# Patient Record
Sex: FEMALE | Race: WHITE | NOT HISPANIC OR LATINO | Employment: OTHER | URBAN - NONMETROPOLITAN AREA
[De-identification: names, ages, dates, MRNs, and addresses within clinical notes are randomized per-mention and may not be internally consistent; named-entity substitution may affect disease eponyms.]

---

## 2017-07-20 ENCOUNTER — PREP FOR SURGERY (OUTPATIENT)
Dept: OTHER | Facility: HOSPITAL | Age: 47
End: 2017-07-20

## 2017-07-20 ENCOUNTER — OFFICE VISIT (OUTPATIENT)
Dept: SURGERY | Facility: CLINIC | Age: 47
End: 2017-07-20

## 2017-07-20 VITALS
BODY MASS INDEX: 43.32 KG/M2 | HEIGHT: 65 IN | DIASTOLIC BLOOD PRESSURE: 72 MMHG | SYSTOLIC BLOOD PRESSURE: 124 MMHG | WEIGHT: 260 LBS

## 2017-07-20 DIAGNOSIS — K81.1 CHRONIC CHOLECYSTITIS: Primary | ICD-10-CM

## 2017-07-20 PROCEDURE — 99204 OFFICE O/P NEW MOD 45 MIN: CPT | Performed by: SURGERY

## 2017-07-20 RX ORDER — SODIUM CHLORIDE 0.9 % (FLUSH) 0.9 %
1-10 SYRINGE (ML) INJECTION AS NEEDED
Status: CANCELLED | OUTPATIENT
Start: 2017-07-24

## 2017-07-20 RX ORDER — PANTOPRAZOLE SODIUM 40 MG/1
40 TABLET, DELAYED RELEASE ORAL DAILY
COMMUNITY
Start: 2017-07-10 | End: 2019-08-29 | Stop reason: HOSPADM

## 2017-07-20 NOTE — PROGRESS NOTES
Subjective   Maricarmen Ferris is a 46 y.o. female.   Referral from [facility] abd pain  History of Present Illness   Ms Ferris is a 46-year-old lady with a several month history of epigastric and right upper quadrant abdominal pain.  It is worse after meals.  She has associated nausea and vomiting.  She also having some reflux symptoms.  She's having some belching.  Also worse at night.  The pain does not radiate.  Just epigastric and right upper quadrant regions.    Past history of anxiety, heartburn, high blood pressure.    Past surgical history of uterine ablation.      Current Outpatient Prescriptions:   •  buPROPion (WELLBUTRIN) 100 MG tablet, Take 100 mg by mouth., Disp: , Rfl:   •  lisinopril (PRINIVIL,ZESTRIL) 10 MG tablet, TAKE ONE TABLET BY MOUTH DAILY, Disp: 30 tablet, Rfl: 0  •  naproxen (NAPROSYN) 500 MG tablet, Take 500 mg by mouth 2 (Two) Times a Day As Needed for mild pain (1-3)., Disp: , Rfl:   •  oxybutynin (DITROPAN) 5 MG tablet, Take 5 mg by mouth 2 (Two) Times a Day., Disp: , Rfl:   •  pantoprazole (PROTONIX) 40 MG EC tablet, 1 tablet., Disp: , Rfl:   •  sertraline (ZOLOFT) 50 MG tablet, Take 50 mg by mouth Daily., Disp: , Rfl:     Allergic to sulfa drugs.    Family history diabetes, heart disease, kidney disease, gallstones, high blood pressure.    Social history patient is unemployed and .  She has 3 kids and staged takes care of 2 grandkids regularly.  She doesn't smoke and drinks socially.  The following portions of the patient's history were reviewed and updated as appropriate: allergies, current medications, past family history, past medical history, past social history, past surgical history and problem list.    Review of Systems   Constitutional: Negative.  Negative for appetite change, chills, fever and unexpected weight change.   HENT: Negative for hearing loss, nosebleeds and trouble swallowing.    Eyes: Negative.  Negative for visual disturbance.   Respiratory: Positive  for chest tightness and shortness of breath. Negative for apnea, cough, choking, wheezing and stridor.    Cardiovascular: Negative.  Negative for chest pain, palpitations and leg swelling.   Gastrointestinal: Positive for abdominal pain, nausea and vomiting. Negative for abdominal distention, blood in stool, constipation and diarrhea.   Endocrine: Negative.  Negative for cold intolerance, heat intolerance, polydipsia, polyphagia and polyuria.   Genitourinary: Negative.  Negative for difficulty urinating, dysuria, frequency, hematuria and urgency.   Musculoskeletal: Positive for back pain. Negative for arthralgias, myalgias and neck pain.   Skin: Negative.  Negative for color change, pallor and rash.   Allergic/Immunologic: Negative.  Negative for immunocompromised state.   Neurological: Positive for headaches. Negative for dizziness, seizures, syncope, light-headedness and numbness.   Hematological: Negative.  Negative for adenopathy.   Psychiatric/Behavioral: Positive for dysphoric mood. Negative for suicidal ideas. The patient is not nervous/anxious.        Objective   Physical Exam   Constitutional: She is oriented to person, place, and time. She appears well-developed and well-nourished.   HENT:   Head: Normocephalic and atraumatic.   Eyes: EOM are normal. Pupils are equal, round, and reactive to light.   Neck: Normal range of motion. Neck supple.   Cardiovascular: Normal rate and regular rhythm.    Pulmonary/Chest: Effort normal and breath sounds normal.   Abdominal: Soft. Bowel sounds are normal. There is tenderness.   Musculoskeletal: Normal range of motion.   Neurological: She is alert and oriented to person, place, and time.   Skin: Skin is warm and dry.   Psychiatric: She has a normal mood and affect. Her behavior is normal.   Vitals reviewed.  Right upper quadrant abdominal pain on palpation.  Review of gallbladder ultrasound from [place] shows several gallstones and normal common duct diameter.  No  evidence of inflammation.  Labs from [facility] are all normal LFTs and normal pancreatic enzymes.    Assessment/Plan   Maricarmen was seen today for cholelithiasis.    Diagnoses and all orders for this visit:    Chronic cholecystitis     46-year-old lady with chronic cholecystitis.  I recommended a lap cholecystectomy to her.  She agrees and we'll do this on Monday, July 24.  The procedure and risks, benefits, and alternatives to the plan have been discussed patient and she understands and agrees.  Thank you for the consult.

## 2017-07-21 ENCOUNTER — ANESTHESIA EVENT (OUTPATIENT)
Dept: PERIOP | Facility: HOSPITAL | Age: 47
End: 2017-07-21

## 2017-07-21 ENCOUNTER — APPOINTMENT (OUTPATIENT)
Dept: PREADMISSION TESTING | Facility: HOSPITAL | Age: 47
End: 2017-07-21

## 2017-07-21 VITALS
HEART RATE: 68 BPM | OXYGEN SATURATION: 96 % | SYSTOLIC BLOOD PRESSURE: 112 MMHG | WEIGHT: 259 LBS | BODY MASS INDEX: 43.15 KG/M2 | RESPIRATION RATE: 18 BRPM | DIASTOLIC BLOOD PRESSURE: 72 MMHG | HEIGHT: 65 IN

## 2017-07-21 PROCEDURE — 93010 ELECTROCARDIOGRAM REPORT: CPT | Performed by: INTERNAL MEDICINE

## 2017-07-21 PROCEDURE — 93005 ELECTROCARDIOGRAM TRACING: CPT

## 2017-07-21 RX ORDER — SODIUM CHLORIDE, SODIUM GLUCONATE, SODIUM ACETATE, POTASSIUM CHLORIDE, AND MAGNESIUM CHLORIDE 526; 502; 368; 37; 30 MG/100ML; MG/100ML; MG/100ML; MG/100ML; MG/100ML
1000 INJECTION, SOLUTION INTRAVENOUS CONTINUOUS PRN
Status: CANCELLED | OUTPATIENT
Start: 2017-07-24

## 2017-07-21 RX ORDER — LISINOPRIL 10 MG/1
10 TABLET ORAL DAILY
COMMUNITY
End: 2018-01-16 | Stop reason: SDUPTHER

## 2017-07-21 NOTE — DISCHARGE INSTRUCTIONS
[facility]  Pre-op Information and Guidelines    You will be called after 2 p.m. the day before your surgery (Friday for Monday surgery) and notified of your time for arrival and approximate surgery time.  If you have not received a call by 4P.M., please contact Same Day Surgery at [phone] of if outside [facility] call [phone].    Please Follow these Important Safety Guidelines:    • The morning of your procedure, take only the medications listed below with   A sip of water:_____________________________________________       ______________________________________________    • DO NOT eat or drink anything after 12:00 midnight the night before surgery  Specific instructions concerning drinking clear liquids will be discussed during  the pre-surgery instruction call the day before your surgery.    • If you take a blood thinner (ex. Plavix, Coumadin, aspirin), ask your doctor when to stop it before surgery  STOP DATE: _________________    • Only 2 visitors are allowed in patient rooms at a time  Your visitors will be asked to wait in the lobby until the admission process is complete with the exception of a parent with a child and patients in need of special assistance.    • YOU CANNOT DRIVE YOURSELF HOME  You must be accompanied by someone who will be responsible for driving you home after surgery and for your care at home.    • DO NOT chew gum, use breath mints, hard candy, or smoke the day of surgery  • DO NOT drink alcohol for at least 24 hours before your surgery  • DO NOT wear any jewelry and remove all body piercing before coming to the hospital  • DO NOT wear make-up to the hospital  • If you are having surgery on an extremity (arm/leg/foot) remove nail polish/artificial nails on the surgical side  • Clothing, glasses, contacts, dentures, and hairpieces must be removed before surgery  • Bathe the night before or the morning of your surgery and do not use powders/lotions on  skin.

## 2017-07-21 NOTE — PAT
Chlorhexidine skin prep given with instruction for use, understanding verbalized.    Dr Montoya here to speak with pt and review ekg, no orders received.

## 2017-07-24 ENCOUNTER — HOSPITAL ENCOUNTER (OUTPATIENT)
Facility: HOSPITAL | Age: 47
Setting detail: HOSPITAL OUTPATIENT SURGERY
Discharge: HOME | End: 2017-07-24
Attending: SURGERY | Admitting: SURGERY

## 2017-07-24 ENCOUNTER — ANESTHESIA (OUTPATIENT)
Dept: PERIOP | Facility: HOSPITAL | Age: 47
End: 2017-07-24

## 2017-07-24 VITALS
WEIGHT: 260.14 LBS | TEMPERATURE: 97 F | OXYGEN SATURATION: 94 % | SYSTOLIC BLOOD PRESSURE: 147 MMHG | HEIGHT: 65 IN | BODY MASS INDEX: 43.34 KG/M2 | DIASTOLIC BLOOD PRESSURE: 77 MMHG | RESPIRATION RATE: 20 BRPM | HEART RATE: 65 BPM

## 2017-07-24 DIAGNOSIS — K81.1 CHRONIC CHOLECYSTITIS: ICD-10-CM

## 2017-07-24 PROCEDURE — 88304 TISSUE EXAM BY PATHOLOGIST: CPT | Performed by: PATHOLOGY

## 2017-07-24 PROCEDURE — 47562 LAPAROSCOPIC CHOLECYSTECTOMY: CPT | Performed by: SURGERY

## 2017-07-24 PROCEDURE — 49585 PR REPAIR UMBILICAL HERN,5+Y/O,REDUC: CPT | Performed by: SURGERY

## 2017-07-24 RX ORDER — SODIUM CHLORIDE 0.9 % (FLUSH) 0.9 %
1-10 SYRINGE (ML) INJECTION AS NEEDED
Status: DISCONTINUED | OUTPATIENT
Start: 2017-07-24 | End: 2017-07-24 | Stop reason: HOSPADM

## 2017-07-24 RX ORDER — LIDOCAINE HYDROCHLORIDE 20 MG/ML
INJECTION, SOLUTION INFILTRATION; PERINEURAL AS NEEDED
Status: DISCONTINUED | OUTPATIENT
Start: 2017-07-24 | End: 2017-07-24 | Stop reason: SURG

## 2017-07-24 RX ORDER — SUCCINYLCHOLINE CHLORIDE 20 MG/ML
INJECTION INTRAMUSCULAR; INTRAVENOUS AS NEEDED
Status: DISCONTINUED | OUTPATIENT
Start: 2017-07-24 | End: 2017-07-24 | Stop reason: SURG

## 2017-07-24 RX ORDER — NALOXONE HCL 0.4 MG/ML
0.2 VIAL (ML) INJECTION AS NEEDED
Status: DISCONTINUED | OUTPATIENT
Start: 2017-07-24 | End: 2017-07-24 | Stop reason: HOSPADM

## 2017-07-24 RX ORDER — ACETAMINOPHEN 325 MG/1
650 TABLET ORAL ONCE AS NEEDED
Status: DISCONTINUED | OUTPATIENT
Start: 2017-07-24 | End: 2017-07-24 | Stop reason: HOSPADM

## 2017-07-24 RX ORDER — FLUMAZENIL 0.1 MG/ML
0.2 INJECTION INTRAVENOUS AS NEEDED
Status: DISCONTINUED | OUTPATIENT
Start: 2017-07-24 | End: 2017-07-24 | Stop reason: HOSPADM

## 2017-07-24 RX ORDER — ONDANSETRON 2 MG/ML
4 INJECTION INTRAMUSCULAR; INTRAVENOUS ONCE AS NEEDED
Status: DISCONTINUED | OUTPATIENT
Start: 2017-07-24 | End: 2017-07-24 | Stop reason: HOSPADM

## 2017-07-24 RX ORDER — ACETAMINOPHEN 650 MG/1
650 SUPPOSITORY RECTAL ONCE AS NEEDED
Status: DISCONTINUED | OUTPATIENT
Start: 2017-07-24 | End: 2017-07-24 | Stop reason: HOSPADM

## 2017-07-24 RX ORDER — FENTANYL CITRATE 50 UG/ML
INJECTION, SOLUTION INTRAMUSCULAR; INTRAVENOUS AS NEEDED
Status: DISCONTINUED | OUTPATIENT
Start: 2017-07-24 | End: 2017-07-24 | Stop reason: SURG

## 2017-07-24 RX ORDER — GLYCOPYRROLATE 0.2 MG/ML
INJECTION INTRAMUSCULAR; INTRAVENOUS AS NEEDED
Status: DISCONTINUED | OUTPATIENT
Start: 2017-07-24 | End: 2017-07-24 | Stop reason: SURG

## 2017-07-24 RX ORDER — DEXAMETHASONE SODIUM PHOSPHATE 4 MG/ML
INJECTION, SOLUTION INTRA-ARTICULAR; INTRALESIONAL; INTRAMUSCULAR; INTRAVENOUS; SOFT TISSUE AS NEEDED
Status: DISCONTINUED | OUTPATIENT
Start: 2017-07-24 | End: 2017-07-24 | Stop reason: SURG

## 2017-07-24 RX ORDER — ROCURONIUM BROMIDE 10 MG/ML
INJECTION, SOLUTION INTRAVENOUS AS NEEDED
Status: DISCONTINUED | OUTPATIENT
Start: 2017-07-24 | End: 2017-07-24 | Stop reason: SURG

## 2017-07-24 RX ORDER — DIPHENHYDRAMINE HYDROCHLORIDE 50 MG/ML
12.5 INJECTION INTRAMUSCULAR; INTRAVENOUS
Status: DISCONTINUED | OUTPATIENT
Start: 2017-07-24 | End: 2017-07-24 | Stop reason: HOSPADM

## 2017-07-24 RX ORDER — BUPIVACAINE HYDROCHLORIDE AND EPINEPHRINE 5; 5 MG/ML; UG/ML
INJECTION, SOLUTION EPIDURAL; INTRACAUDAL; PERINEURAL AS NEEDED
Status: DISCONTINUED | OUTPATIENT
Start: 2017-07-24 | End: 2017-07-24 | Stop reason: HOSPADM

## 2017-07-24 RX ORDER — HYDROCODONE BITARTRATE AND ACETAMINOPHEN 7.5; 325 MG/1; MG/1
1 TABLET ORAL ONCE AS NEEDED
Status: COMPLETED | OUTPATIENT
Start: 2017-07-24 | End: 2017-07-24

## 2017-07-24 RX ORDER — HYDROCODONE BITARTRATE AND ACETAMINOPHEN 7.5; 325 MG/1; MG/1
1-2 TABLET ORAL EVERY 6 HOURS PRN
Qty: 30 TABLET | Refills: 0 | Status: ON HOLD | OUTPATIENT
Start: 2017-07-24 | End: 2017-07-31

## 2017-07-24 RX ORDER — LABETALOL HYDROCHLORIDE 5 MG/ML
5 INJECTION, SOLUTION INTRAVENOUS
Status: DISCONTINUED | OUTPATIENT
Start: 2017-07-24 | End: 2017-07-24 | Stop reason: HOSPADM

## 2017-07-24 RX ORDER — HYDRALAZINE HYDROCHLORIDE 20 MG/ML
5 INJECTION INTRAMUSCULAR; INTRAVENOUS
Status: DISCONTINUED | OUTPATIENT
Start: 2017-07-24 | End: 2017-07-24 | Stop reason: HOSPADM

## 2017-07-24 RX ORDER — PROPOFOL 10 MG/ML
VIAL (ML) INTRAVENOUS AS NEEDED
Status: DISCONTINUED | OUTPATIENT
Start: 2017-07-24 | End: 2017-07-24 | Stop reason: SURG

## 2017-07-24 RX ORDER — MIDAZOLAM HYDROCHLORIDE 1 MG/ML
INJECTION INTRAMUSCULAR; INTRAVENOUS AS NEEDED
Status: DISCONTINUED | OUTPATIENT
Start: 2017-07-24 | End: 2017-07-24 | Stop reason: SURG

## 2017-07-24 RX ORDER — KETOROLAC TROMETHAMINE 30 MG/ML
INJECTION, SOLUTION INTRAMUSCULAR; INTRAVENOUS AS NEEDED
Status: DISCONTINUED | OUTPATIENT
Start: 2017-07-24 | End: 2017-07-24 | Stop reason: SURG

## 2017-07-24 RX ORDER — ONDANSETRON 2 MG/ML
INJECTION INTRAMUSCULAR; INTRAVENOUS AS NEEDED
Status: DISCONTINUED | OUTPATIENT
Start: 2017-07-24 | End: 2017-07-24 | Stop reason: SURG

## 2017-07-24 RX ORDER — EPHEDRINE SULFATE 50 MG/ML
5 INJECTION, SOLUTION INTRAVENOUS ONCE AS NEEDED
Status: DISCONTINUED | OUTPATIENT
Start: 2017-07-24 | End: 2017-07-24 | Stop reason: HOSPADM

## 2017-07-24 RX ORDER — SODIUM CHLORIDE, SODIUM GLUCONATE, SODIUM ACETATE, POTASSIUM CHLORIDE, AND MAGNESIUM CHLORIDE 526; 502; 368; 37; 30 MG/100ML; MG/100ML; MG/100ML; MG/100ML; MG/100ML
1000 INJECTION, SOLUTION INTRAVENOUS CONTINUOUS PRN
Status: DISCONTINUED | OUTPATIENT
Start: 2017-07-24 | End: 2017-07-24 | Stop reason: HOSPADM

## 2017-07-24 RX ADMIN — LABETALOL HYDROCHLORIDE 5 MG: 5 INJECTION, SOLUTION INTRAVENOUS at 09:51

## 2017-07-24 RX ADMIN — HYDRALAZINE HYDROCHLORIDE 5 MG: 20 INJECTION INTRAMUSCULAR; INTRAVENOUS at 10:44

## 2017-07-24 RX ADMIN — LIDOCAINE HYDROCHLORIDE 50 MG: 20 INJECTION, SOLUTION INFILTRATION; PERINEURAL at 07:13

## 2017-07-24 RX ADMIN — SUCCINYLCHOLINE CHLORIDE 180 MG: 20 INJECTION, SOLUTION INTRAMUSCULAR; INTRAVENOUS at 07:13

## 2017-07-24 RX ADMIN — KETOROLAC TROMETHAMINE 30 MG: 30 INJECTION, SOLUTION INTRAMUSCULAR at 08:52

## 2017-07-24 RX ADMIN — ROCURONIUM BROMIDE 5 MG: 10 INJECTION INTRAVENOUS at 07:13

## 2017-07-24 RX ADMIN — LIDOCAINE HYDROCHLORIDE 50 MG: 20 INJECTION, SOLUTION INFILTRATION; PERINEURAL at 08:55

## 2017-07-24 RX ADMIN — MIDAZOLAM 2 MG: 1 INJECTION INTRAMUSCULAR; INTRAVENOUS at 07:08

## 2017-07-24 RX ADMIN — CEFOXITIN 2 G: 2 INJECTION, POWDER, FOR SOLUTION INTRAVENOUS at 07:21

## 2017-07-24 RX ADMIN — ONDANSETRON 4 MG: 2 INJECTION INTRAMUSCULAR; INTRAVENOUS at 08:35

## 2017-07-24 RX ADMIN — PROPOFOL 50 MG: 10 INJECTION, EMULSION INTRAVENOUS at 07:17

## 2017-07-24 RX ADMIN — LABETALOL HYDROCHLORIDE 5 MG: 5 INJECTION, SOLUTION INTRAVENOUS at 09:32

## 2017-07-24 RX ADMIN — PROPOFOL 50 MG: 10 INJECTION, EMULSION INTRAVENOUS at 07:35

## 2017-07-24 RX ADMIN — DEXAMETHASONE SODIUM PHOSPHATE 4 MG: 4 INJECTION, SOLUTION INTRAMUSCULAR; INTRAVENOUS at 07:13

## 2017-07-24 RX ADMIN — HYDROMORPHONE HYDROCHLORIDE 0.5 MG: 1 INJECTION, SOLUTION INTRAMUSCULAR; INTRAVENOUS; SUBCUTANEOUS at 09:17

## 2017-07-24 RX ADMIN — FENTANYL CITRATE 50 MCG: 50 INJECTION, SOLUTION INTRAMUSCULAR; INTRAVENOUS at 07:57

## 2017-07-24 RX ADMIN — PROPOFOL 200 MG: 10 INJECTION, EMULSION INTRAVENOUS at 07:13

## 2017-07-24 RX ADMIN — NEOSTIGMINE METHYLSULFATE 3 MG: 1 INJECTION, SOLUTION INTRAMUSCULAR; INTRAVENOUS; SUBCUTANEOUS at 08:48

## 2017-07-24 RX ADMIN — HYDROCODONE BITARTRATE AND ACETAMINOPHEN 1 TABLET: 7.5; 325 TABLET ORAL at 11:23

## 2017-07-24 RX ADMIN — GLYCOPYRROLATE 0.6 MG: 0.2 INJECTION, SOLUTION INTRAMUSCULAR; INTRAVENOUS at 08:48

## 2017-07-24 RX ADMIN — FENTANYL CITRATE 50 MCG: 50 INJECTION, SOLUTION INTRAMUSCULAR; INTRAVENOUS at 07:45

## 2017-07-24 RX ADMIN — ROCURONIUM BROMIDE 30 MG: 10 INJECTION INTRAVENOUS at 07:30

## 2017-07-24 RX ADMIN — FENTANYL CITRATE 100 MCG: 50 INJECTION, SOLUTION INTRAMUSCULAR; INTRAVENOUS at 07:13

## 2017-07-24 RX ADMIN — HYDRALAZINE HYDROCHLORIDE 5 MG: 20 INJECTION INTRAMUSCULAR; INTRAVENOUS at 10:26

## 2017-07-24 RX ADMIN — ROCURONIUM BROMIDE 20 MG: 10 INJECTION INTRAVENOUS at 07:57

## 2017-07-24 RX ADMIN — SODIUM CHLORIDE, SODIUM GLUCONATE, SODIUM ACETATE, POTASSIUM CHLORIDE, AND MAGNESIUM CHLORIDE 1000 ML: 526; 502; 368; 37; 30 INJECTION, SOLUTION INTRAVENOUS at 05:59

## 2017-07-24 RX ADMIN — HYDROMORPHONE HYDROCHLORIDE 0.5 MG: 1 INJECTION, SOLUTION INTRAMUSCULAR; INTRAVENOUS; SUBCUTANEOUS at 09:40

## 2017-07-24 NOTE — ANESTHESIA PROCEDURE NOTES
Airway  Urgency: elective    Airway not difficult    General Information and Staff    Patient location during procedure: OR  CRNA: KOKO PANDA    Indications and Patient Condition  Indications for airway management: airway protection    Preoxygenated: yes  MILS maintained throughout  Mask difficulty assessment: 2 - vent by mask + OA or adjuvant +/- NMBA    Final Airway Details  Final airway type: endotracheal airway      Successful airway: ETT  Cuffed: yes   Successful intubation technique: video laryngoscopy  Endotracheal tube insertion site: oral  Blade: Parveen  Blade size: #3  ETT size: 7.0 mm  Cormack-Lehane Classification: grade IIa - partial view of glottis  Placement verified by: chest auscultation   Cuff volume (mL): 7  Measured from: lips  ETT to lips (cm): 21  Number of attempts at approach: 2

## 2017-07-24 NOTE — ANESTHESIA POSTPROCEDURE EVALUATION
Patient: Maricarmen Ferris    Procedure Summary     Date Anesthesia Start Anesthesia Stop Room / Location    07/24/17 0708 0904  [facility] OR 03 / [facility] OR       Procedure Diagnosis Surgeon Provider    CHOLECYSTECTOMY LAPAROSCOPIC, also umbillical hernia repair (N/A Abdomen) Chronic cholecystitis  (Chronic cholecystitis [K81.1]) MD Bro Melgoza MD          Anesthesia Type: general  Last vitals  BP        Temp        Pulse       Resp        SpO2          Post Anesthesia Care and Evaluation    Patient location during evaluation: PACU  Patient participation: complete - patient participated  Level of consciousness: awake and alert  Pain score: 0  Pain management: adequate  Airway patency: patent  Anesthetic complications: No anesthetic complications  PONV Status: none  Cardiovascular status: acceptable  Respiratory status: acceptable  Hydration status: acceptable

## 2017-07-24 NOTE — ANESTHESIA PREPROCEDURE EVALUATION
Anesthesia Evaluation     Patient summary reviewed and Nursing notes reviewed   no history of anesthetic complications:  NPO Solid Status: > 8 hours  NPO Liquid Status: > 8 hours     Airway   Mallampati: II  TM distance: >3 FB  Neck ROM: full  no difficulty expected  Dental - normal exam     Pulmonary - normal exam   (+) sleep apnea,   Cardiovascular - normal exam    (+) hypertension,       Neuro/Psych  (+) psychiatric history Anxiety and Depression,    GI/Hepatic/Renal/Endo    (+) obesity, morbid obesity, GERD,     Musculoskeletal     Abdominal  - normal exam   Substance History      OB/GYN          Other                                        Anesthesia Plan    ASA 3     general     intravenous induction   Anesthetic plan and risks discussed with patient.

## 2017-07-25 LAB
LAB AP CASE REPORT: NORMAL
Lab: NORMAL
PATH REPORT.FINAL DX SPEC: NORMAL
PATH REPORT.GROSS SPEC: NORMAL

## 2017-07-27 ENCOUNTER — OFFICE VISIT (OUTPATIENT)
Dept: SURGERY | Facility: CLINIC | Age: 47
End: 2017-07-27

## 2017-07-27 ENCOUNTER — APPOINTMENT (OUTPATIENT)
Dept: CT IMAGING | Facility: HOSPITAL | Age: 47
End: 2017-07-27

## 2017-07-27 ENCOUNTER — HOSPITAL ENCOUNTER (INPATIENT)
Facility: HOSPITAL | Age: 47
LOS: 5 days | Discharge: ANOTHER HOSPITAL | End: 2017-08-01
Attending: SURGERY | Admitting: SURGERY

## 2017-07-27 ENCOUNTER — APPOINTMENT (OUTPATIENT)
Dept: GENERAL RADIOLOGY | Facility: HOSPITAL | Age: 47
End: 2017-07-27

## 2017-07-27 VITALS
DIASTOLIC BLOOD PRESSURE: 62 MMHG | SYSTOLIC BLOOD PRESSURE: 122 MMHG | BODY MASS INDEX: 43.15 KG/M2 | WEIGHT: 259 LBS | HEIGHT: 65 IN

## 2017-07-27 DIAGNOSIS — K81.1 CHRONIC CHOLECYSTITIS: Primary | ICD-10-CM

## 2017-07-27 DIAGNOSIS — K42.9 UMBILICAL HERNIA WITHOUT OBSTRUCTION AND WITHOUT GANGRENE: ICD-10-CM

## 2017-07-27 PROBLEM — R10.9 ABDOMINAL PAIN: Status: ACTIVE | Noted: 2017-07-27

## 2017-07-27 LAB
ALBUMIN SERPL-MCNC: 3.8 G/DL (ref 3.4–4.8)
ALBUMIN/GLOB SERPL: 1.2 G/DL (ref 1.1–1.8)
ALP SERPL-CCNC: 86 U/L (ref 38–126)
ALT SERPL W P-5'-P-CCNC: 51 U/L (ref 9–52)
AMYLASE SERPL-CCNC: 41 U/L (ref 50–130)
ANION GAP SERPL CALCULATED.3IONS-SCNC: 12 MMOL/L (ref 5–15)
AST SERPL-CCNC: 41 U/L (ref 14–36)
BASOPHILS # BLD AUTO: 0.02 10*3/MM3 (ref 0–0.2)
BASOPHILS NFR BLD AUTO: 0.1 % (ref 0–2)
BILIRUB SERPL-MCNC: 3 MG/DL (ref 0.2–1.3)
BUN BLD-MCNC: 11 MG/DL (ref 7–21)
BUN/CREAT SERPL: 14.9 (ref 7–25)
CALCIUM SPEC-SCNC: 9.2 MG/DL (ref 8.4–10.2)
CHLORIDE SERPL-SCNC: 95 MMOL/L (ref 95–110)
CK MB SERPL-CCNC: 1.39 NG/ML (ref 0–5)
CK SERPL-CCNC: 47 U/L (ref 30–135)
CK SERPL-CCNC: 52 U/L (ref 30–135)
CO2 SERPL-SCNC: 29 MMOL/L (ref 22–31)
CREAT BLD-MCNC: 0.74 MG/DL (ref 0.5–1)
D-LACTATE SERPL-SCNC: 2.1 MMOL/L (ref 0.5–2)
D-LACTATE SERPL-SCNC: 2.2 MMOL/L (ref 0.5–2)
DEPRECATED RDW RBC AUTO: 41.8 FL (ref 36.4–46.3)
EOSINOPHIL # BLD AUTO: 0.13 10*3/MM3 (ref 0–0.7)
EOSINOPHIL NFR BLD AUTO: 0.9 % (ref 0–7)
ERYTHROCYTE [DISTWIDTH] IN BLOOD BY AUTOMATED COUNT: 13.2 % (ref 11.5–14.5)
GFR SERPL CREATININE-BSD FRML MDRD: 84 ML/MIN/1.73 (ref 58–135)
GLOBULIN UR ELPH-MCNC: 3.1 GM/DL (ref 2.3–3.5)
GLUCOSE BLD-MCNC: 127 MG/DL (ref 60–100)
HCT VFR BLD AUTO: 45.3 % (ref 35–45)
HGB BLD-MCNC: 14.8 G/DL (ref 12–15.5)
IMM GRANULOCYTES # BLD: 0.05 10*3/MM3 (ref 0–0.02)
IMM GRANULOCYTES NFR BLD: 0.3 % (ref 0–0.5)
INR PPP: 0.97 (ref 0.8–1.2)
LIPASE SERPL-CCNC: 62 U/L (ref 23–300)
LYMPHOCYTES # BLD AUTO: 1.9 10*3/MM3 (ref 0.6–4.2)
LYMPHOCYTES NFR BLD AUTO: 13.2 % (ref 10–50)
MAGNESIUM SERPL-MCNC: 1.9 MG/DL (ref 1.6–2.3)
MCH RBC QN AUTO: 28.2 PG (ref 26.5–34)
MCHC RBC AUTO-ENTMCNC: 32.7 G/DL (ref 31.4–36)
MCV RBC AUTO: 86.5 FL (ref 80–98)
MONOCYTES # BLD AUTO: 1.22 10*3/MM3 (ref 0–0.9)
MONOCYTES NFR BLD AUTO: 8.5 % (ref 0–12)
NEUTROPHILS # BLD AUTO: 11.03 10*3/MM3 (ref 2–8.6)
NEUTROPHILS NFR BLD AUTO: 77 % (ref 37–80)
NRBC BLD MANUAL-RTO: 0 /100 WBC (ref 0–0)
NT-PROBNP SERPL-MCNC: 2240 PG/ML (ref 0–450)
PHOSPHATE SERPL-MCNC: 3.8 MG/DL (ref 2.4–4.4)
PLATELET # BLD AUTO: 306 10*3/MM3 (ref 150–450)
PMV BLD AUTO: 10.9 FL (ref 8–12)
POTASSIUM BLD-SCNC: 4.5 MMOL/L (ref 3.5–5.1)
PROT SERPL-MCNC: 6.9 G/DL (ref 6.3–8.6)
PROTHROMBIN TIME: 12.8 SECONDS (ref 11.1–15.3)
RBC # BLD AUTO: 5.24 10*6/MM3 (ref 3.77–5.16)
SODIUM BLD-SCNC: 136 MMOL/L (ref 137–145)
TROPONIN I SERPL-MCNC: 0.34 NG/ML
TROPONIN I SERPL-MCNC: 0.44 NG/ML
WBC NRBC COR # BLD: 14.35 10*3/MM3 (ref 3.2–9.8)

## 2017-07-27 PROCEDURE — 85025 COMPLETE CBC W/AUTO DIFF WBC: CPT | Performed by: SURGERY

## 2017-07-27 PROCEDURE — 25010000002 MORPHINE PER 10 MG: Performed by: SURGERY

## 2017-07-27 PROCEDURE — 82550 ASSAY OF CK (CPK): CPT | Performed by: SURGERY

## 2017-07-27 PROCEDURE — 83690 ASSAY OF LIPASE: CPT | Performed by: SURGERY

## 2017-07-27 PROCEDURE — 84484 ASSAY OF TROPONIN QUANT: CPT | Performed by: SURGERY

## 2017-07-27 PROCEDURE — 80053 COMPREHEN METABOLIC PANEL: CPT | Performed by: SURGERY

## 2017-07-27 PROCEDURE — 85610 PROTHROMBIN TIME: CPT | Performed by: SURGERY

## 2017-07-27 PROCEDURE — 99024 POSTOP FOLLOW-UP VISIT: CPT | Performed by: SURGERY

## 2017-07-27 PROCEDURE — 0 IOPAMIDOL PER 1 ML: Performed by: SURGERY

## 2017-07-27 PROCEDURE — 93010 ELECTROCARDIOGRAM REPORT: CPT | Performed by: INTERNAL MEDICINE

## 2017-07-27 PROCEDURE — 94799 UNLISTED PULMONARY SVC/PX: CPT

## 2017-07-27 PROCEDURE — 84100 ASSAY OF PHOSPHORUS: CPT | Performed by: SURGERY

## 2017-07-27 PROCEDURE — 83605 ASSAY OF LACTIC ACID: CPT | Performed by: SURGERY

## 2017-07-27 PROCEDURE — 71275 CT ANGIOGRAPHY CHEST: CPT

## 2017-07-27 PROCEDURE — 82550 ASSAY OF CK (CPK): CPT | Performed by: INTERNAL MEDICINE

## 2017-07-27 PROCEDURE — 93005 ELECTROCARDIOGRAM TRACING: CPT | Performed by: SURGERY

## 2017-07-27 PROCEDURE — 84484 ASSAY OF TROPONIN QUANT: CPT | Performed by: INTERNAL MEDICINE

## 2017-07-27 PROCEDURE — 82150 ASSAY OF AMYLASE: CPT | Performed by: SURGERY

## 2017-07-27 PROCEDURE — 71010 HC CHEST PA OR AP: CPT

## 2017-07-27 PROCEDURE — 83735 ASSAY OF MAGNESIUM: CPT | Performed by: SURGERY

## 2017-07-27 PROCEDURE — 83880 ASSAY OF NATRIURETIC PEPTIDE: CPT | Performed by: INTERNAL MEDICINE

## 2017-07-27 PROCEDURE — 94760 N-INVAS EAR/PLS OXIMETRY 1: CPT

## 2017-07-27 PROCEDURE — 82553 CREATINE MB FRACTION: CPT | Performed by: INTERNAL MEDICINE

## 2017-07-27 PROCEDURE — 99232 SBSQ HOSP IP/OBS MODERATE 35: CPT | Performed by: INTERNAL MEDICINE

## 2017-07-27 RX ORDER — HYDROCODONE BITARTRATE AND ACETAMINOPHEN 7.5; 325 MG/1; MG/1
2 TABLET ORAL EVERY 6 HOURS PRN
Status: DISCONTINUED | OUTPATIENT
Start: 2017-07-27 | End: 2017-08-01 | Stop reason: HOSPADM

## 2017-07-27 RX ORDER — DIPHENHYDRAMINE HCL 25 MG
25 CAPSULE ORAL NIGHTLY PRN
Status: DISCONTINUED | OUTPATIENT
Start: 2017-07-27 | End: 2017-08-01 | Stop reason: HOSPADM

## 2017-07-27 RX ORDER — NALOXONE HCL 0.4 MG/ML
0.4 VIAL (ML) INJECTION
Status: DISCONTINUED | OUTPATIENT
Start: 2017-07-27 | End: 2017-07-31

## 2017-07-27 RX ORDER — SODIUM CHLORIDE 9 MG/ML
100 INJECTION, SOLUTION INTRAVENOUS CONTINUOUS
Status: DISCONTINUED | OUTPATIENT
Start: 2017-07-27 | End: 2017-07-31

## 2017-07-27 RX ORDER — MORPHINE SULFATE 4 MG/ML
4 INJECTION, SOLUTION INTRAMUSCULAR; INTRAVENOUS
Status: DISCONTINUED | OUTPATIENT
Start: 2017-07-27 | End: 2017-07-31

## 2017-07-27 RX ORDER — SODIUM CHLORIDE 0.9 % (FLUSH) 0.9 %
1-10 SYRINGE (ML) INJECTION AS NEEDED
Status: DISCONTINUED | OUTPATIENT
Start: 2017-07-27 | End: 2017-08-01 | Stop reason: HOSPADM

## 2017-07-27 RX ORDER — ONDANSETRON 2 MG/ML
4 INJECTION INTRAMUSCULAR; INTRAVENOUS EVERY 6 HOURS PRN
Status: DISCONTINUED | OUTPATIENT
Start: 2017-07-27 | End: 2017-08-01 | Stop reason: HOSPADM

## 2017-07-27 RX ADMIN — SODIUM CHLORIDE 100 ML/HR: 900 INJECTION, SOLUTION INTRAVENOUS at 15:26

## 2017-07-27 RX ADMIN — Medication 10 ML: at 15:25

## 2017-07-27 RX ADMIN — SODIUM CHLORIDE 100 ML/HR: 900 INJECTION, SOLUTION INTRAVENOUS at 22:31

## 2017-07-27 RX ADMIN — SODIUM CHLORIDE 500 ML: 9 INJECTION, SOLUTION INTRAVENOUS at 16:46

## 2017-07-27 RX ADMIN — MORPHINE SULFATE 4 MG: 4 INJECTION, SOLUTION INTRAMUSCULAR; INTRAVENOUS at 15:25

## 2017-07-27 RX ADMIN — MORPHINE SULFATE 4 MG: 4 INJECTION, SOLUTION INTRAMUSCULAR; INTRAVENOUS at 19:20

## 2017-07-27 RX ADMIN — MORPHINE SULFATE 4 MG: 4 INJECTION, SOLUTION INTRAMUSCULAR; INTRAVENOUS at 22:31

## 2017-07-27 RX ADMIN — IOPAMIDOL 69 ML: 755 INJECTION, SOLUTION INTRAVENOUS at 19:00

## 2017-07-27 RX ADMIN — HYDROCODONE BITARTRATE AND ACETAMINOPHEN 2 TABLET: 7.5; 325 TABLET ORAL at 17:56

## 2017-07-27 NOTE — PROGRESS NOTES
Ms Ferris is a 46-year-old lady 3 days status post laparoscopic cholecystectomy and umbilical hernia repair.  She is not tolerating her surgery well at all.  She's having pain.  She is having nausea.  She feels weak.  She can't sleep.  The is difficult to take a deep breath.  Overall she is miserable.  She has a cough.  After further discussion her decided to do a direct admission and put in the hospital for symptom control.  We'll also do a workup including labs and chest x-ray to see if we can find a reason for her respiratory symptoms abdominal symptoms.  This most likely is just normal postoperative course and she is just not doing well.

## 2017-07-28 ENCOUNTER — APPOINTMENT (OUTPATIENT)
Dept: CARDIOLOGY | Facility: HOSPITAL | Age: 47
End: 2017-07-28
Attending: INTERNAL MEDICINE

## 2017-07-28 LAB
ALBUMIN SERPL-MCNC: 3.5 G/DL (ref 3.4–4.8)
ALBUMIN/GLOB SERPL: 1 G/DL (ref 1.1–1.8)
ALP SERPL-CCNC: 75 U/L (ref 38–126)
ALT SERPL W P-5'-P-CCNC: 41 U/L (ref 9–52)
ANION GAP SERPL CALCULATED.3IONS-SCNC: 6 MMOL/L (ref 5–15)
AST SERPL-CCNC: 34 U/L (ref 14–36)
BASOPHILS # BLD AUTO: 0.02 10*3/MM3 (ref 0–0.2)
BASOPHILS NFR BLD AUTO: 0.1 % (ref 0–2)
BH CV ECHO MEAS - ACS: 1.8 CM
BH CV ECHO MEAS - AO ISTHMUS: 1.8 CM
BH CV ECHO MEAS - AO MAX PG (FULL): 4.6 MMHG
BH CV ECHO MEAS - AO MAX PG: 13.1 MMHG
BH CV ECHO MEAS - AO MEAN PG (FULL): 2 MMHG
BH CV ECHO MEAS - AO MEAN PG: 6.6 MMHG
BH CV ECHO MEAS - AO ROOT AREA (BSA CORRECTED): 1.2
BH CV ECHO MEAS - AO ROOT AREA: 5.4 CM^2
BH CV ECHO MEAS - AO ROOT DIAM: 2.6 CM
BH CV ECHO MEAS - AO V2 MAX: 181 CM/SEC
BH CV ECHO MEAS - AO V2 MEAN: 120.7 CM/SEC
BH CV ECHO MEAS - AO V2 VTI: 24.2 CM
BH CV ECHO MEAS - ASC AORTA: 2.5 CM
BH CV ECHO MEAS - AVA(I,A): 3.5 CM^2
BH CV ECHO MEAS - AVA(I,D): 3.5 CM^2
BH CV ECHO MEAS - AVA(V,A): 2.9 CM^2
BH CV ECHO MEAS - AVA(V,D): 2.9 CM^2
BH CV ECHO MEAS - BSA(HAYCOCK): 2.4 M^2
BH CV ECHO MEAS - BSA: 2.2 M^2
BH CV ECHO MEAS - BZI_BMI: 44.4 KILOGRAMS/M^2
BH CV ECHO MEAS - BZI_METRIC_HEIGHT: 165.1 CM
BH CV ECHO MEAS - BZI_METRIC_WEIGHT: 121.1 KG
BH CV ECHO MEAS - EDV(CUBED): 140.6 ML
BH CV ECHO MEAS - EDV(TEICH): 129.5 ML
BH CV ECHO MEAS - EF(CUBED): 94.8 %
BH CV ECHO MEAS - EF(TEICH): 90.9 %
BH CV ECHO MEAS - ESV(CUBED): 7.3 ML
BH CV ECHO MEAS - ESV(TEICH): 11.8 ML
BH CV ECHO MEAS - FS: 62.7 %
BH CV ECHO MEAS - IVS/LVPW: 1.8
BH CV ECHO MEAS - IVSD: 1.3 CM
BH CV ECHO MEAS - LA DIMENSION: 4.2 CM
BH CV ECHO MEAS - LA/AO: 1.6
BH CV ECHO MEAS - LV MASS(C)D: 203.5 GRAMS
BH CV ECHO MEAS - LV MASS(C)DI: 91 GRAMS/M^2
BH CV ECHO MEAS - LV MAX PG: 8.5 MMHG
BH CV ECHO MEAS - LV MEAN PG: 4.7 MMHG
BH CV ECHO MEAS - LV V1 MAX: 146.1 CM/SEC
BH CV ECHO MEAS - LV V1 MEAN: 101.3 CM/SEC
BH CV ECHO MEAS - LV V1 VTI: 23.6 CM
BH CV ECHO MEAS - LVIDD: 5.2 CM
BH CV ECHO MEAS - LVIDS: 1.9 CM
BH CV ECHO MEAS - LVOT AREA (M): 3.5 CM^2
BH CV ECHO MEAS - LVOT AREA: 3.6 CM^2
BH CV ECHO MEAS - LVOT DIAM: 2.1 CM
BH CV ECHO MEAS - LVPWD: 0.74 CM
BH CV ECHO MEAS - MV A MAX VEL: 105 CM/SEC
BH CV ECHO MEAS - MV DEC SLOPE: 493.4 CM/SEC^2
BH CV ECHO MEAS - MV E MAX VEL: 67.3 CM/SEC
BH CV ECHO MEAS - MV E/A: 0.64
BH CV ECHO MEAS - MV MAX PG: 3.5 MMHG
BH CV ECHO MEAS - MV MEAN PG: 1.4 MMHG
BH CV ECHO MEAS - MV P1/2T MAX VEL: 76.4 CM/SEC
BH CV ECHO MEAS - MV P1/2T: 45.4 MSEC
BH CV ECHO MEAS - MV V2 MAX: 93.8 CM/SEC
BH CV ECHO MEAS - MV V2 MEAN: 55.2 CM/SEC
BH CV ECHO MEAS - MV V2 VTI: 20.6 CM
BH CV ECHO MEAS - MVA P1/2T LCG: 2.9 CM^2
BH CV ECHO MEAS - MVA(P1/2T): 4.8 CM^2
BH CV ECHO MEAS - MVA(VTI): 4.1 CM^2
BH CV ECHO MEAS - PA MAX PG: 7.9 MMHG
BH CV ECHO MEAS - PA V2 MAX: 140.3 CM/SEC
BH CV ECHO MEAS - PI END-D VEL: 60.3 CM/SEC
BH CV ECHO MEAS - RVDD: 2.5 CM
BH CV ECHO MEAS - SI(AO): 58.9 ML/M^2
BH CV ECHO MEAS - SI(CUBED): 59.6 ML/M^2
BH CV ECHO MEAS - SI(LVOT): 37.5 ML/M^2
BH CV ECHO MEAS - SI(TEICH): 52.6 ML/M^2
BH CV ECHO MEAS - SV(AO): 131.8 ML
BH CV ECHO MEAS - SV(CUBED): 133.3 ML
BH CV ECHO MEAS - SV(LVOT): 83.9 ML
BH CV ECHO MEAS - SV(TEICH): 117.7 ML
BH CV ECHO MEAS - TR MAX VEL: 167.5 CM/SEC
BILIRUB SERPL-MCNC: 3.4 MG/DL (ref 0.2–1.3)
BUN BLD-MCNC: 13 MG/DL (ref 7–21)
BUN/CREAT SERPL: 15.3 (ref 7–25)
CALCIUM SPEC-SCNC: 8.7 MG/DL (ref 8.4–10.2)
CHLORIDE SERPL-SCNC: 97 MMOL/L (ref 95–110)
CO2 SERPL-SCNC: 32 MMOL/L (ref 22–31)
CREAT BLD-MCNC: 0.85 MG/DL (ref 0.5–1)
DEPRECATED RDW RBC AUTO: 42.1 FL (ref 36.4–46.3)
EOSINOPHIL # BLD AUTO: 0.3 10*3/MM3 (ref 0–0.7)
EOSINOPHIL NFR BLD AUTO: 2.2 % (ref 0–7)
ERYTHROCYTE [DISTWIDTH] IN BLOOD BY AUTOMATED COUNT: 13.1 % (ref 11.5–14.5)
GFR SERPL CREATININE-BSD FRML MDRD: 72 ML/MIN/1.73 (ref 58–135)
GLOBULIN UR ELPH-MCNC: 3.4 GM/DL (ref 2.3–3.5)
GLUCOSE BLD-MCNC: 114 MG/DL (ref 60–100)
HCT VFR BLD AUTO: 41.3 % (ref 35–45)
HGB BLD-MCNC: 13.5 G/DL (ref 12–15.5)
IMM GRANULOCYTES # BLD: 0.08 10*3/MM3 (ref 0–0.02)
IMM GRANULOCYTES NFR BLD: 0.6 % (ref 0–0.5)
LYMPHOCYTES # BLD AUTO: 1.83 10*3/MM3 (ref 0.6–4.2)
LYMPHOCYTES NFR BLD AUTO: 13.1 % (ref 10–50)
MCH RBC QN AUTO: 28.5 PG (ref 26.5–34)
MCHC RBC AUTO-ENTMCNC: 32.7 G/DL (ref 31.4–36)
MCV RBC AUTO: 87.3 FL (ref 80–98)
MONOCYTES # BLD AUTO: 1.32 10*3/MM3 (ref 0–0.9)
MONOCYTES NFR BLD AUTO: 9.5 % (ref 0–12)
NEUTROPHILS # BLD AUTO: 10.4 10*3/MM3 (ref 2–8.6)
NEUTROPHILS NFR BLD AUTO: 74.5 % (ref 37–80)
PLATELET # BLD AUTO: 287 10*3/MM3 (ref 150–450)
PMV BLD AUTO: 10.6 FL (ref 8–12)
POTASSIUM BLD-SCNC: 4.1 MMOL/L (ref 3.5–5.1)
PROT SERPL-MCNC: 6.9 G/DL (ref 6.3–8.6)
RBC # BLD AUTO: 4.73 10*6/MM3 (ref 3.77–5.16)
SODIUM BLD-SCNC: 135 MMOL/L (ref 137–145)
TROPONIN I SERPL-MCNC: 0.2 NG/ML
TROPONIN I SERPL-MCNC: 0.28 NG/ML
WBC NRBC COR # BLD: 13.95 10*3/MM3 (ref 3.2–9.8)

## 2017-07-28 PROCEDURE — 25010000002 FUROSEMIDE PER 20 MG: Performed by: SURGERY

## 2017-07-28 PROCEDURE — 94799 UNLISTED PULMONARY SVC/PX: CPT

## 2017-07-28 PROCEDURE — 93306 TTE W/DOPPLER COMPLETE: CPT | Performed by: INTERNAL MEDICINE

## 2017-07-28 PROCEDURE — 84484 ASSAY OF TROPONIN QUANT: CPT | Performed by: INTERNAL MEDICINE

## 2017-07-28 PROCEDURE — 99232 SBSQ HOSP IP/OBS MODERATE 35: CPT | Performed by: INTERNAL MEDICINE

## 2017-07-28 PROCEDURE — 99024 POSTOP FOLLOW-UP VISIT: CPT | Performed by: SURGERY

## 2017-07-28 PROCEDURE — 25010000002 MORPHINE PER 10 MG: Performed by: SURGERY

## 2017-07-28 PROCEDURE — 94760 N-INVAS EAR/PLS OXIMETRY 1: CPT

## 2017-07-28 PROCEDURE — 25010000002 ENOXAPARIN PER 10 MG: Performed by: SURGERY

## 2017-07-28 PROCEDURE — 93306 TTE W/DOPPLER COMPLETE: CPT

## 2017-07-28 PROCEDURE — 80053 COMPREHEN METABOLIC PANEL: CPT | Performed by: SURGERY

## 2017-07-28 PROCEDURE — 25010000002 PIPERACILLIN SOD-TAZOBACTAM PER 1 G: Performed by: SURGERY

## 2017-07-28 PROCEDURE — 85025 COMPLETE CBC W/AUTO DIFF WBC: CPT | Performed by: SURGERY

## 2017-07-28 RX ORDER — DEXTROSE, SODIUM CHLORIDE, AND POTASSIUM CHLORIDE 5; .45; .15 G/100ML; G/100ML; G/100ML
50 INJECTION INTRAVENOUS CONTINUOUS
Status: DISCONTINUED | OUTPATIENT
Start: 2017-07-28 | End: 2017-07-31

## 2017-07-28 RX ORDER — FUROSEMIDE 10 MG/ML
20 INJECTION INTRAMUSCULAR; INTRAVENOUS ONCE
Status: COMPLETED | OUTPATIENT
Start: 2017-07-28 | End: 2017-07-28

## 2017-07-28 RX ORDER — IBUPROFEN 400 MG/1
400 TABLET ORAL EVERY 6 HOURS SCHEDULED
Status: DISCONTINUED | OUTPATIENT
Start: 2017-07-28 | End: 2017-07-31

## 2017-07-28 RX ADMIN — MORPHINE SULFATE 4 MG: 4 INJECTION, SOLUTION INTRAMUSCULAR; INTRAVENOUS at 17:02

## 2017-07-28 RX ADMIN — MORPHINE SULFATE 4 MG: 4 INJECTION, SOLUTION INTRAMUSCULAR; INTRAVENOUS at 23:15

## 2017-07-28 RX ADMIN — MORPHINE SULFATE 4 MG: 4 INJECTION, SOLUTION INTRAMUSCULAR; INTRAVENOUS at 03:19

## 2017-07-28 RX ADMIN — TAZOBACTAM SODIUM AND PIPERACILLIN SODIUM 3.38 G: 375; 3 INJECTION, SOLUTION INTRAVENOUS at 00:11

## 2017-07-28 RX ADMIN — HYDROCODONE BITARTRATE AND ACETAMINOPHEN 2 TABLET: 7.5; 325 TABLET ORAL at 01:36

## 2017-07-28 RX ADMIN — IBUPROFEN 400 MG: 400 TABLET, FILM COATED ORAL at 19:59

## 2017-07-28 RX ADMIN — IBUPROFEN 400 MG: 400 TABLET, FILM COATED ORAL at 14:45

## 2017-07-28 RX ADMIN — MORPHINE SULFATE 4 MG: 4 INJECTION, SOLUTION INTRAMUSCULAR; INTRAVENOUS at 13:53

## 2017-07-28 RX ADMIN — ENOXAPARIN SODIUM 40 MG: 40 INJECTION SUBCUTANEOUS at 14:45

## 2017-07-28 RX ADMIN — HYDROCODONE BITARTRATE AND ACETAMINOPHEN 2 TABLET: 7.5; 325 TABLET ORAL at 21:03

## 2017-07-28 RX ADMIN — HYDROCODONE BITARTRATE AND ACETAMINOPHEN 2 TABLET: 7.5; 325 TABLET ORAL at 07:28

## 2017-07-28 RX ADMIN — HYDROCODONE BITARTRATE AND ACETAMINOPHEN 2 TABLET: 7.5; 325 TABLET ORAL at 14:29

## 2017-07-28 RX ADMIN — POTASSIUM CHLORIDE, DEXTROSE MONOHYDRATE AND SODIUM CHLORIDE 50 ML/HR: 150; 5; 450 INJECTION, SOLUTION INTRAVENOUS at 14:45

## 2017-07-28 RX ADMIN — MORPHINE SULFATE 4 MG: 4 INJECTION, SOLUTION INTRAMUSCULAR; INTRAVENOUS at 09:26

## 2017-07-28 RX ADMIN — FUROSEMIDE 20 MG: 10 INJECTION, SOLUTION INTRAVENOUS at 14:45

## 2017-07-28 RX ADMIN — TAZOBACTAM SODIUM AND PIPERACILLIN SODIUM 3.38 G: 375; 3 INJECTION, SOLUTION INTRAVENOUS at 17:12

## 2017-07-28 RX ADMIN — TAZOBACTAM SODIUM AND PIPERACILLIN SODIUM 3.38 G: 375; 3 INJECTION, SOLUTION INTRAVENOUS at 09:18

## 2017-07-28 RX ADMIN — MORPHINE SULFATE 4 MG: 4 INJECTION, SOLUTION INTRAMUSCULAR; INTRAVENOUS at 19:58

## 2017-07-28 RX ADMIN — SODIUM CHLORIDE 100 ML/HR: 900 INJECTION, SOLUTION INTRAVENOUS at 09:18

## 2017-07-29 ENCOUNTER — APPOINTMENT (OUTPATIENT)
Dept: MRI IMAGING | Facility: HOSPITAL | Age: 47
End: 2017-07-29

## 2017-07-29 ENCOUNTER — APPOINTMENT (OUTPATIENT)
Dept: NUCLEAR MEDICINE | Facility: HOSPITAL | Age: 47
End: 2017-07-29

## 2017-07-29 LAB
ALBUMIN SERPL-MCNC: 3.3 G/DL (ref 3.4–4.8)
ALBUMIN/GLOB SERPL: 1.1 G/DL (ref 1.1–1.8)
ALP SERPL-CCNC: 72 U/L (ref 38–126)
ALT SERPL W P-5'-P-CCNC: 41 U/L (ref 9–52)
ANION GAP SERPL CALCULATED.3IONS-SCNC: 5 MMOL/L (ref 5–15)
APTT PPP: 31.4 SECONDS (ref 20–40.3)
AST SERPL-CCNC: 33 U/L (ref 14–36)
BASOPHILS # BLD AUTO: 0.02 10*3/MM3 (ref 0–0.2)
BASOPHILS NFR BLD AUTO: 0.2 % (ref 0–2)
BILIRUB CONJ SERPL-MCNC: 1.1 MG/DL (ref 0–0.3)
BILIRUB INDIRECT SERPL-MCNC: 0.9 MG/DL (ref 0–1.1)
BILIRUB SERPL-MCNC: 3.6 MG/DL (ref 0.2–1.3)
BILIRUB SERPL-MCNC: 3.6 MG/DL (ref 0.2–1.3)
BUN BLD-MCNC: 13 MG/DL (ref 7–21)
BUN/CREAT SERPL: 15.5 (ref 7–25)
CALCIUM SPEC-SCNC: 8.8 MG/DL (ref 8.4–10.2)
CHLORIDE SERPL-SCNC: 95 MMOL/L (ref 95–110)
CO2 SERPL-SCNC: 33 MMOL/L (ref 22–31)
CREAT BLD-MCNC: 0.84 MG/DL (ref 0.5–1)
DEPRECATED RDW RBC AUTO: 42 FL (ref 36.4–46.3)
EOSINOPHIL # BLD AUTO: 0.59 10*3/MM3 (ref 0–0.7)
EOSINOPHIL NFR BLD AUTO: 4.9 % (ref 0–7)
ERYTHROCYTE [DISTWIDTH] IN BLOOD BY AUTOMATED COUNT: 13 % (ref 11.5–14.5)
GFR SERPL CREATININE-BSD FRML MDRD: 73 ML/MIN/1.73 (ref 60–135)
GLOBULIN UR ELPH-MCNC: 3.1 GM/DL (ref 2.3–3.5)
GLUCOSE BLD-MCNC: 110 MG/DL (ref 60–100)
HCT VFR BLD AUTO: 38.3 % (ref 35–45)
HGB BLD-MCNC: 12.4 G/DL (ref 12–15.5)
IMM GRANULOCYTES # BLD: 0.02 10*3/MM3 (ref 0–0.02)
IMM GRANULOCYTES NFR BLD: 0.2 % (ref 0–0.5)
INR PPP: 0.95 (ref 0.8–1.2)
LYMPHOCYTES # BLD AUTO: 1.7 10*3/MM3 (ref 0.6–4.2)
LYMPHOCYTES NFR BLD AUTO: 14.1 % (ref 10–50)
MCH RBC QN AUTO: 28.2 PG (ref 26.5–34)
MCHC RBC AUTO-ENTMCNC: 32.4 G/DL (ref 31.4–36)
MCV RBC AUTO: 87.2 FL (ref 80–98)
MONOCYTES # BLD AUTO: 1.47 10*3/MM3 (ref 0–0.9)
MONOCYTES NFR BLD AUTO: 12.2 % (ref 0–12)
NEUTROPHILS # BLD AUTO: 8.25 10*3/MM3 (ref 2–8.6)
NEUTROPHILS NFR BLD AUTO: 68.4 % (ref 37–80)
PLATELET # BLD AUTO: 278 10*3/MM3 (ref 150–450)
PMV BLD AUTO: 10.9 FL (ref 8–12)
POTASSIUM BLD-SCNC: 3.7 MMOL/L (ref 3.5–5.1)
PROT SERPL-MCNC: 6.4 G/DL (ref 6.3–8.6)
PROTHROMBIN TIME: 12.6 SECONDS (ref 11.1–15.3)
RBC # BLD AUTO: 4.39 10*6/MM3 (ref 3.77–5.16)
SODIUM BLD-SCNC: 133 MMOL/L (ref 137–145)
WBC NRBC COR # BLD: 12.05 10*3/MM3 (ref 3.2–9.8)

## 2017-07-29 PROCEDURE — 85025 COMPLETE CBC W/AUTO DIFF WBC: CPT | Performed by: SURGERY

## 2017-07-29 PROCEDURE — 82248 BILIRUBIN DIRECT: CPT | Performed by: INTERNAL MEDICINE

## 2017-07-29 PROCEDURE — 25010000002 MORPHINE PER 10 MG: Performed by: SURGERY

## 2017-07-29 PROCEDURE — 85730 THROMBOPLASTIN TIME PARTIAL: CPT | Performed by: SURGERY

## 2017-07-29 PROCEDURE — 85610 PROTHROMBIN TIME: CPT | Performed by: SURGERY

## 2017-07-29 PROCEDURE — 25010000002 GADOTERIDOL PER 1 ML: Performed by: SURGERY

## 2017-07-29 PROCEDURE — A9537 TC99M MEBROFENIN: HCPCS | Performed by: SURGERY

## 2017-07-29 PROCEDURE — 0 TECHNETIUM TC 99M MEBROFENIN KIT: Performed by: SURGERY

## 2017-07-29 PROCEDURE — 78226 HEPATOBILIARY SYSTEM IMAGING: CPT

## 2017-07-29 PROCEDURE — 74183 MRI ABD W/O CNTR FLWD CNTR: CPT

## 2017-07-29 PROCEDURE — 82247 BILIRUBIN TOTAL: CPT | Performed by: INTERNAL MEDICINE

## 2017-07-29 PROCEDURE — A9576 INJ PROHANCE MULTIPACK: HCPCS | Performed by: SURGERY

## 2017-07-29 PROCEDURE — 25010000002 PIPERACILLIN SOD-TAZOBACTAM PER 1 G: Performed by: SURGERY

## 2017-07-29 PROCEDURE — 80053 COMPREHEN METABOLIC PANEL: CPT | Performed by: SURGERY

## 2017-07-29 RX ORDER — FLUCONAZOLE 150 MG/1
150 TABLET ORAL ONCE
Status: COMPLETED | OUTPATIENT
Start: 2017-07-29 | End: 2017-07-29

## 2017-07-29 RX ORDER — KIT FOR THE PREPARATION OF TECHNETIUM TC 99M MEBROFENIN 45 MG/10ML
1 INJECTION, POWDER, LYOPHILIZED, FOR SOLUTION INTRAVENOUS
Status: COMPLETED | OUTPATIENT
Start: 2017-07-29 | End: 2017-07-29

## 2017-07-29 RX ADMIN — MORPHINE SULFATE 4 MG: 4 INJECTION, SOLUTION INTRAMUSCULAR; INTRAVENOUS at 20:24

## 2017-07-29 RX ADMIN — MORPHINE SULFATE 4 MG: 4 INJECTION, SOLUTION INTRAMUSCULAR; INTRAVENOUS at 11:21

## 2017-07-29 RX ADMIN — FLUCONAZOLE 150 MG: 150 TABLET ORAL at 16:47

## 2017-07-29 RX ADMIN — HYDROCODONE BITARTRATE AND ACETAMINOPHEN 2 TABLET: 7.5; 325 TABLET ORAL at 16:51

## 2017-07-29 RX ADMIN — GADOTERIDOL 20 ML: 279.3 INJECTION, SOLUTION INTRAVENOUS at 11:00

## 2017-07-29 RX ADMIN — HYDROCODONE BITARTRATE AND ACETAMINOPHEN 2 TABLET: 7.5; 325 TABLET ORAL at 02:52

## 2017-07-29 RX ADMIN — IBUPROFEN 400 MG: 400 TABLET, FILM COATED ORAL at 05:26

## 2017-07-29 RX ADMIN — MEBROFENIN 1 DOSE: 45 INJECTION, POWDER, LYOPHILIZED, FOR SOLUTION INTRAVENOUS at 15:33

## 2017-07-29 RX ADMIN — TAZOBACTAM SODIUM AND PIPERACILLIN SODIUM 3.38 G: 375; 3 INJECTION, SOLUTION INTRAVENOUS at 11:26

## 2017-07-29 RX ADMIN — IBUPROFEN 400 MG: 400 TABLET, FILM COATED ORAL at 00:43

## 2017-07-29 RX ADMIN — TAZOBACTAM SODIUM AND PIPERACILLIN SODIUM 3.38 G: 375; 3 INJECTION, SOLUTION INTRAVENOUS at 16:00

## 2017-07-29 RX ADMIN — POTASSIUM CHLORIDE, DEXTROSE MONOHYDRATE AND SODIUM CHLORIDE 50 ML/HR: 150; 5; 450 INJECTION, SOLUTION INTRAVENOUS at 11:29

## 2017-07-29 RX ADMIN — MORPHINE SULFATE 4 MG: 4 INJECTION, SOLUTION INTRAMUSCULAR; INTRAVENOUS at 23:39

## 2017-07-29 RX ADMIN — TAZOBACTAM SODIUM AND PIPERACILLIN SODIUM 3.38 G: 375; 3 INJECTION, SOLUTION INTRAVENOUS at 00:43

## 2017-07-29 RX ADMIN — HYDROCODONE BITARTRATE AND ACETAMINOPHEN 2 TABLET: 7.5; 325 TABLET ORAL at 08:43

## 2017-07-29 RX ADMIN — IBUPROFEN 400 MG: 400 TABLET, FILM COATED ORAL at 11:29

## 2017-07-29 RX ADMIN — MORPHINE SULFATE 4 MG: 4 INJECTION, SOLUTION INTRAMUSCULAR; INTRAVENOUS at 05:26

## 2017-07-30 ENCOUNTER — APPOINTMENT (OUTPATIENT)
Dept: CT IMAGING | Facility: HOSPITAL | Age: 47
End: 2017-07-30

## 2017-07-30 LAB
BASOPHILS # BLD AUTO: 0.02 10*3/MM3 (ref 0–0.2)
BASOPHILS NFR BLD AUTO: 0.2 % (ref 0–2)
DEPRECATED RDW RBC AUTO: 40.4 FL (ref 36.4–46.3)
EOSINOPHIL # BLD AUTO: 0.62 10*3/MM3 (ref 0–0.7)
EOSINOPHIL NFR BLD AUTO: 5.4 % (ref 0–7)
ERYTHROCYTE [DISTWIDTH] IN BLOOD BY AUTOMATED COUNT: 12.9 % (ref 11.5–14.5)
HCT VFR BLD AUTO: 39 % (ref 35–45)
HGB BLD-MCNC: 12.6 G/DL (ref 12–15.5)
IMM GRANULOCYTES # BLD: 0.03 10*3/MM3 (ref 0–0.02)
IMM GRANULOCYTES NFR BLD: 0.3 % (ref 0–0.5)
LYMPHOCYTES # BLD AUTO: 1.81 10*3/MM3 (ref 0.6–4.2)
LYMPHOCYTES NFR BLD AUTO: 15.8 % (ref 10–50)
MCH RBC QN AUTO: 27.7 PG (ref 26.5–34)
MCHC RBC AUTO-ENTMCNC: 32.3 G/DL (ref 31.4–36)
MCV RBC AUTO: 85.7 FL (ref 80–98)
MONOCYTES # BLD AUTO: 1.34 10*3/MM3 (ref 0–0.9)
MONOCYTES NFR BLD AUTO: 11.7 % (ref 0–12)
NEUTROPHILS # BLD AUTO: 7.6 10*3/MM3 (ref 2–8.6)
NEUTROPHILS NFR BLD AUTO: 66.6 % (ref 37–80)
NRBC BLD MANUAL-RTO: 0 /100 WBC (ref 0–0)
PLATELET # BLD AUTO: 321 10*3/MM3 (ref 150–450)
PMV BLD AUTO: 10.8 FL (ref 8–12)
RBC # BLD AUTO: 4.55 10*6/MM3 (ref 3.77–5.16)
WBC NRBC COR # BLD: 11.42 10*3/MM3 (ref 3.2–9.8)

## 2017-07-30 PROCEDURE — 87206 SMEAR FLUORESCENT/ACID STAI: CPT | Performed by: RADIOLOGY

## 2017-07-30 PROCEDURE — 87116 MYCOBACTERIA CULTURE: CPT | Performed by: RADIOLOGY

## 2017-07-30 PROCEDURE — 25010000002 MIDAZOLAM PER 1 MG: Performed by: RADIOLOGY

## 2017-07-30 PROCEDURE — 0W9F30Z DRAINAGE OF ABDOMINAL WALL WITH DRAINAGE DEVICE, PERCUTANEOUS APPROACH: ICD-10-PCS | Performed by: RADIOLOGY

## 2017-07-30 PROCEDURE — 87070 CULTURE OTHR SPECIMN AEROBIC: CPT | Performed by: RADIOLOGY

## 2017-07-30 PROCEDURE — 87015 SPECIMEN INFECT AGNT CONCNTJ: CPT | Performed by: RADIOLOGY

## 2017-07-30 PROCEDURE — 87205 SMEAR GRAM STAIN: CPT | Performed by: RADIOLOGY

## 2017-07-30 PROCEDURE — 25010000002 FENTANYL CITRATE (PF) 100 MCG/2ML SOLUTION: Performed by: RADIOLOGY

## 2017-07-30 PROCEDURE — 85025 COMPLETE CBC W/AUTO DIFF WBC: CPT | Performed by: SURGERY

## 2017-07-30 PROCEDURE — 25010000002 MORPHINE PER 10 MG: Performed by: SURGERY

## 2017-07-30 PROCEDURE — 25010000002 PIPERACILLIN SOD-TAZOBACTAM PER 1 G: Performed by: SURGERY

## 2017-07-30 PROCEDURE — 63710000001 DIPHENHYDRAMINE PER 50 MG: Performed by: SURGERY

## 2017-07-30 PROCEDURE — 87075 CULTR BACTERIA EXCEPT BLOOD: CPT | Performed by: RADIOLOGY

## 2017-07-30 RX ORDER — MIDAZOLAM HYDROCHLORIDE 1 MG/ML
INJECTION INTRAMUSCULAR; INTRAVENOUS
Status: COMPLETED | OUTPATIENT
Start: 2017-07-30 | End: 2017-07-30

## 2017-07-30 RX ORDER — FENTANYL CITRATE 50 UG/ML
INJECTION, SOLUTION INTRAMUSCULAR; INTRAVENOUS
Status: COMPLETED | OUTPATIENT
Start: 2017-07-30 | End: 2017-07-30

## 2017-07-30 RX ADMIN — HYDROCODONE BITARTRATE AND ACETAMINOPHEN 2 TABLET: 7.5; 325 TABLET ORAL at 22:00

## 2017-07-30 RX ADMIN — MIDAZOLAM 0.5 MG: 1 INJECTION INTRAMUSCULAR; INTRAVENOUS at 08:36

## 2017-07-30 RX ADMIN — MORPHINE SULFATE 4 MG: 4 INJECTION, SOLUTION INTRAMUSCULAR; INTRAVENOUS at 10:10

## 2017-07-30 RX ADMIN — HYDROCODONE BITARTRATE AND ACETAMINOPHEN 2 TABLET: 7.5; 325 TABLET ORAL at 15:30

## 2017-07-30 RX ADMIN — DIPHENHYDRAMINE HYDROCHLORIDE 25 MG: 25 CAPSULE ORAL at 00:27

## 2017-07-30 RX ADMIN — MORPHINE SULFATE 4 MG: 4 INJECTION, SOLUTION INTRAMUSCULAR; INTRAVENOUS at 03:20

## 2017-07-30 RX ADMIN — IBUPROFEN 400 MG: 400 TABLET, FILM COATED ORAL at 17:00

## 2017-07-30 RX ADMIN — FENTANYL CITRATE 25 MCG: 50 INJECTION, SOLUTION INTRAMUSCULAR; INTRAVENOUS at 08:36

## 2017-07-30 RX ADMIN — TAZOBACTAM SODIUM AND PIPERACILLIN SODIUM 3.38 G: 375; 3 INJECTION, SOLUTION INTRAVENOUS at 09:48

## 2017-07-30 RX ADMIN — HYDROCODONE BITARTRATE AND ACETAMINOPHEN 2 TABLET: 7.5; 325 TABLET ORAL at 10:44

## 2017-07-30 RX ADMIN — IBUPROFEN 400 MG: 400 TABLET, FILM COATED ORAL at 13:03

## 2017-07-30 RX ADMIN — MIDAZOLAM 0.5 MG: 1 INJECTION INTRAMUSCULAR; INTRAVENOUS at 08:28

## 2017-07-30 RX ADMIN — FENTANYL CITRATE 25 MCG: 50 INJECTION, SOLUTION INTRAMUSCULAR; INTRAVENOUS at 08:28

## 2017-07-30 RX ADMIN — TAZOBACTAM SODIUM AND PIPERACILLIN SODIUM 3.38 G: 375; 3 INJECTION, SOLUTION INTRAVENOUS at 15:31

## 2017-07-30 RX ADMIN — MIDAZOLAM 0.5 MG: 1 INJECTION INTRAMUSCULAR; INTRAVENOUS at 08:44

## 2017-07-30 RX ADMIN — TAZOBACTAM SODIUM AND PIPERACILLIN SODIUM 3.38 G: 375; 3 INJECTION, SOLUTION INTRAVENOUS at 00:22

## 2017-07-30 RX ADMIN — MORPHINE SULFATE 4 MG: 4 INJECTION, SOLUTION INTRAMUSCULAR; INTRAVENOUS at 13:27

## 2017-07-30 RX ADMIN — FENTANYL CITRATE 25 MCG: 50 INJECTION, SOLUTION INTRAMUSCULAR; INTRAVENOUS at 08:44

## 2017-07-30 RX ADMIN — IBUPROFEN 400 MG: 400 TABLET, FILM COATED ORAL at 00:22

## 2017-07-31 ENCOUNTER — APPOINTMENT (OUTPATIENT)
Dept: GENERAL RADIOLOGY | Facility: HOSPITAL | Age: 47
End: 2017-07-31

## 2017-07-31 ENCOUNTER — ANESTHESIA (OUTPATIENT)
Dept: GASTROENTEROLOGY | Facility: HOSPITAL | Age: 47
End: 2017-07-31

## 2017-07-31 ENCOUNTER — ANESTHESIA EVENT (OUTPATIENT)
Dept: GASTROENTEROLOGY | Facility: HOSPITAL | Age: 47
End: 2017-07-31

## 2017-07-31 LAB
ALBUMIN SERPL-MCNC: 3.2 G/DL (ref 3.4–4.8)
ALBUMIN/GLOB SERPL: 1 G/DL (ref 1.1–1.8)
ALP SERPL-CCNC: 88 U/L (ref 38–126)
ALT SERPL W P-5'-P-CCNC: 37 U/L (ref 9–52)
ANION GAP SERPL CALCULATED.3IONS-SCNC: 6 MMOL/L (ref 5–15)
AST SERPL-CCNC: 29 U/L (ref 14–36)
BASOPHILS # BLD AUTO: 0.01 10*3/MM3 (ref 0–0.2)
BASOPHILS NFR BLD AUTO: 0.1 % (ref 0–2)
BILIRUB SERPL-MCNC: 1.9 MG/DL (ref 0.2–1.3)
BUN BLD-MCNC: 6 MG/DL (ref 7–21)
BUN/CREAT SERPL: 8.2 (ref 7–25)
CALCIUM SPEC-SCNC: 9.1 MG/DL (ref 8.4–10.2)
CHLORIDE SERPL-SCNC: 96 MMOL/L (ref 95–110)
CO2 SERPL-SCNC: 35 MMOL/L (ref 22–31)
CREAT BLD-MCNC: 0.73 MG/DL (ref 0.5–1)
DEPRECATED RDW RBC AUTO: 42.4 FL (ref 36.4–46.3)
EOSINOPHIL # BLD AUTO: 0.5 10*3/MM3 (ref 0–0.7)
EOSINOPHIL NFR BLD AUTO: 5.2 % (ref 0–7)
ERYTHROCYTE [DISTWIDTH] IN BLOOD BY AUTOMATED COUNT: 13.2 % (ref 11.5–14.5)
GFR SERPL CREATININE-BSD FRML MDRD: 86 ML/MIN/1.73 (ref 58–135)
GLOBULIN UR ELPH-MCNC: 3.2 GM/DL (ref 2.3–3.5)
GLUCOSE BLD-MCNC: 101 MG/DL (ref 60–100)
HCT VFR BLD AUTO: 37.4 % (ref 35–45)
HGB BLD-MCNC: 12.5 G/DL (ref 12–15.5)
IMM GRANULOCYTES # BLD: 0.04 10*3/MM3 (ref 0–0.02)
IMM GRANULOCYTES NFR BLD: 0.4 % (ref 0–0.5)
LYMPHOCYTES # BLD AUTO: 1.76 10*3/MM3 (ref 0.6–4.2)
LYMPHOCYTES NFR BLD AUTO: 18.5 % (ref 10–50)
MCH RBC QN AUTO: 28.8 PG (ref 26.5–34)
MCHC RBC AUTO-ENTMCNC: 33.4 G/DL (ref 31.4–36)
MCV RBC AUTO: 86.2 FL (ref 80–98)
MONOCYTES # BLD AUTO: 1.05 10*3/MM3 (ref 0–0.9)
MONOCYTES NFR BLD AUTO: 11 % (ref 0–12)
NEUTROPHILS # BLD AUTO: 6.17 10*3/MM3 (ref 2–8.6)
NEUTROPHILS NFR BLD AUTO: 64.8 % (ref 37–80)
PLATELET # BLD AUTO: 354 10*3/MM3 (ref 150–450)
PMV BLD AUTO: 10.2 FL (ref 8–12)
POTASSIUM BLD-SCNC: 3.7 MMOL/L (ref 3.5–5.1)
PROT SERPL-MCNC: 6.4 G/DL (ref 6.3–8.6)
RBC # BLD AUTO: 4.34 10*6/MM3 (ref 3.77–5.16)
SODIUM BLD-SCNC: 137 MMOL/L (ref 137–145)
WBC NRBC COR # BLD: 9.53 10*3/MM3 (ref 3.2–9.8)

## 2017-07-31 PROCEDURE — 85025 COMPLETE CBC W/AUTO DIFF WBC: CPT | Performed by: SURGERY

## 2017-07-31 PROCEDURE — 25010000002 PROPOFOL 10 MG/ML EMULSION: Performed by: NURSE ANESTHETIST, CERTIFIED REGISTERED

## 2017-07-31 PROCEDURE — 25010000002 PIPERACILLIN SOD-TAZOBACTAM PER 1 G: Performed by: SURGERY

## 2017-07-31 PROCEDURE — 25010000002 ONDANSETRON PER 1 MG: Performed by: SURGERY

## 2017-07-31 PROCEDURE — 25010000002 SUCCINYLCHOLINE PER 20 MG: Performed by: NURSE ANESTHETIST, CERTIFIED REGISTERED

## 2017-07-31 PROCEDURE — 25010000002 FENTANYL CITRATE (PF) 100 MCG/2ML SOLUTION: Performed by: NURSE ANESTHETIST, CERTIFIED REGISTERED

## 2017-07-31 PROCEDURE — 25010000002 MORPHINE PER 10 MG: Performed by: SURGERY

## 2017-07-31 PROCEDURE — 25010000002 DEXAMETHASONE PER 1 MG: Performed by: INTERNAL MEDICINE

## 2017-07-31 PROCEDURE — 25010000002 HYDROMORPHONE PER 4 MG: Performed by: NURSE ANESTHETIST, CERTIFIED REGISTERED

## 2017-07-31 PROCEDURE — 25010000002 NEOSTIGMINE 10 MG/10ML SOLUTION

## 2017-07-31 PROCEDURE — 25010000002 MIDAZOLAM PER 1 MG: Performed by: NURSE ANESTHETIST, CERTIFIED REGISTERED

## 2017-07-31 PROCEDURE — 99024 POSTOP FOLLOW-UP VISIT: CPT | Performed by: SURGERY

## 2017-07-31 PROCEDURE — 80053 COMPREHEN METABOLIC PANEL: CPT | Performed by: SURGERY

## 2017-07-31 PROCEDURE — 0 IOPAMIDOL 61 % SOLUTION: Performed by: SURGERY

## 2017-07-31 PROCEDURE — 74330 X-RAY BILE/PANC ENDOSCOPY: CPT

## 2017-07-31 PROCEDURE — 94799 UNLISTED PULMONARY SVC/PX: CPT

## 2017-07-31 PROCEDURE — 94640 AIRWAY INHALATION TREATMENT: CPT

## 2017-07-31 PROCEDURE — 25010000002 FUROSEMIDE PER 20 MG: Performed by: INTERNAL MEDICINE

## 2017-07-31 PROCEDURE — 0DJ08ZZ INSPECTION OF UPPER INTESTINAL TRACT, VIA NATURAL OR ARTIFICIAL OPENING ENDOSCOPIC: ICD-10-PCS | Performed by: INTERNAL MEDICINE

## 2017-07-31 RX ORDER — DIPHENHYDRAMINE HYDROCHLORIDE 50 MG/ML
12.5 INJECTION INTRAMUSCULAR; INTRAVENOUS
Status: DISCONTINUED | OUTPATIENT
Start: 2017-07-31 | End: 2017-07-31 | Stop reason: HOSPADM

## 2017-07-31 RX ORDER — ALBUTEROL SULFATE 2.5 MG/3ML
2.5 SOLUTION RESPIRATORY (INHALATION)
Status: DISCONTINUED | OUTPATIENT
Start: 2017-07-31 | End: 2017-08-01 | Stop reason: HOSPADM

## 2017-07-31 RX ORDER — NALOXONE HCL 0.4 MG/ML
0.2 VIAL (ML) INJECTION AS NEEDED
Status: DISCONTINUED | OUTPATIENT
Start: 2017-07-31 | End: 2017-07-31 | Stop reason: HOSPADM

## 2017-07-31 RX ORDER — EPHEDRINE SULFATE 50 MG/ML
5 INJECTION, SOLUTION INTRAVENOUS ONCE AS NEEDED
Status: DISCONTINUED | OUTPATIENT
Start: 2017-07-31 | End: 2017-07-31 | Stop reason: HOSPADM

## 2017-07-31 RX ORDER — LISINOPRIL 10 MG/1
10 TABLET ORAL DAILY
Status: DISCONTINUED | OUTPATIENT
Start: 2017-08-01 | End: 2017-08-01 | Stop reason: HOSPADM

## 2017-07-31 RX ORDER — LABETALOL HYDROCHLORIDE 5 MG/ML
5 INJECTION, SOLUTION INTRAVENOUS
Status: DISCONTINUED | OUTPATIENT
Start: 2017-07-31 | End: 2017-07-31 | Stop reason: HOSPADM

## 2017-07-31 RX ORDER — ACETAMINOPHEN 325 MG/1
650 TABLET ORAL ONCE AS NEEDED
Status: DISCONTINUED | OUTPATIENT
Start: 2017-07-31 | End: 2017-07-31 | Stop reason: HOSPADM

## 2017-07-31 RX ORDER — PROPOFOL 10 MG/ML
VIAL (ML) INTRAVENOUS AS NEEDED
Status: DISCONTINUED | OUTPATIENT
Start: 2017-07-31 | End: 2017-07-31 | Stop reason: SURG

## 2017-07-31 RX ORDER — ROCURONIUM BROMIDE 10 MG/ML
INJECTION, SOLUTION INTRAVENOUS AS NEEDED
Status: DISCONTINUED | OUTPATIENT
Start: 2017-07-31 | End: 2017-07-31 | Stop reason: SURG

## 2017-07-31 RX ORDER — LIDOCAINE HYDROCHLORIDE 20 MG/ML
INJECTION, SOLUTION INFILTRATION; PERINEURAL AS NEEDED
Status: DISCONTINUED | OUTPATIENT
Start: 2017-07-31 | End: 2017-07-31 | Stop reason: SURG

## 2017-07-31 RX ORDER — FUROSEMIDE 10 MG/ML
40 INJECTION INTRAMUSCULAR; INTRAVENOUS EVERY 12 HOURS
Status: DISCONTINUED | OUTPATIENT
Start: 2017-07-31 | End: 2017-08-01

## 2017-07-31 RX ORDER — PANTOPRAZOLE SODIUM 40 MG/1
40 TABLET, DELAYED RELEASE ORAL DAILY
Status: DISCONTINUED | OUTPATIENT
Start: 2017-08-01 | End: 2017-08-01 | Stop reason: HOSPADM

## 2017-07-31 RX ORDER — FENTANYL CITRATE 50 UG/ML
INJECTION, SOLUTION INTRAMUSCULAR; INTRAVENOUS AS NEEDED
Status: DISCONTINUED | OUTPATIENT
Start: 2017-07-31 | End: 2017-07-31 | Stop reason: SURG

## 2017-07-31 RX ORDER — OXYBUTYNIN CHLORIDE 5 MG/1
5 TABLET ORAL 2 TIMES DAILY
Status: DISCONTINUED | OUTPATIENT
Start: 2017-07-31 | End: 2017-08-01 | Stop reason: HOSPADM

## 2017-07-31 RX ORDER — GLYCOPYRROLATE 0.2 MG/ML
INJECTION INTRAMUSCULAR; INTRAVENOUS AS NEEDED
Status: DISCONTINUED | OUTPATIENT
Start: 2017-07-31 | End: 2017-07-31 | Stop reason: SURG

## 2017-07-31 RX ORDER — MORPHINE SULFATE 4 MG/ML
4 INJECTION, SOLUTION INTRAMUSCULAR; INTRAVENOUS
Status: DISCONTINUED | OUTPATIENT
Start: 2017-07-31 | End: 2017-08-01 | Stop reason: HOSPADM

## 2017-07-31 RX ORDER — MIDAZOLAM HYDROCHLORIDE 1 MG/ML
INJECTION INTRAMUSCULAR; INTRAVENOUS AS NEEDED
Status: DISCONTINUED | OUTPATIENT
Start: 2017-07-31 | End: 2017-07-31 | Stop reason: SURG

## 2017-07-31 RX ORDER — SUCCINYLCHOLINE CHLORIDE 20 MG/ML
INJECTION INTRAMUSCULAR; INTRAVENOUS AS NEEDED
Status: DISCONTINUED | OUTPATIENT
Start: 2017-07-31 | End: 2017-07-31 | Stop reason: SURG

## 2017-07-31 RX ORDER — ALBUTEROL SULFATE 2.5 MG/3ML
0.63 SOLUTION RESPIRATORY (INHALATION) ONCE
Status: DISCONTINUED | OUTPATIENT
Start: 2017-07-31 | End: 2017-07-31

## 2017-07-31 RX ORDER — BUPROPION HYDROCHLORIDE 100 MG/1
100 TABLET ORAL 2 TIMES DAILY
Status: DISCONTINUED | OUTPATIENT
Start: 2017-07-31 | End: 2017-08-01 | Stop reason: HOSPADM

## 2017-07-31 RX ORDER — ACETAMINOPHEN 650 MG/1
650 SUPPOSITORY RECTAL ONCE AS NEEDED
Status: DISCONTINUED | OUTPATIENT
Start: 2017-07-31 | End: 2017-07-31 | Stop reason: HOSPADM

## 2017-07-31 RX ORDER — ONDANSETRON 2 MG/ML
4 INJECTION INTRAMUSCULAR; INTRAVENOUS ONCE AS NEEDED
Status: DISCONTINUED | OUTPATIENT
Start: 2017-07-31 | End: 2017-07-31 | Stop reason: HOSPADM

## 2017-07-31 RX ORDER — DEXAMETHASONE SODIUM PHOSPHATE 4 MG/ML
10 INJECTION, SOLUTION INTRA-ARTICULAR; INTRALESIONAL; INTRAMUSCULAR; INTRAVENOUS; SOFT TISSUE ONCE
Status: COMPLETED | OUTPATIENT
Start: 2017-07-31 | End: 2017-07-31

## 2017-07-31 RX ORDER — FLUMAZENIL 0.1 MG/ML
0.2 INJECTION INTRAVENOUS AS NEEDED
Status: DISCONTINUED | OUTPATIENT
Start: 2017-07-31 | End: 2017-07-31 | Stop reason: HOSPADM

## 2017-07-31 RX ADMIN — HYDROCODONE BITARTRATE AND ACETAMINOPHEN 2 TABLET: 7.5; 325 TABLET ORAL at 21:10

## 2017-07-31 RX ADMIN — ROCURONIUM BROMIDE 40 MG: 10 INJECTION INTRAVENOUS at 17:29

## 2017-07-31 RX ADMIN — MORPHINE SULFATE 4 MG: 4 INJECTION, SOLUTION INTRAMUSCULAR; INTRAVENOUS at 02:23

## 2017-07-31 RX ADMIN — LIDOCAINE HYDROCHLORIDE 100 MG: 20 INJECTION, SOLUTION INFILTRATION; PERINEURAL at 17:13

## 2017-07-31 RX ADMIN — FUROSEMIDE 40 MG: 10 INJECTION, SOLUTION INTRAMUSCULAR; INTRAVENOUS at 21:14

## 2017-07-31 RX ADMIN — ALBUTEROL SULFATE 2.5 MG: 2.5 SOLUTION RESPIRATORY (INHALATION) at 19:03

## 2017-07-31 RX ADMIN — IBUPROFEN 400 MG: 400 TABLET, FILM COATED ORAL at 00:06

## 2017-07-31 RX ADMIN — IBUPROFEN 400 MG: 400 TABLET, FILM COATED ORAL at 06:20

## 2017-07-31 RX ADMIN — HYDROMORPHONE HYDROCHLORIDE 0.5 MG: 1 INJECTION, SOLUTION INTRAMUSCULAR; INTRAVENOUS; SUBCUTANEOUS at 19:07

## 2017-07-31 RX ADMIN — ALBUTEROL SULFATE 2.5 MG: 2.5 SOLUTION RESPIRATORY (INHALATION) at 22:51

## 2017-07-31 RX ADMIN — TAZOBACTAM SODIUM AND PIPERACILLIN SODIUM 3.38 G: 375; 3 INJECTION, SOLUTION INTRAVENOUS at 00:06

## 2017-07-31 RX ADMIN — DEXAMETHASONE SODIUM PHOSPHATE 10 MG: 4 INJECTION, SOLUTION INTRAMUSCULAR; INTRAVENOUS at 19:06

## 2017-07-31 RX ADMIN — SODIUM CHLORIDE 100 ML/HR: 900 INJECTION, SOLUTION INTRAVENOUS at 11:07

## 2017-07-31 RX ADMIN — OXYBUTYNIN CHLORIDE 5 MG: 5 TABLET ORAL at 21:11

## 2017-07-31 RX ADMIN — BUPROPION HYDROCHLORIDE 100 MG: 100 TABLET, FILM COATED ORAL at 21:10

## 2017-07-31 RX ADMIN — SUCCINYLCHOLINE CHLORIDE 180 MG: 20 INJECTION, SOLUTION INTRAMUSCULAR; INTRAVENOUS at 17:13

## 2017-07-31 RX ADMIN — TAZOBACTAM SODIUM AND PIPERACILLIN SODIUM 3.38 G: 375; 3 INJECTION, SOLUTION INTRAVENOUS at 09:01

## 2017-07-31 RX ADMIN — HYDROCODONE BITARTRATE AND ACETAMINOPHEN 2 TABLET: 7.5; 325 TABLET ORAL at 06:39

## 2017-07-31 RX ADMIN — MIDAZOLAM 2 MG: 1 INJECTION INTRAMUSCULAR; INTRAVENOUS at 17:05

## 2017-07-31 RX ADMIN — ONDANSETRON 4 MG: 2 INJECTION INTRAMUSCULAR; INTRAVENOUS at 21:34

## 2017-07-31 RX ADMIN — GLYCOPYRROLATE 0.4 MG: 0.2 INJECTION, SOLUTION INTRAMUSCULAR; INTRAVENOUS at 18:43

## 2017-07-31 RX ADMIN — PROPOFOL 40 MG: 10 INJECTION, EMULSION INTRAVENOUS at 17:19

## 2017-07-31 RX ADMIN — FENTANYL CITRATE 100 MCG: 50 INJECTION, SOLUTION INTRAMUSCULAR; INTRAVENOUS at 17:13

## 2017-07-31 RX ADMIN — PROPOFOL 160 MG: 10 INJECTION, EMULSION INTRAVENOUS at 17:13

## 2017-07-31 RX ADMIN — IOPAMIDOL 12 ML: 612 INJECTION, SOLUTION INTRAVENOUS at 19:15

## 2017-07-31 RX ADMIN — HYDROCODONE BITARTRATE AND ACETAMINOPHEN 2 TABLET: 7.5; 325 TABLET ORAL at 12:41

## 2017-07-31 RX ADMIN — MORPHINE SULFATE 4 MG: 4 INJECTION, SOLUTION INTRAMUSCULAR; INTRAVENOUS at 09:01

## 2017-07-31 RX ADMIN — ONDANSETRON 4 MG: 2 INJECTION INTRAMUSCULAR; INTRAVENOUS at 17:50

## 2017-07-31 RX ADMIN — MORPHINE SULFATE 4 MG: 4 INJECTION, SOLUTION INTRAMUSCULAR; INTRAVENOUS at 22:33

## 2017-07-31 RX ADMIN — MORPHINE SULFATE 4 MG: 4 INJECTION, SOLUTION INTRAMUSCULAR; INTRAVENOUS at 13:42

## 2017-07-31 NOTE — ANESTHESIA PROCEDURE NOTES
Airway  Urgency: elective    End Time:7/31/2017 5:27 PM    General Information and Staff    Patient location during procedure: radiology  CRNA: ANITHA ESCOBAR    Indications and Patient Condition  Indications for airway management: airway protection    Preoxygenated: yes  MILS maintained throughout  Mask difficulty assessment: 3 - difficult mask (inadequate, unstable or two providers) +/- NMBA    Final Airway Details  Final airway type: endotracheal airway      Successful airway: ETT  Cuffed: yes   Successful intubation technique: video laryngoscopy  Facilitating devices/methods: Bougie  Endotracheal tube insertion site: oral  Blade: Parveen  Blade size: #3  ETT size: 7.0 mm  Cormack-Lehane Classification: grade IIb - view of arytenoids or posterior of glottis only  Placement verified by: chest auscultation and capnometry   Inital cuff pressure (cm H2O): 22  Cuff volume (mL): 8  Measured from: lips  Number of attempts at approach: 3 or more    Additional Comments  Short Tm distance . Small mouth. Difficult to mask.

## 2017-07-31 NOTE — ANESTHESIA POSTPROCEDURE EVALUATION
Patient: Maricarmen Ferris    Procedure Summary     Date Anesthesia Start Anesthesia Stop Room / Location    07/31/17 8579 [id number] [facility] ENDO VIRTUAL  / [facility] ENDOSCOPY       Procedure Diagnosis Surgeon Provider    ENDOSCOPIC RETROGRADE CHOLANGIOPANCREATOGRAPHY (Left ) Common bile duct leak  (Common bile duct leak [K83.8]) DO Dannielle Chavez CRNA          Anesthesia Type: general  Last vitals  BP        Temp        Pulse       Resp        SpO2          Post Anesthesia Care and Evaluation    Patient location during evaluation: PACU  Patient participation: complete - patient participated  Level of consciousness: awake and alert  Pain score: 6  Pain management: adequate  Airway patency: patent  Anesthetic complications: No anesthetic complications  PONV Status: none  Cardiovascular status: acceptable  Respiratory status: acceptable  Hydration status: acceptable

## 2017-07-31 NOTE — ANESTHESIA PREPROCEDURE EVALUATION
Anesthesia Evaluation     Patient summary reviewed and Nursing notes reviewed   NPO Solid Status: > 8 hours  NPO Liquid Status: > 8 hours     Airway   Mallampati: III  TM distance: >3 FB  Neck ROM: full  possible difficult intubation  Dental - normal exam     Pulmonary - normal exam   (+) sleep apnea,     ROS comment: O2 since surgery last week  Cardiovascular - normal exam    (+) hypertension,       Neuro/Psych  (+) psychiatric history Anxiety and Depression,    GI/Hepatic/Renal/Endo    (+) morbid obesity, GERD well controlled,     ROS Comment: Bile duct leak    Musculoskeletal     Abdominal  - normal exam   Substance History      OB/GYN          Other                                        Anesthesia Plan    ASA 3     general     Anesthetic plan and risks discussed with patient.

## 2017-08-01 VITALS
DIASTOLIC BLOOD PRESSURE: 84 MMHG | HEIGHT: 65 IN | SYSTOLIC BLOOD PRESSURE: 150 MMHG | RESPIRATION RATE: 20 BRPM | TEMPERATURE: 96.2 F | WEIGHT: 259 LBS | OXYGEN SATURATION: 93 % | HEART RATE: 73 BPM | BODY MASS INDEX: 43.15 KG/M2

## 2017-08-01 LAB
BACTERIA SPEC ANAEROBE CULT: NORMAL
BASOPHILS # BLD AUTO: 0.01 10*3/MM3 (ref 0–0.2)
BASOPHILS NFR BLD AUTO: 0.1 % (ref 0–2)
DEPRECATED RDW RBC AUTO: 41.3 FL (ref 36.4–46.3)
EOSINOPHIL # BLD AUTO: 0 10*3/MM3 (ref 0–0.7)
EOSINOPHIL NFR BLD AUTO: 0 % (ref 0–7)
ERYTHROCYTE [DISTWIDTH] IN BLOOD BY AUTOMATED COUNT: 13 % (ref 11.5–14.5)
GRAM STN SPEC: NORMAL
GRAM STN SPEC: NORMAL
HCT VFR BLD AUTO: 37.9 % (ref 35–45)
HGB BLD-MCNC: 12.4 G/DL (ref 12–15.5)
HOLD SPECIMEN: NORMAL
IMM GRANULOCYTES # BLD: 0.04 10*3/MM3 (ref 0–0.02)
IMM GRANULOCYTES NFR BLD: 0.4 % (ref 0–0.5)
LYMPHOCYTES # BLD AUTO: 0.74 10*3/MM3 (ref 0.6–4.2)
LYMPHOCYTES NFR BLD AUTO: 6.5 % (ref 10–50)
MCH RBC QN AUTO: 28.3 PG (ref 26.5–34)
MCHC RBC AUTO-ENTMCNC: 32.7 G/DL (ref 31.4–36)
MCV RBC AUTO: 86.5 FL (ref 80–98)
MONOCYTES # BLD AUTO: 0.39 10*3/MM3 (ref 0–0.9)
MONOCYTES NFR BLD AUTO: 3.4 % (ref 0–12)
NEUTROPHILS # BLD AUTO: 10.19 10*3/MM3 (ref 2–8.6)
NEUTROPHILS NFR BLD AUTO: 89.6 % (ref 37–80)
PLATELET # BLD AUTO: 413 10*3/MM3 (ref 150–450)
PMV BLD AUTO: 10.3 FL (ref 8–12)
RBC # BLD AUTO: 4.38 10*6/MM3 (ref 3.77–5.16)
WBC NRBC COR # BLD: 11.37 10*3/MM3 (ref 3.2–9.8)

## 2017-08-01 PROCEDURE — 99024 POSTOP FOLLOW-UP VISIT: CPT | Performed by: SURGERY

## 2017-08-01 PROCEDURE — 94799 UNLISTED PULMONARY SVC/PX: CPT

## 2017-08-01 PROCEDURE — 25010000002 ONDANSETRON PER 1 MG: Performed by: SURGERY

## 2017-08-01 PROCEDURE — 25010000002 MORPHINE PER 10 MG: Performed by: SURGERY

## 2017-08-01 PROCEDURE — 94760 N-INVAS EAR/PLS OXIMETRY 1: CPT

## 2017-08-01 PROCEDURE — 85025 COMPLETE CBC W/AUTO DIFF WBC: CPT | Performed by: SURGERY

## 2017-08-01 PROCEDURE — 25010000002 PIPERACILLIN SOD-TAZOBACTAM PER 1 G: Performed by: SURGERY

## 2017-08-01 RX ADMIN — TAZOBACTAM SODIUM AND PIPERACILLIN SODIUM 3.38 G: 375; 3 INJECTION, SOLUTION INTRAVENOUS at 00:04

## 2017-08-01 RX ADMIN — MORPHINE SULFATE 4 MG: 4 INJECTION, SOLUTION INTRAMUSCULAR; INTRAVENOUS at 01:43

## 2017-08-01 RX ADMIN — HYDROCODONE BITARTRATE AND ACETAMINOPHEN 2 TABLET: 7.5; 325 TABLET ORAL at 03:21

## 2017-08-01 RX ADMIN — SERTRALINE HYDROCHLORIDE 50 MG: 50 TABLET ORAL at 08:16

## 2017-08-01 RX ADMIN — OXYBUTYNIN CHLORIDE 5 MG: 5 TABLET ORAL at 08:16

## 2017-08-01 RX ADMIN — MORPHINE SULFATE 4 MG: 4 INJECTION, SOLUTION INTRAMUSCULAR; INTRAVENOUS at 08:15

## 2017-08-01 RX ADMIN — ALBUTEROL SULFATE 2.5 MG: 2.5 SOLUTION RESPIRATORY (INHALATION) at 03:09

## 2017-08-01 RX ADMIN — ALBUTEROL SULFATE 2.5 MG: 2.5 SOLUTION RESPIRATORY (INHALATION) at 07:20

## 2017-08-01 RX ADMIN — MORPHINE SULFATE 4 MG: 4 INJECTION, SOLUTION INTRAMUSCULAR; INTRAVENOUS at 04:56

## 2017-08-01 RX ADMIN — HYDROCODONE BITARTRATE AND ACETAMINOPHEN 2 TABLET: 7.5; 325 TABLET ORAL at 09:25

## 2017-08-01 RX ADMIN — PANTOPRAZOLE SODIUM 40 MG: 40 TABLET, DELAYED RELEASE ORAL at 08:16

## 2017-08-01 RX ADMIN — TAZOBACTAM SODIUM AND PIPERACILLIN SODIUM 3.38 G: 375; 3 INJECTION, SOLUTION INTRAVENOUS at 08:16

## 2017-08-01 RX ADMIN — MORPHINE SULFATE 4 MG: 4 INJECTION, SOLUTION INTRAMUSCULAR; INTRAVENOUS at 10:23

## 2017-08-01 RX ADMIN — ONDANSETRON 4 MG: 2 INJECTION INTRAMUSCULAR; INTRAVENOUS at 03:22

## 2017-08-01 RX ADMIN — LISINOPRIL 10 MG: 10 TABLET ORAL at 08:16

## 2017-08-01 RX ADMIN — BUPROPION HYDROCHLORIDE 100 MG: 100 TABLET, FILM COATED ORAL at 08:16

## 2017-08-01 NOTE — PROGRESS NOTES
LOS: 5 days   Patient Care Team:  MARIA TERESA Jorge as PCP - General    Chief Complaint:  <principal problem not specified>    Subjective     Interval History:   Feels worse, pain overnight, getting up to urinate constantly    Objective     Vital Signs  Temp:  [96 °F (35.6 °C)-100.8 °F (38.2 °C)] 97.7 °F (36.5 °C)  Heart Rate:  [] 61  Resp:  [18-30] 18  BP: (132-162)/(65-88) 132/88    Physical Exam:  Lungs sound better, abd , perc drain with bilious output     Results Review:       Lab Results (last 24 hours)     Procedure Component Value Units Date/Time    CBC & Differential [[id number]] Collected:  08/01/17 0546    Specimen:  Blood Updated:  08/01/17 0609    Narrative:       The following orders were created for panel order CBC & Differential.  Procedure                               Abnormality         Status                     ---------                               -----------         ------                     CBC Auto Differential[[id number]]        Abnormal            Final result                 Please view results for these tests on the individual orders.    CBC Auto Differential [[id number]]  (Abnormal) Collected:  08/01/17 0546    Specimen:  Blood Updated:  08/01/17 0609     WBC 11.37 (H) 10*3/mm3      RBC 4.38 10*6/mm3      Hemoglobin 12.4 g/dL      Hematocrit 37.9 %      MCV 86.5 fL      MCH 28.3 pg      MCHC 32.7 g/dL      RDW 13.0 %      RDW-SD 41.3 fl      MPV 10.3 fL      Platelets 413 10*3/mm3      Neutrophil % 89.6 (H) %      Lymphocyte % 6.5 (L) %      Monocyte % 3.4 %      Eosinophil % 0.0 %      Basophil % 0.1 %      Immature Grans % 0.4 %      Neutrophils, Absolute 10.19 (H) 10*3/mm3      Lymphocytes, Absolute 0.74 10*3/mm3      Monocytes, Absolute 0.39 10*3/mm3      Eosinophils, Absolute 0.00 10*3/mm3      Basophils, Absolute 0.01 10*3/mm3      Immature Grans, Absolute 0.04 (H) 10*3/mm3     Anaerobic Culture [[id number]]  (Normal) Collected:  07/30/17 0910     Specimen:  Body Fluid from Abdominal Cavity Updated:  08/01/17 0656     Culture No anaerobes isolated at 2 days     Gram Stain Result Many (4+) WBCs seen      No organisms seen    Extra Tubes [[id number]] Collected:  08/01/17 0546    Specimen:  Blood from Blood, Venous Line Updated:  08/01/17 0701    Narrative:       The following orders were created for panel order Extra Tubes.  Procedure                               Abnormality         Status                     ---------                               -----------         ------                     Green Top (Gel)[[id number]]                                  Final result                 Please view results for these tests on the individual orders.    Green Top (Gel) [[id number]] Collected:  08/01/17 0546    Specimen:  Blood Updated:  08/01/17 0701     Extra Tube Hold for add-ons.      Auto resulted.             Medication Review:   Current Facility-Administered Medications   Medication Dose Route Frequency Provider Last Rate Last Dose   • albuterol (PROVENTIL) nebulizer solution 0.083% 2.5 mg/3mL  2.5 mg Nebulization Q4H - RT Samuel Redding DO   2.5 mg at 08/01/17 0720   • buPROPion (WELLBUTRIN) tablet 100 mg  100 mg Oral BID Samuel Redding DO   100 mg at 08/01/17 0816   • diphenhydrAMINE (BENADRYL) capsule 25 mg  25 mg Oral Nightly PRN Pk العلي MD   25 mg at 07/30/17 0027   • HYDROcodone-acetaminophen (NORCO) 7.5-325 MG per tablet 2 tablet  2 tablet Oral Q6H PRN Pk العلي MD   2 tablet at 08/01/17 0321   • lisinopril (PRINIVIL,ZESTRIL) tablet 10 mg  10 mg Oral Daily Samuel Redding DO   10 mg at 08/01/17 0816   • Morphine sulfate (PF) injection 4 mg  4 mg Intravenous Q3H PRN Pk العلي MD   4 mg at 08/01/17 0815   • ondansetron (ZOFRAN) injection 4 mg  4 mg Intravenous Q6H PRN Pk العلي MD   4 mg at 08/01/17 0322   • oxybutynin (DITROPAN) tablet 5 mg  5 mg Oral BID Samuel Redding DO   5 mg at 08/01/17 0816   • pantoprazole (PROTONIX) EC tablet  40 mg  40 mg Oral Daily Samuel Redding, DO   40 mg at 08/01/17 0816   • piperacillin-tazobactam (ZOSYN) 3.375 g in iso-osmotic dextrose 50 ml (premix)  3.375 g Intravenous Q8H Chong Crespo MD   3.375 g at 08/01/17 0816   • sertraline (ZOLOFT) tablet 50 mg  50 mg Oral Daily Samuel Redding,    50 mg at 08/01/17 0816   • sodium chloride 0.9 % flush 1-10 mL  1-10 mL Intravenous PRN Pk العلي MD   10 mL at 07/27/17 1525   • terconazole (TERAZOL 7) vaginal cream 1 applicator  1 applicator Vaginal PRN Samuel Redding DO           Assessment/Plan     Active Problems:    Abdominal pain    47 yo lady POD#8 s/p lap pk with post op bile leak  Continue drain  ERCP unsuccessful yesterday  Discussed with patient and Dr Kaur  Will transfer to [place] to Dr Kaur for ERCP or possible PTC to treat bile leak    Pk العلي MD  08/01/17  8:38 AM

## 2017-08-01 NOTE — DISCHARGE SUMMARY
Discharge Summary  Date of Admission: 7/27/17  Date of Discharge: 8/1/17  Service: General Surgery  Attending: Pk العلي  Procedures: MRCP, HIDA scan, percutaneous drainage of biloma, attempted ERCP  Discharge Diagnoses: post op cholecystectomy bile leak  Hospital Course: Patient seen POD#3 s/p lap pk with symptoms.  Cardiac workup negative.  Found to have bile leak and percutaneous drain placed.  ERCP attempted and aborted due to inability to canulate ampulla.  Transferred to Dr Kaur in [place] for treatment of bile leak.  Discharge Medications:     Your medication list      CONTINUE taking these medications       Instructions Last Dose Given Next Dose Due    buPROPion 100 MG tablet   Commonly known as:  WELLBUTRIN              lisinopril 10 MG tablet   Commonly known as:  PRINIVIL,ZESTRIL              oxybutynin 5 MG tablet   Commonly known as:  DITROPAN              pantoprazole 40 MG EC tablet   Commonly known as:  PROTONIX              sertraline 50 MG tablet   Commonly known as:  ZOLOFT              terconazole 0.4 % vaginal cream   Commonly known as:  TERAZOL 7                  Disposition: [facility]  Your Scheduled Appointments     Aug 07, 2017  1:15 PM CDT   Post-Op with Pk العلي MD   [facility] GENERAL SURGERY (--)    [address] [place] [address]  Medical [address] [address] [phone]            Aug 10, 2017  9:45 AM CDT   Mammo mad screen bilat with [facility]   [facility]    [address] [place] [id number] [phone]           Arrive 15 minutes prior to appointment time.                Continue current care  Transfer via ambulance          This document has been electronically signed by Pk العلي MD on August 1, 2017 8:43 AM

## 2017-08-01 NOTE — NURSING NOTE
Paged Dr. العلي regarding patient's complaint of pain.  The CNA brought to my attention that the patient had stated that she would rianna us if we didn't do something about her pain.  The patient was given Dilaudid in the PACU and was given Norco 7.5mg tabs x2 after arriving to the floor and her oxygen was stable.  The patient was not able to take deep breathes upon arrival and was requiring 5L NC oxygen.  The patient also stated to the CNA that we needed to give her a catheter because she was in to much pain to get up to keep going to the restroom.  I explained these issues with Dr. العلي.  He order Morphine IV for the patient and stated that we would not do a catheter for convenience.  We would however discuss the matter if the patient became unable to urinate.

## 2017-08-01 NOTE — PLAN OF CARE
Problem: Patient Care Overview (Adult)  Goal: Plan of Care Review  Outcome: Ongoing (interventions implemented as appropriate)    07/31/17 1939   Coping/Psychosocial Response Interventions   Plan Of Care Reviewed With patient   Patient Care Overview   Progress improving   Outcome Evaluation   Outcome Summary/Follow up Plan VSS, pt meets pacu d/c criteria       Goal: Adult Individualization and Mutuality  Outcome: Ongoing (interventions implemented as appropriate)    Problem: Perioperative Period (Adult)  Goal: Signs and Symptoms of Listed Potential Problems Will be Absent or Manageable (Perioperative Period)  Outcome: Ongoing (interventions implemented as appropriate)

## 2017-08-01 NOTE — PLAN OF CARE
Problem: Patient Care Overview (Adult)  Goal: Plan of Care Review  Outcome: Ongoing (interventions implemented as appropriate)    08/01/17 0355   Coping/Psychosocial Response Interventions   Plan Of Care Reviewed With patient   Patient Care Overview   Progress no change   Outcome Evaluation   Outcome Summary/Follow up Plan vss, pain better controlled, urinating well       Goal: Adult Individualization and Mutuality  Outcome: Ongoing (interventions implemented as appropriate)  Goal: Discharge Needs Assessment  Outcome: Ongoing (interventions implemented as appropriate)    Problem: Pain, Acute (Adult)  Goal: Acceptable Pain Control/Comfort Level  Outcome: Ongoing (interventions implemented as appropriate)    Problem: GI Endoscopy (Adult)  Goal: Signs and Symptoms of Listed Potential Problems Will be Absent or Manageable (GI Endoscopy)  Outcome: Ongoing (interventions implemented as appropriate)    Problem: Perioperative Period (Adult)  Goal: Signs and Symptoms of Listed Potential Problems Will be Absent or Manageable (Perioperative Period)  Outcome: Outcome(s) achieved Date Met:  08/01/17

## 2017-08-01 NOTE — PLAN OF CARE
Problem: GI Endoscopy (Adult)  Goal: Signs and Symptoms of Listed Potential Problems Will be Absent or Manageable (GI Endoscopy)  Outcome: Ongoing (interventions implemented as appropriate)    07/31/17 8374   GI Endoscopy   Problems Assessed (GI Endoscopy) all   Problems Present (GI Endoscopy) pain

## 2017-08-04 LAB
BACTERIA FLD CULT: NORMAL
GRAM STN SPEC: NORMAL
GRAM STN SPEC: NORMAL

## 2017-08-06 ENCOUNTER — HOSPITAL ENCOUNTER (INPATIENT)
Facility: HOSPITAL | Age: 47
LOS: 8 days | Discharge: HOME | End: 2017-08-16
Attending: EMERGENCY MEDICINE | Admitting: SURGERY

## 2017-08-06 ENCOUNTER — APPOINTMENT (OUTPATIENT)
Dept: CT IMAGING | Facility: HOSPITAL | Age: 47
End: 2017-08-06

## 2017-08-06 ENCOUNTER — APPOINTMENT (OUTPATIENT)
Dept: GENERAL RADIOLOGY | Facility: HOSPITAL | Age: 47
End: 2017-08-06

## 2017-08-06 DIAGNOSIS — Z74.09 IMPAIRED PHYSICAL MOBILITY: ICD-10-CM

## 2017-08-06 DIAGNOSIS — R18.8 ABDOMINAL FLUID COLLECTION: Primary | ICD-10-CM

## 2017-08-06 DIAGNOSIS — J90 PLEURAL EFFUSION: ICD-10-CM

## 2017-08-06 PROBLEM — K83.8: Status: ACTIVE | Noted: 2017-08-01

## 2017-08-06 LAB
ALBUMIN SERPL-MCNC: 3.4 G/DL (ref 3.4–4.8)
ALBUMIN/GLOB SERPL: 1 G/DL (ref 1.1–1.8)
ALP SERPL-CCNC: 113 U/L (ref 38–126)
ALT SERPL W P-5'-P-CCNC: 30 U/L (ref 9–52)
AMYLASE SERPL-CCNC: 77 U/L (ref 50–130)
ANION GAP SERPL CALCULATED.3IONS-SCNC: 20 MMOL/L (ref 5–15)
APTT PPP: 31.4 SECONDS (ref 20–40.3)
AST SERPL-CCNC: 30 U/L (ref 14–36)
BASOPHILS # BLD AUTO: 0.02 10*3/MM3 (ref 0–0.2)
BASOPHILS NFR BLD AUTO: 0.1 % (ref 0–2)
BILIRUB SERPL-MCNC: 0.8 MG/DL (ref 0.2–1.3)
BUN BLD-MCNC: 8 MG/DL (ref 7–21)
BUN/CREAT SERPL: 11 (ref 7–25)
CALCIUM SPEC-SCNC: 8.8 MG/DL (ref 8.4–10.2)
CHLORIDE SERPL-SCNC: 89 MMOL/L (ref 95–110)
CK MB SERPL-CCNC: 0.31 NG/ML (ref 0–5)
CK SERPL-CCNC: 27 U/L (ref 30–135)
CO2 SERPL-SCNC: 29 MMOL/L (ref 22–31)
CREAT BLD-MCNC: 0.73 MG/DL (ref 0.5–1)
D-DIMER, QUANTITATIVE (MAD,POW, STR): >4000 NG/ML (FEU) (ref 0–470)
DEPRECATED RDW RBC AUTO: 39.7 FL (ref 36.4–46.3)
EOSINOPHIL # BLD AUTO: 0.47 10*3/MM3 (ref 0–0.7)
EOSINOPHIL NFR BLD AUTO: 3.2 % (ref 0–7)
ERYTHROCYTE [DISTWIDTH] IN BLOOD BY AUTOMATED COUNT: 12.9 % (ref 11.5–14.5)
GFR SERPL CREATININE-BSD FRML MDRD: 86 ML/MIN/1.73 (ref 58–135)
GLOBULIN UR ELPH-MCNC: 3.4 GM/DL (ref 2.3–3.5)
GLUCOSE BLD-MCNC: 95 MG/DL (ref 60–100)
HCG SERPL QL: NEGATIVE
HCT VFR BLD AUTO: 37.2 % (ref 35–45)
HGB BLD-MCNC: 12.3 G/DL (ref 12–15.5)
IMM GRANULOCYTES # BLD: 0.07 10*3/MM3 (ref 0–0.02)
IMM GRANULOCYTES NFR BLD: 0.5 % (ref 0–0.5)
INR PPP: 1 (ref 0.8–1.2)
LIPASE SERPL-CCNC: 251 U/L (ref 23–300)
LYMPHOCYTES # BLD AUTO: 2.3 10*3/MM3 (ref 0.6–4.2)
LYMPHOCYTES NFR BLD AUTO: 15.6 % (ref 10–50)
MCH RBC QN AUTO: 28 PG (ref 26.5–34)
MCHC RBC AUTO-ENTMCNC: 33.1 G/DL (ref 31.4–36)
MCV RBC AUTO: 84.7 FL (ref 80–98)
MONOCYTES # BLD AUTO: 1.24 10*3/MM3 (ref 0–0.9)
MONOCYTES NFR BLD AUTO: 8.4 % (ref 0–12)
NEUTROPHILS # BLD AUTO: 10.69 10*3/MM3 (ref 2–8.6)
NEUTROPHILS NFR BLD AUTO: 72.2 % (ref 37–80)
NT-PROBNP SERPL-MCNC: 555 PG/ML (ref 0–450)
PLATELET # BLD AUTO: 473 10*3/MM3 (ref 150–450)
PMV BLD AUTO: 9.5 FL (ref 8–12)
POTASSIUM BLD-SCNC: 3.9 MMOL/L (ref 3.5–5.1)
PROT SERPL-MCNC: 6.8 G/DL (ref 6.3–8.6)
PROTHROMBIN TIME: 13.1 SECONDS (ref 11.1–15.3)
RBC # BLD AUTO: 4.39 10*6/MM3 (ref 3.77–5.16)
SODIUM BLD-SCNC: 138 MMOL/L (ref 137–145)
TROPONIN I SERPL-MCNC: <0.012 NG/ML
TROPONIN I SERPL-MCNC: <0.012 NG/ML
WBC NRBC COR # BLD: 14.79 10*3/MM3 (ref 3.2–9.8)

## 2017-08-06 PROCEDURE — 25010000002 HYDROMORPHONE PER 4 MG: Performed by: SURGERY

## 2017-08-06 PROCEDURE — 83690 ASSAY OF LIPASE: CPT | Performed by: EMERGENCY MEDICINE

## 2017-08-06 PROCEDURE — 0 IOPAMIDOL PER 1 ML: Performed by: EMERGENCY MEDICINE

## 2017-08-06 PROCEDURE — 93005 ELECTROCARDIOGRAM TRACING: CPT

## 2017-08-06 PROCEDURE — 71275 CT ANGIOGRAPHY CHEST: CPT

## 2017-08-06 PROCEDURE — G0378 HOSPITAL OBSERVATION PER HR: HCPCS

## 2017-08-06 PROCEDURE — 80053 COMPREHEN METABOLIC PANEL: CPT | Performed by: EMERGENCY MEDICINE

## 2017-08-06 PROCEDURE — 71010 HC CHEST PA OR AP: CPT

## 2017-08-06 PROCEDURE — 84484 ASSAY OF TROPONIN QUANT: CPT | Performed by: EMERGENCY MEDICINE

## 2017-08-06 PROCEDURE — 84703 CHORIONIC GONADOTROPIN ASSAY: CPT | Performed by: EMERGENCY MEDICINE

## 2017-08-06 PROCEDURE — 85379 FIBRIN DEGRADATION QUANT: CPT | Performed by: EMERGENCY MEDICINE

## 2017-08-06 PROCEDURE — 85025 COMPLETE CBC W/AUTO DIFF WBC: CPT | Performed by: EMERGENCY MEDICINE

## 2017-08-06 PROCEDURE — 93010 ELECTROCARDIOGRAM REPORT: CPT | Performed by: INTERNAL MEDICINE

## 2017-08-06 PROCEDURE — 82550 ASSAY OF CK (CPK): CPT | Performed by: EMERGENCY MEDICINE

## 2017-08-06 PROCEDURE — 85730 THROMBOPLASTIN TIME PARTIAL: CPT | Performed by: EMERGENCY MEDICINE

## 2017-08-06 PROCEDURE — 82150 ASSAY OF AMYLASE: CPT | Performed by: EMERGENCY MEDICINE

## 2017-08-06 PROCEDURE — 83880 ASSAY OF NATRIURETIC PEPTIDE: CPT | Performed by: EMERGENCY MEDICINE

## 2017-08-06 PROCEDURE — 85610 PROTHROMBIN TIME: CPT | Performed by: EMERGENCY MEDICINE

## 2017-08-06 PROCEDURE — 74177 CT ABD & PELVIS W/CONTRAST: CPT

## 2017-08-06 PROCEDURE — 82553 CREATINE MB FRACTION: CPT | Performed by: EMERGENCY MEDICINE

## 2017-08-06 PROCEDURE — 25010000002 PIPERACILLIN-TAZOBACTAM: Performed by: SURGERY

## 2017-08-06 PROCEDURE — 99024 POSTOP FOLLOW-UP VISIT: CPT | Performed by: SURGERY

## 2017-08-06 PROCEDURE — 25010000002 PIPERACILLIN SOD-TAZOBACTAM PER 1 G: Performed by: SURGERY

## 2017-08-06 PROCEDURE — 99285 EMERGENCY DEPT VISIT HI MDM: CPT

## 2017-08-06 RX ORDER — SODIUM CHLORIDE 9 MG/ML
125 INJECTION, SOLUTION INTRAVENOUS CONTINUOUS
Status: DISCONTINUED | OUTPATIENT
Start: 2017-08-06 | End: 2017-08-06

## 2017-08-06 RX ORDER — SODIUM CHLORIDE 9 MG/ML
75 INJECTION, SOLUTION INTRAVENOUS CONTINUOUS
Status: DISCONTINUED | OUTPATIENT
Start: 2017-08-06 | End: 2017-08-07

## 2017-08-06 RX ORDER — SODIUM CHLORIDE 0.9 % (FLUSH) 0.9 %
10 SYRINGE (ML) INJECTION AS NEEDED
Status: DISCONTINUED | OUTPATIENT
Start: 2017-08-06 | End: 2017-08-16 | Stop reason: HOSPADM

## 2017-08-06 RX ORDER — BUPROPION HYDROCHLORIDE 100 MG/1
100 TABLET ORAL 2 TIMES DAILY
Status: DISCONTINUED | OUTPATIENT
Start: 2017-08-06 | End: 2017-08-16 | Stop reason: HOSPADM

## 2017-08-06 RX ORDER — OXYBUTYNIN CHLORIDE 5 MG/1
5 TABLET ORAL 2 TIMES DAILY
COMMUNITY
End: 2019-05-31

## 2017-08-06 RX ORDER — PANTOPRAZOLE SODIUM 40 MG/1
40 TABLET, DELAYED RELEASE ORAL DAILY
Status: DISCONTINUED | OUTPATIENT
Start: 2017-08-07 | End: 2017-08-16 | Stop reason: HOSPADM

## 2017-08-06 RX ORDER — ENALAPRILAT 2.5 MG/2ML
1.25 INJECTION INTRAVENOUS EVERY 6 HOURS PRN
Status: DISCONTINUED | OUTPATIENT
Start: 2017-08-06 | End: 2017-08-12

## 2017-08-06 RX ORDER — OXYBUTYNIN CHLORIDE 5 MG/1
5 TABLET ORAL 2 TIMES DAILY
Status: DISCONTINUED | OUTPATIENT
Start: 2017-08-06 | End: 2017-08-16 | Stop reason: HOSPADM

## 2017-08-06 RX ADMIN — IOPAMIDOL 50 ML: 755 INJECTION, SOLUTION INTRAVENOUS at 17:30

## 2017-08-06 RX ADMIN — SODIUM CHLORIDE 500 ML: 9 INJECTION, SOLUTION INTRAVENOUS at 16:53

## 2017-08-06 RX ADMIN — HYDROMORPHONE HYDROCHLORIDE 1 MG: 1 INJECTION, SOLUTION INTRAMUSCULAR; INTRAVENOUS; SUBCUTANEOUS at 21:11

## 2017-08-06 RX ADMIN — SODIUM CHLORIDE 75 ML/HR: 9 INJECTION, SOLUTION INTRAVENOUS at 21:11

## 2017-08-06 RX ADMIN — BUPROPION HYDROCHLORIDE 100 MG: 100 TABLET, FILM COATED ORAL at 21:46

## 2017-08-06 RX ADMIN — IOPAMIDOL 54 ML: 755 INJECTION, SOLUTION INTRAVENOUS at 17:31

## 2017-08-06 RX ADMIN — SODIUM CHLORIDE 125 ML/HR: 9 INJECTION, SOLUTION INTRAVENOUS at 16:17

## 2017-08-06 RX ADMIN — TAZOBACTAM SODIUM AND PIPERACILLIN SODIUM 3.38 G: 375; 3 INJECTION, SOLUTION INTRAVENOUS at 21:47

## 2017-08-06 RX ADMIN — OXYBUTYNIN CHLORIDE 5 MG: 5 TABLET ORAL at 21:46

## 2017-08-06 NOTE — ED PROVIDER NOTES
Subjective   HPI Comments: 45yo female pmh significant htn, recently discharged [facility] 08.02.2017, s/p ERCP/sphincterotomy/CBD stent placement/percutaneous drainage of bile leak resulting from lap cholecystectomy 07.24.2017, presents ED c/o 2d hx chest 'heaviness'/nonradiating/assoc soa, sears, orthopnea.  ROS neg fever/chills/cough/abdominal pain/dysuria.  Pt reports normal bowel/bladder function.    Patient is a 46 y.o. female presenting with chest pain.   Chest Pain   Pain location:  Substernal area  Pain quality: pressure and tightness    Pain radiates to:  Does not radiate  Pain severity:  Moderate  Onset quality:  Sudden  Duration:  2 days  Timing:  Constant  Chronicity:  New  Context: breathing    Relieved by:  Nothing  Worsened by:  Exertion  Ineffective treatments:  None tried  Associated symptoms: shortness of breath    Associated symptoms: no cough and no palpitations        Review of Systems   Constitutional: Negative.    HENT: Negative.    Eyes: Negative.    Respiratory: Positive for chest tightness and shortness of breath. Negative for cough and wheezing.    Cardiovascular: Positive for chest pain. Negative for palpitations and leg swelling.   Gastrointestinal: Negative.    Endocrine: Negative.    Genitourinary: Negative.    Musculoskeletal: Negative.    Skin: Negative.    Neurological: Negative for syncope.       Past Medical History:   Diagnosis Date   • Acid reflux    • Anxiety    • Depressed    • Hard to intubate    • Hypertension    • Sleep apnea        Allergies   Allergen Reactions   • Sulfa Antibiotics Anaphylaxis       Past Surgical History:   Procedure Laterality Date   • ABDOMINAL SURGERY     • CYSTOSCOPY     • ENDOMETRIAL ABLATION  2015   • LAPAROSCOPIC TUBAL LIGATION  1995   • [place] ERCP DX COLLECTION SPECIMEN BRUSHING/WASHING Left 7/31/2017    Procedure: ENDOSCOPIC RETROGRADE CHOLANGIOPANCREATOGRAPHY;  Surgeon: Samuel Redding DO;  Location: [facility] ENDOSCOPY;  Service:  Gastroenterology   • [place] LAP,CHOLECYSTECTOMY N/A 7/24/2017    Procedure: CHOLECYSTECTOMY LAPAROSCOPIC, also umbillical hernia repair;  Surgeon: Pk العلي MD;  Location: [facility];  Service: General       History reviewed. No pertinent family history.    Social History     Social History   • Marital status:      Spouse name: N/A   • Number of children: N/A   • Years of education: N/A     Social History Main Topics   • Smoking status: Never Smoker   • Smokeless tobacco: Never Used   • Alcohol use Yes      Comment: socially   • Drug use: No   • Sexual activity: Not Asked     Other Topics Concern   • None     Social History Narrative           Objective   Physical Exam   Constitutional: She is oriented to person, place, and time. She appears well-developed and well-nourished.   HENT:   Head: Normocephalic and atraumatic.   Mouth/Throat: Oropharynx is clear and moist.   Eyes: Pupils are equal, round, and reactive to light.   Neck: Normal range of motion. Neck supple. No JVD present. No tracheal deviation present.   Cardiovascular: Normal rate, regular rhythm, normal heart sounds and intact distal pulses.  Exam reveals no gallop and no friction rub.    No murmur heard.  Pulmonary/Chest: Effort normal and breath sounds normal. She has no wheezes. She has no rales.   Abdominal: Soft. Normal appearance and bowel sounds are normal. There is tenderness in the right upper quadrant. There is no rebound, no guarding and no tenderness at McBurney's point.       Musculoskeletal: Normal range of motion. She exhibits no tenderness.   Lymphadenopathy:     She has no cervical adenopathy.   Neurological: She is alert and oriented to person, place, and time.   Skin: Skin is warm and dry.   Nursing note and vitals reviewed.      ECG 12 Lead    Date/Time: 8/6/2017 5:00 PM  Performed by: SONAL GLOVER  Authorized by: SONAL GLOVER   Interpreted by physician  Rhythm: sinus rhythm  Rate: normal  BPM: 78  QRS axis:  left  Conduction: conduction normal  ST Segments: ST segments normal  T Waves: T waves normal  Other: no other findings  Clinical impression: non-specific ECG               ED Course  ED Course   Comment By Time   D/w Dr Varner; donna zelaya in ED David Bunn MD 08/06 1816      Labs Reviewed   COMPREHENSIVE METABOLIC PANEL - Abnormal; Notable for the following:        Result Value    Chloride 89 (*)     A/G Ratio 1.0 (*)     Anion Gap 20.0 (*)     All other components within normal limits   CK - Abnormal; Notable for the following:     Creatine Kinase 27 (*)     All other components within normal limits   BNP (IN-HOUSE) - Abnormal; Notable for the following:     proBNP 555.0 (*)     All other components within normal limits   D-DIMER, QUANTITATIVE - Abnormal; Notable for the following:     D-Dimer, Quantitative >4000 (*)     All other components within normal limits    Narrative:     Dimer values <500 ng/ml FEU are FDA approved as aid in diagnosis of deep venous thrombosis and pulmonary embolism.  This test should not be used in an exclusion strategy with pretest probability alone.    A recent guideline regarding diagnosis for pulmonary thomboembolism recommends an adjusted exclusion criterion of age x 10 ng/ml FEU for patients >50 years of age (Casie Intern Med 2015; 163: 701-711).   CBC WITH AUTO DIFFERENTIAL - Abnormal; Notable for the following:     WBC 14.79 (*)     Platelets 473 (*)     Neutrophils, Absolute 10.69 (*)     Monocytes, Absolute 1.24 (*)     Immature Grans, Absolute 0.07 (*)     All other components within normal limits   AMYLASE - Normal   LIPASE - Normal   PROTIME-INR - Normal    Narrative:     Therapeutic range for most indications is 2.0-3.0 INR,  or 2.5-3.5 for mechanical heart valves.   APTT - Normal    Narrative:     The recommended Heparin therapeutic range is 68-97 seconds.   TROPONIN (IN-HOUSE) - Normal   TROPONIN (IN-HOUSE) - Normal   CK MB - Normal   HCG, SERUM, QUALITATIVE - Normal    TROPONIN (IN-HOUSE)   CBC AND DIFFERENTIAL    Narrative:     The following orders were created for panel order CBC & Differential.  Procedure                               Abnormality         Status                     ---------                               -----------         ------                     Scan Slide[[id number]]                                                                  CBC Auto Differential[[id number]]        Abnormal            Final result                 Please view results for these tests on the individual orders.     Mri Abdomen With & Without Contrast    Result Date: 7/29/2017  Narrative: EXAMINATION:  MRI OF THE ABDOMEN WITHOUT AND WITH CONTRAST CLINICAL INFORMATION: Elevated bilirubin after cholecystectomy. DESCRIPTION: Technique: Multiplanar imaging was performed using multiple pulse sequences. MRCP was also performed. Computer generated 3-D reconstructions/MIPS were performed Contrast: 20 mL intravenous ProHance Comparison: None available. FINDINGS: Lung bases: There is a right pleural effusion with adjacent atelectasis/consolidation of the right lower lobe partially visualized. There is a tiny left pleural effusion.   Liver: There is no suspicious mass.     Gallbladder and bile ducts: The gallbladder has been resected. There is fluid in the gallbladder fossa.   There is no intrahepatic or extrahepatic biliary ductal dilatation.     Pancreas: The pancreas is normal.    Spleen: The spleen is normal.    Kidneys: There is no evidence of hydronephrosis or other abnormality.    Adrenal glands: There is no adrenal nodule.    Peritoneum, retroperitoneum and gastrointestinal tract: There is a moderate amount of ascites, predominantly perihepatic. Lymph nodes: There is no lymphadenopathy.    Aorta and main branches: The aorta and main branches are normal in caliber.        Impression: CONCLUSION: 1.  Moderate amount of perihepatic ascites and a small amount of fluid in the gallbladder fossa.  In patient with recent cholecystectomy, a bile leak should be considered. A scan could be obtained for further evaluation if indicated. 2.  Normal caliber intra and extrahepatic bile ducts. Electronically signed by:  Justin Burch MD  7/29/2017 12:04 PM CDT Workstation: [id number]    Nm Hepatobiliary Without Cck    Result Date: 7/29/2017  Narrative: Status post cholecystectomy and abnormal MRI scan. Nuclear medicine, hepatobiliary study. Study was performed following intravenous administration 6.1 mCi technetium 99 M Choletec. There is large collection of radiotracer seen in the gallbladder fossa consistent with bile leak.     Impression: CONCLUSION: Finding consistent with bile leak. Finding discussed with patient nurse at the time of the dictation. Electronically signed by:  Juan Carlos Caldwell MD  7/29/2017 5:16 PM CDT Workstation: [id number]    Fl Ercp    Result Date: 7/31/2017  Narrative: Fluoroscopy ERCP. HISTORY: Bile duct leak. Four minutes and three seconds of fluoroscopy was used by Dr. Samuel Redding during attempted ERCP. 12 mL of Isovue-300. A single film was obtained. Please see more detailed procedural note by Dr. Samuel Redding.     Impression: CONCLUSION: As above. Electronically signed by:  David Monique MD  7/31/2017 7:59 PM CDT Workstation: [id number]    Xr Chest 1 View    Result Date: 8/6/2017  Narrative: Patient Name:  MS. RAHEEM VENCES Patient ID:  [id number] Ordering:  SONAL GLOVER Attending:  SONAL GLOVER Referring:  SONAL GLOVER ------------------------------------------------ PORTABLE CHEST HISTORY: Chest pain. Chest pressure. Shortness of air. Portable AP upright film of the chest was obtained at 4:12 PM. COMPARISON: July 27, 2017 EKG leads in place. Unchanged elevation right hemidiaphragm. Right basilar subsegmental atelectasis or infiltrate. The heart is not enlarged. The pulmonary vasculature is not increased. No pleural effusion. No pneumothorax. No acute osseous abnormality.  Degenerative changes are present in the thoracic spine.     Impression: CONCLUSION: Unchanged elevation right hemidiaphragm. Right basilar subsegmental atelectasis or infiltrate. [id number] Electronically signed by:  Christiano Enciso MD  8/6/2017 4:47 PM CDT Workstation: [id number]    Xr Chest 1 View    Result Date: 7/27/2017  Narrative: [facility] Patient Name: MS. RAHEEM VENCES ORDERING: GLEN COSBY ATTENDING: GLEN COSBY REFERRING: GLEN COSBY ----------------------- PROCEDURE: Portable chest x-ray TECHNIQUE: Single AP view of the chest COMPARISON: None HISTORY: cough FINDINGS:  Life-support devices: None Lungs/pleura: There are low lung volumes. No overt pulmonary vascular congestion, focal pulmonary parenchymal opacity, pleural effusion or pneumothorax. Heart, hilar and mediastinal structures:  Normal accounting for projection and technique     Impression: CONCLUSION: No acute pulmonary disease. Electronically signed by:  Justin Burch MD  7/27/2017 3:03 PM CDT Workstation: [id number]    Ct Angiogram Chest With Contrast    Result Date: 8/6/2017  Narrative: Patient Name:  MS. RAHEEM VENCES Patient ID:  [id number] Ordering:  SONAL GLOVER Attending:  SONAL GLOVER Referring:  SONAL GLOVER ------------------------------------------------ CT angiogram of the chest with contrast and CT abdomen and pelvis with contrast. History: Shortness of air. Right-sided pain. CT angiogram of the chest with contrast Axial spiral scans of the chest were obtained  with  intravenous contrast.  Coronal and sagittal reconstructions and both oblique coronal MIPS performed the same workstation. This exam was performed according to our departmental dose-optimization program, which includes automated exposure control, adjustment of the mA and/or kV according to patient size and/or use of iterative reconstruction technique. DLP: 121.90 Comparison: July 27, 2017 No pulmonary embolism. No mediastinal  hematoma. No hilar, mediastinal or axillary adenopathy. No thoracic aortic aneurysm or dissection. No pericardial effusion. Small to moderate-sized right pleural effusion with associated compressive atelectasis right upper and lower lobes. Minimal subsegmental atelectasis or infiltrate left upper lobe. No pneumothorax. No acute osseous abnormality. Minimal degenerative changes in the thoracic spine. CT ABDOMEN AND PELVIS WITH CONTRAST Following the intravenous administration of contrast, axial spiral scans of the abdomen and pelvis were obtained.  Coronal and sagital reconstructions were preformed. This exam was performed according to our departmental dose-optimization program, which includes automated exposure control, adjustment of the mA and/or kV according to patient size and/or use of iterative reconstruction technique. DLP: 2052.70 COMPARISON: July 30, 2017. No acute osseous abnormality. Degenerative changes lumbar spine. Cholecystectomy. Biliary stent extending from the common hepatic duct into the duodenum. Right upper quadrant pigtail drainage catheter now lies in the subcutaneous fat right anterior abdominal wall. Hepatic subcapsular fluid collection dome of the liver and right lateral aspect of the liver measuring 15.4 cm in greatest length by 8.2 cm greatest transverse dimension by 13.9 cm greatest AP dimension. Second 6.4 cm subcapsular fluid collection along the inferior edge of the liver. 4.4 cm collection of fluid in the gallbladder fossa. Small amount of free fluid posteriorly in the pelvis. The spleen is unremarkable. The pancreas is unremarkable. The adrenal glands are unremarkable. No renal or ureteral calculi. No renal mass. No hydronephrosis. Unremarkable bladder. No pelvic mass. No abdominal aortic aneurysm. No adenopathy. No bowel obstruction. Normal appendix. No free fluid. No hernia.     Impression: Conclusion: No Pulmonary Embolism Small to moderate-sized right pleural effusion with  associated compressive atelectasis right upper and lower lobes. Minimal subsegmental atelectasis or infiltrate left upper lobe. Cholecystectomy. Biliary stent extending from the common hepatic duct into the duodenum. Right upper quadrant pigtail drainage catheter now lies in the subcutaneous fat right anterior abdominal wall. Hepatic subcapsular fluid collection dome of the liver and right lateral aspect of the liver measuring 15.4 cm in greatest length by 8.2 cm greatest transverse dimension by 13.9 cm greatest AP dimension. Second 6.4 cm subcapsular fluid collection along the inferior edge of the liver. 4.4 cm collection of fluid in the gallbladder fossa. Small amount of free fluid posteriorly in the pelvis. [id number] Electronically signed by:  Christiano Enciso MD  8/6/2017 5:54 PM CDT Workstation: [email or web address]    Ct Angiogram Chest With Contrast    Result Date: 7/27/2017  Narrative: EXAM:  Computed Tomography with CTA       REGION:  Chest     INDICATION:    dyspnea   - rule out pulmonary embolism     CLINICAL HISTORY: CORRELATIVE IMAGING:  None                       TECHNIQUE:    - PE / vascular protocol    - reconstructions:  axial, coronal, sagittal, obliques    - computer-generated 3D reconstructions (MIPS) were performed.    - contrast:  intravenous Isovue 370, 70 mL                             This exam was performed according to the departmental dose-optimization program which includes automated exposure control, adjustment of the mA and/or kV according to patient size and/or use of iterative reconstruction technique.     COMMENTS: - Pulmonary arterial system:     - Main pulmonary artery trunk:  negative     - Left, right main pulmonary arteries: negative     - Lobar arteries: negative     - Segmental arteries: negative  - Systemic vascularity (as visualized):       - Aorta:  grossly negative / normal caliber / no dissection       - roots of great vessels:  grossly negative / normal caliber       - SVC / IVC:   grossly negative / normal caliber  - Misc (limited visualization):     - pulmonary parenchyma:  Consolidated airspace disease, primarily in the right lower lobe and dependent portions of the upper lobe.     - pleura:  negative     - mediastinal / shane:  negative     - neck, inferior:  grossly wnl     - subdiaphragmatic structures:  grossly negative (limited evaluation)     - osseous:     - misc:    .        Impression: CONCLUSION:      1.  No evidence of pulmonary embolism.          2.  No evidence of pathology associated with the visualized aorta.      3. Consolidated airspace disease, primarily right lower lobe.                                               Electronically signed by:  SUSANA Mijares MD  7/27/2017 6:48 PM CDT Workstation: [id number]    Ct Tube Placement Organ Site    Result Date: 7/31/2017  Narrative: Procedure: CT TUBE PLACEMENT ORGAN SITE History: Bile leak. This exam was performed according to our departmental dose optimization program, which includes automated exposure control, adjustment of the mA and/or KV according to patient size and/or use of iterative reconstruction technique. Dose length product 1953 The patient was placed in a supine position and  scans were obtained revealing a fluid collection in the right upper quadrant of the abdomen around the liver.  The overlying skin was marked at an appropriate level for needle introduction.  The skin was cleansed with chlorhexidine solution and local lidocaine was infiltrated. A five gauge Yueh needle was introduced into the fluid collection. Aspiration of a small amount of yellow fluid was obtained.  A stiff wire was then placed through the needle and the needle was removed.  The tract was then dilated with eventual placement of an 8 Fr. all purpose drainage catheter.  Aspiration of approximately 800 cc of a bilious fluid was performed.  The catheter was locked in place and connected to gravity drainage.  The catheter was secured at  the skin and a dressing was applied.     Impression: Impression: Successful CT guided fluid drainage from the right upper quadrant of the abdomen.  The catheter was connected to gravity.  The procedure was tolerated well without complications. Electronically signed by:  Justin Burch MD  7/31/2017 11:16 AM CDT Workstation: [id number]                MDM    Final diagnoses:   Abdominal fluid collection   Pleural effusion            David Bunn MD  08/06/17 1927

## 2017-08-06 NOTE — H&P
Patient Care Team:  MARIA TERESA Jorge as PCP - General    Chief complaint Shortness of breath    Subjective     Chest Pain    This is a new problem. The current episode started yesterday. The onset quality is gradual. The problem occurs constantly. The problem has been unchanged. The pain is present in the lateral region and epigastric region (Right side). The pain is moderate. The quality of the pain is described as sharp (Worse with deep breaths). The pain does not radiate. Associated symptoms include abdominal pain, a cough, diaphoresis, malaise/fatigue, shortness of breath and weakness. Pertinent negatives include no back pain, claudication, dizziness, fever, hemoptysis, irregular heartbeat, nausea, near-syncope, numbness, palpitations or vomiting.   Shortness of Breath   Associated symptoms include abdominal pain and chest pain. Pertinent negatives include no claudication, fever, hemoptysis or vomiting.   Abdominal Pain   This is a recurrent problem. Pertinent negatives include no fever, nausea or vomiting. Her past medical history is significant for abdominal surgery (Recent lap pk with po bile leak, ercp, stemt placement). There is no history of colon cancer, Crohn's disease, GERD, irritable bowel syndrome, pancreatitis, PUD or ulcerative colitis.     Laparoscopic cholecystectomy by Dr. العلي in late July. A bile leak occurred requiring drainage and ERCP with stent placement in [place]. Was doing well with no drainage from the biliary catheter- yesterday and today began having sharp chest and RUQ pain, difficulty breathing. ER workup has excluded PE and MI. There are 2 subcapsular collections of fluid on the RIGHT lobe of the liver and a RIGHT pleural effusion. Her main problem is pleuritic chest pain on the right side when she takes a deep breath. Eating and having bowel movements today.    Review of Systems   Constitutional: Positive for diaphoresis and malaise/fatigue. Negative for fever.    Respiratory: Positive for cough and shortness of breath. Negative for hemoptysis.    Cardiovascular: Positive for chest pain. Negative for palpitations, claudication and near-syncope.   Gastrointestinal: Positive for abdominal pain. Negative for nausea and vomiting.   Musculoskeletal: Negative for back pain.   Neurological: Positive for weakness. Negative for dizziness and numbness.        Past Medical History:   Diagnosis Date   • Acid reflux    • Anxiety    • Depressed    • Hard to intubate    • Hypertension    • Sleep apnea      Past Surgical History:   Procedure Laterality Date   • ABDOMINAL SURGERY     • CYSTOSCOPY     • ENDOMETRIAL ABLATION  2015   • LAPAROSCOPIC TUBAL LIGATION  1995   • [place] ERCP DX COLLECTION SPECIMEN BRUSHING/WASHING Left 7/31/2017    Procedure: ENDOSCOPIC RETROGRADE CHOLANGIOPANCREATOGRAPHY;  Surgeon: Samuel Redding DO;  Location: [facility] ENDOSCOPY;  Service: Gastroenterology   • [place] LAP,CHOLECYSTECTOMY N/A 7/24/2017    Procedure: CHOLECYSTECTOMY LAPAROSCOPIC, also umbillical hernia repair;  Surgeon: Pk العلي MD;  Location: [facility] OR;  Service: General     History reviewed. No pertinent family history.  Social History   Substance Use Topics   • Smoking status: Never Smoker   • Smokeless tobacco: Never Used   • Alcohol use Yes      Comment: socially   Medications:       buPROPion (WELLBUTRIN) 100 MG tablet    Take 100 mg by mouth 2 (Two) Times a Day.           furosemide (LASIX) 40 MG tablet    Take 1 tablet by mouth  Daily.       lisinopril (PRINIVIL,ZESTRIL) 10 MG tablet    Take 10 mg by mouth Daily.       oxybutynin (DITROPAN) 5 MG tablet    Take 5 mg by mouth.       oxyCODONE-acetaminophen (ROXICET) 5-325 MG per tablet    Take 1-2 tablets by mouth Every 6 (Six) Demi rs As Needed for Severe Pain .       pantoprazole (PROTONIX) 40 MG EC tablet    Take 40 mg by mouth Daily.       sertraline (ZOLOFT) 50 MG tablet    Take 50 mg by mouth Daily.       traZODone (DESYREL) 50 MG tablet     Take 1 tablet by mouth Every Night.    (Not in a hospital admission)  Allergies:  Sulfa antibiotics    Objective      Vital Signs  Temp:  [98.8 °F (37.1 °C)] 98.8 °F (37.1 °C)  Heart Rate:  [74-87] 76  Resp:  [20] 20  BP: (138-180)/(78-91) 180/90    Physical Exam   Constitutional: She is oriented to person, place, and time. She appears well-developed and well-nourished. No distress.   HENT:   Head: Normocephalic and atraumatic.   Eyes: EOM are normal. Pupils are equal, round, and reactive to light.   Neck: Normal range of motion. Neck supple. No JVD present. No tracheal deviation present. No thyromegaly present.   Cardiovascular: Normal rate and regular rhythm.    Pulmonary/Chest: Effort normal and breath sounds normal. No stridor. No respiratory distress. She has no wheezes. She has no rales. She exhibits no tenderness.   Abdominal: Soft. Bowel sounds are normal. She exhibits no distension and no mass. There is no tenderness. There is no rebound and no guarding. No hernia.   Drain in the sq tissue is removed   Musculoskeletal: Normal range of motion.   Lymphadenopathy:     She has no cervical adenopathy.   Neurological: She is alert and oriented to person, place, and time.   Skin: Skin is warm and dry. She is not diaphoretic.   Psychiatric: She has a normal mood and affect. Judgment and thought content normal.   Vitals reviewed.      Results Review:   I reviewed the patient's new clinical results.  I reviewed the patient's new imaging results and agree with the interpretation.      Assessment/Plan     Active Problems:    * No active hospital problems. *      Assessment:    Condition: In stable condition.   (1. Pleuritic pain RIGHT side- possible early pneumonia).     Plan:   Transfer to floor.  Encourage ambulation.  Add bronchodilators.  Regular diet.  Start antibiotics.         I discussed the patients findings and my recommendations with patient, family and consulting provider    Abraham Varner,  MD  08/06/17  6:56 PM    Time: 30 mins

## 2017-08-07 ENCOUNTER — APPOINTMENT (OUTPATIENT)
Dept: CT IMAGING | Facility: HOSPITAL | Age: 47
End: 2017-08-07

## 2017-08-07 LAB
ALBUMIN SERPL-MCNC: 3.2 G/DL (ref 3.4–4.8)
ALBUMIN/GLOB SERPL: 1 G/DL (ref 1.1–1.8)
ALP SERPL-CCNC: 102 U/L (ref 38–126)
ALT SERPL W P-5'-P-CCNC: 39 U/L (ref 9–52)
ANION GAP SERPL CALCULATED.3IONS-SCNC: 8 MMOL/L (ref 5–15)
AST SERPL-CCNC: 35 U/L (ref 14–36)
BASOPHILS # BLD AUTO: 0.02 10*3/MM3 (ref 0–0.2)
BASOPHILS NFR BLD AUTO: 0.1 % (ref 0–2)
BILIRUB SERPL-MCNC: 1.1 MG/DL (ref 0.2–1.3)
BUN BLD-MCNC: 8 MG/DL (ref 7–21)
BUN/CREAT SERPL: 9.8 (ref 7–25)
CALCIUM SPEC-SCNC: 8.5 MG/DL (ref 8.4–10.2)
CHLORIDE SERPL-SCNC: 100 MMOL/L (ref 95–110)
CO2 SERPL-SCNC: 29 MMOL/L (ref 22–31)
CREAT BLD-MCNC: 0.82 MG/DL (ref 0.5–1)
DEPRECATED RDW RBC AUTO: 40.7 FL (ref 36.4–46.3)
EOSINOPHIL # BLD AUTO: 0.33 10*3/MM3 (ref 0–0.7)
EOSINOPHIL NFR BLD AUTO: 2.2 % (ref 0–7)
ERYTHROCYTE [DISTWIDTH] IN BLOOD BY AUTOMATED COUNT: 13.2 % (ref 11.5–14.5)
GFR SERPL CREATININE-BSD FRML MDRD: 75 ML/MIN/1.73 (ref 58–135)
GLOBULIN UR ELPH-MCNC: 3.2 GM/DL (ref 2.3–3.5)
GLUCOSE BLD-MCNC: 102 MG/DL (ref 60–100)
HCT VFR BLD AUTO: 35.9 % (ref 35–45)
HGB BLD-MCNC: 12 G/DL (ref 12–15.5)
IMM GRANULOCYTES # BLD: 0.06 10*3/MM3 (ref 0–0.02)
IMM GRANULOCYTES NFR BLD: 0.4 % (ref 0–0.5)
LYMPHOCYTES # BLD AUTO: 1.66 10*3/MM3 (ref 0.6–4.2)
LYMPHOCYTES NFR BLD AUTO: 11.3 % (ref 10–50)
MAGNESIUM SERPL-MCNC: 1.9 MG/DL (ref 1.6–2.3)
MCH RBC QN AUTO: 28.2 PG (ref 26.5–34)
MCHC RBC AUTO-ENTMCNC: 33.4 G/DL (ref 31.4–36)
MCV RBC AUTO: 84.3 FL (ref 80–98)
MONOCYTES # BLD AUTO: 1.44 10*3/MM3 (ref 0–0.9)
MONOCYTES NFR BLD AUTO: 9.8 % (ref 0–12)
NEUTROPHILS # BLD AUTO: 11.2 10*3/MM3 (ref 2–8.6)
NEUTROPHILS NFR BLD AUTO: 76.2 % (ref 37–80)
PHOSPHATE SERPL-MCNC: 4 MG/DL (ref 2.4–4.4)
PLATELET # BLD AUTO: 503 10*3/MM3 (ref 150–450)
PMV BLD AUTO: 9.3 FL (ref 8–12)
POTASSIUM BLD-SCNC: 4.2 MMOL/L (ref 3.5–5.1)
PROT SERPL-MCNC: 6.4 G/DL (ref 6.3–8.6)
RBC # BLD AUTO: 4.26 10*6/MM3 (ref 3.77–5.16)
SODIUM BLD-SCNC: 137 MMOL/L (ref 137–145)
WBC NRBC COR # BLD: 14.71 10*3/MM3 (ref 3.2–9.8)

## 2017-08-07 PROCEDURE — 87070 CULTURE OTHR SPECIMN AEROBIC: CPT | Performed by: SURGERY

## 2017-08-07 PROCEDURE — 87205 SMEAR GRAM STAIN: CPT | Performed by: SURGERY

## 2017-08-07 PROCEDURE — 25010000002 HYDROMORPHONE PER 4 MG: Performed by: SURGERY

## 2017-08-07 PROCEDURE — 0F903ZZ DRAINAGE OF LIVER, PERCUTANEOUS APPROACH: ICD-10-PCS | Performed by: RADIOLOGY

## 2017-08-07 PROCEDURE — 87015 SPECIMEN INFECT AGNT CONCNTJ: CPT | Performed by: SURGERY

## 2017-08-07 PROCEDURE — 25010000002 FENTANYL CITRATE (PF) 100 MCG/2ML SOLUTION: Performed by: RADIOLOGY

## 2017-08-07 PROCEDURE — 83735 ASSAY OF MAGNESIUM: CPT | Performed by: SURGERY

## 2017-08-07 PROCEDURE — 25010000002 MIDAZOLAM PER 1 MG: Performed by: RADIOLOGY

## 2017-08-07 PROCEDURE — G0378 HOSPITAL OBSERVATION PER HR: HCPCS

## 2017-08-07 PROCEDURE — 25010000002 ONDANSETRON PER 1 MG: Performed by: SURGERY

## 2017-08-07 PROCEDURE — 84100 ASSAY OF PHOSPHORUS: CPT | Performed by: SURGERY

## 2017-08-07 PROCEDURE — 99024 POSTOP FOLLOW-UP VISIT: CPT | Performed by: SURGERY

## 2017-08-07 PROCEDURE — 85025 COMPLETE CBC W/AUTO DIFF WBC: CPT | Performed by: SURGERY

## 2017-08-07 PROCEDURE — 80053 COMPREHEN METABOLIC PANEL: CPT | Performed by: SURGERY

## 2017-08-07 PROCEDURE — 75989 ABSCESS DRAINAGE UNDER X-RAY: CPT

## 2017-08-07 PROCEDURE — 25010000002 PIPERACILLIN SOD-TAZOBACTAM PER 1 G: Performed by: SURGERY

## 2017-08-07 RX ORDER — MIDAZOLAM HYDROCHLORIDE 1 MG/ML
INJECTION INTRAMUSCULAR; INTRAVENOUS
Status: COMPLETED | OUTPATIENT
Start: 2017-08-07 | End: 2017-08-07

## 2017-08-07 RX ORDER — FENTANYL CITRATE 50 UG/ML
INJECTION, SOLUTION INTRAMUSCULAR; INTRAVENOUS
Status: COMPLETED | OUTPATIENT
Start: 2017-08-07 | End: 2017-08-07

## 2017-08-07 RX ORDER — ONDANSETRON 2 MG/ML
4 INJECTION INTRAMUSCULAR; INTRAVENOUS EVERY 6 HOURS PRN
Status: DISCONTINUED | OUTPATIENT
Start: 2017-08-07 | End: 2017-08-16 | Stop reason: HOSPADM

## 2017-08-07 RX ORDER — HYDROCODONE BITARTRATE AND ACETAMINOPHEN 5; 325 MG/1; MG/1
1 TABLET ORAL EVERY 6 HOURS PRN
Status: DISCONTINUED | OUTPATIENT
Start: 2017-08-07 | End: 2017-08-08

## 2017-08-07 RX ADMIN — SERTRALINE HYDROCHLORIDE 50 MG: 50 TABLET ORAL at 08:08

## 2017-08-07 RX ADMIN — OXYBUTYNIN CHLORIDE 5 MG: 5 TABLET ORAL at 08:08

## 2017-08-07 RX ADMIN — HYDROMORPHONE HYDROCHLORIDE 1 MG: 1 INJECTION, SOLUTION INTRAMUSCULAR; INTRAVENOUS; SUBCUTANEOUS at 20:37

## 2017-08-07 RX ADMIN — TAZOBACTAM SODIUM AND PIPERACILLIN SODIUM 3.38 G: 375; 3 INJECTION, SOLUTION INTRAVENOUS at 20:38

## 2017-08-07 RX ADMIN — TAZOBACTAM SODIUM AND PIPERACILLIN SODIUM 3.38 G: 375; 3 INJECTION, SOLUTION INTRAVENOUS at 15:09

## 2017-08-07 RX ADMIN — HYDROMORPHONE HYDROCHLORIDE 1 MG: 1 INJECTION, SOLUTION INTRAMUSCULAR; INTRAVENOUS; SUBCUTANEOUS at 22:41

## 2017-08-07 RX ADMIN — PANTOPRAZOLE SODIUM 40 MG: 40 TABLET, DELAYED RELEASE ORAL at 08:08

## 2017-08-07 RX ADMIN — BUPROPION HYDROCHLORIDE 100 MG: 100 TABLET, FILM COATED ORAL at 18:06

## 2017-08-07 RX ADMIN — FENTANYL CITRATE 25 MCG: 50 INJECTION, SOLUTION INTRAMUSCULAR; INTRAVENOUS at 13:35

## 2017-08-07 RX ADMIN — HYDROMORPHONE HYDROCHLORIDE 1 MG: 1 INJECTION, SOLUTION INTRAMUSCULAR; INTRAVENOUS; SUBCUTANEOUS at 15:09

## 2017-08-07 RX ADMIN — HYDROCODONE BITARTRATE AND ACETAMINOPHEN 1 TABLET: 5; 325 TABLET ORAL at 20:37

## 2017-08-07 RX ADMIN — HYDROMORPHONE HYDROCHLORIDE 1 MG: 1 INJECTION, SOLUTION INTRAMUSCULAR; INTRAVENOUS; SUBCUTANEOUS at 12:22

## 2017-08-07 RX ADMIN — HYDROMORPHONE HYDROCHLORIDE 1 MG: 1 INJECTION, SOLUTION INTRAMUSCULAR; INTRAVENOUS; SUBCUTANEOUS at 18:06

## 2017-08-07 RX ADMIN — TAZOBACTAM SODIUM AND PIPERACILLIN SODIUM 3.38 G: 375; 3 INJECTION, SOLUTION INTRAVENOUS at 08:16

## 2017-08-07 RX ADMIN — FENTANYL CITRATE 25 MCG: 50 INJECTION, SOLUTION INTRAMUSCULAR; INTRAVENOUS at 13:57

## 2017-08-07 RX ADMIN — HYDROMORPHONE HYDROCHLORIDE 1 MG: 1 INJECTION, SOLUTION INTRAMUSCULAR; INTRAVENOUS; SUBCUTANEOUS at 08:35

## 2017-08-07 RX ADMIN — HYDROMORPHONE HYDROCHLORIDE 1 MG: 1 INJECTION, SOLUTION INTRAMUSCULAR; INTRAVENOUS; SUBCUTANEOUS at 05:33

## 2017-08-07 RX ADMIN — OXYBUTYNIN CHLORIDE 5 MG: 5 TABLET ORAL at 18:06

## 2017-08-07 RX ADMIN — BUPROPION HYDROCHLORIDE 100 MG: 100 TABLET, FILM COATED ORAL at 08:08

## 2017-08-07 RX ADMIN — MIDAZOLAM 0.5 MG: 1 INJECTION INTRAMUSCULAR; INTRAVENOUS at 13:57

## 2017-08-07 RX ADMIN — MIDAZOLAM 0.5 MG: 1 INJECTION INTRAMUSCULAR; INTRAVENOUS at 13:34

## 2017-08-07 RX ADMIN — TAZOBACTAM SODIUM AND PIPERACILLIN SODIUM 3.38 G: 375; 3 INJECTION, SOLUTION INTRAVENOUS at 02:46

## 2017-08-07 RX ADMIN — ONDANSETRON 4 MG: 2 INJECTION INTRAMUSCULAR; INTRAVENOUS at 15:42

## 2017-08-07 RX ADMIN — HYDROMORPHONE HYDROCHLORIDE 1 MG: 1 INJECTION, SOLUTION INTRAMUSCULAR; INTRAVENOUS; SUBCUTANEOUS at 00:40

## 2017-08-07 NOTE — PLAN OF CARE
Problem: Patient Care Overview (Adult)  Goal: Plan of Care Review  Outcome: Ongoing (interventions implemented as appropriate)    08/07/17 0319   Coping/Psychosocial Response Interventions   Plan Of Care Reviewed With patient   Patient Care Overview   Progress no change   Outcome Evaluation   Outcome Summary/Follow up Plan Patient is new admit, is NPO for SX 8/7/17, pain has been controlled with IV meds, has rested well throughout the night.        Goal: Adult Individualization and Mutuality  Outcome: Ongoing (interventions implemented as appropriate)  Goal: Discharge Needs Assessment  Outcome: Ongoing (interventions implemented as appropriate)    Problem: Pain, Acute (Adult)  Goal: Identify Related Risk Factors and Signs and Symptoms  Outcome: Ongoing (interventions implemented as appropriate)  Goal: Acceptable Pain Control/Comfort Level  Outcome: Ongoing (interventions implemented as appropriate)

## 2017-08-07 NOTE — PROGRESS NOTES
LOS: 0 days   Patient Care Team:  MARIA TERESA Jorge as PCP - General    Chief Complaint:  <principal problem not specified>    Subjective     Interval History:   Feels better than last hospitalization.  Difficult to take a deep breath.  Pleuritic chest pain.  RUQ abd pain. No fevers    Objective     Vital Signs  Temp:  [98.2 °F (36.8 °C)-99.8 °F (37.7 °C)] 99.7 °F (37.6 °C)  Heart Rate:  [69-87] 81  Resp:  [17-20] 18  BP: (120-180)/(58-92) 122/58    Physical Exam:  Incisions OK     Results Review:       Lab Results (last 24 hours)     Procedure Component Value Units Date/Time    CBC & Differential [[id number]] Collected:  08/06/17 1601    Specimen:  Blood Updated:  08/06/17 1611    Narrative:       The following orders were created for panel order CBC & Differential.  Procedure                               Abnormality         Status                     ---------                               -----------         ------                     Scan Slide[[id number]]                                                                  CBC Auto Differential[[id number]]        Abnormal            Final result                 Please view results for these tests on the individual orders.    CBC Auto Differential [[id number]]  (Abnormal) Collected:  08/06/17 1601    Specimen:  Blood Updated:  08/06/17 1611     WBC 14.79 (H) 10*3/mm3      RBC 4.39 10*6/mm3      Hemoglobin 12.3 g/dL      Hematocrit 37.2 %      MCV 84.7 fL      MCH 28.0 pg      MCHC 33.1 g/dL      RDW 12.9 %      RDW-SD 39.7 fl      MPV 9.5 fL      Platelets 473 (H) 10*3/mm3      Neutrophil % 72.2 %      Lymphocyte % 15.6 %      Monocyte % 8.4 %      Eosinophil % 3.2 %      Basophil % 0.1 %      Immature Grans % 0.5 %      Neutrophils, Absolute 10.69 (H) 10*3/mm3      Lymphocytes, Absolute 2.30 10*3/mm3      Monocytes, Absolute 1.24 (H) 10*3/mm3      Eosinophils, Absolute 0.47 10*3/mm3      Basophils, Absolute 0.02 10*3/mm3      Immature Grans, Absolute 0.07  (H) 10*3/mm3     CK [[id number]]  (Abnormal) Collected:  08/06/17 1601    Specimen:  Blood Updated:  08/06/17 1619     Creatine Kinase 27 (L) U/L     Amylase [[id number]]  (Normal) Collected:  08/06/17 1601    Specimen:  Blood Updated:  08/06/17 1620     Amylase 77 U/L     Lipase [[id number]]  (Normal) Collected:  08/06/17 1601    Specimen:  Blood Updated:  08/06/17 1620     Lipase 251 U/L     Comprehensive Metabolic Panel [[id number]]  (Abnormal) Collected:  08/06/17 1601    Specimen:  Blood Updated:  08/06/17 1620     Glucose 95 mg/dL      BUN 8 mg/dL      Creatinine 0.73 mg/dL      Sodium 138 mmol/L      Potassium 3.9 mmol/L      Chloride 89 (L) mmol/L      CO2 29.0 mmol/L      Calcium 8.8 mg/dL      Total Protein 6.8 g/dL      Albumin 3.40 g/dL      ALT (SGPT) 30 U/L      AST (SGOT) 30 U/L      Alkaline Phosphatase 113 U/L      Total Bilirubin 0.8 mg/dL      eGFR Non African Amer 86 mL/min/1.73      Globulin 3.4 gm/dL      A/G Ratio 1.0 (L) g/dL      BUN/Creatinine Ratio 11.0     Anion Gap 20.0 (H) mmol/L     hCG, Serum, Qualitative [[id number]]  (Normal) Collected:  08/06/17 1601    Specimen:  Blood Updated:  08/06/17 1622     HCG Qualitative Negative    Protime-INR [[id number]]  (Normal) Collected:  08/06/17 1601    Specimen:  Blood Updated:  08/06/17 1623     Protime 13.1 Seconds      INR 1.00    Narrative:       Therapeutic range for most indications is 2.0-3.0 INR,  or 2.5-3.5 for mechanical heart valves.    aPTT [[id number]]  (Normal) Collected:  08/06/17 1601    Specimen:  Blood Updated:  08/06/17 1629     PTT 31.4 seconds     Narrative:       The recommended Heparin therapeutic range is 68-97 seconds.    CK-MB [[id number]]  (Normal) Collected:  08/06/17 1601    Specimen:  Blood Updated:  08/06/17 1631     CKMB 0.31 ng/mL     Troponin [[id number]]  (Normal) Collected:  08/06/17 1601    Specimen:  Blood Updated:  08/06/17 1631     Troponin I <0.012 ng/mL     BNP [[id number]]  (Abnormal) Collected:  08/06/17 1601     Specimen:  Blood Updated:  08/06/17 1631     proBNP 555.0 (H) pg/mL     D-dimer, Quantitative [[id number]]  (Abnormal) Collected:  08/06/17 1601    Specimen:  Blood Updated:  08/06/17 1633     D-Dimer, Quantitative >4000 (H) ng/mL (FEU)     Narrative:       Dimer values <500 ng/ml FEU are FDA approved as aid in diagnosis of deep venous thrombosis and pulmonary embolism.  This test should not be used in an exclusion strategy with pretest probability alone.    A recent guideline regarding diagnosis for pulmonary thomboembolism recommends an adjusted exclusion criterion of age x 10 ng/ml FEU for patients >50 years of age ([employer] Intern Med 2015; 163: 701-711).    Troponin [[id number]]  (Normal) Collected:  08/06/17 1755    Specimen:  Blood from Arm, Left Updated:  08/06/17 1841     Troponin I <0.012 ng/mL     CBC & Differential [[id number]] Collected:  08/07/17 0534    Specimen:  Blood Updated:  08/07/17 0617    Narrative:       The following orders were created for panel order CBC & Differential.  Procedure                               Abnormality         Status                     ---------                               -----------         ------                     CBC Auto Differential[[id number]]        Abnormal            Final result                 Please view results for these tests on the individual orders.    CBC Auto Differential [[id number]]  (Abnormal) Collected:  08/07/17 0534    Specimen:  Blood Updated:  08/07/17 0617     WBC 14.71 (H) 10*3/mm3      RBC 4.26 10*6/mm3      Hemoglobin 12.0 g/dL      Hematocrit 35.9 %      MCV 84.3 fL      MCH 28.2 pg      MCHC 33.4 g/dL      RDW 13.2 %      RDW-SD 40.7 fl      MPV 9.3 fL      Platelets 503 (H) 10*3/mm3      Neutrophil % 76.2 %      Lymphocyte % 11.3 %      Monocyte % 9.8 %      Eosinophil % 2.2 %      Basophil % 0.1 %      Immature Grans % 0.4 %      Neutrophils, Absolute 11.20 (H) 10*3/mm3      Lymphocytes, Absolute 1.66 10*3/mm3      Monocytes, Absolute 1.44  (H) 10*3/mm3      Eosinophils, Absolute 0.33 10*3/mm3      Basophils, Absolute 0.02 10*3/mm3      Immature Grans, Absolute 0.06 (H) 10*3/mm3     Magnesium [[id number]]  (Normal) Collected:  08/07/17 0534    Specimen:  Blood Updated:  08/07/17 0633     Magnesium 1.9 mg/dL     Phosphorus [[id number]]  (Normal) Collected:  08/07/17 0534    Specimen:  Blood Updated:  08/07/17 0633     Phosphorus 4.0 mg/dL     Comprehensive Metabolic Panel [[id number]]  (Abnormal) Collected:  08/07/17 0534    Specimen:  Blood Updated:  08/07/17 0636     Glucose 102 (H) mg/dL      BUN 8 mg/dL      Creatinine 0.82 mg/dL      Sodium 137 mmol/L      Potassium 4.2 mmol/L      Chloride 100 mmol/L      CO2 29.0 mmol/L      Calcium 8.5 mg/dL      Total Protein 6.4 g/dL      Albumin 3.20 (L) g/dL      ALT (SGPT) 39 U/L      AST (SGOT) 35 U/L      Alkaline Phosphatase 102 U/L      Total Bilirubin 1.1 mg/dL      eGFR Non African Amer 75 mL/min/1.73      Globulin 3.2 gm/dL      A/G Ratio 1.0 (L) g/dL      BUN/Creatinine Ratio 9.8     Anion Gap 8.0 mmol/L           Medication Review:   Current Facility-Administered Medications   Medication Dose Route Frequency Provider Last Rate Last Dose   • buPROPion (WELLBUTRIN) tablet 100 mg  100 mg Oral BID Abraham Varner MD   100 mg at 08/07/17 0808   • enalaprilat (VASOTEC) injection 1.25 mg  1.25 mg Intravenous Q6H PRN Abraham Varner MD       • HYDROmorphone (DILAUDID) injection 1 mg  1 mg Intravenous Q3H PRN Abraham Varner MD   1 mg at 08/07/17 0835   • oxybutynin (DITROPAN) tablet 5 mg  5 mg Oral BID Abraham Varner MD   5 mg at 08/07/17 0808   • pantoprazole (PROTONIX) EC tablet 40 mg  40 mg Oral Daily Abraham Varner MD   40 mg at 08/07/17 0808   • piperacillin-tazobactam (ZOSYN) 3.375 g in iso-osmotic dextrose 50 ml (premix)  3.375 g Intravenous Q6H Abraham Varner MD   3.375 g at 08/07/17 0816   • sertraline (ZOLOFT) tablet 50 mg  50 mg Oral Daily Abraham Varner MD   50 mg at 08/07/17 0808   • sodium chloride 0.9 % flush  10 mL  10 mL Intravenous PRN David Bunn MD       • sodium chloride 0.9 % infusion  75 mL/hr Intravenous Continuous Abraham Varner MD 75 mL/hr at 08/07/17 0807 75 mL/hr at 08/07/17 0807       Assessment/Plan     Active Problems:    Abdominal fluid collection    47 yo lady with resolved bile leak and retained biloma after lap pk  Drain appears to have come out spontaneously around Thursday  Due to increasing symptoms will have radiology replace drain today  Continue NPO and iv abx    Pk العلي MD  08/07/17  9:09 AM

## 2017-08-07 NOTE — PLAN OF CARE
Problem: Patient Care Overview (Adult)  Goal: Plan of Care Review  Outcome: Ongoing (interventions implemented as appropriate)    08/07/17 1522   Coping/Psychosocial Response Interventions   Plan Of Care Reviewed With patient   Patient Care Overview   Progress no change   Outcome Evaluation   Outcome Summary/Follow up Plan VSS, unsuccessful drain placement. restart diet.        Goal: Adult Individualization and Mutuality  Outcome: Ongoing (interventions implemented as appropriate)    08/07/17 1522   Individualization   Patient Specific Goals patient wants to go home       Goal: Discharge Needs Assessment  Outcome: Ongoing (interventions implemented as appropriate)    08/07/17 1522   Discharge Needs Assessment   Concerns To Be Addressed no discharge needs identified         Problem: Pain, Acute (Adult)  Goal: Identify Related Risk Factors and Signs and Symptoms  Outcome: Ongoing (interventions implemented as appropriate)    08/07/17 1522   Pain, Acute   Related Risk Factors (Acute Pain) patient perception   Signs and Symptoms (Acute Pain) verbalization of pain descriptors       Goal: Acceptable Pain Control/Comfort Level  Outcome: Ongoing (interventions implemented as appropriate)    08/07/17 1522   Pain, Acute (Adult)   Acceptable Pain Control/Comfort Level making progress toward outcome

## 2017-08-07 NOTE — CONSULTS
Adult Nutrition  Assessment    Patient Name:  Maricarmen Ferris  YOB: 1970  MRN: [id number]  Admit Date:  8/6/2017    Assessment Date:  8/7/2017          Reason for Assessment       08/07/17 1520    Reason for Assessment    Reason For Assessment/Visit per organizational policy    Identified At Risk By Screening Criteria BMI                Nutrition/Diet History       08/07/17 1520    Nutrition/Diet History    Typical Food/Fluid Intake Pt reports that she follows no particular diet at home                         Comments:  Pt presents with a BMI of 43.98 (which is compatible with morbid obesity) & is 220% of her IBW>  Education provided on Making Lifestyle changes for a healthy weight.  Written diet copy and contact number provided         Electronically signed by:  Mayi Anderson RD  08/07/17 3:23 PM

## 2017-08-08 LAB
ANION GAP SERPL CALCULATED.3IONS-SCNC: 8 MMOL/L (ref 5–15)
BASOPHILS # BLD AUTO: 0.02 10*3/MM3 (ref 0–0.2)
BASOPHILS NFR BLD AUTO: 0.1 % (ref 0–2)
BUN BLD-MCNC: 8 MG/DL (ref 7–21)
BUN/CREAT SERPL: 10.5 (ref 7–25)
CALCIUM SPEC-SCNC: 8.6 MG/DL (ref 8.4–10.2)
CHLORIDE SERPL-SCNC: 97 MMOL/L (ref 95–110)
CO2 SERPL-SCNC: 30 MMOL/L (ref 22–31)
CREAT BLD-MCNC: 0.76 MG/DL (ref 0.5–1)
DEPRECATED RDW RBC AUTO: 42.3 FL (ref 36.4–46.3)
EOSINOPHIL # BLD AUTO: 0.37 10*3/MM3 (ref 0–0.7)
EOSINOPHIL NFR BLD AUTO: 2.3 % (ref 0–7)
ERYTHROCYTE [DISTWIDTH] IN BLOOD BY AUTOMATED COUNT: 13.5 % (ref 11.5–14.5)
GFR SERPL CREATININE-BSD FRML MDRD: 82 ML/MIN/1.73 (ref 60–135)
GLUCOSE BLD-MCNC: 106 MG/DL (ref 60–100)
HCT VFR BLD AUTO: 35.9 % (ref 35–45)
HGB BLD-MCNC: 11.9 G/DL (ref 12–15.5)
IMM GRANULOCYTES # BLD: 0.08 10*3/MM3 (ref 0–0.02)
IMM GRANULOCYTES NFR BLD: 0.5 % (ref 0–0.5)
LYMPHOCYTES # BLD AUTO: 2.06 10*3/MM3 (ref 0.6–4.2)
LYMPHOCYTES NFR BLD AUTO: 12.8 % (ref 10–50)
MCH RBC QN AUTO: 28.3 PG (ref 26.5–34)
MCHC RBC AUTO-ENTMCNC: 33.1 G/DL (ref 31.4–36)
MCV RBC AUTO: 85.3 FL (ref 80–98)
MONOCYTES # BLD AUTO: 1.93 10*3/MM3 (ref 0–0.9)
MONOCYTES NFR BLD AUTO: 12 % (ref 0–12)
NEUTROPHILS # BLD AUTO: 11.58 10*3/MM3 (ref 2–8.6)
NEUTROPHILS NFR BLD AUTO: 72.3 % (ref 37–80)
PLATELET # BLD AUTO: 505 10*3/MM3 (ref 150–450)
PMV BLD AUTO: 9.5 FL (ref 8–12)
POTASSIUM BLD-SCNC: 4.2 MMOL/L (ref 3.5–5.1)
RBC # BLD AUTO: 4.21 10*6/MM3 (ref 3.77–5.16)
RBC MORPH BLD: NORMAL
SMALL PLATELETS BLD QL SMEAR: NORMAL
SODIUM BLD-SCNC: 135 MMOL/L (ref 137–145)
WBC MORPH BLD: NORMAL
WBC NRBC COR # BLD: 16.04 10*3/MM3 (ref 3.2–9.8)

## 2017-08-08 PROCEDURE — 94760 N-INVAS EAR/PLS OXIMETRY 1: CPT

## 2017-08-08 PROCEDURE — 80048 BASIC METABOLIC PNL TOTAL CA: CPT | Performed by: SURGERY

## 2017-08-08 PROCEDURE — 94640 AIRWAY INHALATION TREATMENT: CPT

## 2017-08-08 PROCEDURE — 94799 UNLISTED PULMONARY SVC/PX: CPT

## 2017-08-08 PROCEDURE — 25010000002 HYDROMORPHONE PER 4 MG: Performed by: SURGERY

## 2017-08-08 PROCEDURE — 25010000002 PIPERACILLIN SOD-TAZOBACTAM PER 1 G: Performed by: SURGERY

## 2017-08-08 PROCEDURE — 85007 BL SMEAR W/DIFF WBC COUNT: CPT | Performed by: SURGERY

## 2017-08-08 PROCEDURE — 99024 POSTOP FOLLOW-UP VISIT: CPT | Performed by: SURGERY

## 2017-08-08 PROCEDURE — 85025 COMPLETE CBC W/AUTO DIFF WBC: CPT | Performed by: SURGERY

## 2017-08-08 RX ORDER — HYDROCODONE BITARTRATE AND ACETAMINOPHEN 5; 325 MG/1; MG/1
2 TABLET ORAL EVERY 6 HOURS PRN
Status: DISCONTINUED | OUTPATIENT
Start: 2017-08-08 | End: 2017-08-10

## 2017-08-08 RX ORDER — IPRATROPIUM BROMIDE AND ALBUTEROL SULFATE 2.5; .5 MG/3ML; MG/3ML
3 SOLUTION RESPIRATORY (INHALATION)
Status: DISCONTINUED | OUTPATIENT
Start: 2017-08-08 | End: 2017-08-14

## 2017-08-08 RX ADMIN — TAZOBACTAM SODIUM AND PIPERACILLIN SODIUM 3.38 G: 375; 3 INJECTION, SOLUTION INTRAVENOUS at 04:05

## 2017-08-08 RX ADMIN — HYDROCODONE BITARTRATE AND ACETAMINOPHEN 2 TABLET: 5; 325 TABLET ORAL at 15:08

## 2017-08-08 RX ADMIN — IPRATROPIUM BROMIDE AND ALBUTEROL SULFATE 3 ML: 2.5; .5 SOLUTION RESPIRATORY (INHALATION) at 11:56

## 2017-08-08 RX ADMIN — HYDROCODONE BITARTRATE AND ACETAMINOPHEN 1 TABLET: 5; 325 TABLET ORAL at 01:15

## 2017-08-08 RX ADMIN — HYDROMORPHONE HYDROCHLORIDE 1 MG: 1 INJECTION, SOLUTION INTRAMUSCULAR; INTRAVENOUS; SUBCUTANEOUS at 09:38

## 2017-08-08 RX ADMIN — IPRATROPIUM BROMIDE AND ALBUTEROL SULFATE 3 ML: 2.5; .5 SOLUTION RESPIRATORY (INHALATION) at 19:03

## 2017-08-08 RX ADMIN — HYDROMORPHONE HYDROCHLORIDE 1 MG: 1 INJECTION, SOLUTION INTRAMUSCULAR; INTRAVENOUS; SUBCUTANEOUS at 17:14

## 2017-08-08 RX ADMIN — BUPROPION HYDROCHLORIDE 100 MG: 100 TABLET, FILM COATED ORAL at 17:18

## 2017-08-08 RX ADMIN — OXYBUTYNIN CHLORIDE 5 MG: 5 TABLET ORAL at 08:30

## 2017-08-08 RX ADMIN — HYDROMORPHONE HYDROCHLORIDE 1 MG: 1 INJECTION, SOLUTION INTRAMUSCULAR; INTRAVENOUS; SUBCUTANEOUS at 01:15

## 2017-08-08 RX ADMIN — HYDROCODONE BITARTRATE AND ACETAMINOPHEN 1 TABLET: 5; 325 TABLET ORAL at 07:15

## 2017-08-08 RX ADMIN — HYDROMORPHONE HYDROCHLORIDE 1 MG: 1 INJECTION, SOLUTION INTRAMUSCULAR; INTRAVENOUS; SUBCUTANEOUS at 12:08

## 2017-08-08 RX ADMIN — TAZOBACTAM SODIUM AND PIPERACILLIN SODIUM 3.38 G: 375; 3 INJECTION, SOLUTION INTRAVENOUS at 15:11

## 2017-08-08 RX ADMIN — HYDROCODONE BITARTRATE AND ACETAMINOPHEN 2 TABLET: 5; 325 TABLET ORAL at 21:39

## 2017-08-08 RX ADMIN — HYDROMORPHONE HYDROCHLORIDE 1 MG: 1 INJECTION, SOLUTION INTRAMUSCULAR; INTRAVENOUS; SUBCUTANEOUS at 20:57

## 2017-08-08 RX ADMIN — BUPROPION HYDROCHLORIDE 100 MG: 100 TABLET, FILM COATED ORAL at 08:30

## 2017-08-08 RX ADMIN — TAZOBACTAM SODIUM AND PIPERACILLIN SODIUM 3.38 G: 375; 3 INJECTION, SOLUTION INTRAVENOUS at 20:58

## 2017-08-08 RX ADMIN — HYDROMORPHONE HYDROCHLORIDE 1 MG: 1 INJECTION, SOLUTION INTRAMUSCULAR; INTRAVENOUS; SUBCUTANEOUS at 04:05

## 2017-08-08 RX ADMIN — TAZOBACTAM SODIUM AND PIPERACILLIN SODIUM 3.38 G: 375; 3 INJECTION, SOLUTION INTRAVENOUS at 08:30

## 2017-08-08 RX ADMIN — PANTOPRAZOLE SODIUM 40 MG: 40 TABLET, DELAYED RELEASE ORAL at 08:30

## 2017-08-08 RX ADMIN — OXYBUTYNIN CHLORIDE 5 MG: 5 TABLET ORAL at 17:18

## 2017-08-08 RX ADMIN — SERTRALINE HYDROCHLORIDE 50 MG: 50 TABLET ORAL at 08:30

## 2017-08-08 NOTE — PROGRESS NOTES
LOS: 0 days   Patient Care Team:  MARIA TERESA Jorge as PCP - General    Chief Complaint:  <principal problem not specified>    Subjective     Interval History:   Went on oxygen yesterday after the procedure, feeling pain at stick site, tolerating diet    Objective     Vital Signs  Temp:  [96.7 °F (35.9 °C)-100.5 °F (38.1 °C)] 99.6 °F (37.6 °C)  Heart Rate:  [67-87] 72  Resp:  [17-22] 18  BP: ()/(56-85) 98/56    Physical Exam:  Diminished breath sounds on right, abd OK     Results Review:       Lab Results (last 24 hours)     Procedure Component Value Units Date/Time    CBC Auto Differential [[id number]]  (Abnormal) Collected:  08/08/17 0559    Specimen:  Blood Updated:  08/08/17 0628     WBC 16.04 (H) 10*3/mm3      RBC 4.21 10*6/mm3      Hemoglobin 11.9 (L) g/dL      Hematocrit 35.9 %      MCV 85.3 fL      MCH 28.3 pg      MCHC 33.1 g/dL      RDW 13.5 %      RDW-SD 42.3 fl      MPV 9.5 fL      Platelets 505 (H) 10*3/mm3      Neutrophil % 72.3 %      Lymphocyte % 12.8 %      Monocyte % 12.0 %      Eosinophil % 2.3 %      Basophil % 0.1 %      Immature Grans % 0.5 %      Neutrophils, Absolute 11.58 (H) 10*3/mm3      Lymphocytes, Absolute 2.06 10*3/mm3      Monocytes, Absolute 1.93 (H) 10*3/mm3      Eosinophils, Absolute 0.37 10*3/mm3      Basophils, Absolute 0.02 10*3/mm3      Immature Grans, Absolute 0.08 (H) 10*3/mm3     Body Fluid Culture [[id number]]  (Normal) Collected:  08/07/17 1337    Specimen:  Body Fluid from Liver Updated:  08/08/17 0640     BF Culture No growth at less than 24 hours     Gram Stain Result No WBCs per low power field      No organisms seen    Body Fluid Culture [[id number]]  (Normal) Collected:  08/07/17 1405    Specimen:  Body Fluid from Liver Updated:  08/08/17 0640     BF Culture No growth at less than 24 hours     Gram Stain Result Rare (1+) WBCs per low power field      No organisms seen    Basic Metabolic Panel [[id number]]  (Abnormal) Collected:  08/08/17 0559     Specimen:  Blood Updated:  08/08/17 0644     Glucose 106 (H) mg/dL      BUN 8 mg/dL      Creatinine 0.76 mg/dL      Sodium 135 (L) mmol/L      Potassium 4.2 mmol/L      Chloride 97 mmol/L      CO2 30.0 mmol/L      Calcium 8.6 mg/dL      eGFR Non African Amer 82 mL/min/1.73      BUN/Creatinine Ratio 10.5     Anion Gap 8.0 mmol/L     CBC & Differential [[id number]] Collected:  08/08/17 0559    Specimen:  Blood Updated:  08/08/17 0720    Narrative:       The following orders were created for panel order CBC & Differential.  Procedure                               Abnormality         Status                     ---------                               -----------         ------                     Scan Slide[[id number]]                                       Final result               CBC Auto Differential[[id number]]        Abnormal            Final result                 Please view results for these tests on the individual orders.    Scan Slide [[id number]] Collected:  08/08/17 0559    Specimen:  Blood Updated:  08/08/17 0720     RBC Morphology Normal     WBC Morphology Normal     Platelet Estimate Increased          Medication Review:   Current Facility-Administered Medications   Medication Dose Route Frequency Provider Last Rate Last Dose   • buPROPion (WELLBUTRIN) tablet 100 mg  100 mg Oral BID Abraham Varner MD   100 mg at 08/08/17 0830   • enalaprilat (VASOTEC) injection 1.25 mg  1.25 mg Intravenous Q6H PRN Abraham Varner MD       • HYDROcodone-acetaminophen (NORCO) 5-325 MG per tablet 2 tablet  2 tablet Oral Q6H PRN Pk العلي MD       • HYDROmorphone (DILAUDID) injection 1 mg  1 mg Intravenous Q3H PRN Pk العلي MD   1 mg at 08/08/17 0405   • ipratropium-albuterol (DUO-NEB) nebulizer solution 3 mL  3 mL Nebulization Q6H While Awake - RT Pk العلي MD       • ondansetron (ZOFRAN) injection 4 mg  4 mg Intravenous Q6H PRN Pk العلي MD   4 mg at 08/07/17 1542   • oxybutynin (DITROPAN) tablet 5 mg  5 mg Oral BID  Abraham Varner MD   5 mg at 08/08/17 0830   • pantoprazole (PROTONIX) EC tablet 40 mg  40 mg Oral Daily Abraham Varner MD   40 mg at 08/08/17 0830   • piperacillin-tazobactam (ZOSYN) 3.375 g in iso-osmotic dextrose 50 ml (premix)  3.375 g Intravenous Q6H Abraham Varner MD 0 mL/hr at 08/08/17 0627 3.375 g at 08/08/17 0830   • sertraline (ZOLOFT) tablet 50 mg  50 mg Oral Daily Abraham Varner MD   50 mg at 08/08/17 0830   • sodium chloride 0.9 % flush 10 mL  10 mL Intravenous PRN David Bunn MD           Assessment/Plan     Active Problems:    Abdominal fluid collection    45 yo lady with resolved bile leak and retained biloma after lap pk  S/p CT guided drainage, could not leave a catheter yesterday  Continue regular diet and iv abx  Pain control  Pulmonary toilet  Will resorb biloma spontaneously, will keep inpatient as long as she is symptomatic    Pk العلي MD  08/08/17  9:10 AM

## 2017-08-08 NOTE — PLAN OF CARE
Problem: Patient Care Overview (Adult)  Goal: Plan of Care Review  Outcome: Ongoing (interventions implemented as appropriate)    08/08/17 1353   Coping/Psychosocial Response Interventions   Plan Of Care Reviewed With patient   Patient Care Overview   Progress improving   Outcome Evaluation   Outcome Summary/Follow up Plan VSS. pt tolerating regular food       Goal: Adult Individualization and Mutuality  Outcome: Ongoing (interventions implemented as appropriate)    08/08/17 1353   Individualization   Patient Specific Goals better pain control       Goal: Discharge Needs Assessment  Outcome: Ongoing (interventions implemented as appropriate)    08/08/17 1353   Discharge Needs Assessment   Concerns To Be Addressed no discharge needs identified         Problem: Pain, Acute (Adult)  Goal: Identify Related Risk Factors and Signs and Symptoms  Outcome: Ongoing (interventions implemented as appropriate)    08/08/17 1353   Pain, Acute   Related Risk Factors (Acute Pain) surgery;procedure/treatment   Signs and Symptoms (Acute Pain) verbalization of pain descriptors       Goal: Acceptable Pain Control/Comfort Level  Outcome: Ongoing (interventions implemented as appropriate)    08/08/17 1353   Pain, Acute (Adult)   Acceptable Pain Control/Comfort Level making progress toward outcome

## 2017-08-08 NOTE — PAYOR COMM NOTE
"[facility]  Carolann Abrams, [facility]  [phone]  Fax [phone]    Barrington Raheemlia Sanchez (46 y.o. Female)     Date of Birth Social Security Number Address Home Phone MRN    1970  [address] [facility] [address] [phone] [id number]    Sikhism Marital Status          Congregation        Admission Date Admission Type Admitting Provider Attending Provider Department, Room/Bed    8/6/17 Emergency Abraham Varner MD Fife, Luke P, MD [facility] 3 EAST, 375/1    Discharge Date Discharge Disposition Discharge Destination                      Attending Provider: Pk العلي MD     Allergies:  Sulfa Antibiotics    Isolation:  None   Infection:  None   Code Status:  FULL    Ht:  65\" (165.1 cm)   Wt:  264 lb 4.8 oz (120 kg)    Admission Cmt:  None   Principal Problem:  None                Active Insurance as of 8/6/2017     Primary Coverage     Payor Plan Insurance Group Employer/Plan Group    [employer] [place] [employer]LL[employer] [place]ICA[place]      Payor Plan Address Payor Plan Phone Number Effective From Effective To    [address] [address] [phone] 3/3/2017     [place] [address]       Subscriber Name Subscriber Birth Date Member ID       RAHEEM FERRIS 1970 [id number]                 Emergency Contacts      (Rel.) Home Phone Work Phone Mobile Phone    Joyce Bolton (Mother) [phone]            Insurance Information                [facility]/WELLCARE MEDICAID Phone: [phone]    Subscriber: Raheem Ferris Subscriber#: [id number]    Group#:  Precert#:              History & Physical      Abraham Varner MD at 8/6/2017  6:56 PM                Patient Care Team:  MARIA TERESA Jorge as PCP - General    Chief complaint Shortness of breath    Subjective     Chest Pain    This is a new problem. The current episode started yesterday. The onset quality is gradual. The problem occurs constantly. The problem has been " unchanged. The pain is present in the lateral region and epigastric region (Right side). The pain is moderate. The quality of the pain is described as sharp (Worse with deep breaths). The pain does not radiate. Associated symptoms include abdominal pain, a cough, diaphoresis, malaise/fatigue, shortness of breath and weakness. Pertinent negatives include no back pain, claudication, dizziness, fever, hemoptysis, irregular heartbeat, nausea, near-syncope, numbness, palpitations or vomiting.   Shortness of Breath   Associated symptoms include abdominal pain and chest pain. Pertinent negatives include no claudication, fever, hemoptysis or vomiting.   Abdominal Pain   This is a recurrent problem. Pertinent negatives include no fever, nausea or vomiting. Her past medical history is significant for abdominal surgery (Recent lap pk with po bile leak, ercp, stemt placement). There is no history of colon cancer, Crohn's disease, GERD, irritable bowel syndrome, pancreatitis, PUD or ulcerative colitis.     Laparoscopic cholecystectomy by Dr. العلي in late July. A bile leak occurred requiring drainage and ERCP with stent placement in [place]. Was doing well with no drainage from the biliary catheter- yesterday and today began having sharp chest and RUQ pain, difficulty breathing. ER workup has excluded PE and MI. There are 2 subcapsular collections of fluid on the RIGHT lobe of the liver and a RIGHT pleural effusion. Her main problem is pleuritic chest pain on the right side when she takes a deep breath. Eating and having bowel movements today.    Review of Systems   Constitutional: Positive for diaphoresis and malaise/fatigue. Negative for fever.   Respiratory: Positive for cough and shortness of breath. Negative for hemoptysis.    Cardiovascular: Positive for chest pain. Negative for palpitations, claudication and near-syncope.   Gastrointestinal: Positive for abdominal pain. Negative for nausea and vomiting.    Musculoskeletal: Negative for back pain.   Neurological: Positive for weakness. Negative for dizziness and numbness.        Past Medical History:   Diagnosis Date   • Acid reflux    • Anxiety    • Depressed    • Hard to intubate    • Hypertension    • Sleep apnea      Past Surgical History:   Procedure Laterality Date   • ABDOMINAL SURGERY     • CYSTOSCOPY     • ENDOMETRIAL ABLATION  2015   • LAPAROSCOPIC TUBAL LIGATION  1995   • OK ERCP DX COLLECTION SPECIMEN BRUSHING/WASHING Left 7/31/2017    Procedure: ENDOSCOPIC RETROGRADE CHOLANGIOPANCREATOGRAPHY;  Surgeon: Samuel Redding DO;  Location: [facility] ENDOSCOPY;  Service: Gastroenterology   • OK LAP,CHOLECYSTECTOMY N/A 7/24/2017    Procedure: CHOLECYSTECTOMY LAPAROSCOPIC, also umbillical hernia repair;  Surgeon: Pk العلي MD;  Location: [facility] OR;  Service: General     History reviewed. No pertinent family history.  Social History   Substance Use Topics   • Smoking status: Never Smoker   • Smokeless tobacco: Never Used   • Alcohol use Yes      Comment: socially       (Not in a hospital admission)  Allergies:  Sulfa antibiotics    Objective      Vital Signs  Temp:  [98.8 °F (37.1 °C)] 98.8 °F (37.1 °C)  Heart Rate:  [74-87] 76  Resp:  [20] 20  BP: (138-180)/(78-91) 180/90    Physical Exam   Constitutional: She is oriented to person, place, and time. She appears well-developed and well-nourished. No distress.   HENT:   Head: Normocephalic and atraumatic.   Eyes: EOM are normal. Pupils are equal, round, and reactive to light.   Neck: Normal range of motion. Neck supple. No JVD present. No tracheal deviation present. No thyromegaly present.   Cardiovascular: Normal rate and regular rhythm.    Pulmonary/Chest: Effort normal and breath sounds normal. No stridor. No respiratory distress. She has no wheezes. She has no rales. She exhibits no tenderness.   Abdominal: Soft. Bowel sounds are normal. She exhibits no distension and no mass. There is no tenderness. There is  no rebound and no guarding. No hernia.   Drain in the sq tissue is removed   Musculoskeletal: Normal range of motion.   Lymphadenopathy:     She has no cervical adenopathy.   Neurological: She is alert and oriented to person, place, and time.   Skin: Skin is warm and dry. She is not diaphoretic.   Psychiatric: She has a normal mood and affect. Judgment and thought content normal.   Vitals reviewed.      Results Review:   I reviewed the patient's new clinical results.  I reviewed the patient's new imaging results and agree with the interpretation.      Assessment/Plan     Active Problems:    * No active hospital problems. *      Assessment:    Condition: In stable condition.   (1. Pleuritic pain RIGHT side- possible early pneumonia).     Plan:   Transfer to floor.  Encourage ambulation.  Add bronchodilators.  Regular diet.  Start antibiotics.         I discussed the patients findings and my recommendations with patient, family and consulting provider    Abraham Varner MD  08/06/17  6:56 PM    Time: 30 mins     Electronically signed by Abraham aVrner MD at 8/6/2017  7:07 PM           Physician Progress Notes (last 72 hours) (Notes from 8/5/2017  1:37 PM through 8/8/2017  1:37 PM)      Pk العلي MD at 8/7/2017  9:09 AM  Version 1 of 1              LOS: 0 days   Patient Care Team:  MARIA TERESA Jorge as PCP - General    Chief Complaint:  <principal problem not specified>    Subjective     Interval History:   Feels better than last hospitalization.  Difficult to take a deep breath.  Pleuritic chest pain.  RUQ abd pain. No fevers    Objective     Vital Signs  Temp:  [98.2 °F (36.8 °C)-99.8 °F (37.7 °C)] 99.7 °F (37.6 °C)  Heart Rate:  [69-87] 81  Resp:  [17-20] 18  BP: (120-180)/(58-92) 122/58    Physical Exam:  Incisions OK     Results Review:       Lab Results (last 24 hours)     Procedure Component Value Units Date/Time    CBC & Differential [[id number]] Collected:  08/06/17 1601    Specimen:  Blood Updated:   08/06/17 1611    Narrative:       The following orders were created for panel order CBC & Differential.  Procedure                               Abnormality         Status                     ---------                               -----------         ------                     Scan Slide[[id number]]                                                                  CBC Auto Differential[[id number]]        Abnormal            Final result                 Please view results for these tests on the individual orders.    CBC Auto Differential [[id number]]  (Abnormal) Collected:  08/06/17 1601    Specimen:  Blood Updated:  08/06/17 1611     WBC 14.79 (H) 10*3/mm3      RBC 4.39 10*6/mm3      Hemoglobin 12.3 g/dL      Hematocrit 37.2 %      MCV 84.7 fL      MCH 28.0 pg      MCHC 33.1 g/dL      RDW 12.9 %      RDW-SD 39.7 fl      MPV 9.5 fL      Platelets 473 (H) 10*3/mm3      Neutrophil % 72.2 %      Lymphocyte % 15.6 %      Monocyte % 8.4 %      Eosinophil % 3.2 %      Basophil % 0.1 %      Immature Grans % 0.5 %      Neutrophils, Absolute 10.69 (H) 10*3/mm3      Lymphocytes, Absolute 2.30 10*3/mm3      Monocytes, Absolute 1.24 (H) 10*3/mm3      Eosinophils, Absolute 0.47 10*3/mm3      Basophils, Absolute 0.02 10*3/mm3      Immature Grans, Absolute 0.07 (H) 10*3/mm3     CK [[id number]]  (Abnormal) Collected:  08/06/17 1601    Specimen:  Blood Updated:  08/06/17 1619     Creatine Kinase 27 (L) U/L     Amylase [[id number]]  (Normal) Collected:  08/06/17 1601    Specimen:  Blood Updated:  08/06/17 1620     Amylase 77 U/L     Lipase [[id number]]  (Normal) Collected:  08/06/17 1601    Specimen:  Blood Updated:  08/06/17 1620     Lipase 251 U/L     Comprehensive Metabolic Panel [[id number]]  (Abnormal) Collected:  08/06/17 1601    Specimen:  Blood Updated:  08/06/17 1620     Glucose 95 mg/dL      BUN 8 mg/dL      Creatinine 0.73 mg/dL      Sodium 138 mmol/L      Potassium 3.9 mmol/L      Chloride 89 (L) mmol/L      CO2 29.0 mmol/L       Calcium 8.8 mg/dL      Total Protein 6.8 g/dL      Albumin 3.40 g/dL      ALT (SGPT) 30 U/L      AST (SGOT) 30 U/L      Alkaline Phosphatase 113 U/L      Total Bilirubin 0.8 mg/dL      eGFR Non African Amer 86 mL/min/1.73      Globulin 3.4 gm/dL      A/G Ratio 1.0 (L) g/dL      BUN/Creatinine Ratio 11.0     Anion Gap 20.0 (H) mmol/L     hCG, Serum, Qualitative [[id number]]  (Normal) Collected:  08/06/17 1601    Specimen:  Blood Updated:  08/06/17 1622     HCG Qualitative Negative    Protime-INR [[id number]]  (Normal) Collected:  08/06/17 1601    Specimen:  Blood Updated:  08/06/17 1623     Protime 13.1 Seconds      INR 1.00    Narrative:       Therapeutic range for most indications is 2.0-3.0 INR,  or 2.5-3.5 for mechanical heart valves.    aPTT [[id number]]  (Normal) Collected:  08/06/17 1601    Specimen:  Blood Updated:  08/06/17 1629     PTT 31.4 seconds     Narrative:       The recommended Heparin therapeutic range is 68-97 seconds.    CK-MB [[id number]]  (Normal) Collected:  08/06/17 1601    Specimen:  Blood Updated:  08/06/17 1631     CKMB 0.31 ng/mL     Troponin [[id number]]  (Normal) Collected:  08/06/17 1601    Specimen:  Blood Updated:  08/06/17 1631     Troponin I <0.012 ng/mL     BNP [[id number]]  (Abnormal) Collected:  08/06/17 1601    Specimen:  Blood Updated:  08/06/17 1631     proBNP 555.0 (H) pg/mL     D-dimer, Quantitative [[id number]]  (Abnormal) Collected:  08/06/17 1601    Specimen:  Blood Updated:  08/06/17 1633     D-Dimer, Quantitative >4000 (H) ng/mL (FEU)     Narrative:       Dimer values <500 ng/ml FEU are FDA approved as aid in diagnosis of deep venous thrombosis and pulmonary embolism.  This test should not be used in an exclusion strategy with pretest probability alone.    A recent guideline regarding diagnosis for pulmonary thomboembolism recommends an adjusted exclusion criterion of age x 10 ng/ml FEU for patients >50 years of age ([employer] Intern Med 2015; 163: 701-711).    Troponin [[id number]]   (Normal) Collected:  08/06/17 3486    Specimen:  Blood from Arm, Left Updated:  08/06/17 1841     Troponin I <0.012 ng/mL     CBC & Differential [[id number]] Collected:  08/07/17 0534    Specimen:  Blood Updated:  08/07/17 0617    Narrative:       The following orders were created for panel order CBC & Differential.  Procedure                               Abnormality         Status                     ---------                               -----------         ------                     CBC Auto Differential[[id number]]        Abnormal            Final result                 Please view results for these tests on the individual orders.    CBC Auto Differential [[id number]]  (Abnormal) Collected:  08/07/17 0534    Specimen:  Blood Updated:  08/07/17 0617     WBC 14.71 (H) 10*3/mm3      RBC 4.26 10*6/mm3      Hemoglobin 12.0 g/dL      Hematocrit 35.9 %      MCV 84.3 fL      MCH 28.2 pg      MCHC 33.4 g/dL      RDW 13.2 %      RDW-SD 40.7 fl      MPV 9.3 fL      Platelets 503 (H) 10*3/mm3      Neutrophil % 76.2 %      Lymphocyte % 11.3 %      Monocyte % 9.8 %      Eosinophil % 2.2 %      Basophil % 0.1 %      Immature Grans % 0.4 %      Neutrophils, Absolute 11.20 (H) 10*3/mm3      Lymphocytes, Absolute 1.66 10*3/mm3      Monocytes, Absolute 1.44 (H) 10*3/mm3      Eosinophils, Absolute 0.33 10*3/mm3      Basophils, Absolute 0.02 10*3/mm3      Immature Grans, Absolute 0.06 (H) 10*3/mm3     Magnesium [[id number]]  (Normal) Collected:  08/07/17 0534    Specimen:  Blood Updated:  08/07/17 0633     Magnesium 1.9 mg/dL     Phosphorus [[id number]]  (Normal) Collected:  08/07/17 0534    Specimen:  Blood Updated:  08/07/17 0633     Phosphorus 4.0 mg/dL     Comprehensive Metabolic Panel [[id number]]  (Abnormal) Collected:  08/07/17 0534    Specimen:  Blood Updated:  08/07/17 0636     Glucose 102 (H) mg/dL      BUN 8 mg/dL      Creatinine 0.82 mg/dL      Sodium 137 mmol/L      Potassium 4.2 mmol/L      Chloride 100 mmol/L      CO2  29.0 mmol/L      Calcium 8.5 mg/dL      Total Protein 6.4 g/dL      Albumin 3.20 (L) g/dL      ALT (SGPT) 39 U/L      AST (SGOT) 35 U/L      Alkaline Phosphatase 102 U/L      Total Bilirubin 1.1 mg/dL      eGFR Non African Amer 75 mL/min/1.73      Globulin 3.2 gm/dL      A/G Ratio 1.0 (L) g/dL      BUN/Creatinine Ratio 9.8     Anion Gap 8.0 mmol/L           Medication Review:   Current Facility-Administered Medications   Medication Dose Route Frequency Provider Last Rate Last Dose   • buPROPion (WELLBUTRIN) tablet 100 mg  100 mg Oral BID Abraham Varner MD   100 mg at 08/07/17 0808   • enalaprilat (VASOTEC) injection 1.25 mg  1.25 mg Intravenous Q6H PRN Abraham Varner MD       • HYDROmorphone (DILAUDID) injection 1 mg  1 mg Intravenous Q3H PRN Abraham Varner MD   1 mg at 08/07/17 0835   • oxybutynin (DITROPAN) tablet 5 mg  5 mg Oral BID Abraham Varner MD   5 mg at 08/07/17 0808   • pantoprazole (PROTONIX) EC tablet 40 mg  40 mg Oral Daily Abraham Varner MD   40 mg at 08/07/17 0808   • piperacillin-tazobactam (ZOSYN) 3.375 g in iso-osmotic dextrose 50 ml (premix)  3.375 g Intravenous Q6H Abraham Varner MD   3.375 g at 08/07/17 0816   • sertraline (ZOLOFT) tablet 50 mg  50 mg Oral Daily Abraham Varner MD   50 mg at 08/07/17 0808   • sodium chloride 0.9 % flush 10 mL  10 mL Intravenous PRN David Bunn MD       • sodium chloride 0.9 % infusion  75 mL/hr Intravenous Continuous Abraham Varner MD 75 mL/hr at 08/07/17 0807 75 mL/hr at 08/07/17 0807       Assessment/Plan     Active Problems:    Abdominal fluid collection    45 yo lady with resolved bile leak and retained biloma after lap pk  Drain appears to have come out spontaneously around Thursday  Due to increasing symptoms will have radiology replace drain today  Continue NPO and iv abx    Pk العلي MD  08/07/17  9:09 AM       Electronically signed by Pk العلي MD at 8/7/2017  9:13 AM      Pk العلي MD at 8/8/2017  9:10 AM  Version 1 of 1              LOS: 0 days    Patient Care Team:  MARIA TERESA Jorge as PCP - General    Chief Complaint:  <principal problem not specified>    Subjective     Interval History:   Went on oxygen yesterday after the procedure, feeling pain at stick site, tolerating diet    Objective     Vital Signs  Temp:  [96.7 °F (35.9 °C)-100.5 °F (38.1 °C)] 99.6 °F (37.6 °C)  Heart Rate:  [67-87] 72  Resp:  [17-22] 18  BP: ()/(56-85) 98/56    Physical Exam:  Diminished breath sounds on right, abd OK     Results Review:       Lab Results (last 24 hours)     Procedure Component Value Units Date/Time    CBC Auto Differential [[id number]]  (Abnormal) Collected:  08/08/17 0559    Specimen:  Blood Updated:  08/08/17 0628     WBC 16.04 (H) 10*3/mm3      RBC 4.21 10*6/mm3      Hemoglobin 11.9 (L) g/dL      Hematocrit 35.9 %      MCV 85.3 fL      MCH 28.3 pg      MCHC 33.1 g/dL      RDW 13.5 %      RDW-SD 42.3 fl      MPV 9.5 fL      Platelets 505 (H) 10*3/mm3      Neutrophil % 72.3 %      Lymphocyte % 12.8 %      Monocyte % 12.0 %      Eosinophil % 2.3 %      Basophil % 0.1 %      Immature Grans % 0.5 %      Neutrophils, Absolute 11.58 (H) 10*3/mm3      Lymphocytes, Absolute 2.06 10*3/mm3      Monocytes, Absolute 1.93 (H) 10*3/mm3      Eosinophils, Absolute 0.37 10*3/mm3      Basophils, Absolute 0.02 10*3/mm3      Immature Grans, Absolute 0.08 (H) 10*3/mm3     Body Fluid Culture [[id number]]  (Normal) Collected:  08/07/17 1337    Specimen:  Body Fluid from Liver Updated:  08/08/17 0640     BF Culture No growth at less than 24 hours     Gram Stain Result No WBCs per low power field      No organisms seen    Body Fluid Culture [[id number]]  (Normal) Collected:  08/07/17 1405    Specimen:  Body Fluid from Liver Updated:  08/08/17 0640     BF Culture No growth at less than 24 hours     Gram Stain Result Rare (1+) WBCs per low power field      No organisms seen    Basic Metabolic Panel [[id number]]  (Abnormal) Collected:  08/08/17 0559    Specimen:  Blood Updated:   08/08/17 0644     Glucose 106 (H) mg/dL      BUN 8 mg/dL      Creatinine 0.76 mg/dL      Sodium 135 (L) mmol/L      Potassium 4.2 mmol/L      Chloride 97 mmol/L      CO2 30.0 mmol/L      Calcium 8.6 mg/dL      eGFR Non African Amer 82 mL/min/1.73      BUN/Creatinine Ratio 10.5     Anion Gap 8.0 mmol/L     CBC & Differential [[id number]] Collected:  08/08/17 0559    Specimen:  Blood Updated:  08/08/17 0720    Narrative:       The following orders were created for panel order CBC & Differential.  Procedure                               Abnormality         Status                     ---------                               -----------         ------                     Scan Slide[[id number]]                                       Final result               CBC Auto Differential[[id number]]        Abnormal            Final result                 Please view results for these tests on the individual orders.    Scan Slide [[id number]] Collected:  08/08/17 0559    Specimen:  Blood Updated:  08/08/17 0720     RBC Morphology Normal     WBC Morphology Normal     Platelet Estimate Increased          Medication Review:   Current Facility-Administered Medications   Medication Dose Route Frequency Provider Last Rate Last Dose   • buPROPion (WELLBUTRIN) tablet 100 mg  100 mg Oral BID Abraham Varner MD   100 mg at 08/08/17 0830   • enalaprilat (VASOTEC) injection 1.25 mg  1.25 mg Intravenous Q6H PRN Abraham Varner MD       • HYDROcodone-acetaminophen (NORCO) 5-325 MG per tablet 2 tablet  2 tablet Oral Q6H PRN Pk العلي MD       • HYDROmorphone (DILAUDID) injection 1 mg  1 mg Intravenous Q3H PRN Pk العلي MD   1 mg at 08/08/17 0405   • ipratropium-albuterol (DUO-NEB) nebulizer solution 3 mL  3 mL Nebulization Q6H While Awake - RT Pk العلي MD       • ondansetron (ZOFRAN) injection 4 mg  4 mg Intravenous Q6H PRN Pk العلي MD   4 mg at 08/07/17 1542   • oxybutynin (DITROPAN) tablet 5 mg  5 mg Oral BID Abraham Varner MD   5 mg at  08/08/17 0830   • pantoprazole (PROTONIX) EC tablet 40 mg  40 mg Oral Daily Abraham Varner MD   40 mg at 08/08/17 0830   • piperacillin-tazobactam (ZOSYN) 3.375 g in iso-osmotic dextrose 50 ml (premix)  3.375 g Intravenous Q6H Abraham Varner MD 0 mL/hr at 08/08/17 0627 3.375 g at 08/08/17 0830   • sertraline (ZOLOFT) tablet 50 mg  50 mg Oral Daily Abraham Varner MD   50 mg at 08/08/17 0830   • sodium chloride 0.9 % flush 10 mL  10 mL Intravenous PRN David Bunn MD           Assessment/Plan     Active Problems:    Abdominal fluid collection    47 yo lady with resolved bile leak and retained biloma after lap pk  S/p CT guided drainage, could not leave a catheter yesterday  Continue regular diet and iv abx  Pain control  Pulmonary toilet  Will resorb biloma spontaneously, will keep inpatient as long as she is symptomatic    Pk العلي MD  08/08/17  9:10 AM       Electronically signed by Pk العلي MD at 8/8/2017  9:13 AM

## 2017-08-09 LAB
BASOPHILS # BLD AUTO: 0.02 10*3/MM3 (ref 0–0.2)
BASOPHILS NFR BLD AUTO: 0.1 % (ref 0–2)
DEPRECATED RDW RBC AUTO: 41.8 FL (ref 36.4–46.3)
EOSINOPHIL # BLD AUTO: 0.34 10*3/MM3 (ref 0–0.7)
EOSINOPHIL NFR BLD AUTO: 2.4 % (ref 0–7)
ERYTHROCYTE [DISTWIDTH] IN BLOOD BY AUTOMATED COUNT: 13.2 % (ref 11.5–14.5)
HCT VFR BLD AUTO: 33.5 % (ref 35–45)
HGB BLD-MCNC: 11.1 G/DL (ref 12–15.5)
HOLD SPECIMEN: NORMAL
IMM GRANULOCYTES # BLD: 0.07 10*3/MM3 (ref 0–0.02)
IMM GRANULOCYTES NFR BLD: 0.5 % (ref 0–0.5)
LYMPHOCYTES # BLD AUTO: 1.28 10*3/MM3 (ref 0.6–4.2)
LYMPHOCYTES NFR BLD AUTO: 9.2 % (ref 10–50)
MCH RBC QN AUTO: 28.2 PG (ref 26.5–34)
MCHC RBC AUTO-ENTMCNC: 33.1 G/DL (ref 31.4–36)
MCV RBC AUTO: 85.2 FL (ref 80–98)
MONOCYTES # BLD AUTO: 1.74 10*3/MM3 (ref 0–0.9)
MONOCYTES NFR BLD AUTO: 12.5 % (ref 0–12)
NEUTROPHILS # BLD AUTO: 10.48 10*3/MM3 (ref 2–8.6)
NEUTROPHILS NFR BLD AUTO: 75.3 % (ref 37–80)
PLATELET # BLD AUTO: 466 10*3/MM3 (ref 150–450)
PMV BLD AUTO: 9.8 FL (ref 8–12)
RBC # BLD AUTO: 3.93 10*6/MM3 (ref 3.77–5.16)
WBC NRBC COR # BLD: 13.93 10*3/MM3 (ref 3.2–9.8)

## 2017-08-09 PROCEDURE — 25010000002 PIPERACILLIN SOD-TAZOBACTAM PER 1 G: Performed by: SURGERY

## 2017-08-09 PROCEDURE — 99024 POSTOP FOLLOW-UP VISIT: CPT | Performed by: SURGERY

## 2017-08-09 PROCEDURE — 94799 UNLISTED PULMONARY SVC/PX: CPT

## 2017-08-09 PROCEDURE — 25010000002 ENOXAPARIN PER 10 MG: Performed by: SURGERY

## 2017-08-09 PROCEDURE — 94760 N-INVAS EAR/PLS OXIMETRY 1: CPT

## 2017-08-09 PROCEDURE — 85025 COMPLETE CBC W/AUTO DIFF WBC: CPT | Performed by: SURGERY

## 2017-08-09 PROCEDURE — 25010000002 HYDROMORPHONE PER 4 MG: Performed by: SURGERY

## 2017-08-09 RX ADMIN — IPRATROPIUM BROMIDE AND ALBUTEROL SULFATE 3 ML: 2.5; .5 SOLUTION RESPIRATORY (INHALATION) at 13:15

## 2017-08-09 RX ADMIN — HYDROMORPHONE HYDROCHLORIDE 1 MG: 1 INJECTION, SOLUTION INTRAMUSCULAR; INTRAVENOUS; SUBCUTANEOUS at 00:14

## 2017-08-09 RX ADMIN — TAZOBACTAM SODIUM AND PIPERACILLIN SODIUM 3.38 G: 375; 3 INJECTION, SOLUTION INTRAVENOUS at 15:25

## 2017-08-09 RX ADMIN — TAZOBACTAM SODIUM AND PIPERACILLIN SODIUM 3.38 G: 375; 3 INJECTION, SOLUTION INTRAVENOUS at 03:27

## 2017-08-09 RX ADMIN — HYDROCODONE BITARTRATE AND ACETAMINOPHEN 2 TABLET: 5; 325 TABLET ORAL at 17:17

## 2017-08-09 RX ADMIN — PANTOPRAZOLE SODIUM 40 MG: 40 TABLET, DELAYED RELEASE ORAL at 08:10

## 2017-08-09 RX ADMIN — HYDROMORPHONE HYDROCHLORIDE 1 MG: 1 INJECTION, SOLUTION INTRAMUSCULAR; INTRAVENOUS; SUBCUTANEOUS at 08:14

## 2017-08-09 RX ADMIN — HYDROCODONE BITARTRATE AND ACETAMINOPHEN 2 TABLET: 5; 325 TABLET ORAL at 10:03

## 2017-08-09 RX ADMIN — HYDROMORPHONE HYDROCHLORIDE 1 MG: 1 INJECTION, SOLUTION INTRAMUSCULAR; INTRAVENOUS; SUBCUTANEOUS at 23:57

## 2017-08-09 RX ADMIN — OXYBUTYNIN CHLORIDE 5 MG: 5 TABLET ORAL at 08:10

## 2017-08-09 RX ADMIN — BUPROPION HYDROCHLORIDE 100 MG: 100 TABLET, FILM COATED ORAL at 17:13

## 2017-08-09 RX ADMIN — ENOXAPARIN SODIUM 40 MG: 40 INJECTION SUBCUTANEOUS at 10:43

## 2017-08-09 RX ADMIN — TAZOBACTAM SODIUM AND PIPERACILLIN SODIUM 3.38 G: 375; 3 INJECTION, SOLUTION INTRAVENOUS at 08:11

## 2017-08-09 RX ADMIN — IPRATROPIUM BROMIDE AND ALBUTEROL SULFATE 3 ML: 2.5; .5 SOLUTION RESPIRATORY (INHALATION) at 07:10

## 2017-08-09 RX ADMIN — HYDROMORPHONE HYDROCHLORIDE 1 MG: 1 INJECTION, SOLUTION INTRAMUSCULAR; INTRAVENOUS; SUBCUTANEOUS at 19:59

## 2017-08-09 RX ADMIN — TAZOBACTAM SODIUM AND PIPERACILLIN SODIUM 3.38 G: 375; 3 INJECTION, SOLUTION INTRAVENOUS at 20:54

## 2017-08-09 RX ADMIN — HYDROMORPHONE HYDROCHLORIDE 1 MG: 1 INJECTION, SOLUTION INTRAMUSCULAR; INTRAVENOUS; SUBCUTANEOUS at 15:25

## 2017-08-09 RX ADMIN — HYDROCODONE BITARTRATE AND ACETAMINOPHEN 2 TABLET: 5; 325 TABLET ORAL at 23:18

## 2017-08-09 RX ADMIN — OXYBUTYNIN CHLORIDE 5 MG: 5 TABLET ORAL at 17:13

## 2017-08-09 RX ADMIN — IPRATROPIUM BROMIDE AND ALBUTEROL SULFATE 3 ML: 2.5; .5 SOLUTION RESPIRATORY (INHALATION) at 19:36

## 2017-08-09 RX ADMIN — SERTRALINE HYDROCHLORIDE 50 MG: 50 TABLET ORAL at 08:10

## 2017-08-09 RX ADMIN — BUPROPION HYDROCHLORIDE 100 MG: 100 TABLET, FILM COATED ORAL at 08:10

## 2017-08-09 RX ADMIN — HYDROMORPHONE HYDROCHLORIDE 1 MG: 1 INJECTION, SOLUTION INTRAMUSCULAR; INTRAVENOUS; SUBCUTANEOUS at 04:23

## 2017-08-09 RX ADMIN — HYDROMORPHONE HYDROCHLORIDE 1 MG: 1 INJECTION, SOLUTION INTRAMUSCULAR; INTRAVENOUS; SUBCUTANEOUS at 11:58

## 2017-08-09 RX ADMIN — HYDROCODONE BITARTRATE AND ACETAMINOPHEN 2 TABLET: 5; 325 TABLET ORAL at 03:27

## 2017-08-09 NOTE — PROGRESS NOTES
LOS: 1 day   Patient Care Team:  MARIA TERESA Jorge as PCP - General    Chief Complaint:  <principal problem not specified>    Subjective     Interval History:   Pain better, breathing better, eating OK    Objective     Vital Signs  Temp:  [96.5 °F (35.8 °C)-98.8 °F (37.1 °C)] 96.5 °F (35.8 °C)  Heart Rate:  [64-87] 87  Resp:  [18-22] 20  BP: (119-136)/(56-68) 136/64    Physical Exam:  abd OK     Results Review:       Lab Results (last 24 hours)     Procedure Component Value Units Date/Time    CBC & Differential [[id number]] Collected:  08/09/17 0535    Specimen:  Blood Updated:  08/09/17 0601    Narrative:       The following orders were created for panel order CBC & Differential.  Procedure                               Abnormality         Status                     ---------                               -----------         ------                     CBC Auto Differential[[id number]]        Abnormal            Final result                 Please view results for these tests on the individual orders.    CBC Auto Differential [[id number]]  (Abnormal) Collected:  08/09/17 0535    Specimen:  Blood Updated:  08/09/17 0601     WBC 13.93 (H) 10*3/mm3      RBC 3.93 10*6/mm3      Hemoglobin 11.1 (L) g/dL      Hematocrit 33.5 (L) %      MCV 85.2 fL      MCH 28.2 pg      MCHC 33.1 g/dL      RDW 13.2 %      RDW-SD 41.8 fl      MPV 9.8 fL      Platelets 466 (H) 10*3/mm3      Neutrophil % 75.3 %      Lymphocyte % 9.2 (L) %      Monocyte % 12.5 (H) %      Eosinophil % 2.4 %      Basophil % 0.1 %      Immature Grans % 0.5 %      Neutrophils, Absolute 10.48 (H) 10*3/mm3      Lymphocytes, Absolute 1.28 10*3/mm3      Monocytes, Absolute 1.74 (H) 10*3/mm3      Eosinophils, Absolute 0.34 10*3/mm3      Basophils, Absolute 0.02 10*3/mm3      Immature Grans, Absolute 0.07 (H) 10*3/mm3     Extra Tubes [[id number]] Collected:  08/09/17 0535    Specimen:  Blood from Blood, Venous Line Updated:  08/09/17 0701    Narrative:       The  following orders were created for panel order Extra Tubes.  Procedure                               Abnormality         Status                     ---------                               -----------         ------                     Green Top (Gel)[[id number]]                                  Final result                 Please view results for these tests on the individual orders.    Green Top (Gel) [[id number]] Collected:  08/09/17 0535    Specimen:  Blood Updated:  08/09/17 0701     Extra Tube Hold for add-ons.      Auto resulted.       Body Fluid Culture [[id number]]  (Normal) Collected:  08/07/17 1337    Specimen:  Body Fluid from Liver Updated:  08/09/17 0709     BF Culture No growth at 2 days     Gram Stain Result No WBCs per low power field      No organisms seen    Body Fluid Culture [[id number]]  (Normal) Collected:  08/07/17 1405    Specimen:  Body Fluid from Liver Updated:  08/09/17 0709     BF Culture No growth at 2 days     Gram Stain Result Rare (1+) WBCs per low power field      No organisms seen          Medication Review:   Current Facility-Administered Medications   Medication Dose Route Frequency Provider Last Rate Last Dose   • buPROPion (WELLBUTRIN) tablet 100 mg  100 mg Oral BID Abraham Varner MD   100 mg at 08/09/17 0810   • enalaprilat (VASOTEC) injection 1.25 mg  1.25 mg Intravenous Q6H PRN Abraham Varner MD       • enoxaparin (LOVENOX) syringe 40 mg  40 mg Subcutaneous Q24H Pk العلي MD       • HYDROcodone-acetaminophen (NORCO) 5-325 MG per tablet 2 tablet  2 tablet Oral Q6H PRN Pk العلي MD   2 tablet at 08/09/17 0327   • HYDROmorphone (DILAUDID) injection 1 mg  1 mg Intravenous Q3H PRN Pk العلي MD   1 mg at 08/09/17 0814   • ipratropium-albuterol (DUO-NEB) nebulizer solution 3 mL  3 mL Nebulization Q6H While Awake - RT Pk العلي MD   3 mL at 08/09/17 0710   • ondansetron (ZOFRAN) injection 4 mg  4 mg Intravenous Q6H PRN Pk العلي MD   4 mg at 08/07/17 1542   • oxybutynin  (DITROPAN) tablet 5 mg  5 mg Oral BID Abraham Varner MD   5 mg at 08/09/17 0810   • pantoprazole (PROTONIX) EC tablet 40 mg  40 mg Oral Daily Abraham Varner MD   40 mg at 08/09/17 0810   • piperacillin-tazobactam (ZOSYN) 3.375 g in iso-osmotic dextrose 50 ml (premix)  3.375 g Intravenous Q6H Abraham Varner MD 0 mL/hr at 08/08/17 0627 3.375 g at 08/09/17 0811   • sertraline (ZOLOFT) tablet 50 mg  50 mg Oral Daily Abraham Varner MD   50 mg at 08/09/17 0810   • sodium chloride 0.9 % flush 10 mL  10 mL Intravenous PRN David Bunn MD           Assessment/Plan     Active Problems:    Abdominal fluid collection    47 yo lady with resolved bile leak and retained biloma after lap pk  S/p CT guided drainage, could not leave a catheter   Continue regular diet and iv abx  Pain control  Pulmonary toilet  Will resorb biloma spontaneously, will keep inpatient as long as she is symptomatic  Wean off oxygen as tolerated    Pk العلي MD  08/09/17  10:02 AM

## 2017-08-09 NOTE — PLAN OF CARE
Problem: Patient Care Overview (Adult)  Goal: Adult Individualization and Mutuality  Outcome: Ongoing (interventions implemented as appropriate)  Goal: Discharge Needs Assessment  Outcome: Ongoing (interventions implemented as appropriate)    Problem: Pain, Acute (Adult)  Goal: Identify Related Risk Factors and Signs and Symptoms  Outcome: Outcome(s) achieved Date Met:  08/09/17  Goal: Acceptable Pain Control/Comfort Level  Outcome: Ongoing (interventions implemented as appropriate)

## 2017-08-09 NOTE — PLAN OF CARE
Problem: Patient Care Overview (Adult)  Goal: Plan of Care Review  Outcome: Ongoing (interventions implemented as appropriate)    08/09/17 0352   Coping/Psychosocial Response Interventions   Plan Of Care Reviewed With patient   Patient Care Overview   Progress improving   Outcome Evaluation   Outcome Summary/Follow up Plan VSS, working on pain control.       Goal: Adult Individualization and Mutuality  Outcome: Ongoing (interventions implemented as appropriate)  Goal: Discharge Needs Assessment  Outcome: Ongoing (interventions implemented as appropriate)    Problem: Pain, Acute (Adult)  Goal: Identify Related Risk Factors and Signs and Symptoms  Outcome: Ongoing (interventions implemented as appropriate)  Goal: Acceptable Pain Control/Comfort Level  Outcome: Ongoing (interventions implemented as appropriate)

## 2017-08-10 ENCOUNTER — APPOINTMENT (OUTPATIENT)
Dept: GENERAL RADIOLOGY | Facility: HOSPITAL | Age: 47
End: 2017-08-10

## 2017-08-10 LAB
BASOPHILS # BLD AUTO: 0.02 10*3/MM3 (ref 0–0.2)
BASOPHILS NFR BLD AUTO: 0.2 % (ref 0–2)
DEPRECATED RDW RBC AUTO: 41.8 FL (ref 36.4–46.3)
EOSINOPHIL # BLD AUTO: 0.38 10*3/MM3 (ref 0–0.7)
EOSINOPHIL NFR BLD AUTO: 3.2 % (ref 0–7)
ERYTHROCYTE [DISTWIDTH] IN BLOOD BY AUTOMATED COUNT: 13.2 % (ref 11.5–14.5)
HCT VFR BLD AUTO: 32.8 % (ref 35–45)
HGB BLD-MCNC: 10.5 G/DL (ref 12–15.5)
IMM GRANULOCYTES # BLD: 0.06 10*3/MM3 (ref 0–0.02)
IMM GRANULOCYTES NFR BLD: 0.5 % (ref 0–0.5)
LYMPHOCYTES # BLD AUTO: 1.72 10*3/MM3 (ref 0.6–4.2)
LYMPHOCYTES NFR BLD AUTO: 14.5 % (ref 10–50)
MCH RBC QN AUTO: 27.7 PG (ref 26.5–34)
MCHC RBC AUTO-ENTMCNC: 32 G/DL (ref 31.4–36)
MCV RBC AUTO: 86.5 FL (ref 80–98)
MONOCYTES # BLD AUTO: 1.5 10*3/MM3 (ref 0–0.9)
MONOCYTES NFR BLD AUTO: 12.7 % (ref 0–12)
NEUTROPHILS # BLD AUTO: 8.16 10*3/MM3 (ref 2–8.6)
NEUTROPHILS NFR BLD AUTO: 68.9 % (ref 37–80)
PLATELET # BLD AUTO: 454 10*3/MM3 (ref 150–450)
PMV BLD AUTO: 10.3 FL (ref 8–12)
RBC # BLD AUTO: 3.79 10*6/MM3 (ref 3.77–5.16)
WBC NRBC COR # BLD: 11.84 10*3/MM3 (ref 3.2–9.8)

## 2017-08-10 PROCEDURE — 71010 HC CHEST PA OR AP: CPT

## 2017-08-10 PROCEDURE — 99024 POSTOP FOLLOW-UP VISIT: CPT | Performed by: SURGERY

## 2017-08-10 PROCEDURE — 25010000002 HYDROMORPHONE PER 4 MG: Performed by: SURGERY

## 2017-08-10 PROCEDURE — 25010000002 MORPHINE PER 10 MG: Performed by: SURGERY

## 2017-08-10 PROCEDURE — 85025 COMPLETE CBC W/AUTO DIFF WBC: CPT | Performed by: SURGERY

## 2017-08-10 PROCEDURE — 94760 N-INVAS EAR/PLS OXIMETRY 1: CPT

## 2017-08-10 PROCEDURE — 94799 UNLISTED PULMONARY SVC/PX: CPT

## 2017-08-10 PROCEDURE — 25010000002 PIPERACILLIN SOD-TAZOBACTAM PER 1 G: Performed by: SURGERY

## 2017-08-10 PROCEDURE — 25010000002 ENOXAPARIN PER 10 MG: Performed by: SURGERY

## 2017-08-10 RX ORDER — HYDROCODONE BITARTRATE AND ACETAMINOPHEN 7.5; 325 MG/1; MG/1
2 TABLET ORAL EVERY 6 HOURS PRN
Status: DISCONTINUED | OUTPATIENT
Start: 2017-08-10 | End: 2017-08-16 | Stop reason: HOSPADM

## 2017-08-10 RX ORDER — FLUCONAZOLE 150 MG/1
150 TABLET ORAL ONCE
Status: COMPLETED | OUTPATIENT
Start: 2017-08-10 | End: 2017-08-10

## 2017-08-10 RX ORDER — MORPHINE SULFATE 4 MG/ML
4 INJECTION, SOLUTION INTRAMUSCULAR; INTRAVENOUS
Status: DISCONTINUED | OUTPATIENT
Start: 2017-08-10 | End: 2017-08-12

## 2017-08-10 RX ADMIN — IPRATROPIUM BROMIDE AND ALBUTEROL SULFATE 3 ML: 2.5; .5 SOLUTION RESPIRATORY (INHALATION) at 07:47

## 2017-08-10 RX ADMIN — BUPROPION HYDROCHLORIDE 100 MG: 100 TABLET, FILM COATED ORAL at 08:02

## 2017-08-10 RX ADMIN — HYDROMORPHONE HYDROCHLORIDE 1 MG: 1 INJECTION, SOLUTION INTRAMUSCULAR; INTRAVENOUS; SUBCUTANEOUS at 03:07

## 2017-08-10 RX ADMIN — SERTRALINE HYDROCHLORIDE 50 MG: 50 TABLET ORAL at 08:02

## 2017-08-10 RX ADMIN — HYDROCODONE BITARTRATE AND ACETAMINOPHEN 2 TABLET: 7.5; 325 TABLET ORAL at 12:00

## 2017-08-10 RX ADMIN — TAZOBACTAM SODIUM AND PIPERACILLIN SODIUM 3.38 G: 375; 3 INJECTION, SOLUTION INTRAVENOUS at 10:15

## 2017-08-10 RX ADMIN — MORPHINE SULFATE 4 MG: 4 INJECTION, SOLUTION INTRAMUSCULAR; INTRAVENOUS at 17:55

## 2017-08-10 RX ADMIN — PANTOPRAZOLE SODIUM 40 MG: 40 TABLET, DELAYED RELEASE ORAL at 08:02

## 2017-08-10 RX ADMIN — BUPROPION HYDROCHLORIDE 100 MG: 100 TABLET, FILM COATED ORAL at 17:55

## 2017-08-10 RX ADMIN — MORPHINE SULFATE 4 MG: 4 INJECTION, SOLUTION INTRAMUSCULAR; INTRAVENOUS at 11:07

## 2017-08-10 RX ADMIN — TAZOBACTAM SODIUM AND PIPERACILLIN SODIUM 3.38 G: 375; 3 INJECTION, SOLUTION INTRAVENOUS at 04:44

## 2017-08-10 RX ADMIN — MORPHINE SULFATE 4 MG: 4 INJECTION, SOLUTION INTRAMUSCULAR; INTRAVENOUS at 22:54

## 2017-08-10 RX ADMIN — HYDROCODONE BITARTRATE AND ACETAMINOPHEN 2 TABLET: 5; 325 TABLET ORAL at 05:57

## 2017-08-10 RX ADMIN — HYDROMORPHONE HYDROCHLORIDE 1 MG: 1 INJECTION, SOLUTION INTRAMUSCULAR; INTRAVENOUS; SUBCUTANEOUS at 08:02

## 2017-08-10 RX ADMIN — TAZOBACTAM SODIUM AND PIPERACILLIN SODIUM 3.38 G: 375; 3 INJECTION, SOLUTION INTRAVENOUS at 22:15

## 2017-08-10 RX ADMIN — TAZOBACTAM SODIUM AND PIPERACILLIN SODIUM 3.38 G: 375; 3 INJECTION, SOLUTION INTRAVENOUS at 16:20

## 2017-08-10 RX ADMIN — MORPHINE SULFATE 4 MG: 4 INJECTION, SOLUTION INTRAMUSCULAR; INTRAVENOUS at 14:44

## 2017-08-10 RX ADMIN — HYDROCODONE BITARTRATE AND ACETAMINOPHEN 2 TABLET: 7.5; 325 TABLET ORAL at 19:34

## 2017-08-10 RX ADMIN — FLUCONAZOLE 150 MG: 150 TABLET ORAL at 10:15

## 2017-08-10 RX ADMIN — OXYBUTYNIN CHLORIDE 5 MG: 5 TABLET ORAL at 17:55

## 2017-08-10 RX ADMIN — IPRATROPIUM BROMIDE AND ALBUTEROL SULFATE 3 ML: 2.5; .5 SOLUTION RESPIRATORY (INHALATION) at 13:51

## 2017-08-10 RX ADMIN — IPRATROPIUM BROMIDE AND ALBUTEROL SULFATE 3 ML: 2.5; .5 SOLUTION RESPIRATORY (INHALATION) at 18:45

## 2017-08-10 RX ADMIN — OXYBUTYNIN CHLORIDE 5 MG: 5 TABLET ORAL at 08:02

## 2017-08-10 RX ADMIN — ENOXAPARIN SODIUM 40 MG: 40 INJECTION SUBCUTANEOUS at 10:15

## 2017-08-10 NOTE — PROGRESS NOTES
LOS: 2 days   Patient Care Team:  MARIA TERESA Jorge as PCP - General    Chief Complaint:  <principal problem not specified>    Subjective     Interval History:   Still on oxygen, still having pain, tolerating diet, ambulating    Objective     Vital Signs  Temp:  [97.6 °F (36.4 °C)-99.5 °F (37.5 °C)] 99.5 °F (37.5 °C)  Heart Rate:  [56-78] 75  Resp:  [18-20] 18  BP: (117-134)/(61-67) 130/67    Physical Exam:  Diminished BS on left, incisions OK     Results Review:       Lab Results (last 24 hours)     Procedure Component Value Units Date/Time    CBC & Differential [[id number]] Collected:  08/10/17 0551    Specimen:  Blood Updated:  08/10/17 0657    Narrative:       The following orders were created for panel order CBC & Differential.  Procedure                               Abnormality         Status                     ---------                               -----------         ------                     CBC Auto Differential[[id number]]        Abnormal            Final result                 Please view results for these tests on the individual orders.    CBC Auto Differential [[id number]]  (Abnormal) Collected:  08/10/17 0551    Specimen:  Blood Updated:  08/10/17 0657     WBC 11.84 (H) 10*3/mm3      RBC 3.79 10*6/mm3      Hemoglobin 10.5 (L) g/dL      Hematocrit 32.8 (L) %      MCV 86.5 fL      MCH 27.7 pg      MCHC 32.0 g/dL      RDW 13.2 %      RDW-SD 41.8 fl      MPV 10.3 fL      Platelets 454 (H) 10*3/mm3      Neutrophil % 68.9 %      Lymphocyte % 14.5 %      Monocyte % 12.7 (H) %      Eosinophil % 3.2 %      Basophil % 0.2 %      Immature Grans % 0.5 %      Neutrophils, Absolute 8.16 10*3/mm3      Lymphocytes, Absolute 1.72 10*3/mm3      Monocytes, Absolute 1.50 (H) 10*3/mm3      Eosinophils, Absolute 0.38 10*3/mm3      Basophils, Absolute 0.02 10*3/mm3      Immature Grans, Absolute 0.06 (H) 10*3/mm3           Medication Review:   Current Facility-Administered Medications   Medication Dose Route  Frequency Provider Last Rate Last Dose   • buPROPion (WELLBUTRIN) tablet 100 mg  100 mg Oral BID Abraham Varner MD   100 mg at 08/10/17 0802   • enalaprilat (VASOTEC) injection 1.25 mg  1.25 mg Intravenous Q6H PRN Abraham Varner MD       • enoxaparin (LOVENOX) syringe 40 mg  40 mg Subcutaneous Q24H Pk العلي MD   40 mg at 08/09/17 1043   • fluconazole (DIFLUCAN) tablet 150 mg  150 mg Oral Once Pk العلي MD       • HYDROcodone-acetaminophen (NORCO) 7.5-325 MG per tablet 2 tablet  2 tablet Oral Q6H PRN Pk العلي MD       • ipratropium-albuterol (DUO-NEB) nebulizer solution 3 mL  3 mL Nebulization Q6H While Awake - RT Pk العلي MD   3 mL at 08/10/17 0747   • Morphine sulfate (PF) injection 4 mg  4 mg Intravenous Q3H PRN Pk العلي MD       • ondansetron (ZOFRAN) injection 4 mg  4 mg Intravenous Q6H PRN Pk العلي MD   4 mg at 08/07/17 1542   • oxybutynin (DITROPAN) tablet 5 mg  5 mg Oral BID Abraham Varner MD   5 mg at 08/10/17 0802   • pantoprazole (PROTONIX) EC tablet 40 mg  40 mg Oral Daily Abraham Varner MD   40 mg at 08/10/17 0802   • piperacillin-tazobactam (ZOSYN) 3.375 g in iso-osmotic dextrose 50 ml (premix)  3.375 g Intravenous Q6H Abraham Varner MD 0 mL/hr at 08/08/17 0627 3.375 g at 08/10/17 0444   • sertraline (ZOLOFT) tablet 50 mg  50 mg Oral Daily Abraham Varner MD   50 mg at 08/10/17 0802   • sodium chloride 0.9 % flush 10 mL  10 mL Intravenous PRN David Bunn MD       CXR: worsening left pleural effusion    Assessment/Plan     Active Problems:    Abdominal fluid collection    45 yo lady with resolved bile leak and retained biloma after lap pk  S/p CT guided drainage, could not leave a catheter   Continue regular diet and iv abx, fluconazole today for yeast infection  Pain control, will change to morphine today and increase norco  Pulmonary toilet  Will resorb biloma spontaneously, will keep inpatient as long as she is symptomatic  Wean off oxygen as tolerated   consult for home  therapy vs inpatient placement    Pk العلي MD  08/10/17  9:18 AM

## 2017-08-10 NOTE — PLAN OF CARE
Problem: Patient Care Overview (Adult)  Goal: Plan of Care Review  Outcome: Ongoing (interventions implemented as appropriate)    08/10/17 2122   Coping/Psychosocial Response Interventions   Plan Of Care Reviewed With patient   Patient Care Overview   Progress no change   Outcome Evaluation   Outcome Summary/Follow up Plan Pain controlled with interventions. Vitals stable. No concerns at this time.        Goal: Adult Individualization and Mutuality  Outcome: Ongoing (interventions implemented as appropriate)  Goal: Discharge Needs Assessment  Outcome: Ongoing (interventions implemented as appropriate)    Problem: Pain, Acute (Adult)  Goal: Acceptable Pain Control/Comfort Level  Outcome: Ongoing (interventions implemented as appropriate)

## 2017-08-10 NOTE — NURSING NOTE
"Went in to give patient her zosyn, patient stated \"where's my pain meds I called for them at 3 o clock\" I told her I gave her diluadid at 3:07 am. Pt stated \"you're a liar, I've been sitting here in excruciating pain waiting for my medicine.\" I told her \"do you remember me bringing you your pain meds we talked about you wanting a shower\" and she said \"you're making this up\" I called Lisa Cunningham supervisor to come and talk to patient. Patient stated \"I never said that I must not remember it's because I want morphine I don't want diluadid. You just think I'm a druggie.I want my doctor. I don't deserve any of this. I won't call for anymore medicine I'll just have my mother come up here and sit with me.\" Lisa told her we would let the doctor know and try to change her meds back to something that pleases her. I gave patient her zosyn she ignored my questions and refused to make eye contact with me.   "

## 2017-08-10 NOTE — NURSING NOTE
"Medicated patient with her PO norco patient complained of terrible pain, went back in room 30 mins later to reassess pain and recheck patient's blood pressure and see if she wanted her PRN dialaudid. Patient stated her pain was 0 and that her pain was under control from her PO pain medication. While I was in the room a visitor came in and brought patient food patient became very upset and started crying. Patient stated to her friend \"I'm in the worst pain of my life\" I asked patient I thought you said your pain was under control. And she said no. Patient stated to her friend that she \"walked to the nurse's station gasping for air.\" Regina Porras RN CN was sitting at the desk with me and patient was fine and in no distress. Patient stated that she was \"off track for her medication, we were making her wait too long between medications\". I told patient the last time she had her PRN IV pain med q3 and PRN PO pain med q6. The interval between the times she is getting the medications is right on time that she can have it. After receiving additional complaints I gave patient her IV pain med.   "

## 2017-08-10 NOTE — PLAN OF CARE
Problem: Patient Care Overview (Adult)  Goal: Plan of Care Review  Outcome: Ongoing (interventions implemented as appropriate)  Goal: Adult Individualization and Mutuality  Outcome: Ongoing (interventions implemented as appropriate)    08/07/17 0319 08/10/17 0317   Individualization   Patient Specific Preferences Patient likes door closed.  --    Mutuality/Individual Preferences   What Information Would Help Us Give You More Personalized Care? --  Wants pain meds as soon as she can have them         Problem: Pain, Acute (Adult)  Goal: Acceptable Pain Control/Comfort Level  Outcome: Ongoing (interventions implemented as appropriate)

## 2017-08-11 ENCOUNTER — APPOINTMENT (OUTPATIENT)
Dept: GENERAL RADIOLOGY | Facility: HOSPITAL | Age: 47
End: 2017-08-11

## 2017-08-11 ENCOUNTER — APPOINTMENT (OUTPATIENT)
Dept: CT IMAGING | Facility: HOSPITAL | Age: 47
End: 2017-08-11

## 2017-08-11 LAB
BASOPHILS # BLD AUTO: 0.02 10*3/MM3 (ref 0–0.2)
BASOPHILS NFR BLD AUTO: 0.2 % (ref 0–2)
DEPRECATED RDW RBC AUTO: 42.5 FL (ref 36.4–46.3)
EOSINOPHIL # BLD AUTO: 0.38 10*3/MM3 (ref 0–0.7)
EOSINOPHIL NFR BLD AUTO: 3.7 % (ref 0–7)
ERYTHROCYTE [DISTWIDTH] IN BLOOD BY AUTOMATED COUNT: 13.2 % (ref 11.5–14.5)
HCT VFR BLD AUTO: 32.2 % (ref 35–45)
HGB BLD-MCNC: 10.3 G/DL (ref 12–15.5)
IMM GRANULOCYTES # BLD: 0.04 10*3/MM3 (ref 0–0.02)
IMM GRANULOCYTES NFR BLD: 0.4 % (ref 0–0.5)
LYMPHOCYTES # BLD AUTO: 1.82 10*3/MM3 (ref 0.6–4.2)
LYMPHOCYTES NFR BLD AUTO: 17.6 % (ref 10–50)
MCH RBC QN AUTO: 27.7 PG (ref 26.5–34)
MCHC RBC AUTO-ENTMCNC: 32 G/DL (ref 31.4–36)
MCV RBC AUTO: 86.6 FL (ref 80–98)
MONOCYTES # BLD AUTO: 1.16 10*3/MM3 (ref 0–0.9)
MONOCYTES NFR BLD AUTO: 11.2 % (ref 0–12)
NEUTROPHILS # BLD AUTO: 6.91 10*3/MM3 (ref 2–8.6)
NEUTROPHILS NFR BLD AUTO: 66.9 % (ref 37–80)
PLATELET # BLD AUTO: 459 10*3/MM3 (ref 150–450)
PMV BLD AUTO: 9.9 FL (ref 8–12)
RBC # BLD AUTO: 3.72 10*6/MM3 (ref 3.77–5.16)
WBC NRBC COR # BLD: 10.33 10*3/MM3 (ref 3.2–9.8)

## 2017-08-11 PROCEDURE — 94799 UNLISTED PULMONARY SVC/PX: CPT

## 2017-08-11 PROCEDURE — 99024 POSTOP FOLLOW-UP VISIT: CPT | Performed by: SURGERY

## 2017-08-11 PROCEDURE — 87070 CULTURE OTHR SPECIMN AEROBIC: CPT | Performed by: SURGERY

## 2017-08-11 PROCEDURE — 87015 SPECIMEN INFECT AGNT CONCNTJ: CPT | Performed by: SURGERY

## 2017-08-11 PROCEDURE — 25010000002 PIPERACILLIN SOD-TAZOBACTAM PER 1 G: Performed by: SURGERY

## 2017-08-11 PROCEDURE — 87205 SMEAR GRAM STAIN: CPT | Performed by: SURGERY

## 2017-08-11 PROCEDURE — 25010000002 FENTANYL CITRATE (PF) 100 MCG/2ML SOLUTION: Performed by: RADIOLOGY

## 2017-08-11 PROCEDURE — 75989 ABSCESS DRAINAGE UNDER X-RAY: CPT

## 2017-08-11 PROCEDURE — 25010000002 MORPHINE PER 10 MG: Performed by: SURGERY

## 2017-08-11 PROCEDURE — 94760 N-INVAS EAR/PLS OXIMETRY 1: CPT

## 2017-08-11 PROCEDURE — 71010 HC CHEST PA OR AP: CPT

## 2017-08-11 PROCEDURE — 0W993ZZ DRAINAGE OF RIGHT PLEURAL CAVITY, PERCUTANEOUS APPROACH: ICD-10-PCS | Performed by: RADIOLOGY

## 2017-08-11 PROCEDURE — 25010000002 MIDAZOLAM PER 1 MG: Performed by: RADIOLOGY

## 2017-08-11 PROCEDURE — 85025 COMPLETE CBC W/AUTO DIFF WBC: CPT | Performed by: SURGERY

## 2017-08-11 RX ORDER — FENTANYL CITRATE 50 UG/ML
INJECTION, SOLUTION INTRAMUSCULAR; INTRAVENOUS
Status: COMPLETED | OUTPATIENT
Start: 2017-08-11 | End: 2017-08-11

## 2017-08-11 RX ORDER — MIDAZOLAM HYDROCHLORIDE 1 MG/ML
INJECTION INTRAMUSCULAR; INTRAVENOUS
Status: COMPLETED | OUTPATIENT
Start: 2017-08-11 | End: 2017-08-11

## 2017-08-11 RX ADMIN — SERTRALINE HYDROCHLORIDE 50 MG: 50 TABLET ORAL at 08:24

## 2017-08-11 RX ADMIN — IPRATROPIUM BROMIDE AND ALBUTEROL SULFATE 3 ML: 2.5; .5 SOLUTION RESPIRATORY (INHALATION) at 06:51

## 2017-08-11 RX ADMIN — OXYBUTYNIN CHLORIDE 5 MG: 5 TABLET ORAL at 08:25

## 2017-08-11 RX ADMIN — TAZOBACTAM SODIUM AND PIPERACILLIN SODIUM 3.38 G: 375; 3 INJECTION, SOLUTION INTRAVENOUS at 04:07

## 2017-08-11 RX ADMIN — FENTANYL CITRATE 25 MCG: 50 INJECTION, SOLUTION INTRAMUSCULAR; INTRAVENOUS at 13:12

## 2017-08-11 RX ADMIN — OXYBUTYNIN CHLORIDE 5 MG: 5 TABLET ORAL at 17:35

## 2017-08-11 RX ADMIN — IPRATROPIUM BROMIDE AND ALBUTEROL SULFATE 3 ML: 2.5; .5 SOLUTION RESPIRATORY (INHALATION) at 19:24

## 2017-08-11 RX ADMIN — MIDAZOLAM 1 MG: 1 INJECTION INTRAMUSCULAR; INTRAVENOUS at 13:12

## 2017-08-11 RX ADMIN — HYDROCODONE BITARTRATE AND ACETAMINOPHEN 2 TABLET: 7.5; 325 TABLET ORAL at 14:29

## 2017-08-11 RX ADMIN — PANTOPRAZOLE SODIUM 40 MG: 40 TABLET, DELAYED RELEASE ORAL at 08:24

## 2017-08-11 RX ADMIN — TAZOBACTAM SODIUM AND PIPERACILLIN SODIUM 3.38 G: 375; 3 INJECTION, SOLUTION INTRAVENOUS at 14:29

## 2017-08-11 RX ADMIN — TAZOBACTAM SODIUM AND PIPERACILLIN SODIUM 3.38 G: 375; 3 INJECTION, SOLUTION INTRAVENOUS at 20:57

## 2017-08-11 RX ADMIN — MORPHINE SULFATE 4 MG: 4 INJECTION, SOLUTION INTRAMUSCULAR; INTRAVENOUS at 19:20

## 2017-08-11 RX ADMIN — HYDROCODONE BITARTRATE AND ACETAMINOPHEN 2 TABLET: 7.5; 325 TABLET ORAL at 20:55

## 2017-08-11 RX ADMIN — MORPHINE SULFATE 4 MG: 4 INJECTION, SOLUTION INTRAMUSCULAR; INTRAVENOUS at 09:08

## 2017-08-11 RX ADMIN — BUPROPION HYDROCHLORIDE 100 MG: 100 TABLET, FILM COATED ORAL at 08:24

## 2017-08-11 RX ADMIN — HYDROCODONE BITARTRATE AND ACETAMINOPHEN 2 TABLET: 7.5; 325 TABLET ORAL at 02:06

## 2017-08-11 RX ADMIN — BUPROPION HYDROCHLORIDE 100 MG: 100 TABLET, FILM COATED ORAL at 17:35

## 2017-08-11 RX ADMIN — MORPHINE SULFATE 4 MG: 4 INJECTION, SOLUTION INTRAMUSCULAR; INTRAVENOUS at 23:37

## 2017-08-11 RX ADMIN — MORPHINE SULFATE 4 MG: 4 INJECTION, SOLUTION INTRAMUSCULAR; INTRAVENOUS at 04:42

## 2017-08-11 RX ADMIN — TAZOBACTAM SODIUM AND PIPERACILLIN SODIUM 3.38 G: 375; 3 INJECTION, SOLUTION INTRAVENOUS at 08:24

## 2017-08-11 NOTE — PLAN OF CARE
Problem: Patient Care Overview (Adult)  Goal: Plan of Care Review  Outcome: Ongoing (interventions implemented as appropriate)    08/10/17 1709 08/11/17 0325   Coping/Psychosocial Response Interventions   Plan Of Care Reviewed With --  patient   Patient Care Overview   Progress --  no change   Outcome Evaluation   Outcome Summary/Follow up Plan Pain controlled with interventions. Vitals stable. No concerns at this time.  --        Goal: Adult Individualization and Mutuality  Outcome: Ongoing (interventions implemented as appropriate)  Goal: Discharge Needs Assessment  Outcome: Ongoing (interventions implemented as appropriate)    Problem: Pain, Acute (Adult)  Goal: Acceptable Pain Control/Comfort Level  Outcome: Ongoing (interventions implemented as appropriate)

## 2017-08-11 NOTE — PLAN OF CARE
Problem: Patient Care Overview (Adult)  Goal: Plan of Care Review  Outcome: Ongoing (interventions implemented as appropriate)    08/11/17 1537   Coping/Psychosocial Response Interventions   Plan Of Care Reviewed With patient   Patient Care Overview   Progress no change       Goal: Adult Individualization and Mutuality  Outcome: Ongoing (interventions implemented as appropriate)  Goal: Discharge Needs Assessment  Outcome: Ongoing (interventions implemented as appropriate)    Problem: Pain, Acute (Adult)  Goal: Acceptable Pain Control/Comfort Level  Outcome: Ongoing (interventions implemented as appropriate)

## 2017-08-11 NOTE — PAYOR COMM NOTE
"[facility]   [facility]  [phone]  [phone]    auth number [id number]      Raheem Ferris (47 y.o. Female)     Date of Birth Social Security Number Address Home Phone MRN    1970  [address]  [facility] [address] [phone] [id number]    Sabianist Marital Status          Religious        Admission Date Admission Type Admitting Provider Attending Provider Department, Room/Bed    8/6/17 Emergency Pk العلي MD Fife, Luke P, MD [facility] 3 EAST, [facility]/1    Discharge Date Discharge Disposition Discharge Destination                      Attending Provider: Pk العلي MD     Allergies:  Sulfa Antibiotics    Isolation:  None   Infection:  None   Code Status:  FULL    Ht:  65\" (165.1 cm)   Wt:  264 lb 4.8 oz (120 kg)    Admission Cmt:  None   Principal Problem:  None                Active Insurance as of 8/6/2017     Primary Coverage     Payor Plan Insurance Group Employer/Plan Group    [employer] [place] [employer] [place]ICA[place]      Payor Plan Address Payor Plan Phone Number Effective From Effective To    [address] [address] [phone] 3/3/2017     [place] [id number]       Subscriber Name Subscriber Birth Date Member ID       RAHEEM FERRIS 1970 [id number]                 Emergency Contacts      (Rel.) Home Phone Work Phone Mobile Phone    Joyce Bolton (Mother) [phone]               Physician Progress Notes (last 24 hours) (Notes from 8/10/2017  1:14 PM through 8/11/2017  1:14 PM)      Pk العلي MD at 8/11/2017  7:39 AM  Version 1 of 1              LOS: 3 days   Patient Care Team:  MARIA TERESA oJrge as PCP - General    Chief Complaint:  <principal problem not specified>    Subjective     Interval History:   Feels better    Objective     Vital Signs  Temp:  [97.4 °F (36.3 °C)-100.3 °F (37.9 °C)] 97.4 °F (36.3 °C)  Heart Rate:  [64-75] 67  Resp:  [18-20] 20  BP: " (106145)/(51-74) 117/56    Physical Exam:  improved     Results Review:       Lab Results (last 24 hours)     Procedure Component Value Units Date/Time    CBC & Differential [[id number]] Collected:  08/11/17 0508    Specimen:  Blood Updated:  08/11/17 0516    Narrative:       The following orders were created for panel order CBC & Differential.  Procedure                               Abnormality         Status                     ---------                               -----------         ------                     CBC Auto Differential[[id number]]        Abnormal            Final result                 Please view results for these tests on the individual orders.    CBC Auto Differential [[id number]]  (Abnormal) Collected:  08/11/17 0508    Specimen:  Blood Updated:  08/11/17 0516     WBC 10.33 (H) 10*3/mm3      RBC 3.72 (L) 10*6/mm3      Hemoglobin 10.3 (L) g/dL      Hematocrit 32.2 (L) %      MCV 86.6 fL      MCH 27.7 pg      MCHC 32.0 g/dL      RDW 13.2 %      RDW-SD 42.5 fl      MPV 9.9 fL      Platelets 459 (H) 10*3/mm3      Neutrophil % 66.9 %      Lymphocyte % 17.6 %      Monocyte % 11.2 %      Eosinophil % 3.7 %      Basophil % 0.2 %      Immature Grans % 0.4 %      Neutrophils, Absolute 6.91 10*3/mm3      Lymphocytes, Absolute 1.82 10*3/mm3      Monocytes, Absolute 1.16 (H) 10*3/mm3      Eosinophils, Absolute 0.38 10*3/mm3      Basophils, Absolute 0.02 10*3/mm3      Immature Grans, Absolute 0.04 (H) 10*3/mm3     Body Fluid Culture [[id number]]  (Normal) Collected:  08/07/17 1405    Specimen:  Body Fluid from Liver Updated:  08/11/17 0620     BF Culture No growth at 4 days     Gram Stain Result Rare (1+) WBCs per low power field      No organisms seen    Body Fluid Culture [[id number]]  (Normal) Collected:  08/07/17 1337    Specimen:  Body Fluid from Liver Updated:  08/11/17 0620     BF Culture No growth at 4 days     Gram Stain Result No WBCs per low power field      No organisms seen          Medication  Review:   Current Facility-Administered Medications   Medication Dose Route Frequency Provider Last Rate Last Dose   • buPROPion (WELLBUTRIN) tablet 100 mg  100 mg Oral BID Abraham Varner MD   100 mg at 08/10/17 1755   • enalaprilat (VASOTEC) injection 1.25 mg  1.25 mg Intravenous Q6H PRN Abraham Varner MD       • HYDROcodone-acetaminophen (NORCO) 7.5-325 MG per tablet 2 tablet  2 tablet Oral Q6H PRN Pk العلي MD   2 tablet at 08/11/17 0206   • ipratropium-albuterol (DUO-NEB) nebulizer solution 3 mL  3 mL Nebulization Q6H While Awake - RT Pk العلي MD   3 mL at 08/11/17 0651   • Morphine sulfate (PF) injection 4 mg  4 mg Intravenous Q3H PRN Pk العلي MD   4 mg at 08/11/17 0442   • ondansetron (ZOFRAN) injection 4 mg  4 mg Intravenous Q6H PRN Pk العلي MD   4 mg at 08/07/17 1542   • oxybutynin (DITROPAN) tablet 5 mg  5 mg Oral BID Abraham Varner MD   5 mg at 08/10/17 1755   • pantoprazole (PROTONIX) EC tablet 40 mg  40 mg Oral Daily Abraham Varner MD   40 mg at 08/10/17 0802   • piperacillin-tazobactam (ZOSYN) 3.375 g in iso-osmotic dextrose 50 ml (premix)  3.375 g Intravenous Q6H Abraham Varner MD 0 mL/hr at 08/10/17 1040 3.375 g at 08/11/17 0407   • sertraline (ZOLOFT) tablet 50 mg  50 mg Oral Daily Abraham Varner MD   50 mg at 08/10/17 0802   • sodium chloride 0.9 % flush 10 mL  10 mL Intravenous PRN David Bunn MD           Assessment/Plan     Active Problems:    Abdominal fluid collection    47 yo lady with resolved bile leak and retained biloma after lap pk  Continue regular diet and iv abx  Pain control  Pulmonary toilet  Will resorb biloma spontaneously, will keep inpatient as long as she is symptomatic  Wean off oxygen as tolerated  Radiology consult today for possible right thoracentesis, will keep NPO and hold levenox    Luke P [place], MD  08/11/17  7:39 AM       Electronically signed by Pk العلي MD at 8/11/2017  7:40 AM

## 2017-08-11 NOTE — PROGRESS NOTES
LOS: 3 days   Patient Care Team:  MARIA TERESA Jorge as PCP - General    Chief Complaint:  <principal problem not specified>    Subjective     Interval History:   Feels better    Objective     Vital Signs  Temp:  [97.4 °F (36.3 °C)-100.3 °F (37.9 °C)] 97.4 °F (36.3 °C)  Heart Rate:  [64-75] 67  Resp:  [18-20] 20  BP: (106-145)/(51-74) 117/56    Physical Exam:  improved     Results Review:       Lab Results (last 24 hours)     Procedure Component Value Units Date/Time    CBC & Differential [[id number]] Collected:  08/11/17 0508    Specimen:  Blood Updated:  08/11/17 0516    Narrative:       The following orders were created for panel order CBC & Differential.  Procedure                               Abnormality         Status                     ---------                               -----------         ------                     CBC Auto Differential[[id number]]        Abnormal            Final result                 Please view results for these tests on the individual orders.    CBC Auto Differential [[id number]]  (Abnormal) Collected:  08/11/17 0508    Specimen:  Blood Updated:  08/11/17 0516     WBC 10.33 (H) 10*3/mm3      RBC 3.72 (L) 10*6/mm3      Hemoglobin 10.3 (L) g/dL      Hematocrit 32.2 (L) %      MCV 86.6 fL      MCH 27.7 pg      MCHC 32.0 g/dL      RDW 13.2 %      RDW-SD 42.5 fl      MPV 9.9 fL      Platelets 459 (H) 10*3/mm3      Neutrophil % 66.9 %      Lymphocyte % 17.6 %      Monocyte % 11.2 %      Eosinophil % 3.7 %      Basophil % 0.2 %      Immature Grans % 0.4 %      Neutrophils, Absolute 6.91 10*3/mm3      Lymphocytes, Absolute 1.82 10*3/mm3      Monocytes, Absolute 1.16 (H) 10*3/mm3      Eosinophils, Absolute 0.38 10*3/mm3      Basophils, Absolute 0.02 10*3/mm3      Immature Grans, Absolute 0.04 (H) 10*3/mm3     Body Fluid Culture [[id number]]  (Normal) Collected:  08/07/17 1405    Specimen:  Body Fluid from Liver Updated:  08/11/17 0620     BF Culture No growth at 4 days     Gram  Stain Result Rare (1+) WBCs per low power field      No organisms seen    Body Fluid Culture [[id number]]  (Normal) Collected:  08/07/17 1337    Specimen:  Body Fluid from Liver Updated:  08/11/17 0620     BF Culture No growth at 4 days     Gram Stain Result No WBCs per low power field      No organisms seen          Medication Review:   Current Facility-Administered Medications   Medication Dose Route Frequency Provider Last Rate Last Dose   • buPROPion (WELLBUTRIN) tablet 100 mg  100 mg Oral BID Abraham Varner MD   100 mg at 08/10/17 1755   • enalaprilat (VASOTEC) injection 1.25 mg  1.25 mg Intravenous Q6H PRN Abraham Varner MD       • HYDROcodone-acetaminophen (NORCO) 7.5-325 MG per tablet 2 tablet  2 tablet Oral Q6H PRN Pk العلي MD   2 tablet at 08/11/17 0206   • ipratropium-albuterol (DUO-NEB) nebulizer solution 3 mL  3 mL Nebulization Q6H While Awake - RT Pk العلي MD   3 mL at 08/11/17 0651   • Morphine sulfate (PF) injection 4 mg  4 mg Intravenous Q3H PRN Pk العلي MD   4 mg at 08/11/17 0442   • ondansetron (ZOFRAN) injection 4 mg  4 mg Intravenous Q6H PRN Pk العلي MD   4 mg at 08/07/17 1542   • oxybutynin (DITROPAN) tablet 5 mg  5 mg Oral BID Abraham Varner MD   5 mg at 08/10/17 1755   • pantoprazole (PROTONIX) EC tablet 40 mg  40 mg Oral Daily Abraham Varner MD   40 mg at 08/10/17 0802   • piperacillin-tazobactam (ZOSYN) 3.375 g in iso-osmotic dextrose 50 ml (premix)  3.375 g Intravenous Q6H Abraham Varner MD 0 mL/hr at 08/10/17 1040 3.375 g at 08/11/17 0407   • sertraline (ZOLOFT) tablet 50 mg  50 mg Oral Daily Abraham Varner MD   50 mg at 08/10/17 0802   • sodium chloride 0.9 % flush 10 mL  10 mL Intravenous PRN David Bunn MD           Assessment/Plan     Active Problems:    Abdominal fluid collection    45 yo lady with resolved bile leak and retained biloma after lap pk  Continue regular diet and iv abx  Pain control  Pulmonary toilet  Will resorb biloma spontaneously, will keep inpatient as  long as she is symptomatic  Wean off oxygen as tolerated  Radiology consult today for possible right thoracentesis, will keep NPO and hold levenox    Pk العلي MD  08/11/17  7:39 AM

## 2017-08-12 LAB
BACTERIA FLD CULT: NORMAL
BACTERIA FLD CULT: NORMAL
BASOPHILS # BLD AUTO: 0.02 10*3/MM3 (ref 0–0.2)
BASOPHILS NFR BLD AUTO: 0.2 % (ref 0–2)
DEPRECATED RDW RBC AUTO: 43.5 FL (ref 36.4–46.3)
EOSINOPHIL # BLD AUTO: 0.44 10*3/MM3 (ref 0–0.7)
EOSINOPHIL NFR BLD AUTO: 4.3 % (ref 0–7)
ERYTHROCYTE [DISTWIDTH] IN BLOOD BY AUTOMATED COUNT: 13.5 % (ref 11.5–14.5)
GRAM STN SPEC: NORMAL
HCT VFR BLD AUTO: 32.2 % (ref 35–45)
HGB BLD-MCNC: 10 G/DL (ref 12–15.5)
IMM GRANULOCYTES # BLD: 0.03 10*3/MM3 (ref 0–0.02)
IMM GRANULOCYTES NFR BLD: 0.3 % (ref 0–0.5)
LYMPHOCYTES # BLD AUTO: 2.24 10*3/MM3 (ref 0.6–4.2)
LYMPHOCYTES NFR BLD AUTO: 21.9 % (ref 10–50)
MCH RBC QN AUTO: 27.2 PG (ref 26.5–34)
MCHC RBC AUTO-ENTMCNC: 31.1 G/DL (ref 31.4–36)
MCV RBC AUTO: 87.7 FL (ref 80–98)
MONOCYTES # BLD AUTO: 0.96 10*3/MM3 (ref 0–0.9)
MONOCYTES NFR BLD AUTO: 9.4 % (ref 0–12)
NEUTROPHILS # BLD AUTO: 6.55 10*3/MM3 (ref 2–8.6)
NEUTROPHILS NFR BLD AUTO: 63.9 % (ref 37–80)
PLATELET # BLD AUTO: 504 10*3/MM3 (ref 150–450)
PMV BLD AUTO: 10.2 FL (ref 8–12)
RBC # BLD AUTO: 3.67 10*6/MM3 (ref 3.77–5.16)
WBC NRBC COR # BLD: 10.24 10*3/MM3 (ref 3.2–9.8)

## 2017-08-12 PROCEDURE — 25010000002 MORPHINE PER 10 MG: Performed by: SURGERY

## 2017-08-12 PROCEDURE — 94760 N-INVAS EAR/PLS OXIMETRY 1: CPT

## 2017-08-12 PROCEDURE — G8978 MOBILITY CURRENT STATUS: HCPCS

## 2017-08-12 PROCEDURE — 94799 UNLISTED PULMONARY SVC/PX: CPT

## 2017-08-12 PROCEDURE — 85025 COMPLETE CBC W/AUTO DIFF WBC: CPT | Performed by: SURGERY

## 2017-08-12 PROCEDURE — 99024 POSTOP FOLLOW-UP VISIT: CPT | Performed by: SURGERY

## 2017-08-12 PROCEDURE — G8979 MOBILITY GOAL STATUS: HCPCS

## 2017-08-12 PROCEDURE — 97162 PT EVAL MOD COMPLEX 30 MIN: CPT

## 2017-08-12 PROCEDURE — 97116 GAIT TRAINING THERAPY: CPT

## 2017-08-12 PROCEDURE — 25010000002 ENOXAPARIN PER 10 MG: Performed by: SURGERY

## 2017-08-12 PROCEDURE — 25010000002 PIPERACILLIN SOD-TAZOBACTAM PER 1 G: Performed by: SURGERY

## 2017-08-12 RX ORDER — MORPHINE SULFATE 4 MG/ML
4 INJECTION, SOLUTION INTRAMUSCULAR; INTRAVENOUS
Status: DISCONTINUED | OUTPATIENT
Start: 2017-08-12 | End: 2017-08-16 | Stop reason: HOSPADM

## 2017-08-12 RX ADMIN — TAZOBACTAM SODIUM AND PIPERACILLIN SODIUM 3.38 G: 375; 3 INJECTION, SOLUTION INTRAVENOUS at 02:33

## 2017-08-12 RX ADMIN — Medication 10 ML: at 11:30

## 2017-08-12 RX ADMIN — ENOXAPARIN SODIUM 40 MG: 40 INJECTION SUBCUTANEOUS at 08:59

## 2017-08-12 RX ADMIN — MORPHINE SULFATE 4 MG: 4 INJECTION, SOLUTION INTRAMUSCULAR; INTRAVENOUS at 06:39

## 2017-08-12 RX ADMIN — MORPHINE SULFATE 4 MG: 4 INJECTION, SOLUTION INTRAMUSCULAR; INTRAVENOUS at 21:01

## 2017-08-12 RX ADMIN — Medication 10 ML: at 11:31

## 2017-08-12 RX ADMIN — BUPROPION HYDROCHLORIDE 100 MG: 100 TABLET, FILM COATED ORAL at 17:36

## 2017-08-12 RX ADMIN — IPRATROPIUM BROMIDE AND ALBUTEROL SULFATE 3 ML: 2.5; .5 SOLUTION RESPIRATORY (INHALATION) at 18:29

## 2017-08-12 RX ADMIN — HYDROCODONE BITARTRATE AND ACETAMINOPHEN 2 TABLET: 7.5; 325 TABLET ORAL at 18:49

## 2017-08-12 RX ADMIN — OXYBUTYNIN CHLORIDE 5 MG: 5 TABLET ORAL at 17:36

## 2017-08-12 RX ADMIN — IPRATROPIUM BROMIDE AND ALBUTEROL SULFATE 3 ML: 2.5; .5 SOLUTION RESPIRATORY (INHALATION) at 07:21

## 2017-08-12 RX ADMIN — HYDROCODONE BITARTRATE AND ACETAMINOPHEN 2 TABLET: 7.5; 325 TABLET ORAL at 12:39

## 2017-08-12 RX ADMIN — PANTOPRAZOLE SODIUM 40 MG: 40 TABLET, DELAYED RELEASE ORAL at 08:59

## 2017-08-12 RX ADMIN — IPRATROPIUM BROMIDE AND ALBUTEROL SULFATE 3 ML: 2.5; .5 SOLUTION RESPIRATORY (INHALATION) at 13:55

## 2017-08-12 RX ADMIN — BUPROPION HYDROCHLORIDE 100 MG: 100 TABLET, FILM COATED ORAL at 08:59

## 2017-08-12 RX ADMIN — HYDROCODONE BITARTRATE AND ACETAMINOPHEN 2 TABLET: 7.5; 325 TABLET ORAL at 02:32

## 2017-08-12 RX ADMIN — SERTRALINE HYDROCHLORIDE 50 MG: 50 TABLET ORAL at 08:59

## 2017-08-12 RX ADMIN — OXYBUTYNIN CHLORIDE 5 MG: 5 TABLET ORAL at 08:59

## 2017-08-12 NOTE — PLAN OF CARE
Problem: Patient Care Overview (Adult)  Goal: Plan of Care Review  Outcome: Ongoing (interventions implemented as appropriate)    08/12/17 0247   Coping/Psychosocial Response Interventions   Plan Of Care Reviewed With patient   Patient Care Overview   Progress no change   Outcome Evaluation   Outcome Summary/Follow up Plan Pt reports SOA on exertion, lungs clear bilaterally but diminished, remains on 2l oxygen vian/c         Problem: Pain, Acute (Adult)  Goal: Acceptable Pain Control/Comfort Level  Outcome: Ongoing (interventions implemented as appropriate)

## 2017-08-12 NOTE — THERAPY EVALUATION
Acute Care - Physical Therapy Initial Evaluation  [facility]     Patient Name: Maricarmen Ferris  : 1970  MRN: [id number]  Today's Date: 2017   Onset of Illness/Injury or Date of Surgery Date: 17  Date of Referral to PT: 17  Referring Physician: Dr. Chong Crespo      Admit Date: 2017     Visit Dx:    ICD-10-CM ICD-9-CM   1. Abdominal fluid collection R18.8 789.59   2. Pleural effusion J90 511.9   3. Impaired physical mobility Z74.09 781.99     Patient Active Problem List   Diagnosis   • Chronic cholecystitis   • Umbilical hernia without obstruction and without gangrene   • Abdominal pain   • Abdominal fluid collection   • Intra-hepatic bile leak     Past Medical History:   Diagnosis Date   • Acid reflux    • Anxiety    • Depressed    • Hard to intubate    • Hypertension    • Sleep apnea      Past Surgical History:   Procedure Laterality Date   • ABDOMINAL SURGERY     • CYSTOSCOPY     • ENDOMETRIAL ABLATION     • LAPAROSCOPIC TUBAL LIGATION     • [facility] ERCP DX COLLECTION SPECIMEN BRUSHING/WASHING Left 2017    Procedure: ENDOSCOPIC RETROGRADE CHOLANGIOPANCREATOGRAPHY;  Surgeon: Samuel Redding DO;  Location: [facility] ENDOSCOPY;  Service: Gastroenterology   • VA LAP,CHOLECYSTECTOMY N/A 2017    Procedure: CHOLECYSTECTOMY LAPAROSCOPIC, also umbillical hernia repair;  Surgeon: Pk العلي MD;  Location: [facility] OR;  Service: General          PT ASSESSMENT (last 72 hours)      PT Evaluation       17 1512       Rehab Evaluation    Document Type evaluation  -LINDSEY     Subjective Information agree to therapy  -LINDSEY     Patient Effort, Rehab Treatment good  -LINDSEY     Symptoms Noted During/After Treatment shortness of breath;significant change in vital signs  -LINDSEY     Symptoms Noted Comment Pt voiced her frustration with recent multiple hospitalizations. Pt stating she is not getting well before she leaves the hospital. PT allowed pt to vent. After  session, PT reported  "incident to RN. RN  reports pt has voiced her frustration to her as well.  -LINDSEY     General Information    Patient Profile Review yes  -LINDSEY     Onset of Illness/Injury or Date of Surgery Date 08/06/17  -LINDSEY     Referring Physician Dr. Chong Crespo  -LINDSEY     General Observations Pt supine; noted IV, O2 on 2L/min  -LINDSEY     Pertinent History Of Current Problem Pt admitted to [facility] with R pleural effusion with h/o recent laparoscopic with bile leak/abdominal fluid collection. Pt underwent R thorocentesis 8/11/2017  -LINDSEY     Precautions/Limitations oxygen therapy device and L/min;other (see comments)   vital signs;desats with O2 watch exercise tolerance  -LINDSEY     Prior Level of Function independent:;all household mobility;community mobility;ADL's;home management;driving;using stairs;work   had 2 other jobs in addition to babysitting 2 grandchildren  -LINDSEY     Equipment Currently Used at Home none  -LINDSEY     Plans/Goals Discussed With patient  -LINDSEY     Risks Reviewed patient:  -LINDSEY     Benefits Reviewed patient:  -LINDSEY     Barriers to Rehab --   pt frustrated with current condition  -LINDSEY     Living Environment    Lives With parent(s)   mother  -LINDSEY     Living Arrangements house  -LINDSEY     Home Accessibility ramps present at home  -LINDSEY     Transportation Available car  -LINDSEY     Living Environment Comment Prior pt able to complete her own IADL's  -LINDSEY     Clinical Impression    Date of Referral to PT 08/12/17  -LINDSEY     PT Diagnosis impaired mobility due to pleural effusion, deconditioning, s/p R thoracentesis  -LINDSEY     Prognosis per MD  -LINDSEY     Patient/Family Goals Statement return to PLOF   \"I want my life back\"  -LINDSEY     Criteria for Skilled Therapeutic Interventions Met yes  -LINDSEY     Rehab Potential good, to achieve stated therapy goals  -LINDSEY     Predicted Duration of Therapy Intervention (days/wks) 1-2 weeks  -LINDSEY     Vital Signs    Pre Systolic BP Rehab 100  -LINDSEY     Pre Treatment Diastolic BP 54  -LINDSEY     Pretreatment Heart Rate (beats/min) 76  " -     Posttreatment Heart Rate (beats/min) 78  -     Pre SpO2 (%) 96  -LINDSEY     O2 Delivery Pre Treatment supplemental O2   2L/min  -     Intra SpO2 (%) 88  -     O2 Delivery Intra Treatment supplemental O2   2L/min  -     Post SpO2 (%) 98  -     O2 Delivery Post Treatment supplemental O2  -LINDSEY     Pre Patient Position Supine  -LINDSEY     Intra Patient Position Standing  -LINDSEY     Post Patient Position Supine  -     Pain Assessment    Pain Assessment 0-10  -     Pain Score 5  -LINDSEY     Post Pain Score 5  -     Pain Location Other (Comment)   R side  -     Pain Intervention(s) Emotional support;Rest   reports had pain med  -     Vision Assessment/Intervention    Visual Impairment WFL with corrective lenses  -     Cognitive Assessment/Intervention    Current Cognitive/Communication Assessment functional  -     Orientation Status oriented x 4  -     Follows Commands/Answers Questions 100% of the time  -     Personal Safety WNL/WFL  -     ROM (Range of Motion)    General ROM no range of motion deficits identified  -     MMT (Manual Muscle Testing)    General MMT Assessment lower extremity strength deficits identified  -     General MMT Assessment Detail BUE: grossly 5/5  -     Lower Extremity    Lower Ext Manual Muscle Testing Detail BLE: 4/5 ankles/feet, knees. hips  -     Bed Mobility, Assessment/Treatment    Bed Mob, Supine to Sit, [place] independent  -     Bed Mob, Sit to Supine, [place] independent  -     Transfer Assessment/Treatment    Transfers, Sit-Stand [place] independent  -     Transfers, Stand-Sit [place] independent  -     Gait Assessment/Treatment    Gait, [place] Level supervision required  -     Gait, Distance (Feet) 300  -     Gait, Comment Pt very SOA during last 100ft;however, pt upset and  insisted she did not need rest break. O2 sats retaken and registered 88%. Recovered to 94% in 1 minute  -     Sensory Assessment/Intervention     Light Touch LUE;RUE;LLE;RLE  -LINDSEY     LUE Light Touch WNL  -LINDSEY     RUE Light Touch WNL  -LINDSEY     LLE Light Touch WNL  -LINDSEY     RLE Light Touch WNL  -LINDSEY     Edema Management    Edema Amount right:;left:;minimal  -LINDSEY     Positioning and Restraints    Pre-Treatment Position in bed  -LINDSEY     Post Treatment Position bed  -LINDSEY     In Bed call light within reach  -LINDSEY       User Key  (r) = Recorded By, (t) = Taken By, (c) = Cosigned By    Initials Name Provider Type    LINDSEY Sullivan PT Physical Therapist          Physical Therapy Education     Title: PT OT SLP Therapies (Active)     Topic: Physical Therapy (Active)     Point: Mobility training (Active)    Learning Progress Summary    Learner Readiness Method Response Comment Documented by Status   Patient Acceptance E NR   08/12/17 1726 Active               Point: Precautions (Active)    Learning Progress Summary    Learner Readiness Method Response Comment Documented by Status   Patient Acceptance E NR   08/12/17 1726 Active                      User Key     Initials Effective Dates Name Provider Type Discipline     10/17/16 -  Mey Sullivan PT Physical Therapist PT                PT Recommendation and Plan  Anticipated Discharge Disposition: home with assist (may benefit from pulmonary rehab)  Planned Therapy Interventions: gait training, stair training, transfer training, strengthening  PT Frequency: other (see comments) (5-14 times per week)  Plan of Care Review  Plan Of Care Reviewed With: patient  Outcome Summary/Follow up Plan: PT evaluation completed. Pt extremely frustrated and upset about recent complications and hospitalizations. Pt ambulated 300ft with SBA. O2 sats dropped to 88% on 2L/min and was extremely SOA . Pt was upset during walking and insisted she did not need to rest even with coaxing from PT. Function limited primarily by decreased strength and tolerance for functional mobility and activities. Pt will benefit from skilled PT to regain lost  function gently as/if pt weaned of O2.  If pt discharged prior to goals being met, anticipate pt will require assistance at home and may benefit from pulmonary rehab.          IP PT Goals       08/12/17 1727          Gait Training PT LTG    Gait Training Goal PT LTG, Date Established 08/12/17  -      Gait Training Goal PT LTG, Time to Achieve by discharge  -      Gait Training Goal PT LTG, [place] Level independent  -      Gait Training Goal PT LTG, Distance to Achieve [id number];O2 sats will not drop below 92%  -      Gait Training Goal PT LTG, Additional Goal [id number];O2 sats will not drop below 92%  -      Gait Training Goal PT LTG, Outcome goal ongoing  -      Stair Training PT LTG    Stair Training Goal PT LTG, Date Established 08/12/17  -      Stair Training Goal PT LTG, Time to Achieve by discharge  -      Stair Training Goal PT LTG, Number of Steps 3  -      Stair Training Goal PT LTG, Assist Device 1 handrail  -      Stair Training Goal PT LTG, Additional Goal O2 sats will not drop below 92%  -      Stair Training Goal PT LTG, Outcome goal ongoing  [facility]      Patient Education PT LTG    Patient Education PT LTG, Date Established 08/12/17  -      Patient Education PT LTG, Time to Achieve by discharge  -      Patient Education PT LTG, Education Type energy conservation;home safety  -      Patient Education PT LTG Outcome goal [facility]        User Key  (r) = Recorded By, (t) = Taken By, (c) = Cosigned By    Initials Name Provider Type    LINDSEY Sullivan, PT Physical Therapist                Outcome Measures       08/12/17 1512          How much help from another person do you currently need...    Turning from your back to your side while in flat bed without using bedrails? 4  -LINDSEY      Moving from lying on back to sitting on the side of a flat bed without bedrails? 4  -LINDSEY      Moving to and from a bed to a chair (including a wheelchair)? 4  -LINDSEY      Standing up from a chair using  your arms (e.g., wheelchair, bedside chair)? 4  -LINDSEY      Climbing 3-5 steps with a railing? 3  -LINDSEY      To walk in hospital room? 3  -LINDSEY      AM-PAC 6 Clicks Score 22  -      Functional Assessment    Outcome Measure Options AM-PAC 6 Clicks Basic Mobility (PT)  -LINDSEY        User Key  (r) = Recorded By, (t) = Taken By, (c) = Cosigned By    Initials Name Provider Type    LINDSEY Sullivan PT Physical Therapist           Time Calculation:         PT Charges       08/12/17 1739          Time Calculation    Start Time 1512  -LINDSEY      Stop Time 1544  -LINDSEY      Time Calculation (min) 32 min  -LINDSEY      PT Received On 08/12/17  -      PT Goal Re-Cert Due Date 08/25/17  -      Time Calculation- PT    Total Timed Code Minutes- PT 17 minute(s)  -LINDSEY        User Key  (r) = Recorded By, (t) = Taken By, (c) = Cosigned By    Initials Name Provider Type    LINDSEY Sullivan PT Physical Therapist          Therapy Charges for Today     Code Description Service Date Service Provider Modifiers Qty    52022242918 HC PT MOBILITY CURRENT 8/12/2017 Mey Sullivan, PT GP,  1    25719339276 HC PT MOBILITY PROJECTED 8/12/2017 Mey Sullivan, PT GP,  1    95652262402 HC PT EVAL MOD COMPLEXITY 1 8/12/2017 Mey Sullivan, PT GP 1    46347873373 HC GAIT TRAINING EA 15 MIN 8/12/2017 Mey Sullivan, PT GP 1          PT G-Codes  PT Professional Judgement Used?: Yes  Outcome Measure Options: AM-PAC 6 Clicks Basic Mobility (PT)  Score: 22  Functional Limitation: Mobility: Walking and moving around  Mobility: Walking and Moving Around Current Status (): At least 20 percent but less than 40 percent impaired, limited or restricted  Mobility: Walking and Moving Around Goal Status (): 0 percent impaired, limited or restricted      Mey Sullivan PT  8/12/2017

## 2017-08-12 NOTE — PLAN OF CARE
Problem: Patient Care Overview (Adult)  Goal: Plan of Care Review  Outcome: Ongoing (interventions implemented as appropriate)    08/12/17 1727   Coping/Psychosocial Response Interventions   Plan Of Care Reviewed With patient   Outcome Evaluation   Outcome Summary/Follow up Plan PT evaluation completed. Pt extremely frustrated and upset about recent complications and hospitalizations. Pt ambulated 300ft with SBA. O2 sats dropped to 88% on 2L/min and was extremely SOA . Pt was upset during walking and insisted she did not need to rest even with coaxing from PT. Function limited primarily by decreased strength and tolerance for functional mobility and activities. Pt will benefit from skilled PT to regain lost function gently as/if pt weaned of O2. If pt discharged prior to goals being met, anticipate pt will require assistance at home and may benefit from pulmonary rehab.         Problem: Inpatient Physical Therapy  Goal: Gait Training Goal LTG- PT  Outcome: Ongoing (interventions implemented as appropriate)    08/12/17 1727   Gait Training PT LTG   Gait Training Goal PT LTG, Date Established 08/12/17   Gait Training Goal PT LTG, Time to Achieve by discharge   Gait Training Goal PT LTG, [place] Level independent   Gait Training Goal PT LTG, Distance to Achieve [id number];O2 sats will not drop below 92%   Gait Training Goal PT LTG, Additional Goal [id number];O2 sats will not drop below 92%   Gait Training Goal PT LTG, Outcome goal ongoing       Goal: Stair Training Goal LTG- PT  Outcome: Ongoing (interventions implemented as appropriate)    08/12/17 1727   Stair Training PT LTG   Stair Training Goal PT LTG, Date Established 08/12/17   Stair Training Goal PT LTG, Time to Achieve by discharge   Stair Training Goal PT LTG, Number of Steps 3   Stair Training Goal PT LTG, Assist Device 1 handrail   Stair Training Goal PT LTG, Additional Goal O2 sats will not drop below 92%   Stair Training Goal PT LTG, Outcome goal ongoing        Goal: Patient Education Goal LTG- PT  Outcome: Ongoing (interventions implemented as appropriate)    08/12/17 1727   Patient Education PT LTG   Patient Education PT LTG, Date Established 08/12/17   Patient Education PT LTG, Time to Achieve by discharge   Patient Education PT LTG, Education Type energy conservation;home safety   Patient Education PT LTG Outcome goal ongoing

## 2017-08-12 NOTE — PLAN OF CARE
Problem: Patient Care Overview (Adult)  Goal: Plan of Care Review  Outcome: Ongoing (interventions implemented as appropriate)    08/12/17 1550   Coping/Psychosocial Response Interventions   Plan Of Care Reviewed With patient   Patient Care Overview   Progress no change   Outcome Evaluation   Outcome Summary/Follow up Plan pain controlled with PO meds, pt has no IV access ok by Dr. Crespo, pt ambulated halls today, unable to come off O2       Goal: Adult Individualization and Mutuality  Outcome: Ongoing (interventions implemented as appropriate)  Goal: Discharge Needs Assessment  Outcome: Ongoing (interventions implemented as appropriate)    Problem: Pain, Acute (Adult)  Goal: Acceptable Pain Control/Comfort Level  Outcome: Ongoing (interventions implemented as appropriate)

## 2017-08-12 NOTE — PROGRESS NOTES
GENERAL SURGERY PROGRESS NOTE  Chief Complaint:  Surgery Follow up   LOS: 4 days       Subjective     Interval History:     Still with some SOA and desats. Had thoracentesis yesterday.     Objective     Vital Signs  Temp:  [97.9 °F (36.6 °C)-99.5 °F (37.5 °C)] 98.8 °F (37.1 °C)  Heart Rate:  [58-79] 64  Resp:  [18-21] 18  BP: ()/(52-89) 90/52    Physical Exam:   Abdomen soft  Labs:  Lab Results (last 24 hours)     Procedure Component Value Units Date/Time    CBC & Differential [[id number]] Collected:  08/12/17 0557    Specimen:  Blood Updated:  08/12/17 0647    Narrative:       The following orders were created for panel order CBC & Differential.  Procedure                               Abnormality         Status                     ---------                               -----------         ------                     CBC Auto Differential[[id number]]        Abnormal            Final result                 Please view results for these tests on the individual orders.    CBC Auto Differential [[id number]]  (Abnormal) Collected:  08/12/17 0557    Specimen:  Blood Updated:  08/12/17 0647     WBC 10.24 (H) 10*3/mm3      RBC 3.67 (L) 10*6/mm3      Hemoglobin 10.0 (L) g/dL      Hematocrit 32.2 (L) %      MCV 87.7 fL      MCH 27.2 pg      MCHC 31.1 (L) g/dL      RDW 13.5 %      RDW-SD 43.5 fl      MPV 10.2 fL      Platelets 504 (H) 10*3/mm3      Neutrophil % 63.9 %      Lymphocyte % 21.9 %      Monocyte % 9.4 %      Eosinophil % 4.3 %      Basophil % 0.2 %      Immature Grans % 0.3 %      Neutrophils, Absolute 6.55 10*3/mm3      Lymphocytes, Absolute 2.24 10*3/mm3      Monocytes, Absolute 0.96 (H) 10*3/mm3      Eosinophils, Absolute 0.44 10*3/mm3      Basophils, Absolute 0.02 10*3/mm3      Immature Grans, Absolute 0.03 (H) 10*3/mm3     Body Fluid Culture [[id number]]  (Normal) Collected:  08/07/17 1405    Specimen:  Body Fluid from Liver Updated:  08/12/17 0842     BF Culture No growth at 5 days     Gram Stain Result  Rare (1+) WBCs per low power field      No organisms seen    Body Fluid Culture [[id number]]  (Normal) Collected:  08/07/17 1337    Specimen:  Body Fluid from Liver Updated:  08/12/17 0842     BF Culture No growth at 5 days     Gram Stain Result No WBCs per low power field      No organisms seen    Body Fluid Culture [[id number]]  (Normal) Collected:  08/11/17 1320    Specimen:  Body Fluid from Pleural Cavity Updated:  08/12/17 0905     BF Culture No growth at 24 hours     Gram Stain Result Many (4+) WBCs seen      No No organisms seen           Results Review:     Labs and imaging for today were reviewed.    Assessment/Plan     Maricarmen Ferris is a 47 y.o. female who is s/p lap pk, bile leak, pleural effusion      DC IV ABx  PO Pain meds  Ambulate, will get PT to assist.          This document has been electronically signed by Chong Crespo MD on August 12, 2017 11:35 AM        Chong Crespo MD  08/12/17  11:35 AM

## 2017-08-13 LAB
BASOPHILS # BLD AUTO: 0.03 10*3/MM3 (ref 0–0.2)
BASOPHILS NFR BLD AUTO: 0.3 % (ref 0–2)
DEPRECATED RDW RBC AUTO: 43.2 FL (ref 36.4–46.3)
EOSINOPHIL # BLD AUTO: 0.43 10*3/MM3 (ref 0–0.7)
EOSINOPHIL NFR BLD AUTO: 4.1 % (ref 0–7)
ERYTHROCYTE [DISTWIDTH] IN BLOOD BY AUTOMATED COUNT: 13.3 % (ref 11.5–14.5)
HCT VFR BLD AUTO: 31.6 % (ref 35–45)
HGB BLD-MCNC: 9.9 G/DL (ref 12–15.5)
HOLD SPECIMEN: NORMAL
IMM GRANULOCYTES # BLD: 0.04 10*3/MM3 (ref 0–0.02)
IMM GRANULOCYTES NFR BLD: 0.4 % (ref 0–0.5)
LYMPHOCYTES # BLD AUTO: 2.55 10*3/MM3 (ref 0.6–4.2)
LYMPHOCYTES NFR BLD AUTO: 24.5 % (ref 10–50)
MCH RBC QN AUTO: 27.3 PG (ref 26.5–34)
MCHC RBC AUTO-ENTMCNC: 31.3 G/DL (ref 31.4–36)
MCV RBC AUTO: 87.3 FL (ref 80–98)
MONOCYTES # BLD AUTO: 0.95 10*3/MM3 (ref 0–0.9)
MONOCYTES NFR BLD AUTO: 9.1 % (ref 0–12)
NEUTROPHILS # BLD AUTO: 6.39 10*3/MM3 (ref 2–8.6)
NEUTROPHILS NFR BLD AUTO: 61.6 % (ref 37–80)
PLATELET # BLD AUTO: 506 10*3/MM3 (ref 150–450)
PMV BLD AUTO: 10.3 FL (ref 8–12)
RBC # BLD AUTO: 3.62 10*6/MM3 (ref 3.77–5.16)
WBC NRBC COR # BLD: 10.39 10*3/MM3 (ref 3.2–9.8)

## 2017-08-13 PROCEDURE — 94799 UNLISTED PULMONARY SVC/PX: CPT

## 2017-08-13 PROCEDURE — 94760 N-INVAS EAR/PLS OXIMETRY 1: CPT

## 2017-08-13 PROCEDURE — 25010000002 ENOXAPARIN PER 10 MG: Performed by: SURGERY

## 2017-08-13 PROCEDURE — 25010000002 MORPHINE PER 10 MG: Performed by: SURGERY

## 2017-08-13 PROCEDURE — 97116 GAIT TRAINING THERAPY: CPT

## 2017-08-13 PROCEDURE — 99024 POSTOP FOLLOW-UP VISIT: CPT | Performed by: SURGERY

## 2017-08-13 PROCEDURE — 85025 COMPLETE CBC W/AUTO DIFF WBC: CPT | Performed by: SURGERY

## 2017-08-13 RX ADMIN — MORPHINE SULFATE 4 MG: 4 INJECTION, SOLUTION INTRAMUSCULAR; INTRAVENOUS at 12:05

## 2017-08-13 RX ADMIN — MORPHINE SULFATE 4 MG: 4 INJECTION, SOLUTION INTRAMUSCULAR; INTRAVENOUS at 20:31

## 2017-08-13 RX ADMIN — HYDROCODONE BITARTRATE AND ACETAMINOPHEN 2 TABLET: 7.5; 325 TABLET ORAL at 16:12

## 2017-08-13 RX ADMIN — IPRATROPIUM BROMIDE AND ALBUTEROL SULFATE 3 ML: 2.5; .5 SOLUTION RESPIRATORY (INHALATION) at 07:30

## 2017-08-13 RX ADMIN — MORPHINE SULFATE 4 MG: 4 INJECTION, SOLUTION INTRAMUSCULAR; INTRAVENOUS at 04:01

## 2017-08-13 RX ADMIN — HYDROCODONE BITARTRATE AND ACETAMINOPHEN 2 TABLET: 7.5; 325 TABLET ORAL at 23:14

## 2017-08-13 RX ADMIN — OXYBUTYNIN CHLORIDE 5 MG: 5 TABLET ORAL at 08:49

## 2017-08-13 RX ADMIN — HYDROCODONE BITARTRATE AND ACETAMINOPHEN 2 TABLET: 7.5; 325 TABLET ORAL at 01:35

## 2017-08-13 RX ADMIN — PANTOPRAZOLE SODIUM 40 MG: 40 TABLET, DELAYED RELEASE ORAL at 08:49

## 2017-08-13 RX ADMIN — IPRATROPIUM BROMIDE AND ALBUTEROL SULFATE 3 ML: 2.5; .5 SOLUTION RESPIRATORY (INHALATION) at 19:00

## 2017-08-13 RX ADMIN — HYDROCODONE BITARTRATE AND ACETAMINOPHEN 2 TABLET: 7.5; 325 TABLET ORAL at 09:39

## 2017-08-13 RX ADMIN — OXYBUTYNIN CHLORIDE 5 MG: 5 TABLET ORAL at 17:03

## 2017-08-13 RX ADMIN — IPRATROPIUM BROMIDE AND ALBUTEROL SULFATE 3 ML: 2.5; .5 SOLUTION RESPIRATORY (INHALATION) at 13:22

## 2017-08-13 RX ADMIN — BUPROPION HYDROCHLORIDE 100 MG: 100 TABLET, FILM COATED ORAL at 08:50

## 2017-08-13 RX ADMIN — ENOXAPARIN SODIUM 40 MG: 40 INJECTION SUBCUTANEOUS at 08:49

## 2017-08-13 RX ADMIN — SERTRALINE HYDROCHLORIDE 50 MG: 50 TABLET ORAL at 08:49

## 2017-08-13 RX ADMIN — BUPROPION HYDROCHLORIDE 100 MG: 100 TABLET, FILM COATED ORAL at 17:02

## 2017-08-13 NOTE — PLAN OF CARE
Problem: Patient Care Overview (Adult)  Goal: Discharge Needs Assessment  Outcome: Ongoing (interventions implemented as appropriate)    08/09/17 1457 08/12/17 1512 08/12/17 1550   Discharge Needs Assessment   Concerns To Be Addressed --  --  no discharge needs identified   Readmission Within The Last 30 Days other (see comments)  (Restorationist transferred to [facility] Patient Readmitted after discharge from [facility] ) --  --    Current Discharge Risk chronically ill --  --    Discharge Disposition still a patient --  --    Living Environment   Transportation Available --  car --    Self-Care   Equipment Currently Used at Home --  none --          Problem: Inpatient Physical Therapy  Goal: Gait Training Goal LTG- PT  Outcome: Ongoing (interventions implemented as appropriate)    08/12/17 1727 08/13/17 1103   Gait Training PT LTG   Gait Training Goal PT LTG, Date Established 08/12/17 --    Gait Training Goal PT LTG, Time to Achieve by discharge --    Gait Training Goal PT LTG, [place] Level independent --    Gait Training Goal PT LTG, Distance to Achieve [id number];O2 sats will not drop below 92% --    Gait Training Goal PT LTG, Additional Goal [id number];O2 sats will not drop below 92% --    Gait Training Goal PT LTG, Date Goal Reviewed --  08/13/17   Gait Training Goal PT LTG, Outcome --  goal not met       Goal: Stair Training Goal LTG- PT  Outcome: Ongoing (interventions implemented as appropriate)    08/12/17 1727 08/13/17 1103   Stair Training PT LTG   Stair Training Goal PT LTG, Date Established 08/12/17 --    Stair Training Goal PT LTG, Time to Achieve by discharge --    Stair Training Goal PT LTG, Number of Steps 3 --    Stair Training Goal PT LTG, Assist Device 1 handrail --    Stair Training Goal PT LTG, Additional Goal O2 sats will not drop below 92% --    Stair Training Goal PT LTG, Date Goal Reviewed --  08/13/17   Stair Training Goal PT LTG, Outcome --  goal not met       Goal: Patient Education Goal LTG-  PT  Outcome: Ongoing (interventions implemented as appropriate)    08/12/17 1727 08/13/17 1103   Patient Education PT LTG   Patient Education PT LTG, Date Established 08/12/17 --    Patient Education PT LTG, Time to Achieve by discharge --    Patient Education PT LTG, Education Type energy conservation;home safety --    Patient Education PT LTG, Date Goal Reviewed --  08/13/17   Patient Education PT LTG Outcome --  goal not met

## 2017-08-13 NOTE — PLAN OF CARE
Problem: Patient Care Overview (Adult)  Goal: Plan of Care Review  Outcome: Ongoing (interventions implemented as appropriate)    08/12/17 1550 08/13/17 1103   Coping/Psychosocial Response Interventions   Plan Of Care Reviewed With --  patient   Patient Care Overview   Progress no change --    Outcome Evaluation   Outcome Summary/Follow up Plan --  amb 82',90',112',66' w/o ad and sba of 1.sats 92-94% with shorter distances,t/f's ind-no new goals met-if d/c today would benefit from pulmonary rehab

## 2017-08-13 NOTE — PLAN OF CARE
Problem: Patient Care Overview (Adult)  Goal: Plan of Care Review  Outcome: Ongoing (interventions implemented as appropriate)  Regained iv access. Iv Morphine    08/12/17 1550 08/12/17 1069   Coping/Psychosocial Response Interventions   Plan Of Care Reviewed With --  patient   Patient Care Overview   Progress no change --     restarted.  Goal: Adult Individualization and Mutuality  Outcome: Ongoing (interventions implemented as appropriate)  Goal: Discharge Needs Assessment  Outcome: Ongoing (interventions implemented as appropriate)    Problem: Pain, Acute (Adult)  Goal: Acceptable Pain Control/Comfort Level  Outcome: Ongoing (interventions implemented as appropriate)

## 2017-08-13 NOTE — PROGRESS NOTES
GENERAL SURGERY PROGRESS NOTE  Chief Complaint:  Surgery Follow up   LOS: 5 days       Subjective     Interval History:     Feels a little. Had desats into the 80's yesterday while walking with PT. Complains of pain at site of thoracentesis.    Objective     Vital Signs  Temp:  [97.7 °F (36.5 °C)-100.7 °F (38.2 °C)] 97.7 °F (36.5 °C)  Heart Rate:  [57-74] 71  Resp:  [16-18] 16  BP: (117-132)/(58-74) 122/64    Physical Exam:   Abdomen soft  Labs:  Lab Results (last 24 hours)     Procedure Component Value Units Date/Time    CBC & Differential [[id number]] Collected:  08/13/17 0601    Specimen:  Blood Updated:  08/13/17 0705    Narrative:       The following orders were created for panel order CBC & Differential.  Procedure                               Abnormality         Status                     ---------                               -----------         ------                     CBC Auto Differential[[id number]]        Abnormal            Final result                 Please view results for these tests on the individual orders.    CBC Auto Differential [[id number]]  (Abnormal) Collected:  08/13/17 0601    Specimen:  Blood Updated:  08/13/17 0705     WBC 10.39 (H) 10*3/mm3      RBC 3.62 (L) 10*6/mm3      Hemoglobin 9.9 (L) g/dL      Hematocrit 31.6 (L) %      MCV 87.3 fL      MCH 27.3 pg      MCHC 31.3 (L) g/dL      RDW 13.3 %      RDW-SD 43.2 fl      MPV 10.3 fL      Platelets 506 (H) 10*3/mm3      Neutrophil % 61.6 %      Lymphocyte % 24.5 %      Monocyte % 9.1 %      Eosinophil % 4.1 %      Basophil % 0.3 %      Immature Grans % 0.4 %      Neutrophils, Absolute 6.39 10*3/mm3      Lymphocytes, Absolute 2.55 10*3/mm3      Monocytes, Absolute 0.95 (H) 10*3/mm3      Eosinophils, Absolute 0.43 10*3/mm3      Basophils, Absolute 0.03 10*3/mm3      Immature Grans, Absolute 0.04 (H) 10*3/mm3     Extra Tubes [[id number]] Collected:  08/13/17 0601    Specimen:  Blood from Blood, Venous Line Updated:  08/13/17 0802     Narrative:       The following orders were created for panel order Extra Tubes.  Procedure                               Abnormality         Status                     ---------                               -----------         ------                     Green Top (Gel)[[id number]]                                  Final result                 Please view results for these tests on the individual orders.    Green Top (Gel) [[id number]] Collected:  08/13/17 0601    Specimen:  Blood Updated:  08/13/17 0802     Extra Tube Hold for add-ons.      Auto resulted.       Body Fluid Culture [[id number]]  (Normal) Collected:  08/11/17 1320    Specimen:  Body Fluid from Pleural Cavity Updated:  08/13/17 0917     BF Culture No growth at 2 days     Gram Stain Result Many (4+) WBCs seen      No No organisms seen           Results Review:     Labs and imaging for today were reviewed.    Assessment/Plan     Maricarmen Ferris is a 47 y.o. female who is s/p lap pk with complications, bile leak, pleural effusion      Continue PT and encouraged OOB.   Pain control.  Pulmonary toilet.          This document has been electronically signed by Chong Crespo MD on August 13, 2017 10:37 AM        Chong Crespo MD  08/13/17  10:37 AM

## 2017-08-13 NOTE — THERAPY TREATMENT NOTE
Acute Care - Physical Therapy Treatment Note  [facility]     Patient Name: Maricarmen Ferris  : 1970  MRN: [id number]  Today's Date: 2017  Onset of Illness/Injury or Date of Surgery Date: 17  Date of Referral to PT: 17  Referring Physician: Dr. Chong Crespo    Admit Date: 2017    Visit Dx:    ICD-10-CM ICD-9-CM   1. Abdominal fluid collection R18.8 789.59   2. Pleural effusion J90 511.9   3. Impaired physical mobility Z74.09 781.99     Patient Active Problem List   Diagnosis   • Chronic cholecystitis   • Umbilical hernia without obstruction and without gangrene   • Abdominal pain   • Abdominal fluid collection   • Intra-hepatic bile leak               Adult Rehabilitation Note       17 1103          Rehab Assessment/Intervention    Discipline physical therapy assistant  -LN      Document Type therapy note (daily note)  -LN      Subjective Information agree to therapy;complains of;pain  -LN      Precautions/Limitations oxygen therapy device and L/min   vs,desats with 02,watch ex tolerance  -LN      Recorded by [LN] Ele Plascencia PTA      Vital Signs    Pre Systolic BP Rehab 115  -LN      Pre Treatment Diastolic BP 55  -LN      Post Systolic BP Rehab 127  -LN      Post Treatment Diastolic BP 66  -LN      Pretreatment Heart Rate (beats/min) 65  -LN      Intratreatment Heart Rate (beats/min) 82  -LN      Posttreatment Heart Rate (beats/min) 69  -LN      Pre SpO2 (%) 97  -LN      O2 Delivery Pre Treatment supplemental O2  -LN      Intra SpO2 (%) --   92-94% with gt  -LN      Post SpO2 (%) 95  -LN      Pre Patient Position Sitting  -LN      Intra Patient Position Standing  -LN      Post Patient Position Sitting  -LN      Recorded by [LN] Ele Plascencia PTA      Pain Assessment    Pain Assessment 0-10  -LN      Pain Score 5  -LN      Post Pain Score 7  -LN      Pain Type Acute pain;Surgical pain  -LN      Pain Location Rib cage  -LN      Pain Orientation Right  -LN       Recorded by [LN] Ele Plascencia PTA      Cognitive Assessment/Intervention    Orientation Status oriented to;person     -LN      Recorded by [LN] Ele Plascencia, PTA      Bed Mobility, Assessment/Treatment    Bed Mob, Supine to Sit, [place] not tested  -LN      Bed Mob, Sit to Supine, [place] not tested  -LN      Recorded by [LN] Ele Plascencia, PTA      Transfer Assessment/Treatment    Transfers, Sit-Stand [place] independent  -LN      Transfers, Stand-Sit [place] independent  -LN      Transfers, Sit-Stand-Sit, Assist Device --   none  -LN      Recorded by [LN] Ele Plascencia, PTA      Gait Assessment/Treatment    Gait, [place] Level supervision required  -LN      Gait, Assistive Device --   none  -LN      Gait, Distance (Feet) 82   90',112',66'  -LN      Gait, Comment --   pt able to stop/rest when becoming soa w/o v.c's  -LN      Recorded by [LN] Ele Plascencia, PTA      Positioning and Restraints    Post Treatment Position bed  -LN      In Bed sitting EOB;call light within reach;with family/caregiver  -LN      Recorded by [LN] Ele Plascencia, RICHELLE        User Key  (r) = Recorded By, (t) = Taken By, (c) = Cosigned By    Initials Name Effective Dates    LN Ele Plascencia, RICHELLE 10/17/16 -                 IP PT Goals       17 1103 17 1727       Gait Training PT LTG    Gait Training Goal PT LTG, Date Established  17  -     Gait Training Goal PT LTG, Time to Achieve  by discharge  -     Gait Training Goal PT LTG, [place] Level  independent  -     Gait Training Goal PT LTG, Distance to Achieve  [id number];O2 sats will not drop below 92%  -     Gait Training Goal PT LTG, Additional Goal  [id number];O2 sats will not drop below 92%  -     Gait Training Goal PT LTG, Date Goal Reviewed 17  -LN      Gait Training Goal PT LTG, Outcome goal not met  - goal ongoing  -     Stair Training PT LTG    Stair Training Goal PT LTG, Date Established  17  -     Stair Training Goal  PT LTG, Time to Achieve  by discharge  -     Stair Training Goal PT LTG, Number of Steps  3  -     Stair Training Goal PT LTG, Assist Device  1 handrail  -     Stair Training Goal PT LTG, Additional Goal  O2 sats will not drop below 92%  -     Stair Training Goal PT LTG, Date Goal Reviewed 08/13/17  -      Stair Training Goal PT LTG, Outcome goal not met  -LN goal ongoing  -     Patient Education PT LTG    Patient Education PT LTG, Date Established  08/12/17  -     Patient Education PT LTG, Time to Achieve  by discharge  -     Patient Education PT LTG, Education Type  energy conservation;home safety  -     Patient Education PT LTG, Date Goal Reviewed 08/13/17  -      Patient Education PT LTG Outcome goal not met  -LN goal ongoing  -       User Key  (r) = Recorded By, (t) = Taken By, (c) = Cosigned By    Initials Name Provider Type    LINDSEY Sullivan, PT Physical Therapist    LN Ele Plascencia, PTA Physical Therapy Assistant          Physical Therapy Education     Title: PT OT SLP Therapies (Active)     Topic: Physical Therapy (Active)     Point: Mobility training (Active)    Learning Progress Summary    Learner Readiness Method Response Comment Documented by Status   Patient Acceptance E NR   08/13/17 1247 Active    Acceptance E NR   08/12/17 1726 Active               Point: Precautions (Active)    Learning Progress Summary    Learner Readiness Method Response Comment Documented by Status   Patient Acceptance E NR  LN 08/13/17 1247 Active    Acceptance E NR   08/12/17 1726 Active                      User Key     Initials Effective Dates Name Provider Type Discipline     10/17/16 -  Mey Sullivan, PT Physical Therapist PT     10/17/16 -  Ele Plascencia, RICHELLE Physical Therapy Assistant PT                    PT Recommendation and Plan  Anticipated Discharge Disposition: home with assist (may benefit from pulmonary rehab)  Planned Therapy Interventions: gait training, stair training,  transfer training, strengthening  PT Frequency: other (see comments) (5-14 times per week)  Plan of Care Review  Plan Of Care Reviewed With: patient  Outcome Summary/Follow up Plan: amb 82',90',112',66' w/o ad and sba of 1.sats 92-94% with shorter distances,t/f's ind-no new goals met-if d/c today would benefit from pulmonary rehab          Outcome Measures       08/13/17 1103 08/12/17 1512       How much help from another person do you currently need...    Turning from your back to your side while in flat bed without using bedrails? 4  -LN 4  -LINDSEY     Moving from lying on back to sitting on the side of a flat bed without bedrails? 4  -LN 4  -LINDSEY     Moving to and from a bed to a chair (including a wheelchair)? 4  -LN 4  -LINDSEY     Standing up from a chair using your arms (e.g., wheelchair, bedside chair)? 4  -LN 4  -LINDSEY     Climbing 3-5 steps with a railing? 3  -LN 3  -LINDSEY     To walk in hospital room? 3  -LN 3  -LINDSEY     AM-PAC 6 Clicks Score 22  -LN 22  -LINDSEY     Functional Assessment    Outcome Measure Options AM-PAC 6 Clicks Basic Mobility (PT)  -LN AM-PAC 6 Clicks Basic Mobility (PT)  -LINDSEY       User Key  (r) = Recorded By, (t) = Taken By, (c) = Cosigned By    Initials Name Provider Type    LINDSEY Sullivan, PT Physical Therapist    DAX Plascencia PTA Physical Therapy Assistant           Time Calculation:         PT Charges       08/13/17 1103          Time Calculation    Start Time 1103  -LN      Stop Time 1120  -LN      Time Calculation (min) 17 min  -LN      PT Received On 08/13/17  -LN      Time Calculation- PT    Total Timed Code Minutes- PT 17 minute(s)  -LN        User Key  (r) = Recorded By, (t) = Taken By, (c) = Cosigned By    Initials Name Provider Type    DAX Plascencia PTA Physical Therapy Assistant          Therapy Charges for Today     Code Description Service Date Service Provider Modifiers Qty    11316006747 HC GAIT TRAINING EA 15 MIN 8/13/2017 Ele Plascencia PTA GP 1          PT G-Codes  PT Professional  Judgement Used?: Yes  Outcome Measure Options: AM-PAC 6 Clicks Basic Mobility (PT)  Score: 22  Functional Limitation: Mobility: Walking and moving around  Mobility: Walking and Moving Around Current Status (): At least 20 percent but less than 40 percent impaired, limited or restricted  Mobility: Walking and Moving Around Goal Status (): 0 percent impaired, limited or restricted    Ele Plascencia, PTA  8/13/2017

## 2017-08-13 NOTE — PLAN OF CARE
Problem: Patient Care Overview (Adult)  Goal: Plan of Care Review  Outcome: Ongoing (interventions implemented as appropriate)    08/13/17 1310   Coping/Psychosocial Response Interventions   Plan Of Care Reviewed With patient   Patient Care Overview   Progress no change   Outcome Evaluation   Outcome Summary/Follow up Plan pt vss and pain controlled       Goal: Adult Individualization and Mutuality  Outcome: Ongoing (interventions implemented as appropriate)  Goal: Discharge Needs Assessment  Outcome: Ongoing (interventions implemented as appropriate)    Problem: Pain, Acute (Adult)  Goal: Acceptable Pain Control/Comfort Level  Outcome: Ongoing (interventions implemented as appropriate)

## 2017-08-14 LAB
BASOPHILS # BLD AUTO: 0.03 10*3/MM3 (ref 0–0.2)
BASOPHILS NFR BLD AUTO: 0.3 % (ref 0–2)
DEPRECATED RDW RBC AUTO: 43.6 FL (ref 36.4–46.3)
EOSINOPHIL # BLD AUTO: 0.41 10*3/MM3 (ref 0–0.7)
EOSINOPHIL NFR BLD AUTO: 4.3 % (ref 0–7)
ERYTHROCYTE [DISTWIDTH] IN BLOOD BY AUTOMATED COUNT: 13.7 % (ref 11.5–14.5)
HCT VFR BLD AUTO: 32.2 % (ref 35–45)
HGB BLD-MCNC: 10.6 G/DL (ref 12–15.5)
HOLD SPECIMEN: NORMAL
IMM GRANULOCYTES # BLD: 0.06 10*3/MM3 (ref 0–0.02)
IMM GRANULOCYTES NFR BLD: 0.6 % (ref 0–0.5)
LYMPHOCYTES # BLD AUTO: 2.51 10*3/MM3 (ref 0.6–4.2)
LYMPHOCYTES NFR BLD AUTO: 26.2 % (ref 10–50)
MCH RBC QN AUTO: 28.3 PG (ref 26.5–34)
MCHC RBC AUTO-ENTMCNC: 32.9 G/DL (ref 31.4–36)
MCV RBC AUTO: 85.9 FL (ref 80–98)
MONOCYTES # BLD AUTO: 0.88 10*3/MM3 (ref 0–0.9)
MONOCYTES NFR BLD AUTO: 9.2 % (ref 0–12)
NEUTROPHILS # BLD AUTO: 5.68 10*3/MM3 (ref 2–8.6)
NEUTROPHILS NFR BLD AUTO: 59.4 % (ref 37–80)
PLATELET # BLD AUTO: 593 10*3/MM3 (ref 150–450)
PMV BLD AUTO: 9.4 FL (ref 8–12)
RBC # BLD AUTO: 3.75 10*6/MM3 (ref 3.77–5.16)
WBC NRBC COR # BLD: 9.57 10*3/MM3 (ref 3.2–9.8)

## 2017-08-14 PROCEDURE — 25010000002 ENOXAPARIN PER 10 MG: Performed by: SURGERY

## 2017-08-14 PROCEDURE — 94799 UNLISTED PULMONARY SVC/PX: CPT

## 2017-08-14 PROCEDURE — 25010000002 MORPHINE PER 10 MG: Performed by: SURGERY

## 2017-08-14 PROCEDURE — 99024 POSTOP FOLLOW-UP VISIT: CPT | Performed by: SURGERY

## 2017-08-14 PROCEDURE — 99232 SBSQ HOSP IP/OBS MODERATE 35: CPT | Performed by: INTERNAL MEDICINE

## 2017-08-14 PROCEDURE — 25010000002 FUROSEMIDE PER 20 MG: Performed by: INTERNAL MEDICINE

## 2017-08-14 PROCEDURE — 97530 THERAPEUTIC ACTIVITIES: CPT

## 2017-08-14 PROCEDURE — 94760 N-INVAS EAR/PLS OXIMETRY 1: CPT

## 2017-08-14 PROCEDURE — 85025 COMPLETE CBC W/AUTO DIFF WBC: CPT | Performed by: SURGERY

## 2017-08-14 RX ORDER — ECHINACEA PURPUREA EXTRACT 125 MG
1 TABLET ORAL AS NEEDED
Status: DISCONTINUED | OUTPATIENT
Start: 2017-08-14 | End: 2017-08-16 | Stop reason: HOSPADM

## 2017-08-14 RX ORDER — FUROSEMIDE 10 MG/ML
20 INJECTION INTRAMUSCULAR; INTRAVENOUS EVERY 12 HOURS
Status: DISCONTINUED | OUTPATIENT
Start: 2017-08-14 | End: 2017-08-15

## 2017-08-14 RX ORDER — IPRATROPIUM BROMIDE AND ALBUTEROL SULFATE 2.5; .5 MG/3ML; MG/3ML
3 SOLUTION RESPIRATORY (INHALATION) EVERY 4 HOURS PRN
Status: DISCONTINUED | OUTPATIENT
Start: 2017-08-14 | End: 2017-08-14

## 2017-08-14 RX ADMIN — MORPHINE SULFATE 4 MG: 4 INJECTION, SOLUTION INTRAMUSCULAR; INTRAVENOUS at 18:01

## 2017-08-14 RX ADMIN — PANTOPRAZOLE SODIUM 40 MG: 40 TABLET, DELAYED RELEASE ORAL at 08:42

## 2017-08-14 RX ADMIN — ENOXAPARIN SODIUM 40 MG: 40 INJECTION SUBCUTANEOUS at 08:43

## 2017-08-14 RX ADMIN — MORPHINE SULFATE 4 MG: 4 INJECTION, SOLUTION INTRAMUSCULAR; INTRAVENOUS at 01:20

## 2017-08-14 RX ADMIN — BUPROPION HYDROCHLORIDE 100 MG: 100 TABLET, FILM COATED ORAL at 08:42

## 2017-08-14 RX ADMIN — MORPHINE SULFATE 4 MG: 4 INJECTION, SOLUTION INTRAMUSCULAR; INTRAVENOUS at 10:29

## 2017-08-14 RX ADMIN — HYDROCODONE BITARTRATE AND ACETAMINOPHEN 2 TABLET: 7.5; 325 TABLET ORAL at 12:55

## 2017-08-14 RX ADMIN — FUROSEMIDE 20 MG: 10 INJECTION, SOLUTION INTRAVENOUS at 10:29

## 2017-08-14 RX ADMIN — FUROSEMIDE 20 MG: 10 INJECTION, SOLUTION INTRAVENOUS at 18:00

## 2017-08-14 RX ADMIN — HYDROCODONE BITARTRATE AND ACETAMINOPHEN 2 TABLET: 7.5; 325 TABLET ORAL at 21:36

## 2017-08-14 RX ADMIN — HYDROCODONE BITARTRATE AND ACETAMINOPHEN 2 TABLET: 7.5; 325 TABLET ORAL at 05:11

## 2017-08-14 RX ADMIN — IPRATROPIUM BROMIDE AND ALBUTEROL SULFATE 3 ML: 2.5; .5 SOLUTION RESPIRATORY (INHALATION) at 07:24

## 2017-08-14 RX ADMIN — MORPHINE SULFATE 4 MG: 4 INJECTION, SOLUTION INTRAMUSCULAR; INTRAVENOUS at 23:51

## 2017-08-14 RX ADMIN — OXYBUTYNIN CHLORIDE 5 MG: 5 TABLET ORAL at 18:01

## 2017-08-14 RX ADMIN — OXYBUTYNIN CHLORIDE 5 MG: 5 TABLET ORAL at 08:42

## 2017-08-14 RX ADMIN — IPRATROPIUM BROMIDE AND ALBUTEROL SULFATE 3 ML: 2.5; .5 SOLUTION RESPIRATORY (INHALATION) at 00:59

## 2017-08-14 RX ADMIN — BUPROPION HYDROCHLORIDE 100 MG: 100 TABLET, FILM COATED ORAL at 18:01

## 2017-08-14 RX ADMIN — SERTRALINE HYDROCHLORIDE 50 MG: 50 TABLET ORAL at 08:42

## 2017-08-14 NOTE — PLAN OF CARE
Problem: Patient Care Overview (Adult)  Goal: Plan of Care Review  Outcome: Ongoing (interventions implemented as appropriate)    08/14/17 1247   Coping/Psychosocial Response Interventions   Plan Of Care Reviewed With patient   Patient Care Overview   Progress no change   Outcome Evaluation   Outcome Summary/Follow up Plan working on pain control       Goal: Adult Individualization and Mutuality  Outcome: Ongoing (interventions implemented as appropriate)  Goal: Discharge Needs Assessment  Outcome: Ongoing (interventions implemented as appropriate)    Problem: Pain, Acute (Adult)  Goal: Acceptable Pain Control/Comfort Level  Outcome: Ongoing (interventions implemented as appropriate)

## 2017-08-14 NOTE — PLAN OF CARE
Problem: Patient Care Overview (Adult)  Goal: Plan of Care Review  Outcome: Ongoing (interventions implemented as appropriate)    08/14/17 1240   Coping/Psychosocial Response Interventions   Plan Of Care Reviewed With patient   Patient Care Overview   Progress progress toward functional goals as expected   Outcome Evaluation   Outcome Summary/Follow up Plan pt responded well to therapy. tx focused on gait endurance while montioring vitals. pt is indep w/ gait but becomes SOA easily and sometimes requires standing rests. pt O2 never dropped below 93% this tx showing improvement. 1 goal partially met this tx. pt would continue to benefit from PT services.        Goal: Discharge Needs Assessment  Outcome: Ongoing (interventions implemented as appropriate)    08/09/17 1457 08/12/17 1512 08/14/17 1247   Discharge Needs Assessment   Concerns To Be Addressed --  --  no discharge needs identified   Readmission Within The Last 30 Days other (see comments)  ([facility] transferred to [facility]. Patient Readmitted after discharge from [facility] ) --  --    Current Discharge Risk chronically ill --  --    Discharge Disposition still a patient --  --    Living Environment   Transportation Available --  car --    Self-Care   Equipment Currently Used at Home --  none --          Problem: Inpatient Physical Therapy  Goal: Gait Training Goal LTG- PT  Outcome: Ongoing (interventions implemented as appropriate)    08/12/17 1727 08/14/17 1240   Gait Training PT LTG   Gait Training Goal PT LTG, Date Established 08/12/17 --    Gait Training Goal PT LTG, Time to Achieve by discharge --    Gait Training Goal PT LTG, [place] Level independent --    Gait Training Goal PT LTG, Distance to Achieve [id number];O2 sats will not drop below 92% --    Gait Training Goal PT LTG, Additional Goal [id number];O2 sats will not drop below 92% --    Gait Training Goal PT LTG, Date Goal Reviewed --  08/14/17   Gait Training Goal PT LTG, Outcome --  goal partially  met       Goal: Stair Training Goal LTG- PT  Outcome: Ongoing (interventions implemented as appropriate)    08/12/17 1727 08/14/17 1240   Stair Training PT LTG   Stair Training Goal PT LTG, Date Established 08/12/17 --    Stair Training Goal PT LTG, Time to Achieve by discharge --    Stair Training Goal PT LTG, Number of Steps 3 --    Stair Training Goal PT LTG, Assist Device 1 handrail --    Stair Training Goal PT LTG, Additional Goal O2 sats will not drop below 92% --    Stair Training Goal PT LTG, Date Goal Reviewed --  08/14/17   Stair Training Goal PT LTG, Outcome --  goal ongoing       Goal: Patient Education Goal LTG- PT  Outcome: Ongoing (interventions implemented as appropriate)    08/12/17 1727 08/13/17 1103 08/14/17 1240   Patient Education PT LTG   Patient Education PT LTG, Date Established 08/12/17 --  --    Patient Education PT LTG, Time to Achieve by discharge --  --    Patient Education PT LTG, Education Type energy conservation;home safety --  --    Patient Education PT LTG, Date Goal Reviewed --  --  08/14/17   Patient Education PT LTG Outcome --  goal not met --

## 2017-08-14 NOTE — CONSULTS
"PULMONARY CONSULT NOTE  Rachel Enciso MD    [address]  8/14/2017            Maricarmen Ferris  47 y.o. female  1970  [id number]            Requesting physician: Pk اللعي MD    Reason for Consultation:  Hypoxemic respiratory failure    CC: \"I can't breathe\"    Subjective     History of Present Illness:  Maricarmen Ferris is a 47 y.o. female with PMH significant for morbid obesity, ELSIE noncompliant with CPAP, hypertension, and GERD who was admitted on 8/6/17 with abdominal pain.  Patient has compensated recent history which started a few weeks ago when she was admitted for an elective cholecystectomy.  Her surgery was unremarkable, but she soon developed severe abdominal pain after discharge and had to be readmitted.  She was found to have a bile leak so a drain was placed.  An attempt was made to place a biliary stent, but was unsuccessful and the patient had to be transferred to [facility] in [place].  She did undergo successful stent placement at level was discharged home.  Unfortunately, she began experiencing recurrent abdominal pain and re-presented to the ED on 8/6/17.  During this time the patient reports onset of severe dyspnea on exertion as well as new orthopnea.  She denies any dyspnea or history of respiratory disease prior to current illness.  She did receive significant IV fluids during her multiple hospitalizations, although she is no longer receiving them.  Patient reports that she was diagnosed with apnea a while back, but no longer uses the CPAP as she did not find a comfortable.  Also, she was able to lose a significant amount of weight with the assistance of phentermine, but when this was stopped 2 months ago, she had some weight gain.  Prior to her surgery her weight was in the mid 250s, but most recently it is in the mid to 60s.  Currently, the patient is satting well on 1.5 L.  She did have a desat into the 70s on exertion this morning.  She is a never " smoker but was exposed to secondhand smoke when she was young from her parents.  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  Her grandfather did have black lung and pulmonary fibrosis, but there is no other lung disease in the family.  She did have an echocardiogram during her last hospitalization which showed diastolic dysfunction, but did not comment on the RVSP or pulmonary artery size.    Review of Systems:    Review of Systems   Constitutional: Positive for fatigue and unexpected weight change.   Respiratory: Positive for cough, chest tightness and shortness of breath. Negative for wheezing.    Cardiovascular: Positive for leg swelling. Negative for chest pain.        Orthopnea   Gastrointestinal: Positive for abdominal distention and abdominal pain.     All systems were reviewed and negative except as noted above in the HPI.    Home Meds:  Prescriptions Prior to Admission   Medication Sig Dispense Refill Last Dose   • buPROPion (WELLBUTRIN) 100 MG tablet Take 100 mg by mouth 2 (Two) Times a Day.   8/6/2017 at 1000   • lisinopril (PRINIVIL,ZESTRIL) 10 MG tablet Take 10 mg by mouth Daily.   8/6/2017 at 1000   • pantoprazole (PROTONIX) 40 MG EC tablet Take 40 mg by mouth Daily.   8/6/2017 at 1000   • sertraline (ZOLOFT) 50 MG tablet Take 50 mg by mouth Daily.   8/6/2017 at 1000   • oxybutynin (DITROPAN) 5 MG tablet Take 5 mg by mouth.          Inpatient Meds:    Current Facility-Administered Medications:   •  buPROPion (WELLBUTRIN) tablet 100 mg, 100 mg, Oral, BID, Abraham Varner MD, 100 mg at 08/14/17 0842  •  enoxaparin (LOVENOX) syringe 40 mg, 40 mg, Subcutaneous, Q24H, Pk العلي MD, 40 mg at 08/14/17 0843  •  furosemide (LASIX) injection 20 mg, 20 mg, Intravenous, Q12H, Rachel Enciso MD, 20 mg at 08/14/17 1029  •  HYDROcodone-acetaminophen (NORCO) 7.5-325 MG per tablet 2 tablet, 2 tablet, Oral, Q6H PRN, Pk العلي MD, 2 tablet at  08/14/17 0511  •  ipratropium-albuterol (DUO-NEB) nebulizer solution 3 mL, 3 mL, Nebulization, Q4H PRN, Rachel Enciso MD  •  Morphine sulfate (PF) injection 4 mg, 4 mg, Intravenous, Q3H PRN, Chong Crespo MD, 4 mg at 08/14/17 1029  •  ondansetron (ZOFRAN) injection 4 mg, 4 mg, Intravenous, Q6H PRN, Pk العلي MD, 4 mg at 08/07/17 1542  •  oxybutynin (DITROPAN) tablet 5 mg, 5 mg, Oral, BID, Abraham Varner MD, 5 mg at 08/14/17 0842  •  pantoprazole (PROTONIX) EC tablet 40 mg, 40 mg, Oral, Daily, Abraham Varner MD, 40 mg at 08/14/17 0842  •  sertraline (ZOLOFT) tablet 50 mg, 50 mg, Oral, Daily, Abraham Varner MD, 50 mg at 08/14/17 0842  •  sodium chloride (OCEAN) nasal spray 1 spray, 1 spray, Each Nare, PRN, Rachel Enciso MD  •  Insert peripheral IV, , , Once **AND** sodium chloride 0.9 % flush 10 mL, 10 mL, Intravenous, PRN, David Bunn MD, 10 mL at 08/12/17 1131    Allergies:  Allergies   Allergen Reactions   • Sulfa Antibiotics Anaphylaxis and GI Intolerance       Past Medical History:  Past Medical History:   Diagnosis Date   • Acid reflux    • Anxiety    • Depressed    • Hard to intubate    • Hypertension    • Sleep apnea        Past Surgical History:  Past Surgical History:   Procedure Laterality Date   • ABDOMINAL SURGERY     • CYSTOSCOPY     • ENDOMETRIAL ABLATION  2015   • LAPAROSCOPIC TUBAL LIGATION  1995   • CT ERCP DX COLLECTION SPECIMEN BRUSHING/WASHING Left 7/31/2017    Procedure: ENDOSCOPIC RETROGRADE CHOLANGIOPANCREATOGRAPHY;  Surgeon: Samuel Redding DO;  Location: [facility] ENDOSCOPY;  Service: Gastroenterology   • CT LAP,CHOLECYSTECTOMY N/A 7/24/2017    Procedure: CHOLECYSTECTOMY LAPAROSCOPIC, also umbillical hernia repair;  Surgeon: Pk العلي MD;  Location: [facility] OR;  Service: General        Social History:   Social History     Social History   • Marital status:      Spouse name: N/A   • Number of children: N/A   • Years of education: N/A     Occupational History   • Not on  file.     Social History Main Topics   • Smoking status: Never Smoker   • Smokeless tobacco: Never Used   • Alcohol use Yes      Comment: socially   • Drug use: No   • Sexual activity: Defer     Other Topics Concern   • Not on file     Social History Narrative       Family History:  History reviewed. No pertinent family history.    Objective     Vital Sign Min/Max for last 24 hours:  Temp  Min: 97 °F (36.1 °C)  Max: 99.1 °F (37.3 °C)   BP  Min: 133/69  Max: 150/80   Pulse  Min: 52  Max: 78   Resp  Min: 16  Max: 20   SpO2  Min: 93 %  Max: 99 %   Flow (L/min)  Min: 1.5  Max: 2   Weight  Min: 264 lb (120 kg)  Max: 264 lb (120 kg)     Physical Exam:  98.4 °F (36.9 °C) (Oral) 67 133/69 20 99% 264 lb (120 kg) Body mass index is 43.93 kg/(m^2).  Physical Exam   Constitutional: She is oriented to person, place, and time. Vital signs are normal. She appears well-developed and well-nourished.   Morbidly obese   HENT:   Head: Normocephalic and atraumatic.   Nose: Nose normal.   Mouth/Throat: Oropharynx is clear and moist and mucous membranes are normal.   Mallampati 4   Eyes: Conjunctivae, EOM and lids are normal.   Neck: Trachea normal. Neck supple. No thyroid mass present.   Cardiovascular: Normal rate, regular rhythm and normal heart sounds.  Exam reveals no gallop.    No murmur heard.  Pulmonary/Chest: Effort normal. No respiratory distress. She has decreased breath sounds in the right lower field and the left lower field. She has no wheezes. She has no rhonchi. She has no rales.   Shallow effort   Abdominal: Soft. Normal appearance and bowel sounds are normal. There is tenderness in the right upper quadrant.   Morbidly obese   Musculoskeletal:   1+ BLE pitting edema   Lymphadenopathy:        Head (right side): No submandibular adenopathy present.        Head (left side): No submandibular adenopathy present.     She has no cervical adenopathy.        Right: No supraclavicular adenopathy present.        Left: No  supraclavicular adenopathy present.   Neurological: She is alert and oriented to person, place, and time.   Skin: Skin is warm and dry. No cyanosis. Nails show no clubbing.   Psychiatric: She has a normal mood and affect. Her behavior is normal. Judgment normal. Cognition and memory are normal.       Data Review-     Labs: I personally reviewed latest laboratory results.  Lab Results (last 24 hours)     Procedure Component Value Units Date/Time    CBC Auto Differential [[id number]]  (Abnormal) Collected:  08/14/17 0531    Specimen:  Blood Updated:  08/14/17 0609     WBC 9.57 10*3/mm3      RBC 3.75 (L) 10*6/mm3      Hemoglobin 10.6 (L) g/dL      Hematocrit 32.2 (L) %      MCV 85.9 fL      MCH 28.3 pg      MCHC 32.9 g/dL      RDW 13.7 %      RDW-SD 43.6 fl      MPV 9.4 fL      Platelets 593 (H) 10*3/mm3      Neutrophil % 59.4 %      Lymphocyte % 26.2 %      Monocyte % 9.2 %      Eosinophil % 4.3 %      Basophil % 0.3 %      Immature Grans % 0.6 (H) %      Neutrophils, Absolute 5.68 10*3/mm3      Lymphocytes, Absolute 2.51 10*3/mm3      Monocytes, Absolute 0.88 10*3/mm3      Eosinophils, Absolute 0.41 10*3/mm3      Basophils, Absolute 0.03 10*3/mm3      Immature Grans, Absolute 0.06 (H) 10*3/mm3     Body Fluid Culture [[id number]]  (Normal) Collected:  08/11/17 1320    Specimen:  Body Fluid from Pleural Cavity Updated:  08/14/17 0612     BF Culture No growth at 3 days     Gram Stain Result Many (4+) WBCs seen      No No organisms seen    CBC & Differential [[id number]] Collected:  08/14/17 0531    Specimen:  Blood Updated:  08/14/17 0618    Narrative:       The following orders were created for panel order CBC & Differential.  Procedure                               Abnormality         Status                     ---------                               -----------         ------                     Scan Slide[[id number]]                                                                  CBC Auto Differential[[id number]]         Abnormal            Final result                 Please view results for these tests on the individual orders.    Extra Tubes [[id number]] Collected:  08/14/17 0531    Specimen:  Blood from Blood, Venous Line Updated:  08/14/17 0701    Narrative:       The following orders were created for panel order Extra Tubes.  Procedure                               Abnormality         Status                     ---------                               -----------         ------                     Green Top (Gel)[[id number]]                                  Final result                 Please view results for these tests on the individual orders.    Green Top (Gel) [[id number]] Collected:  08/14/17 0531    Specimen:  Blood Updated:  08/14/17 0701     Extra Tube Hold for add-ons.      Auto resulted.              Imaging: I personally visualized the relevant images of scans/x-rays performed.  Imaging Results (last 72 hours)     Procedure Component Value Units Date/Time    CT Guided Thoracentesis Right [[id number]] Collected:  08/11/17 1307     Updated:  08/11/17 1343    Narrative:       CT-guided thoracentesis.       CLINICAL INDICATION: Right-sided pleural effusion and right  basilar atelectasis.       COMPARISON: CT chest August 6, 2017.       TECHNIQUE: Noncontrast helical scanning with axial and coronal  reformations. Soft tissue, lung, liver, and bone windows  reviewed.    This exam was performed according to our departmental  dose-optimization program, which includes automated exposure  control, adjustment of the mA and/or kV according to patient size  and/or use of iterative reconstruction technique.    CHEST CT FINDINGS: Using CT guidance a path was selected for  thoracentesis.    A 5 Lao multipurpose drainage catheter was introduced into the  inferolateral right chest.    500 mL of clear yellowish fluid was aspirated without difficulty.  Fluid was sent to the laboratory for requested exams.    Following thoracentesis,  there is significant decrease in amount  of right-sided pleural fluid and improved aeration of the right  lower lobe. There is no pneumothorax.      Impression:       CONCLUSION: Technically successful and uneventful CT guided right  thoracentesis.    Electronically signed by:  David Monique MD  8/11/2017 1:42 PM CDT  Workstation: [id number]    XR Chest 1 View [[id number]] Collected:  08/11/17 1451     Updated:  08/11/17 1711    Narrative:         EXAM:         Radiograph(s), Chest   VIEWS:   Portable ; 1       DATE/TIME: 8/11/2017 5:04 PM CDT               INDICATION:     Pleural effusion follow-up, status post  thoracentesis      COMPARISON:   CXR: 8/10/17          FINDINGS:           - lines/tubes:     none     - cardiac:          size within normal limits         - mediastinum:  contour within normal limits         - lungs:          no focal air space process, pulmonary  interstitial edema, nodule(s)/mass             - pleura:          Interval decrease in size of the right pleural  fluid collection, no evidence of a pneumothorax.                  - osseous:          unremarkable for age                  - misc.:        Impression:       CONCLUSION:        1. Stable post procedural examination.                      Electronically signed by:  SUSANA Mijares MD  8/11/2017 5:10  PM CDT Workstation: [id number]          Assessment/Plan     Assessment:  # Acute hypoxic respiratory failure- likely a combination of hypervolemia, hypoventilation from obesity and abdominal surgery, and pulmonary hypertension  # Pulmonary hypertension as evidenced by enlarged PA on CTPA-likely secondary to untreated ELSIE, the patient did have exposure to phentermine  # Diastolic dysfunction  # Hypervolemia  # Status post cholecystectomy with subsequent bilioma and drainage  # Morbid obesity  # ELSIE/ OHS, noncompliant with CPAP  # Mild chronic hypercarbic respiratory failure with compensation  # Rhinitis due to oxygen  use      Recommendations:  -Wean O2 to keep sat >89%.  Goal is to wean off oxygen.  -Lasix 20 mg IV twice daily  -Follow daily weights and urine output  -Consider repeat echocardiogram with PAH protocol interpreted by Dr. Monson  -Stop scheduled duo nebs as there is no indication.  Okay to continue as needed.  -Nasal saline as needed  -Encouraged out of bed and frequent ambulation as tolerated to prevent deconditioning  -Adequate pain control  -Incentive spirometry  -Needs outpatient sleep study to reestablish with CPAP  -PPX: Lovenox    If we do not make much headway over the next couple days, will consult Dr. Monson for assistance with pulmonary hypertension.    Thank you for allowing me to participate in the care of Maricarmen Ferris. Please do not hesitate to contact me with any questions.     Total time spent: 73 minutes      This document has been electronically signed by Rachel Enciso MD on August 14, 2017 12:25 PM      [phone]    Dictated using Dragon

## 2017-08-14 NOTE — CONSULTS
"Adult Nutrition  Assessment    Patient Name:  Maricarmen Ferris  YOB: 1970  MRN: [id number]  Admit Date:  8/6/2017    Assessment Date:  8/14/2017          Reason for Assessment       08/14/17 1136    Reason for Assessment    Reason For Assessment/Visit per organizational policy;length of stay              Nutrition/Diet History       08/14/17 1137    Nutrition/Diet History    Typical Food/Fluid Intake Pt continues to have some discomfort in abdomen due to fluid build up, reports Lasix started.  Pt reports meals are going fine.            Anthropometrics       08/14/17 1137    Anthropometrics    Height 165.1 cm (65\")    Weight 120 kg (264 lb)    Anthropometrics (Special Considerations)    Height Used for Calculations 1.651 m (5' 5\")    Weight Used for Calculations 120 kg (264 lb)    RD Calculated     RD Calculated %     Ideal Body Weight (IBW)    Ideal Body Weight (IBW), Female 57.69    % Ideal Body Weight 208.01    Body Mass Index (BMI)    BMI (kg/m2) 44.02    BMI Grade greater than 40 - obesity grade III            Labs/Tests/Procedures/Meds       08/14/17 1139    Labs/Tests/Procedures/Meds    Labs/Tests Review Reviewed    Medication Review Reviewed, pertinent;Diuretic            Physical Findings       08/14/17 1139    Physical Findings/Assessment    Additional Documentation Physical Appearance (Group)    Physical Appearance    Overall Physical Appearance obese    Gastrointestinal abdominal distention;abdominal tenderness            Estimated/Assessed Needs       08/14/17 1139    Calculation Measurements    Weight Used For Calculations 120 kg (264 lb)    Height Used for Calculations 1.651 m (5' 5\")    Estimated/Assessed Energy Needs    Energy Need Method [place] Jeor    Age 47    RMR ([place]-St[place] Jeor Equation) 1833.38    Activity Factors ([place] St Jeor)  Out of bed, ambulatory  1.3    Total estimated needs ([place] St[place] Jeor) 2383-597=0415    Estimated/Assessed Protein Needs "    Weight Used for Protein Calculation 56.7 kg (125 lb)    Protein (gm/kg) 0.8    0.8 Gm Protein (gm) 45.36    Estimated Protein Range 57g    Estimated/Assessed Fluid Needs    Fluid Need Method RDA method    RDA Method (mL)  1883            Nutrition Prescription Ordered       08/14/17 1143    Nutrition Prescription PO    Current PO Diet Regular            Evaluation of Received Nutrient/Fluid Intake       08/14/17 1143    PO Evaluation    Number of Days PO Intake Evaluated --   5 days    % PO Intake             Comments:      Pt reports good appetite and has no food preferences or requests.  Continues to have some abdominal tenderness and fluid accumulation s/p lap cholecystectomy 3 weeks ago with complications of bile leakage.  Lasix ordered.  RD discussed wt loss and heart healthy diet with pt and her aunt who was present and had questions.  Also encouraged pt to limit fat intake since recent gallbladder surgery.  Pt BMI 44 compatible w/morbid obesity at 211% IBW 125lb.  RD will monitor.        Electronically signed by:  Claudia Galindo RD  08/14/17 11:44 AM

## 2017-08-14 NOTE — PAYOR COMM NOTE
"[facility]  [facility]ann [facility]  [phone]  [phone]    auth number [id number]    Raheem Ferris (47 y.o. Female)     Date of Birth Social Security Number Address Home Phone MRN    1970  [address]  [facility] [address] [phone] [id number]    Protestant Marital Status          Gnosticism        Admission Date Admission Type Admitting Provider Attending Provider Department, Room/Bed    8/6/17 Emergency Pk العلي MD Fife, Luke P, MD [facility] 3 EAST, 375/1    Discharge Date Discharge Disposition Discharge Destination                      Attending Provider: Pk العلي MD     Allergies:  Sulfa Antibiotics    Isolation:  None   Infection:  None   Code Status:  FULL    Ht:  65\" (165.1 cm)   Wt:  264 lb 4.8 oz (120 kg)    Admission Cmt:  None   Principal Problem:  None                Active Insurance as of 8/6/2017     Primary Coverage     Payor Plan Insurance Group Employer/Plan Group    [employer] [place] [employer] [place]ICA[place]      Payor Plan Address Payor Plan Phone Number Effective From Effective To    [address] [address] [phone] 3/3/2017     [place] [address]       Subscriber Name Subscriber Birth Date Member ID       RAHEEM FERRIS 1970 [id number]                 Emergency Contacts      (Rel.) Home Phone Work Phone Mobile Phone    Joyce Bolton (Mother) [phone]               Physician Progress Notes (last 72 hours) (Notes from 8/11/2017  8:33 AM through 8/14/2017  8:33 AM)      Chong Crespo MD at 8/12/2017 11:35 AM  Version 1 of 1         GENERAL SURGERY PROGRESS NOTE  Chief Complaint:  Surgery Follow up   LOS: 4 days       Subjective     Interval History:     Still with some SOA and desats. Had thoracentesis yesterday.     Objective     Vital Signs  Temp:  [97.9 °F (36.6 °C)-99.5 °F (37.5 °C)] 98.8 °F (37.1 °C)  Heart Rate:  [58-79] 64  Resp:  [18-21] 18  BP: " ()/(52-89) 90/52    Physical Exam:   Abdomen soft  Labs:  Lab Results (last 24 hours)     Procedure Component Value Units Date/Time    CBC & Differential [[id number]] Collected:  08/12/17 0557    Specimen:  Blood Updated:  08/12/17 0647    Narrative:       The following orders were created for panel order CBC & Differential.  Procedure                               Abnormality         Status                     ---------                               -----------         ------                     CBC Auto Differential[[id number]]        Abnormal            Final result                 Please view results for these tests on the individual orders.    CBC Auto Differential [[id number]]  (Abnormal) Collected:  08/12/17 0557    Specimen:  Blood Updated:  08/12/17 0647     WBC 10.24 (H) 10*3/mm3      RBC 3.67 (L) 10*6/mm3      Hemoglobin 10.0 (L) g/dL      Hematocrit 32.2 (L) %      MCV 87.7 fL      MCH 27.2 pg      MCHC 31.1 (L) g/dL      RDW 13.5 %      RDW-SD 43.5 fl      MPV 10.2 fL      Platelets 504 (H) 10*3/mm3      Neutrophil % 63.9 %      Lymphocyte % 21.9 %      Monocyte % 9.4 %      Eosinophil % 4.3 %      Basophil % 0.2 %      Immature Grans % 0.3 %      Neutrophils, Absolute 6.55 10*3/mm3      Lymphocytes, Absolute 2.24 10*3/mm3      Monocytes, Absolute 0.96 (H) 10*3/mm3      Eosinophils, Absolute 0.44 10*3/mm3      Basophils, Absolute 0.02 10*3/mm3      Immature Grans, Absolute 0.03 (H) 10*3/mm3     Body Fluid Culture [[id number]]  (Normal) Collected:  08/07/17 1405    Specimen:  Body Fluid from Liver Updated:  08/12/17 0842     BF Culture No growth at 5 days     Gram Stain Result Rare (1+) WBCs per low power field      No organisms seen    Body Fluid Culture [[id number]]  (Normal) Collected:  08/07/17 1337    Specimen:  Body Fluid from Liver Updated:  08/12/17 0842     BF Culture No growth at 5 days     Gram Stain Result No WBCs per low power field      No organisms seen    Body Fluid Culture [[id number]]   (Normal) Collected:  08/11/17 1320    Specimen:  Body Fluid from Pleural Cavity Updated:  08/12/17 0905     BF Culture No growth at 24 hours     Gram Stain Result Many (4+) WBCs seen      No No organisms seen           Results Review:     Labs and imaging for today were reviewed.    Assessment/Plan     Maricarmen Ferris is a 47 y.o. female who is s/p lap pk, bile leak, pleural effusion      DC IV ABx  PO Pain meds  Ambulate, will get PT to assist.          This document has been electronically signed by Chong Crespo MD on August 12, 2017 11:35 AM        Chong Crespo MD  08/12/17  11:35 AM           Electronically signed by Chong Crespo MD at 8/12/2017 11:36 AM      Chong Crespo MD at 8/13/2017 10:37 AM  Version 1 of 1         GENERAL SURGERY PROGRESS NOTE  Chief Complaint:  Surgery Follow up   LOS: 5 days       Subjective     Interval History:     Feels a little. Had desats into the 80's yesterday while walking with PT. Complains of pain at site of thoracentesis.    Objective     Vital Signs  Temp:  [97.7 °F (36.5 °C)-100.7 °F (38.2 °C)] 97.7 °F (36.5 °C)  Heart Rate:  [57-74] 71  Resp:  [16-18] 16  BP: (117-132)/(58-74) 122/64    Physical Exam:   Abdomen soft  Labs:  Lab Results (last 24 hours)     Procedure Component Value Units Date/Time    CBC & Differential [[id number]] Collected:  08/13/17 0601    Specimen:  Blood Updated:  08/13/17 0705    Narrative:       The following orders were created for panel order CBC & Differential.  Procedure                               Abnormality         Status                     ---------                               -----------         ------                     CBC Auto Differential[[id number]]        Abnormal            Final result                 Please view results for these tests on the individual orders.    CBC Auto Differential [[id number]]  (Abnormal) Collected:  08/13/17 0601    Specimen:  Blood Updated:  08/13/17  0705     WBC 10.39 (H) 10*3/mm3      RBC 3.62 (L) 10*6/mm3      Hemoglobin 9.9 (L) g/dL      Hematocrit 31.6 (L) %      MCV 87.3 fL      MCH 27.3 pg      MCHC 31.3 (L) g/dL      RDW 13.3 %      RDW-SD 43.2 fl      MPV 10.3 fL      Platelets 506 (H) 10*3/mm3      Neutrophil % 61.6 %      Lymphocyte % 24.5 %      Monocyte % 9.1 %      Eosinophil % 4.1 %      Basophil % 0.3 %      Immature Grans % 0.4 %      Neutrophils, Absolute 6.39 10*3/mm3      Lymphocytes, Absolute 2.55 10*3/mm3      Monocytes, Absolute 0.95 (H) 10*3/mm3      Eosinophils, Absolute 0.43 10*3/mm3      Basophils, Absolute 0.03 10*3/mm3      Immature Grans, Absolute 0.04 (H) 10*3/mm3     Extra Tubes [[id number]] Collected:  08/13/17 0601    Specimen:  Blood from Blood, Venous Line Updated:  08/13/17 0802    Narrative:       The following orders were created for panel order Extra Tubes.  Procedure                               Abnormality         Status                     ---------                               -----------         ------                     Green Top (Gel)[[id number]]                                  Final result                 Please view results for these tests on the individual orders.    Green Top (Gel) [[id number]] Collected:  08/13/17 0601    Specimen:  Blood Updated:  08/13/17 0802     Extra Tube Hold for add-ons.      Auto resulted.       Body Fluid Culture [[id number]]  (Normal) Collected:  08/11/17 1320    Specimen:  Body Fluid from Pleural Cavity Updated:  08/13/17 0917     BF Culture No growth at 2 days     Gram Stain Result Many (4+) WBCs seen      No No organisms seen           Results Review:     Labs and imaging for today were reviewed.    Assessment/Plan     Maricarmen Ferris is a 47 y.o. female who is s/p lap pk with complications, bile leak, pleural effusion      Continue PT and encouraged OOB.   Pain control.  Pulmonary toilet.          This document has been electronically signed by Chong Crespo MD  on August 13, 2017 10:37 AM        Chong Crespo MD  08/13/17  10:37 AM           Electronically signed by Chong Crespo MD at 8/13/2017 10:39 AM

## 2017-08-14 NOTE — PLAN OF CARE
Problem: Patient Care Overview (Adult)  Goal: Plan of Care Review  Outcome: Ongoing (interventions implemented as appropriate)    08/14/17 0450   Coping/Psychosocial Response Interventions   Plan Of Care Reviewed With patient   Patient Care Overview   Progress no change   Outcome Evaluation   Outcome Summary/Follow up Plan pt rested well during the night; working towards pain control       Goal: Adult Individualization and Mutuality  Outcome: Ongoing (interventions implemented as appropriate)  Goal: Discharge Needs Assessment  Outcome: Ongoing (interventions implemented as appropriate)    Problem: Pain, Acute (Adult)  Goal: Acceptable Pain Control/Comfort Level  Outcome: Ongoing (interventions implemented as appropriate)

## 2017-08-14 NOTE — THERAPY TREATMENT NOTE
Acute Care - Physical Therapy Treatment Note  [facility]     Patient Name: Maricarmen Ferris  : 1970  MRN: [id number]  Today's Date: 2017  Onset of Illness/Injury or Date of Surgery Date: 17  Date of Referral to PT: 17  Referring Physician: Dr. Chong Crespo    Admit Date: 2017    Visit Dx:    ICD-10-CM ICD-9-CM   1. Abdominal fluid collection R18.8 789.59   2. Pleural effusion J90 511.9   3. Impaired physical mobility Z74.09 781.99     Patient Active Problem List   Diagnosis   • Chronic cholecystitis   • Umbilical hernia without obstruction and without gangrene   • Abdominal pain   • Abdominal fluid collection   • Intra-hepatic bile leak               Adult Rehabilitation Note       17 1240 17 1103       Rehab Assessment/Intervention    Discipline physical therapy assistant  -LINDSEY physical therapy assistant  -LN     Document Type therapy note (daily note)  -LINDSEY therapy note (daily note)  -LN     Subjective Information agree to therapy  -LINDSEY agree to therapy;complains of;pain  -LN     Patient Effort, Rehab Treatment good  -LINDSEY      Symptoms Noted During/After Treatment shortness of breath;significant change in vital signs  -LINDSEY      Precautions/Limitations oxygen therapy device and L/min   vs, desats w/ O2, watch ex tolerance  -LINDSEY oxygen therapy device and L/min   vs,desats with 02,watch ex tolerance  -LN     Recorded by [LINDSEY] Tony Tom, PTA [LN] Ele Plascencia PTA     Vital Signs    Pre Systolic BP Rehab 144  -LINDSEY 115  -LN     Pre Treatment Diastolic BP 81  -LINDSEY 55  -LN     Post Systolic BP Rehab 133  -LINDSEY 127  -LN     Post Treatment Diastolic BP 77  -LINDSEY 66  -LN     Pretreatment Heart Rate (beats/min) 69  -LINDSEY 65  -LN     Intratreatment Heart Rate (beats/min) 90  -LINDSEY 82  -LN     Posttreatment Heart Rate (beats/min) 74  -LINDSEY 69  -LN     Pre SpO2 (%) 99  -LINDSEY 97  -LN     O2 Delivery Pre Treatment supplemental O2  -LINDSEY supplemental O2  -LN     Intra SpO2 (%) 93   after gait.  98% after 2nd gait trip.   -LINDSEY --   92-94% with gt  -LN     O2 Delivery Intra Treatment supplemental O2  -LINDSEY      Post SpO2 (%) 98  -LINDSEY 95  -LN     O2 Delivery Post Treatment supplemental O2  -LINDSEY      Pre Patient Position Supine   pt had been sitting up all morning. had just gotten to bed.  -LINDSEY Sitting  -LN     Intra Patient Position  Standing  -LN     Post Patient Position Supine  -LINDSEY Sitting  -LN     Recorded by [LINDSEY] Tony Tom PTA [LN] Ele Plascencia PTA     Pain Assessment    Pain Assessment 0-10  -LINDSEY 0-10  -LN     Pain Score 8  -LINDSEY 5  -LN     Post Pain Score 8  -LINDSEY 7  -LN     Pain Type Acute pain  -LINDSEY Acute pain;Surgical pain  -LN     Pain Location Rib cage  -LINDSEY Rib cage  -LN     Pain Orientation  Right  -LN     Pain Intervention(s) Medication (See MAR);Ambulation/increased activity  -LINDSEY      Recorded by [LINDSEY] Tony Tom PTA [LN] Ele Plascencia PTA     Vision Assessment/Intervention    Visual Impairment WFL with corrective lenses  -LINDSEY      Recorded by [LINDSEY] Tony Tom PTA      Cognitive Assessment/Intervention    Current Cognitive/Communication Assessment functional  -LINDSEY      Orientation Status oriented x 4  -LINDSEY oriented to;person     -LN     Follows Commands/Answers Questions 100% of the time  -LINDSEY      Personal Safety WNL/WFL  -LINDSEY      Recorded by [LINDSEY] Tony Tom PTA [LN] Ele Plascencia PTA     Bed Mobility, Assessment/Treatment    Bed Mobility, Assistive Device bed rails  -LINDSEY      Bed Mob, Supine to Sit, [place] conditional independence  -LINDSEY not tested  -LN     Bed Mob, Sit to Supine, Ge[place] conditional independence  -LINDSEY not tested  -LN     Recorded by [LINDSEY] Tony Tom PTA [LN] Ele Plascencia PTA     Transfer Assessment/Treatment    Transfers, Sit-Stand [place] independent  -LINDSEY independent  -LN     Transfers, Stand-Sit Ge[place] independent  -LINDSEY independent  -LN     Transfers, Sit-Stand-Sit, Assist Device  --   none  -LN     Recorded by [LINDSEY] Tony Tom PTA  [LN] Ele Plascencia PTA     Gait Assessment/Treatment    Gait, [place] Level independent  - supervision required  -LN     Gait, Assistive Device --   no AD  - --   none  -LN     Gait, Distance (Feet) 360   260 ft. 1st gait trip required multiple standing rests.   - 82   90',112',66'  -LN     Gait, Comment  --   pt able to stop/rest when becoming soa w/o v.c's  -LN     Recorded by [] Tony Tom PTA [LN] Ele Plascencia PTA     Positioning and Restraints    Pre-Treatment Position in bed  -      Post Treatment Position bed  - bed  -LN     In Bed supine;call light within reach;encouraged to call for assist;with family/caregiver   all needs met.   - sitting EOB;call light within reach;with family/caregiver  -LN     Recorded by [LINDSEY] Tony Tom PTA [LN] Ele Plascencia PTA       User Key  (r) = Recorded By, (t) = Taken By, (c) = Cosigned By    Initials Name Effective Dates     Tony Tom PTA 10/17/16 -     LN Ele Plascencia PTA 10/17/16 -                 IP PT Goals       08/14/17 1240 08/13/17 1103 08/12/17 1727    Gait Training PT LTG    Gait Training Goal PT LTG, Date Established   08/12/17  -    Gait Training Goal PT LTG, Time to Achieve   by discharge  -    Gait Training Goal PT LTG, [place] Level   independent  -    Gait Training Goal PT LTG, Distance to Achieve   932zer2;O2 sats will not drop below 92%  -    Gait Training Goal PT LTG, Additional Goal   [id number];O2 sats will not drop below 92%  -    Gait Training Goal PT LTG, Date Goal Reviewed 08/14/17  -JCA 08/13/17  -LN     Gait Training Goal PT LTG, Outcome goal partially met  -A goal not met  -LN goal ongoing  -    Stair Training PT LTG    Stair Training Goal PT LTG, Date Established   08/12/17  -    Stair Training Goal PT LTG, Time to Achieve   by discharge  -    Stair Training Goal PT LTG, Number of Steps   3  -    Stair Training Goal PT LTG, Assist Device   1 handrail  -    Stair Training Goal PT LTG,  Additional Goal   O2 sats will not drop below 92%  -    Stair Training Goal PT LTG, Date Goal Reviewed 08/14/17  -JCA 08/13/17  -LN     Stair Training Goal PT LTG, Outcome goal ongoing  -A goal not met  -LN goal ongoing  -    Patient Education PT LTG    Patient Education PT LTG, Date Established   08/12/17  -    Patient Education PT LTG, Time to Achieve   by discharge  -    Patient Education PT LTG, Education Type   energy conservation;home safety  -    Patient Education PT LTG, Date Goal Reviewed 08/14/17  -JCA 08/13/17  -LN     Patient Education PT LTG Outcome  goal not met  -LN goal ongoing  -      User Key  (r) = Recorded By, (t) = Taken By, (c) = Cosigned By    Initials Name Provider Type    LINDSEY Mey Sullivan, PT Physical Therapist    [facility] Tony Tom, PTA Physical Therapy Assistant    LN Ele Plascencia, RICHELLE Physical Therapy Assistant          Physical Therapy Education     Title: PT OT SLP Therapies (Active)     Topic: Physical Therapy (Active)     Point: Mobility training (Active)    Learning Progress Summary    Learner Readiness Method Response Comment Documented by Status   Patient Acceptance E NR  LINDSEY 08/14/17 1315 Active    Acceptance E NR  LN 08/13/17 1247 Active    Acceptance E NR  JC1 08/12/17 1726 Active               Point: Precautions (Active)    Learning Progress Summary    Learner Readiness Method Response Comment Documented by Status   Patient Acceptance E NR  LN 08/13/17 1247 Active    Acceptance E NR  JC1 08/12/17 1726 Active                      User Key     Initials Effective Dates Name Provider Type Discipline    [facility] 10/17/16 -  Mey Sullivan, PT Physical Therapist PT     10/17/16 -  Tony Tom PTA Physical Therapy Assistant PT     10/17/16 -  Ele Plascencia PTA Physical Therapy Assistant PT                    PT Recommendation and Plan  Anticipated Discharge Disposition: home with assist (may benefit from pulmonary rehab)  Planned Therapy Interventions: gait training,  stair training, transfer training, strengthening  PT Frequency: other (see comments) (5-14 times per week)  Plan of Care Review  Plan Of Care Reviewed With: patient  Progress: progress toward functional goals as expected  Outcome Summary/Follow up Plan: pt responded well to therapy. tx focused on gait endurance while montioring vitals. pt is indep w/ gait but becomes SOA easily and sometimes requires standing rests. pt O2 never dropped below 93% this tx showing improvement. 1 goal partially met this tx. pt would continue to benefit from PT services.           Outcome Measures       08/14/17 1240 08/13/17 1103 08/12/17 1512    How much help from another person do you currently need...    Turning from your back to your side while in flat bed without using bedrails? 4  -LINDSEY 4  -LN 4  -JCA    Moving from lying on back to sitting on the side of a flat bed without bedrails? 4  -LINDSEY 4  -LN 4  -JCA    Moving to and from a bed to a chair (including a wheelchair)? 4  -LINDSEY 4  -LN 4  -JCA    Standing up from a chair using your arms (e.g., wheelchair, bedside chair)? 4  -LINDSEY 4  -LN 4  -JCA    Climbing 3-5 steps with a railing? 3  -LINDSEY 3  -LN 3  -JCA    To walk in hospital room? 4  -LINDSEY 3  -LN 3  -JCA    AM-PAC 6 Clicks Score 23  -LINDSEY 22  -LN 22  -JCA    Functional Assessment    Outcome Measure Options AM-PAC 6 Clicks Basic Mobility (PT)  -LINDSEY AM-PAC 6 Clicks Basic Mobility (PT)  -LN AM-PAC 6 Clicks Basic Mobility (PT)  -JCA      User Key  (r) = Recorded By, (t) = Taken By, (c) = Cosigned By    Initials Name Provider Type    JCA Mey Sullivan, PT Physical Therapist    LINDSEY Tom PTA Physical Therapy Assistant    LN Ele Plascencia PTA Physical Therapy Assistant           Time Calculation:         PT Charges       08/14/17 1318          Time Calculation    Start Time 1240  -LINDSEY      Stop Time 1303  -LINDSEY      Time Calculation (min) 23 min  -LINDSEY      Time Calculation- PT    Total Timed Code Minutes- PT 23 minute(s)  -LINDSEY        User Key   (r) = Recorded By, (t) = Taken By, (c) = Cosigned By    Initials Name Provider Type     Tony Tom PTA Physical Therapy Assistant          Therapy Charges for Today     Code Description Service Date Service Provider Modifiers Qty    82385971368  PT THERAPEUTIC ACT EA 15 MIN 8/14/2017 Tony Tom PTA GP 2          PT G-Codes  PT Professional Judgement Used?: Yes  Outcome Measure Options: AM-PAC 6 Clicks Basic Mobility (PT)  Score: 22  Functional Limitation: Mobility: Walking and moving around  Mobility: Walking and Moving Around Current Status (): At least 20 percent but less than 40 percent impaired, limited or restricted  Mobility: Walking and Moving Around Goal Status (): 0 percent impaired, limited or restricted    Tony Tom PTA  8/14/2017

## 2017-08-14 NOTE — PROGRESS NOTES
LOS: 6 days   Patient Care Team:  MARIA TERESA Jorge as PCP - General    Chief Complaint:  <principal problem not specified>    Subjective     Interval History:   Still having SOA with ambulation, eating OK, having bowel fxn    Objective     Vital Signs  Temp:  [97 °F (36.1 °C)-99.1 °F (37.3 °C)] 97 °F (36.1 °C)  Heart Rate:  [52-78] 70  Resp:  [16-20] 20  BP: (127-150)/(66-80) 150/80    Physical Exam:  Right sided breath sounds better     Results Review:       Lab Results (last 24 hours)     Procedure Component Value Units Date/Time    CBC Auto Differential [[id number]]  (Abnormal) Collected:  08/14/17 0531    Specimen:  Blood Updated:  08/14/17 0609     WBC 9.57 10*3/mm3      RBC 3.75 (L) 10*6/mm3      Hemoglobin 10.6 (L) g/dL      Hematocrit 32.2 (L) %      MCV 85.9 fL      MCH 28.3 pg      MCHC 32.9 g/dL      RDW 13.7 %      RDW-SD 43.6 fl      MPV 9.4 fL      Platelets 593 (H) 10*3/mm3      Neutrophil % 59.4 %      Lymphocyte % 26.2 %      Monocyte % 9.2 %      Eosinophil % 4.3 %      Basophil % 0.3 %      Immature Grans % 0.6 (H) %      Neutrophils, Absolute 5.68 10*3/mm3      Lymphocytes, Absolute 2.51 10*3/mm3      Monocytes, Absolute 0.88 10*3/mm3      Eosinophils, Absolute 0.41 10*3/mm3      Basophils, Absolute 0.03 10*3/mm3      Immature Grans, Absolute 0.06 (H) 10*3/mm3     Body Fluid Culture [[id number]]  (Normal) Collected:  08/11/17 1320    Specimen:  Body Fluid from Pleural Cavity Updated:  08/14/17 0612     BF Culture No growth at 3 days     Gram Stain Result Many (4+) WBCs seen      No No organisms seen    CBC & Differential [[id number]] Collected:  08/14/17 0531    Specimen:  Blood Updated:  08/14/17 0618    Narrative:       The following orders were created for panel order CBC & Differential.  Procedure                               Abnormality         Status                     ---------                               -----------         ------                     Scan Slide[[id number]]                                                                   CBC Auto Differential[[id number]]        Abnormal            Final result                 Please view results for these tests on the individual orders.    Extra Tubes [[id number]] Collected:  08/14/17 0531    Specimen:  Blood from Blood, Venous Line Updated:  08/14/17 0701    Narrative:       The following orders were created for panel order Extra Tubes.  Procedure                               Abnormality         Status                     ---------                               -----------         ------                     Green Top (Gel)[[id number]]                                  Final result                 Please view results for these tests on the individual orders.    Green Top (Gel) [[id number]] Collected:  08/14/17 0531    Specimen:  Blood Updated:  08/14/17 0701     Extra Tube Hold for add-ons.      Auto resulted.             Medication Review:   Current Facility-Administered Medications   Medication Dose Route Frequency Provider Last Rate Last Dose   • buPROPion (WELLBUTRIN) tablet 100 mg  100 mg Oral BID Abraham Varner MD   100 mg at 08/14/17 0842   • enoxaparin (LOVENOX) syringe 40 mg  40 mg Subcutaneous Q24H Pk العلي MD   40 mg at 08/14/17 0843   • HYDROcodone-acetaminophen (NORCO) 7.5-325 MG per tablet 2 tablet  2 tablet Oral Q6H PRN Pk العلي MD   2 tablet at 08/14/17 0511   • ipratropium-albuterol (DUO-NEB) nebulizer solution 3 mL  3 mL Nebulization Q6H While Awake - RT Pk العلي MD   3 mL at 08/14/17 0724   • Morphine sulfate (PF) injection 4 mg  4 mg Intravenous Q3H PRN Chong Crespo MD   4 mg at 08/14/17 0120   • ondansetron (ZOFRAN) injection 4 mg  4 mg Intravenous Q6H PRN Pk العلي MD   4 mg at 08/07/17 1542   • oxybutynin (DITROPAN) tablet 5 mg  5 mg Oral BID Abraham Varner MD   5 mg at 08/14/17 0842   • pantoprazole (PROTONIX) EC tablet 40 mg  40 mg Oral Daily Abraham Varner MD   40 mg at 08/14/17 0842   • sertraline  (ZOLOFT) tablet 50 mg  50 mg Oral Daily Abraham Varner MD   50 mg at 08/14/17 0842   • sodium chloride 0.9 % flush 10 mL  10 mL Intravenous PRN David Bunn MD   10 mL at 08/12/17 1131       Assessment/Plan     Active Problems:    Abdominal fluid collection    47 yo lady with resolved bile leak and retained biloma after lap pk  Continue regular diet  Pain control  Pulmonary toilet  Continue PT  Pulmonology consult    Pk العلي MD  08/14/17  9:18 AM

## 2017-08-15 LAB
ANION GAP SERPL CALCULATED.3IONS-SCNC: 7 MMOL/L (ref 5–15)
BASOPHILS # BLD AUTO: 0.02 10*3/MM3 (ref 0–0.2)
BASOPHILS NFR BLD AUTO: 0.2 % (ref 0–2)
BUN BLD-MCNC: 11 MG/DL (ref 7–21)
BUN/CREAT SERPL: 15.3 (ref 7–25)
CALCIUM SPEC-SCNC: 9.1 MG/DL (ref 8.4–10.2)
CHLORIDE SERPL-SCNC: 93 MMOL/L (ref 95–110)
CO2 SERPL-SCNC: 38 MMOL/L (ref 22–31)
CREAT BLD-MCNC: 0.72 MG/DL (ref 0.5–1)
DEPRECATED RDW RBC AUTO: 43.3 FL (ref 36.4–46.3)
EOSINOPHIL # BLD AUTO: 0.53 10*3/MM3 (ref 0–0.7)
EOSINOPHIL NFR BLD AUTO: 5.6 % (ref 0–7)
ERYTHROCYTE [DISTWIDTH] IN BLOOD BY AUTOMATED COUNT: 13.6 % (ref 11.5–14.5)
GFR SERPL CREATININE-BSD FRML MDRD: 87 ML/MIN/1.73 (ref 58–135)
GLUCOSE BLD-MCNC: 95 MG/DL (ref 60–100)
HCT VFR BLD AUTO: 36.2 % (ref 35–45)
HGB BLD-MCNC: 11.2 G/DL (ref 12–15.5)
IMM GRANULOCYTES # BLD: 0.03 10*3/MM3 (ref 0–0.02)
IMM GRANULOCYTES NFR BLD: 0.3 % (ref 0–0.5)
LYMPHOCYTES # BLD AUTO: 2.46 10*3/MM3 (ref 0.6–4.2)
LYMPHOCYTES NFR BLD AUTO: 26.1 % (ref 10–50)
MCH RBC QN AUTO: 26.9 PG (ref 26.5–34)
MCHC RBC AUTO-ENTMCNC: 30.9 G/DL (ref 31.4–36)
MCV RBC AUTO: 86.8 FL (ref 80–98)
MONOCYTES # BLD AUTO: 0.87 10*3/MM3 (ref 0–0.9)
MONOCYTES NFR BLD AUTO: 9.2 % (ref 0–12)
NEUTROPHILS # BLD AUTO: 5.5 10*3/MM3 (ref 2–8.6)
NEUTROPHILS NFR BLD AUTO: 58.6 % (ref 37–80)
PLATELET # BLD AUTO: 565 10*3/MM3 (ref 150–450)
PMV BLD AUTO: 9.9 FL (ref 8–12)
POTASSIUM BLD-SCNC: 4 MMOL/L (ref 3.5–5.1)
RBC # BLD AUTO: 4.17 10*6/MM3 (ref 3.77–5.16)
SODIUM BLD-SCNC: 138 MMOL/L (ref 137–145)
WBC NRBC COR # BLD: 9.41 10*3/MM3 (ref 3.2–9.8)

## 2017-08-15 PROCEDURE — 25010000002 ENOXAPARIN PER 10 MG: Performed by: SURGERY

## 2017-08-15 PROCEDURE — 25010000002 FUROSEMIDE PER 20 MG: Performed by: INTERNAL MEDICINE

## 2017-08-15 PROCEDURE — 80048 BASIC METABOLIC PNL TOTAL CA: CPT | Performed by: INTERNAL MEDICINE

## 2017-08-15 PROCEDURE — 85025 COMPLETE CBC W/AUTO DIFF WBC: CPT | Performed by: SURGERY

## 2017-08-15 PROCEDURE — 94760 N-INVAS EAR/PLS OXIMETRY 1: CPT

## 2017-08-15 PROCEDURE — 94799 UNLISTED PULMONARY SVC/PX: CPT

## 2017-08-15 PROCEDURE — 99232 SBSQ HOSP IP/OBS MODERATE 35: CPT | Performed by: INTERNAL MEDICINE

## 2017-08-15 PROCEDURE — 97116 GAIT TRAINING THERAPY: CPT

## 2017-08-15 PROCEDURE — 99024 POSTOP FOLLOW-UP VISIT: CPT | Performed by: SURGERY

## 2017-08-15 RX ORDER — FUROSEMIDE 10 MG/ML
20 INJECTION INTRAMUSCULAR; INTRAVENOUS DAILY
Status: DISCONTINUED | OUTPATIENT
Start: 2017-08-16 | End: 2017-08-16

## 2017-08-15 RX ADMIN — BUPROPION HYDROCHLORIDE 100 MG: 100 TABLET, FILM COATED ORAL at 17:47

## 2017-08-15 RX ADMIN — FUROSEMIDE 20 MG: 10 INJECTION, SOLUTION INTRAVENOUS at 05:41

## 2017-08-15 RX ADMIN — OXYBUTYNIN CHLORIDE 5 MG: 5 TABLET ORAL at 08:58

## 2017-08-15 RX ADMIN — SERTRALINE HYDROCHLORIDE 50 MG: 50 TABLET ORAL at 08:58

## 2017-08-15 RX ADMIN — BUPROPION HYDROCHLORIDE 100 MG: 100 TABLET, FILM COATED ORAL at 08:58

## 2017-08-15 RX ADMIN — OXYBUTYNIN CHLORIDE 5 MG: 5 TABLET ORAL at 17:47

## 2017-08-15 RX ADMIN — ENOXAPARIN SODIUM 40 MG: 40 INJECTION SUBCUTANEOUS at 08:58

## 2017-08-15 RX ADMIN — HYDROCODONE BITARTRATE AND ACETAMINOPHEN 2 TABLET: 7.5; 325 TABLET ORAL at 10:07

## 2017-08-15 RX ADMIN — HYDROCODONE BITARTRATE AND ACETAMINOPHEN 2 TABLET: 7.5; 325 TABLET ORAL at 23:10

## 2017-08-15 RX ADMIN — HYDROCODONE BITARTRATE AND ACETAMINOPHEN 2 TABLET: 7.5; 325 TABLET ORAL at 16:25

## 2017-08-15 RX ADMIN — PANTOPRAZOLE SODIUM 40 MG: 40 TABLET, DELAYED RELEASE ORAL at 08:58

## 2017-08-15 RX ADMIN — HYDROCODONE BITARTRATE AND ACETAMINOPHEN 2 TABLET: 7.5; 325 TABLET ORAL at 03:59

## 2017-08-15 NOTE — SIGNIFICANT NOTE
08/15/17 1340   Rehab Treatment   Discipline physical therapy assistant   Treatment Not Performed patient/family declined treatment  (pt states she walked outside w/ her family and sat for about 30-45', O2 only dropped to 89, pt defers pm PT tx)

## 2017-08-15 NOTE — THERAPY TREATMENT NOTE
Acute Care - Physical Therapy Treatment Note  [facility]     Patient Name: Maricarmen Ferris  : 1970  MRN: [id number]  Today's Date: 8/15/2017  Onset of Illness/Injury or Date of Surgery Date: 17  Date of Referral to PT: 17  Referring Physician: Dr. Chong Crespo    Admit Date: 2017    Visit Dx:    ICD-10-CM ICD-9-CM   1. Abdominal fluid collection R18.8 789.59   2. Pleural effusion J90 511.9   3. Impaired physical mobility Z74.09 781.99     Patient Active Problem List   Diagnosis   • Chronic cholecystitis   • Umbilical hernia without obstruction and without gangrene   • Abdominal pain   • Abdominal fluid collection   • Intra-hepatic bile leak               Adult Rehabilitation Note       08/15/17 0808 17 1240 17 1103    Rehab Assessment/Intervention    Discipline physical therapy assistant  -JW physical therapy assistant  -LINDSEY physical therapy assistant  -LN    Document Type therapy note (daily note)  -JW therapy note (daily note)  -LINDSEY therapy note (daily note)  -LN    Subjective Information agree to therapy   pt states it is too early, but still agreeable  - agree to therapy  -LINDSEY agree to therapy;complains of;pain  -LN    Patient Effort, Rehab Treatment good  - good  -LINDSEY     Symptoms Noted During/After Treatment shortness of breath  - shortness of breath;significant change in vital signs  -LINDSEY     Precautions/Limitations oxygen therapy device and L/min   vital signs; desats w/ O2 watch exercise tolerance  - oxygen therapy device and L/min   vs, desats w/ O2, watch ex tolerance  -LINDSEY oxygen therapy device and L/min   vs,desats with 02,watch ex tolerance  -LN    Recorded by [JW] Elena Norris, PTA [LINDSEY] Tony Tom, PTA [LN] Ele Plascencia PTA    Vital Signs    Pre Systolic BP Rehab  144  -LINDSEY 115  -LN    Pre Treatment Diastolic BP  81  -LINDSEY 55  -LN    Post Systolic BP Rehab  133  -LINDSEY 127  -LN    Post Treatment Diastolic BP  77  -LINDSEY 66  -LN    Pretreatment  Heart Rate (beats/min) 58  -JW 69  -LINDSEY 65  -LN    Intratreatment Heart Rate (beats/min) 70  -JW 90  -LINDSEY 82  -LN    Posttreatment Heart Rate (beats/min) 75  -JW 74  -LINDSEY 69  -LN    Pre SpO2 (%) 90   up to 93 w/ PLB  -JW 99  -LINDSEY 97  -LN    O2 Delivery Pre Treatment room air  -JW supplemental O2  -LINDSEY supplemental O2  -LN    Intra SpO2 (%) 91   after 1st gt, 87 after 2nd gt, 90 after 3rd gait  -JW 93   after gait. 98% after 2nd gait trip.   -LINDSEY --   92-94% with gt  -LN    O2 Delivery Intra Treatment room air  -JW supplemental O2  -LINDSEY     Post SpO2 (%) 94  -JW 98  -LINDSEY 95  -LN    O2 Delivery Post Treatment room air  -JW supplemental O2  -LINDSEY     Pre Patient Position Supine  -JW Supine   pt had been sitting up all morning. had just gotten to bed.  -LINDSEY Sitting  -LN    Intra Patient Position   Standing  -LN    Post Patient Position Sitting  -JW Supine  -LINDSEY Sitting  -LN    Recorded by [JW] Elena Norris, PTA [LINDSEY] Tony Tom PTA [LN] Ele Plascencia PTA    Pain Assessment    Pain Assessment 0-10  -JW 0-10  -LINDSEY 0-10  -LN    Pain Score --   pt states yes but didn't rate  -JW 8  -LINDSEY 5  -LN    Post Pain Score  8  -LINDSEY 7  -LN    Pain Type  Acute pain  -LINDSEY Acute pain;Surgical pain  -LN    Pain Location Rib cage  -JW Rib cage  -LINDSEY Rib cage  -LN    Pain Orientation   Right  -LN    Pain Intervention(s) Medication (See MAR)  - Medication (See MAR);Ambulation/increased activity  -LINDSEY     Recorded by [JW] Elena Norris PTA [LINDSEY] Tony Tom, PTA [LN] Ele Plascencia PTA    Vision Assessment/Intervention    Visual Impairment  WFL with corrective lenses  -LINDSEY     Recorded by  [LINDSEY] Tony Tom PTA     Cognitive Assessment/Intervention    Current Cognitive/Communication Assessment functional  -JW functional  -LINDSEY     Orientation Status oriented x 4  -JW oriented x 4  -LINDSEY oriented to;person     -LN    Follows Commands/Answers Questions 100% of the time  -% of the time  -LINDSEY     Personal Safety  WNL/WFL  -LINDSEY      Personal Safety Interventions nonskid shoes/slippers when out of bed  -JW      Recorded by [JW] Elena Norris, PTA [LINDSEY] Tony Tom, PTA [LN] Ele Plascencia PTA    Bed Mobility, Assessment/Treatment    Bed Mobility, Assistive Device head of bed elevated  -JW bed rails  -LINDSEY     Bed Mob, Supine to Sit, Jenkins conditional independence  -JW conditional independence  -LINDSEY not tested  -LN    Bed Mob, Sit to Supine, Jenkins  conditional independence  -LINDSEY not tested  -LN    Recorded by [JW] Elena Norris, PTA [LINDSEY] Tony Tom, PTA [LN] Ele Plascencia, RICHELLE    Transfer Assessment/Treatment    Transfers, Sit-Stand Jenkins independent  - independent  -LINDSEY independent  -LN    Transfers, Stand-Sit Jenkins independent  -JW independent  -LINDSEY independent  -LN    Transfers, Sit-Stand-Sit, Assist Device other (see comments)   none  -JW  --   none  -LN    Recorded by [JW] Elena Norris, PTA [LINDSEY] Tony Tom, PTA [LN] Ele Plascencia PTA    Gait Assessment/Treatment    Gait, Jenkins Level independent  - independent  - supervision required  -LN    Gait, Assistive Device --   none  -JW --   no AD  - --   none  -LN    Gait, Distance (Feet) 180   180, 150  - 360   260 ft. 1st gait trip required multiple standing rests.   - 82   90',112',66'  -LN    Gait, Comment pt w/ standing rest break between 1st and 2nd gt distances, pt w/ sitting rest break between 2nd and 3rd gait distances  -  --   pt able to stop/rest when becoming soa w/o v.c's  -LN    Recorded by [] Elena Norris, PTA [LINDSEY] Tony Tom, PTA [LN] Ele Plascencia, PTA    Positioning and Restraints    Pre-Treatment Position in bed  -JW in bed  -     Post Treatment Position chair  - bed  -LINDSEY bed  -LN    In Bed  supine;call light within reach;encouraged to call for assist;with family/caregiver   all needs met.   -LINDSEY sitting EOB;call light within reach;with family/caregiver  -LN    In Chair sitting;call light  within reach;encouraged to call for assist   MD present  -JW      Recorded by [JW] Elena Norris, PTA [LINDSEY] Tony Tom, PTA [LN] Ele Plascencia, PTA      User Key  (r) = Recorded By, (t) = Taken By, (c) = Cosigned By    Initials Name Effective Dates    JW Elena Norris, PTA 08/11/15 -     LINDSEY Tony Tom, PTA 10/17/16 -     LN Ele DUNIA Plascencia, PTA 10/17/16 -                 IP PT Goals       08/15/17 0808 08/14/17 1240 08/13/17 1103    Gait Training PT LTG    Gait Training Goal PT LTG, Date Goal Reviewed 08/15/17  -JW 08/14/17  -LINDSEY 08/13/17  -LN    Gait Training Goal PT LTG, Outcome  goal partially met  -LINDSEY goal not met  -LN    Stair Training PT LTG    Stair Training Goal PT LTG, Date Goal Reviewed 08/15/17  -JW 08/14/17  -LINDSEY 08/13/17  -LN    Stair Training Goal PT LTG, Outcome  goal ongoing  -LINDSEY goal not met  -LN    Patient Education PT LTG    Patient Education PT LTG, Date Goal Reviewed 08/15/17  -JW 08/14/17  -LINDSEY 08/13/17  -LN    Patient Education PT LTG Outcome goal ongoing  -JW  goal not met  -LN      08/12/17 1727          Gait Training PT LTG    Gait Training Goal PT LTG, Date Established 08/12/17  -A      Gait Training Goal PT LTG, Time to Achieve by discharge  -A      Gait Training Goal PT LTG, [place] Level independent  -[facility]      Gait Training Goal PT LTG, Distance to Achieve [id number];O2 sats will not drop below 92%  -A      Gait Training Goal PT LTG, Additional Goal [id number];O2 sats will not drop below 92%  -A      Gait Training Goal PT LTG, Outcome goal ongoing  -A      Stair Training PT LTG    Stair Training Goal PT LTG, Date Established 08/12/17  -A      Stair Training Goal PT LTG, Time to Achieve by discharge  -A      Stair Training Goal PT LTG, Number of Steps 3  -[facility]      Stair Training Goal PT LTG, Assist Device 1 handrail  -[facility]      Stair Training Goal PT LTG, Additional Goal O2 sats will not drop below 92%  -JCA      Stair Training Goal PT LTG, Outcome goal  ongoing  -A      Patient Education PT LTG    Patient Education PT LTG, Date Established 08/12/17  -A      Patient Education PT LTG, Time to Achieve by discharge  -[facility]      Patient Education PT LTG, Education Type energy conservation;home safety  -A      Patient Education PT LTG Outcome goal ongoing  -[facility]        User Key  (r) = Recorded By, (t) = Taken By, (c) = Cosigned By    Initials Name Provider Type     Elena Norris, PTA Physical Therapy Assistant    [facility] Mey Sullivan, PT Physical Therapist    LINDSEY Tony Tom, PTA Physical Therapy Assistant    DAX Plascencia, PTA Physical Therapy Assistant          Physical Therapy Education     Title: PT OT SLP Therapies (Active)     Topic: Physical Therapy (Active)     Point: Mobility training (Active)    Learning Progress Summary    Learner Readiness Method Response Comment Documented by Status   Patient Acceptance E NR  LINDSEY 08/14/17 1315 Active    Acceptance E NR   08/13/17 1247 Active    Acceptance E NR  [facility] 08/12/17 1726 Active               Point: Precautions (Active)    Learning Progress Summary    Learner Readiness Method Response Comment Documented by Status   Patient Acceptance E NR  LN 08/13/17 1247 Active    Acceptance E NR  [facility] 08/12/17 1726 Active                      User Key     Initials Effective Dates Name Provider Type Discipline    [facility] 10/17/16 -  Mey Sullivan, PT Physical Therapist PT     10/17/16 -  Tony Tom, PTA Physical Therapy Assistant PT     10/17/16 -  Ele Plascencia, PTA Physical Therapy Assistant PT                    PT Recommendation and Plan  Anticipated Discharge Disposition: home with assist (may benefit from pulmonary rehab)  Planned Therapy Interventions: gait training, stair training, transfer training, strengthening  PT Frequency: other (see comments) (5-14 times per week)  Plan of Care Review  Plan Of Care Reviewed With: patient  Progress: improving  Outcome Summary/Follow up Plan: pt on RA upon entering  room, pt 90% RA, up to 93% w/ PLB, pt t/zully sup to sit w/ HOB elevated w/ Mod Ind, sit <-> stand Ind, ambulated 180' O2 to 91 w/ short standing rest break, ambulated 180' O2 to 87 w/ short sitting rest break; pt ambulated 150' w/ O2 down to 90, recovered to 94 at end of tx, no new goals met this tx, but progressing toward goals          Outcome Measures       08/15/17 0808 08/14/17 1240 08/13/17 1103    How much help from another person do you currently need...    Turning from your back to your side while in flat bed without using bedrails? 4  - 4  -LINDSEY 4  -LN    Moving from lying on back to sitting on the side of a flat bed without bedrails? 4  - 4  -LINDESY 4  -LN    Moving to and from a bed to a chair (including a wheelchair)? 4  - 4  -LINDSEY 4  -LN    Standing up from a chair using your arms (e.g., wheelchair, bedside chair)? 4  - 4  -LINDSEY 4  -LN    Climbing 3-5 steps with a railing? 3  - 3  -LINDSEY 3  -LN    To walk in hospital room? 4  - 4  -LINDSEY 3  -LN    AM-PAC 6 Clicks Score 23  - 23  -LINDSEY 22  -LN    Functional Assessment    Outcome Measure Options AM-PAC 6 Clicks Basic Mobility (PT)  - AM-PAC 6 Clicks Basic Mobility (PT)  -LINDSEY AM-PAC 6 Clicks Basic Mobility (PT)  -LN      08/12/17 1512          How much help from another person do you currently need...    Turning from your back to your side while in flat bed without using bedrails? 4  -JCA      Moving from lying on back to sitting on the side of a flat bed without bedrails? 4  -JCA      Moving to and from a bed to a chair (including a wheelchair)? 4  -JCA      Standing up from a chair using your arms (e.g., wheelchair, bedside chair)? 4  -JCA      Climbing 3-5 steps with a railing? 3  -JCA      To walk in hospital room? 3  -JCA      AM-PAC 6 Clicks Score 22  -JCA      Functional Assessment    Outcome Measure Options AM-PAC 6 Clicks Basic Mobility (PT)  -JCA        User Key  (r) = Recorded By, (t) = Taken By, (c) = Cosigned By    Initials Name Provider Type     SAI Norris PTA Physical Therapy Assistant    ALDA Sullivan, PT Physical Therapist    LINDSEY Tony Tom, PTA Physical Therapy Assistant    DAX Plascencia, PTA Physical Therapy Assistant           Time Calculation:         PT Charges       08/15/17 0808          Time Calculation    Start Time 0808  -      Stop Time 0823  -      Time Calculation (min) 15 min  -      PT Received On 08/15/17  -      Time Calculation- PT    Total Timed Code Minutes- PT 15 minute(s)  -        User Key  (r) = Recorded By, (t) = Taken By, (c) = Cosigned By    Initials Name Provider Type    SAI Norris PTA Physical Therapy Assistant          Therapy Charges for Today     Code Description Service Date Service Provider Modifiers Qty    [id number] HC GAIT TRAINING EA 15 MIN 8/15/2017 Elena Norris, RICHELLE GP 1          PT G-Codes  PT Professional Judgement Used?: Yes  Outcome Measure Options: AM-PAC 6 Clicks Basic Mobility (PT)  Score: 22  Functional Limitation: Mobility: Walking and moving around  Mobility: Walking and Moving Around Current Status (): At least 20 percent but less than 40 percent impaired, limited or restricted  Mobility: Walking and Moving Around Goal Status (): 0 percent impaired, limited or restricted    Elena Norris PTA  8/15/2017

## 2017-08-15 NOTE — PLAN OF CARE
Problem: Patient Care Overview (Adult)  Goal: Plan of Care Review  Outcome: Ongoing (interventions implemented as appropriate)    08/15/17 0333   Coping/Psychosocial Response Interventions   Plan Of Care Reviewed With patient   Patient Care Overview   Progress no change   Outcome Evaluation   Outcome Summary/Follow up Plan pt resting well throughout the night       Goal: Adult Individualization and Mutuality  Outcome: Ongoing (interventions implemented as appropriate)  Goal: Discharge Needs Assessment  Outcome: Ongoing (interventions implemented as appropriate)    Problem: Pain, Acute (Adult)  Goal: Acceptable Pain Control/Comfort Level  Outcome: Ongoing (interventions implemented as appropriate)

## 2017-08-15 NOTE — PROGRESS NOTES
PULMONARY PROGRESS NOTE  Rachel Enciso MD    [address]  8/15/2017        Maricarmen Ferris  [id number]  1970  47 y.o. female             LOS: 7 days   Pk العلي MD    Subjective     CC/Reason for visit: F/u hypoxic respiratory failure    HPI: Rough night as she could not get comfortable and was more conscious of her breathing. Still with orthopnea. Good diuresis. O2 just taken off but sats not recorded yet.     Review of Systems:  Respiratory ROS:  Review of Systems   Constitutional: Positive for fatigue.   Respiratory: Positive for cough and shortness of breath. Negative for wheezing.    Cardiovascular: Positive for leg swelling. Negative for chest pain.     All other systems were reviewed and were negative except as above in the HPI.    Objective     Vital Sign Min/Max for last 24 hours:  Temp  Min: 98 °F (36.7 °C)  Max: 98.8 °F (37.1 °C)   BP  Min: 103/53  Max: 133/69   Pulse  Min: 63  Max: 74   Resp  Min: 18  Max: 20   SpO2  Min: 91 %  Max: 99 %   Flow (L/min)  Min: 1  Max: 1.5   Weight  Min: 264 lb (120 kg)  Max: 264 lb (120 kg)     Physical Exam:  98 °F (36.7 °C) (Oral) 63 132/63 18 96% 264 lb (120 kg) Body mass index is 43.93 kg/(m^2).  Physical Exam   Constitutional: She is oriented to person, place, and time. Vital signs are normal. She appears well-developed and well-nourished.   Morbidly obese   HENT:   Head: Normocephalic and atraumatic.   Nose: Nose normal.   Eyes: EOM are normal.   Cardiovascular: Normal rate, regular rhythm and normal heart sounds.  Exam reveals no gallop.    No murmur heard.  Pulmonary/Chest: Effort normal and breath sounds normal. No respiratory distress. She has no wheezes. She has no rhonchi. She has no rales.   Abdominal: Soft. Normal appearance and bowel sounds are normal. There is tenderness in the right upper quadrant.   Morbidly obese   Musculoskeletal:   Trace bilateral lower extremity edema   Neurological: She is alert and oriented  to person, place, and time.   Skin: Skin is warm and dry. No cyanosis. Nails show no clubbing.   Psychiatric: She has a normal mood and affect. Her behavior is normal. Judgment normal. Cognition and memory are normal.       Scheduled meds:      buPROPion 100 mg Oral BID   enoxaparin 40 mg Subcutaneous Q24H   furosemide 20 mg Intravenous Q12H   oxybutynin 5 mg Oral BID   pantoprazole 40 mg Oral Daily   sertraline 50 mg Oral Daily       IV meds:                            Data Review: I personally reviewed the patient's medications and new clinical results.  Lab Results   Component Value Date    CALCIUM 9.1 08/15/2017    PHOS 4.0 08/07/2017     Results from last 7 days  Lab Units 08/15/17  0639 08/14/17  0531 08/13/17  0601   SODIUM mmol/L 138  --   --    POTASSIUM mmol/L 4.0  --   --    CHLORIDE mmol/L 93*  --   --    CO2 mmol/L 38.0*  --   --    BUN mg/dL 11  --   --    CREATININE mg/dL 0.72  --   --    GLUCOSE mg/dL 95  --   --    CALCIUM mg/dL 9.1  --   --    WBC 10*3/mm3 9.41 9.57 10.39*   HEMOGLOBIN g/dL 11.2* 10.6* 9.9*   PLATELETS 10*3/mm3 565* 593* 506*               Radiology: I independently reviewed the patient's new imaging, including images and reports.  Imaging Results (last 24 hours)     ** No results found for the last 24 hours. **          Assessment/Plan     ASSESSMENT:   # Acute hypoxic respiratory failure- likely a combination of hypervolemia, hypoventilation from obesity and abdominal surgery, and pulmonary hypertension  # Pulmonary hypertension as evidenced by enlarged PA on CTPA-likely secondary to untreated ELSIE, the patient did have exposure to phentermine  # Diastolic dysfunction  # Hypervolemia  # Status post cholecystectomy with subsequent bilioma and drainage  # Morbid obesity  # ELSIE/ OHS, noncompliant with CPAP  # Mild chronic hypercarbic respiratory failure with compensation  # Rhinitis due to oxygen use    PLAN/ RECOMMENDATIONS:  -Wean O2 to keep sat >89%.  Goal is to wean off  oxygen.  -Decrease lasix to 20mg IV daily  -Follow daily weights and urine output  -Consider repeat echocardiogram with PAH protocol interpreted by Dr. Iona Rodriguez as needed.  -Nasal saline as needed  -Encouraged out of bed and frequent ambulation as tolerated to prevent deconditioning  -Adequate pain control  -Incentive spirometry  -Needs outpatient sleep study to reestablish with CPAP  -PPX: Lovenox        Total time spent: 22 minutes      This document has been electronically signed by Rachel Enciso MD on August 15, 2017 8:20 AM      [phone]    Dictated using Dragon

## 2017-08-15 NOTE — PROGRESS NOTES
LOS: 7 days   Patient Care Team:  MARIA TERESA Jorge as PCP - General    Chief Complaint:  <principal problem not specified>    Subjective     Interval History:   Pain and breathing improved. Lasix very effective.  Walked around the [place] this morning without oxygen and did not drop sats.  Did become SOA.  Pain improved.    Objective     Vital Signs  Temp:  [98 °F (36.7 °C)-98.8 °F (37.1 °C)] 98 °F (36.7 °C)  Heart Rate:  [63-74] 63  Resp:  [18-20] 18  BP: (103-133)/(53-69) 132/63    Physical Exam:  No change, wounds healed     Results Review:       Lab Results (last 24 hours)     Procedure Component Value Units Date/Time    Body Fluid Culture [[id number]]  (Normal) Collected:  08/11/17 1320    Specimen:  Body Fluid from Pleural Cavity Updated:  08/15/17 0653     BF Culture No growth at 4 days     Gram Stain Result Many (4+) WBCs seen      No No organisms seen    CBC & Differential [[id number]] Collected:  08/15/17 0639    Specimen:  Blood Updated:  08/15/17 0706    Narrative:       The following orders were created for panel order CBC & Differential.  Procedure                               Abnormality         Status                     ---------                               -----------         ------                     CBC Auto Differential[[id number]]        Abnormal            Final result                 Please view results for these tests on the individual orders.    CBC Auto Differential [[id number]]  (Abnormal) Collected:  08/15/17 0639    Specimen:  Blood Updated:  08/15/17 0706     WBC 9.41 10*3/mm3      RBC 4.17 10*6/mm3      Hemoglobin 11.2 (L) g/dL      Hematocrit 36.2 %      MCV 86.8 fL      MCH 26.9 pg      MCHC 30.9 (L) g/dL      RDW 13.6 %      RDW-SD 43.3 fl      MPV 9.9 fL      Platelets 565 (H) 10*3/mm3      Neutrophil % 58.6 %      Lymphocyte % 26.1 %      Monocyte % 9.2 %      Eosinophil % 5.6 %      Basophil % 0.2 %      Immature Grans % 0.3 %      Neutrophils, Absolute 5.50 10*3/mm3       Lymphocytes, Absolute 2.46 10*3/mm3      Monocytes, Absolute 0.87 10*3/mm3      Eosinophils, Absolute 0.53 10*3/mm3      Basophils, Absolute 0.02 10*3/mm3      Immature Grans, Absolute 0.03 (H) 10*3/mm3     Basic Metabolic Panel [[id number]]  (Abnormal) Collected:  08/15/17 0639    Specimen:  Blood Updated:  08/15/17 0714     Glucose 95 mg/dL      BUN 11 mg/dL      Creatinine 0.72 mg/dL      Sodium 138 mmol/L      Potassium 4.0 mmol/L      Chloride 93 (L) mmol/L      CO2 38.0 (H) mmol/L      Calcium 9.1 mg/dL      eGFR Non African Amer 87 mL/min/1.73      BUN/Creatinine Ratio 15.3     Anion Gap 7.0 mmol/L           Medication Review:   Current Facility-Administered Medications   Medication Dose Route Frequency Provider Last Rate Last Dose   • buPROPion (WELLBUTRIN) tablet 100 mg  100 mg Oral BID Abraham Varner MD   100 mg at 08/14/17 1801   • enoxaparin (LOVENOX) syringe 40 mg  40 mg Subcutaneous Q24H Pk العلي MD   40 mg at 08/14/17 0843   • [START ON 8/16/2017] furosemide (LASIX) injection 20 mg  20 mg Intravenous Daily Rachel Enciso MD       • HYDROcodone-acetaminophen (NORCO) 7.5-325 MG per tablet 2 tablet  2 tablet Oral Q6H PRN Pk العلي MD   2 tablet at 08/15/17 0359   • Morphine sulfate (PF) injection 4 mg  4 mg Intravenous Q3H PRN Chong Crespo MD   4 mg at 08/14/17 2351   • ondansetron (ZOFRAN) injection 4 mg  4 mg Intravenous Q6H PRN Pk العلي MD   4 mg at 08/07/17 1542   • oxybutynin (DITROPAN) tablet 5 mg  5 mg Oral BID Abraham Varner MD   5 mg at 08/14/17 1801   • pantoprazole (PROTONIX) EC tablet 40 mg  40 mg Oral Daily Abraham Varner MD   40 mg at 08/14/17 0842   • sertraline (ZOLOFT) tablet 50 mg  50 mg Oral Daily Abraham Varner MD   50 mg at 08/14/17 0842   • sodium chloride (OCEAN) nasal spray 1 spray  1 spray Each Nare PRN Rachel Enciso MD       • sodium chloride 0.9 % flush 10 mL  10 mL Intravenous PRN David Bunn MD   10 mL at 08/12/17 5498       Assessment/Plan     Active  Problems:    Abdominal fluid collection    45 yo lady with resolved bile leak after lap pk  Continue regular diet  Pain control  Pulmonary toilet  Continue PT  Continue lasix and diuresis  Much improved    Pk العلي MD  08/15/17  8:29 AM

## 2017-08-15 NOTE — PLAN OF CARE
Problem: Patient Care Overview (Adult)  Goal: Plan of Care Review  Outcome: Ongoing (interventions implemented as appropriate)    08/15/17 1433   Coping/Psychosocial Response Interventions   Plan Of Care Reviewed With patient   Patient Care Overview   Progress no change   Outcome Evaluation   Outcome Summary/Follow up Plan Pain controlled with PO pain medication. Vitals stable. O2 92% room air.        Goal: Adult Individualization and Mutuality  Outcome: Ongoing (interventions implemented as appropriate)  Goal: Discharge Needs Assessment  Outcome: Ongoing (interventions implemented as appropriate)    Problem: Pain, Acute (Adult)  Goal: Acceptable Pain Control/Comfort Level  Outcome: Ongoing (interventions implemented as appropriate)

## 2017-08-16 ENCOUNTER — APPOINTMENT (OUTPATIENT)
Dept: CARDIOLOGY | Facility: HOSPITAL | Age: 47
End: 2017-08-16
Attending: INTERNAL MEDICINE

## 2017-08-16 ENCOUNTER — APPOINTMENT (OUTPATIENT)
Dept: GENERAL RADIOLOGY | Facility: HOSPITAL | Age: 47
End: 2017-08-16

## 2017-08-16 VITALS
BODY MASS INDEX: 43.99 KG/M2 | HEART RATE: 53 BPM | WEIGHT: 264 LBS | SYSTOLIC BLOOD PRESSURE: 115 MMHG | DIASTOLIC BLOOD PRESSURE: 62 MMHG | HEIGHT: 65 IN | OXYGEN SATURATION: 92 % | RESPIRATION RATE: 18 BRPM | TEMPERATURE: 96.3 F

## 2017-08-16 LAB
ANION GAP SERPL CALCULATED.3IONS-SCNC: 6 MMOL/L (ref 5–15)
ANISOCYTOSIS BLD QL: NORMAL
BACTERIA FLD CULT: NORMAL
BASOPHILS # BLD AUTO: 0.01 10*3/MM3 (ref 0–0.2)
BASOPHILS NFR BLD AUTO: 0.1 % (ref 0–2)
BH CV ECHO MEAS - % IVS THICK: 17.9 %
BH CV ECHO MEAS - % LVPW THICK: 22.4 %
BH CV ECHO MEAS - BSA(HAYCOCK): 2.4 M^2
BH CV ECHO MEAS - BSA: 2.2 M^2
BH CV ECHO MEAS - BZI_BMI: 43.9 KILOGRAMS/M^2
BH CV ECHO MEAS - BZI_METRIC_HEIGHT: 165.1 CM
BH CV ECHO MEAS - BZI_METRIC_WEIGHT: 119.8 KG
BH CV ECHO MEAS - EDV(CUBED): 156 ML
BH CV ECHO MEAS - EDV(TEICH): 140.3 ML
BH CV ECHO MEAS - EF(CUBED): 67.7 %
BH CV ECHO MEAS - EF(TEICH): 58.8 %
BH CV ECHO MEAS - ESV(CUBED): 50.3 ML
BH CV ECHO MEAS - ESV(TEICH): 57.8 ML
BH CV ECHO MEAS - FS: 31.4 %
BH CV ECHO MEAS - IVS/LVPW: 1.1
BH CV ECHO MEAS - IVSD: 1.1 CM
BH CV ECHO MEAS - IVSS: 1.3 CM
BH CV ECHO MEAS - LA DIMENSION: 4.3 CM
BH CV ECHO MEAS - LV MASS(C)D: 221.3 GRAMS
BH CV ECHO MEAS - LV MASS(C)DI: 99.4 GRAMS/M^2
BH CV ECHO MEAS - LV MASS(C)S: 159.9 GRAMS
BH CV ECHO MEAS - LV MASS(C)SI: 71.8 GRAMS/M^2
BH CV ECHO MEAS - LVIDD: 5.4 CM
BH CV ECHO MEAS - LVIDS: 3.7 CM
BH CV ECHO MEAS - LVPWD: 1 CM
BH CV ECHO MEAS - LVPWS: 1.3 CM
BH CV ECHO MEAS - PA MAX PG: 5.7 MMHG
BH CV ECHO MEAS - PA V2 MAX: 119.4 CM/SEC
BH CV ECHO MEAS - RAP SYSTOLE: 5 MMHG
BH CV ECHO MEAS - RVDD: 2.6 CM
BH CV ECHO MEAS - RVSP: 30.7 MMHG
BH CV ECHO MEAS - SI(CUBED): 47.5 ML/M^2
BH CV ECHO MEAS - SI(TEICH): 37.1 ML/M^2
BH CV ECHO MEAS - SV(CUBED): 105.7 ML
BH CV ECHO MEAS - SV(TEICH): 82.5 ML
BH CV ECHO MEAS - TAPSE (>1.6): 2 CM2
BH CV ECHO MEAS - TR MAX VEL: 252.1 CM/SEC
BH CV XLRA - RV BASE: 3.3 CM
BH CV XLRA - RV LENGTH: 5.7 CM
BH CV XLRA - RV MID: 3.3 CM
BH CV XLRA - TDI S': 13 CM/SEC
BUN BLD-MCNC: 10 MG/DL (ref 7–21)
BUN/CREAT SERPL: 12.8 (ref 7–25)
CALCIUM SPEC-SCNC: 9 MG/DL (ref 8.4–10.2)
CHLORIDE SERPL-SCNC: 97 MMOL/L (ref 95–110)
CO2 SERPL-SCNC: 36 MMOL/L (ref 22–31)
CREAT BLD-MCNC: 0.78 MG/DL (ref 0.5–1)
DEPRECATED RDW RBC AUTO: 42.3 FL (ref 36.4–46.3)
EOSINOPHIL # BLD AUTO: 0.61 10*3/MM3 (ref 0–0.7)
EOSINOPHIL NFR BLD AUTO: 7.7 % (ref 0–7)
ERYTHROCYTE [DISTWIDTH] IN BLOOD BY AUTOMATED COUNT: 13.4 % (ref 11.5–14.5)
GFR SERPL CREATININE-BSD FRML MDRD: 79 ML/MIN/1.73 (ref 58–135)
GLUCOSE BLD-MCNC: 84 MG/DL (ref 60–100)
GRAM STN SPEC: NORMAL
GRAM STN SPEC: NORMAL
HCT VFR BLD AUTO: 34.8 % (ref 35–45)
HGB BLD-MCNC: 11.3 G/DL (ref 12–15.5)
HOLD SPECIMEN: NORMAL
IMM GRANULOCYTES # BLD: 0.03 10*3/MM3 (ref 0–0.02)
IMM GRANULOCYTES NFR BLD: 0.4 % (ref 0–0.5)
LYMPHOCYTES # BLD AUTO: 2.46 10*3/MM3 (ref 0.6–4.2)
LYMPHOCYTES NFR BLD AUTO: 31 % (ref 10–50)
MCH RBC QN AUTO: 27.7 PG (ref 26.5–34)
MCHC RBC AUTO-ENTMCNC: 32.5 G/DL (ref 31.4–36)
MCV RBC AUTO: 85.3 FL (ref 80–98)
MONOCYTES # BLD AUTO: 0.79 10*3/MM3 (ref 0–0.9)
MONOCYTES NFR BLD AUTO: 9.9 % (ref 0–12)
NEUTROPHILS # BLD AUTO: 4.04 10*3/MM3 (ref 2–8.6)
NEUTROPHILS NFR BLD AUTO: 50.9 % (ref 37–80)
PLATELET # BLD AUTO: 594 10*3/MM3 (ref 150–450)
PMV BLD AUTO: 9.3 FL (ref 8–12)
POTASSIUM BLD-SCNC: 4 MMOL/L (ref 3.5–5.1)
RBC # BLD AUTO: 4.08 10*6/MM3 (ref 3.77–5.16)
SMALL PLATELETS BLD QL SMEAR: NORMAL
SODIUM BLD-SCNC: 139 MMOL/L (ref 137–145)
WBC MORPH BLD: NORMAL
WBC NRBC COR # BLD: 7.94 10*3/MM3 (ref 3.2–9.8)

## 2017-08-16 PROCEDURE — 99232 SBSQ HOSP IP/OBS MODERATE 35: CPT | Performed by: INTERNAL MEDICINE

## 2017-08-16 PROCEDURE — 93308 TTE F-UP OR LMTD: CPT

## 2017-08-16 PROCEDURE — 85007 BL SMEAR W/DIFF WBC COUNT: CPT | Performed by: SURGERY

## 2017-08-16 PROCEDURE — 97110 THERAPEUTIC EXERCISES: CPT

## 2017-08-16 PROCEDURE — 93325 DOPPLER ECHO COLOR FLOW MAPG: CPT

## 2017-08-16 PROCEDURE — 93321 DOPPLER ECHO F-UP/LMTD STD: CPT

## 2017-08-16 PROCEDURE — 97116 GAIT TRAINING THERAPY: CPT

## 2017-08-16 PROCEDURE — 99024 POSTOP FOLLOW-UP VISIT: CPT | Performed by: SURGERY

## 2017-08-16 PROCEDURE — 71010 HC CHEST PA OR AP: CPT

## 2017-08-16 PROCEDURE — 25010000002 ENOXAPARIN PER 10 MG: Performed by: SURGERY

## 2017-08-16 PROCEDURE — 94760 N-INVAS EAR/PLS OXIMETRY 1: CPT

## 2017-08-16 PROCEDURE — 94640 AIRWAY INHALATION TREATMENT: CPT

## 2017-08-16 PROCEDURE — 80048 BASIC METABOLIC PNL TOTAL CA: CPT | Performed by: INTERNAL MEDICINE

## 2017-08-16 PROCEDURE — 93308 TTE F-UP OR LMTD: CPT | Performed by: INTERNAL MEDICINE

## 2017-08-16 PROCEDURE — 85025 COMPLETE CBC W/AUTO DIFF WBC: CPT | Performed by: SURGERY

## 2017-08-16 PROCEDURE — 94799 UNLISTED PULMONARY SVC/PX: CPT

## 2017-08-16 RX ORDER — FUROSEMIDE 40 MG/1
40 TABLET ORAL DAILY
Qty: 30 TABLET | Refills: 3 | Status: SHIPPED | OUTPATIENT
Start: 2017-08-16 | End: 2018-01-16 | Stop reason: HOSPADM

## 2017-08-16 RX ORDER — ALBUTEROL SULFATE 2.5 MG/3ML
2.5 SOLUTION RESPIRATORY (INHALATION) EVERY 4 HOURS PRN
Status: DISCONTINUED | OUTPATIENT
Start: 2017-08-16 | End: 2017-08-16 | Stop reason: HOSPADM

## 2017-08-16 RX ORDER — FUROSEMIDE 40 MG/1
40 TABLET ORAL DAILY
Status: DISCONTINUED | OUTPATIENT
Start: 2017-08-16 | End: 2017-08-16 | Stop reason: HOSPADM

## 2017-08-16 RX ORDER — ALBUTEROL SULFATE 2.5 MG/3ML
SOLUTION RESPIRATORY (INHALATION)
Status: COMPLETED
Start: 2017-08-16 | End: 2017-08-16

## 2017-08-16 RX ORDER — HYDROCODONE BITARTRATE AND ACETAMINOPHEN 7.5; 325 MG/1; MG/1
1-2 TABLET ORAL EVERY 6 HOURS PRN
Qty: 30 TABLET | Refills: 0 | Status: SHIPPED | OUTPATIENT
Start: 2017-08-16 | End: 2017-08-20

## 2017-08-16 RX ADMIN — BUPROPION HYDROCHLORIDE 100 MG: 100 TABLET, FILM COATED ORAL at 08:48

## 2017-08-16 RX ADMIN — FUROSEMIDE 40 MG: 40 TABLET ORAL at 08:49

## 2017-08-16 RX ADMIN — OXYBUTYNIN CHLORIDE 5 MG: 5 TABLET ORAL at 08:48

## 2017-08-16 RX ADMIN — HYDROCODONE BITARTRATE AND ACETAMINOPHEN 2 TABLET: 7.5; 325 TABLET ORAL at 08:51

## 2017-08-16 RX ADMIN — ENOXAPARIN SODIUM 40 MG: 40 INJECTION SUBCUTANEOUS at 08:49

## 2017-08-16 RX ADMIN — ALBUTEROL SULFATE 2.5 MG: 2.5 SOLUTION RESPIRATORY (INHALATION) at 01:00

## 2017-08-16 RX ADMIN — PANTOPRAZOLE SODIUM 40 MG: 40 TABLET, DELAYED RELEASE ORAL at 08:49

## 2017-08-16 RX ADMIN — SERTRALINE HYDROCHLORIDE 50 MG: 50 TABLET ORAL at 08:48

## 2017-08-16 NOTE — DISCHARGE PLACEMENT REQUEST
"Raheem Ferris (47 y.o. Female)     Date of Birth Social Security Number Address Home Phone MRN    1970  [address] [id number] [phone] [id number]    [employer] Marital Status          Taoist        Admission Date Admission Type Admitting Provider Attending Provider Department, Room/Bed    17 Emergency Pk العلي MD Fife, Luke P, MD [address], [facility]/    Discharge Date Discharge Disposition Discharge Destination         Home or Self Care             Attending Provider: Pk العلي MD     Allergies:  Sulfa Antibiotics    Isolation:  None   Infection:  None   Code Status:  FULL    Ht:  65\" (165.1 cm)   Wt:  264 lb (120 kg)    Admission Cmt:  None   Principal Problem:  None                Active Insurance as of 2017     Primary Coverage     Payor Plan Insurance Group Employer/Plan Group    [employer] [place] [employer] [place]ICAID      Payor Plan Address Payor Plan Phone Number Effective From Effective To    [address] [address] [phone] 3/3/2017     [place] [address]       Subscriber Name Subscriber Birth Date Member ID       RAHEEM FERRIS 1970 [id number]                 Emergency Contacts      (Rel.) Home Phone Work Phone Mobile Phone    Joyce Bolton (Mother) [phone]          [address] [address]  Phone:  [phone]  Fax:   Date Ordered: Aug 16, 2017         Patient:  Raheem Ferris MRN:  [id number]   [address] [facility] [address] :  1970  SSN:    Phone: [phone] Sex:  F     Weight: 264 lb (120 kg)         Ht Readings from Last 1 Encounters:   17 65\" (165.1 cm)         Miscellaneous DME             (Order ID: [id number])    Diagnosis:  Impaired physical mobility (Z74.09 [ICD-10-CM] 781.99 [ICD-9-CM])   Quantity:  1     Type of DME: overnight pulse ox to qualify for nocturnal " oxygen  Length of Need (99 Months = Lifetime): 3 Months            Authorizing Provider:Pk العلي MD  Authorizing Provider's NPI: [id number]  Order Entered By: Pk العلي MD 8/16/2017  9:51 AM     Electronically signed by: Pk العلي MD 8/16/2017  9:51 AM                History & Physical      Abraham Varner MD at 8/6/2017  6:56 PM                Patient Care Team:  MARIA TERESA Jorge as PCP - General    Chief complaint Shortness of breath    Subjective     Chest Pain    This is a new problem. The current episode started yesterday. The onset quality is gradual. The problem occurs constantly. The problem has been unchanged. The pain is present in the lateral region and epigastric region (Right side). The pain is moderate. The quality of the pain is described as sharp (Worse with deep breaths). The pain does not radiate. Associated symptoms include abdominal pain, a cough, diaphoresis, malaise/fatigue, shortness of breath and weakness. Pertinent negatives include no back pain, claudication, dizziness, fever, hemoptysis, irregular heartbeat, nausea, near-syncope, numbness, palpitations or vomiting.   Shortness of Breath   Associated symptoms include abdominal pain and chest pain. Pertinent negatives include no claudication, fever, hemoptysis or vomiting.   Abdominal Pain   This is a recurrent problem. Pertinent negatives include no fever, nausea or vomiting. Her past medical history is significant for abdominal surgery (Recent lap pk with po bile leak, ercp, stemt placement). There is no history of colon cancer, Crohn's disease, GERD, irritable bowel syndrome, pancreatitis, PUD or ulcerative colitis.     Laparoscopic cholecystectomy by Dr. العلي in late July. A bile leak occurred requiring drainage and ERCP with stent placement in [place]. Was doing well with no drainage from the biliary catheter- yesterday and today began having sharp chest and RUQ pain, difficulty breathing. ER workup has excluded PE  and MI. There are 2 subcapsular collections of fluid on the RIGHT lobe of the liver and a RIGHT pleural effusion. Her main problem is pleuritic chest pain on the right side when she takes a deep breath. Eating and having bowel movements today.    Review of Systems   Constitutional: Positive for diaphoresis and malaise/fatigue. Negative for fever.   Respiratory: Positive for cough and shortness of breath. Negative for hemoptysis.    Cardiovascular: Positive for chest pain. Negative for palpitations, claudication and near-syncope.   Gastrointestinal: Positive for abdominal pain. Negative for nausea and vomiting.   Musculoskeletal: Negative for back pain.   Neurological: Positive for weakness. Negative for dizziness and numbness.        Past Medical History:   Diagnosis Date   • Acid reflux    • Anxiety    • Depressed    • Hard to intubate    • Hypertension    • Sleep apnea      Past Surgical History:   Procedure Laterality Date   • ABDOMINAL SURGERY     • CYSTOSCOPY     • ENDOMETRIAL ABLATION  2015   • LAPAROSCOPIC TUBAL LIGATION  1995   • NM ERCP DX COLLECTION SPECIMEN BRUSHING/WASHING Left 7/31/2017    Procedure: ENDOSCOPIC RETROGRADE CHOLANGIOPANCREATOGRAPHY;  Surgeon: Samuel Redding DO;  Location: [facility] ENDOSCOPY;  Service: Gastroenterology   • NM LAP,CHOLECYSTECTOMY N/A 7/24/2017    Procedure: CHOLECYSTECTOMY LAPAROSCOPIC, also umbillical hernia repair;  Surgeon: Pk العلي MD;  Location: [facility] OR;  Service: General     History reviewed. No pertinent family history.  Social History   Substance Use Topics   • Smoking status: Never Smoker   • Smokeless tobacco: Never Used   • Alcohol use Yes      Comment: socially       (Not in a hospital admission)  Allergies:  Sulfa antibiotics    Objective      Vital Signs  Temp:  [98.8 °F (37.1 °C)] 98.8 °F (37.1 °C)  Heart Rate:  [74-87] 76  Resp:  [20] 20  BP: (138-180)/(78-91) 180/90    Physical Exam   Constitutional: She is oriented to person, place, and time. She  appears well-developed and well-nourished. No distress.   HENT:   Head: Normocephalic and atraumatic.   Eyes: EOM are normal. Pupils are equal, round, and reactive to light.   Neck: Normal range of motion. Neck supple. No JVD present. No tracheal deviation present. No thyromegaly present.   Cardiovascular: Normal rate and regular rhythm.    Pulmonary/Chest: Effort normal and breath sounds normal. No stridor. No respiratory distress. She has no wheezes. She has no rales. She exhibits no tenderness.   Abdominal: Soft. Bowel sounds are normal. She exhibits no distension and no mass. There is no tenderness. There is no rebound and no guarding. No hernia.   Drain in the sq tissue is removed   Musculoskeletal: Normal range of motion.   Lymphadenopathy:     She has no cervical adenopathy.   Neurological: She is alert and oriented to person, place, and time.   Skin: Skin is warm and dry. She is not diaphoretic.   Psychiatric: She has a normal mood and affect. Judgment and thought content normal.   Vitals reviewed.      Results Review:   I reviewed the patient's new clinical results.  I reviewed the patient's new imaging results and agree with the interpretation.      Assessment/Plan     Active Problems:    * No active hospital problems. *      Assessment:    Condition: In stable condition.   (1. Pleuritic pain RIGHT side- possible early pneumonia).     Plan:   Transfer to floor.  Encourage ambulation.  Add bronchodilators.  Regular diet.  Start antibiotics.         I discussed the patients findings and my recommendations with patient, family and consulting provider    Abraham Varner MD  08/06/17  6:56 PM    Time: 30 mins     Electronically signed by Abraham Varner MD at 8/6/2017  7:07 PM           Physician Progress Notes (last 72 hours) (Notes from 8/13/2017  9:58 AM through 8/16/2017  9:58 AM)      Chong Crespo MD at 8/13/2017 10:37 AM  Version 1 of 1         GENERAL SURGERY PROGRESS NOTE  Chief Complaint:   Surgery Follow up   LOS: 5 days       Subjective     Interval History:     Feels a little. Had desats into the 80's yesterday while walking with PT. Complains of pain at site of thoracentesis.    Objective     Vital Signs  Temp:  [97.7 °F (36.5 °C)-100.7 °F (38.2 °C)] 97.7 °F (36.5 °C)  Heart Rate:  [57-74] 71  Resp:  [16-18] 16  BP: (117-132)/(58-74) 122/64    Physical Exam:   Abdomen soft  Labs:  Lab Results (last 24 hours)     Procedure Component Value Units Date/Time    CBC & Differential [[id number]] Collected:  08/13/17 0601    Specimen:  Blood Updated:  08/13/17 0705    Narrative:       The following orders were created for panel order CBC & Differential.  Procedure                               Abnormality         Status                     ---------                               -----------         ------                     CBC Auto Differential[[id number]]        Abnormal            Final result                 Please view results for these tests on the individual orders.    CBC Auto Differential [[id number]]  (Abnormal) Collected:  08/13/17 0601    Specimen:  Blood Updated:  08/13/17 0705     WBC 10.39 (H) 10*3/mm3      RBC 3.62 (L) 10*6/mm3      Hemoglobin 9.9 (L) g/dL      Hematocrit 31.6 (L) %      MCV 87.3 fL      MCH 27.3 pg      MCHC 31.3 (L) g/dL      RDW 13.3 %      RDW-SD 43.2 fl      MPV 10.3 fL      Platelets 506 (H) 10*3/mm3      Neutrophil % 61.6 %      Lymphocyte % 24.5 %      Monocyte % 9.1 %      Eosinophil % 4.1 %      Basophil % 0.3 %      Immature Grans % 0.4 %      Neutrophils, Absolute 6.39 10*3/mm3      Lymphocytes, Absolute 2.55 10*3/mm3      Monocytes, Absolute 0.95 (H) 10*3/mm3      Eosinophils, Absolute 0.43 10*3/mm3      Basophils, Absolute 0.03 10*3/mm3      Immature Grans, Absolute 0.04 (H) 10*3/mm3     Extra Tubes [[id number]] Collected:  08/13/17 0601    Specimen:  Blood from Blood, Venous Line Updated:  08/13/17 0802    Narrative:       The following orders were created for  panel order Extra Tubes.  Procedure                               Abnormality         Status                     ---------                               -----------         ------                     Green Top (Gel)[[id number]]                                  Final result                 Please view results for these tests on the individual orders.    Green Top (Gel) [[id number]] Collected:  08/13/17 0601    Specimen:  Blood Updated:  08/13/17 0802     Extra Tube Hold for add-ons.      Auto resulted.       Body Fluid Culture [[id number]]  (Normal) Collected:  08/11/17 1320    Specimen:  Body Fluid from Pleural Cavity Updated:  08/13/17 0917     BF Culture No growth at 2 days     Gram Stain Result Many (4+) WBCs seen      No No organisms seen           Results Review:     Labs and imaging for today were reviewed.    Assessment/Plan     Maricarmen Ferris is a 47 y.o. female who is s/p lap pk with complications, bile leak, pleural effusion      Continue PT and encouraged OOB.   Pain control.  Pulmonary toilet.          This document has been electronically signed by Chong Crespo MD on August 13, 2017 10:37 AM        Chong Crespo MD  08/13/17  10:37 AM           Electronically signed by Chong Crespo MD at 8/13/2017 10:39 AM      Pk العلي MD at 8/14/2017  9:18 AM  Version 1 of 1              LOS: 6 days   Patient Care Team:  MARIA TERESA Jorge as PCP - General    Chief Complaint:  <principal problem not specified>    Subjective     Interval History:   Still having SOA with ambulation, eating OK, having bowel fxn    Objective     Vital Signs  Temp:  [97 °F (36.1 °C)-99.1 °F (37.3 °C)] 97 °F (36.1 °C)  Heart Rate:  [52-78] 70  Resp:  [16-20] 20  BP: (127-150)/(66-80) 150/80    Physical Exam:  Right sided breath sounds better     Results Review:       Lab Results (last 24 hours)     Procedure Component Value Units Date/Time    CBC Auto Differential [[id number]]  (Abnormal)  Collected:  08/14/17 0531    Specimen:  Blood Updated:  08/14/17 0609     WBC 9.57 10*3/mm3      RBC 3.75 (L) 10*6/mm3      Hemoglobin 10.6 (L) g/dL      Hematocrit 32.2 (L) %      MCV 85.9 fL      MCH 28.3 pg      MCHC 32.9 g/dL      RDW 13.7 %      RDW-SD 43.6 fl      MPV 9.4 fL      Platelets 593 (H) 10*3/mm3      Neutrophil % 59.4 %      Lymphocyte % 26.2 %      Monocyte % 9.2 %      Eosinophil % 4.3 %      Basophil % 0.3 %      Immature Grans % 0.6 (H) %      Neutrophils, Absolute 5.68 10*3/mm3      Lymphocytes, Absolute 2.51 10*3/mm3      Monocytes, Absolute 0.88 10*3/mm3      Eosinophils, Absolute 0.41 10*3/mm3      Basophils, Absolute 0.03 10*3/mm3      Immature Grans, Absolute 0.06 (H) 10*3/mm3     Body Fluid Culture [[id number]]  (Normal) Collected:  08/11/17 1320    Specimen:  Body Fluid from Pleural Cavity Updated:  08/14/17 0612     BF Culture No growth at 3 days     Gram Stain Result Many (4+) WBCs seen      No No organisms seen    CBC & Differential [[id number]] Collected:  08/14/17 0531    Specimen:  Blood Updated:  08/14/17 0618    Narrative:       The following orders were created for panel order CBC & Differential.  Procedure                               Abnormality         Status                     ---------                               -----------         ------                     Scan Slide[[id number]]                                                                  CBC Auto Differential[[id number]]        Abnormal            Final result                 Please view results for these tests on the individual orders.    Extra Tubes [[id number]] Collected:  08/14/17 0531    Specimen:  Blood from Blood, Venous Line Updated:  08/14/17 0701    Narrative:       The following orders were created for panel order Extra Tubes.  Procedure                               Abnormality         Status                     ---------                               -----------         ------                     Green  Top (Gel)[[id number]]                                  Final result                 Please view results for these tests on the individual orders.    Green Top (Gel) [[id number]] Collected:  08/14/17 0531    Specimen:  Blood Updated:  08/14/17 0701     Extra Tube Hold for add-ons.      Auto resulted.             Medication Review:   Current Facility-Administered Medications   Medication Dose Route Frequency Provider Last Rate Last Dose   • buPROPion (WELLBUTRIN) tablet 100 mg  100 mg Oral BID Abraham Varner MD   100 mg at 08/14/17 0842   • enoxaparin (LOVENOX) syringe 40 mg  40 mg Subcutaneous Q24H Pk العلي MD   40 mg at 08/14/17 0843   • HYDROcodone-acetaminophen (NORCO) 7.5-325 MG per tablet 2 tablet  2 tablet Oral Q6H PRN Pk العلي MD   2 tablet at 08/14/17 0511   • ipratropium-albuterol (DUO-NEB) nebulizer solution 3 mL  3 mL Nebulization Q6H While Awake - RT Pk العلي MD   3 mL at 08/14/17 0724   • Morphine sulfate (PF) injection 4 mg  4 mg Intravenous Q3H PRN Chong Crespo MD   4 mg at 08/14/17 0120   • ondansetron (ZOFRAN) injection 4 mg  4 mg Intravenous Q6H PRN Pk العلي MD   4 mg at 08/07/17 1542   • oxybutynin (DITROPAN) tablet 5 mg  5 mg Oral BID Abraham Varner MD   5 mg at 08/14/17 0842   • pantoprazole (PROTONIX) EC tablet 40 mg  40 mg Oral Daily Abraham Varner MD   40 mg at 08/14/17 0842   • sertraline (ZOLOFT) tablet 50 mg  50 mg Oral Daily Abraham Varner MD   50 mg at 08/14/17 0842   • sodium chloride 0.9 % flush 10 mL  10 mL Intravenous PRN David Bunn MD   10 mL at 08/12/17 1131       Assessment/Plan     Active Problems:    Abdominal fluid collection    47 yo lady with resolved bile leak and retained biloma after lap pk  Continue regular diet  Pain control  Pulmonary toilet  Continue PT  Pulmonology consult    Pk العلي MD  08/14/17  9:18 AM       Electronically signed by Pk العلي MD at 8/14/2017  9:20 AM      Rachel Enciso MD at 8/15/2017  8:19 AM  Version 1 of 1          PULMONARY PROGRESS NOTE  Rachel Enciso MD    [address]  8/15/2017        Maricarmen Ferris  [id number]  1970  47 y.o. female             LOS: 7 days   Pk العلي MD    Subjective     CC/Reason for visit: F/u hypoxic respiratory failure    HPI: Rough night as she could not get comfortable and was more conscious of her breathing. Still with orthopnea. Good diuresis. O2 just taken off but sats not recorded yet.     Review of Systems:  Respiratory ROS:  Review of Systems   Constitutional: Positive for fatigue.   Respiratory: Positive for cough and shortness of breath. Negative for wheezing.    Cardiovascular: Positive for leg swelling. Negative for chest pain.     All other systems were reviewed and were negative except as above in the HPI.    Objective     Vital Sign Min/Max for last 24 hours:  Temp  Min: 98 °F (36.7 °C)  Max: 98.8 °F (37.1 °C)   BP  Min: 103/53  Max: 133/69   Pulse  Min: 63  Max: 74   Resp  Min: 18  Max: 20   SpO2  Min: 91 %  Max: 99 %   Flow (L/min)  Min: 1  Max: 1.5   Weight  Min: 264 lb (120 kg)  Max: 264 lb (120 kg)     Physical Exam:  98 °F (36.7 °C) (Oral) 63 132/63 18 96% 264 lb (120 kg) Body mass index is 43.93 kg/(m^2).  Physical Exam   Constitutional: She is oriented to person, place, and time. Vital signs are normal. She appears well-developed and well-nourished.   Morbidly obese   HENT:   Head: Normocephalic and atraumatic.   Nose: Nose normal.   Eyes: EOM are normal.   Cardiovascular: Normal rate, regular rhythm and normal heart sounds.  Exam reveals no gallop.    No murmur heard.  Pulmonary/Chest: Effort normal and breath sounds normal. No respiratory distress. She has no wheezes. She has no rhonchi. She has no rales.   Abdominal: Soft. Normal appearance and bowel sounds are normal. There is tenderness in the right upper quadrant.   Morbidly obese   Musculoskeletal:   Trace bilateral lower extremity edema   Neurological: She is alert and  oriented to person, place, and time.   Skin: Skin is warm and dry. No cyanosis. Nails show no clubbing.   Psychiatric: She has a normal mood and affect. Her behavior is normal. Judgment normal. Cognition and memory are normal.       Scheduled meds:      buPROPion 100 mg Oral BID   enoxaparin 40 mg Subcutaneous Q24H   furosemide 20 mg Intravenous Q12H   oxybutynin 5 mg Oral BID   pantoprazole 40 mg Oral Daily   sertraline 50 mg Oral Daily       IV meds:                            Data Review: I personally reviewed the patient's medications and new clinical results.  Lab Results   Component Value Date    CALCIUM 9.1 08/15/2017    PHOS 4.0 08/07/2017     Results from last 7 days  Lab Units 08/15/17  0639 08/14/17  0531 08/13/17  0601   SODIUM mmol/L 138  --   --    POTASSIUM mmol/L 4.0  --   --    CHLORIDE mmol/L 93*  --   --    CO2 mmol/L 38.0*  --   --    BUN mg/dL 11  --   --    CREATININE mg/dL 0.72  --   --    GLUCOSE mg/dL 95  --   --    CALCIUM mg/dL 9.1  --   --    WBC 10*3/mm3 9.41 9.57 10.39*   HEMOGLOBIN g/dL 11.2* 10.6* 9.9*   PLATELETS 10*3/mm3 565* 593* 506*               Radiology: I independently reviewed the patient's new imaging, including images and reports.  Imaging Results (last 24 hours)     ** No results found for the last 24 hours. **          Assessment/Plan     ASSESSMENT:   # Acute hypoxic respiratory failure- likely a combination of hypervolemia, hypoventilation from obesity and abdominal surgery, and pulmonary hypertension  # Pulmonary hypertension as evidenced by enlarged PA on CTPA-likely secondary to untreated ELSIE, the patient did have exposure to phentermine  # Diastolic dysfunction  # Hypervolemia  # Status post cholecystectomy with subsequent bilioma and drainage  # Morbid obesity  # ELSIE/ OHS, noncompliant with CPAP  # Mild chronic hypercarbic respiratory failure with compensation  # Rhinitis due to oxygen use    PLAN/ RECOMMENDATIONS:  -Wean O2 to keep sat >89%.  Goal is to wean off  oxygen.  -Decrease lasix to 20mg IV daily  -Follow daily weights and urine output  -Consider repeat echocardiogram with PAH protocol interpreted by Dr. Iona Rodriguez as needed.  -Nasal saline as needed  -Encouraged out of bed and frequent ambulation as tolerated to prevent deconditioning  -Adequate pain control  -Incentive spirometry  -Needs outpatient sleep study to reestablish with CPAP  -PPX: Lovenox        Total time spent: 22 minutes      This document has been electronically signed by Rachel Enciso MD on August 15, 2017 8:20 AM      [phone]    Dictated using Dragon       Electronically signed by Rachel Enciso MD at 8/15/2017  8:24 AM      Pk العلي MD at 8/15/2017  8:29 AM  Version 1 of 1              LOS: 7 days   Patient Care Team:  MARIA TERESA Jorge as PCP - General    Chief Complaint:  <principal problem not specified>    Subjective     Interval History:   Pain and breathing improved. Lasix very effective.  [place]ed [place] this morning without oxygen and did not drop sats.  Did become SOA.  Pain improved.    Objective     Vital Signs  Temp:  [98 °F (36.7 °C)-98.8 °F (37.1 °C)] 98 °F (36.7 °C)  Heart Rate:  [63-74] 63  Resp:  [18-20] 18  BP: (103-133)/(53-69) 132/63    Physical Exam:  No change, wounds healed     Results Review:       Lab Results (last 24 hours)     Procedure Component Value Units Date/Time    Body Fluid Culture [[id number]]  (Normal) Collected:  08/11/17 1320    Specimen:  Body Fluid from Pleural Cavity Updated:  08/15/17 0653     BF Culture No growth at 4 days     Gram Stain Result Many (4+) WBCs seen      No No organisms seen    CBC & Differential [[id number]] Collected:  08/15/17 0639    Specimen:  Blood Updated:  08/15/17 0706    Narrative:       The following orders were created for panel order CBC & Differential.  Procedure                               Abnormality         Status                     ---------                               -----------          ------                     CBC Auto Differential[[id number]]        Abnormal            Final result                 Please view results for these tests on the individual orders.    CBC Auto Differential [[id number]]  (Abnormal) Collected:  08/15/17 0639    Specimen:  Blood Updated:  08/15/17 0706     WBC 9.41 10*3/mm3      RBC 4.17 10*6/mm3      Hemoglobin 11.2 (L) g/dL      Hematocrit 36.2 %      MCV 86.8 fL      MCH 26.9 pg      MCHC 30.9 (L) g/dL      RDW 13.6 %      RDW-SD 43.3 fl      MPV 9.9 fL      Platelets 565 (H) 10*3/mm3      Neutrophil % 58.6 %      Lymphocyte % 26.1 %      Monocyte % 9.2 %      Eosinophil % 5.6 %      Basophil % 0.2 %      Immature Grans % 0.3 %      Neutrophils, Absolute 5.50 10*3/mm3      Lymphocytes, Absolute 2.46 10*3/mm3      Monocytes, Absolute 0.87 10*3/mm3      Eosinophils, Absolute 0.53 10*3/mm3      Basophils, Absolute 0.02 10*3/mm3      Immature Grans, Absolute 0.03 (H) 10*3/mm3     Basic Metabolic Panel [[id number]]  (Abnormal) Collected:  08/15/17 0639    Specimen:  Blood Updated:  08/15/17 0714     Glucose 95 mg/dL      BUN 11 mg/dL      Creatinine 0.72 mg/dL      Sodium 138 mmol/L      Potassium 4.0 mmol/L      Chloride 93 (L) mmol/L      CO2 38.0 (H) mmol/L      Calcium 9.1 mg/dL      eGFR Non African Amer 87 mL/min/1.73      BUN/Creatinine Ratio 15.3     Anion Gap 7.0 mmol/L           Medication Review:   Current Facility-Administered Medications   Medication Dose Route Frequency Provider Last Rate Last Dose   • buPROPion (WELLBUTRIN) tablet 100 mg  100 mg Oral BID Abraham Varner MD   100 mg at 08/14/17 1801   • enoxaparin (LOVENOX) syringe 40 mg  40 mg Subcutaneous Q24H Pk العلي MD   40 mg at 08/14/17 0843   • [START ON 8/16/2017] furosemide (LASIX) injection 20 mg  20 mg Intravenous Daily Rachel Enciso MD       • HYDROcodone-acetaminophen (NORCO) 7.5-325 MG per tablet 2 tablet  2 tablet Oral Q6H PRN Pk العلي MD   2 tablet at 08/15/17 0359   • Morphine  sulfate (PF) injection 4 mg  4 mg Intravenous Q3H PRN Chong Crespo MD   4 mg at 08/14/17 2351   • ondansetron (ZOFRAN) injection 4 mg  4 mg Intravenous Q6H PRN Pk العلي MD   4 mg at 08/07/17 1542   • oxybutynin (DITROPAN) tablet 5 mg  5 mg Oral BID Abraham Varner MD   5 mg at 08/14/17 1801   • pantoprazole (PROTONIX) EC tablet 40 mg  40 mg Oral Daily Abraham Varner MD   40 mg at 08/14/17 0842   • sertraline (ZOLOFT) tablet 50 mg  50 mg Oral Daily Abraham Varner MD   50 mg at 08/14/17 0842   • sodium chloride (OCEAN) nasal spray 1 spray  1 spray Each Nare PRN Rachel Enciso MD       • sodium chloride 0.9 % flush 10 mL  10 mL Intravenous PRN David Bunn MD   10 mL at 08/12/17 1131       Assessment/Plan     Active Problems:    Abdominal fluid collection    47 yo lady with resolved bile leak after lap pk  Continue regular diet  Pain control  Pulmonary toilet  Continue PT  Continue lasix and diuresis  Much improved    Pk العلي MD  08/15/17  8:29 AM       Electronically signed by Pk العلي MD at 8/15/2017  8:31 AM      Rachel Enciso MD at 8/16/2017  8:22 AM  Version 1 of 1         PULMONARY PROGRESS NOTE  Rachel Enciso MD    [address]  8/16/2017        Maricarmen Ferris  [id number]  1970  47 y.o. female             LOS: 8 days   Pk العلي MD    Subjective     CC/Reason for visit: F/u hypoxic respiratory failure    HPI: Continues to improve. ~2L net negative yesterday. Off O2 this AM with adequate sats. Reports able to ambulate off O2 yesterday. Ready for d/c.    Review of Systems:  Respiratory ROS:  Review of Systems   Constitutional: Positive for fatigue.   Respiratory: Positive for shortness of breath. Negative for cough and wheezing.    Cardiovascular: Positive for leg swelling. Negative for chest pain.     All other systems were reviewed and were negative except as above in the HPI.    Objective     Vital Sign Min/Max for last 24 hours:  Temp  Min:  96.3 °F (35.7 °C)  Max: 97.9 °F (36.6 °C)   BP  Min: 115/62  Max: 139/78   Pulse  Min: 53  Max: 69   Resp  Min: 18  Max: 20   SpO2  Min: 91 %  Max: 95 %   Flow (L/min)  Min: 1  Max: 1   No Data Recorded     Physical Exam:  96.3 °F (35.7 °C) (Oral) 53 115/62 18 92% 264 lb (120 kg) Body mass index is 43.93 kg/(m^2).  Physical Exam   Constitutional: She is oriented to person, place, and time. Vital signs are normal. She appears well-developed and well-nourished.   Morbidly obese   HENT:   Head: Normocephalic and atraumatic.   Nose: Nose normal.   Eyes: EOM are normal.   Cardiovascular: Normal rate, regular rhythm and normal heart sounds.  Exam reveals no gallop.    No murmur heard.  Pulmonary/Chest: Effort normal and breath sounds normal. No respiratory distress. She has no wheezes. She has no rhonchi. She has no rales.   Abdominal: Soft. Normal appearance and bowel sounds are normal. There is tenderness in the right upper quadrant.   Morbidly obese   Musculoskeletal:   Trace bilateral lower extremity edema   Neurological: She is alert and oriented to person, place, and time.   Skin: Skin is warm and dry. No cyanosis. Nails show no clubbing.   Psychiatric: She has a normal mood and affect. Her behavior is normal. Judgment normal. Cognition and memory are normal.       Scheduled meds:      buPROPion 100 mg Oral BID   enoxaparin 40 mg Subcutaneous Q24H   furosemide 40 mg Oral Daily   oxybutynin 5 mg Oral BID   pantoprazole 40 mg Oral Daily   sertraline 50 mg Oral Daily       IV meds:                            Data Review: I personally reviewed the patient's medications and new clinical results.  Lab Results   Component Value Date    CALCIUM 9.1 08/15/2017    PHOS 4.0 08/07/2017       Results from last 7 days  Lab Units 08/16/17  0538 08/15/17  0639 08/14/17  0531   SODIUM mmol/L  --  138  --    POTASSIUM mmol/L  --  4.0  --    CHLORIDE mmol/L  --  93*  --    CO2 mmol/L  --  38.0*  --    BUN mg/dL  --  11  --     CREATININE mg/dL  --  0.72  --    GLUCOSE mg/dL  --  95  --    CALCIUM mg/dL  --  9.1  --    WBC 10*3/mm3 7.94 9.41 9.57   HEMOGLOBIN g/dL 11.3* 11.2* 10.6*   PLATELETS 10*3/mm3 594* 565* 593*               Radiology: I independently reviewed the patient's new imaging, including images and reports.  Imaging Results (last 24 hours)     Procedure Component Value Units Date/Time    XR Chest 1 View [[id number]] Collected:  08/16/17 0044     Updated:  08/16/17 0113    Narrative:         Chest single view on  8/16/2017     CLINICAL INDICATION: Change in breath sounds, shortness of breath    COMPARISON: 8/11/2017    FINDINGS: There is elevation of the right hemidiaphragm. There is  small adjacent right pleural effusion. Cardiomegaly is noted.  There is worsening vascular congestion. Hilar and mediastinal  contours are within normal limits. There is mild right basilar  atelectasis.      Impression:       Findings consistent with changes of CHF.    Electronically signed by:  Denis Power  8/16/2017 1:12 AM  CDT Workstation: [id number]          Assessment/Plan     ASSESSMENT:   # Acute hypoxic respiratory failure- likely a combination of hypervolemia, hypoventilation from obesity and abdominal surgery, and pulmonary hypertension  # Pulmonary hypertension as evidenced by enlarged PA on CTPA-likely secondary to untreated ELSIE, the patient did have exposure to phentermine  # Diastolic dysfunction  # Hypervolemia  # Status post cholecystectomy with subsequent bilioma and drainage  # Morbid obesity  # ELSIE/ OHS, noncompliant with CPAP  # Mild chronic hypercarbic respiratory failure with compensation  # Rhinitis due to oxygen use    PLAN/ RECOMMENDATIONS:  -Wean O2 to keep sat >89%.  Goal is to wean off oxygen.  -Change lasix to 40mg PO daily  -Follow daily weights and urine output  -TTE with PAH protocol interpreted by Dr. Iona Rodriguez as needed.  -Nasal saline as needed  -Encouraged out of bed and frequent  "ambulation as tolerated to prevent deconditioning  -Adequate pain control  -Incentive spirometry  -Needs outpatient sleep study to reestablish with CPAP  -PPX: Lovenox    If adequate RA sats (rest and exertion), ok for d/c home today. Needs to be seen by PCP Fri or Mon for BMP to monitor diuresis. Referral to Dr. Monson as outpt for PH.    Total time spent: 23 minutes      This document has been electronically signed by Rachel Enciso MD on August 16, 2017 8:22 AM      [phone]    Dictated using Dragon       Electronically signed by Rachel Enciso MD at 8/16/2017  8:24 AM           Consult Notes (last 72 hours) (Notes from 8/13/2017  9:58 AM through 8/16/2017  9:58 AM)      Rachel Enciso MD at 8/14/2017 12:24 PM  Version 2 of 2     Consult Orders:    1. Inpatient Consult to Pulmonology [[id number]] ordered by Pk العلي MD at 08/14/17 0917                PULMONARY CONSULT NOTE  Rachel Enciso MD    [address]  8/14/2017            Maricarmen Ferris  47 y.o. female  1970  [id number]            Requesting physician: Pk العلي MD    Reason for Consultation:  Hypoxemic respiratory failure    CC: \"I can't breathe\"    Subjective     History of Present Illness:  Maricarmen Ferris is a 47 y.o. female with PMH significant for morbid obesity, ELSIE noncompliant with CPAP, hypertension, and GERD who was admitted on 8/6/17 with abdominal pain.  Patient has compensated recent history which started a few weeks ago when she was admitted for an elective cholecystectomy.  Her surgery was unremarkable, but she soon developed severe abdominal pain after discharge and had to be readmitted.  She was found to have a bile leak so a drain was placed.  An attempt was made to place a biliary stent, but was unsuccessful and the patient had to be transferred to [facility] in [place].  She did undergo successful stent placement at level was discharged home.  Unfortunately, she began " experiencing recurrent abdominal pain and re-presented to the ED on 8/6/17.  During this time the patient reports onset of severe dyspnea on exertion as well as new orthopnea.  She denies any dyspnea or history of respiratory disease prior to current illness.  She did receive significant IV fluids during her multiple hospitalizations, although she is no longer receiving them.  Patient reports that she was diagnosed with apnea a while back, but no longer uses the CPAP as she did not find a comfortable.  Also, she was able to lose a significant amount of weight with the assistance of phentermine, but when this was stopped 2 months ago, she had some weight gain.  Prior to her surgery her weight was in the mid 250s, but most recently it is in the mid to 60s.  Currently, the patient is satting well on 1.5 L.  She did have a desat into the 70s on exertion this morning.  She is a never smoker but was exposed to secondhand smoke when she was young from her parents.  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  Her grandfather did have black lung and pulmonary fibrosis, but there is no other lung disease in the family.  She did have an echocardiogram during her last hospitalization which showed diastolic dysfunction, but did not comment on the RVSP or pulmonary artery size.    Review of Systems:    Review of Systems   Constitutional: Positive for fatigue and unexpected weight change.   Respiratory: Positive for cough, chest tightness and shortness of breath. Negative for wheezing.    Cardiovascular: Positive for leg swelling. Negative for chest pain.        Orthopnea   Gastrointestinal: Positive for abdominal distention and abdominal pain.     All systems were reviewed and negative except as noted above in the HPI.    Home Meds:  Prescriptions Prior to Admission   Medication Sig Dispense Refill Last Dose   • buPROPion (WELLBUTRIN) 100 MG tablet Take 100  mg by mouth 2 (Two) Times a Day.   8/6/2017 at 1000   • lisinopril (PRINIVIL,ZESTRIL) 10 MG tablet Take 10 mg by mouth Daily.   8/6/2017 at 1000   • pantoprazole (PROTONIX) 40 MG EC tablet Take 40 mg by mouth Daily.   8/6/2017 at 1000   • sertraline (ZOLOFT) 50 MG tablet Take 50 mg by mouth Daily.   8/6/2017 at 1000   • oxybutynin (DITROPAN) 5 MG tablet Take 5 mg by mouth.          Inpatient Meds:    Current Facility-Administered Medications:   •  buPROPion (WELLBUTRIN) tablet 100 mg, 100 mg, Oral, BID, Abraham Varner MD, 100 mg at 08/14/17 0842  •  enoxaparin (LOVENOX) syringe 40 mg, 40 mg, Subcutaneous, Q24H, Pk العلي MD, 40 mg at 08/14/17 0843  •  furosemide (LASIX) injection 20 mg, 20 mg, Intravenous, Q12H, Rachel Enciso MD, 20 mg at 08/14/17 1029  •  HYDROcodone-acetaminophen (NORCO) 7.5-325 MG per tablet 2 tablet, 2 tablet, Oral, Q6H PRN, Pk العلي MD, 2 tablet at 08/14/17 0511  •  ipratropium-albuterol (DUO-NEB) nebulizer solution 3 mL, 3 mL, Nebulization, Q4H PRN, Rachel Enciso MD  •  Morphine sulfate (PF) injection 4 mg, 4 mg, Intravenous, Q3H PRN, Chong Crespo MD, 4 mg at 08/14/17 1029  •  ondansetron (ZOFRAN) injection 4 mg, 4 mg, Intravenous, Q6H PRN, Pk العلي MD, 4 mg at 08/07/17 1542  •  oxybutynin (DITROPAN) tablet 5 mg, 5 mg, Oral, BID, Abraham Varner MD, 5 mg at 08/14/17 0842  •  pantoprazole (PROTONIX) EC tablet 40 mg, 40 mg, Oral, Daily, Abraham Varner MD, 40 mg at 08/14/17 0842  •  sertraline (ZOLOFT) tablet 50 mg, 50 mg, Oral, Daily, Abraham Varner MD, 50 mg at 08/14/17 0842  •  sodium chloride (OCEAN) nasal spray 1 spray, 1 spray, Each Nare, PRN, Rachel Enciso MD  •  Insert peripheral IV, , , Once **AND** sodium chloride 0.9 % flush 10 mL, 10 mL, Intravenous, PRN, David Bunn MD, 10 mL at 08/12/17 1131    Allergies:  Allergies   Allergen Reactions   • Sulfa Antibiotics Anaphylaxis and GI Intolerance       Past Medical History:  Past Medical History:   Diagnosis Date    • Acid reflux    • Anxiety    • Depressed    • Hard to intubate    • Hypertension    • Sleep apnea        Past Surgical History:  Past Surgical History:   Procedure Laterality Date   • ABDOMINAL SURGERY     • CYSTOSCOPY     • ENDOMETRIAL ABLATION  2015   • LAPAROSCOPIC TUBAL LIGATION  1995   • MT ERCP DX COLLECTION SPECIMEN BRUSHING/WASHING Left 7/31/2017    Procedure: ENDOSCOPIC RETROGRADE CHOLANGIOPANCREATOGRAPHY;  Surgeon: Samuel Redding DO;  Location: [facility] ENDOSCOPY;  Service: Gastroenterology   • MT LAP,CHOLECYSTECTOMY N/A 7/24/2017    Procedure: CHOLECYSTECTOMY LAPAROSCOPIC, also umbillical hernia repair;  Surgeon: Pk العلي MD;  Location: [facility] OR;  Service: General        Social History:   Social History     Social History   • Marital status:      Spouse name: N/A   • Number of children: N/A   • Years of education: N/A     Occupational History   • Not on file.     Social History Main Topics   • Smoking status: Never Smoker   • Smokeless tobacco: Never Used   • Alcohol use Yes      Comment: socially   • Drug use: No   • Sexual activity: Defer     Other Topics Concern   • Not on file     Social History Narrative       Family History:  History reviewed. No pertinent family history.    Objective     Vital Sign Min/Max for last 24 hours:  Temp  Min: 97 °F (36.1 °C)  Max: 99.1 °F (37.3 °C)   BP  Min: 133/69  Max: 150/80   Pulse  Min: 52  Max: 78   Resp  Min: 16  Max: 20   SpO2  Min: 93 %  Max: 99 %   Flow (L/min)  Min: 1.5  Max: 2   Weight  Min: 264 lb (120 kg)  Max: 264 lb (120 kg)     Physical Exam:  98.4 °F (36.9 °C) (Oral) 67 133/69 20 99% 264 lb (120 kg) Body mass index is 43.93 kg/(m^2).  Physical Exam   Constitutional: She is oriented to person, place, and time. Vital signs are normal. She appears well-developed and well-nourished.   Morbidly obese   HENT:   Head: Normocephalic and atraumatic.   Nose: Nose normal.   Mouth/Throat: Oropharynx is clear and moist and mucous membranes are  normal.   Mallampati 4   Eyes: Conjunctivae, EOM and lids are normal.   Neck: Trachea normal. Neck supple. No thyroid mass present.   Cardiovascular: Normal rate, regular rhythm and normal heart sounds.  Exam reveals no gallop.    No murmur heard.  Pulmonary/Chest: Effort normal. No respiratory distress. She has decreased breath sounds in the right lower field and the left lower field. She has no wheezes. She has no rhonchi. She has no rales.   Shallow effort   Abdominal: Soft. Normal appearance and bowel sounds are normal. There is tenderness in the right upper quadrant.   Morbidly obese   Musculoskeletal:   1+ BLE pitting edema   Lymphadenopathy:        Head (right side): No submandibular adenopathy present.        Head (left side): No submandibular adenopathy present.     She has no cervical adenopathy.        Right: No supraclavicular adenopathy present.        Left: No supraclavicular adenopathy present.   Neurological: She is alert and oriented to person, place, and time.   Skin: Skin is warm and dry. No cyanosis. Nails show no clubbing.   Psychiatric: She has a normal mood and affect. Her behavior is normal. Judgment normal. Cognition and memory are normal.       Data Review-     Labs: I personally reviewed latest laboratory results.  Lab Results (last 24 hours)     Procedure Component Value Units Date/Time    CBC Auto Differential [[id number]]  (Abnormal) Collected:  08/14/17 0531    Specimen:  Blood Updated:  08/14/17 0609     WBC 9.57 10*3/mm3      RBC 3.75 (L) 10*6/mm3      Hemoglobin 10.6 (L) g/dL      Hematocrit 32.2 (L) %      MCV 85.9 fL      MCH 28.3 pg      MCHC 32.9 g/dL      RDW 13.7 %      RDW-SD 43.6 fl      MPV 9.4 fL      Platelets 593 (H) 10*3/mm3      Neutrophil % 59.4 %      Lymphocyte % 26.2 %      Monocyte % 9.2 %      Eosinophil % 4.3 %      Basophil % 0.3 %      Immature Grans % 0.6 (H) %      Neutrophils, Absolute 5.68 10*3/mm3      Lymphocytes, Absolute 2.51 10*3/mm3      Monocytes,  Absolute 0.88 10*3/mm3      Eosinophils, Absolute 0.41 10*3/mm3      Basophils, Absolute 0.03 10*3/mm3      Immature Grans, Absolute 0.06 (H) 10*3/mm3     Body Fluid Culture [[id number]]  (Normal) Collected:  08/11/17 1320    Specimen:  Body Fluid from Pleural Cavity Updated:  08/14/17 0612     BF Culture No growth at 3 days     Gram Stain Result Many (4+) WBCs seen      No No organisms seen    CBC & Differential [[id number]] Collected:  08/14/17 0531    Specimen:  Blood Updated:  08/14/17 0618    Narrative:       The following orders were created for panel order CBC & Differential.  Procedure                               Abnormality         Status                     ---------                               -----------         ------                     Scan Slide[[id number]]                                                                  CBC Auto Differential[[id number]]        Abnormal            Final result                 Please view results for these tests on the individual orders.    Extra Tubes [[id number]] Collected:  08/14/17 0531    Specimen:  Blood from Blood, Venous Line Updated:  08/14/17 0701    Narrative:       The following orders were created for panel order Extra Tubes.  Procedure                               Abnormality         Status                     ---------                               -----------         ------                     Green Top (Gel)[[id number]]                                  Final result                 Please view results for these tests on the individual orders.    Green Top (Gel) [[id number]] Collected:  08/14/17 0531    Specimen:  Blood Updated:  08/14/17 0701     Extra Tube Hold for add-ons.      Auto resulted.              Imaging: I personally visualized the relevant images of scans/x-rays performed.  Imaging Results (last 72 hours)     Procedure Component Value Units Date/Time    CT Guided Thoracentesis Right [[id number]] Collected:  08/11/17 1307     Updated:   08/11/17 1343    Narrative:       CT-guided thoracentesis.       CLINICAL INDICATION: Right-sided pleural effusion and right  basilar atelectasis.       COMPARISON: CT chest August 6, 2017.       TECHNIQUE: Noncontrast helical scanning with axial and coronal  reformations. Soft tissue, lung, liver, and bone windows  reviewed.    This exam was performed according to our departmental  dose-optimization program, which includes automated exposure  control, adjustment of the mA and/or kV according to patient size  and/or use of iterative reconstruction technique.    CHEST CT FINDINGS: Using CT guidance a path was selected for  thoracentesis.    A 5 [place] multipurpose drainage catheter was introduced into the  inferolateral right chest.    500 mL of clear yellowish fluid was aspirated without difficulty.  Fluid was sent to the laboratory for requested exams.    Following thoracentesis, there is significant decrease in amount  of right-sided pleural fluid and improved aeration of the right  lower lobe. There is no pneumothorax.      Impression:       CONCLUSION: Technically successful and uneventful CT guided right  thoracentesis.    Electronically signed by:  David Monique MD  8/11/2017 1:42 PM CDT  Workstation: [id number]    XR Chest 1 View [[id number]] Collected:  08/11/17 1451     Updated:  08/11/17 1711    Narrative:         EXAM:         Radiograph(s), Chest   VIEWS:   Portable ; 1       DATE/TIME: 8/11/2017 5:04 PM CDT               INDICATION:     Pleural effusion follow-up, status post  thoracentesis      COMPARISON:   CXR: 8/10/17          FINDINGS:           - lines/tubes:     none     - cardiac:          size within normal limits         - mediastinum:  contour within normal limits         - lungs:          no focal air space process, pulmonary  interstitial edema, nodule(s)/mass             - pleura:          Interval decrease in size of the right pleural  fluid collection, no evidence of a pneumothorax.                   - osseous:          unremarkable for age                  - misc.:        Impression:       CONCLUSION:        1. Stable post procedural examination.                      Electronically signed by:  SUSANA Mijares MD  8/11/2017 5:10  PM CDT Workstation: [id number]          Assessment/Plan     Assessment:  # Acute hypoxic respiratory failure- likely a combination of hypervolemia, hypoventilation from obesity and abdominal surgery, and pulmonary hypertension  # Pulmonary hypertension as evidenced by enlarged PA on CTPA-likely secondary to untreated ELSIE, the patient did have exposure to phentermine  # Diastolic dysfunction  # Hypervolemia  # Status post cholecystectomy with subsequent bilioma and drainage  # Morbid obesity  # ELSIE/ OHS, noncompliant with CPAP  # Mild chronic hypercarbic respiratory failure with compensation  # Rhinitis due to oxygen use      Recommendations:  -Wean O2 to keep sat >89%.  Goal is to wean off oxygen.  -Lasix 20 mg IV twice daily  -Follow daily weights and urine output  -Consider repeat echocardiogram with PAH protocol interpreted by Dr. Monson  -Stop scheduled duo nebs as there is no indication.  Okay to continue as needed.  -Nasal saline as needed  -Encouraged out of bed and frequent ambulation as tolerated to prevent deconditioning  -Adequate pain control  -Incentive spirometry  -Needs outpatient sleep study to reestablish with CPAP  -PPX: Lovenox    If we do not make much headway over the next couple days, will consult Dr. Monson for assistance with pulmonary hypertension.    Thank you for allowing me to participate in the care of Maricarmen Ferris. Please do not hesitate to contact me with any questions.     Total time spent: 73 minutes      This document has been electronically signed by Rachel Enciso MD on August 14, 2017 12:25 PM      [phone]    Dictated using Dragon       Electronically signed by Rachel nEciso MD at 8/14/2017 12:50 PM      Rachel MCKENZIE  "MD Fernie at 8/14/2017 12:24 PM  Version 1 of 2         PULMONARY CONSULT NOTE  Rachel Enciso MD    [address]  8/14/2017            Maricarmen Ferris  47 y.o. female  1970  [id number]            Requesting physician: Pk العلي MD    Reason for Consultation:  Hypoxemic respiratory failure    CC: \"I can't breathe\"    Subjective     History of Present Illness:  Maricarmen Ferris is a 47 y.o. female with PMH significant for morbid obesity, ELSIE noncompliant with CPAP, hypertension, and GERD who was admitted on 8/6/17 with abdominal pain.  Patient has compensated recent history which started a few weeks ago when she was admitted for an elective cholecystectomy.  Her surgery was unremarkable, but she soon developed severe abdominal pain after discharge and had to be readmitted.  She was found to have a bile leak so a drain was placed.  An attempt was made to place a biliary stent, but was unsuccessful and the patient had to be transferred to [facility] in [place].  She did undergo successful stent placement at level was discharged home.  Unfortunately, she began experiencing recurrent abdominal pain and re-presented to the ED on 8/6/17.  During this time the patient reports onset of severe dyspnea on exertion as well as new orthopnea.  She denies any dyspnea or history of respiratory disease prior to current illness.  She did receive significant IV fluids during her multiple hospitalizations, although she is no longer receiving them.  Patient reports that she was diagnosed with apnea a while back, but no longer uses the CPAP as she did not find a comfortable.  Also, she was able to lose a significant amount of weight with the assistance of phentermine, but when this was stopped 2 months ago, she had some weight gain.  Prior to her surgery her weight was in the mid 250s, but most recently it is in the mid to 60s.  Currently, the patient is satting well on 1.5 L.  She did have a " desat into the 70s on exertion this morning.  She is a never smoker but was exposed to secondhand smoke when she was young from her parents.  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  Her grandfather did have black lung and pulmonary fibrosis, but there is no other lung disease in the family.  She did have an echocardiogram during her last hospitalization which showed diastolic dysfunction, but did not comment on the RVSP or pulmonary artery size.    Review of Systems:    Review of Systems   Constitutional: Positive for fatigue and unexpected weight change.   Respiratory: Positive for cough, chest tightness and shortness of breath. Negative for wheezing.    Cardiovascular: Positive for leg swelling. Negative for chest pain.        Orthopnea   Gastrointestinal: Positive for abdominal distention and abdominal pain.     All systems were reviewed and negative except as noted above in the HPI.    Home Meds:  Prescriptions Prior to Admission   Medication Sig Dispense Refill Last Dose   • buPROPion (WELLBUTRIN) 100 MG tablet Take 100 mg by mouth 2 (Two) Times a Day.   8/6/2017 at 1000   • lisinopril (PRINIVIL,ZESTRIL) 10 MG tablet Take 10 mg by mouth Daily.   8/6/2017 at 1000   • pantoprazole (PROTONIX) 40 MG EC tablet Take 40 mg by mouth Daily.   8/6/2017 at 1000   • sertraline (ZOLOFT) 50 MG tablet Take 50 mg by mouth Daily.   8/6/2017 at 1000   • oxybutynin (DITROPAN) 5 MG tablet Take 5 mg by mouth.          Inpatient Meds:    Current Facility-Administered Medications:   •  buPROPion (WELLBUTRIN) tablet 100 mg, 100 mg, Oral, BID, Abraham Varner MD, 100 mg at 08/14/17 0842  •  enoxaparin (LOVENOX) syringe 40 mg, 40 mg, Subcutaneous, Q24H, Pk العلي MD, 40 mg at 08/14/17 0843  •  furosemide (LASIX) injection 20 mg, 20 mg, Intravenous, Q12H, Rachel Enciso MD, 20 mg at 08/14/17 1029  •  HYDROcodone-acetaminophen (NORCO) 7.5-325 MG per tablet 2  tablet, 2 tablet, Oral, Q6H PRN, Pk العلي MD, 2 tablet at 08/14/17 0511  •  ipratropium-albuterol (DUO-NEB) nebulizer solution 3 mL, 3 mL, Nebulization, Q4H PRN, Rachel Enciso MD  •  Morphine sulfate (PF) injection 4 mg, 4 mg, Intravenous, Q3H PRN, Chong Crespo MD, 4 mg at 08/14/17 1029  •  ondansetron (ZOFRAN) injection 4 mg, 4 mg, Intravenous, Q6H PRN, Pk العلي MD, 4 mg at 08/07/17 1542  •  oxybutynin (DITROPAN) tablet 5 mg, 5 mg, Oral, BID, Abraham Varner MD, 5 mg at 08/14/17 0842  •  pantoprazole (PROTONIX) EC tablet 40 mg, 40 mg, Oral, Daily, Abraham Varner MD, 40 mg at 08/14/17 0842  •  sertraline (ZOLOFT) tablet 50 mg, 50 mg, Oral, Daily, Abraham Varner MD, 50 mg at 08/14/17 0842  •  sodium chloride (OCEAN) nasal spray 1 spray, 1 spray, Each Nare, PRN, Rachel Enciso MD  •  Insert peripheral IV, , , Once **AND** sodium chloride 0.9 % flush 10 mL, 10 mL, Intravenous, PRN, David Bunn MD, 10 mL at 08/12/17 1131    Allergies:  Allergies   Allergen Reactions   • Sulfa Antibiotics Anaphylaxis and GI Intolerance       Past Medical History:  Past Medical History:   Diagnosis Date   • Acid reflux    • Anxiety    • Depressed    • Hard to intubate    • Hypertension    • Sleep apnea        Past Surgical History:  Past Surgical History:   Procedure Laterality Date   • ABDOMINAL SURGERY     • CYSTOSCOPY     • ENDOMETRIAL ABLATION  2015   • LAPAROSCOPIC TUBAL LIGATION  1995   • MI ERCP DX COLLECTION SPECIMEN BRUSHING/WASHING Left 7/31/2017    Procedure: ENDOSCOPIC RETROGRADE CHOLANGIOPANCREATOGRAPHY;  Surgeon: Samuel Redding DO;  Location: [facility] ENDOSCOPY;  Service: Gastroenterology   • MI LAP,CHOLECYSTECTOMY N/A 7/24/2017    Procedure: CHOLECYSTECTOMY LAPAROSCOPIC, also umbillical hernia repair;  Surgeon: Pk العلي MD;  Location: [facility] OR;  Service: General        Social History:   Social History     Social History   • Marital status:      Spouse name: N/A   • Number of children: N/A    • Years of education: N/A     Occupational History   • Not on file.     Social History Main Topics   • Smoking status: Never Smoker   • Smokeless tobacco: Never Used   • Alcohol use Yes      Comment: socially   • Drug use: No   • Sexual activity: Defer     Other Topics Concern   • Not on file     Social History Narrative       Family History:  History reviewed. No pertinent family history.    Objective     Vital Sign Min/Max for last 24 hours:  Temp  Min: 97 °F (36.1 °C)  Max: 99.1 °F (37.3 °C)   BP  Min: 133/69  Max: 150/80   Pulse  Min: 52  Max: 78   Resp  Min: 16  Max: 20   SpO2  Min: 93 %  Max: 99 %   Flow (L/min)  Min: 1.5  Max: 2   Weight  Min: 264 lb (120 kg)  Max: 264 lb (120 kg)     Physical Exam:  98.4 °F (36.9 °C) (Oral) 67 133/69 20 99% 264 lb (120 kg) Body mass index is 43.93 kg/(m^2).  Physical Exam   Constitutional: She is oriented to person, place, and time. Vital signs are normal. She appears well-developed and well-nourished.   Morbidly obese   HENT:   Head: Normocephalic and atraumatic.   Nose: Nose normal.   Mouth/Throat: Oropharynx is clear and moist and mucous membranes are normal.   Mallampati 4   Eyes: Conjunctivae, EOM and lids are normal.   Neck: Trachea normal. Neck supple. No thyroid mass present.   Cardiovascular: Normal rate, regular rhythm and normal heart sounds.  Exam reveals no gallop.    No murmur heard.  Pulmonary/Chest: Effort normal. No respiratory distress. She has decreased breath sounds in the right lower field and the left lower field. She has no wheezes. She has no rhonchi. She has no rales.   Shallow effort   Abdominal: Soft. Normal appearance and bowel sounds are normal. There is tenderness in the right upper quadrant.   Morbidly obese   Musculoskeletal:   1+ BLE pitting edema   Lymphadenopathy:        Head (right side): No submandibular adenopathy present.        Head (left side): No submandibular adenopathy present.     She has no cervical adenopathy.        Right: No  supraclavicular adenopathy present.        Left: No supraclavicular adenopathy present.   Neurological: She is alert and oriented to person, place, and time.   Skin: Skin is warm and dry. No cyanosis. Nails show no clubbing.   Psychiatric: She has a normal mood and affect. Her behavior is normal. Judgment normal. Cognition and memory are normal.       Data Review-     Labs: I personally reviewed latest laboratory results.  Lab Results (last 24 hours)     Procedure Component Value Units Date/Time    CBC Auto Differential [[id number]]  (Abnormal) Collected:  08/14/17 0531    Specimen:  Blood Updated:  08/14/17 0609     WBC 9.57 10*3/mm3      RBC 3.75 (L) 10*6/mm3      Hemoglobin 10.6 (L) g/dL      Hematocrit 32.2 (L) %      MCV 85.9 fL      MCH 28.3 pg      MCHC 32.9 g/dL      RDW 13.7 %      RDW-SD 43.6 fl      MPV 9.4 fL      Platelets 593 (H) 10*3/mm3      Neutrophil % 59.4 %      Lymphocyte % 26.2 %      Monocyte % 9.2 %      Eosinophil % 4.3 %      Basophil % 0.3 %      Immature Grans % 0.6 (H) %      Neutrophils, Absolute 5.68 10*3/mm3      Lymphocytes, Absolute 2.51 10*3/mm3      Monocytes, Absolute 0.88 10*3/mm3      Eosinophils, Absolute 0.41 10*3/mm3      Basophils, Absolute 0.03 10*3/mm3      Immature Grans, Absolute 0.06 (H) 10*3/mm3     Body Fluid Culture [[id number]]  (Normal) Collected:  08/11/17 1320    Specimen:  Body Fluid from Pleural Cavity Updated:  08/14/17 0612     BF Culture No growth at 3 days     Gram Stain Result Many (4+) WBCs seen      No No organisms seen    CBC & Differential [[id number]] Collected:  08/14/17 0531    Specimen:  Blood Updated:  08/14/17 0618    Narrative:       The following orders were created for panel order CBC & Differential.  Procedure                               Abnormality         Status                     ---------                               -----------         ------                     Scan Slide[[id number]]                                                                   CBC Auto Differential[[id number]]        Abnormal            Final result                 Please view results for these tests on the individual orders.    Extra Tubes [[id number]] Collected:  08/14/17 0531    Specimen:  Blood from Blood, Venous Line Updated:  08/14/17 0701    Narrative:       The following orders were created for panel order Extra Tubes.  Procedure                               Abnormality         Status                     ---------                               -----------         ------                     Green Top (Gel)[[id number]]                                  Final result                 Please view results for these tests on the individual orders.    Green Top (Gel) [[id number]] Collected:  08/14/17 0531    Specimen:  Blood Updated:  08/14/17 0701     Extra Tube Hold for add-ons.      Auto resulted.              Imaging: I personally visualized the relevant images of scans/x-rays performed.  Imaging Results (last 72 hours)     Procedure Component Value Units Date/Time    CT Guided Thoracentesis Right [[id number]] Collected:  08/11/17 1307     Updated:  08/11/17 1343    Narrative:       CT-guided thoracentesis.       CLINICAL INDICATION: Right-sided pleural effusion and right  basilar atelectasis.       COMPARISON: CT chest August 6, 2017.       TECHNIQUE: Noncontrast helical scanning with axial and coronal  reformations. Soft tissue, lung, liver, and bone windows  reviewed.    This exam was performed according to our departmental  dose-optimization program, which includes automated exposure  control, adjustment of the mA and/or kV according to patient size  and/or use of iterative reconstruction technique.    CHEST CT FINDINGS: Using CT guidance a path was selected for  thoracentesis.    A 5 Greek multipurpose drainage catheter was introduced into the  inferolateral right chest.    500 mL of clear yellowish fluid was aspirated without difficulty.  Fluid was sent to the laboratory  for requested exams.    Following thoracentesis, there is significant decrease in amount  of right-sided pleural fluid and improved aeration of the right  lower lobe. There is no pneumothorax.      Impression:       CONCLUSION: Technically successful and uneventful CT guided right  thoracentesis.    Electronically signed by:  David Monique MD  8/11/2017 1:42 PM CDT  Workstation: [id number]    XR Chest 1 View [[id number]] Collected:  08/11/17 1451     Updated:  08/11/17 1711    Narrative:         EXAM:         Radiograph(s), Chest   VIEWS:   Portable ; 1       DATE/TIME: 8/11/2017 5:04 PM CDT               INDICATION:     Pleural effusion follow-up, status post  thoracentesis      COMPARISON:   CXR: 8/10/17          FINDINGS:           - lines/tubes:     none     - cardiac:          size within normal limits         - mediastinum:  contour within normal limits         - lungs:          no focal air space process, pulmonary  interstitial edema, nodule(s)/mass             - pleura:          Interval decrease in size of the right pleural  fluid collection, no evidence of a pneumothorax.                  - osseous:          unremarkable for age                  - misc.:        Impression:       CONCLUSION:        1. Stable post procedural examination.                      Electronically signed by:  SUSANA Mijares MD  8/11/2017 5:10  PM CDT Workstation: [id number]          Assessment/Plan     Assessment:  # Acute hypoxic respiratory failure- likely a combination of hypervolemia, hypoventilation from obesity and abdominal surgery, and pulmonary hypertension  # Pulmonary hypertension as evidenced by enlarged PA on CTPA-likely secondary to untreated ELSIE, the patient did have exposure to phentermine  # Diastolic dysfunction  # Hypervolemia  # Status post cholecystectomy with subsequent bilioma and drainage  # Morbid obesity  # ELSIE, noncompliant with CPAP  # Rhinitis due to oxygen use      Recommendations:  -Wean O2 to keep  sat >89%.  Goal is to wean off oxygen.  -Lasix 20 mg IV twice daily  -Follow daily weights and urine output  -Consider repeat echocardiogram with PAH protocol interpreted by Dr. Monson  -Stop scheduled duo nebs as there is no indication.  Okay to continue as needed.  -Nasal saline as needed  -Encouraged out of bed and frequent ambulation as tolerated to prevent deconditioning  -Adequate pain control  -Incentive spirometry  -Needs outpatient sleep study to reestablish with CPAP  -PPX: Lovenox    If we do not make much headway over the next couple days, will consult Dr. Monson for assistance with pulmonary hypertension.    Thank you for allowing me to participate in the care of Maricarmen Ferris. Please do not hesitate to contact me with any questions.     Total time spent: 73 minutes      This document has been electronically signed by Rachel Enciso MD on August 14, 2017 12:25 PM      [phone]    Dictated using Dragon       Electronically signed by Rachel Enciso MD at 8/14/2017 12:47 PM

## 2017-08-16 NOTE — PLAN OF CARE
Problem: Patient Care Overview (Adult)  Goal: Plan of Care Review  Outcome: Ongoing (interventions implemented as appropriate)    08/16/17 0237   Coping/Psychosocial Response Interventions   Plan Of Care Reviewed With patient   Patient Care Overview   Progress no change   Outcome Evaluation   Outcome Summary/Follow up Plan VSS, O2 94% room air, had to get PRN neb tx ordered die to pt stating she felt she couldnt't lie down and sleep due to SOA       Goal: Adult Individualization and Mutuality  Outcome: Ongoing (interventions implemented as appropriate)  Goal: Discharge Needs Assessment  Outcome: Ongoing (interventions implemented as appropriate)    Problem: Pain, Acute (Adult)  Goal: Acceptable Pain Control/Comfort Level  Outcome: Ongoing (interventions implemented as appropriate)    Problem: Respiratory Insufficiency (Adult)  Goal: Identify Related Risk Factors and Signs and Symptoms  Outcome: Ongoing (interventions implemented as appropriate)  Goal: Acid/Base Balance  Outcome: Ongoing (interventions implemented as appropriate)  Goal: Effective Ventilation  Outcome: Ongoing (interventions implemented as appropriate)

## 2017-08-16 NOTE — DISCHARGE SUMMARY
Discharge Summary  Date of Admission: 8/6/17  Date of Discharge: 8/16/17   Service: General Surgery  Attending: Pk العلي  Procedures: percutaneous drainage of biloma and pleural effusion  Discharge Diagnoses: hypoxic respiratory failure, deconditioning, bile leak (resolved)  Hospital Course: 48 yo lady 2 weeks s/p lap pk complicated by bile leak.  Received ERCP in [place] for resolution of bile leak.  Her percutaneous drain came out early and she started having symptoms again.  She arrived with SOA and RUQ abd pain.  Pain was due to retained biloma that could not be drained again.  Thoracentesis was attempted without success for resolution of pleural effusion.  Pulm consult revealed pulmonary HTN as the source of her respiratory symptoms.  This was treated with diuresis which successfully resolved her hypoxia.     Discharge Medications:     Your medication list      START taking these medications       Instructions Last Dose Given Next Dose Due    furosemide 40 MG tablet   Commonly known as:  LASIX        Take 1 tablet by mouth Daily.         HYDROcodone-acetaminophen 7.5-325 MG per tablet   Commonly known as:  NORCO        Take 1-2 tablets by mouth Every 6 (Six) Hours As Needed for Moderate Pain  for up to 4 days.           CONTINUE taking these medications       Instructions Last Dose Given Next Dose Due    buPROPion 100 MG tablet   Commonly known as:  WELLBUTRIN              lisinopril 10 MG tablet   Commonly known as:  PRINIVIL,ZESTRIL              oxybutynin 5 MG tablet   Commonly known as:  DITROPAN              pantoprazole 40 MG EC tablet   Commonly known as:  PROTONIX              sertraline 50 MG tablet   Commonly known as:  ZOLOFT                   Where to Get Your Medications      These medications were sent to [facility], [place] - [address] [place] [address] [address] [phone]  - [phone] [address] [place], [facility] [address]     Phone:   [phone]    • furosemide 40 MG tablet         You can get these medications from any pharmacy     Bring a paper prescription for each of these medications    • HYDROcodone-acetaminophen 7.5-325 MG per tablet           Disposition: home  Your Scheduled Appointments     Sep 05, 2017  9:30 AM CDT   New Patient with Abdoulaye Dooley MD   [facility] (--)    [address] [place] [address]   [place] [id number] [phone]           Bring all previous medical records and films, along with current medications and insurance information.                F/u with me in 1 week  F/u Dr Kaur as scheduled  F/u with sleep medicine for sleep study          This document has been electronically signed by Pk العلي MD on August 16, 2017 9:42 AM

## 2017-08-16 NOTE — PROGRESS NOTES
LOS: 8 days   Patient Care Team:  MARIA TERESA Jorge as PCP - General    Chief Complaint:  <principal problem not specified>    Subjective     Interval History:   Feels better, off oxygen during day and with ambulation, minimal pain    Objective     Vital Signs  Temp:  [96.3 °F (35.7 °C)-97.9 °F (36.6 °C)] 96.3 °F (35.7 °C)  Heart Rate:  [53-69] 53  Resp:  [18-20] 18  BP: (115-139)/(57-78) 115/62    Physical Exam:  No change     Results Review:       Lab Results (last 24 hours)     Procedure Component Value Units Date/Time    CBC Auto Differential [[id number]]  (Abnormal) Collected:  08/16/17 0538    Specimen:  Blood Updated:  08/16/17 0638     WBC 7.94 10*3/mm3      RBC 4.08 10*6/mm3      Hemoglobin 11.3 (L) g/dL      Hematocrit 34.8 (L) %      MCV 85.3 fL      MCH 27.7 pg      MCHC 32.5 g/dL      RDW 13.4 %      RDW-SD 42.3 fl      MPV 9.3 fL      Platelets 594 (H) 10*3/mm3      Neutrophil % 50.9 %      Lymphocyte % 31.0 %      Monocyte % 9.9 %      Eosinophil % 7.7 (H) %      Basophil % 0.1 %      Immature Grans % 0.4 %      Neutrophils, Absolute 4.04 10*3/mm3      Lymphocytes, Absolute 2.46 10*3/mm3      Monocytes, Absolute 0.79 10*3/mm3      Eosinophils, Absolute 0.61 10*3/mm3      Basophils, Absolute 0.01 10*3/mm3      Immature Grans, Absolute 0.03 (H) 10*3/mm3     Extra Tubes [[id number]] Collected:  08/16/17 0538    Specimen:  Blood from Blood, Venous Line Updated:  08/16/17 0701    Narrative:       The following orders were created for panel order Extra Tubes.  Procedure                               Abnormality         Status                     ---------                               -----------         ------                     Green Top (Gel)[[id number]]                                  Final result                 Please view results for these tests on the individual orders.    Green Top (Gel) [[id number]] Collected:  08/16/17 0538    Specimen:  Blood Updated:  08/16/17 0701     Extra Tube Hold  for add-ons.      Auto resulted.       CBC & Differential [[id number]] Collected:  08/16/17 0538    Specimen:  Blood Updated:  08/16/17 0707    Narrative:       The following orders were created for panel order CBC & Differential.  Procedure                               Abnormality         Status                     ---------                               -----------         ------                     Scan Slide[[id number]]                                       Final result               CBC Auto Differential[[id number]]        Abnormal            Final result                 Please view results for these tests on the individual orders.    Scan Slide [[id number]] Collected:  08/16/17 0538    Specimen:  Blood Updated:  08/16/17 0707     Anisocytosis Slight/1+     WBC Morphology Normal     Platelet Estimate Increased      RARE GIANT PLATELET SEEN       Body Fluid Culture [[id number]]  (Normal) Collected:  08/11/17 1320    Specimen:  Body Fluid from Pleural Cavity Updated:  08/16/17 0735     BF Culture No growth at 5 days     Gram Stain Result Many (4+) WBCs seen      No No organisms seen    Basic Metabolic Panel [[id number]]  (Abnormal) Collected:  08/16/17 0538    Specimen:  Blood Updated:  08/16/17 0947     Glucose 84 mg/dL      BUN 10 mg/dL      Creatinine 0.78 mg/dL      Sodium 139 mmol/L      Potassium 4.0 mmol/L      Chloride 97 mmol/L      CO2 36.0 (H) mmol/L      Calcium 9.0 mg/dL      eGFR Non African Amer 79 mL/min/1.73      BUN/Creatinine Ratio 12.8     Anion Gap 6.0 mmol/L           Medication Review:   Current Facility-Administered Medications   Medication Dose Route Frequency Provider Last Rate Last Dose   • albuterol (PROVENTIL) nebulizer solution 0.083% 2.5 mg/3mL  2.5 mg Nebulization Q4H PRN Abraham Varner MD       • buPROPion (WELLBUTRIN) tablet 100 mg  100 mg Oral BID Abraham Varner MD   100 mg at 08/16/17 0848   • enoxaparin (LOVENOX) syringe 40 mg  40 mg Subcutaneous Q24H Pk العلي MD   40 mg at  08/16/17 0849   • furosemide (LASIX) tablet 40 mg  40 mg Oral Daily Rachel Enciso MD   40 mg at 08/16/17 0849   • HYDROcodone-acetaminophen (NORCO) 7.5-325 MG per tablet 2 tablet  2 tablet Oral Q6H PRN Pk العلي MD   2 tablet at 08/16/17 0851   • Morphine sulfate (PF) injection 4 mg  4 mg Intravenous Q3H PRN Chong Crespo MD   4 mg at 08/14/17 2351   • ondansetron (ZOFRAN) injection 4 mg  4 mg Intravenous Q6H PRN Pk العلي MD   4 mg at 08/07/17 1542   • oxybutynin (DITROPAN) tablet 5 mg  5 mg Oral BID Abraham Varner MD   5 mg at 08/16/17 0848   • pantoprazole (PROTONIX) EC tablet 40 mg  40 mg Oral Daily Abraham Varner MD   40 mg at 08/16/17 0849   • sertraline (ZOLOFT) tablet 50 mg  50 mg Oral Daily Abraham Varner MD   50 mg at 08/16/17 0848   • sodium chloride (OCEAN) nasal spray 1 spray  1 spray Each Nare PRN Rachel Enciso MD       • sodium chloride 0.9 % flush 10 mL  10 mL Intravenous PRN David Bunn MD   10 mL at 08/12/17 1131       Assessment/Plan     Active Problems:    Abdominal fluid collection    47 yo lady with resolved bile leak after lap pk  Hypoxic respiratory failure improved  Continue regular diet  Pain control  Pulmonary toilet  Continue PT  Continue lasix and diuresis  Much improved  Home today with oral pain meds and lasix  F/u with sleep medicine  Home oxygen at night as needed  BMP later this week  Echo today and f/u Dr Monson for pulm HTN    Pk العلي MD  08/16/17  9:58 AM

## 2017-08-16 NOTE — PROGRESS NOTES
PULMONARY PROGRESS NOTE  Rachel Enciso MD    [address]  8/16/2017        Maricarmen Ferris  [id number]  1970  47 y.o. female             LOS: 8 days   Pk العلي MD    Subjective     CC/Reason for visit: F/u hypoxic respiratory failure    HPI: Continues to improve. ~2L net negative yesterday. Off O2 this AM with adequate sats. Reports able to ambulate off O2 yesterday. Ready for d/c.    Review of Systems:  Respiratory ROS:  Review of Systems   Constitutional: Positive for fatigue.   Respiratory: Positive for shortness of breath. Negative for cough and wheezing.    Cardiovascular: Positive for leg swelling. Negative for chest pain.     All other systems were reviewed and were negative except as above in the HPI.    Objective     Vital Sign Min/Max for last 24 hours:  Temp  Min: 96.3 °F (35.7 °C)  Max: 97.9 °F (36.6 °C)   BP  Min: 115/62  Max: 139/78   Pulse  Min: 53  Max: 69   Resp  Min: 18  Max: 20   SpO2  Min: 91 %  Max: 95 %   Flow (L/min)  Min: 1  Max: 1   No Data Recorded     Physical Exam:  96.3 °F (35.7 °C) (Oral) 53 115/62 18 92% 264 lb (120 kg) Body mass index is 43.93 kg/(m^2).  Physical Exam   Constitutional: She is oriented to person, place, and time. Vital signs are normal. She appears well-developed and well-nourished.   Morbidly obese   HENT:   Head: Normocephalic and atraumatic.   Nose: Nose normal.   Eyes: EOM are normal.   Cardiovascular: Normal rate, regular rhythm and normal heart sounds.  Exam reveals no gallop.    No murmur heard.  Pulmonary/Chest: Effort normal and breath sounds normal. No respiratory distress. She has no wheezes. She has no rhonchi. She has no rales.   Abdominal: Soft. Normal appearance and bowel sounds are normal. There is tenderness in the right upper quadrant.   Morbidly obese   Musculoskeletal:   Trace bilateral lower extremity edema   Neurological: She is alert and oriented to person, place, and time.   Skin: Skin is warm and dry.  No cyanosis. Nails show no clubbing.   Psychiatric: She has a normal mood and affect. Her behavior is normal. Judgment normal. Cognition and memory are normal.       Scheduled meds:      buPROPion 100 mg Oral BID   enoxaparin 40 mg Subcutaneous Q24H   furosemide 40 mg Oral Daily   oxybutynin 5 mg Oral BID   pantoprazole 40 mg Oral Daily   sertraline 50 mg Oral Daily       IV meds:                            Data Review: I personally reviewed the patient's medications and new clinical results.  Lab Results   Component Value Date    CALCIUM 9.1 08/15/2017    PHOS 4.0 08/07/2017       Results from last 7 days  Lab Units 08/16/17  0538 08/15/17  0639 08/14/17  0531   SODIUM mmol/L  --  138  --    POTASSIUM mmol/L  --  4.0  --    CHLORIDE mmol/L  --  93*  --    CO2 mmol/L  --  38.0*  --    BUN mg/dL  --  11  --    CREATININE mg/dL  --  0.72  --    GLUCOSE mg/dL  --  95  --    CALCIUM mg/dL  --  9.1  --    WBC 10*3/mm3 7.94 9.41 9.57   HEMOGLOBIN g/dL 11.3* 11.2* 10.6*   PLATELETS 10*3/mm3 594* 565* 593*               Radiology: I independently reviewed the patient's new imaging, including images and reports.  Imaging Results (last 24 hours)     Procedure Component Value Units Date/Time    XR Chest 1 View [[id number]] Collected:  08/16/17 0044     Updated:  08/16/17 0113    Narrative:         Chest single view on  8/16/2017     CLINICAL INDICATION: Change in breath sounds, shortness of breath    COMPARISON: 8/11/2017    FINDINGS: There is elevation of the right hemidiaphragm. There is  small adjacent right pleural effusion. Cardiomegaly is noted.  There is worsening vascular congestion. Hilar and mediastinal  contours are within normal limits. There is mild right basilar  atelectasis.      Impression:       Findings consistent with changes of CHF.    Electronically signed by:  Denis Power  8/16/2017 1:12 AM  CDT Workstation: [id number]          Assessment/Plan     ASSESSMENT:   # Acute hypoxic respiratory  failure- likely a combination of hypervolemia, hypoventilation from obesity and abdominal surgery, and pulmonary hypertension  # Pulmonary hypertension as evidenced by enlarged PA on CTPA-likely secondary to untreated ELSIE, the patient did have exposure to phentermine  # Diastolic dysfunction  # Hypervolemia  # Status post cholecystectomy with subsequent bilioma and drainage  # Morbid obesity  # ELSIE/ OHS, noncompliant with CPAP  # Mild chronic hypercarbic respiratory failure with compensation  # Rhinitis due to oxygen use    PLAN/ RECOMMENDATIONS:  -Wean O2 to keep sat >89%.  Goal is to wean off oxygen.  -Change lasix to 40mg PO daily  -Follow daily weights and urine output  -TTE with PAH protocol interpreted by Dr. Iona Rodriguez as needed.  -Nasal saline as needed  -Encouraged out of bed and frequent ambulation as tolerated to prevent deconditioning  -Adequate pain control  -Incentive spirometry  -Needs outpatient sleep study to reestablish with CPAP  -PPX: Lovenox    If adequate RA sats (rest and exertion), ok for d/c home today. Needs to be seen by PCP Fri or Mon for BMP to monitor diuresis. Referral to Dr. Monson as outpt for PH.    Total time spent: 23 minutes      This document has been electronically signed by Rachel Enciso MD on August 16, 2017 8:22 AM      [phone]    Dictated using Dragon

## 2017-08-16 NOTE — THERAPY DISCHARGE NOTE
Acute Care - Physical Therapy Treatment Note/Discharge  [facility]     Patient Name: Maricarmen Ferris  : 1970  MRN: [id number]  Today's Date: 2017  Onset of Illness/Injury or Date of Surgery Date: 17  Date of Referral to PT: 17  Referring Physician: Dr. Chong Crespo    Admit Date: 2017    Visit Dx:    ICD-10-CM ICD-9-CM   1. Abdominal fluid collection R18.8 789.59   2. Pleural effusion J90 511.9   3. Impaired physical mobility Z74.09 781.99     Patient Active Problem List   Diagnosis   • Chronic cholecystitis   • Umbilical hernia without obstruction and without gangrene   • Abdominal pain   • Abdominal fluid collection   • Intra-hepatic bile leak       Physical Therapy Education     Title: PT OT SLP Therapies (Active)     Topic: Physical Therapy (Active)     Point: Mobility training (Active)    Learning Progress Summary    Learner Readiness Method Response Comment Documented by Status   Patient Acceptance E NR  LINDSEY 17 1315 Active    Acceptance E NR   17 1247 Active    Acceptance E NR  [facility] 17 1726 Active               Point: Precautions (Active)    Learning Progress Summary    Learner Readiness Method Response Comment Documented by Status   Patient Acceptance E NR   17 1247 Active    Acceptance E NR  [facility] 17 1726 Active                      User Key     Initials Effective Dates Name Provider Type Discipline    [facility] 10/17/16 -  Mey Sullivan, PT Physical Therapist PT     10/17/16 -  Tony Tom PTA Physical Therapy Assistant PT    LN 10/17/16 -  Ele Plascencia, PTA Physical Therapy Assistant PT                    IP PT Goals       08/15/17 0808 17 1240 17 1103    Gait Training PT LTG    Gait Training Goal PT LTG, Date Goal Reviewed 08/15/17  -JW 17  -LINDSEY 17  -LN    Gait Training Goal PT LTG, Outcome  goal partially met  - goal not met  -LN    Stair Training PT LTG    Stair Training Goal PT LTG, Date Goal Reviewed  08/15/17  -JW 08/14/17  -LINDSEY 08/13/17  -LN    Stair Training Goal PT LTG, Outcome  goal ongoing  - goal not met  -LN    Patient Education PT LTG    Patient Education PT LTG, Date Goal Reviewed 08/15/17  -JW 08/14/17  -LINDSEY 08/13/17  -LN    Patient Education PT LTG Outcome goal ongoing  [facility]  goal not met  -LN      08/12/17 1727          Gait Training PT LTG    Gait Training Goal PT LTG, Date Established 08/12/17  -A      Gait Training Goal PT LTG, Time to Achieve by discharge  -A      Gait Training Goal PT LTG, [place] Level independent  -[facility]      Gait Training Goal PT LTG, Distance to Achieve [id number];O2 sats will not drop below 92%  -A      Gait Training Goal PT LTG, Additional Goal [id number];O2 sats will not drop below 92%  -A      Gait Training Goal PT LTG, Outcome goal ongoing  -[facility]      Stair Training PT LTG    Stair Training Goal PT LTG, Date Established 08/12/17  -[facility]      Stair Training Goal PT LTG, Time to Achieve by discharge  -[facility]      Stair Training Goal PT LTG, Number of Steps 3  -A      Stair Training Goal PT LTG, Assist Device 1 handrail  -[facility]      Stair Training Goal PT LTG, Additional Goal O2 sats will not drop below 92%  -A      Stair Training Goal PT LTG, Outcome goal ongoing  -[facility]      Patient Education PT LTG    Patient Education PT LTG, Date Established 08/12/17  -[facility]      Patient Education PT LTG, Time to Achieve by discharge  -[facility]      Patient Education PT LTG, Education Type energy conservation;home safety  -[facility]      Patient Education PT LTG Outcome goal ongoing  -[facility]        User Key  (r) = Recorded By, (t) = Taken By, (c) = Cosigned By    Initials Name Provider Type    SAI Norirs, PTA Physical Therapy Assistant    ALDA Sullivan, PT Physical Therapist    LINDSEY Tom, PTA Physical Therapy Assistant    DAX Plascencia, PTA Physical Therapy Assistant              Adult Rehabilitation Note       08/16/17 0820 08/15/17 0808 08/14/17 1240    Rehab  Assessment/Intervention    Discipline physical therapy assistant  -AM physical therapy assistant  -JW physical therapy assistant  -LINDSEY    Document Type therapy note (daily note)  -AM therapy note (daily note)  -JW therapy note (daily note)  -LINDSEY    Subjective Information agree to therapy;no complaints  -AM agree to therapy   pt states it is too early, but still agreeable  -JW agree to therapy  -LINDSEY    Patient Effort, Rehab Treatment good  -AM good  -JW good  -LINDSEY    Symptoms Noted During/After Treatment none  -AM shortness of breath  -JW shortness of breath;significant change in vital signs  -LINDSEY    Precautions/Limitations no known precautions/limitations  -AM oxygen therapy device and L/min   vital signs; desats w/ O2 watch exercise tolerance  - oxygen therapy device and L/min   vs, desats w/ O2, watch ex tolerance  -LINDSEY    Recorded by [AM] Zach Jaimes, PTA [JW] Elena Norris, RICHELLE [LINDSEY] Tony Tom, RICHELLE    Vital Signs    Pre Systolic BP Rehab 132  -AM  144  -LINDSEY    Pre Treatment Diastolic BP 81  -AM  81  -LINDSEY    Post Systolic BP Rehab 145  -AM  133  -LINDSEY    Post Treatment Diastolic BP 64  -AM  77  -LINDSEY    Pretreatment Heart Rate (beats/min) 75  -AM 58  -JW 69  -LINDSEY    Intratreatment Heart Rate (beats/min)  70  -JW 90  -LINDSEY    Posttreatment Heart Rate (beats/min) 89  -AM 75  -JW 74  -LINDSEY    Pre SpO2 (%) 97  -AM 90   up to 93 w/ PLB  -JW 99  -LINDSEY    O2 Delivery Pre Treatment room air  -AM room air  - supplemental O2  -LINDSEY    Intra SpO2 (%)  91   after 1st gt, 87 after 2nd gt, 90 after 3rd gait  -JW 93   after gait. 98% after 2nd gait trip.   -LINDSEY    O2 Delivery Intra Treatment  room air  - supplemental O2  -LINDSEY    Post SpO2 (%) 93  -AM 94  -JW 98  -LINDSEY    O2 Delivery Post Treatment room air  -AM room air  - supplemental O2  -LINDSEY    Pre Patient Position Supine  -AM Supine  -JW Supine   pt had been sitting up all morning. had just gotten to bed.  -LINDSEY    Intra Patient Position Standing  -AM      Post Patient Position  Sitting  -AM Sitting  -JW Supine  -LINDSEY    Recorded by [AM] Zach Jaimes PTA [JW] Elena Norris, RICHELLE [LINDSEY] Tony Tom PTA    Pain Assessment    Pain Assessment No/denies pain  -AM 0-10  -JW 0-10  -LINDSEY    Pain Score  --   pt states yes but didn't rate  -JW 8  -LINDSEY    Post Pain Score   8  -LINDSEY    Pain Type   Acute pain  -LINDSEY    Pain Location  Rib cage  -JW Rib cage  -LINDSEY    Pain Intervention(s)  Medication (See MAR)  -JW Medication (See MAR);Ambulation/increased activity  -LINDSEY    Recorded by [AM] Zach Jaimes PTA [JW] Elena Norris PTA [LINDSEY] Tony Tom PTA    Vision Assessment/Intervention    Visual Impairment   WFL with corrective lenses  -LINDSEY    Recorded by   [LINDSEY] Tony Tom PTA    Cognitive Assessment/Intervention    Current Cognitive/Communication Assessment functional  -AM functional  -JW functional  -LINDSEY    Orientation Status oriented x 4  -AM oriented x 4  -JW oriented x 4  -LINDSEY    Follows Commands/Answers Questions 100% of the time  -% of the time  -% of the time  -LINDSEY    Personal Safety   WNL/WFL  -LINDSEY    Personal Safety Interventions gait belt;nonskid shoes/slippers when out of bed;supervised activity  -AM nonskid shoes/slippers when out of bed  -JW     Recorded by [AM] Zach Jaimes PTA [JW] Elena Norris, PTA [LINDSEY] Tony Tom PTA    ROM (Range of Motion)    General ROM no range of motion deficits identified  -AM      Recorded by [AM] Zach Jaimes PTA      Bed Mobility, Assessment/Treatment    Bed Mobility, Assistive Device  head of bed elevated  -JW bed rails  -LINDSEY    Bed Mobility, Roll Left, [place] not tested  -AM      Bed Mobility, Roll Right, [place] not tested  -AM      Bed Mobility, Scoot/Bridge, [place] not tested  -AM      Bed Mob, Supine to Sit, [place] independent  -AM conditional independence  -JW conditional independence  -LINDSEY    Bed Mob, Sit to Supine, [place] independent  -AM  conditional independence  -LINDSEY    Bed  Mob, Sidelying to Sit, [place] not tested  -AM      Bed Mob, Sit to Sidelying, [place] not tested  -AM      Recorded by [AM] Zach Jaimes PTA [JW] Elena Norris, PTA [LINDSEY] Tony Tom PTA    Transfer Assessment/Treatment    Transfers, Bed-Chair [place] independent  -AM      Transfers, Chair-Bed [place] independent  -AM      Transfers, Sit-Stand [place] independent  -AM independent  -JW independent  -LINDSEY    Transfers, Stand-Sit [place] independent  -AM independent  -JW independent  -LINDSEY    Transfers, Sit-Stand-Sit, Assist Device  other (see comments)   none  -JW     Toilet Transfer, [place] not tested  -AM      Walk-In Shower Transfer, [place] not tested  -AM      Bathtub Transfer, [place] not tested  -AM      Transfer, Maintain Weight Bearing Status able to maintain weight bearing status  -AM      Recorded by [AM] Zach Jaimes PTA [JW] Elena Norris, PTA [LINDSEY] Tony Tom PTA    Gait Assessment/Treatment    Gait, [place] Level independent  -AM independent  -JW independent  -LINDSEY    Gait, Assistive Device --   none  -AM --   none  -JW --   no AD  -LINDSEY    Gait, Distance (Feet) 1000   300+300+200+200  -   180, 150  -   260 ft. 1st gait trip required multiple standing rests.   -LINDSEY    Gait, Gait Pattern Analysis swing-through gait  -AM      Gait, Maintain Weight Bearing Status able to maintain weight bearing status  -AM      Gait, Comment  pt w/ standing rest break between 1st and 2nd gt distances, pt w/ sitting rest break between 2nd and 3rd gait distances  -JW     Recorded by [AM] Zach Jaimes PTA [JW] Elena Norris, PTA [LINDSEY] Tony Tom PTA    Stairs Assessment/Treatment    Number of Stairs 3  -AM      Stairs, Handrail Location right side (ascending)  -AM      Stairs, [place] Level independent  -AM      Stairs, Technique Used step over step (ascending);step over step (descending)  -AM      Stairs, Maintain  Weight Bearing Status able to maintain weight bearing status  -AM      Recorded by [AM] Zach Jaimes PTA      Positioning and Restraints    Pre-Treatment Position in bed  -AM in bed  -JW in bed  -LINDSEY    Post Treatment Position bed  -AM chair  -JW bed  -LINDSEY    In Bed sitting EOB;call light within reach;encouraged to call for assist  -AM  supine;call light within reach;encouraged to call for assist;with family/caregiver   all needs met.   -LINDSEY    In Chair  sitting;call light within reach;encouraged to call for assist   MD present  -     Recorded by [AM] Zach Jaimes, RICHELLE [JW] Elena Norris, RICHELLE [LINDSEY] Tony Tom PTA      User Key  (r) = Recorded By, (t) = Taken By, (c) = Cosigned By    Initials Name Effective Dates    JW Elena Norris, PTA 08/11/15 -     LINDSEY Tony Tom, RICHELLE 10/17/16 -     AM Zach Jaimes PTA 10/17/16 -           PT Recommendation and Plan  Anticipated Discharge Disposition: home with home health  Planned Therapy Interventions: gait training, stair training, transfer training, strengthening  PT Frequency: other (see comments) (5-14 times per week)             Outcome Measures       08/16/17 0820 08/15/17 0808 08/14/17 1240    How much help from another person do you currently need...    Turning from your back to your side while in flat bed without using bedrails? 4  -AM 4  - 4  -LINDSEY    Moving from lying on back to sitting on the side of a flat bed without bedrails? 4  -AM 4  - 4  -LINDSEY    Moving to and from a bed to a chair (including a wheelchair)? 4  -AM 4  - 4  -LINDSEY    Standing up from a chair using your arms (e.g., wheelchair, bedside chair)? 4  -AM 4  - 4  -LINDSEY    Climbing 3-5 steps with a railing? 4  -AM 3  - 3  -LINDSEY    To walk in hospital room? 4  -AM 4  - 4  -LINDSEY    AM-PAC 6 Clicks Score 24  -AM 23  - 23  -    Functional Assessment    Outcome Measure Options AM-PAC 6 Clicks Basic Mobility (PT)  -AM AM-PAC 6 Clicks Basic Mobility (PT)  -JW AM-PAC 6 Clicks  Basic Mobility (PT)  -LINDSEY      User Key  (r) = Recorded By, (t) = Taken By, (c) = Cosigned By    Initials Name Provider Type    SAI Norris, PTA Physical Therapy Assistant    LINDSEY Tom PTA Physical Therapy Assistant    SHIVAM Jaimes PTA Physical Therapy Assistant           Time Calculation:         PT Charges       08/16/17 0820          Time Calculation    Start Time 0820  -AM      Stop Time 0900  -AM      Time Calculation (min) 40 min  -AM      PT Received On 08/16/17  -AM      Time Calculation- PT    Total Timed Code Minutes- PT 40 minute(s)  -AM        User Key  (r) = Recorded By, (t) = Taken By, (c) = Cosigned By    Initials Name Provider Type    AM Zach Jaimes PTA Physical Therapy Assistant          Therapy Charges for Today     Code Description Service Date Service Provider Modifiers Qty    82031336822 HC GAIT TRAINING EA 15 MIN 8/16/2017 Zach Jaimes PTA GP 1    72744473100 HC PT THER PROC EA 15 MIN 8/16/2017 Zach Jaimes PTA GP 2          PT G-Codes  PT Professional Judgement Used?: Yes  Outcome Measure Options: AM-PAC 6 Clicks Basic Mobility (PT)  Score: 22  Functional Limitation: Mobility: Walking and moving around  Mobility: Walking and Moving Around Current Status (): At least 20 percent but less than 40 percent impaired, limited or restricted  Mobility: Walking and Moving Around Goal Status (): 0 percent impaired, limited or restricted    PT Discharge Summary  Anticipated Discharge Disposition: home with home health  Reason for Discharge: All goals achieved, Discharge from facility, Per MD order  Outcomes Achieved: Able to achieve all goals within established timeline  Discharge Destination: Home    Zach Jaimes PTA  8/16/2017

## 2017-08-16 NOTE — PLAN OF CARE
Problem: Inpatient Physical Therapy  Goal: Gait Training Goal LTG- PT  Outcome: Outcome(s) achieved Date Met:  08/16/17 08/12/17 1727 08/16/17 0820   Gait Training PT LTG   Gait Training Goal PT LTG, Date Established 08/12/17 --    Gait Training Goal PT LTG, Time to Achieve by discharge --    Gait Training Goal PT LTG, [place] Level independent --    Gait Training Goal PT LTG, Distance to Achieve [id number];O2 sats will not drop below 92% --    Gait Training Goal PT LTG, Additional Goal [id number];O2 sats will not drop below 92% --    Gait Training Goal PT LTG, Date Goal Reviewed --  08/16/17   Gait Training Goal PT LTG, Outcome --  goal met       Goal: Stair Training Goal LTG- PT  Outcome: Outcome(s) achieved Date Met:  08/16/17 08/12/17 1727 08/16/17 0820   Stair Training PT LTG   Stair Training Goal PT LTG, Date Established 08/12/17 --    Stair Training Goal PT LTG, Time to Achieve by discharge --    Stair Training Goal PT LTG, Number of Steps 3 --    Stair Training Goal PT LTG, Assist Device 1 handrail --    Stair Training Goal PT LTG, Additional Goal O2 sats will not drop below 92% --    Stair Training Goal PT LTG, Date Goal Reviewed --  08/16/17   Stair Training Goal PT LTG, Outcome --  goal met       Goal: Patient Education Goal LTG- PT  Outcome: Outcome(s) achieved Date Met:  08/16/17 08/12/17 1727 08/16/17 0820   Patient Education PT LTG   Patient Education PT LTG, Date Established 08/12/17 --    Patient Education PT LTG, Time to Achieve by discharge --    Patient Education PT LTG, Education Type energy conservation;home safety --    Patient Education PT LTG, Date Goal Reviewed --  08/16/17   Patient Education PT LTG Outcome --  goal met

## 2017-08-18 ENCOUNTER — LAB (OUTPATIENT)
Dept: LAB | Facility: HOSPITAL | Age: 47
End: 2017-08-18

## 2017-08-18 ENCOUNTER — OFFICE VISIT (OUTPATIENT)
Dept: SURGERY | Facility: CLINIC | Age: 47
End: 2017-08-18

## 2017-08-18 VITALS
HEIGHT: 65 IN | WEIGHT: 247 LBS | BODY MASS INDEX: 41.15 KG/M2 | SYSTOLIC BLOOD PRESSURE: 128 MMHG | DIASTOLIC BLOOD PRESSURE: 90 MMHG

## 2017-08-18 DIAGNOSIS — Z74.09 IMPAIRED PHYSICAL MOBILITY: ICD-10-CM

## 2017-08-18 DIAGNOSIS — G47.01 INSOMNIA DUE TO MEDICAL CONDITION: Primary | ICD-10-CM

## 2017-08-18 LAB
ANION GAP SERPL CALCULATED.3IONS-SCNC: 12 MMOL/L (ref 5–15)
BUN BLD-MCNC: 11 MG/DL (ref 7–21)
BUN/CREAT SERPL: 15.3 (ref 7–25)
CALCIUM SPEC-SCNC: 9.5 MG/DL (ref 8.4–10.2)
CHLORIDE SERPL-SCNC: 100 MMOL/L (ref 95–110)
CO2 SERPL-SCNC: 27 MMOL/L (ref 22–31)
CREAT BLD-MCNC: 0.72 MG/DL (ref 0.5–1)
GFR SERPL CREATININE-BSD FRML MDRD: 87 ML/MIN/1.73 (ref 58–135)
GLUCOSE BLD-MCNC: 98 MG/DL (ref 60–100)
POTASSIUM BLD-SCNC: 4.3 MMOL/L (ref 3.5–5.1)
SODIUM BLD-SCNC: 139 MMOL/L (ref 137–145)

## 2017-08-18 PROCEDURE — 80048 BASIC METABOLIC PNL TOTAL CA: CPT

## 2017-08-18 PROCEDURE — 99024 POSTOP FOLLOW-UP VISIT: CPT | Performed by: SURGERY

## 2017-08-18 PROCEDURE — 36415 COLL VENOUS BLD VENIPUNCTURE: CPT

## 2017-08-18 RX ORDER — TRAZODONE HYDROCHLORIDE 50 MG/1
50 TABLET ORAL NIGHTLY
Qty: 30 TABLET | Refills: 0 | Status: SHIPPED | OUTPATIENT
Start: 2017-08-18 | End: 2017-08-23 | Stop reason: ALTCHOICE

## 2017-08-18 RX ORDER — DIPHENHYDRAMINE HCL 25 MG
25 CAPSULE ORAL NIGHTLY PRN
Status: DISCONTINUED | OUTPATIENT
Start: 2017-08-18 | End: 2018-04-01

## 2017-08-18 RX ORDER — OXYCODONE HYDROCHLORIDE AND ACETAMINOPHEN 5; 325 MG/1; MG/1
1-2 TABLET ORAL EVERY 6 HOURS PRN
Qty: 30 TABLET | Refills: 0 | Status: SHIPPED | OUTPATIENT
Start: 2017-08-18 | End: 2017-08-23

## 2017-08-22 ENCOUNTER — OFFICE VISIT (OUTPATIENT)
Dept: SLEEP MEDICINE | Facility: HOSPITAL | Age: 47
End: 2017-08-22

## 2017-08-22 VITALS
HEIGHT: 65 IN | DIASTOLIC BLOOD PRESSURE: 76 MMHG | SYSTOLIC BLOOD PRESSURE: 120 MMHG | OXYGEN SATURATION: 97 % | HEART RATE: 85 BPM | WEIGHT: 251 LBS | BODY MASS INDEX: 41.82 KG/M2

## 2017-08-22 DIAGNOSIS — G47.33 OBSTRUCTIVE SLEEP APNEA, ADULT: Primary | ICD-10-CM

## 2017-08-22 DIAGNOSIS — F51.04 PSYCHOPHYSIOLOGICAL INSOMNIA: ICD-10-CM

## 2017-08-22 DIAGNOSIS — Z72.821 INADEQUATE SLEEP HYGIENE: ICD-10-CM

## 2017-08-22 PROCEDURE — 99214 OFFICE O/P EST MOD 30 MIN: CPT | Performed by: INTERNAL MEDICINE

## 2017-08-22 NOTE — PROGRESS NOTES
"Sleep Clinic Follow Up    Date: 8/22/2017  Primary Care Physician: MARIA TERESA Jorge      Interim History (1/3):  Since the last visit on 01/27/2015, patient has not followed up. She had complex gallbladder removal in June 207, complicated by bile duct laceration, stent placement, peritonitis? She was started on trazodone while in the hospital. She has known ELSIE with split night polysomnography  on 05/12/2015 that showed and RDI of 61.1. She was titrated to CPAP of 6 cm H2O with a residual AHI of 0.6. She admits to having worm CPAP in the past, however, has not been on for some time (insurance issues). Since her last visit in 2015 she continues, with excessive daytime sleepiness, fatigue, and non-restorative sleep. She did not have CPAP machine ordered at her last visit. She has not work  PAP therapy in 8-9 years. She takes wellbutrin and zoloft for depression, trazodone for insomnia.    Before surgery :  Bedtime is ~ 2300, takes up to 1 hour to fall asleep (plays candy crush in bed). She is up ~ 0730, drinks 5-6 cokes per day (prior to hospitalization), no tea or coffee. She had 1 pint of alcohol daily. She has daily naps but denies RLS. She has sleeptalking.     PMHx, FH, SH reviewed and pertinent changes are: as above    REVIEW OF SYSTEMS:   Negative for chest pain, fever, chills, SOA, abdominal pain. Smoking: none  Wilmar - 17  Exam (6-11/12):    Vitals:    08/22/17 1117   BP: 120/76   Pulse: 85   SpO2: 97%     Body mass index is 41.77 kg/(m^2).    Neck circumference: 16\"            General: Alert. Cooperative. Well developed. No acute distress.             Head:  Normocephalic. Symmetrical. Atraumatic.              Eyes: Sclera clear. No icterus. PERRLA. Normal EOM.             Ears: No deformities. Normal hearing.             Nose: No septal deviation. No drainage.          Throat: No oral lesions. No thrush. Moist mucous membranes.    Tongue is enlarged    Dentition is fair       Pharynx: Posterior " pharyngeal pillars are wide    Mallampati score of IV (only hard palate visible)    Pharynx is nonerythematous   Chest Wall:  Normal shape. Symmetric expansion with respiration. No tenderness.             Neck:  Trachea midline.           Lungs:  Clear to auscultation bilaterally. No wheezes. No rhonchi. No rales. Respirations regular, even and unlabored.            Heart:  Regular rhythm and normal rate. Normal S1 and S2. No murmur.     Abdomen:  Soft, obese, mildly tender to deep palpation  Extremities:  Moves all extremities well. No edema.           Pulses: Pulses palpable and equal bilaterally.               Skin: Dry. Intact. No bleeding. No rash.           Neuro: Moves all 4 extremities and cranial nerves grossly intact.  Psychiatric: Normal mood and affect.        Past Medical History:   Diagnosis Date   • Acid reflux    • Anxiety    • Depressed    • Hard to intubate    • Hypertension    • Sleep apnea        Current Outpatient Prescriptions:   •  buPROPion (WELLBUTRIN) 100 MG tablet, Take 100 mg by mouth 2 (Two) Times a Day., Disp: , Rfl:   •  furosemide (LASIX) 40 MG tablet, Take 1 tablet by mouth Daily., Disp: 30 tablet, Rfl: 3  •  lisinopril (PRINIVIL,ZESTRIL) 10 MG tablet, Take 10 mg by mouth Daily., Disp: , Rfl:   •  oxybutynin (DITROPAN) 5 MG tablet, Take 5 mg by mouth., Disp: , Rfl:   •  oxyCODONE-acetaminophen (ROXICET) 5-325 MG per tablet, Take 1-2 tablets by mouth Every 6 (Six) Hours As Needed for Severe Pain ., Disp: 30 tablet, Rfl: 0  •  pantoprazole (PROTONIX) 40 MG EC tablet, Take 40 mg by mouth Daily., Disp: , Rfl:   •  sertraline (ZOLOFT) 50 MG tablet, Take 50 mg by mouth Daily., Disp: , Rfl:   •  traZODone (DESYREL) 50 MG tablet, Take 1 tablet by mouth Every Night., Disp: 30 tablet, Rfl: 0    Current Facility-Administered Medications:   •  diphenhydrAMINE (BENADRYL) capsule 25 mg, 25 mg, Oral, Nightly PRN, Pk العلي MD      ASSESSMENT (3/4):    1. Obstructive sleep apnea (PSG on  5/12/2005, AHI of 61.1, on CPAP of 6 cm H2O), currently on nothing -under controlled. Will order home sleep test - with follow up after.  2. Sleep onset insomina - multifactorial (rcent hospitalization, pain, medication, behavioral) - suggest stopping trazodone, maintain regular schedule  3. Poor sleep hygiene - turn off phone in bed       This document has been electronically signed by Abdoulaye Dooley MD on August 22, 2017         CC: MARIA TERESA Jorge          No ref. provider found

## 2017-08-23 ENCOUNTER — LAB (OUTPATIENT)
Dept: LAB | Facility: HOSPITAL | Age: 47
End: 2017-08-23

## 2017-08-23 ENCOUNTER — OFFICE VISIT (OUTPATIENT)
Dept: SURGERY | Facility: CLINIC | Age: 47
End: 2017-08-23

## 2017-08-23 VITALS
BODY MASS INDEX: 41.65 KG/M2 | DIASTOLIC BLOOD PRESSURE: 68 MMHG | WEIGHT: 250 LBS | SYSTOLIC BLOOD PRESSURE: 118 MMHG | HEIGHT: 65 IN

## 2017-08-23 DIAGNOSIS — R10.11 RIGHT UPPER QUADRANT ABDOMINAL PAIN: Primary | ICD-10-CM

## 2017-08-23 DIAGNOSIS — R10.11 RIGHT UPPER QUADRANT ABDOMINAL PAIN: ICD-10-CM

## 2017-08-23 LAB
ALBUMIN SERPL-MCNC: 4.1 G/DL (ref 3.4–4.8)
ALBUMIN/GLOB SERPL: 1 G/DL (ref 1.1–1.8)
ALP SERPL-CCNC: 78 U/L (ref 38–126)
ALT SERPL W P-5'-P-CCNC: 32 U/L (ref 9–52)
ANION GAP SERPL CALCULATED.3IONS-SCNC: 12 MMOL/L (ref 5–15)
AST SERPL-CCNC: 28 U/L (ref 14–36)
BILIRUB SERPL-MCNC: 0.6 MG/DL (ref 0.2–1.3)
BUN BLD-MCNC: 18 MG/DL (ref 7–21)
BUN/CREAT SERPL: 21.4 (ref 7–25)
CALCIUM SPEC-SCNC: 9.5 MG/DL (ref 8.4–10.2)
CHLORIDE SERPL-SCNC: 99 MMOL/L (ref 95–110)
CO2 SERPL-SCNC: 25 MMOL/L (ref 22–31)
CREAT BLD-MCNC: 0.84 MG/DL (ref 0.5–1)
GFR SERPL CREATININE-BSD FRML MDRD: 73 ML/MIN/1.73 (ref 58–135)
GLOBULIN UR ELPH-MCNC: 4.2 GM/DL (ref 2.3–3.5)
GLUCOSE BLD-MCNC: 97 MG/DL (ref 60–100)
POTASSIUM BLD-SCNC: 4.1 MMOL/L (ref 3.5–5.1)
PROT SERPL-MCNC: 8.3 G/DL (ref 6.3–8.6)
SODIUM BLD-SCNC: 136 MMOL/L (ref 137–145)
WHOLE BLOOD HOLD SPECIMEN: NORMAL

## 2017-08-23 PROCEDURE — 99024 POSTOP FOLLOW-UP VISIT: CPT | Performed by: SURGERY

## 2017-08-23 PROCEDURE — 80053 COMPREHEN METABOLIC PANEL: CPT

## 2017-08-23 PROCEDURE — 36415 COLL VENOUS BLD VENIPUNCTURE: CPT

## 2017-08-23 RX ORDER — HYDROCODONE BITARTRATE AND ACETAMINOPHEN 7.5; 325 MG/1; MG/1
1 TABLET ORAL EVERY 6 HOURS PRN
Qty: 30 TABLET | Refills: 0 | Status: SHIPPED | OUTPATIENT
Start: 2017-08-23 | End: 2017-08-31 | Stop reason: SDUPTHER

## 2017-08-23 NOTE — PROGRESS NOTES
This is a 47-year-old lady one month s/p laparoscopic cholecystectomy, get a postop bile leak.  She is much improved.  Getting better finally.  Eating and pain is controlled.  She is sleeping better at night.  They did not approve her home hospital bed or her home oxygen.  She's been checking her sats and her O2 sats only dropped once the grocery store when she was walking.  She's also sleep doctor yesterday is set up for her sleep study.  She has seen Dr. Kaur September 11.  The percocet is not working well and she wants Norco back instead.  She still having some right upper quadrant pain and an chest on his shortness of breath that she had before.  We'll check a CT scan to see if anything is changed at this time.  Follow-up after CT scan.

## 2017-08-24 ENCOUNTER — HOSPITAL ENCOUNTER (OUTPATIENT)
Dept: CT IMAGING | Facility: HOSPITAL | Age: 47
End: 2017-08-24

## 2017-08-30 ENCOUNTER — HOSPITAL ENCOUNTER (OUTPATIENT)
Dept: CT IMAGING | Facility: HOSPITAL | Age: 47
Discharge: HOME | End: 2017-08-30
Admitting: SURGERY

## 2017-08-30 DIAGNOSIS — R10.11 RIGHT UPPER QUADRANT ABDOMINAL PAIN: ICD-10-CM

## 2017-08-30 PROCEDURE — 0 IOPAMIDOL 61 % SOLUTION: Performed by: SURGERY

## 2017-08-30 PROCEDURE — 74177 CT ABD & PELVIS W/CONTRAST: CPT

## 2017-08-30 RX ADMIN — IOPAMIDOL 94 ML: 612 INJECTION, SOLUTION INTRAVENOUS at 08:30

## 2017-08-31 ENCOUNTER — OFFICE VISIT (OUTPATIENT)
Dept: SURGERY | Facility: CLINIC | Age: 47
End: 2017-08-31

## 2017-08-31 VITALS
DIASTOLIC BLOOD PRESSURE: 72 MMHG | SYSTOLIC BLOOD PRESSURE: 110 MMHG | WEIGHT: 252 LBS | BODY MASS INDEX: 41.99 KG/M2 | HEIGHT: 65 IN

## 2017-08-31 DIAGNOSIS — R10.84 GENERALIZED ABDOMINAL PAIN: Primary | ICD-10-CM

## 2017-08-31 PROCEDURE — 99024 POSTOP FOLLOW-UP VISIT: CPT | Performed by: SURGERY

## 2017-08-31 RX ORDER — HYDROCODONE BITARTRATE AND ACETAMINOPHEN 7.5; 325 MG/1; MG/1
1 TABLET ORAL EVERY 6 HOURS PRN
Qty: 30 TABLET | Refills: 0 | Status: SHIPPED | OUTPATIENT
Start: 2017-08-31 | End: 2017-09-18 | Stop reason: RX

## 2017-09-06 ENCOUNTER — HOSPITAL ENCOUNTER (OUTPATIENT)
Dept: SLEEP MEDICINE | Facility: HOSPITAL | Age: 47
Discharge: HOME | End: 2017-09-06
Admitting: INTERNAL MEDICINE

## 2017-09-06 DIAGNOSIS — G47.33 OBSTRUCTIVE SLEEP APNEA, ADULT: ICD-10-CM

## 2017-09-06 PROCEDURE — G0399 HOME SLEEP TEST/TYPE 3 PORTA: HCPCS

## 2017-09-06 PROCEDURE — 95806 SLEEP STUDY UNATT&RESP EFFT: CPT | Performed by: INTERNAL MEDICINE

## 2017-09-06 PROCEDURE — 95806 SLEEP STUDY UNATT&RESP EFFT: CPT

## 2017-09-10 LAB
MYCOBACTERIUM SPEC CULT: NORMAL
NIGHT BLUE STAIN TISS: NORMAL

## 2017-09-12 DIAGNOSIS — G47.33 OSA (OBSTRUCTIVE SLEEP APNEA): Primary | ICD-10-CM

## 2017-09-18 ENCOUNTER — OFFICE VISIT (OUTPATIENT)
Dept: SURGERY | Facility: CLINIC | Age: 47
End: 2017-09-18

## 2017-09-18 VITALS
BODY MASS INDEX: 41.65 KG/M2 | DIASTOLIC BLOOD PRESSURE: 74 MMHG | SYSTOLIC BLOOD PRESSURE: 116 MMHG | WEIGHT: 250 LBS | HEIGHT: 65 IN

## 2017-09-18 DIAGNOSIS — R10.84 GENERALIZED ABDOMINAL PAIN: Primary | ICD-10-CM

## 2017-09-18 PROCEDURE — 99024 POSTOP FOLLOW-UP VISIT: CPT | Performed by: SURGERY

## 2017-09-18 RX ORDER — HYDROCODONE BITARTRATE AND ACETAMINOPHEN 7.5; 325 MG/1; MG/1
1 TABLET ORAL EVERY 6 HOURS PRN
Qty: 30 TABLET | Refills: 0 | Status: SHIPPED | OUTPATIENT
Start: 2017-09-18 | End: 2017-09-27 | Stop reason: SDUPTHER

## 2017-09-18 NOTE — PROGRESS NOTES
Mrs. Ferris is a 47-year-old lady 2 months status post laparoscopic cholecystectomy.,  Guided by postoperative bile leak that is since resolved.  She saw Dr. singh retired this week for ERCP good with exception of possibly of the right hepatic segmental area of her bile ducts.  He is concern for potential bile duct injury in that area.  She doesn't smoke pain medications.  She has seen the sleep doctor has been diagnosed sleep apnea.  She is going for in-house sleep study soon.  We'll refill pain Meds today.  Follow-up as needed.

## 2017-09-27 ENCOUNTER — OFFICE VISIT (OUTPATIENT)
Dept: SURGERY | Facility: CLINIC | Age: 47
End: 2017-09-27

## 2017-09-27 VITALS
BODY MASS INDEX: 42.32 KG/M2 | DIASTOLIC BLOOD PRESSURE: 80 MMHG | WEIGHT: 254 LBS | SYSTOLIC BLOOD PRESSURE: 158 MMHG | HEIGHT: 65 IN

## 2017-09-27 DIAGNOSIS — R10.84 GENERALIZED ABDOMINAL PAIN: Primary | ICD-10-CM

## 2017-09-27 PROCEDURE — 99024 POSTOP FOLLOW-UP VISIT: CPT | Performed by: SURGERY

## 2017-09-27 RX ORDER — HYDROCODONE BITARTRATE AND ACETAMINOPHEN 7.5; 325 MG/1; MG/1
1 TABLET ORAL EVERY 6 HOURS PRN
Qty: 30 TABLET | Refills: 0 | Status: SHIPPED | OUTPATIENT
Start: 2017-09-27 | End: 2017-10-04 | Stop reason: SDUPTHER

## 2017-09-27 NOTE — PROGRESS NOTES
This is a 47-year-old lady 2 months status post laparoscopic cholecystectomy with umbilical hernia repair complicated by postop bile leak.  Still having some mild pain.  Pain is somewhat improved.  She takes alternating Norco in ibuprofens.  She's here for pain medication refill.  She'll see Dr. Kaur is about 2 weeks.  We'll do an ERCP to evaluate her bile ducts at that time.  She is welcome follow-up any time with issues.

## 2017-10-04 ENCOUNTER — OFFICE VISIT (OUTPATIENT)
Dept: SURGERY | Facility: CLINIC | Age: 47
End: 2017-10-04

## 2017-10-04 VITALS
WEIGHT: 254 LBS | BODY MASS INDEX: 42.32 KG/M2 | SYSTOLIC BLOOD PRESSURE: 120 MMHG | DIASTOLIC BLOOD PRESSURE: 74 MMHG | HEIGHT: 65 IN

## 2017-10-04 DIAGNOSIS — R10.11 RIGHT UPPER QUADRANT ABDOMINAL PAIN: Primary | ICD-10-CM

## 2017-10-04 PROCEDURE — 99024 POSTOP FOLLOW-UP VISIT: CPT | Performed by: SURGERY

## 2017-10-04 RX ORDER — HYDROCODONE BITARTRATE AND ACETAMINOPHEN 7.5; 325 MG/1; MG/1
1 TABLET ORAL EVERY 6 HOURS PRN
Qty: 30 TABLET | Refills: 0 | Status: SHIPPED | OUTPATIENT
Start: 2017-10-04 | End: 2017-10-12 | Stop reason: SDUPTHER

## 2017-10-04 NOTE — PROGRESS NOTES
Ms Ferris is a 47-year-old lady here again status post laparoscopic cholecystectomy with bile leak that resolved.  Still having right upper quadrant pain.  Tried to go back to work.  Pain medications are still helping and she needs a refill.  Going to see Dr. Kaur in 2 weeks for MRCP and possible procedure.  F/U as necessary.

## 2017-10-12 ENCOUNTER — HOSPITAL ENCOUNTER (OUTPATIENT)
Dept: SLEEP MEDICINE | Facility: HOSPITAL | Age: 47
Discharge: HOME | End: 2017-10-12
Admitting: INTERNAL MEDICINE

## 2017-10-12 ENCOUNTER — OFFICE VISIT (OUTPATIENT)
Dept: SURGERY | Facility: CLINIC | Age: 47
End: 2017-10-12

## 2017-10-12 VITALS
BODY MASS INDEX: 43.32 KG/M2 | SYSTOLIC BLOOD PRESSURE: 120 MMHG | WEIGHT: 260 LBS | DIASTOLIC BLOOD PRESSURE: 76 MMHG | HEIGHT: 65 IN

## 2017-10-12 DIAGNOSIS — R10.84 GENERALIZED ABDOMINAL PAIN: Primary | ICD-10-CM

## 2017-10-12 DIAGNOSIS — G47.33 OSA (OBSTRUCTIVE SLEEP APNEA): ICD-10-CM

## 2017-10-12 PROCEDURE — 95810 POLYSOM 6/> YRS 4/> PARAM: CPT | Performed by: INTERNAL MEDICINE

## 2017-10-12 PROCEDURE — 99024 POSTOP FOLLOW-UP VISIT: CPT | Performed by: SURGERY

## 2017-10-12 PROCEDURE — 95810 POLYSOM 6/> YRS 4/> PARAM: CPT

## 2017-10-12 RX ORDER — HYDROCODONE BITARTRATE AND ACETAMINOPHEN 7.5; 325 MG/1; MG/1
1 TABLET ORAL EVERY 6 HOURS PRN
Qty: 30 TABLET | Refills: 0 | Status: SHIPPED | OUTPATIENT
Start: 2017-10-12 | End: 2018-04-01

## 2017-10-12 NOTE — PROGRESS NOTES
Ms Ferris is a 47-year-old lady here almost 3 months status post laparoscopic cholecystectomy with post op bile leak.  Still having pain.  Back to work due to her job.  She finally it's insertion inhale sleep study tonight.  She will go see Dr. Kaur Monday for MRCP and possible procedure.  Follow-up with me in 1-2 weeks for another postop check.  Refill pain medications today.

## 2017-11-03 DIAGNOSIS — G47.33 OSA (OBSTRUCTIVE SLEEP APNEA): Primary | ICD-10-CM

## 2018-01-15 ENCOUNTER — HOSPITAL ENCOUNTER (OUTPATIENT)
Facility: HOSPITAL | Age: 48
Setting detail: OBSERVATION
Discharge: HOME | End: 2018-01-16
Attending: EMERGENCY MEDICINE | Admitting: INTERNAL MEDICINE

## 2018-01-15 DIAGNOSIS — T78.40XA ALLERGIC REACTION, INITIAL ENCOUNTER: ICD-10-CM

## 2018-01-15 DIAGNOSIS — G47.01 INSOMNIA DUE TO MEDICAL CONDITION: ICD-10-CM

## 2018-01-15 DIAGNOSIS — R07.9 CHEST PAIN, UNSPECIFIED TYPE: Primary | ICD-10-CM

## 2018-01-15 PROCEDURE — 85379 FIBRIN DEGRADATION QUANT: CPT | Performed by: EMERGENCY MEDICINE

## 2018-01-15 PROCEDURE — 99285 EMERGENCY DEPT VISIT HI MDM: CPT

## 2018-01-15 PROCEDURE — 93005 ELECTROCARDIOGRAM TRACING: CPT | Performed by: EMERGENCY MEDICINE

## 2018-01-15 PROCEDURE — 85610 PROTHROMBIN TIME: CPT | Performed by: EMERGENCY MEDICINE

## 2018-01-15 PROCEDURE — 80053 COMPREHEN METABOLIC PANEL: CPT | Performed by: EMERGENCY MEDICINE

## 2018-01-15 PROCEDURE — 85025 COMPLETE CBC W/AUTO DIFF WBC: CPT | Performed by: EMERGENCY MEDICINE

## 2018-01-15 PROCEDURE — 93010 ELECTROCARDIOGRAM REPORT: CPT | Performed by: INTERNAL MEDICINE

## 2018-01-15 PROCEDURE — 84484 ASSAY OF TROPONIN QUANT: CPT | Performed by: EMERGENCY MEDICINE

## 2018-01-15 PROCEDURE — 83880 ASSAY OF NATRIURETIC PEPTIDE: CPT | Performed by: EMERGENCY MEDICINE

## 2018-01-15 RX ORDER — SODIUM CHLORIDE 0.9 % (FLUSH) 0.9 %
10 SYRINGE (ML) INJECTION AS NEEDED
Status: DISCONTINUED | OUTPATIENT
Start: 2018-01-15 | End: 2018-01-16 | Stop reason: HOSPADM

## 2018-01-15 RX ORDER — DIPHENHYDRAMINE HYDROCHLORIDE 50 MG/ML
50 INJECTION INTRAMUSCULAR; INTRAVENOUS ONCE
Status: COMPLETED | OUTPATIENT
Start: 2018-01-15 | End: 2018-01-16

## 2018-01-15 RX ORDER — NITROGLYCERIN 0.4 MG/1
0.4 TABLET SUBLINGUAL
Status: DISCONTINUED | OUTPATIENT
Start: 2018-01-15 | End: 2018-01-16 | Stop reason: HOSPADM

## 2018-01-15 RX ORDER — ASPIRIN 325 MG
325 TABLET ORAL ONCE
Status: COMPLETED | OUTPATIENT
Start: 2018-01-15 | End: 2018-01-16

## 2018-01-16 ENCOUNTER — APPOINTMENT (OUTPATIENT)
Dept: GENERAL RADIOLOGY | Facility: HOSPITAL | Age: 48
End: 2018-01-16

## 2018-01-16 VITALS
WEIGHT: 275.2 LBS | TEMPERATURE: 97.4 F | RESPIRATION RATE: 18 BRPM | HEIGHT: 65 IN | SYSTOLIC BLOOD PRESSURE: 104 MMHG | OXYGEN SATURATION: 96 % | BODY MASS INDEX: 45.85 KG/M2 | HEART RATE: 56 BPM | DIASTOLIC BLOOD PRESSURE: 50 MMHG

## 2018-01-16 PROBLEM — T78.40XA ALLERGIC REACTION: Status: RESOLVED | Noted: 2018-01-16 | Resolved: 2018-01-16

## 2018-01-16 PROBLEM — R07.9 CHEST PAIN: Status: ACTIVE | Noted: 2018-01-16

## 2018-01-16 PROBLEM — R07.9 CHEST PAIN: Status: RESOLVED | Noted: 2018-01-16 | Resolved: 2018-01-16

## 2018-01-16 PROBLEM — T78.40XA ALLERGIC REACTION: Status: ACTIVE | Noted: 2018-01-16

## 2018-01-16 LAB
ALBUMIN SERPL-MCNC: 3.9 G/DL (ref 3.4–4.8)
ALBUMIN/GLOB SERPL: 1.2 G/DL (ref 1.1–1.8)
ALP SERPL-CCNC: 56 U/L (ref 38–126)
ALT SERPL W P-5'-P-CCNC: 26 U/L (ref 9–52)
ANION GAP SERPL CALCULATED.3IONS-SCNC: 9 MMOL/L (ref 5–15)
AST SERPL-CCNC: 24 U/L (ref 14–36)
BASOPHILS # BLD AUTO: 0.01 10*3/MM3 (ref 0–0.2)
BASOPHILS NFR BLD AUTO: 0.1 % (ref 0–2)
BILIRUB SERPL-MCNC: 0.2 MG/DL (ref 0.2–1.3)
BILIRUB UR QL STRIP: NEGATIVE
BUN BLD-MCNC: 18 MG/DL (ref 7–21)
BUN/CREAT SERPL: 18.2 (ref 7–25)
CALCIUM SPEC-SCNC: 9.2 MG/DL (ref 8.4–10.2)
CHLORIDE SERPL-SCNC: 98 MMOL/L (ref 95–110)
CLARITY UR: CLEAR
CO2 SERPL-SCNC: 24 MMOL/L (ref 22–31)
COLOR UR: YELLOW
CREAT BLD-MCNC: 0.99 MG/DL (ref 0.5–1)
D-DIMER, QUANTITATIVE (MAD,POW, STR): 302 NG/ML (FEU) (ref 0–470)
DEPRECATED RDW RBC AUTO: 43.3 FL (ref 36.4–46.3)
EOSINOPHIL # BLD AUTO: 0.19 10*3/MM3 (ref 0–0.7)
EOSINOPHIL NFR BLD AUTO: 1.8 % (ref 0–7)
ERYTHROCYTE [DISTWIDTH] IN BLOOD BY AUTOMATED COUNT: 14.4 % (ref 11.5–14.5)
GFR SERPL CREATININE-BSD FRML MDRD: 60 ML/MIN/1.73 (ref 58–135)
GLOBULIN UR ELPH-MCNC: 3.2 GM/DL (ref 2.3–3.5)
GLUCOSE BLD-MCNC: 105 MG/DL (ref 60–100)
GLUCOSE UR STRIP-MCNC: NEGATIVE MG/DL
HCT VFR BLD AUTO: 41.9 % (ref 35–45)
HGB BLD-MCNC: 14.1 G/DL (ref 12–15.5)
HGB UR QL STRIP.AUTO: NEGATIVE
HOLD SPECIMEN: NORMAL
HOLD SPECIMEN: NORMAL
IMM GRANULOCYTES # BLD: 0.03 10*3/MM3 (ref 0–0.02)
IMM GRANULOCYTES NFR BLD: 0.3 % (ref 0–0.5)
INR PPP: 0.9 (ref 0.8–1.2)
KETONES UR QL STRIP: NEGATIVE
LEUKOCYTE ESTERASE UR QL STRIP.AUTO: NEGATIVE
LYMPHOCYTES # BLD AUTO: 4.14 10*3/MM3 (ref 0.6–4.2)
LYMPHOCYTES NFR BLD AUTO: 38.8 % (ref 10–50)
MAGNESIUM SERPL-MCNC: 1.8 MG/DL (ref 1.6–2.3)
MCH RBC QN AUTO: 27.8 PG (ref 26.5–34)
MCHC RBC AUTO-ENTMCNC: 33.7 G/DL (ref 31.4–36)
MCV RBC AUTO: 82.5 FL (ref 80–98)
MONOCYTES # BLD AUTO: 0.75 10*3/MM3 (ref 0–0.9)
MONOCYTES NFR BLD AUTO: 7 % (ref 0–12)
NEUTROPHILS # BLD AUTO: 5.54 10*3/MM3 (ref 2–8.6)
NEUTROPHILS NFR BLD AUTO: 52 % (ref 37–80)
NITRITE UR QL STRIP: NEGATIVE
NT-PROBNP SERPL-MCNC: 80.1 PG/ML (ref 0–450)
PH UR STRIP.AUTO: <=5 [PH] (ref 5–9)
PLATELET # BLD AUTO: 288 10*3/MM3 (ref 150–450)
PMV BLD AUTO: 10.1 FL (ref 8–12)
POTASSIUM BLD-SCNC: 3.8 MMOL/L (ref 3.5–5.1)
PROT SERPL-MCNC: 7.1 G/DL (ref 6.3–8.6)
PROT UR QL STRIP: NEGATIVE
PROTHROMBIN TIME: 12 SECONDS (ref 11.1–15.3)
RBC # BLD AUTO: 5.08 10*6/MM3 (ref 3.77–5.16)
SODIUM BLD-SCNC: 131 MMOL/L (ref 137–145)
SP GR UR STRIP: 1.01 (ref 1–1.03)
TROPONIN I SERPL-MCNC: <0.012 NG/ML
TROPONIN I SERPL-MCNC: <0.012 NG/ML
UROBILINOGEN UR QL STRIP: NORMAL
WBC NRBC COR # BLD: 10.66 10*3/MM3 (ref 3.2–9.8)
WHOLE BLOOD HOLD SPECIMEN: NORMAL

## 2018-01-16 PROCEDURE — 94760 N-INVAS EAR/PLS OXIMETRY 1: CPT

## 2018-01-16 PROCEDURE — 96372 THER/PROPH/DIAG INJ SC/IM: CPT

## 2018-01-16 PROCEDURE — 25010000002 METHYLPREDNISOLONE PER 125 MG: Performed by: EMERGENCY MEDICINE

## 2018-01-16 PROCEDURE — G0378 HOSPITAL OBSERVATION PER HR: HCPCS

## 2018-01-16 PROCEDURE — 81003 URINALYSIS AUTO W/O SCOPE: CPT | Performed by: INTERNAL MEDICINE

## 2018-01-16 PROCEDURE — 71045 X-RAY EXAM CHEST 1 VIEW: CPT

## 2018-01-16 PROCEDURE — 25010000002 DIPHENHYDRAMINE PER 50 MG: Performed by: EMERGENCY MEDICINE

## 2018-01-16 PROCEDURE — 96374 THER/PROPH/DIAG INJ IV PUSH: CPT

## 2018-01-16 PROCEDURE — 94799 UNLISTED PULMONARY SVC/PX: CPT

## 2018-01-16 PROCEDURE — 96375 TX/PRO/DX INJ NEW DRUG ADDON: CPT

## 2018-01-16 PROCEDURE — 84484 ASSAY OF TROPONIN QUANT: CPT | Performed by: EMERGENCY MEDICINE

## 2018-01-16 PROCEDURE — 96361 HYDRATE IV INFUSION ADD-ON: CPT

## 2018-01-16 PROCEDURE — 93010 ELECTROCARDIOGRAM REPORT: CPT | Performed by: INTERNAL MEDICINE

## 2018-01-16 PROCEDURE — 25010000002 HEPARIN (PORCINE) PER 1000 UNITS: Performed by: INTERNAL MEDICINE

## 2018-01-16 PROCEDURE — 90682 RIV4 VACC RECOMBINANT DNA IM: CPT | Performed by: INTERNAL MEDICINE

## 2018-01-16 PROCEDURE — 25010000002 INFLUENZA VAC SPLIT QUAD SUSPENSION: Performed by: INTERNAL MEDICINE

## 2018-01-16 PROCEDURE — 93005 ELECTROCARDIOGRAM TRACING: CPT | Performed by: INTERNAL MEDICINE

## 2018-01-16 PROCEDURE — G0008 ADMIN INFLUENZA VIRUS VAC: HCPCS | Performed by: INTERNAL MEDICINE

## 2018-01-16 PROCEDURE — 83735 ASSAY OF MAGNESIUM: CPT | Performed by: INTERNAL MEDICINE

## 2018-01-16 RX ORDER — HEPARIN SODIUM 5000 [USP'U]/ML
5000 INJECTION, SOLUTION INTRAVENOUS; SUBCUTANEOUS EVERY 8 HOURS SCHEDULED
Status: DISCONTINUED | OUTPATIENT
Start: 2018-01-16 | End: 2018-01-16 | Stop reason: HOSPADM

## 2018-01-16 RX ORDER — METHYLPREDNISOLONE SODIUM SUCCINATE 125 MG/2ML
125 INJECTION, POWDER, LYOPHILIZED, FOR SOLUTION INTRAMUSCULAR; INTRAVENOUS ONCE
Status: COMPLETED | OUTPATIENT
Start: 2018-01-16 | End: 2018-01-16

## 2018-01-16 RX ORDER — NAPROXEN 500 MG/1
500 TABLET ORAL 2 TIMES DAILY PRN
COMMUNITY
Start: 2018-01-02 | End: 2019-05-14

## 2018-01-16 RX ORDER — ACETAMINOPHEN 325 MG/1
650 TABLET ORAL EVERY 4 HOURS PRN
Status: DISCONTINUED | OUTPATIENT
Start: 2018-01-16 | End: 2018-01-16 | Stop reason: HOSPADM

## 2018-01-16 RX ORDER — CINCHONA OFFICINALIS BARK,MAGNESIUM PHOSPHATE, DIBASIC TRIHYDRATE,COPPER,PSEUDOGNAPHALIUM OBTUSIFOLIUM,VERATRUM ALBUM ROOT,HYOSCYAMUS NIGER,PLATINUM AND ZINC VALERATE DIHYDRATE 3; 4; 6; 6; 6; 12; 12; 12 [HP_X]/1; [HP_X]/1; [HP_X]/1; [HP_X]/1; [HP_X]/1; [HP_X]/1; [HP_X]/1; [HP_X]/1
TABLET ORAL
COMMUNITY
Start: 2018-01-13 | End: 2018-01-16 | Stop reason: HOSPADM

## 2018-01-16 RX ORDER — PANTOPRAZOLE SODIUM 40 MG/1
40 TABLET, DELAYED RELEASE ORAL
Status: DISCONTINUED | OUTPATIENT
Start: 2018-01-17 | End: 2018-01-16 | Stop reason: HOSPADM

## 2018-01-16 RX ORDER — FUROSEMIDE 40 MG/1
40 TABLET ORAL DAILY
Status: DISCONTINUED | OUTPATIENT
Start: 2018-01-16 | End: 2018-01-16 | Stop reason: HOSPADM

## 2018-01-16 RX ORDER — ONDANSETRON 4 MG/1
4 TABLET, FILM COATED ORAL EVERY 6 HOURS PRN
Status: DISCONTINUED | OUTPATIENT
Start: 2018-01-16 | End: 2018-01-16 | Stop reason: HOSPADM

## 2018-01-16 RX ORDER — LISINOPRIL 20 MG/1
20 TABLET ORAL
Status: DISCONTINUED | OUTPATIENT
Start: 2018-01-16 | End: 2018-01-16 | Stop reason: HOSPADM

## 2018-01-16 RX ORDER — BUPROPION HYDROCHLORIDE 100 MG/1
100 TABLET ORAL EVERY 12 HOURS SCHEDULED
Status: DISCONTINUED | OUTPATIENT
Start: 2018-01-16 | End: 2018-01-16 | Stop reason: HOSPADM

## 2018-01-16 RX ORDER — ONDANSETRON 2 MG/ML
4 INJECTION INTRAMUSCULAR; INTRAVENOUS EVERY 6 HOURS PRN
Status: DISCONTINUED | OUTPATIENT
Start: 2018-01-16 | End: 2018-01-16 | Stop reason: HOSPADM

## 2018-01-16 RX ORDER — SODIUM CHLORIDE 0.9 % (FLUSH) 0.9 %
1-10 SYRINGE (ML) INJECTION AS NEEDED
Status: DISCONTINUED | OUTPATIENT
Start: 2018-01-16 | End: 2018-01-16 | Stop reason: HOSPADM

## 2018-01-16 RX ORDER — DOCUSATE SODIUM 100 MG/1
200 CAPSULE, LIQUID FILLED ORAL 2 TIMES DAILY
Status: DISCONTINUED | OUTPATIENT
Start: 2018-01-16 | End: 2018-01-16 | Stop reason: HOSPADM

## 2018-01-16 RX ORDER — NICOTINE 21 MG/24HR
1 PATCH, TRANSDERMAL 24 HOURS TRANSDERMAL EVERY 24 HOURS
Status: DISCONTINUED | OUTPATIENT
Start: 2018-01-16 | End: 2018-01-16 | Stop reason: HOSPADM

## 2018-01-16 RX ORDER — OXYBUTYNIN CHLORIDE 5 MG/1
5 TABLET ORAL 3 TIMES DAILY
Status: DISCONTINUED | OUTPATIENT
Start: 2018-01-16 | End: 2018-01-16 | Stop reason: HOSPADM

## 2018-01-16 RX ORDER — FLUCONAZOLE 150 MG/1
TABLET ORAL
COMMUNITY
Start: 2017-12-07 | End: 2018-01-16 | Stop reason: SDUPTHER

## 2018-01-16 RX ORDER — ONDANSETRON 4 MG/1
4 TABLET, ORALLY DISINTEGRATING ORAL EVERY 6 HOURS PRN
Status: DISCONTINUED | OUTPATIENT
Start: 2018-01-16 | End: 2018-01-16 | Stop reason: HOSPADM

## 2018-01-16 RX ORDER — DIPHENHYDRAMINE HCL 25 MG
25 CAPSULE ORAL NIGHTLY PRN
Status: DISCONTINUED | OUTPATIENT
Start: 2018-01-16 | End: 2018-01-16 | Stop reason: HOSPADM

## 2018-01-16 RX ORDER — METRONIDAZOLE 500 MG/1
TABLET ORAL
COMMUNITY
Start: 2017-11-14 | End: 2018-04-01

## 2018-01-16 RX ORDER — LISINOPRIL 20 MG/1
20 TABLET ORAL DAILY
COMMUNITY
Start: 2018-01-02 | End: 2019-05-19 | Stop reason: HOSPADM

## 2018-01-16 RX ORDER — FAMOTIDINE 10 MG/ML
20 INJECTION, SOLUTION INTRAVENOUS ONCE
Status: COMPLETED | OUTPATIENT
Start: 2018-01-16 | End: 2018-01-16

## 2018-01-16 RX ORDER — BISACODYL 10 MG
10 SUPPOSITORY, RECTAL RECTAL DAILY PRN
Status: DISCONTINUED | OUTPATIENT
Start: 2018-01-16 | End: 2018-01-16 | Stop reason: HOSPADM

## 2018-01-16 RX ORDER — SODIUM CHLORIDE 9 MG/ML
100 INJECTION, SOLUTION INTRAVENOUS CONTINUOUS
Status: DISCONTINUED | OUTPATIENT
Start: 2018-01-16 | End: 2018-01-16 | Stop reason: HOSPADM

## 2018-01-16 RX ORDER — PANTOPRAZOLE SODIUM 40 MG/1
40 TABLET, DELAYED RELEASE ORAL DAILY
Status: DISCONTINUED | OUTPATIENT
Start: 2018-01-16 | End: 2018-01-16 | Stop reason: SDUPTHER

## 2018-01-16 RX ORDER — HYDROCODONE BITARTRATE AND ACETAMINOPHEN 7.5; 325 MG/1; MG/1
1 TABLET ORAL EVERY 6 HOURS PRN
Status: DISCONTINUED | OUTPATIENT
Start: 2018-01-16 | End: 2018-01-16 | Stop reason: HOSPADM

## 2018-01-16 RX ORDER — METRONIDAZOLE 500 MG/1
500 TABLET ORAL EVERY 8 HOURS SCHEDULED
Status: DISCONTINUED | OUTPATIENT
Start: 2018-01-16 | End: 2018-01-16

## 2018-01-16 RX ADMIN — FAMOTIDINE 20 MG: 10 INJECTION, SOLUTION INTRAVENOUS at 01:16

## 2018-01-16 RX ADMIN — SODIUM CHLORIDE 1000 ML: 900 INJECTION, SOLUTION INTRAVENOUS at 01:59

## 2018-01-16 RX ADMIN — Medication 10 ML: at 00:42

## 2018-01-16 RX ADMIN — INFLUENZA A VIRUS A/MICHIGAN/45/2015 X-275 (H1N1) ANTIGEN (FORMALDEHYDE INACTIVATED), INFLUENZA A VIRUS A/HONG KONG/4801/2014 X-263B (H3N2) ANTIGEN (FORMALDEHYDE INACTIVATED), INFLUENZA B VIRUS B/PHUKET/3073/2013 ANTIGEN (FORMALDEHYDE INACTIVATED), AND INFLUENZA B VIRUS B/BRISBANE/60/2008 ANTIGEN (FORMALDEHYDE INACTIVATED) 0.5 ML: 15; 15; 15; 15 INJECTION, SUSPENSION INTRAMUSCULAR at 11:58

## 2018-01-16 RX ADMIN — DIPHENHYDRAMINE HYDROCHLORIDE 50 MG: 50 INJECTION INTRAMUSCULAR; INTRAVENOUS at 00:40

## 2018-01-16 RX ADMIN — METRONIDAZOLE 500 MG: 500 TABLET ORAL at 05:41

## 2018-01-16 RX ADMIN — ASPIRIN 325 MG: 325 TABLET, COATED ORAL at 00:40

## 2018-01-16 RX ADMIN — OXYBUTYNIN CHLORIDE 5 MG: 5 TABLET ORAL at 08:28

## 2018-01-16 RX ADMIN — NITROGLYCERIN 0.4 MG: 0.4 TABLET SUBLINGUAL at 01:06

## 2018-01-16 RX ADMIN — SODIUM CHLORIDE 100 ML/HR: 9 INJECTION, SOLUTION INTRAVENOUS at 05:41

## 2018-01-16 RX ADMIN — METHYLPREDNISOLONE SODIUM SUCCINATE 125 MG: 125 INJECTION, POWDER, FOR SOLUTION INTRAMUSCULAR; INTRAVENOUS at 01:16

## 2018-01-16 RX ADMIN — SODIUM CHLORIDE 1000 ML: 900 INJECTION, SOLUTION INTRAVENOUS at 00:58

## 2018-01-16 RX ADMIN — HEPARIN SODIUM 5000 UNITS: 5000 INJECTION, SOLUTION INTRAVENOUS; SUBCUTANEOUS at 05:41

## 2018-01-16 RX ADMIN — SERTRALINE HYDROCHLORIDE 50 MG: 50 TABLET ORAL at 08:28

## 2018-01-16 NOTE — ED TRIAGE NOTES
Patient presents to ED with c/o SOA and chest pain and what she thinks maybe a possible allergic reaction to Lasix. Patient appears red in the face chest and arms, blanchable. Reports she took Lasix an hour ago before she came in and within 30 started feeling this way like she could not breathe. Pt stopped taking her Lasix for several weeks due to side effects of legs cramps and shortness of air. She expressed her concern with her PCP and they put her back on the med and she began taking it 3 days ago. Pt reports gaining 20 lbs in the last month.

## 2018-01-16 NOTE — DISCHARGE SUMMARY
[facility] Medicine Services  DISCHARGE SUMMARY       Date of Admission: 1/15/2018  Date of Discharge:  1/16/2018  Primary Care Physician: MARIA TERESA Jorge    Presenting Problem/History of Present Illness:  Allergic reaction, initial encounter [T78.40XA]  Chest pain, unspecified type [R07.9]     Final Discharge Diagnoses:  Allergic reaction, resolved    Pertinent Test Results:     Lab Results (last 24 hours)     Procedure Component Value Units Date/Time    CBC & Differential [[id number]] Collected:  01/15/18 2359    Specimen:  Blood Updated:  01/16/18 0003    Narrative:       The following orders were created for panel order CBC & Differential.  Procedure                               Abnormality         Status                     ---------                               -----------         ------                     CBC Auto Differential[[id number]]        Abnormal            Final result                 Please view results for these tests on the individual orders.    CBC Auto Differential [[id number]]  (Abnormal) Collected:  01/15/18 2359    Specimen:  Blood Updated:  01/16/18 0003     WBC 10.66 (H) 10*3/mm3      RBC 5.08 10*6/mm3      Hemoglobin 14.1 g/dL      Hematocrit 41.9 %      MCV 82.5 fL      MCH 27.8 pg      MCHC 33.7 g/dL      RDW 14.4 %      RDW-SD 43.3 fl      MPV 10.1 fL      Platelets 288 10*3/mm3      Neutrophil % 52.0 %      Lymphocyte % 38.8 %      Monocyte % 7.0 %      Eosinophil % 1.8 %      Basophil % 0.1 %      Immature Grans % 0.3 %      Neutrophils, Absolute 5.54 10*3/mm3      Lymphocytes, Absolute 4.14 10*3/mm3      Monocytes, Absolute 0.75 10*3/mm3      Eosinophils, Absolute 0.19 10*3/mm3      Basophils, Absolute 0.01 10*3/mm3      Immature Grans, Absolute 0.03 (H) 10*3/mm3     Protime-INR [[id number]]  (Normal) Collected:  01/15/18 2359    Specimen:  Blood Updated:  01/16/18 0021     Protime 12.0 Seconds      INR 0.90    Narrative:        Therapeutic range for most indications is 2.0-3.0 INR,  or 2.5-3.5 for mechanical heart valves.    Comprehensive Metabolic Panel [[id number]]  (Abnormal) Collected:  01/15/18 2359    Specimen:  Blood Updated:  01/16/18 0040     Glucose 105 (H) mg/dL      BUN 18 mg/dL      Creatinine 0.99 mg/dL      Sodium 131 (L) mmol/L      Potassium 3.8 mmol/L      Chloride 98 mmol/L      CO2 24.0 mmol/L      Calcium 9.2 mg/dL      Total Protein 7.1 g/dL      Albumin 3.90 g/dL      ALT (SGPT) 26 U/L      AST (SGOT) 24 U/L      Alkaline Phosphatase 56 U/L      Total Bilirubin 0.2 mg/dL      eGFR Non African Amer 60 mL/min/1.73      Globulin 3.2 gm/dL      A/G Ratio 1.2 g/dL      BUN/Creatinine Ratio 18.2     Anion Gap 9.0 mmol/L     Troponin [[id number]]  (Normal) Collected:  01/15/18 2359    Specimen:  Blood Updated:  01/16/18 0052     Troponin I <0.012 ng/mL     BNP [[id number]]  (Normal) Collected:  01/15/18 2359    Specimen:  Blood Updated:  01/16/18 0052     proBNP 80.1 pg/mL     [place] Draw [[id number]] Collected:  01/15/18 2359    Specimen:  Blood Updated:  01/16/18 0101    Narrative:       The following orders were created for panel order [place] Draw.  Procedure                               Abnormality         Status                     ---------                               -----------         ------                     Light Blue Top[[id number]]                                   Final result               Green Top (Gel)[[id number]]                                  Final result               Lavender Top[[id number]]                                     Final result               Gold Top - SST[[id number]]                                   Final result                 Please view results for these tests on the individual orders.    Light Blue Top [[id number]] Collected:  01/15/18 2359    Specimen:  Blood Updated:  01/16/18 0101     Extra Tube hold for add-on      Auto resulted       Green Top (Gel) [[id number]] Collected:   01/15/18 2359    Specimen:  Blood Updated:  01/16/18 0101     Extra Tube Hold for add-ons.      Auto resulted.       Lavender Top [[id number]] Collected:  01/15/18 2359    Specimen:  Blood Updated:  01/16/18 0101     Extra Tube hold for add-on      Auto resulted       Gold Top - SST [[id number]] Collected:  01/15/18 2359    Specimen:  Blood Updated:  01/16/18 0101     Extra Tube Hold for add-ons.      Auto resulted.       D-dimer, Quantitative [[id number]]  (Normal) Collected:  01/15/18 2359    Specimen:  Blood Updated:  01/16/18 0145     D-Dimer, Quantitative 302 ng/mL (FEU)     Narrative:       Dimer values <500 ng/ml FEU are FDA approved as aid in diagnosis of deep venous thrombosis and pulmonary embolism.  This test should not be used in an exclusion strategy with pretest probability alone.    A recent guideline regarding diagnosis for pulmonary thomboembolism recommends an adjusted exclusion criterion of age x 10 ng/ml FEU for patients >50 years of age ([employer] Intern Med 2015; 163: 701-711).    Magnesium [[id number]]  (Normal) Collected:  01/16/18 0530    Specimen:  Blood Updated:  01/16/18 0602     Magnesium 1.8 mg/dL     Urinalysis With / Culture If Indicated - [[id number]]  (Normal) Collected:  01/16/18 0540    Specimen:  Urine Updated:  01/16/18 0610     Color, UA Yellow     Appearance, UA Clear     pH, UA <=5.0     Specific Gravity, UA 1.010     Glucose, UA Negative     Ketones, UA Negative     Bilirubin, UA Negative     Blood, UA Negative     Protein, UA Negative     Leuk Esterase, UA Negative     Nitrite, UA Negative     Urobilinogen, UA 0.2 E.U./dL    Narrative:       Urine microscopic not indicated.    Troponin [[id number]]  (Normal) Collected:  01/16/18 0530    Specimen:  Blood Updated:  01/16/18 0614     Troponin I <0.012 ng/mL     Extra Tubes [[id number]] Collected:  01/16/18 0530    Specimen:  Blood from Blood, Venous Line Updated:  01/16/18 0631    Narrative:       The following orders were created for  "panel order Extra Tubes.  Procedure                               Abnormality         Status                     ---------                               -----------         ------                     Lavender Top[[id number]]                                     Final result                 Please view results for these tests on the individual orders.    Evelia Bowden [[id number]] Collected:  01/16/18 0530    Specimen:  Blood Updated:  01/16/18 0631     Extra Tube hold for add-on      Auto resulted           Hospital Course:  The patient is a 47 y.o. female who presented to [facility] with Symptoms of an allergic reaction.  Patient, who has an allergy to sulfa drugs, started a new herbal over-the-counter medication for leg cramps.  Patient began to have chest tightness, nausea, and mild shortness of air.  Patient states that she looked up on the Internet for the possible side effects of the over-the-counter medication she took and all of the side effects matched what was happening to her.  Denied any dizziness, syncope, or sharp chest pain.  Denied any abdominal pain.  All of patient's resolved while at the hospital.  She states she felt much better and would like to go home.  No shortness of air, chest pain, nausea, vomiting, dizziness, syncope, edema, stridor, or wheezing.  Airway patent with no obstructions.  Patient was also told to refrain from taking Lasix at this time given the fact that most Lasix formulations contains sulfa.  Patient was instructed to follow-up with her PCP concerning Lasix or possible substitute.  Patient was instructed to return with chest pain, shortness of air, nausea, vomiting, dizziness, any worsening or concerning symptoms.    Condition on Discharge:  Stable    Physical Exam on Discharge:  /50 (BP Location: Right arm, Patient Position: Lying)  Pulse 56  Temp 97.4 °F (36.3 °C) (Oral)   Resp 18  Ht 165.1 cm (65\")  Wt 125 kg (275 lb 3.2 oz)  SpO2 96%  BMI 45.8 " kg/m2  Physical Exam   Constitutional: She is oriented to person, place, and time. She appears well-developed and well-nourished. No distress.   HENT:   Head: Normocephalic and atraumatic.   Eyes: Conjunctivae are normal.   Cardiovascular: Normal rate and regular rhythm.    Pulmonary/Chest: Effort normal and breath sounds normal. No respiratory distress. She has no wheezes. She has no rales.   Abdominal: Soft. Bowel sounds are normal. She exhibits no distension. There is no tenderness.   Musculoskeletal: She exhibits no edema.   Neurological: She is alert and oriented to person, place, and time.   Skin: Skin is warm and dry. No rash noted. She is not diaphoretic.   Psychiatric: She has a normal mood and affect. Her behavior is normal.     Discharge Disposition:  Home or Self Care    Discharge Medications:   Maricarmen Ferris   Home Medication Instructions LORNE:[id number]    Printed on:01/16/18 7418   Medication Information                      buPROPion (WELLBUTRIN) 100 MG tablet  Take 100 mg by mouth 2 (Two) Times a Day.             fluconazole (DIFLUCAN) 200 MG tablet  Take one at the first sign of infection and may repeat in 3 days as needed.             guaifenesin-dextromethorphan (ROBITUSSIN DM) 100-10 MG/5ML syrup  Take 5 mL by mouth 3 (Three) Times a Day As Needed for Cough.             HYDROcodone-acetaminophen (NORCO) 7.5-325 MG per tablet  Take 1 tablet by mouth Every 6 (Six) Hours As Needed for Moderate Pain .             lisinopril (PRINIVIL,ZESTRIL) 20 MG tablet               metroNIDAZOLE (FLAGYL) 500 MG tablet               naproxen (NAPROSYN) 500 MG tablet  As Needed.             oxybutynin (DITROPAN) 5 MG tablet  Take 5 mg by mouth.             pantoprazole (PROTONIX) 40 MG EC tablet  Take 40 mg by mouth Daily.             sertraline (ZOLOFT) 50 MG tablet  Take 50 mg by mouth Daily.                 Discharge Diet:   Diet Instructions     Advance Diet As Tolerated                     Activity  at Discharge:   Activity Instructions     Activity as Tolerated                     Discharge Care Plan/Instructions:   As noted above.          This document has been electronically signed by JANET Nathan on January 16, 2018 1:56 PM

## 2018-01-16 NOTE — PLAN OF CARE
Problem: Patient Care Overview (Adult)  Goal: Plan of Care Review  Outcome: Ongoing (interventions implemented as appropriate)   01/16/18 0446   Coping/Psychosocial Response Interventions   Plan Of Care Reviewed With patient   Patient Care Overview   Progress no change   Outcome Evaluation   Outcome Summary/Follow up Plan new admit, vss, no c/o chest pain     Goal: Adult Individualization and Mutuality  Outcome: Ongoing (interventions implemented as appropriate)    Goal: Discharge Needs Assessment  Outcome: Ongoing (interventions implemented as appropriate)      Problem: Acute Coronary Syndrome (ACS) (Adult)  Goal: Signs and Symptoms of Listed Potential Problems Will be Absent or Manageable (Acute Coronary Syndrome)  Outcome: Ongoing (interventions implemented as appropriate)

## 2018-01-16 NOTE — CONSULTS
"Adult Nutrition  Assessment    Patient Name:  Maricarmen Ferris  YOB: 1970  MRN: [id number]  Admit Date:  1/15/2018    Assessment Date:  1/16/2018    Comments:  Visited due to BMI >40.  Pt has already been discharged.  Pt has a BMI of 45.8 which is compatible with morbid obesity.  She is at 212% of her IBW.  RD will mail pt information on \"Making Lifestyle changes for a Healthy weight\" along with contact numbers.            Reason for Assessment       01/16/18 1411    Reason for Assessment    Reason For Assessment/Visit per organizational policy    Identified At Risk By Screening Criteria BMI                              Electronically signed by:  Mayi Anderson RD  01/16/18 2:11 PM  "

## 2018-01-16 NOTE — H&P
[facility] Medicine Services  INPATIENT HISTORY AND PHYSICAL       Patient Care Team:  MARIA TERESA Jorge as PCP - General    Chief complaint   Chief Complaint   Patient presents with   • Chest Pain   • Allergic Reaction       Subjective     Patient is a 47 y.o. female presents with Complaint of having allergic reaction.  Patient was initially evaluated in the emergency room and was found to have allergic reaction to over-the-counter medication which patient claims she had taken from leg cramps.  Patient states she had lost any allergies to sulfa drugs and was unaware of over-the-counter product containing sulfur products.  Patient subsequently also complain of having chest tightness which was suspected to have atypical chest pain and patient was admitted for further evaluation and monitoring.  Patient denies any diaphoresis, palpitation, nausea, vomiting, shortness of breath or weakness associated with the symptoms.        Review of Systems   Review of Systems   Constitutional: Negative for appetite change, chills, diaphoresis and fever.   HENT: Negative for congestion, rhinorrhea, sore throat and trouble swallowing.    Eyes: Negative for visual disturbance.   Respiratory: Negative for cough, chest tightness, shortness of breath and wheezing.    Cardiovascular: Positive for chest pain. Negative for palpitations and leg swelling.   Gastrointestinal: Negative for abdominal pain, blood in stool, diarrhea, nausea and vomiting.   Endocrine: Negative for cold intolerance and heat intolerance.   Genitourinary: Negative for decreased urine volume and difficulty urinating.   Musculoskeletal: Negative for back pain, gait problem and neck pain.   Skin: Negative for rash.   Neurological: Negative for dizziness, syncope, weakness, light-headedness, numbness and headaches.   Psychiatric/Behavioral: The patient is not nervous/anxious.        History  Past Medical History:    Diagnosis Date   • Acid reflux    • Anxiety    • Depressed    • Hard to intubate    • Hypertension    • Sleep apnea      Past Surgical History:   Procedure Laterality Date   • ABDOMINAL SURGERY     • CYSTOSCOPY     • ENDOMETRIAL ABLATION  2015   • LAPAROSCOPIC TUBAL LIGATION  1995   • MT ERCP DX COLLECTION SPECIMEN BRUSHING/WASHING Left 7/31/2017    Procedure: ENDOSCOPIC RETROGRADE CHOLANGIOPANCREATOGRAPHY;  Surgeon: Samuel Redding DO;  Location: [facility] ENDOSCOPY;  Service: Gastroenterology   • MT LAP,CHOLECYSTECTOMY N/A 7/24/2017    Procedure: CHOLECYSTECTOMY LAPAROSCOPIC, also umbillical hernia repair;  Surgeon: Pk العلي MD;  Location: [facility] OR;  Service: General     Family History   Problem Relation Age of Onset   • Diabetes Mother    • Hypertension Mother    • Hypertension Father      Social History   Substance Use Topics   • Smoking status: Never Smoker   • Smokeless tobacco: Never Used   • Alcohol use Yes      Comment: socially     Facility-Administered Medications Prior to Admission   Medication Dose Route Frequency Provider Last Rate Last Dose   • diphenhydrAMINE (BENADRYL) capsule 25 mg  25 mg Oral Nightly PRN Pk العلي MD         Prescriptions Prior to Admission   Medication Sig Dispense Refill Last Dose   • buPROPion (WELLBUTRIN) 100 MG tablet Take 100 mg by mouth 2 (Two) Times a Day.   1/15/2018 at Unknown time   • fluconazole (DIFLUCAN) 200 MG tablet Take one at the first sign of infection and may repeat in 3 days as needed. 2 tablet 0 Past Week at Unknown time   • furosemide (LASIX) 40 MG tablet Take 1 tablet by mouth Daily. 30 tablet 3 1/15/2018 at Unknown time   • lisinopril (PRINIVIL,ZESTRIL) 20 MG tablet    1/15/2018 at Unknown time   • metroNIDAZOLE (FLAGYL) 500 MG tablet    1/15/2018 at Unknown time   • naproxen (NAPROSYN) 500 MG tablet As Needed.   1/14/2018 at Unknown time   • oxybutynin (DITROPAN) 5 MG tablet Take 5 mg by mouth.   1/15/2018 at Unknown time   • pantoprazole  (PROTONIX) 40 MG EC tablet Take 40 mg by mouth Daily.   1/15/2018 at Unknown time   • sertraline (ZOLOFT) 50 MG tablet Take 50 mg by mouth Daily.   1/15/2018 at Unknown time   • guaifenesin-dextromethorphan (ROBITUSSIN DM) 100-10 MG/5ML syrup Take 5 mL by mouth 3 (Three) Times a Day As Needed for Cough. 118 mL 0 Taking   • Homeopathic Products (LEG CRAMP RELIEF) sublingual tablet Place  under the tongue. Patient reports that she started taking this medication about 3 days ago OTC. Brand name: Radha's Leg Cramps dissolving tablets      • HYDROcodone-acetaminophen (NORCO) 7.5-325 MG per tablet Take 1 tablet by mouth Every 6 (Six) Hours As Needed for Moderate Pain . 30 tablet 0 More than a month at Unknown time   • metroNIDAZOLE (FLAGYL) 250 MG tablet Take 250 mg by mouth 2 (Two) Times a Day.      • promethazine-dextromethorphan (PROMETHAZINE-DM) 6.25-15 MG/5ML syrup Take 5 mL by mouth Every 4 (Four) Hours As Needed for Cough. 150 mL 0 Unknown at Unknown time     Allergies:  Sulfa antibiotics  Prior to Admission medications    Medication Sig Start Date End Date Taking? Authorizing Provider   buPROPion (WELLBUTRIN) 100 MG tablet Take 100 mg by mouth 2 (Two) Times a Day.   Yes Nurse Epic Emergency, RN   fluconazole (DIFLUCAN) 200 MG tablet Take one at the first sign of infection and may repeat in 3 days as needed. 11/16/17  Yes MARIA TERESA Ge   furosemide (LASIX) 40 MG tablet Take 1 tablet by mouth Daily. 8/16/17  Yes Pk العلي MD   lisinopril (PRINIVIL,ZESTRIL) 20 MG tablet  1/2/18  Yes Historical Provider, MD   metroNIDAZOLE (FLAGYL) 500 MG tablet  11/14/17  Yes Historical Provider, MD   naproxen (NAPROSYN) 500 MG tablet As Needed. 1/2/18  Yes Historical Provider, MD   oxybutynin (DITROPAN) 5 MG tablet Take 5 mg by mouth.   Yes Historical Provider, MD   pantoprazole (PROTONIX) 40 MG EC tablet Take 40 mg by mouth Daily. 7/10/17  Yes Historical Provider, MD   sertraline (ZOLOFT) 50 MG tablet Take 50 mg by  mouth Daily.   Yes Nurse Epic Emergency, RN   guaifenesin-dextromethorphan (ROBITUSSIN DM) 100-10 MG/5ML syrup Take 5 mL by mouth 3 (Three) Times a Day As Needed for Cough. 9/16/17   Roshan Fernandez MD   Homeopathic Products (LEG CRAMP RELIEF) sublingual tablet Place  under the tongue. Patient reports that she started taking this medication about 3 days ago OTC. Brand name: Luis Armandos Leg Cramps dissolving tablets 1/13/18   Historical Provider, MD   HYDROcodone-acetaminophen (NORCO) 7.5-325 MG per tablet Take 1 tablet by mouth Every 6 (Six) Hours As Needed for Moderate Pain . 10/12/17   Pk العلي MD   metroNIDAZOLE (FLAGYL) 250 MG tablet Take 250 mg by mouth 2 (Two) Times a Day.    Nurse Epic Emergency, RN   promethazine-dextromethorphan (PROMETHAZINE-DM) 6.25-15 MG/5ML syrup Take 5 mL by mouth Every 4 (Four) Hours As Needed for Cough. 11/16/17   MARIA TERESA Ge   fluconazole (DIFLUCAN) 150 MG tablet  12/7/17 1/16/18  Historical Provider, MD   lisinopril (PRINIVIL,ZESTRIL) 10 MG tablet Take 10 mg by mouth Daily.  1/16/18  Historical Provider, MD       Objective     Vital Signs    Temp:  [97.5 °F (36.4 °C)-98 °F (36.7 °C)] 98 °F (36.7 °C)  Heart Rate:  [60-92] 63  Resp:  [18-28] 18  BP: ()/(50-76) 113/60    Physical Exam:      Physical Exam   Constitutional: She is oriented to person, place, and time. She appears well-developed and well-nourished.   HENT:   Head: Normocephalic and atraumatic.   Nose: Nose normal.   Eyes: Conjunctivae and EOM are normal. Pupils are equal, round, and reactive to light.   Neck: Normal range of motion. Neck supple. No JVD present. No tracheal deviation present. No thyromegaly present.   Cardiovascular: Normal rate, regular rhythm, normal heart sounds and intact distal pulses.    Pulmonary/Chest: Effort normal and breath sounds normal. No respiratory distress. She has no wheezes. She has no rales. She exhibits no tenderness.   Abdominal: Soft. Bowel sounds are normal. She  exhibits no distension. There is no tenderness. There is no rebound and no guarding.   Musculoskeletal: Normal range of motion. She exhibits no edema.   Lymphadenopathy:     She has no cervical adenopathy.   Neurological: She is alert and oriented to person, place, and time. She has normal reflexes. No cranial nerve deficit.   Skin: Skin is warm and dry.   Intact   Psychiatric: She has a normal mood and affect.   Nursing note and vitals reviewed.      Results Review:       Results from last 7 days  Lab Units 01/15/18  2359   SODIUM mmol/L 131*   POTASSIUM mmol/L 3.8   CHLORIDE mmol/L 98   CO2 mmol/L 24.0   BUN mg/dL 18   CREATININE mg/dL 0.99   GLUCOSE mg/dL 105*   CALCIUM mg/dL 9.2   BILIRUBIN mg/dL 0.2   ALK PHOS U/L 56   ALT (SGPT) U/L 26   AST (SGOT) U/L 24         Results from last 7 days  Lab Units 01/16/18  0530   MAGNESIUM mg/dL 1.8         Results from last 7 days  Lab Units 01/15/18  2359   WBC 10*3/mm3 10.66*   HEMOGLOBIN g/dL 14.1   HEMATOCRIT % 41.9   PLATELETS 10*3/mm3 288         Results from last 7 days  Lab Units 01/15/18  2359   INR  0.90     Imaging Results (last 7 days)     Procedure Component Value Units Date/Time    XR Chest 1 View [[id number]] Collected:  01/16/18 0006     Updated:  01/16/18 0032    Narrative:         Chest single view on  1/15/2018     CLINICAL INDICATION: Chest pain    COMPARISON: 8/16/2017    FINDINGS: There is elevation of the right diaphragm. There is  minimal right basilar atelectasis or scarring. The lungs are  otherwise clear. Cardiac, hilar and mediastinal contours are  within normal limits. Pulmonary vascularity is within normal  limits.      Impression:       No acute disease.    Electronically signed by:  Denis Power  1/16/2018 12:31 AM  CST Workstation: [id number]          Assessment/Plan     Principal Problem:    Chest pain  Active Problems:    Allergic reaction      -Will get 3 sets of Cardiac enzymes.   -Will continue with home medications as prior to  coming to hospital.  -DVT and GI Prophylaxis in place.  -Continue monitoring patient in hospital setting and treat patient as course dictates.    I discussed the patients findings and my recommendations with patient and nursing staff.         This document has been electronically signed by Fernandez Royal MD on January 16, 2018 6:35 AM        Total Time Spent: 45 mins    EMR Dragon/Transcription disclaimer:   Dictated utilizing Dragon dictation.

## 2018-01-16 NOTE — ED PROVIDER NOTES
Subjective   HPI Comments: Patient is a history of anxiety sleep apnea acid reflux she presents to emergency department with a chief complaint of chest pain patient states that she was taking generic form of Lasix a prescription for some lower extremity swelling and about 20 minutes after she took that PLO she developed some chest pain she reports substernal chest pressure that's nonradiating the soreness with associated shortness of breath nausea and diaphoresis the episode lasted about 2 hours yesterday and resolved patient states that again this evening she developed the chest pressure again this is about an hour after taking the Lasix pill she developed the pressure with associated shortness of breath now she is taking this pill in the past before and hasn't had these symptoms she reports last time she took the pills she only had some leg cramping and no shortness of breath no rash no diaphoresis nausea or vomiting patient states that this time she denies any rash denies any itching she denies any syncope or near syncope she does remember the last time she had a stress test she does not have a cardiologist      History provided by:  Patient   used: No        Review of Systems   Constitutional: Negative.    HENT: Negative.    Eyes: Negative.    Respiratory: Positive for shortness of breath. Negative for apnea, cough, choking, chest tightness, wheezing and stridor.    Cardiovascular: Positive for chest pain. Negative for palpitations and leg swelling.   Gastrointestinal: Positive for nausea. Negative for abdominal distention, abdominal pain, anal bleeding, blood in stool, constipation, diarrhea, rectal pain and vomiting.   Genitourinary: Negative.    Musculoskeletal: Negative.    Skin: Negative.    Neurological: Negative.    Hematological: Negative.        Past Medical History:   Diagnosis Date   • Acid reflux    • Anxiety    • Depressed    • Hard to intubate    • Hypertension    • Sleep apnea         Allergies   Allergen Reactions   • Sulfa Antibiotics Anaphylaxis and GI Intolerance       Past Surgical History:   Procedure Laterality Date   • ABDOMINAL SURGERY     • CYSTOSCOPY     • ENDOMETRIAL ABLATION  2015   • LAPAROSCOPIC TUBAL LIGATION  1995   • DC ERCP DX COLLECTION SPECIMEN BRUSHING/WASHING Left 7/31/2017    Procedure: ENDOSCOPIC RETROGRADE CHOLANGIOPANCREATOGRAPHY;  Surgeon: Samuel Redding DO;  Location: [facility] ENDOSCOPY;  Service: Gastroenterology   • DC LAP,CHOLECYSTECTOMY N/A 7/24/2017    Procedure: CHOLECYSTECTOMY LAPAROSCOPIC, also umbillical hernia repair;  Surgeon: Pk العلي MD;  Location: [facility] OR;  Service: General       Family History   Problem Relation Age of Onset   • Diabetes Mother    • Hypertension Mother    • Hypertension Father        Social History     Social History   • Marital status:      Spouse name: N/A   • Number of children: N/A   • Years of education: N/A     Social History Main Topics   • Smoking status: Never Smoker   • Smokeless tobacco: Never Used   • Alcohol use Yes      Comment: socially   • Drug use: No   • Sexual activity: Defer     Other Topics Concern   • Not on file     Social History Narrative           Objective   Physical Exam   Constitutional: She is oriented to person, place, and time. She appears well-developed and well-nourished. No distress.   HENT:   Head: Normocephalic and atraumatic.   Nose: Nose normal.   Mouth/Throat: Oropharynx is clear and moist.   Eyes: Conjunctivae and EOM are normal. Pupils are equal, round, and reactive to light.   Neck: Normal range of motion. Neck supple. No JVD present.   Cardiovascular: Normal rate, regular rhythm, normal heart sounds and intact distal pulses.  Exam reveals no gallop and no friction rub.    No murmur heard.  Pulmonary/Chest: Effort normal and breath sounds normal. No stridor. No respiratory distress. She has no wheezes. She has no rales. She exhibits no tenderness.   Abdominal: Soft. Bowel  sounds are normal. She exhibits no distension and no mass. There is no tenderness. There is no rebound and no guarding. No hernia.   Musculoskeletal: Normal range of motion. She exhibits no edema, tenderness or deformity.   Neurological: She is alert and oriented to person, place, and time.   Skin: Skin is warm and dry. She is not diaphoretic. No erythema.   Flushed in the face no other rash noted.  No hives   Psychiatric:   Patient is very anxious   Nursing note and vitals reviewed.      ECG 12 Lead    Date/Time: 1/15/2018 11:57 PM  Performed by: PETE SANTOS  Authorized by: PETE SANTOS   Comparison: not compared with previous ECG   Rhythm: sinus rhythm  Rate: normal  BPM: 83  Clinical impression: non-specific ECG               ED Course  ED Course        Labs Reviewed   COMPREHENSIVE METABOLIC PANEL - Abnormal; Notable for the following:        Result Value    Glucose 105 (*)     Sodium 131 (*)     All other components within normal limits   CBC WITH AUTO DIFFERENTIAL - Abnormal; Notable for the following:     WBC 10.66 (*)     Immature Grans, Absolute 0.03 (*)     All other components within normal limits   TROPONIN (IN-HOUSE) - Normal   BNP (IN-HOUSE) - Normal   PROTIME-INR - Normal    Narrative:     Therapeutic range for most indications is 2.0-3.0 INR,  or 2.5-3.5 for mechanical heart valves.   D-DIMER, QUANTITATIVE - Normal    Narrative:     Dimer values <500 ng/ml FEU are FDA approved as aid in diagnosis of deep venous thrombosis and pulmonary embolism.  This test should not be used in an exclusion strategy with pretest probability alone.    A recent guideline regarding diagnosis for pulmonary thomboembolism recommends an adjusted exclusion criterion of age x 10 ng/ml FEU for patients >50 years of age ([employer] Intern Med 2015; 163: 701-711).   RAINBOW DRAW    Narrative:     The following orders were created for panel order [place] Draw.  Procedure                               Abnormality          Status                     ---------                               -----------         ------                     Light Blue Top[[id number]]                                   Final result               Green Top (Gel)[[id number]]                                  Final result               Lavender Top[[id number]]                                     Final result               Gold Top - SST[[id number]]                                   Final result                 Please view results for these tests on the individual orders.   TROPONIN (IN-HOUSE)   CBC AND DIFFERENTIAL    Narrative:     The following orders were created for panel order CBC & Differential.  Procedure                               Abnormality         Status                     ---------                               -----------         ------                     CBC Auto Differential[[id number]]        Abnormal            Final result                 Please view results for these tests on the individual orders.   LIGHT BLUE TOP   GREEN TOP   LAVENDER TOP   GOLD TOP - SST       XR Chest 1 View   Final Result   No acute disease.      Electronically signed by:  Denis Power  1/16/2018 12:31 AM   [facility] Workstation: [id number]                        [facility]  Number of Diagnoses or Management Options  Allergic reaction, initial encounter:   Chest pain, unspecified type:   Diagnosis management comments: Differential diagnosis ACS allergic reaction anxiety    Patient Progress  Patient progress: (Nurse was able to ascertain patient is now taking over-the-counter medication for leg cramps that has sulfur and she does have a sulfa allergy which would explain her symptoms we did give her Solu-Medrol and Pepcid as well she did, little hypotensive after the Lasix I don't feel she's in anaphylaxis at this time we did give her a little bit of a fluid bolus which her pressure did respond admitted to the hospitalist service for observation    Please note the patient's  blood pressure did respond to fluids I don't suspect anaphylaxis in this patient she says he did take her increased dosage of lisinopril before coming in here her blood pressure medication was just increased a week ago at primary care doctor's office she just started taking it 2 days ago and took her evening dose about an hour prior to arrival she has no lightheadedness or dizziness or vitals are otherwise stable she is feeling better after the allergy medicine treatment but still is not safe for discharge at this time we'll proceed with the observational status)      Final diagnoses:   Chest pain, unspecified type   Allergic reaction, initial encounter            Shantanu Vallejo MD  01/16/18 5547

## 2018-01-16 NOTE — ED NOTES
"Asked patient if she is \"taking any OTC meds?\"... She said yes she started taking a pill for leg cramps about 3 days ago. Researched OTC, \"Hylands Leg Cramp dissolving tablets.\" Notified MD that OTC med contains sulfur, pt is allergic to Sulfa antibiotics.      Camille Montiel RN  01/16/18 0236    "

## 2018-04-01 PROCEDURE — 87480 CANDIDA DNA DIR PROBE: CPT | Performed by: NURSE PRACTITIONER

## 2018-04-01 PROCEDURE — 87660 TRICHOMONAS VAGIN DIR PROBE: CPT | Performed by: NURSE PRACTITIONER

## 2018-04-01 PROCEDURE — 87510 GARDNER VAG DNA DIR PROBE: CPT | Performed by: NURSE PRACTITIONER

## 2018-04-01 PROCEDURE — 87086 URINE CULTURE/COLONY COUNT: CPT | Performed by: NURSE PRACTITIONER

## 2018-05-04 ENCOUNTER — APPOINTMENT (OUTPATIENT)
Dept: MRI IMAGING | Facility: HOSPITAL | Age: 48
End: 2018-05-04

## 2018-05-10 ENCOUNTER — HOSPITAL ENCOUNTER (OUTPATIENT)
Dept: MRI IMAGING | Facility: HOSPITAL | Age: 48
Discharge: HOME | End: 2018-05-10
Admitting: NURSE PRACTITIONER

## 2018-05-10 DIAGNOSIS — R20.2 PARESTHESIA OF SKIN: ICD-10-CM

## 2018-05-10 PROCEDURE — 25010000002 GADOTERIDOL PER 1 ML: Performed by: NURSE PRACTITIONER

## 2018-05-10 PROCEDURE — 70553 MRI BRAIN STEM W/O & W/DYE: CPT

## 2018-05-10 PROCEDURE — A9576 INJ PROHANCE MULTIPACK: HCPCS | Performed by: NURSE PRACTITIONER

## 2018-05-10 RX ADMIN — GADOTERIDOL 20 ML: 279.3 INJECTION, SOLUTION INTRAVENOUS at 07:27

## 2018-05-23 ENCOUNTER — OFFICE VISIT (OUTPATIENT)
Dept: SLEEP MEDICINE | Facility: HOSPITAL | Age: 48
End: 2018-05-23

## 2018-05-23 VITALS — BODY MASS INDEX: 46.48 KG/M2 | HEIGHT: 65 IN | WEIGHT: 279 LBS

## 2018-05-23 DIAGNOSIS — F51.04 PSYCHOPHYSIOLOGICAL INSOMNIA: ICD-10-CM

## 2018-05-23 DIAGNOSIS — G25.81 RESTLESS LEGS SYNDROME (RLS): ICD-10-CM

## 2018-05-23 DIAGNOSIS — G47.33 OBSTRUCTIVE SLEEP APNEA, ADULT: Primary | ICD-10-CM

## 2018-05-23 PROCEDURE — 99213 OFFICE O/P EST LOW 20 MIN: CPT | Performed by: INTERNAL MEDICINE

## 2018-05-23 NOTE — PROGRESS NOTES
Sleep Clinic Follow Up    Date: 5/23/2018  Primary Care Physician: MARIA TERESA Jorge      Interim History (1/3):  Since the last visit on 08/22/2017, patient has:      1)  ELSIE - Unfortunately had legal circumstances that prevented her from wearing machine. She will have to return secondary to under use.     PAP Data:  Time frame: 12/21/2017 - 03/20/201   Compliance 28.9%  Average use on days used: 3 hrs 12 min  PAP range : 5-20 cm H2O  Average 90% pressure: 15.8 cmH2O  Leak: 7.5 minutes  Average AHI 4.1 events/hr  Mask type: FFM  DME: BG    Bed time: 3989-5467  Sleep latency: 40-50 minutes  Number of times awakens during the night: 3-4  Wake time: 9272-4795  Estimated total sleep time at night: 6-7 hours  Caffeine intake: 2-3 teas  Alcohol intake: 2-3 per week  Nap time: yes, unexpected  Sleepiness with Driving: some    [place] - 21    2) Patient has intermittent RLS symptoms - started on Requip by her pcp ~ 2-3 months ago.    PMHx, FH, SH reviewed and pertinent changes are: unchanged from last office visit on 08/22/2017      REVIEW OF SYSTEMS:   Negative for chest pain, fever, chills, SOA, abdominal pain. Smoking: none      Exam (6-11/12):    There were no vitals filed for this visit.    Body mass index is 46.43 kg/m². Patient's Body mass index is 46.43 kg/m². BMI is above normal parameters. Recommendations include: referral to primary care.      Gen:  No distress, conversant, pleasant, appears stated age, alert, oriented  Eyes:   Anicteric sclera, moist conjunctiva, no lid lag     PERRLA, EOMI   Heent:   NC/AT    Oropharynx clear, Mallampati 4    normal hearing  Lungs:  Normal effort, non-labored breathing    Clear to auscultation    CV:  Normal S1/S2, no murmur    no lower extremity edema  ABD:  Soft, normal bowel sounds    Psych:  Appropriate affect  Neuro:  CN 2-12 intact    Past Medical History:   Diagnosis Date   • Acid reflux    • Anxiety    • Depressed    • Hard to intubate    • Hypertension    •  Sleep apnea        Current Outpatient Prescriptions:   •  buPROPion (WELLBUTRIN) 100 MG tablet, Take 100 mg by mouth 2 (Two) Times a Day., Disp: , Rfl:   •  furosemide (LASIX) 20 MG tablet, , Disp: , Rfl:   •  lisinopril (PRINIVIL,ZESTRIL) 20 MG tablet, , Disp: , Rfl:   •  naproxen (NAPROSYN) 500 MG tablet, As Needed., Disp: , Rfl:   •  oxybutynin (DITROPAN) 5 MG tablet, Take 5 mg by mouth., Disp: , Rfl:   •  pantoprazole (PROTONIX) 40 MG EC tablet, Take 40 mg by mouth Daily., Disp: , Rfl:   •  pravastatin (PRAVACHOL) 20 MG tablet, , Disp: , Rfl:   •  rOPINIRole (REQUIP) 0.25 MG tablet, , Disp: , Rfl:   •  sertraline (ZOLOFT) 50 MG tablet, Take 50 mg by mouth Daily., Disp: , Rfl:       ASSESSMENT / PLAN:     1. Obstructive sleep apnea  1. PSG on 05/12/2005, AHI of 61.1  2. Currently on 5-20 cm H2O  3. Continue PAP as prescribed.   4. Script for PAP supplies  5. Return to clinic in 1 year with compliance check unless sx change in the interim period.  2. Insomnia - sleep onset (stress)  3. Poor sleep hygiene - better  4. RLS - on ropinirole per pcp      Total time 15 min, more than half spent in face to face counseling and coordination of care.    RTC in 12 months     This document has been electronically signed by Abdoulaye Dooley MD on May 23, 2018         CC: MARIA TERESA Jorge          No ref. provider found

## 2018-07-03 ENCOUNTER — TRANSCRIBE ORDERS (OUTPATIENT)
Dept: PHYSICAL THERAPY | Facility: HOSPITAL | Age: 48
End: 2018-07-03

## 2018-07-03 DIAGNOSIS — R07.9 CHEST PAIN, UNSPECIFIED TYPE: Primary | ICD-10-CM

## 2018-07-03 DIAGNOSIS — M54.42 ACUTE BACK PAIN WITH SCIATICA, LEFT: Primary | ICD-10-CM

## 2018-07-12 ENCOUNTER — HOSPITAL ENCOUNTER (OUTPATIENT)
Dept: PHYSICAL THERAPY | Facility: HOSPITAL | Age: 48
Setting detail: THERAPIES SERIES
Discharge: HOME | End: 2018-07-12

## 2018-07-12 DIAGNOSIS — M54.40 ACUTE LOW BACK PAIN WITH SCIATICA, SCIATICA LATERALITY UNSPECIFIED, UNSPECIFIED BACK PAIN LATERALITY: Primary | ICD-10-CM

## 2018-07-12 PROCEDURE — 97162 PT EVAL MOD COMPLEX 30 MIN: CPT
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
## 2018-07-19 ENCOUNTER — APPOINTMENT (OUTPATIENT)
Dept: PHYSICAL THERAPY | Facility: HOSPITAL | Age: 48
End: 2018-07-19

## 2018-07-23 ENCOUNTER — HOSPITAL ENCOUNTER (OUTPATIENT)
Dept: PHYSICAL THERAPY | Facility: HOSPITAL | Age: 48
Setting detail: THERAPIES SERIES
Discharge: HOME | End: 2018-07-23

## 2018-07-23 DIAGNOSIS — M54.40 ACUTE LOW BACK PAIN WITH SCIATICA, SCIATICA LATERALITY UNSPECIFIED, UNSPECIFIED BACK PAIN LATERALITY: Primary | ICD-10-CM

## 2018-07-23 PROCEDURE — 97110 THERAPEUTIC EXERCISES: CPT

## 2018-07-23 NOTE — THERAPY TREATMENT NOTE
Outpatient Physical Therapy Ortho Treatment Note  [facility]     Patient Name: Maricarmen Ferris  : 1970  MRN: [id number]  Today's Date: 2018      Visit Date: 2018      Subjective Improvement: 0%  Attendance:  2/3 (eval+ 12 until 18)  Next MD Visit : TBD  Recert Date:  18      Therapy Diagnosis:  Chronic LBP; Sciatica      Visit Dx:    ICD-10-CM ICD-9-CM   1. Acute low back pain with sciatica, sciatica laterality unspecified, unspecified back pain laterality M54.40 724.2     724.3       Patient Active Problem List   Diagnosis   • Chronic cholecystitis   • Umbilical hernia without obstruction and without gangrene   • Abdominal pain   • Abdominal fluid collection   • Intra-hepatic bile leak        Past Medical History:   Diagnosis Date   • Acid reflux    • Anxiety    • Depressed    • Hard to intubate    • Hypertension    • Sleep apnea         Past Surgical History:   Procedure Laterality Date   • ABDOMINAL SURGERY     • CYSTOSCOPY     • ENDOMETRIAL ABLATION     • LAPAROSCOPIC TUBAL LIGATION     • [place] ERCP DX COLLECTION SPECIMEN BRUSHING/WASHING Left 2017    Procedure: ENDOSCOPIC RETROGRADE CHOLANGIOPANCREATOGRAPHY;  Surgeon: Samuel Redding DO;  Location: [facility] ENDOSCOPY;  Service: Gastroenterology   • [place] LAP,CHOLECYSTECTOMY N/A 2017    Procedure: CHOLECYSTECTOMY LAPAROSCOPIC, also umbillical hernia repair;  Surgeon: Pk العلي MD;  Location: [facility] OR;  Service: General             PT Ortho     Row Name 18 0850       Precautions and Contraindications    Precautions Monitor Vitals  -LEENA       Posture/Observations    Posture/Observations Comments L leg long; L anterior torsion; rounded shoulder posture;   -LEENA      User Key  (r) = Recorded By, (t) = Taken By, (c) = Cosigned By    Initials Name Provider Type    LEENA Dumont PTA Physical Therapy Assistant                            PT Assessment/Plan     Row Name 18 1796          PT Assessment  "   Assessment Comments did well with treatment this date and alignment corrected with ME;  Pt very decondition and has heart issues that are still trying to get settled by MD; added stretches to HEP  -KH        PT Plan    PT Frequency 2x/week  -KH     Predicted Duration of Therapy Intervention (Therapy Eval) 6 weeks  -KH     PT Plan Comments Continue to montior alignment and core stab; monitor vitals and increase as pt tolerates.   -KH       User Key  (r) = Recorded By, (t) = Taken By, (c) = Cosigned By    Initials Name Provider Type    LEENA Dumont PTA Physical Therapy Assistant                    Exercises     Row Name 07/23/18 0850             Subjective Comments    Subjective Comments Pt does reports that her leg pain is not constant.  states that prolonged periods of postions pain seem to get worse. Pt reports that her back is not to bad today.    -KH         Subjective Pain    Able to rate subjective pain? yes  -KH      Pre-Treatment Pain Level 6  -KH      Post-Treatment Pain Level 4  -KH         Exercise 1    Exercise Name 1 pro ll LE strength  -KH      Time 1 8  -KH      Additional Comments level 2  -KH         Exercise 2    Exercise Name 2 prone extension  -KH      Sets 2 1  -KH      Reps 2 10  -KH      Time 2 10\" holds  -KH         Exercise 3    Exercise Name 3 prone glute/heel squeeze  -KH      Sets 3 2  -KH      Reps 3 10  -KH         Exercise 4    Exercise Name 4 prone TKE's  -KH      Sets 4 2  -KH      Reps 4 10  -KH         Exercise 5    Exercise Name 5 prone ham curls  -KH      Sets 5 2  -KH      Reps 5 10  -KH         Exercise 6    Exercise Name 6 supine hamstring stretch  -KH      Sets 6 3  -KH      Reps 6 bilateral  -KH      Time 6 30\"  -KH      Additional Comments added to HEP  -KH         Exercise 7    Exercise Name 7 supine pirformis stretch  -KH      Sets 7 3  -KH      Time 7 30\"  -KH      Additional Comments added to HEP  -KH         Exercise 8    Exercise Name 8 ME to L anterior torsion  " -      Time 8 5'  -        User Key  (r) = Recorded By, (t) = Taken By, (c) = Cosigned By    Initials Name Provider Type    LEENA Dumont PTA Physical Therapy Assistant                               PT OP Goals     Row Name 07/23/18 0850          PT Short Term Goals    STG 1 Patient will be independent with HEP  -     STG 1 Progress Ongoing  -     STG 2 Patient will imporve oswestry by 15 %   -     STG 2 Progress Progressing  -     STG 3 Patient will report subjective improvement of 30 %   -     STG 3 Progress Progressing  -     STG 4 Patient will have decreased left radicular symtoms  -     STG 4 Progress Progressing  -        Long Term Goals    LTG 1 Patient will improve oswestry score to less than 20 % disability  -     LTG 1 Progress Progressing  -     LTG 2 Patient will have negative neural tension testing SLR/PKB on left  -     LTG 2 Progress Progressing  -     LTG 3 Patient will improve arom testing of lumbar spine to WFL  -     LTG 3 Progress Progressing  -     LTG 4 Patient will report subjective improvement of 50 %   -     LTG 4 Progress Progressing  -        Time Calculation    PT Goal Re-Cert Due Date 08/03/18  -       User Key  (r) = Recorded By, (t) = Taken By, (c) = Cosigned By    Initials Name Provider Type    LEENA Dumont PTA Physical Therapy Assistant                         Time Calculation:   Start Time: 0850  Stop Time: 0928  Time Calculation (min): 38 min  Total Timed Code Minutes- PT: 38 minute(s)  Therapy Suggested Charges     Code   Minutes Charges    None           Therapy Charges for Today     Code Description Service Date Service Provider Modifiers Qty    14313169281 HC PT THER PROC EA 15 MIN 7/23/2018 Marley Dumont PTA GP 3                    Marley Dumont PTA  7/23/2018

## 2018-07-25 PROCEDURE — G0123 SCREEN CERV/VAG THIN LAYER: HCPCS | Performed by: NURSE PRACTITIONER

## 2018-07-25 PROCEDURE — G0123 SCREEN CERV/VAG THIN LAYER: HCPCS

## 2018-07-27 ENCOUNTER — LAB REQUISITION (OUTPATIENT)
Dept: LAB | Facility: HOSPITAL | Age: 48
End: 2018-07-27

## 2018-07-27 ENCOUNTER — HOSPITAL ENCOUNTER (OUTPATIENT)
Dept: PHYSICAL THERAPY | Facility: HOSPITAL | Age: 48
Setting detail: THERAPIES SERIES
Discharge: HOME | End: 2018-07-27

## 2018-07-27 DIAGNOSIS — R87.610 ATYPICAL SQUAMOUS CELLS OF UNDETERMINED SIGNIFICANCE ON CYTOLOGIC SMEAR OF CERVIX (ASC-US): ICD-10-CM

## 2018-07-27 DIAGNOSIS — M54.40 ACUTE LOW BACK PAIN WITH SCIATICA, SCIATICA LATERALITY UNSPECIFIED, UNSPECIFIED BACK PAIN LATERALITY: Primary | ICD-10-CM

## 2018-07-27 PROCEDURE — 97110 THERAPEUTIC EXERCISES: CPT

## 2018-07-27 NOTE — THERAPY TREATMENT NOTE
Outpatient Physical Therapy Ortho Treatment Note  [facility]     Patient Name: Maricarmen Ferris  : 1970  MRN: [id number]  Today's Date: 2018      Visit Date: 2018     Subjective Improvement: 0%  Attendance:  3/4 (eval +12 until 18)  Next MD Visit : TBD  Recert Date:  18      Therapy Diagnosis:  Chronic LBP; Sciatica        Visit Dx:    ICD-10-CM ICD-9-CM   1. Acute low back pain with sciatica, sciatica laterality unspecified, unspecified back pain laterality M54.40 724.2     724.3       Patient Active Problem List   Diagnosis   • Chronic cholecystitis   • Umbilical hernia without obstruction and without gangrene   • Abdominal pain   • Abdominal fluid collection   • Intra-hepatic bile leak        Past Medical History:   Diagnosis Date   • Acid reflux    • Anxiety    • Depressed    • Hard to intubate    • Hypertension    • Sleep apnea         Past Surgical History:   Procedure Laterality Date   • ABDOMINAL SURGERY     • CYSTOSCOPY     • ENDOMETRIAL ABLATION     • LAPAROSCOPIC TUBAL LIGATION     • [place] ERCP DX COLLECTION SPECIMEN BRUSHING/WASHING Left 2017    Procedure: ENDOSCOPIC RETROGRADE CHOLANGIOPANCREATOGRAPHY;  Surgeon: Samuel Redding DO;  Location: [facility] ENDOSCOPY;  Service: Gastroenterology   • [place] LAP,CHOLECYSTECTOMY N/A 2017    Procedure: CHOLECYSTECTOMY LAPAROSCOPIC, also umbillical hernia repair;  Surgeon: Pk العلي MD;  Location: [facility] OR;  Service: General             PT Ortho     Row Name 18 0932       Precautions and Contraindications    Precautions Monitor Vitals  -       Posture/Observations    Posture/Observations Comments = LL this date, ASIS =  -LEENA      User Key  (r) = Recorded By, (t) = Taken By, (c) = Cosigned By    Initials Name Provider Type    LEENA Dumont PTA Physical Therapy Assistant                            PT Assessment/Plan     Row Name 18 0932          PT Assessment    Assessment Comments good  "alignment noted this date; continuing to progress well with treatment.    -        PT Plan    PT Frequency 2x/week  -     Predicted Duration of Therapy Intervention (Therapy Eval) 6 weeks  -     PT Plan Comments Pt to obtain an order for her neck from PCP; Core stab and montior alignment.  Clam shells next  -       User Key  (r) = Recorded By, (t) = Taken By, (c) = Cosigned By    Initials Name Provider Type    LEENA Dumont PTA Physical Therapy Assistant                Modalities     Row Name 07/27/18 0932             Subjective Pain    Post-Treatment Pain Level 1  -        User Key  (r) = Recorded By, (t) = Taken By, (c) = Cosigned By    Initials Name Provider Type    LEENA Marleybronson Dumont PTA Physical Therapy Assistant                Exercises     Row Name 07/27/18 0932             Subjective Comments    Subjective Comments Saw neurologist yesterday and she states that he was not happy she was not getting PT for her neck, she stated that she explained to him insurance reasons. Pt reports that she is feeling better. states that she actually was able to go walking the other.  had to take a few breaks becasue of her breath but otherwise doing well.  states that she is having a stress test 08/06/18  -         Subjective Pain    Able to rate subjective pain? yes  -      Pre-Treatment Pain Level 1  -      Post-Treatment Pain Level 1  -         Exercise 1    Exercise Name 1 pro ll LE strength  -      Time 1 10'  -KH      Additional Comments level 2   BP after 140/90  -KH         Exercise 2    Exercise Name 2 incline stretch  -KH      Sets 2 3  -KH      Time 2 30\"  -KH         Exercise 3    Exercise Name 3 supine hamstring stretch  -KH      Sets 3 3  -KH      Time 3 30\"  -KH         Exercise 4    Exercise Name 4 supine pirformis stretch  -KH      Sets 4 3  -KH      Time 4 30\"  -KH         Exercise 5    Exercise Name 5 bridges  -KH      Sets 5 2  -KH      Reps 5 10  -KH         Exercise 6    Exercise Name 6 " "LTR  -      Sets 6 1  -KH      Reps 6 10  -      Time 6 10\" holds  -        User Key  (r) = Recorded By, (t) = Taken By, (c) = Cosigned By    Initials Name Provider Type    LEENA Dumont PTA Physical Therapy Assistant                               PT OP Goals     Row Name 07/27/18 0932          PT Short Term Goals    STG 1 Patient will be independent with HEP  -     STG 1 Progress Ongoing  -     STG 2 Patient will imporve oswestry by 15 %   -     STG 2 Progress Progressing  -     STG 3 Patient will report subjective improvement of 30 %   -     STG 3 Progress Progressing  -     STG 4 Patient will have decreased left radicular symtoms  -     STG 4 Progress Progressing  -        Long Term Goals    LTG 1 Patient will improve oswestry score to less than 20 % disability  -     LTG 1 Progress Progressing  -     LTG 2 Patient will have negative neural tension testing SLR/PKB on left  -     LTG 2 Progress Progressing  -     LTG 3 Patient will improve arom testing of lumbar spine to WFL  -     LTG 3 Progress Progressing  -     LTG 4 Patient will report subjective improvement of 50 %   -     LTG 4 Progress Progressing  -        Time Calculation    PT Goal Re-Cert Due Date 08/03/18  -       User Key  (r) = Recorded By, (t) = Taken By, (c) = Cosigned By    Initials Name Provider Type    LEENA Dumont PTA Physical Therapy Assistant                         Time Calculation:   Start Time: 0932  Stop Time: 1011  Time Calculation (min): 39 min  Total Timed Code Minutes- PT: 39 minute(s)  Therapy Suggested Charges     Code   Minutes Charges    None           Therapy Charges for Today     Code Description Service Date Service Provider Modifiers Qty    21783139360 HC PT THER PROC EA 15 MIN 7/27/2018 Marley uDmont PTA GP 3                    Marley Dumont PTA  7/27/2018     "

## 2018-07-30 LAB
LAB AP CASE REPORT: NORMAL
LAB AP GYN ADDITIONAL INFORMATION: NORMAL
LAB AP LMP: NORMAL
PATH INTERP SPEC-IMP: NORMAL
STAT OF ADQ CVX/VAG CYTO-IMP: NORMAL

## 2018-07-31 ENCOUNTER — APPOINTMENT (OUTPATIENT)
Dept: PHYSICAL THERAPY | Facility: HOSPITAL | Age: 48
End: 2018-07-31

## 2018-08-03 ENCOUNTER — HOSPITAL ENCOUNTER (OUTPATIENT)
Dept: PHYSICAL THERAPY | Facility: HOSPITAL | Age: 48
Setting detail: THERAPIES SERIES
Discharge: HOME | End: 2018-08-03

## 2018-08-03 DIAGNOSIS — M54.40 ACUTE LOW BACK PAIN WITH SCIATICA, SCIATICA LATERALITY UNSPECIFIED, UNSPECIFIED BACK PAIN LATERALITY: Primary | ICD-10-CM

## 2018-08-03 PROCEDURE — 97110 THERAPEUTIC EXERCISES: CPT

## 2018-08-03 NOTE — THERAPY TREATMENT NOTE
Outpatient Physical Therapy Ortho Treatment Note  [facility]     Patient Name: Maricarmen Ferris  : 1970  MRN: [id number]  Today's Date: 8/3/2018      Visit Date: 2018     Subjective Improvement: 15%  Attendance:  / (eval + 12 until 18)  Next MD Visit : TBD  Recert Date:  18      Therapy Diagnosis:  Chronic LBP; Sciatica        Visit Dx:    ICD-10-CM ICD-9-CM   1. Acute low back pain with sciatica, sciatica laterality unspecified, unspecified back pain laterality M54.40 724.2     724.3       Patient Active Problem List   Diagnosis   • Chronic cholecystitis   • Umbilical hernia without obstruction and without gangrene   • Abdominal pain   • Abdominal fluid collection   • Intra-hepatic bile leak        Past Medical History:   Diagnosis Date   • Acid reflux    • Anxiety    • Depressed    • Hard to intubate    • Hypertension    • Sleep apnea         Past Surgical History:   Procedure Laterality Date   • ABDOMINAL SURGERY     • CYSTOSCOPY     • ENDOMETRIAL ABLATION     • LAPAROSCOPIC TUBAL LIGATION     • [facility] ERCP DX COLLECTION SPECIMEN BRUSHING/WASHING Left 2017    Procedure: ENDOSCOPIC RETROGRADE CHOLANGIOPANCREATOGRAPHY;  Surgeon: Samuel Redding DO;  Location: [facility] ENDOSCOPY;  Service: Gastroenterology   • VA LAP,CHOLECYSTECTOMY N/A 2017    Procedure: CHOLECYSTECTOMY LAPAROSCOPIC, also umbillical hernia repair;  Surgeon: Pk العلي MD;  Location: [facility] OR;  Service: General             PT Ortho     Row Name 18 0845       Precautions and Contraindications    Precautions Monitor Vitals  -      User Key  (r) = Recorded By, (t) = Taken By, (c) = Cosigned By    Initials Name Provider Type    Marley Raymond PTA Physical Therapy Assistant                            PT Assessment/Plan     Row Name 18 0944          PT Assessment    Assessment Comments decreased treatment this date secondary to pt being late for appt; continues to make good progress  "with LE and core strengthening.   -KH        PT Plan    PT Frequency 2x/week  -KH     Predicted Duration of Therapy Intervention (Therapy Eval) 6 weeks  -KH     PT Plan Comments Recheck with PT 08/06/18; Possible assessment of neck, (order in chart for neck and back from [facility]); continue as advised; core strength as able.   -       User Key  (r) = Recorded By, (t) = Taken By, (c) = Cosigned By    Initials Name Provider Type    Marley Raymond PTA Physical Therapy Assistant                    Exercises     Row Name 08/03/18 0945             Subjective Comments    Subjective Comments Pt reports that she is feeling pretty good today. No increased pain noted.  Still wants to be seen for and her neck and back  -KH         Subjective Pain    Able to rate subjective pain? yes  -KH      Pre-Treatment Pain Level 2  -KH      Post-Treatment Pain Level 1  -KH         Exercise 1    Exercise Name 1 pro ll LE strength  -KH      Time 1 10'  -KH      Additional Comments level 2 seat 10  -KH         Exercise 2    Exercise Name 2 incline stretch  -KH      Sets 2 3  -KH      Time 2 30\"  -KH         Exercise 3    Exercise Name 3 standing hamstring stretch  -KH      Sets 3 3  -KH      Time 3 30\"  -KH         Exercise 4    Exercise Name 4 standing hip flexor stretch  -KH      Sets 4 3  -KH      Time 4 30\"  -KH         Exercise 5    Exercise Name 5 standing hip abd B LE  -KH      Sets 5 2  -KH      Reps 5 10  -KH         Exercise 6    Exercise Name 6 standing hip extension B LE  -KH      Sets 6 2  -KH      Reps 6 10  -KH         Exercise 7    Exercise Name 7 LAQ w/ add squeeze  -KH      Sets 7 2  -KH      Reps 7 10  -KH      Additional Comments with iso trans abs  -KH        User Key  (r) = Recorded By, (t) = Taken By, (c) = Cosigned By    Initials Name Provider Type    Marley Raymond PTA Physical Therapy Assistant                               PT OP Goals     Row Name 08/03/18 0944          PT Short Term Goals    STG 1 Patient will be " independent with HEP  -     STG 1 Progress Ongoing  -     STG 2 Patient will imporve oswestry by 15 %   -     STG 2 Progress Progressing  -     STG 3 Patient will report subjective improvement of 30 %   -     STG 3 Progress Progressing  -     STG 4 Patient will have decreased left radicular symtoms  -     STG 4 Progress Progressing  -        Long Term Goals    LTG 1 Patient will improve oswestry score to less than 20 % disability  -     LTG 1 Progress Progressing  -     LTG 2 Patient will have negative neural tension testing SLR/PKB on left  -     LTG 2 Progress Progressing  -     LTG 3 Patient will improve arom testing of lumbar spine to WFL  -     LTG 3 Progress Progressing  -     LTG 4 Patient will report subjective improvement of 50 %   -     LTG 4 Progress Progressing  -        Time Calculation    PT Goal Re-Cert Due Date 08/03/18  -       User Key  (r) = Recorded By, (t) = Taken By, (c) = Cosigned By    Initials Name Provider Type     Marley Dumont PTA Physical Therapy Assistant                         Time Calculation:   Start Time: 0944  Stop Time: 1014  Time Calculation (min): 30 min  Total Timed Code Minutes- PT: 30 minute(s)  Therapy Suggested Charges     Code   Minutes Charges    None           Therapy Charges for Today     Code Description Service Date Service Provider Modifiers Qty    26848491612 HC PT THER PROC EA 15 MIN 8/3/2018 Marley Dumont PTA GP 2                    Marley Dumont PTA  8/3/2018

## 2018-08-06 ENCOUNTER — HOSPITAL ENCOUNTER (OUTPATIENT)
Dept: NUCLEAR MEDICINE | Facility: HOSPITAL | Age: 48
Discharge: HOME | End: 2018-08-06
Attending: INTERNAL MEDICINE

## 2018-08-06 DIAGNOSIS — R07.9 CHEST PAIN, UNSPECIFIED TYPE: ICD-10-CM

## 2018-08-06 PROCEDURE — 0 TECHNETIUM SESTAMIBI: Performed by: INTERNAL MEDICINE

## 2018-08-06 PROCEDURE — A9500 TC99M SESTAMIBI: HCPCS | Performed by: INTERNAL MEDICINE

## 2018-08-06 RX ORDER — GABAPENTIN 100 MG/1
200 CAPSULE ORAL 3 TIMES DAILY
COMMUNITY
End: 2019-05-19 | Stop reason: HOSPADM

## 2018-08-06 RX ORDER — METHOCARBAMOL 500 MG/1
500 TABLET, FILM COATED ORAL 3 TIMES DAILY
COMMUNITY
End: 2018-09-30

## 2018-08-06 RX ORDER — ISOSORBIDE MONONITRATE 30 MG/1
30 TABLET, EXTENDED RELEASE ORAL DAILY
COMMUNITY
End: 2019-05-19 | Stop reason: HOSPADM

## 2018-08-06 RX ORDER — TRAMADOL HYDROCHLORIDE 50 MG/1
50 TABLET ORAL NIGHTLY
COMMUNITY
End: 2019-01-30

## 2018-08-06 RX ADMIN — TECHNETIUM TC 99M SESTAMIBI 1 DOSE: 1 INJECTION INTRAVENOUS at 08:52

## 2018-08-07 ENCOUNTER — HOSPITAL ENCOUNTER (OUTPATIENT)
Dept: NUCLEAR MEDICINE | Facility: HOSPITAL | Age: 48
Discharge: HOME | End: 2018-08-07
Attending: INTERNAL MEDICINE

## 2018-08-07 ENCOUNTER — HOSPITAL ENCOUNTER (OUTPATIENT)
Dept: CARDIOLOGY | Facility: HOSPITAL | Age: 48
Discharge: HOME | End: 2018-08-07
Attending: INTERNAL MEDICINE

## 2018-08-07 PROCEDURE — 0 TECHNETIUM SESTAMIBI: Performed by: INTERNAL MEDICINE

## 2018-08-07 PROCEDURE — 78452 HT MUSCLE IMAGE SPECT MULT: CPT

## 2018-08-07 PROCEDURE — 93017 CV STRESS TEST TRACING ONLY: CPT

## 2018-08-07 PROCEDURE — A9500 TC99M SESTAMIBI: HCPCS | Performed by: INTERNAL MEDICINE

## 2018-08-07 PROCEDURE — 25010000002 REGADENOSON 0.4 MG/5ML SOLUTION: Performed by: INTERNAL MEDICINE

## 2018-08-07 RX ORDER — 0.9 % SODIUM CHLORIDE 0.9 %
10 VIAL (ML) INJECTION AS NEEDED
Status: DISCONTINUED | OUTPATIENT
Start: 2018-08-07 | End: 2018-08-08 | Stop reason: HOSPADM

## 2018-08-07 RX ADMIN — SODIUM CHLORIDE, PRESERVATIVE FREE 10 ML: 5 INJECTION INTRAVENOUS at 08:41

## 2018-08-07 RX ADMIN — TECHNETIUM TC 99M SESTAMIBI 1 DOSE: 1 INJECTION INTRAVENOUS at 08:42

## 2018-08-07 RX ADMIN — REGADENOSON 0.4 MG: 0.08 INJECTION, SOLUTION INTRAVENOUS at 08:40

## 2018-08-07 NOTE — H&P
Cardiology History and Physical Note        Patient Name: Maricarmen Ferris  Age/Sex: 47 y.o. female  : 1970  MRN: [id number]    Date of Admission : 2018    Primary care Physician: Darnell Peralta APRN    Reason for Admission:  Chest pain Lexiscan Cardiolite stress tests      Subjective:       Chief Complaint: Chest pain .    History of Present Illness:  Maricarmen Ferris is a 47 y.o. female      with a weight of 294 pounds height of 5 feet 5 inch with a body mask index of 48, past medical history significant for arterial hypertension, hypertensive heart disease, obstructive sleep apnea, gastroesophageal reflux disease, history of renal calculi, and strong family history for coronary artery disease, restless leg syndrome, gastroesophageal reflux disease and previous history of thoracocentesis and paracentesis.    Patient in 2017 had undergone cholecystectomy with bile duct leak  and subsequently underwent evaluation by Dr. Redding and further evaluation in [place].  Patient was hospitalized in 2018 with symptoms of chest pain and subsequently ruled out for myocardial infarction.    Patient is undergoing evaluation for gastric bypass surgery for weight loss in  [place].  Patient now presents with symptoms off chest pain which has been on and off.  Patient complained of having symptoms of chest pain with exertion relieved with rest.  Patient has associated symptoms of shortness of breath and diaphoresis.  Patient symptoms of chest pain is nonradiating.  Patient resting electrocardiogram done reveals sinus bradycardia with nonspecific ST-T wave changes.  Patient denies any symptoms off palpitation.  Patient does complain of having symptoms of lightheaded dizziness.  Patient denies previous cardiac evaluation.  Patient denies any hemoptysis hematuria bright red blood per rectum.    Patient blood pressure was 140/80 with a heart rate of 54 bpm respiration of 18.  Patient is  currently not on any AV blocking medication with the resting heart rate of 54 bpm.    Patient 10 point review of system except for what is stated in the history of present illness is negative.        Past Medical History:  1.  Chest pain with the resting sinus bradycardia.  2.  Arterial hypertension.  3.  Hypertensive heart disease.  4.  Obesity with a weight of 294 pounds height of 5 feet 5 inch with a body mask index of 48.  5.  Obstructive sleep apnea.  6.  Gastroesophageal reflux disease.  7.  Anxiety and depression.  8.  History of thoracocentesis and paracentesis.  9.  History of renal calculi.  10.  Restless leg syndrome.  11.  Family history for coronary artery disease  12.  Hyperlipidemia.      Past Surgical History:  1.  Laparoscopic cholecystectomy.   2.  Umbilical hernia repair.  3.  Cystoscopy.  4.  Laparoscopic tubal ligation.  5.  Renal calculi surgery.      Family History: Family history strongly positive for atherosclerotic coronary artery disease      Social History: Denies any tobacco or alcohol intake  Social History     Social History   • Marital status:      Spouse name: N/A   • Number of children: N/A   • Years of education: N/A     Occupational History   • Not on file.     Social History Main Topics   • Smoking status: Never Smoker   • Smokeless tobacco: Never Used   • Alcohol use Yes      Comment: socially   • Drug use: No   • Sexual activity: Defer     Other Topics Concern   • Not on file     Social History Narrative   • No narrative on file        Cardiac Risk factor: Hypertension, Family history  and Obesity hyper lipidemia      Allergies:  Allergies   Allergen Reactions   • Sulfa Antibiotics Anaphylaxis and GI Intolerance       Home Medication::  Prior to Admission medications    Medication Sig Start Date End Date Taking? Authorizing Provider   buPROPion (WELLBUTRIN) 100 MG tablet Take 100 mg by mouth 2 (Two) Times a Day.    Emergency, Nurse Epic, RN   furosemide (LASIX) 20 MG  tablet  2/14/18   Emergency, Nurse Epic, RN   gabapentin (NEURONTIN) 100 MG capsule Take 100 mg by mouth 3 (Three) Times a Day.    Howie Phillips MD   isosorbide mononitrate (IMDUR) 30 MG 24 hr tablet Take 30 mg by mouth Daily.    Howie Phillips MD   lisinopril (PRINIVIL,ZESTRIL) 20 MG tablet  1/2/18   Howie Phillips MD   methocarbamol (ROBAXIN) 500 MG tablet Take 500 mg by mouth 3 (Three) Times a Day.    Howie Phillips MD   naproxen (NAPROSYN) 500 MG tablet As Needed. 1/2/18   Howie Phillips MD   oxybutynin (DITROPAN) 5 MG tablet Take 5 mg by mouth.    Howie Phillips MD   pantoprazole (PROTONIX) 40 MG EC tablet Take 40 mg by mouth Daily. 7/10/17   Howie Phillips MD   pravastatin (PRAVACHOL) 20 MG tablet  2/19/18   Emergency, Nurse Jaylon RN   rOPINIRole (REQUIP) 0.25 MG tablet  2/6/18   Emergency, Nurse Jaylon RN   sertraline (ZOLOFT) 50 MG tablet Take 50 mg by mouth Daily.    Emergency, Nurse MARCELA Iyer   traMADol (ULTRAM) 50 MG tablet Take 50 mg by mouth Daily.    ProviderHowie MD         Review of Systems   Constitutional: Negative for chills, fever and unexpected weight change.   HENT: Negative for hearing loss and nosebleeds.    Eyes: Negative for visual disturbance.   Respiratory: Positive for chest tightness and shortness of breath. Negative for cough and wheezing.    Cardiovascular: Positive for chest pain. Negative for palpitations and leg swelling.   Gastrointestinal: Negative for abdominal pain, blood in stool, constipation, diarrhea, nausea and vomiting.   Endocrine: Negative for cold intolerance, heat intolerance, polydipsia, polyphagia and polyuria.   Genitourinary: Negative for hematuria.   Musculoskeletal: Negative for joint swelling, myalgias and neck pain.   Skin: Negative for color change, rash and wound.   Neurological: Positive for light-headedness and headaches. Negative for dizziness, seizures, syncope and numbness.   Hematological: Does  not bruise/bleed easily.         Objective:     Objective:         There is no height or weight on file to calculate BMI.       Physical Exam   Constitutional: She is oriented to person, place, and time. She appears well-developed and well-nourished.   HENT:   Head: Normocephalic and atraumatic.   Left Ear: External ear normal.   Nose: Nose normal.   Mouth/Throat: Oropharynx is clear and moist.   Eyes: Pupils are equal, round, and reactive to light. Conjunctivae and EOM are normal. No scleral icterus.   Neck: Normal range of motion. Neck supple. No JVD present. No tracheal deviation present. No thyromegaly present.   Cardiovascular: Normal rate and regular rhythm.    Pulmonary/Chest: Breath sounds normal. No stridor.   Abdominal: Bowel sounds are normal.   Musculoskeletal: Normal range of motion.   Lymphadenopathy:     She has no cervical adenopathy.   Neurological: She is alert and oriented to person, place, and time. She has normal reflexes.   Skin: Skin is warm and dry.   Psychiatric: She has a normal mood and affect. Her behavior is normal. Judgment and thought content normal.         Lab Review:           Invalid input(s): LABALBU, PROT                                    EKG:   ECG/EMG Results (last 24 hours)     ** No results found for the last 24 hours. **          Imaging:  Imaging Results (last 24 hours)     ** No results found for the last 24 hours. **          I personally viewed and interpreted the patient's EKG/Telemetry data.    Assessment:   1.  Chest pain.  2.  Resting sinus bradycardia currently not on any AV blocking medication with symptoms of lightheaded dizziness.  3.  Arterial hypertension.  4.  Hypertensive heart disease.  5.  Obesity.  6.  Obstructive sleep apnea.  7.  Anxiety and depression.  8.  Restless leg syndrome.          Plan:   1.  Chest pain.  Patient has symptoms of chest pain with hospitalization with negative troponin.  Patient since the discharge from the hospital has continued  to have symptoms of chest pain.  Patient resting echocardiogram done reveals sinus bradycardia with nonspecific ST-T wave changes.  Patient is currently not on any AV blocking medication.  Patient is unable to walk on a treadmill.  Patient does have risk factor for coronary artery disease.  Patient at the present time has been recommended to undergo a Lexiscan Cardiolite stress test to rule out any evidence of any stress-induced ischemia.  Risk-benefit treatment option for the Lexiscan Cardiolite stress test were discussed with the patient and an informed consent was obtained for the Lexiscan Cardiolite stress tests.  Patient has been recommended to start isosorbide 30 mg daily.  Patient has also been recommended to undergo a transthoracic echocardiogram to further evaluate the left ventricular systolic function and to rule out regional segmental wall motion abnormality.  2.  Arterial hypertension.  Patient blood pressure has been labile but well controlled at the present time.  She'll is to continue with the present dose of the lisinopril.  3.  Hypertensive heart disease.  Patient has been counseled to decrease his salt intake and to continue with the present dose of the Lasix and the lisinopril.  Patient is to undergo a transthoracic echocardiogram.  4.  Hyperlipidemia.  Patient has been counseled on low-fat low-cholesterol diet and to continue with the present dose of the pravastatin.  5.  Resting sinus bradycardia.  Patient is currently not on any AV blocking medication and has a resting heart rate of 54 bpm.  Patient complains of having symptoms of lightheaded dizziness.  Patient would undergo a transthoracic echocardiogram and the Lexiscan Cardiolite stress tests.  Patient may need a 14 day event monitor recording should the patient continued to have symptoms of lightheaded dizziness to rule out symptomatic bradycardia.  6.  Obesity with a body mask index of 48.  Patient has been counseled on weight reduction  lifestyle modification and dietary restriction.  Patient has been explained the risk associated with obesity and the occurrence and progression of atherosclerotic coronary artery disease and peripheral vascular disease.  Patient is currently being evaluated by Dr. Sunshine in [place] for gastric bypass surgery.  7.  Anxiety and depression.  Patient has been followed by the primary care physician.  Patient is currently on sertraline.  Patient is also on Wellbutrin and has been followed by the primary care physician.  8.  Restless leg syndrome.  Patient is currently on Requip.  9.  Gastroesophageal reflux disease.  Patient has had previous evaluation by gastroenterology.  Patient is currently on Protonix.  10.  Obstructive sleep apnea.  Patient has been recommended to be compliant with the CPAP.    Above plan of management were discussed with the patient.      Time: time spent in face-to-face evaluation of greater than 55  minutes and interacting and formulating examining and discussing the plan with the patient with 50% of greater time spent in face-to-face interaction.    Kuldip Frank MD  08/06/18  7:24 PM    Dictated utilizing Dragon dictation.

## 2018-08-08 ENCOUNTER — HOSPITAL ENCOUNTER (OUTPATIENT)
Dept: PHYSICAL THERAPY | Facility: HOSPITAL | Age: 48
Setting detail: THERAPIES SERIES
Discharge: HOME | End: 2018-08-08

## 2018-08-08 DIAGNOSIS — M54.40 ACUTE LOW BACK PAIN WITH SCIATICA, SCIATICA LATERALITY UNSPECIFIED, UNSPECIFIED BACK PAIN LATERALITY: Primary | ICD-10-CM

## 2018-08-08 PROCEDURE — 97535 SELF CARE MNGMENT TRAINING: CPT

## 2018-08-08 PROCEDURE — 97110 THERAPEUTIC EXERCISES: CPT

## 2018-08-08 NOTE — THERAPY PROGRESS REPORT/RE-CERT
Outpatient Physical Therapy Ortho Progress Note  [facility]     Patient Name: Maricarmen Ferris  : 1970  MRN: [id number]  Today's Date: 2018      Visit Date: 2018   Subjective Improvement: 15%  Attendance:   (eval + 12 until 18)  Next MD Visit : TBD  Recert Date:  18    Patient Active Problem List   Diagnosis   • Chronic cholecystitis   • Umbilical hernia without obstruction and without gangrene   • Abdominal pain   • Abdominal fluid collection   • Intra-hepatic bile leak        Past Medical History:   Diagnosis Date   • Acid reflux    • Anxiety    • Depressed    • Hard to intubate    • Hypertension    • Sleep apnea         Past Surgical History:   Procedure Laterality Date   • ABDOMINAL SURGERY     • CYSTOSCOPY     • ENDOMETRIAL ABLATION     • LAPAROSCOPIC TUBAL LIGATION     • AR ERCP DX COLLECTION SPECIMEN BRUSHING/WASHING Left 2017    Procedure: ENDOSCOPIC RETROGRADE CHOLANGIOPANCREATOGRAPHY;  Surgeon: Samuel Redding DO;  Location: [facility] ENDOSCOPY;  Service: Gastroenterology   • AR LAP,CHOLECYSTECTOMY N/A 2017    Procedure: CHOLECYSTECTOMY LAPAROSCOPIC, also umbillical hernia repair;  Surgeon: Pk العلي MD;  Location: [facility] OR;  Service: General       Visit Dx:     ICD-10-CM ICD-9-CM   1. Acute low back pain with sciatica, sciatica laterality unspecified, unspecified back pain laterality M54.40 724.2     724.3                 PT Ortho     Row Name 18 1000       Precautions and Contraindications    Precautions Monitor Vitals  -MW       Posture/Observations    Posture/Observations Comments Pt has decreased Lordosis but anteriorly rotated appearing pelvis. Sacral distraction provoked pain. Prone on elbows alleviates some pain sxs.  -MW       Neural Tension Signs- Lower Quarter Clearing    Slump Bilateral:;Positive  -MW       Myotomal Screen- Lower Quarter Clearing    Hip flexion (L2) Bilateral:;4 (Good)  -MW    Knee extension (L3)  Bilateral:;4+ (Good +)  -MW    Ankle DF (L4) Bilateral:;5 (Normal)  -MW    Knee flexion (S2) Bilateral:;4+ (Good +)  -MW       Head/Neck/Trunk    Head/Neck/Trunk Comments Lumbar flexion 25% limited, lumbar extension 25 % limited, LSB to mid thigh , RSB to mid thigh, Left rotation limited 25 % , right rotation limited 50 % . L rotation most provocative of pain.  -MW       Transfers    Comment (Transfers) Antalgic STS  -MW       Gait/Stairs Assessment/Training    Comment (Gait/Stairs) Antalgic gait w/ decreased stance on the L LE  -MW      User Key  (r) = Recorded By, (t) = Taken By, (c) = Cosigned By    Initials Name Provider Type    MW Bebe Griffin, PT Physical Therapist                                  PT OP Goals     Row Name 08/08/18 1826          PT Short Term Goals    STG 1 Patient will be independent with HEP  -MW     STG 1 Progress Ongoing  -MW     STG 2 Patient will imporve oswestry by 15 %   -MW     STG 2 Progress Progressing  -MW     STG 3 Patient will report subjective improvement of 30 %   -MW     STG 3 Progress Progressing  -MW     STG 4 Patient will have decreased left radicular symtoms  -MW     STG 4 Progress Progressing  -MW        Long Term Goals    LTG 1 Patient will improve oswestry score to less than 20 % disability  -MW     LTG 1 Progress Progressing  -MW     LTG 2 Patient will have negative neural tension testing SLR/PKB on left  -MW     LTG 2 Progress Progressing  -MW     LTG 3 Patient will improve arom testing of lumbar spine to WFL  -MW     LTG 3 Progress Progressing  -MW     LTG 4 Patient will report subjective improvement of 50 %   -MW     LTG 4 Progress Progressing  -MW        Time Calculation    PT Goal Re-Cert Due Date 08/29/18  -MW       User Key  (r) = Recorded By, (t) = Taken By, (c) = Cosigned By    Initials Name Provider Type    Bebe Jasmine, PT Physical Therapist                PT Assessment/Plan     Row Name 08/08/18 1800          PT Assessment    Functional  "Limitations Impaired gait;Limitation in home management;Limitations in community activities;Performance in self-care ADL  -MW     Impairments Gait;Range of motion;Posture;Poor body mechanics;Pain;Muscle strength;Joint mobility  -     Assessment Comments The pt presented for reassess and tx today and responded well. She has made moderate strength progress since eval; however, her lumbar ROM progress is limited. She continues to have significantly decreased mobility and pain impairments that limit her overall functional ability. Extensive time spent on discussion of sxs management and self-care secondary to pt's elevated emotional state this visit. Secondary to the pt having made limited progress w/ land based therapy and secondary to pt request I feel that she is an appropriate candidate for pool therapy. Time spent discussing implications of pool therapy w/ pt. Pt stating no contraindications to pool therapy at this time and pt was agreeable to attempt pool therapy.  If pt fails to respond positively to pool therapy will refer her back to MD accordingly.  -MW     Rehab Potential Fair  -MW     Patient/caregiver participated in establishment of treatment plan and goals Yes  -MW     Patient would benefit from skilled therapy intervention Yes  -MW        PT Plan    PT Frequency 2x/week   pool therapy  -MW     Predicted Duration of Therapy Intervention (Therapy Eval) 2-3 weeks  -MW     PT Plan Comments progress w/ aquatic therapy next  -       User Key  (r) = Recorded By, (t) = Taken By, (c) = Cosigned By    Initials Name Provider Type    Bebe Jasmine PT Physical Therapist                  Exercises     Row Name 08/08/18 1000             Subjective Comments    Subjective Comments The pt states \"some days are better than others\". She states some days her back doesn't bother her at all and other days it is really bad. She states her pain is increased today secondary to bathing and making her bed just before " coming. The pt states positioning is extremely difficult secondary to pain including sitting and laying down. She states her L leg continues to get referred pain down into it. Pt states her back is the biggest problem right now; however, she states she would like to have her neck added to her MD order secondary to having functional deficits w/ both. She states she return to see Dr. Peralta next week and she will discuss w/ her whether or not they need to add the neck to her PT regimen. She states she has had significant ADL difficulty because of her back and she is even having trouble toileting and wiping herself secondary to having pain w/ reaching.  Pt requesting to attempt pool therapy and get a pool HEP for home use. She states she has heard good things about pool therapy w/ pain and she states she has home access to a pool.  -MW         Subjective Pain    Able to rate subjective pain? yes  -MW      Pre-Treatment Pain Level 4  -MW         Exercise 1    Exercise Name 1 Ambulation in clinic  -MW      Sets 1 2  -MW      Reps 1 100 feet  -MW         Exercise 2    Exercise Name 2 HEP review/POC discussion  -MW      Time 2 10'  -MW         Exercise 3    Exercise Name 3 Sxs management discussion- discussion held regarding self-care home options  -MW      Time 3 15'  -MW      Additional Comments suggestions made regarding ADL performance   -MW         Exercise 4    Exercise Name 4 lumbar AROM  -MW      Time 4 8'  -MW         Exercise 5    Exercise Name 5 lumbar neurotension glides  -MW      Sets 5 2- bilat  -MW        User Key  (r) = Recorded By, (t) = Taken By, (c) = Cosigned By    Initials Name Provider Type    MW Bebe Griffin, PT Physical Therapist                           Modified Oswestry  Modified Oswestry Score/Comments: 31/50      Time Calculation:     Therapy Suggested Charges     Code   Minutes Charges    None             Start Time: 1019  Stop Time: 1059  Time Calculation (min): 40 min  PT Non-Billable  Time (min): 2 min  Total Timed Code Minutes- PT: 38 minute(s)     Therapy Charges for Today     Code Description Service Date Service Provider Modifiers Qty    52161901832 HC PT THER PROC EA 15 MIN 8/8/2018 Bebe Griffin, PT GP 2    37864561074 HC PT SELF CARE/MGMT/TRAIN EA 15 MIN 8/8/2018 Bebe Griffin, PT GP 1                    Bebe Griffin, PT  8/8/2018

## 2018-08-09 ENCOUNTER — PREP FOR SURGERY (OUTPATIENT)
Dept: OTHER | Facility: HOSPITAL | Age: 48
End: 2018-08-09

## 2018-08-09 DIAGNOSIS — R94.30 ABNORMAL CARDIOVASCULAR FUNCTION STUDY: ICD-10-CM

## 2018-08-09 DIAGNOSIS — R07.9 CHEST PAIN, UNSPECIFIED TYPE: Primary | ICD-10-CM

## 2018-08-09 RX ORDER — SODIUM CHLORIDE 0.9 % (FLUSH) 0.9 %
1-10 SYRINGE (ML) INJECTION AS NEEDED
Status: CANCELLED | OUTPATIENT
Start: 2018-08-13

## 2018-08-09 RX ORDER — SODIUM CHLORIDE 9 MG/ML
75 INJECTION, SOLUTION INTRAVENOUS CONTINUOUS
Status: CANCELLED | OUTPATIENT
Start: 2018-08-13

## 2018-08-10 PROBLEM — R94.30 ABNORMAL CARDIOVASCULAR FUNCTION STUDY: Status: ACTIVE | Noted: 2018-08-10

## 2018-08-10 PROBLEM — R07.9 CHEST PAIN: Status: ACTIVE | Noted: 2018-08-10

## 2018-08-13 ENCOUNTER — APPOINTMENT (OUTPATIENT)
Dept: PHYSICAL THERAPY | Facility: HOSPITAL | Age: 48
End: 2018-08-13

## 2018-08-13 ENCOUNTER — HOSPITAL ENCOUNTER (OUTPATIENT)
Facility: HOSPITAL | Age: 48
Setting detail: HOSPITAL OUTPATIENT SURGERY
Discharge: HOME | End: 2018-08-13
Attending: INTERNAL MEDICINE | Admitting: INTERNAL MEDICINE

## 2018-08-13 VITALS
TEMPERATURE: 98.1 F | HEIGHT: 65 IN | OXYGEN SATURATION: 97 % | RESPIRATION RATE: 18 BRPM | HEART RATE: 63 BPM | WEIGHT: 293 LBS | SYSTOLIC BLOOD PRESSURE: 104 MMHG | DIASTOLIC BLOOD PRESSURE: 55 MMHG | BODY MASS INDEX: 48.82 KG/M2

## 2018-08-13 DIAGNOSIS — R07.9 CHEST PAIN, UNSPECIFIED TYPE: ICD-10-CM

## 2018-08-13 DIAGNOSIS — R94.30 ABNORMAL CARDIOVASCULAR FUNCTION STUDY: ICD-10-CM

## 2018-08-13 LAB
ANION GAP SERPL CALCULATED.3IONS-SCNC: 8 MMOL/L (ref 5–15)
BASOPHILS # BLD AUTO: 0.02 10*3/MM3 (ref 0–0.2)
BASOPHILS NFR BLD AUTO: 0.2 % (ref 0–2)
BUN BLD-MCNC: 19 MG/DL (ref 7–21)
BUN/CREAT SERPL: 29.7 (ref 7–25)
CALCIUM SPEC-SCNC: 8.6 MG/DL (ref 8.4–10.2)
CHLORIDE SERPL-SCNC: 105 MMOL/L (ref 95–110)
CO2 SERPL-SCNC: 25 MMOL/L (ref 22–31)
CREAT BLD-MCNC: 0.64 MG/DL (ref 0.5–1)
DEPRECATED RDW RBC AUTO: 41.1 FL (ref 36.4–46.3)
EOSINOPHIL # BLD AUTO: 0.22 10*3/MM3 (ref 0–0.7)
EOSINOPHIL NFR BLD AUTO: 2.6 % (ref 0–7)
ERYTHROCYTE [DISTWIDTH] IN BLOOD BY AUTOMATED COUNT: 13.6 % (ref 11.5–14.5)
GFR SERPL CREATININE-BSD FRML MDRD: 99 ML/MIN/1.73 (ref 58–135)
GLUCOSE BLD-MCNC: 96 MG/DL (ref 60–100)
HCT VFR BLD AUTO: 37 % (ref 35–45)
HGB BLD-MCNC: 12.4 G/DL (ref 12–15.5)
IMM GRANULOCYTES # BLD: 0.02 10*3/MM3 (ref 0–0.02)
IMM GRANULOCYTES NFR BLD: 0.2 % (ref 0–0.5)
INR PPP: 0.91 (ref 0.8–1.2)
LYMPHOCYTES # BLD AUTO: 2.52 10*3/MM3 (ref 0.6–4.2)
LYMPHOCYTES NFR BLD AUTO: 29.9 % (ref 10–50)
MCH RBC QN AUTO: 27.6 PG (ref 26.5–34)
MCHC RBC AUTO-ENTMCNC: 33.5 G/DL (ref 31.4–36)
MCV RBC AUTO: 82.2 FL (ref 80–98)
MONOCYTES # BLD AUTO: 0.7 10*3/MM3 (ref 0–0.9)
MONOCYTES NFR BLD AUTO: 8.3 % (ref 0–12)
NEUTROPHILS # BLD AUTO: 4.95 10*3/MM3 (ref 2–8.6)
NEUTROPHILS NFR BLD AUTO: 58.8 % (ref 37–80)
NRBC BLD MANUAL-RTO: 0 /100 WBC (ref 0–0)
PLATELET # BLD AUTO: 231 10*3/MM3 (ref 150–450)
PMV BLD AUTO: 10.3 FL (ref 8–12)
POTASSIUM BLD-SCNC: 3.8 MMOL/L (ref 3.5–5.1)
PROTHROMBIN TIME: 12.1 SECONDS (ref 11.1–15.3)
RBC # BLD AUTO: 4.5 10*6/MM3 (ref 3.77–5.16)
SODIUM BLD-SCNC: 138 MMOL/L (ref 137–145)
WBC NRBC COR # BLD: 8.43 10*3/MM3 (ref 3.2–9.8)

## 2018-08-13 PROCEDURE — 93458 L HRT ARTERY/VENTRICLE ANGIO: CPT | Performed by: INTERNAL MEDICINE

## 2018-08-13 PROCEDURE — C1894 INTRO/SHEATH, NON-LASER: HCPCS | Performed by: INTERNAL MEDICINE

## 2018-08-13 PROCEDURE — 80048 BASIC METABOLIC PNL TOTAL CA: CPT | Performed by: INTERNAL MEDICINE

## 2018-08-13 PROCEDURE — C1769 GUIDE WIRE: HCPCS | Performed by: INTERNAL MEDICINE

## 2018-08-13 PROCEDURE — 25010000002 MIDAZOLAM PER 1 MG: Performed by: INTERNAL MEDICINE

## 2018-08-13 PROCEDURE — 0 IOPAMIDOL PER 1 ML: Performed by: INTERNAL MEDICINE

## 2018-08-13 PROCEDURE — 85610 PROTHROMBIN TIME: CPT | Performed by: INTERNAL MEDICINE

## 2018-08-13 PROCEDURE — 25010000002 FENTANYL CITRATE (PF) 100 MCG/2ML SOLUTION: Performed by: INTERNAL MEDICINE

## 2018-08-13 PROCEDURE — 85025 COMPLETE CBC W/AUTO DIFF WBC: CPT | Performed by: INTERNAL MEDICINE

## 2018-08-13 RX ORDER — MIDAZOLAM HYDROCHLORIDE 1 MG/ML
INJECTION INTRAMUSCULAR; INTRAVENOUS AS NEEDED
Status: DISCONTINUED | OUTPATIENT
Start: 2018-08-13 | End: 2018-08-13 | Stop reason: HOSPADM

## 2018-08-13 RX ORDER — SODIUM CHLORIDE 9 MG/ML
100 INJECTION, SOLUTION INTRAVENOUS CONTINUOUS
Status: DISCONTINUED | OUTPATIENT
Start: 2018-08-13 | End: 2018-08-13 | Stop reason: HOSPADM

## 2018-08-13 RX ORDER — LIDOCAINE HYDROCHLORIDE 20 MG/ML
INJECTION, SOLUTION INFILTRATION; PERINEURAL AS NEEDED
Status: DISCONTINUED | OUTPATIENT
Start: 2018-08-13 | End: 2018-08-13 | Stop reason: HOSPADM

## 2018-08-13 RX ORDER — SODIUM CHLORIDE 9 MG/ML
75 INJECTION, SOLUTION INTRAVENOUS CONTINUOUS
Status: DISCONTINUED | OUTPATIENT
Start: 2018-08-13 | End: 2018-08-13

## 2018-08-13 RX ORDER — SODIUM CHLORIDE 0.9 % (FLUSH) 0.9 %
1-10 SYRINGE (ML) INJECTION AS NEEDED
Status: DISCONTINUED | OUTPATIENT
Start: 2018-08-13 | End: 2018-08-13 | Stop reason: HOSPADM

## 2018-08-13 RX ORDER — FENTANYL CITRATE 50 UG/ML
INJECTION, SOLUTION INTRAMUSCULAR; INTRAVENOUS AS NEEDED
Status: DISCONTINUED | OUTPATIENT
Start: 2018-08-13 | End: 2018-08-13 | Stop reason: HOSPADM

## 2018-08-13 RX ADMIN — SODIUM CHLORIDE 75 ML/HR: 9 INJECTION, SOLUTION INTRAVENOUS at 07:31

## 2018-08-13 NOTE — H&P
Cardiology History and Physical Note        Patient Name: Maricarmen Ferris  Age/Sex: 48 y.o. female  : 1970  MRN: [id number]    Date of Admission : 2018    Primary care Physician: Darnell Peralta APRN    Reason for Admission:  Chest pain positive Lexiscan Cardiolite stress test left heart catheterization.      Subjective:       Chief Complaint: Chest pain .    History of Present Illness:  Maricarmen Ferris is a 48 y.o. female      with a weight of 294 pounds height of 5 feet 5 inch with a body mask index of 48, past medical history significant for arterial hypertension, hypertensive heart disease, obstructive sleep apnea, gastroesophageal reflux disease, history of renal calculi, and strong family history for coronary artery disease, restless leg syndrome, gastroesophageal reflux disease and previous history of thoracocentesis and paracentesis.    Patient in 2017 had undergone cholecystectomy with bile duct leak  and subsequently underwent evaluation by Dr. Redding and further evaluation in [place].  Patient was hospitalized in 2018 with symptoms of chest pain and subsequently ruled out for myocardial infarction.    Patient is undergoing evaluation for gastric bypass surgery for weight loss in  [place].  Patient now presents with symptoms off chest pain which has been on and off.  Patient complained of having symptoms of chest pain with exertion relieved with rest.  Patient has associated symptoms of shortness of breath and diaphoresis.  Patient symptoms of chest pain is nonradiating.  Patient resting electrocardiogram done reveals sinus bradycardia with nonspecific ST-T wave changes.  Patient denies any symptoms off palpitation.  Patient does complain of having symptoms of lightheaded dizziness.  Patient denies previous cardiac evaluation.  Patient denies any hemoptysis hematuria bright red blood per rectum.    Patient blood pressure was 140/80 with a heart rate of 54  bpm respiration of 18.  Patient is currently not on any AV blocking medication with the resting heart rate of 54 bpm.  Patient underwent a nuclear Cardiolite stress test which was positive for reversible ischemia.  Patient was recommended a coronary angiogram.  Patient 10 point review of system except for what is stated in the history of present illness is negative.        Past Medical History:  1.  Chest pain with positive nuclear Cardiolite stress tests with myocardial perfusion imaging and images indicating off medium to large size moderately severe area of ischemia located in the anterior wall consistent with high risk study.   2.  Arterial hypertension.  3.  Hypertensive heart disease.  4.  Obesity with a weight of 294 pounds height of 5 feet 5 inch with a body mask index of 48.  5.  Obstructive sleep apnea.  6.  Gastroesophageal reflux disease.  7.  Anxiety and depression.  8.  History of thoracocentesis and paracentesis.  9.  History of renal calculi.  10.  Restless leg syndrome.  11.  Family history for coronary artery disease  12.  Hyperlipidemia.      Past Surgical History:  1.  Laparoscopic cholecystectomy.   2.  Umbilical hernia repair.  3.  Cystoscopy.  4.  Laparoscopic tubal ligation.  5.  Renal calculi surgery.      Family History: Family history strongly positive for atherosclerotic coronary artery disease      Social History: Denies any tobacco or alcohol intake  Social History     Social History   • Marital status:      Spouse name: N/A   • Number of children: N/A   • Years of education: N/A     Occupational History   • Not on file.     Social History Main Topics   • Smoking status: Never Smoker   • Smokeless tobacco: Never Used   • Alcohol use Yes      Comment: socially   • Drug use: No   • Sexual activity: Defer     Other Topics Concern   • Not on file     Social History Narrative   • No narrative on file        Cardiac Risk factor: Hypertension, Family history  and Obesity hyper  lipidemia      Allergies:  Allergies   Allergen Reactions   • Sulfa Antibiotics Anaphylaxis and GI Intolerance       Home Medication::  Prior to Admission medications    Medication Sig Start Date End Date Taking? Authorizing Provider   buPROPion (WELLBUTRIN) 100 MG tablet Take 100 mg by mouth 2 (Two) Times a Day.    Emergency, Nurse Jaylon RN   furosemide (LASIX) 20 MG tablet  2/14/18   Emergency, Nurse Jaylon RN   gabapentin (NEURONTIN) 100 MG capsule Take 100 mg by mouth 3 (Three) Times a Day.    Howie Phillips MD   isosorbide mononitrate (IMDUR) 30 MG 24 hr tablet Take 30 mg by mouth Daily.    Howie Phillips MD   lisinopril (PRINIVIL,ZESTRIL) 20 MG tablet  1/2/18   Howie Phillips MD   methocarbamol (ROBAXIN) 500 MG tablet Take 500 mg by mouth 3 (Three) Times a Day.    Howie Phillips MD   naproxen (NAPROSYN) 500 MG tablet As Needed. 1/2/18   Howie Phillips MD   oxybutynin (DITROPAN) 5 MG tablet Take 5 mg by mouth.    Howie Phillips MD   pantoprazole (PROTONIX) 40 MG EC tablet Take 40 mg by mouth Daily. 7/10/17   Howie Phillips MD   pravastatin (PRAVACHOL) 20 MG tablet  2/19/18   Emergency, Nurse Jaylon RN   rOPINIRole (REQUIP) 0.25 MG tablet  2/6/18   Emergency, Nurse Jaylon RN   sertraline (ZOLOFT) 50 MG tablet Take 50 mg by mouth Daily.    Emergency, Nurse MARCELA Iyer   traMADol (ULTRAM) 50 MG tablet Take 50 mg by mouth Daily.    ProviderHowie MD         Review of Systems   Constitutional: Negative for chills, fever and unexpected weight change.   HENT: Negative for hearing loss and nosebleeds.    Eyes: Negative for visual disturbance.   Respiratory: Positive for chest tightness and shortness of breath. Negative for cough and wheezing.    Cardiovascular: Positive for chest pain. Negative for palpitations and leg swelling.   Gastrointestinal: Negative for abdominal pain, blood in stool, constipation, diarrhea, nausea and vomiting.   Endocrine: Negative for cold  intolerance, heat intolerance, polydipsia, polyphagia and polyuria.   Genitourinary: Negative for hematuria.   Musculoskeletal: Negative for joint swelling, myalgias and neck pain.   Skin: Negative for color change, rash and wound.   Neurological: Positive for light-headedness and headaches. Negative for dizziness, seizures, syncope and numbness.   Hematological: Does not bruise/bleed easily.         Objective:     Objective:         There is no height or weight on file to calculate BMI.       Physical Exam   Constitutional: She is oriented to person, place, and time. She appears well-developed and well-nourished.   HENT:   Head: Normocephalic and atraumatic.   Left Ear: External ear normal.   Nose: Nose normal.   Mouth/Throat: Oropharynx is clear and moist.   Eyes: Pupils are equal, round, and reactive to light. Conjunctivae and EOM are normal. No scleral icterus.   Neck: Normal range of motion. Neck supple. No JVD present. No tracheal deviation present. No thyromegaly present.   Cardiovascular: Normal rate and regular rhythm.    Pulmonary/Chest: Breath sounds normal. No stridor.   Abdominal: Bowel sounds are normal.   Musculoskeletal: Normal range of motion.   Lymphadenopathy:     She has no cervical adenopathy.   Neurological: She is alert and oriented to person, place, and time. She has normal reflexes.   Skin: Skin is warm and dry.   Psychiatric: She has a normal mood and affect. Her behavior is normal. Judgment and thought content normal.         Lab Review:           Invalid input(s): LABALBU, PROT                                    EKG:   ECG/EMG Results (last 24 hours)     ** No results found for the last 24 hours. **          Imaging:  Imaging Results (last 24 hours)     ** No results found for the last 24 hours. **          I personally viewed and interpreted the patient's EKG/Telemetry data.    Assessment:   1.  Chest pain.  2.  Resting sinus bradycardia currently not on any AV blocking medication with  symptoms of lightheaded dizziness.  3.  Arterial hypertension.  4.  Hypertensive heart disease.  5.  Obesity.  6.  Obstructive sleep apnea.  7.  Anxiety and depression.  8.  Restless leg syndrome.          Plan:   1.  Chest pain.  Patient has symptoms of chest pain with hospitalization with negative troponin.  Patient nuclear Cardiolite stress tests was suggestive of reversible ischemia in the anterior wall and a high risk study.  Patient was recommended a coronary angiogram.  Risk-benefit treatment option for the coronary angiogram were discussed with the patient and an informed consent was obtained for the coronary angiogram.  Patient Mallampati score #2 patient ASA classification was #2.  Risks for minimal sedation was also discussed with the patient.    2.  Arterial hypertension.  Patient blood pressure has been labile but well controlled at the present time.  She'll is to continue with the present dose of the lisinopril.  3.  Hypertensive heart disease.  Patient has been counseled to decrease his salt intake and to continue with the present dose of the Lasix and the lisinopril.  Patient is to undergo a transthoracic echocardiogram.  4.  Hyperlipidemia.  Patient has been counseled on low-fat low-cholesterol diet and to continue with the present dose of the pravastatin.  5.  Resting sinus bradycardia.  Patient is currently not on any AV blocking medication and has a resting heart rate of 54 bpm.  Patient complains of having symptoms of lightheaded dizziness.  Patient would undergo a transthoracic echocardiogram and the Lexiscan Cardiolite stress tests.  Patient may need a 14 day event monitor recording should the patient continued to have symptoms of lightheaded dizziness to rule out symptomatic bradycardia.  6.  Obesity with a body mask index of 48.  Patient has been counseled on weight reduction lifestyle modification and dietary restriction.  Patient has been explained the risk associated with obesity and the  occurrence and progression of atherosclerotic coronary artery disease and peripheral vascular disease.  Patient is currently being evaluated by Dr. Sunshine in [place] for gastric bypass surgery.  7.  Anxiety and depression.  Patient has been followed by the primary care physician.  Patient is currently on sertraline.  Patient is also on Wellbutrin and has been followed by the primary care physician.  8.  Restless leg syndrome.  Patient is currently on Requip.  9.  Gastroesophageal reflux disease.  Patient has had previous evaluation by gastroenterology.  Patient is currently on Protonix.  10.  Obstructive sleep apnea.  Patient has been recommended to be compliant with the CPAP.    Above plan of management were discussed with the patient.      Time: time spent in face-to-face evaluation of greater than 55  minutes and interacting and formulating examining and discussing the plan with the patient with 50% of greater time spent in face-to-face interaction.    Kuldip Frank MD  08/12/18  8:02 PM    Dictated utilizing Dragon dictation.

## 2018-08-15 ENCOUNTER — APPOINTMENT (OUTPATIENT)
Dept: PHYSICAL THERAPY | Facility: HOSPITAL | Age: 48
End: 2018-08-15

## 2018-08-16 LAB
BH CV STRESS BP STAGE 1: NORMAL
BH CV STRESS COMMENTS STAGE 1: NORMAL
BH CV STRESS DOSE REGADENOSON STAGE 1: 0.4
BH CV STRESS DURATION MIN STAGE 1: 0
BH CV STRESS DURATION SEC STAGE 1: 10
BH CV STRESS HR STAGE 1: 57
BH CV STRESS PROTOCOL 1: NORMAL
BH CV STRESS RECOVERY BP: NORMAL MMHG
BH CV STRESS RECOVERY HR: 70 BPM
BH CV STRESS STAGE 1: 1
LV EF NUC BP: 78 %
MAXIMAL PREDICTED HEART RATE: 173 BPM
PERCENT MAX PREDICTED HR: 47.4 %
STRESS BASELINE BP: NORMAL MMHG
STRESS BASELINE HR: 63 BPM
STRESS PERCENT HR: 56 %
STRESS POST ESTIMATED WORKLOAD: 1 METS
STRESS POST PEAK BP: NORMAL MMHG
STRESS POST PEAK HR: 82 BPM
STRESS TARGET HR: 147 BPM

## 2018-08-22 ENCOUNTER — HOSPITAL ENCOUNTER (OUTPATIENT)
Dept: PHYSICAL THERAPY | Facility: HOSPITAL | Age: 48
Setting detail: THERAPIES SERIES
Discharge: HOME | End: 2018-08-22

## 2018-08-22 DIAGNOSIS — M54.40 ACUTE LOW BACK PAIN WITH SCIATICA, SCIATICA LATERALITY UNSPECIFIED, UNSPECIFIED BACK PAIN LATERALITY: Primary | ICD-10-CM

## 2018-08-22 PROCEDURE — 97110 THERAPEUTIC EXERCISES: CPT

## 2018-08-22 NOTE — THERAPY TREATMENT NOTE
Outpatient Physical Therapy Ortho Treatment Note  [facility]     Patient Name: Maricarmen Ferris  : 1970  MRN: [id number]  Today's Date: 2018      Visit Date: 2018     Subjective Improvement: 15%  Attendance:  6/10 (eval +12 until 18)  Next MD Visit : TBD  Recert Date:  18      Therapy Diagnosis:  Chronic LBP        Visit Dx:    ICD-10-CM ICD-9-CM   1. Acute low back pain with sciatica, sciatica laterality unspecified, unspecified back pain laterality M54.40 724.2     724.3       Patient Active Problem List   Diagnosis   • Chronic cholecystitis   • Umbilical hernia without obstruction and without gangrene   • Abdominal pain   • Abdominal fluid collection   • Intra-hepatic bile leak   • Abnormal cardiovascular function study   • Chest pain        Past Medical History:   Diagnosis Date   • Acid reflux    • Anxiety    • Depressed    • Hard to intubate    • Hyperlipidemia    • Hypertension    • Sleep apnea         Past Surgical History:   Procedure Laterality Date   • ABDOMINAL SURGERY     • CARDIAC CATHETERIZATION N/A 2018    Procedure: Left Heart Cath 2018 @ 9;00;  Surgeon: Kuldip Frank MD;  Location: [facility] INVASIVE LOCATION;  Service: Cardiology   • CYSTOSCOPY     • ENDOMETRIAL ABLATION     • LAPAROSCOPIC TUBAL LIGATION     • MO ERCP DX COLLECTION SPECIMEN BRUSHING/WASHING Left 2017    Procedure: ENDOSCOPIC RETROGRADE CHOLANGIOPANCREATOGRAPHY;  Surgeon: Samuel Redding DO;  Location: [facility] ENDOSCOPY;  Service: Gastroenterology   • MO LAP,CHOLECYSTECTOMY N/A 2017    Procedure: CHOLECYSTECTOMY LAPAROSCOPIC, also umbillical hernia repair;  Surgeon: Pk العلي MD;  Location: [facility] OR;  Service: General             PT Ortho     Row Name 18 1105       Precautions and Contraindications    Precautions Monitor Vitals  -       Posture/Observations    Posture/Observations Comments no acute distress noted  -      User Key  (r) =  Recorded By, (t) = Taken By, (c) = Cosigned By    Initials Name Provider Type    Marley Raymond PTA Physical Therapy Assistant                            PT Assessment/Plan     Row Name 18 1105          PT Assessment    Assessment Comments initiated aquatics therapy with great response and decreased pain   -        PT Plan    PT Frequency 2x/week  -     Predicted Duration of Therapy Intervention (Therapy Eval) 2-3 weeks (aquatics)  -     PT Plan Comments continue to progress as pt toleates. Visits  end of next week with need an extension  -       User Key  (r) = Recorded By, (t) = Taken By, (c) = Cosigned By    Initials Name Provider Type    Marley Raymond PTA Physical Therapy Assistant                    Exercises     Row Name 18 1105             Subjective Comments    Subjective Comments Pt reports that she is hurtin bad bad; unable to go to PT or chiropractor secondary to having to have a heart bath down.  was not hospitilized.    -         Subjective Pain    Able to rate subjective pain? yes  -      Pre-Treatment Pain Level 10  -KH      Post-Treatment Pain Level 4  -KH         Aquatics    Aquatics performed? Yes  -         Exercise 1    Exercise Name 1 aqu: water walking 3 way  -KH      Time 1 5' each  -KH         Exercise 2    Exercise Name 2 aqu: paddle wheel with trans abs  -KH      Reps 2 20  -KH         Exercise 3    Exercise Name 3 aqu: trunk rotation for lumbar movement  -KH      Reps 3 20  -KH         Exercise 4    Exercise Name 4 aqu: hip 3 way SLR B  -KH      Reps 4 20  -KH         Exercise 5    Exercise Name 5 aqu: mini squats  -      Reps 5 20  -KH         Exercise 6    Exercise Name 6 aqu: CR/TR  -KH      Reps 6 20  -KH         Exercise 7    Exercise Name 7 aqu: deep end bicycle  -KH      Time 7 5'  -KH         Exercise 8    Exercise Name 8 aqu: deep end hang for distratction  -KH      Reps 8 5'  -KH        User Key  (r) = Recorded By, (t) = Taken By, (c) =  Cosigned By    Initials Name Provider Type    Marley Raymond PTA Physical Therapy Assistant                               PT OP Goals     Row Name 08/22/18 1105          PT Short Term Goals    STG 1 Patient will be independent with HEP  -     STG 1 Progress Ongoing  -     STG 2 Patient will imporve oswestry by 15 %   -     STG 2 Progress Progressing  -     STG 3 Patient will report subjective improvement of 30 %   -     STG 3 Progress Progressing  -     STG 4 Patient will have decreased left radicular symtoms  -     STG 4 Progress Progressing  -        Long Term Goals    LTG 1 Patient will improve oswestry score to less than 20 % disability  -     LTG 1 Progress Progressing  -     LTG 2 Patient will have negative neural tension testing SLR/PKB on left  -     LTG 2 Progress Progressing  -     LTG 3 Patient will improve arom testing of lumbar spine to WFL  -     LTG 3 Progress Progressing  -     LTG 4 Patient will report subjective improvement of 50 %   -     LTG 4 Progress Progressing  -        Time Calculation    PT Goal Re-Cert Due Date 08/29/18  -       User Key  (r) = Recorded By, (t) = Taken By, (c) = Cosigned By    Initials Name Provider Type    Marley Raymond PTA Physical Therapy Assistant                         Time Calculation:   Start Time: 1105  Stop Time: 1150  Time Calculation (min): 45 min  Total Timed Code Minutes- PT: 45 minute(s)  Therapy Suggested Charges     Code   Minutes Charges    None           Therapy Charges for Today     Code Description Service Date Service Provider Modifiers Qty    54984222118 HC PT THER PROC EA 15 MIN 8/22/2018 Marley Dumont PTA GP 3                    Marley Dumont PTA  8/22/2018

## 2018-08-27 ENCOUNTER — HOSPITAL ENCOUNTER (OUTPATIENT)
Dept: PHYSICAL THERAPY | Facility: HOSPITAL | Age: 48
Setting detail: THERAPIES SERIES
Discharge: HOME | End: 2018-08-27

## 2018-08-27 DIAGNOSIS — M54.40 ACUTE LOW BACK PAIN WITH SCIATICA, SCIATICA LATERALITY UNSPECIFIED, UNSPECIFIED BACK PAIN LATERALITY: Primary | ICD-10-CM

## 2018-08-27 PROCEDURE — 97113 AQUATIC THERAPY/EXERCISES: CPT

## 2018-08-27 NOTE — THERAPY TREATMENT NOTE
Outpatient Physical Therapy Ortho Treatment Note  [facility]   Sarah Farnsworth       Patient Name: Maricarmen Ferris  : 1970  MRN: [id number]  Today's Date: 2018      Visit Date: 2018   Pt reports 4/10 pain pre treatment, 4/10 pain post treatment  Reports 60% of improvement.  Attended 7/10 visits.  Insurance available: eval + 12 til 2018  Next MD appt: ANITHA  Recertification: 2018.    Visit Dx:    ICD-10-CM ICD-9-CM   1. Acute low back pain with sciatica, sciatica laterality unspecified, unspecified back pain laterality M54.40 724.2     724.3       Patient Active Problem List   Diagnosis   • Chronic cholecystitis   • Umbilical hernia without obstruction and without gangrene   • Abdominal pain   • Abdominal fluid collection   • Intra-hepatic bile leak   • Abnormal cardiovascular function study   • Chest pain        Past Medical History:   Diagnosis Date   • Acid reflux    • Anxiety    • Depressed    • Hard to intubate    • Hyperlipidemia    • Hypertension    • Sleep apnea         Past Surgical History:   Procedure Laterality Date   • ABDOMINAL SURGERY     • CARDIAC CATHETERIZATION N/A 2018    Procedure: Left Heart Cath 2018 @ 9;00;  Surgeon: Kludip Frank MD;  Location: [facility] INVASIVE LOCATION;  Service: Cardiology   • CYSTOSCOPY     • ENDOMETRIAL ABLATION     • LAPAROSCOPIC TUBAL LIGATION     • AL ERCP DX COLLECTION SPECIMEN BRUSHING/WASHING Left 2017    Procedure: ENDOSCOPIC RETROGRADE CHOLANGIOPANCREATOGRAPHY;  Surgeon: Samuel Redding DO;  Location: [facility] ENDOSCOPY;  Service: Gastroenterology   • AL LAP,CHOLECYSTECTOMY N/A 2017    Procedure: CHOLECYSTECTOMY LAPAROSCOPIC, also umbillical hernia repair;  Surgeon: Pk العلي MD;  Location: [facility] OR;  Service: General                             PT Assessment/Plan     Row Name 18 1300          PT Assessment    Assessment Comments (P)  Patient reports pain relief with deep end  "aquatics. \"It feels good\"  -        PT Plan    PT Frequency (P)  2x/week  -     Predicted Duration of Therapy Intervention (Therapy Eval) (P)  2-3 weeks (aquatics)  -     PT Plan Comments (P)  Continue with POC for aquatic therex. Recert next week  -       User Key  (r) = Recorded By, (t) = Taken By, (c) = Cosigned By    Initials Name Provider Type    Sarah Reed                     Exercises     Row Name 08/27/18 1100             Subjective Comments    Subjective Comments (P)  Pain seems to be a bit worse after the weekend. She suspect this is due to no therapy and no chiropractor over weekend. She sees chiro prior to Wed appointment.   -         Subjective Pain    Able to rate subjective pain? (P)  yes  -      Pre-Treatment Pain Level (P)  4  -      Post-Treatment Pain Level (P)  4  -MC         Aquatics    Aquatics performed? (P)  Yes  -         Exercise 1    Exercise Name 1 (P)  aqu walk 3 way  -      Time 1 (P)  5 min each  -         Exercise 2    Exercise Name 2 (P)  aqu trans ab w/ paddlewheel fwd/rev  -      Reps 2 (P)  20  -         Exercise 3    Exercise Name 3 (P)  aqu wand trunk rotations  -      Reps 3 (P)  20  -         Exercise 4    Exercise Name 4 (P)  aqu SLR 3 way  -      Reps 4 (P)  20  -      Additional Comments (P)  bilat  -         Exercise 5    Exercise Name 5 (P)  aqu MS  -      Reps 5 (P)  20  -         Exercise 6    Exercise Name 6 (P)  aqu CR/TR  -      Reps 6 (P)  20  -         Exercise 7    Exercise Name 7 (P)  aqu trans ab w/ shoulder 3 way  -      Reps 7 (P)  10  -         Exercise 8    Exercise Name 8 (P)  aqu deep end pedal  -      Reps 8 (P)  --  -      Time 8 (P)  5 min  -         Exercise 9    Exercise Name 9 (P)  aqu deep end hang  -      Time 9 (P)  5 min  -        User Key  (r) = Recorded By, (t) = Taken By, (c) = Cosigned By    Initials Name Provider Type    Sarah Reed     "                            PT OP Goals     Row Name 08/27/18 1100          PT Short Term Goals    STG 1 (P)  Patient will be independent with HEP  -     STG 1 Progress (P)  Ongoing  -     STG 2 (P)  Patient will imporve oswestry by 15 %   -     STG 2 Progress (P)  Progressing  -     STG 3 (P)  Patient will report subjective improvement of 30 %   -     STG 3 Progress (P)  Progressing  -     STG 4 (P)  Patient will have decreased left radicular symtoms  -     STG 4 Progress (P)  Progressing  -        Long Term Goals    LTG 1 (P)  Patient will improve oswestry score to less than 20 % disability  -     LTG 1 Progress (P)  Progressing  -     LTG 2 (P)  Patient will have negative neural tension testing SLR/PKB on left  -     LTG 2 Progress (P)  Progressing  -     LTG 3 (P)  Patient will improve arom testing of lumbar spine to WFL  -     LTG 3 Progress (P)  Progressing  -     LTG 4 (P)  Patient will report subjective improvement of 50 %   -     LTG 4 Progress (P)  Progressing  -        Time Calculation    PT Goal Re-Cert Due Date (P)  08/29/18  -       User Key  (r) = Recorded By, (t) = Taken By, (c) = Cosigned By    Initials Name Provider Type     Sarah Farnsworth                          Time Calculation:   Start Time: (P) 1108  Stop Time: (P) 1152  Time Calculation (min): (P) 44 min  Therapy Suggested Charges     Code   Minutes Charges    None           Therapy Charges for Today     Code Description Service Date Service Provider Modifiers Qty    75274461578 HC PT AQUATIC THERAPY EA 15 MIN 8/27/2018 Sarah Farnsworth GP 3                    Sarah Farnsworth  8/27/2018

## 2018-08-28 ENCOUNTER — OFFICE VISIT (OUTPATIENT)
Dept: SLEEP MEDICINE | Facility: HOSPITAL | Age: 48
End: 2018-08-28

## 2018-08-28 VITALS
HEART RATE: 80 BPM | BODY MASS INDEX: 48.82 KG/M2 | RESPIRATION RATE: 18 BRPM | WEIGHT: 293 LBS | DIASTOLIC BLOOD PRESSURE: 75 MMHG | SYSTOLIC BLOOD PRESSURE: 135 MMHG | HEIGHT: 65 IN

## 2018-08-28 DIAGNOSIS — F51.04 PSYCHOPHYSIOLOGICAL INSOMNIA: ICD-10-CM

## 2018-08-28 DIAGNOSIS — G25.81 RESTLESS LEGS SYNDROME (RLS): ICD-10-CM

## 2018-08-28 DIAGNOSIS — G47.33 OBSTRUCTIVE SLEEP APNEA, ADULT: Primary | ICD-10-CM

## 2018-08-28 PROCEDURE — 99214 OFFICE O/P EST MOD 30 MIN: CPT | Performed by: INTERNAL MEDICINE

## 2018-08-28 NOTE — PROGRESS NOTES
Sleep Clinic Follow Up    Date: 8/28/2018  Primary Care Physician: Darnell Peralta APRN      Interim History (1/3):  Since the last visit on 05/23/2018, patient has:      1)  ELSIE - returned prior for none use. Got new machine, doing much better, compliant, and feels better.    PAP Data:    Time frame: 06/28/2018 - 08/26/2018   Compliance 95.0%  Average use on days used: 5hrs 45 min  Percent of days with usage greater than or equal to 4 hours: 85.0%  PAP range : 5-20 cm H2O  Average 90% pressure: 12 cmH2O  Leak: 19 minutes  Average AHI 2.4 events/hr  Mask type: FFM - [place] [place]  DME: BG    Bed time: 2230  Sleep latency: 5-10 minutes  Number of times awakens during the night: 0-2  Wake time: 9861-8582  Estimated total sleep time at night: 6-8 hours  Caffeine intake: 0oz of coffee, 16oz of tea, and 0oz of soda  Alcohol intake: none drinks per week  Nap time: none   Sleepiness with Driving: none    2) Patient has intermittent RLS symptoms - started on Requip by her pcp ~ 6 months ago    PMHx, FH, SH reviewed and pertinent changes are: unchanged from last office visit on 05/23/2018      REVIEW OF SYSTEMS:   Negative for chest pain, fever, chills, SOA, abdominal pain. Smoking: none      Exam (6-11/12):    Vitals:    08/28/18 1311   BP: 135/75   Pulse: 80   Resp: 18     Body mass index is 49.59 kg/m². Patient's Body mass index is 49.59 kg/m². BMI is above normal parameters. Recommendations include: referral to primary care.      Gen:  No distress, conversant, pleasant, appears stated age, alert, oriented  Eyes:   Anicteric sclera, moist conjunctiva, no lid lag     PERRLA, EOMI   Heent:   NC/AT    Oropharynx clear, Mallampati 4    normal hearing  Lungs:  Normal effort, non-labored breathing    Clear to auscultation    CV:  Normal S1/S2, no murmur    no lower extremity edema  ABD:  Soft, normal bowel sounds    Psych:  Appropriate affect  Neuro:  CN 2-12 intact    Past Medical History:   Diagnosis Date   • Acid reflux     • Anxiety    • Depressed    • Hard to intubate    • Hyperlipidemia    • Hypertension    • Sleep apnea        Current Outpatient Prescriptions:   •  buPROPion (WELLBUTRIN) 100 MG tablet, Take 100 mg by mouth 2 (Two) Times a Day., Disp: , Rfl:   •  furosemide (LASIX) 20 MG tablet, Take 20 mg by mouth Daily., Disp: , Rfl:   •  gabapentin (NEURONTIN) 100 MG capsule, Take 100 mg by mouth 3 (Three) Times a Day., Disp: , Rfl:   •  isosorbide mononitrate (IMDUR) 30 MG 24 hr tablet, Take 30 mg by mouth Daily., Disp: , Rfl:   •  lisinopril (PRINIVIL,ZESTRIL) 20 MG tablet, Take 20 mg by mouth Daily., Disp: , Rfl:   •  methocarbamol (ROBAXIN) 500 MG tablet, Take 500 mg by mouth 3 (Three) Times a Day., Disp: , Rfl:   •  naproxen (NAPROSYN) 500 MG tablet, Take 500 mg by mouth 2 (Two) Times a Day As Needed for Mild Pain ., Disp: , Rfl:   •  oxybutynin (DITROPAN) 5 MG tablet, Take 5 mg by mouth 2 (Two) Times a Day., Disp: , Rfl:   •  pantoprazole (PROTONIX) 40 MG EC tablet, Take 40 mg by mouth Daily., Disp: , Rfl:   •  pravastatin (PRAVACHOL) 20 MG tablet, Take 20 mg by mouth Every Other Day. At night, Disp: , Rfl:   •  rOPINIRole (REQUIP) 0.25 MG tablet, Take 0.25 mg by mouth Every Night., Disp: , Rfl:   •  sertraline (ZOLOFT) 50 MG tablet, Take 50 mg by mouth Daily., Disp: , Rfl:   •  traMADol (ULTRAM) 50 MG tablet, Take 50 mg by mouth Every Night., Disp: , Rfl:     Sleep Testin. PSG on 2005, AHI of 61.1   2. Currently on 5-20 cm H2O    ASSESSMENT / PLAN:     1. Obstructive sleep apnea  1. Compliant  2. Increase to 12-20  3. RTC in 1 year  2. Insomnia  1. Resolved  3. Poor sleep hygiene  1. improved  3. RLS   1. Continue Requip per pcp    RTC in 12 months     This document has been electronically signed by Abdoulaye Dooley MD on 2018         CC: Darnell Peralta, APRN          No ref. provider found

## 2018-08-29 ENCOUNTER — HOSPITAL ENCOUNTER (OUTPATIENT)
Dept: PHYSICAL THERAPY | Facility: HOSPITAL | Age: 48
Setting detail: THERAPIES SERIES
Discharge: HOME | End: 2018-08-29

## 2018-08-29 DIAGNOSIS — M54.40 ACUTE LOW BACK PAIN WITH SCIATICA, SCIATICA LATERALITY UNSPECIFIED, UNSPECIFIED BACK PAIN LATERALITY: Primary | ICD-10-CM

## 2018-08-29 PROCEDURE — 97113 AQUATIC THERAPY/EXERCISES: CPT

## 2018-08-29 NOTE — THERAPY TREATMENT NOTE
Outpatient Physical Therapy Ortho Treatment Note  [facility]   Sarah Farnsworth       Patient Name: Maricarmen Ferris  : 1970  MRN: [id number]  Today's Date: 2018      Visit Date: 2018   Pt reports 4/10 pain pre treatment, 0/10 pain post treatment  Reports 60% of improvement.  Attended 8/10 visits.  Insurance available: eval +12 til 2018   Next MD appt: ANITHA  Recertification: 2018.    Visit Dx:    ICD-10-CM ICD-9-CM   1. Acute low back pain with sciatica, sciatica laterality unspecified, unspecified back pain laterality M54.40 724.2     724.3       Patient Active Problem List   Diagnosis   • Chronic cholecystitis   • Umbilical hernia without obstruction and without gangrene   • Abdominal pain   • Abdominal fluid collection   • Intra-hepatic bile leak   • Abnormal cardiovascular function study   • Chest pain        Past Medical History:   Diagnosis Date   • Acid reflux    • Anxiety    • Depressed    • Hard to intubate    • Hyperlipidemia    • Hypertension    • Sleep apnea         Past Surgical History:   Procedure Laterality Date   • ABDOMINAL SURGERY     • CARDIAC CATHETERIZATION N/A 2018    Procedure: Left Heart Cath 2018 @ 9;00;  Surgeon: Kuldip Frank MD;  Location: [facility] INVASIVE LOCATION;  Service: Cardiology   • CYSTOSCOPY     • ENDOMETRIAL ABLATION     • LAPAROSCOPIC TUBAL LIGATION     • [place] ERCP DX COLLECTION SPECIMEN BRUSHING/WASHING Left 2017    Procedure: ENDOSCOPIC RETROGRADE CHOLANGIOPANCREATOGRAPHY;  Surgeon: Samuel Redding DO;  Location: [facility] ENDOSCOPY;  Service: Gastroenterology   • [place] LAP,CHOLECYSTECTOMY N/A 2017    Procedure: CHOLECYSTECTOMY LAPAROSCOPIC, also umbillical hernia repair;  Surgeon: Pk العلي MD;  Location: [facility] OR;  Service: General                             PT Assessment/Plan     Row Name 18 1100          PT Assessment    Assessment Comments (P)  Good tolerance of aquatics. Patient reports  "no pain afterwards. \"That water just feels so good.\"  -        PT Plan    PT Frequency (P)  2x/week  -     Predicted Duration of Therapy Intervention (Therapy Eval) (P)  2-3 weeks (aquatics)  -     PT Plan Comments (P)  Continue with aquatics. Sending for further insurance approval. Recert next visit.   -       User Key  (r) = Recorded By, (t) = Taken By, (c) = Cosigned By    Initials Name Provider Type     Sarah Farnsworth                     Exercises     Row Name 08/29/18 1100             Subjective Comments    Subjective Comments (P)  Felt good after leaving the pool last visit until last night when she tried to clean her apartment.   -         Subjective Pain    Able to rate subjective pain? (P)  yes  -      Pre-Treatment Pain Level (P)  4  -         Aquatics    Aquatics performed? (P)  Yes  -         Exercise 1    Exercise Name 1 (P)  aqu walk 3 way  -      Time 1 (P)  5 min each  -         Exercise 2    Exercise Name 2 (P)  aqu trans ab w/ paddlewheel fwd/ back  -      Reps 2 (P)  20  -         Exercise 3    Exercise Name 3 (P)  aqu wand trunk rotation  -      Reps 3 (P)  20  -         Exercise 4    Exercise Name 4 (P)  aqu SLR 3 way  -      Reps 4 (P)  20  -      Additional Comments (P)  bilat  -         Exercise 5    Exercise Name 5 (P)  aqu MS   -      Reps 5 (P)  20  -         Exercise 6    Exercise Name 6 (P)  aqu march  -      Reps 6 (P)  20  -         Exercise 7    Exercise Name 7 (P)  aqu trans ab w/ shoulder 3 way  -      Reps 7 (P)  10  -         Exercise 8    Exercise Name 8 (P)  aqu deep end pedal  -      Time 8 (P)  5 min  -         Exercise 9    Exercise Name 9 (P)  aqu deep hang  -      Time 9 (P)  5 min  -        User Key  (r) = Recorded By, (t) = Taken By, (c) = Cosigned By    Initials Name Provider Type     Sarah Farnsworth                                PT OP Goals     Row Name 08/29/18 1100          " PT Short Term Goals    STG 1 (P)  Patient will be independent with HEP  -     STG 1 Progress (P)  Ongoing  -     STG 2 (P)  Patient will imporve oswestry by 15 %   -     STG 2 Progress (P)  Progressing  -     STG 3 (P)  Patient will report subjective improvement of 30 %   -     STG 3 Progress (P)  Progressing  -     STG 4 (P)  Patient will have decreased left radicular symtoms  -     STG 4 Progress (P)  Progressing  -        Long Term Goals    LTG 1 (P)  Patient will improve oswestry score to less than 20 % disability  -     LTG 1 Progress (P)  Progressing  -     LTG 2 (P)  Patient will have negative neural tension testing SLR/PKB on left  -     LTG 2 Progress (P)  Progressing  -     LTG 3 (P)  Patient will improve arom testing of lumbar spine to WFL  -     LTG 3 Progress (P)  Progressing  -     LTG 4 (P)  Patient will report subjective improvement of 50 %   -     LTG 4 Progress (P)  Progressing  -        Time Calculation    PT Goal Re-Cert Due Date (P)  08/29/18  -       User Key  (r) = Recorded By, (t) = Taken By, (c) = Cosigned By    Initials Name Provider Type    Sarah Reed                          Time Calculation:   Start Time: (P) 1105  Stop Time: (P) 1149  Time Calculation (min): (P) 44 min  Therapy Suggested Charges     Code   Minutes Charges    None           Therapy Charges for Today     Code Description Service Date Service Provider Modifiers Qty    87825385467 HC PT AQUATIC THERAPY EA 15 MIN 8/29/2018 Sarah Farnsworth GP 3                    Sarah Farnsworth  8/29/2018

## 2018-09-04 ENCOUNTER — HOSPITAL ENCOUNTER (OUTPATIENT)
Dept: PHYSICAL THERAPY | Facility: HOSPITAL | Age: 48
Setting detail: THERAPIES SERIES
Discharge: HOME | End: 2018-09-04

## 2018-09-04 DIAGNOSIS — M54.40 ACUTE LOW BACK PAIN WITH SCIATICA, SCIATICA LATERALITY UNSPECIFIED, UNSPECIFIED BACK PAIN LATERALITY: Primary | ICD-10-CM

## 2018-09-04 PROCEDURE — 97110 THERAPEUTIC EXERCISES: CPT

## 2018-09-04 PROCEDURE — G0283 ELEC STIM OTHER THAN WOUND: HCPCS

## 2018-09-04 NOTE — THERAPY PROGRESS REPORT/RE-CERT
Outpatient Physical Therapy Ortho Progress Note  [facility]     Patient Name: Maricarmen Ferris  : 1970  MRN: [id number]  Today's Date: 2018      Visit Date: 2018   Reports 50% of improvement.  Attended  visits.  Insurance available: eval +12 til 2018   Next MD appt: [email or web address]  Recertification: 2018.    Patient Active Problem List   Diagnosis   • Chronic cholecystitis   • Umbilical hernia without obstruction and without gangrene   • Abdominal pain   • Abdominal fluid collection   • Intra-hepatic bile leak   • Abnormal cardiovascular function study   • Chest pain        Past Medical History:   Diagnosis Date   • Acid reflux    • Anxiety    • Depressed    • Hard to intubate    • Hyperlipidemia    • Hypertension    • Sleep apnea         Past Surgical History:   Procedure Laterality Date   • ABDOMINAL SURGERY     • CARDIAC CATHETERIZATION N/A 2018    Procedure: Left Heart Cath 2018 @ 9;00;  Surgeon: Kuldip Frank MD;  Location: [facility] CATH INVASIVE LOCATION;  Service: Cardiology   • CYSTOSCOPY     • ENDOMETRIAL ABLATION     • LAPAROSCOPIC TUBAL LIGATION     • AK ERCP DX COLLECTION SPECIMEN BRUSHING/WASHING Left 2017    Procedure: ENDOSCOPIC RETROGRADE CHOLANGIOPANCREATOGRAPHY;  Surgeon: Samuel Redding DO;  Location: [facility] ENDOSCOPY;  Service: Gastroenterology   • AK LAP,CHOLECYSTECTOMY N/A 2017    Procedure: CHOLECYSTECTOMY LAPAROSCOPIC, also umbillical hernia repair;  Surgeon: Pk العلي MD;  Location: [facility] OR;  Service: General       Visit Dx:     ICD-10-CM ICD-9-CM   1. Acute low back pain with sciatica, sciatica laterality unspecified, unspecified back pain laterality M54.40 724.2     724.3                 PT Ortho     Row Name 18 1000       Precautions and Contraindications    Precautions Monitor Vitals  -MW       Posture/Observations    Posture/Observations Comments decreased Lordosis  -MW       Neural Tension Signs- Lower Quarter  Clearing    Slump Left:;Positive  -MW       Myotomal Screen- Lower Quarter Clearing    Hip flexion (L2) Bilateral:;4+ (Good +)  -MW    Knee extension (L3) Bilateral:;5 (Normal)  -MW    Ankle DF (L4) Bilateral:;5 (Normal)  -MW    Knee flexion (S2) Bilateral:;4+ (Good +)  -MW       Head/Neck/Trunk    Head/Neck/Trunk Comments Lumbar flexion 25% limited, lumbar extension 25 % limited, LSB to mid thigh , RSB to mid thigh, Left rotation limited 50 % , right rotation limited 50 %   -MW       Gait/Stairs Assessment/Training    Comment (Gait/Stairs) Pt has antalgic gait w/ decreased stance on the L LE  -MW      User Key  (r) = Recorded By, (t) = Taken By, (c) = Cosigned By    Initials Name Provider Type    MW Bebe Griffin, PT Physical Therapist                                  PT OP Goals     Row Name 09/04/18 1800          PT Short Term Goals    STG 1 Patient will be independent with HEP  -MW     STG 1 Progress Ongoing  -MW     STG 2 Patient will imporve oswestry by 15 %   -MW     STG 2 Progress Progressing  -MW     STG 3 Patient will report subjective improvement of 30 %   -MW     STG 3 Progress Met  -MW     STG 4 Patient will have decreased left radicular symtoms  -MW     STG 4 Progress Progressing  -MW        Long Term Goals    LTG 1 Patient will improve oswestry score to less than 20 % disability  -MW     LTG 1 Progress Progressing  -MW     LTG 2 Patient will have negative neural tension testing SLR/PKB on left  -MW     LTG 2 Progress Progressing  -MW     LTG 3 Patient will improve arom testing of lumbar spine to WFL  -MW     LTG 3 Progress Progressing  -MW     LTG 4 Patient will report subjective improvement of 50 %   -MW     LTG 4 Progress Met  -MW        Time Calculation    PT Goal Re-Cert Due Date 09/25/18  -MW       User Key  (r) = Recorded By, (t) = Taken By, (c) = Cosigned By    Initials Name Provider Type    Bebe Jasmine, PT Physical Therapist                PT Assessment/Plan     Row Name 09/04/18  1800          PT Assessment    Functional Limitations Impaired gait;Limitation in home management;Limitations in community activities;Performance in self-care ADL  -MW     Impairments Gait;Range of motion;Posture;Poor body mechanics;Pain;Muscle strength;Joint mobility  -MW     Assessment Comments The pt presented for reassess and tx today and responded well. The pt has met subjective improvement goals to date and she continues to progress towards her remaining objective goals. She has made mild strength and AROM progress since last recert. She continues to lack functional strength and ROM needed for ADL performance secondary to increased pain w/ lumbar motion. Skilled PT services are indicated to address continued deficits. She responded well to IFC this date for pain. Will cont to progress as tolerated.   -MW     Rehab Potential Fair  -MW     Patient/caregiver participated in establishment of treatment plan and goals Yes  -MW     Patient would benefit from skilled therapy intervention Yes  -MW        PT Plan    PT Frequency 2x/week  -MW     Predicted Duration of Therapy Intervention (Therapy Eval) 2-3 weeks aquatics  -MW     PT Plan Comments Cont per POC.  -MW       User Key  (r) = Recorded By, (t) = Taken By, (c) = Cosigned By    Initials Name Provider Type    Bebe Jasmine, PT Physical Therapist                Modalities     Row Name 09/04/18 1000             ELECTRICAL STIMULATION    Attended/Unattended Unattended  -MW      Stimulation Type IFC  -MW      Max mAmp 18  -MW      Location/Electrode Placement/Other low back/SI  20 min  -MW        User Key  (r) = Recorded By, (t) = Taken By, (c) = Cosigned By    Initials Name Provider Type    Bebe Jasmine, PT Physical Therapist              Exercises     Row Name 09/04/18 1000             Subjective Comments    Subjective Comments The pt states she continues to have difficulty w/ ADLs including walking, sitting, bending over. The pt states her back is a  constant pain. The pt states she has recently had to buy a shower chair. She states her neurosurgeon is concerned about her back and pain. She states she is going out of town on vacation next week, but she sees her neurosurgeon the next week for testing to investigate the cause of her pain. The pt states that aquatic therapy has been helping her as well as going to the chiropractor. She states she sees the chiropractor 1-2x/week.  She states she feels 50% improved since eval.  She states she feels better on the days she has therapy.  -MW         Subjective Pain    Able to rate subjective pain? yes  -MW      Pre-Treatment Pain Level 10   7 after thr Pro 2  -MW      Post-Treatment Pain Level 0  -MW         Exercise 1    Exercise Name 1 pro ll LE strength  -MW      Time 1 12'  -MW      Additional Comments fwd/back  -MW         Exercise 2    Exercise Name 2 lumbar ROM  -MW      Time 2 5'  -MW         Exercise 3    Exercise Name 3 HEP review/sxs managment discussion/POC discussion  -MW      Time 3 10'  -MW        User Key  (r) = Recorded By, (t) = Taken By, (c) = Cosigned By    Initials Name Provider Type    MW Bebe Griffin, PT Physical Therapist                           Modified Oswestry  Modified Oswestry Score/Comments: 33/50      Time Calculation:         Start Time: 1025  Stop Time: 1125  Time Calculation (min): 60 min  Total Timed Code Minutes- PT: 27 minute(s)     Therapy Charges for Today     Code Description Service Date Service Provider Modifiers Qty    95513089126  PT THER PROC EA 15 MIN 9/4/2018 Bebe Griffin, PT GP 2    95723141864  PT THER SUPP EA 15 MIN 9/4/2018 Bebe Griffin PT GP 1    [id number]  PT ELECTRICAL STIM UNATTENDED 9/4/2018 Bebe Griffin PT  1              Bebe Griffin PT  9/4/2018

## 2018-09-06 ENCOUNTER — HOSPITAL ENCOUNTER (OUTPATIENT)
Dept: PHYSICAL THERAPY | Facility: HOSPITAL | Age: 48
Setting detail: THERAPIES SERIES
Discharge: HOME | End: 2018-09-06

## 2018-09-06 DIAGNOSIS — M54.40 ACUTE LOW BACK PAIN WITH SCIATICA, SCIATICA LATERALITY UNSPECIFIED, UNSPECIFIED BACK PAIN LATERALITY: Primary | ICD-10-CM

## 2018-09-06 PROCEDURE — 97113 AQUATIC THERAPY/EXERCISES: CPT

## 2018-09-06 NOTE — THERAPY TREATMENT NOTE
"    Outpatient Physical Therapy Ortho Treatment Note  [facility]   Sarah Farnsworth       Patient Name: Maricarmen Ferris  : 1970  MRN: [id number]  Today's Date: 2018      Visit Date: 2018   Pt reports 4/10 pain pre treatment, 2/10 pain post treatment  Reports 70% of improvement \"on days I come\".  Attended 10/ visits.  Insurance available: eval + 12 til 2018  Next MD appt: [facility]  Recertification: 2018.    Visit Dx:    ICD-10-CM ICD-9-CM   1. Acute low back pain with sciatica, sciatica laterality unspecified, unspecified back pain laterality M54.40 724.2     724.3       Patient Active Problem List   Diagnosis   • Chronic cholecystitis   • Umbilical hernia without obstruction and without gangrene   • Abdominal pain   • Abdominal fluid collection   • Intra-hepatic bile leak   • Abnormal cardiovascular function study   • Chest pain        Past Medical History:   Diagnosis Date   • Acid reflux    • Anxiety    • Depressed    • Hard to intubate    • Hyperlipidemia    • Hypertension    • Sleep apnea         Past Surgical History:   Procedure Laterality Date   • ABDOMINAL SURGERY     • CARDIAC CATHETERIZATION N/A 2018    Procedure: Left Heart Cath 2018 @ 9;00;  Surgeon: Kuldip Frank MD;  Location: [facility] INVASIVE LOCATION;  Service: Cardiology   • CYSTOSCOPY     • ENDOMETRIAL ABLATION     • LAPAROSCOPIC TUBAL LIGATION     • [place] ERCP DX COLLECTION SPECIMEN BRUSHING/WASHING Left 2017    Procedure: ENDOSCOPIC RETROGRADE CHOLANGIOPANCREATOGRAPHY;  Surgeon: Samuel Redding DO;  Location: [facility] ENDOSCOPY;  Service: Gastroenterology   • [place] LAP,CHOLECYSTECTOMY N/A 2017    Procedure: CHOLECYSTECTOMY LAPAROSCOPIC, also umbillical hernia repair;  Surgeon: Pk العلي MD;  Location: [facility] OR;  Service: General             PT Ortho     Row Name 18 1000       Precautions and Contraindications    Precautions Monitor Vitals  -MW       Posture/Observations "    Posture/Observations Comments decreased Lordosis  -       Neural Tension Signs- Lower Quarter Clearing    Slump Left:;Positive  -       Myotomal Screen- Lower Quarter Clearing    Hip flexion (L2) Bilateral:;4+ (Good +)  -MW    Knee extension (L3) Bilateral:;5 (Normal)  -MW    Ankle DF (L4) Bilateral:;5 (Normal)  -MW    Knee flexion (S2) Bilateral:;4+ (Good +)  -MW       Head/Neck/Trunk    Head/Neck/Trunk Comments Lumbar flexion 25% limited, lumbar extension 25 % limited, LSB to mid thigh , RSB to mid thigh, Left rotation limited 50 % , right rotation limited 50 %   -       Gait/Stairs Assessment/Training    Comment (Gait/Stairs) Pt has antalgic gait w/ decreased stance on the L LE  -      User Key  (r) = Recorded By, (t) = Taken By, (c) = Cosigned By    Initials Name Provider Type    MW Bebe Griffin, PT Physical Therapist                            PT Assessment/Plan     Row Name 09/06/18 1300          PT Assessment    Assessment Comments (P)  Good tolerance of pool therex. Patient notes slight pain with retro walk. Complete pain relief in deep end  -        PT Plan    PT Frequency (P)  2x/week  -     Predicted Duration of Therapy Intervention (Therapy Eval) (P)  2-3 weeks aquatics   -     PT Plan Comments (P)  Continue with aquatic therapy. Patient to be out of town until next Friday for vacation.   -       User Key  (r) = Recorded By, (t) = Taken By, (c) = Cosigned By    Initials Name Provider Type    Sarah Reed                     Exercises     Row Name 09/06/18 1100             Subjective Comments    Subjective Comments (P)  Just came from chiropractor so feeling pretty good. Patient reports that she notices improvement in ability to do ADLs on days when she has pool therapy. She reports that Estim eased her pain last land tx and allowed her to vacuum, change sheets, etc.   -         Subjective Pain    Able to rate subjective pain? (P)  yes  -       Pre-Treatment Pain Level (P)  4  -MC      Post-Treatment Pain Level (P)  2  -MC         Aquatics    Aquatics performed? (P)  Yes  -         Exercise 1    Exercise Name 1 (P)  aqu walk 3 way  -      Time 1 (P)  5 min ea  -         Exercise 2    Exercise Name 2 (P)  aqu trans ab w/ paddlewheel fwd/rev  -      Reps 2 (P)  20  -MC         Exercise 3    Exercise Name 3 (P)  aqu wand trunk rotation  -      Reps 3 (P)  20  -MC         Exercise 4    Exercise Name 4 (P)  aqu SLR 3 way  -      Reps 4 (P)  20  -MC         Exercise 5    Exercise Name 5 (P)  aqu MS   -      Reps 5 (P)  20  -         Exercise 6    Exercise Name 6 (P)  aqu march in place  -      Reps 6 (P)  20  -         Exercise 7    Exercise Name 7 (P)  aqu CR/TR  -      Reps 7 (P)  20  -MC         Exercise 8    Exercise Name 8 (P)  aqu trans ab w/ shoulder 3 way  -      Reps 8 (P)  12  -         Exercise 9    Exercise Name 9 (P)  aqu deep end pedal  -      Time 9 (P)  5 min  -         Exercise 10    Exercise Name 10 (P)  aqu deep end hang  -      Time 10 (P)  5 min  -        User Key  (r) = Recorded By, (t) = Taken By, (c) = Cosigned By    Initials Name Provider Type     Sarah Farnsworth                                PT OP Goals     Row Name 09/06/18 1100          PT Short Term Goals    STG 1 (P)  Patient will be independent with HEP  -     STG 1 Progress (P)  Ongoing  -     STG 2 (P)  Patient will imporve oswestry by 15 %   -     STG 2 Progress (P)  Progressing  -     STG 3 (P)  Patient will report subjective improvement of 30 %   -     STG 3 Progress (P)  Met  -     STG 4 (P)  Patient will have decreased left radicular symtoms  -     STG 4 Progress (P)  Progressing  -        Long Term Goals    LTG 1 (P)  Patient will improve oswestry score to less than 20 % disability  -     LTG 1 Progress (P)  Progressing  -     LTG 2 (P)  Patient will have negative neural tension testing SLR/PKB on  left  -     LTG 2 Progress (P)  Progressing  -     LTG 3 (P)  Patient will improve arom testing of lumbar spine to WFL  -     LTG 3 Progress (P)  Progressing  -     LTG 4 (P)  Patient will report subjective improvement of 50 %   -     LTG 4 Progress (P)  Met  -        Time Calculation    PT Goal Re-Cert Due Date (P)  09/25/18  -       User Key  (r) = Recorded By, (t) = Taken By, (c) = Cosigned By    Initials Name Provider Type     Sarah Farnsworth                          Time Calculation:   Start Time: (P) 1110  Stop Time: (P) 1200  Time Calculation (min): (P) 50 min  Therapy Suggested Charges     Code   Minutes Charges    None           Therapy Charges for Today     Code Description Service Date Service Provider Modifiers Qty    66587780691 HC PT AQUATIC THERAPY EA 15 MIN 9/6/2018 Sarah Farnsworth GP 3                    Sarah Farnsworth  9/6/2018

## 2018-09-14 ENCOUNTER — APPOINTMENT (OUTPATIENT)
Dept: PHYSICAL THERAPY | Facility: HOSPITAL | Age: 48
End: 2018-09-14

## 2018-09-18 ENCOUNTER — HOSPITAL ENCOUNTER (EMERGENCY)
Facility: HOSPITAL | Age: 48
Discharge: HOME | End: 2018-09-18
Attending: EMERGENCY MEDICINE | Admitting: EMERGENCY MEDICINE

## 2018-09-18 ENCOUNTER — APPOINTMENT (OUTPATIENT)
Dept: CT IMAGING | Facility: HOSPITAL | Age: 48
End: 2018-09-18

## 2018-09-18 VITALS
TEMPERATURE: 98.4 F | DIASTOLIC BLOOD PRESSURE: 54 MMHG | SYSTOLIC BLOOD PRESSURE: 115 MMHG | OXYGEN SATURATION: 98 % | WEIGHT: 293 LBS | BODY MASS INDEX: 48.82 KG/M2 | RESPIRATION RATE: 20 BRPM | HEART RATE: 70 BPM | HEIGHT: 65 IN

## 2018-09-18 DIAGNOSIS — R10.12 LEFT UPPER QUADRANT PAIN: Primary | ICD-10-CM

## 2018-09-18 LAB
ALBUMIN SERPL-MCNC: 3.5 G/DL (ref 3.4–4.8)
ALBUMIN/GLOB SERPL: 1.1 G/DL (ref 1.1–1.8)
ALP SERPL-CCNC: 55 U/L (ref 38–126)
ALT SERPL W P-5'-P-CCNC: 22 U/L (ref 9–52)
ANION GAP SERPL CALCULATED.3IONS-SCNC: 12 MMOL/L (ref 5–15)
AST SERPL-CCNC: 28 U/L (ref 14–36)
BASOPHILS # BLD AUTO: 0.02 10*3/MM3 (ref 0–0.2)
BASOPHILS NFR BLD AUTO: 0.2 % (ref 0–2)
BILIRUB SERPL-MCNC: 0.2 MG/DL (ref 0.2–1.3)
BILIRUB UR QL STRIP: NEGATIVE
BUN BLD-MCNC: 19 MG/DL (ref 7–21)
BUN/CREAT SERPL: 27.5 (ref 7–25)
CALCIUM SPEC-SCNC: 8.6 MG/DL (ref 8.4–10.2)
CHLORIDE SERPL-SCNC: 104 MMOL/L (ref 95–110)
CLARITY UR: CLEAR
CO2 SERPL-SCNC: 24 MMOL/L (ref 22–31)
COLOR UR: YELLOW
CREAT BLD-MCNC: 0.69 MG/DL (ref 0.5–1)
DEPRECATED RDW RBC AUTO: 43.9 FL (ref 36.4–46.3)
EOSINOPHIL # BLD AUTO: 0.32 10*3/MM3 (ref 0–0.7)
EOSINOPHIL NFR BLD AUTO: 3.5 % (ref 0–7)
ERYTHROCYTE [DISTWIDTH] IN BLOOD BY AUTOMATED COUNT: 14.3 % (ref 11.5–14.5)
GFR SERPL CREATININE-BSD FRML MDRD: 91 ML/MIN/1.73 (ref 58–135)
GLOBULIN UR ELPH-MCNC: 3.1 GM/DL (ref 2.3–3.5)
GLUCOSE BLD-MCNC: 118 MG/DL (ref 60–100)
GLUCOSE UR STRIP-MCNC: NEGATIVE MG/DL
HCT VFR BLD AUTO: 38.1 % (ref 35–45)
HGB BLD-MCNC: 12.8 G/DL (ref 12–15.5)
HGB UR QL STRIP.AUTO: NEGATIVE
HOLD SPECIMEN: NORMAL
HOLD SPECIMEN: NORMAL
IMM GRANULOCYTES # BLD: 0.03 10*3/MM3 (ref 0–0.02)
IMM GRANULOCYTES NFR BLD: 0.3 % (ref 0–0.5)
KETONES UR QL STRIP: ABNORMAL
LEUKOCYTE ESTERASE UR QL STRIP.AUTO: NEGATIVE
LIPASE SERPL-CCNC: 212 U/L (ref 23–300)
LYMPHOCYTES # BLD AUTO: 3.8 10*3/MM3 (ref 0.6–4.2)
LYMPHOCYTES NFR BLD AUTO: 41.3 % (ref 10–50)
MCH RBC QN AUTO: 28.1 PG (ref 26.5–34)
MCHC RBC AUTO-ENTMCNC: 33.6 G/DL (ref 31.4–36)
MCV RBC AUTO: 83.7 FL (ref 80–98)
MONOCYTES # BLD AUTO: 0.65 10*3/MM3 (ref 0–0.9)
MONOCYTES NFR BLD AUTO: 7.1 % (ref 0–12)
NEUTROPHILS # BLD AUTO: 4.39 10*3/MM3 (ref 2–8.6)
NEUTROPHILS NFR BLD AUTO: 47.6 % (ref 37–80)
NITRITE UR QL STRIP: NEGATIVE
PH UR STRIP.AUTO: 5.5 [PH] (ref 5–9)
PLATELET # BLD AUTO: 264 10*3/MM3 (ref 150–450)
PMV BLD AUTO: 10.5 FL (ref 8–12)
POTASSIUM BLD-SCNC: 3.3 MMOL/L (ref 3.5–5.1)
PROT SERPL-MCNC: 6.6 G/DL (ref 6.3–8.6)
PROT UR QL STRIP: NEGATIVE
RBC # BLD AUTO: 4.55 10*6/MM3 (ref 3.77–5.16)
SODIUM BLD-SCNC: 140 MMOL/L (ref 137–145)
SP GR UR STRIP: 1.02 (ref 1–1.03)
UROBILINOGEN UR QL STRIP: ABNORMAL
WBC NRBC COR # BLD: 9.21 10*3/MM3 (ref 3.2–9.8)
WHOLE BLOOD HOLD SPECIMEN: NORMAL
WHOLE BLOOD HOLD SPECIMEN: NORMAL

## 2018-09-18 PROCEDURE — 25010000002 MORPHINE PER 10 MG: Performed by: EMERGENCY MEDICINE

## 2018-09-18 PROCEDURE — 25010000002 ONDANSETRON PER 1 MG: Performed by: EMERGENCY MEDICINE

## 2018-09-18 PROCEDURE — 96375 TX/PRO/DX INJ NEW DRUG ADDON: CPT

## 2018-09-18 PROCEDURE — 96361 HYDRATE IV INFUSION ADD-ON: CPT

## 2018-09-18 PROCEDURE — 74177 CT ABD & PELVIS W/CONTRAST: CPT

## 2018-09-18 PROCEDURE — 99284 EMERGENCY DEPT VISIT MOD MDM: CPT

## 2018-09-18 PROCEDURE — 85025 COMPLETE CBC W/AUTO DIFF WBC: CPT | Performed by: EMERGENCY MEDICINE

## 2018-09-18 PROCEDURE — 83690 ASSAY OF LIPASE: CPT | Performed by: EMERGENCY MEDICINE

## 2018-09-18 PROCEDURE — 96376 TX/PRO/DX INJ SAME DRUG ADON: CPT

## 2018-09-18 PROCEDURE — 81003 URINALYSIS AUTO W/O SCOPE: CPT | Performed by: EMERGENCY MEDICINE

## 2018-09-18 PROCEDURE — 96374 THER/PROPH/DIAG INJ IV PUSH: CPT

## 2018-09-18 PROCEDURE — 80053 COMPREHEN METABOLIC PANEL: CPT | Performed by: EMERGENCY MEDICINE

## 2018-09-18 PROCEDURE — 25010000002 IOPAMIDOL 61 % SOLUTION: Performed by: EMERGENCY MEDICINE

## 2018-09-18 RX ORDER — SODIUM CHLORIDE 0.9 % (FLUSH) 0.9 %
10 SYRINGE (ML) INJECTION AS NEEDED
Status: DISCONTINUED | OUTPATIENT
Start: 2018-09-18 | End: 2018-09-18 | Stop reason: HOSPADM

## 2018-09-18 RX ORDER — ONDANSETRON 2 MG/ML
4 INJECTION INTRAMUSCULAR; INTRAVENOUS ONCE
Status: COMPLETED | OUTPATIENT
Start: 2018-09-18 | End: 2018-09-18

## 2018-09-18 RX ORDER — POTASSIUM CHLORIDE 750 MG/1
20 CAPSULE, EXTENDED RELEASE ORAL ONCE
Status: COMPLETED | OUTPATIENT
Start: 2018-09-18 | End: 2018-09-18

## 2018-09-18 RX ADMIN — Medication 10 ML: at 03:32

## 2018-09-18 RX ADMIN — IOPAMIDOL 93 ML: 612 INJECTION, SOLUTION INTRAVENOUS at 05:25

## 2018-09-18 RX ADMIN — Medication 10 ML: at 05:33

## 2018-09-18 RX ADMIN — MORPHINE SULFATE 4 MG: 4 INJECTION, SOLUTION INTRAMUSCULAR; INTRAVENOUS at 04:01

## 2018-09-18 RX ADMIN — MORPHINE SULFATE 4 MG: 4 INJECTION, SOLUTION INTRAMUSCULAR; INTRAVENOUS at 05:33

## 2018-09-18 RX ADMIN — Medication 10 ML: at 05:32

## 2018-09-18 RX ADMIN — ONDANSETRON HYDROCHLORIDE 4 MG: 2 INJECTION INTRAMUSCULAR; INTRAVENOUS at 05:31

## 2018-09-18 RX ADMIN — Medication 10 ML: at 03:26

## 2018-09-18 RX ADMIN — ONDANSETRON HYDROCHLORIDE 4 MG: 2 INJECTION INTRAMUSCULAR; INTRAVENOUS at 04:00

## 2018-09-18 RX ADMIN — POTASSIUM CHLORIDE 20 MEQ: 750 CAPSULE, EXTENDED RELEASE ORAL at 05:34

## 2018-09-18 RX ADMIN — SODIUM CHLORIDE 1000 ML: 9 INJECTION, SOLUTION INTRAVENOUS at 03:59
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
## 2018-09-28 ENCOUNTER — TRANSCRIBE ORDERS (OUTPATIENT)
Dept: PHYSICAL THERAPY | Facility: HOSPITAL | Age: 48
End: 2018-09-28

## 2018-09-28 ENCOUNTER — HOSPITAL ENCOUNTER (OUTPATIENT)
Dept: PHYSICAL THERAPY | Facility: HOSPITAL | Age: 48
Setting detail: THERAPIES SERIES
Discharge: HOME | End: 2018-09-28

## 2018-09-28 DIAGNOSIS — G89.29 CHRONIC LOW BACK PAIN WITH SCIATICA, SCIATICA LATERALITY UNSPECIFIED, UNSPECIFIED BACK PAIN LATERALITY: Primary | ICD-10-CM

## 2018-09-28 DIAGNOSIS — M54.2 CERVICALGIA: ICD-10-CM

## 2018-09-28 DIAGNOSIS — M54.40 ACUTE LOW BACK PAIN WITH SCIATICA, SCIATICA LATERALITY UNSPECIFIED, UNSPECIFIED BACK PAIN LATERALITY: Primary | ICD-10-CM

## 2018-09-28 DIAGNOSIS — M54.40 CHRONIC LOW BACK PAIN WITH SCIATICA, SCIATICA LATERALITY UNSPECIFIED, UNSPECIFIED BACK PAIN LATERALITY: Primary | ICD-10-CM

## 2018-09-28 PROCEDURE — 97164 PT RE-EVAL EST PLAN CARE: CPT

## 2018-09-28 PROCEDURE — 97110 THERAPEUTIC EXERCISES: CPT

## 2018-09-28 NOTE — THERAPY RE-EVALUATION
Outpatient Physical Therapy Ortho Re-Evaluation  [facility]     Patient Name: Maricarmen Ferris  : 1970  MRN: [id number]  Today's Date: 2018      Visit Date: 2018   Attended  visits.  Insurance available:sent for more  Next MD appt: EMILY  Recertification: 10/18/2018.    Patient Active Problem List   Diagnosis   • Chronic cholecystitis   • Umbilical hernia without obstruction and without gangrene   • Abdominal pain   • Abdominal fluid collection   • Intra-hepatic bile leak   • Abnormal cardiovascular function study   • Chest pain        Past Medical History:   Diagnosis Date   • Acid reflux    • Anxiety    • Depressed    • Hard to intubate    • Hyperlipidemia    • Hypertension    • Sleep apnea         Past Surgical History:   Procedure Laterality Date   • ABDOMINAL SURGERY     • CARDIAC CATHETERIZATION N/A 2018    Procedure: Left Heart Cath 2018 @ 9;00;  Surgeon: Kuldip Frank MD;  Location: [facility] CATH INVASIVE LOCATION;  Service: Cardiology   • CYSTOSCOPY     • ENDOMETRIAL ABLATION     • LAPAROSCOPIC TUBAL LIGATION     • MD ERCP DX COLLECTION SPECIMEN BRUSHING/WASHING Left 2017    Procedure: ENDOSCOPIC RETROGRADE CHOLANGIOPANCREATOGRAPHY;  Surgeon: Samuel Redding DO;  Location: [facility] ENDOSCOPY;  Service: Gastroenterology   • MD LAP,CHOLECYSTECTOMY N/A 2017    Procedure: CHOLECYSTECTOMY LAPAROSCOPIC, also umbillical hernia repair;  Surgeon: Pk العلي MD;  Location: [facility] OR;  Service: General       Visit Dx:     ICD-10-CM ICD-9-CM   1. Acute low back pain with sciatica, sciatica laterality unspecified, unspecified back pain laterality M54.40 724.2     724.3   2. Cervicalgia M54.2 723.1                 PT Ortho     Row Name 18 0800       Precautions and Contraindications    Precautions Monitor Vitals  -MW       Posture/Observations    Posture/Observations Comments Pt has flattened Lordosis, severely increased Kyphosis, severe forward  "head and rounded shoulders. Pt is TTP along her paraspinals. Increased tension felt along scap stabilizers and lower cervical muscles. Pt has a protruding C-7 region and attempt PA glide of C6, C7 was painful and sent pain down into pt's arms. The pt had TTP of her occiput; however, no pain travel w/ this. Pt has (+) Ulnar nerve glides bilat w/ L worse than R. The pt has (+) SLR neurotension test on L leg but (-) on R leg.   -MW       Myotomal Screen- Lower Quarter Clearing    Hip flexion (L2) Bilateral:;5 (Normal)  -MW    Knee extension (L3) Bilateral:;5 (Normal)  -MW    Ankle DF (L4) Bilateral:;5 (Normal)  -MW    Knee flexion (S2) Bilateral:;4+ (Good +)  -MW       Head/Neck/Trunk    Neck Extension AROM 25 deg w/ report of shooting pain down whole spine  -MW    Neck Flexion AROM 20 deg w/ \"popping\" felt  -MW    Neck Lt Lateral Flexion AROM 38  -MW    Neck Rt Lateral Flexion AROM 40  -MW    Neck Lt Rotation AROM 35  -MW    Neck Rt Rotation AROM 40  -MW    Head/Neck/Trunk Comments Lumbar flexion 50% limited, lumbar extension to neutral (completely limited), LSB to mid-thigh, R SB to mid thigh, B lumbar rotation 50% limited  -MW       MMT (Manual Muscle Testing)    General MMT Comments B UE MMT revelaed WNL strength, however, pt reported pain in her scap region w/ resisted abduction bilat.  -MW       Gait/Stairs Assessment/Training    Comment (Gait/Stairs) Gait mildly antalgic  -MW      User Key  (r) = Recorded By, (t) = Taken By, (c) = Cosigned By    Initials Name Provider Type    Bebe Jasmine, PT Physical Therapist                                  PT OP Goals     Row Name 09/28/18 0800          PT Short Term Goals    STG Date to Achieve 10/19/18  -MW     STG 1 Patient will be independent with HEP  -MW     STG 1 Progress Ongoing  -MW     STG 2 Patient will imporve oswestry by 15 %   -MW     STG 2 Progress Progressing  -MW     STG 3 Patient will report subjective improvement of 30 %   -MW     STG 3 Progress Met "  -MW     STG 4 Patient will have decreased left radicular symtoms  -MW     STG 4 Progress Progressing  -MW        Long Term Goals    LTG Date to Achieve 10/19/18  -MW     LTG 1 Patient will improve oswestry score to less than 20 % disability  -MW     LTG 1 Progress Progressing  -MW     LTG 2 Patient will have negative neural tension testing SLR/PKB on left  -MW     LTG 2 Progress Progressing  -MW     LTG 3 Patient will improve arom testing of lumbar spine to WFL  -MW     LTG 3 Progress Progressing  -MW     LTG 4 Patient will report subjective improvement of 50 %   -MW     LTG 4 Progress Met  -MW     LTG 5 Pt will improve neck AROM by 10 deg in each plane from eval  -MW     LTG 5 Progress New  -MW        Time Calculation    PT Goal Re-Cert Due Date 10/19/18  -MW       User Key  (r) = Recorded By, (t) = Taken By, (c) = Cosigned By    Initials Name Provider Type    Bebe Jasmine, PT Physical Therapist                PT Assessment/Plan     Row Name 09/28/18 0800          PT Assessment    Functional Limitations Impaired gait;Limitation in home management;Limitations in community activities;Limitations in functional capacity and performance;Performance in self-care ADL  -MW     Impairments Gait;Joint mobility;Muscle strength;Pain;Poor body mechanics;Posture;Range of motion  -MW     Assessment Comments The pt presented today for re-eval secondary to her MD wanting her to also be treated for her cervical spine in addition to her lumbar spine. The pt has made good progress w/ her LE strength since last recert. She has made mild progress towards her established lumbar goals. She continues to have significant ROM and pain deficits throughout her lumbar and cervical spine regions. The pt's pain and ROM deficits are affecting her QOL and ability to perform ADLs. She is in need of rehab services to address these mentioned deficits and to ensure her return to PLOF.   -MW     Rehab Potential Fair   chronicity  -MW      Patient/caregiver participated in establishment of treatment plan and goals Yes  -MW     Patient would benefit from skilled therapy intervention Yes  -MW        PT Plan    PT Frequency 2x/week  -MW     Predicted Duration of Therapy Intervention (Therapy Eval) 3 weeks  -MW     PT Plan Comments Land based therapy w/  emphasis on trunk stabilization. Attempt cervical and scapular stabilization. Implement HEP for all over spine stabilization. Perform modalities and manual PRN. If pt is not improved in 3 weeks time will refer back to MD for possible imaging.  -MW       User Key  (r) = Recorded By, (t) = Taken By, (c) = Cosigned By    Initials Name Provider Type    Bebe Jasmine, PT Physical Therapist                  Exercises     Row Name 09/28/18 0800             Subjective Comments    Subjective Comments The pt comes today w/ new order to be seen for both her neck and her back. The pt states her neck bothers her tremendously. She consents to re-eval and tx. The pt states she had difficulty w/ ADLs w/ both her neck and back. She states that her back bothers her more than her neck, but ADLs like driving elicit neck pain too. The pt states that she did feel like therapy was helping her back; however, she had to miss therapy for approx 2 weeks secondary to being out of town and being ill.   -MW         Subjective Pain    Able to rate subjective pain? yes  -MW      Pre-Treatment Pain Level 5  -MW         Exercise 1    Exercise Name 1 pro ll LE and UE strength  -MW      Time 1 8', fwd/back  -MW        User Key  (r) = Recorded By, (t) = Taken By, (c) = Cosigned By    Initials Name Provider Type    Bebe Jasmine, PT Physical Therapist                        Outcome Measure Options: Neck Disability Index (NDI)  Modified Oswestry  Modified Oswestry Score/Comments: 34/50  Neck Disability Index  Section 1 - Pain Intensity: The pain is very mild at the moment.  Section 2 - Personal Care: It is painful to look after  myself, and I am slow and careful.  Section 3 - Lifting: Pain prevents me from lifting heavy weights, but I can manage light weights if they are conveniently positioned.  Section 4 - Work: I can't do my usual work.  Section 5 - Headaches: I have moderate headaches that come infrequently.  Section 6 - Concentration: I have a fair degree of difficulty concentrating.  Section 7 - Sleeping: My sleep is greatly disturbed for up to 3-5 hours.  Section 8 - Driving: I can't drive as long as I want because of moderate neck pain.  Section 9 - Reading: I can't read as much as I want because of moderate neck pain.  Section 10 - Recreation: I have neck pain with most recreational activities.  Neck Disability Index Score: 26      Time Calculation:     Therapy Suggested Charges     Code   Minutes Charges    None             Start Time: 0802  Stop Time: 0841  Time Calculation (min): 39 min  Total Timed Code Minutes- PT: 8 minute(s)     Therapy Charges for Today     Code Description Service Date Service Provider Modifiers Qty    22566107087 HC PT THER PROC EA 15 MIN 9/28/2018 Bebe Griffin, PT GP 1    99249129437 HC PT RE-EVAL ESTABLISHED PLAN 2 9/28/2018 Bebe Griffin, PT GP 1            Bebe Griffin PT  9/28/2018

## 2018-10-02 ENCOUNTER — HOSPITAL ENCOUNTER (OUTPATIENT)
Dept: PHYSICAL THERAPY | Facility: HOSPITAL | Age: 48
Setting detail: THERAPIES SERIES
Discharge: HOME | End: 2018-10-02

## 2018-10-02 ENCOUNTER — APPOINTMENT (OUTPATIENT)
Dept: PHYSICAL THERAPY | Facility: HOSPITAL | Age: 48
End: 2018-10-02

## 2018-10-02 DIAGNOSIS — M54.40 ACUTE LOW BACK PAIN WITH SCIATICA, SCIATICA LATERALITY UNSPECIFIED, UNSPECIFIED BACK PAIN LATERALITY: Primary | ICD-10-CM

## 2018-10-02 DIAGNOSIS — M54.2 CERVICALGIA: ICD-10-CM

## 2018-10-02 PROCEDURE — 97110 THERAPEUTIC EXERCISES: CPT

## 2018-10-02 NOTE — THERAPY TREATMENT NOTE
Outpatient Physical Therapy Ortho Treatment Note  [facility]   Sarah Farnsworth       Patient Name: Maricarmen Ferris  : 1970  MRN: [id number]  Today's Date: 10/2/2018      Visit Date: 10/02/2018   Pt reports 5/10 low back pain; 0/10 neck pain pre treatment, 7/10 low back pain; 3/10 neck pain post treatment  Reports 0% of improvement.  Attended  visits.  Insurance available: 6 visits 10/1-10/31  Next MD appt: ANITHA .  Recertification: 10/18/2018.    Visit Dx:    ICD-10-CM ICD-9-CM   1. Acute low back pain with sciatica, sciatica laterality unspecified, unspecified back pain laterality M54.40 724.2     724.3   2. Cervicalgia M54.2 723.1       Patient Active Problem List   Diagnosis   • Chronic cholecystitis   • Umbilical hernia without obstruction and without gangrene   • Abdominal pain   • Abdominal fluid collection   • Intra-hepatic bile leak   • Abnormal cardiovascular function study   • Chest pain        Past Medical History:   Diagnosis Date   • Acid reflux    • Anxiety    • Depressed    • Hard to intubate    • Hyperlipidemia    • Hypertension    • Sleep apnea         Past Surgical History:   Procedure Laterality Date   • ABDOMINAL SURGERY     • CARDIAC CATHETERIZATION N/A 2018    Procedure: Left Heart Cath 2018 @ 9;00;  Surgeon: Kuldip Frank MD;  Location: [facility] INVASIVE LOCATION;  Service: Cardiology   • CYSTOSCOPY     • ENDOMETRIAL ABLATION     • LAPAROSCOPIC TUBAL LIGATION     • [place] ERCP DX COLLECTION SPECIMEN BRUSHING/WASHING Left 2017    Procedure: ENDOSCOPIC RETROGRADE CHOLANGIOPANCREATOGRAPHY;  Surgeon: Samuel Redding DO;  Location: [facility] ENDOSCOPY;  Service: Gastroenterology   • [place] LAP,CHOLECYSTECTOMY N/A 2017    Procedure: CHOLECYSTECTOMY LAPAROSCOPIC, also umbillical hernia repair;  Surgeon: Pk العلي MD;  Location: [facility] OR;  Service: General             PT Ortho     Row Name 10/02/18 1100       Precautions and Contraindications     Precautions (P)  Monitor Vitals  -      User Key  (r) = Recorded By, (t) = Taken By, (c) = Cosigned By    Initials Name Provider Type    Sarah Reed                             PT Assessment/Plan     Row Name 10/02/18 1100          PT Assessment    Assessment Comments (P)  Patient displayed good effort throughout therex but reported frequent low back pain throughout. Poor tolerance to therex. BP and pulse checked this visit. HR:74, BP:118/62  -        PT Plan    PT Frequency (P)  2x/week  -     Predicted Duration of Therapy Intervention (Therapy Eval) (P)  3 weeks  -     PT Plan Comments (P)  Continue with POC for land based therex. Scap and lumbar stab  -       User Key  (r) = Recorded By, (t) = Taken By, (c) = Cosigned By    Initials Name Provider Type    Sarah Reed                 Modalities     Row Name 10/02/18 1100             Subjective Comments    Subjective Comments (P)  Patient reports that her neck pain is intermittent and she is not having any neck pain today.   -         Subjective Pain    Able to rate subjective pain? (P)  yes  -        User Key  (r) = Recorded By, (t) = Taken By, (c) = Cosigned By    Initials Name Provider Type     Sarah Farnsworth                 Exercises     Row Name 10/02/18 1100             Subjective Comments    Subjective Comments (P)  Patient reports that her neck pain is intermittent and she is not having any neck pain today.   -         Subjective Pain    Able to rate subjective pain? (P)  yes  -      Pre-Treatment Pain Level (P)  --   low back:5, neck:0  -      Post-Treatment Pain Level (P)  --   low back:7, neck:3  -MC         Aquatics    Aquatics performed? (P)  No  -MC         Exercise 1    Exercise Name 1 (P)  PRO II, UE and LE   -MC      Time 1 (P)  10' fwd/rev   -MC      Additional Comments (P)  seat 9  -MC         Exercise 2    Exercise Name 2 (P)  Supine HS stretch w/ strap   " -MC      Sets 2 (P)  3  -MC      Time 2 (P)  30\"  -MC      Additional Comments (P)  bilat  -MC         Exercise 3    Exercise Name 3 (P)  Tball DKTC  -MC      Sets 3 (P)  3  -MC      Time 3 (P)  30\"  -MC         Exercise 4    Exercise Name 4 (P)  Tball LTR  -MC      Sets 4 (P)  10  -MC      Reps 4 (P)  --  -MC      Time 4 (P)  10\"  -MC         Exercise 5    Exercise Name 5 (P)  Supine trans ab   -MC      Sets 5 (P)  2  -MC      Reps 5 (P)  10  -MC      Additional Comments (P)  3\" hold   -MC         Exercise 6    Exercise Name 6 (P)  Seated trans ab w/ alt march   -MC      Sets 6 (P)  3  -MC      Reps 6 (P)  10  -MC         Exercise 7    Exercise Name 7 (P)  Scap squeezes  -MC      Sets 7 (P)  2  -MC      Reps 7 (P)  10  -MC      Time 7 (P)  2 sec hold   -MC         Exercise 8    Exercise Name 8 (P)  Chin tucks   -MC      Sets 8 (P)  2  -MC      Reps 8 (P)  10  -MC      Time 8 (P)  2 sec hold   -MC         Exercise 9    Exercise Name 9 (P)  LS stretch  -MC      Sets 9 (P)  3  -MC      Time 9 (P)  30\"  -MC      Additional Comments (P)  bilat  -MC        User Key  (r) = Recorded By, (t) = Taken By, (c) = Cosigned By    Initials Name Provider Type    Sarah Reed                                PT OP Goals     Row Name 10/02/18 1100          PT Short Term Goals    STG Date to Achieve (P)  10/19/18  -     STG 1 (P)  Patient will be independent with HEP  -     STG 1 Progress (P)  Ongoing  -     STG 2 (P)  Patient will imporve oswestry by 15 %   -     STG 2 Progress (P)  Progressing  -     STG 3 (P)  Patient will report subjective improvement of 30 %   -     STG 3 Progress (P)  Met  -     STG 4 (P)  Patient will have decreased left radicular symtoms  -     STG 4 Progress (P)  Progressing  -        Long Term Goals    LTG Date to Achieve (P)  10/19/18  -     LTG 1 (P)  Patient will improve oswestry score to less than 20 % disability  -     LTG 1 Progress (P)  Progressing  -MC     " LTG 2 (P)  Patient will have negative neural tension testing SLR/PKB on left  -     LTG 2 Progress (P)  Progressing  -     LTG 3 (P)  Patient will improve arom testing of lumbar spine to WFL  -     LTG 3 Progress (P)  Progressing  -     LTG 4 (P)  Patient will report subjective improvement of 50 %   -     LTG 4 Progress (P)  Met  -     LTG 5 (P)  Pt will improve neck AROM by 10 deg in each plane from eval  -     LTG 5 Progress (P)  New  -        Time Calculation    PT Goal Re-Cert Due Date (P)  10/19/18  -       User Key  (r) = Recorded By, (t) = Taken By, (c) = Cosigned By    Initials Name Provider Type     Sarah Farnsworth           Therapy Education  Given: (P) HEP  How Provided: (P) Verbal, Demonstration, Written  Provided to: (P) Patient  Level of Understanding: (P) Teach back education performed              Time Calculation:   Start Time: (P) 1100  Stop Time: (P) 1148  Time Calculation (min): (P) 48 min  Therapy Suggested Charges     Code   Minutes Charges    None           Therapy Charges for Today     Code Description Service Date Service Provider Modifiers Qty    89094913210 HC PT THER PROC EA 15 MIN 10/2/2018 Sarah Farnsworth GP 3                    Sarah Farnsworth  10/2/2018

## 2018-10-04 ENCOUNTER — HOSPITAL ENCOUNTER (OUTPATIENT)
Dept: PHYSICAL THERAPY | Facility: HOSPITAL | Age: 48
Setting detail: THERAPIES SERIES
Discharge: HOME | End: 2018-10-04

## 2018-10-04 DIAGNOSIS — M54.2 CERVICALGIA: ICD-10-CM

## 2018-10-04 DIAGNOSIS — M54.40 ACUTE LOW BACK PAIN WITH SCIATICA, SCIATICA LATERALITY UNSPECIFIED, UNSPECIFIED BACK PAIN LATERALITY: Primary | ICD-10-CM

## 2018-10-04 PROCEDURE — 97110 THERAPEUTIC EXERCISES: CPT

## 2018-10-04 NOTE — THERAPY TREATMENT NOTE
Outpatient Physical Therapy Ortho Treatment Note  [facility]   Sarah Farnsworth       Patient Name: Maricarmen Ferris  : 1970  MRN: [id number]  Today's Date: 10/4/2018      Visit Date: 10/04/2018   Pt reports 7/10 back pain; 3/10 neck pain pre treatment, 3/10 back pain, 2/10 neck pain post treatment  Reports 0% of improvement.  Attended /20 visits.  Insurance available: 6 visits 10/1-10/31  Next MD appt: TBD  Recertification: 10/18/2018.    Visit Dx:    ICD-10-CM ICD-9-CM   1. Acute low back pain with sciatica, sciatica laterality unspecified, unspecified back pain laterality M54.40 724.2     724.3   2. Cervicalgia M54.2 723.1       Patient Active Problem List   Diagnosis   • Chronic cholecystitis   • Umbilical hernia without obstruction and without gangrene   • Abdominal pain   • Abdominal fluid collection   • Intra-hepatic bile leak   • Abnormal cardiovascular function study   • Chest pain        Past Medical History:   Diagnosis Date   • Acid reflux    • Anxiety    • Depressed    • Hard to intubate    • Hyperlipidemia    • Hypertension    • Sleep apnea         Past Surgical History:   Procedure Laterality Date   • ABDOMINAL SURGERY     • CARDIAC CATHETERIZATION N/A 2018    Procedure: Left Heart Cath 2018 @ 9;00;  Surgeon: Kuldip Frank MD;  Location: [facility] INVASIVE LOCATION;  Service: Cardiology   • CYSTOSCOPY     • ENDOMETRIAL ABLATION     • LAPAROSCOPIC TUBAL LIGATION     • [place] ERCP DX COLLECTION SPECIMEN BRUSHING/WASHING Left 2017    Procedure: ENDOSCOPIC RETROGRADE CHOLANGIOPANCREATOGRAPHY;  Surgeon: Samuel Redding DO;  Location: [facility] ENDOSCOPY;  Service: Gastroenterology   • LA LAP,CHOLECYSTECTOMY N/A 2017    Procedure: CHOLECYSTECTOMY LAPAROSCOPIC, also umbillical hernia repair;  Surgeon: Pk العلي MD;  Location: [facility] OR;  Service: General             PT Ortho     Row Name 10/02/18 1100       Precautions and Contraindications     Precautions (P)  Monitor Vitals  -      User Key  (r) = Recorded By, (t) = Taken By, (c) = Cosigned By    Initials Name Provider Type    Sarah Reed                             PT Assessment/Plan     Row Name 10/04/18 1100          PT Assessment    Assessment Comments (P)  Slightly improved tolerance today compared to last visit. Good effort throughout. Patient displayed frequent shortness of breath with all therex.   -        PT Plan    PT Frequency (P)  2x/week  -     Predicted Duration of Therapy Intervention (Therapy Eval) (P)  3 weeks  -     PT Plan Comments (P)  Continue with POC. Progress as tolerated   -       User Key  (r) = Recorded By, (t) = Taken By, (c) = Cosigned By    Initials Name Provider Type    Sarah Reed                 Modalities     Row Name 10/04/18 1100             Ice    Ice Applied (P)  Yes  -MC      Location (P)  low back, mid-back, cervical region  -      Rx Minutes (P)  15 mins  -      Ice Prior to Rx (P)  No  -MC      Ice S/P Rx (P)  Yes  -MC      Patient denies application of Ice (P)  No  -        User Key  (r) = Recorded By, (t) = Taken By, (c) = Cosigned By    Initials Name Provider Type    Sarah Reed                 Exercises     Row Name 10/04/18 1100             Subjective Comments    Subjective Comments (P)  Patient reports that her back is hurting. She states that she would like to get a [employer] membership to be able to work out. I encouraged patient to speak with her PCP and get his approval before getting a gym membership.  -         Subjective Pain    Able to rate subjective pain? (P)  yes  -MC      Pre-Treatment Pain Level (P)  --   back: 7. neck: 3  -MC         Aquatics    Aquatics performed? (P)  No  -MC         Exercise 1    Exercise Name 1 (P)  PRO II, UE and LE   -      Time 1 (P)  10' fwd/rev   -      Additional Comments (P)  seat 9  -         Exercise 2    Exercise Name 2  "(P)  Standing HS stretch  -MC      Sets 2 (P)  3  -MC      Time 2 (P)  30\"   -MC      Additional Comments (P)  bilat  -MC         Exercise 3    Exercise Name 3 (P)  Incline stretch  -MC      Sets 3 (P)  3  -MC      Time 3 (P)  30\"  -MC         Exercise 4    Exercise Name 4 (P)  tball DKTC  -MC      Sets 4 (P)  3  -MC      Time 4 (P)  30\"  -MC         Exercise 5    Exercise Name 5 (P)  Tball LTR  -MC      Sets 5 (P)  10  -MC      Time 5 (P)  10\"  -MC         Exercise 6    Exercise Name 6 (P)  Supine trans ab   -MC      Sets 6 (P)  2  -MC      Reps 6 (P)  10  -MC      Additional Comments (P)  3\" hold  -MC         Exercise 7    Exercise Name 7 (P)  Supine ham ball iso  -MC      Reps 7 (P)  15  -MC      Additional Comments (P)  yellow tball   -MC         Exercise 8    Exercise Name 8 (P)  Seated tball adduction  -MC      Reps 8 (P)  20  -MC      Time 8 (P)  3 sec hold   -MC         Exercise 9    Exercise Name 9 (P)  Seated trans ab w/ alt march  -MC      Sets 9 (P)  3  -MC      Reps 9 (P)  10  -MC         Exercise 10    Exercise Name 10 (P)  Biodex cable mid row   -MC      Sets 10 (P)  2  -MC      Reps 10 (P)  10  -MC      Additional Comments (P)  3 plates   -MC         Exercise 11    Exercise Name 11 (P)  Chin tucks  -MC      Sets 11 (P)  2  -MC      Reps 11 (P)  15  -MC        User Key  (r) = Recorded By, (t) = Taken By, (c) = Cosigned By    Initials Name Provider Type    Sarah Reed                                PT OP Goals     Row Name 10/04/18 1100          PT Short Term Goals    STG Date to Achieve (P)  10/19/18  -     STG 1 (P)  Patient will be independent with HEP  -     STG 1 Progress (P)  Ongoing  -     STG 2 (P)  Patient will imporve oswestry by 15 %   -     STG 2 Progress (P)  Progressing  -     STG 3 (P)  Patient will report subjective improvement of 30 %   -     STG 3 Progress (P)  Met  -     STG 4 (P)  Patient will have decreased left radicular symtoms  -     STG " 4 Progress (P)  Progressing  -        Long Term Goals    LTG Date to Achieve (P)  10/19/18  -     LTG 1 (P)  Patient will improve oswestry score to less than 20 % disability  -     LTG 1 Progress (P)  Progressing  -     LTG 2 (P)  Patient will have negative neural tension testing SLR/PKB on left  -     LTG 2 Progress (P)  Progressing  -     LTG 3 (P)  Patient will improve arom testing of lumbar spine to WFL  -     LTG 3 Progress (P)  Progressing  -     LTG 4 (P)  Patient will report subjective improvement of 50 %   -     LTG 4 Progress (P)  Met  -     LTG 5 (P)  Pt will improve neck AROM by 10 deg in each plane from eval  -     LTG 5 Progress (P)  New  -        Time Calculation    PT Goal Re-Cert Due Date (P)  10/19/18  -       User Key  (r) = Recorded By, (t) = Taken By, (c) = Cosigned By    Initials Name Provider Type    Sarah Reed           Therapy Education  Given: (P) HEP  How Provided: (P) Verbal, Demonstration, Written  Provided to: (P) Patient  Level of Understanding: (P) Teach back education performed              Time Calculation:   Start Time: (P) 1100  Stop Time: (P) 1200  Time Calculation (min): (P) 60 min  Therapy Suggested Charges     Code   Minutes Charges    None           Therapy Charges for Today     Code Description Service Date Service Provider Modifiers Qty    36971171939 HC PT THER PROC EA 15 MIN 10/4/2018 Sarah Farnsworth GP 3    01885320942 HC PT THER SUPP EA 15 MIN 10/4/2018 Sarah Farnsworth GP 1                    Sarah Farnsworth  10/4/2018

## 2018-10-07 PROBLEM — E66.01 MORBID OBESITY WITH BMI OF 50.0-59.9, ADULT (HCC): Status: ACTIVE | Noted: 2018-10-07

## 2018-10-07 NOTE — PROGRESS NOTES
Pulmonary Consultation    MARIA TERSEA Mckinley,    Thank you for asking me to see Maricarmen Ferris for   Chief Complaint   Patient presents with   • Shortness of Breath     REFD BY CHATO WALLS   .    Subjective     History of Present Illness  Maricarmen Ferris is a 48 y.o. female with a PMH significant for morbid obesity, ELSIE on CPAP, HTN, GERD, and depression who presents for evaluation of dyspnea.  Patient was actually seen by me during hospitalization in August 2017.  At that time, she had complications from an elective cholecystectomy and was noted to have hypoxemia during the hospitalization.  I recommended diuresis due to concern for some hypervolemia and encouraged her to establish with sleep medicine as an outpatient to start back on a CPAP. Pt states she has been having SYED for over a year before she had complications from her cholescystectomy. She admits to over 70lbs wt gain over the past year. Pt denies prior lung disease or childhood asthma. She reports that she does have some dyspnea at rest. Pt did have dyspnea at night but it has improved with since starting CPAP 6 months ago. She does admit there are times she cannot tolerate wearing it every night. Pt admits to some wheeze and chest pain. She is not using any inhalers.  Pt had a stress test which was + so she underwent a LHC by Dr. Frank which showed no significant epicardial disease.  She also reports that she is going through the preoperative assessment for bariatric surgery and is currently on month 3 of weigh-in.  Pt is a never smoker, but she has been exposed to 2nd hand smoke in the past. 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 There is no lung disease or lung cancer in the family.     Review of Systems: History obtained from chart review and the patient.  Review of Systems   Constitutional: Positive for fatigue. Negative for fever.        Weight gain, snoring, witnessed apneas, EDS   HENT:  Positive for voice change. Negative for ear pain, rhinorrhea and sore throat.    Respiratory: Positive for shortness of breath and wheezing.    Cardiovascular: Positive for chest pain, palpitations and leg swelling.   Gastrointestinal: Positive for abdominal pain. Negative for vomiting.        Heartburn   Genitourinary: Positive for frequency.   Musculoskeletal: Positive for arthralgias, back pain and neck pain.   Skin: Negative for rash.   Neurological: Negative for headaches.   Psychiatric/Behavioral: Positive for dysphoric mood. The patient is nervous/anxious.      As described in the HPI. Otherwise, remainder of ROS (14 systems) were negative.    Patient Active Problem List   Diagnosis   • Chronic cholecystitis   • Umbilical hernia without obstruction and without gangrene   • Abdominal pain   • Abdominal fluid collection   • Intra-hepatic bile leak   • Abnormal cardiovascular function study   • Chest pain   • Morbid obesity with BMI of 50.0-59.9, adult (CMS/HCC)   • Restrictive lung disease secondary to obesity         Current Outpatient Prescriptions:   •  buPROPion (WELLBUTRIN) 100 MG tablet, Take 100 mg by mouth 2 (Two) Times a Day., Disp: , Rfl:   •  dicyclomine (BENTYL) 10 MG capsule, Take 10 mg by mouth 3 (Three) Times a Day., Disp: , Rfl: 1  •  furosemide (LASIX) 20 MG tablet, Take 20 mg by mouth Daily., Disp: , Rfl:   •  gabapentin (NEURONTIN) 100 MG capsule, Take 100 mg by mouth 3 (Three) Times a Day., Disp: , Rfl:   •  isosorbide mononitrate (IMDUR) 30 MG 24 hr tablet, Take 30 mg by mouth Daily., Disp: , Rfl:   •  lisinopril (PRINIVIL,ZESTRIL) 20 MG tablet, Take 20 mg by mouth Daily., Disp: , Rfl:   •  methocarbamol (ROBAXIN) 500 MG tablet, , Disp: , Rfl:   •  naproxen (NAPROSYN) 500 MG tablet, Take 500 mg by mouth 2 (Two) Times a Day As Needed for Mild Pain ., Disp: , Rfl:   •  oxybutynin (DITROPAN) 5 MG tablet, Take 5 mg by mouth 2 (Two) Times a Day., Disp: , Rfl:   •  pantoprazole (PROTONIX) 40 MG  EC tablet, Take 40 mg by mouth Daily., Disp: , Rfl:   •  pravastatin (PRAVACHOL) 20 MG tablet, Take 20 mg by mouth Every Other Day. At night, Disp: , Rfl:   •  promethazine (PHENERGAN) 25 MG tablet, Take 1 tablet by mouth Every 6 (Six) Hours As Needed for Nausea or Vomiting for up to 15 doses., Disp: 15 tablet, Rfl: 0  •  rOPINIRole (REQUIP) 0.25 MG tablet, Take 0.25 mg by mouth Every Night., Disp: , Rfl:   •  sertraline (ZOLOFT) 50 MG tablet, Take 50 mg by mouth Daily., Disp: , Rfl:   •  traMADol (ULTRAM) 50 MG tablet, Take 50 mg by mouth Every Night., Disp: , Rfl:   •  albuterol (PROVENTIL HFA;VENTOLIN HFA) 108 (90 Base) MCG/ACT inhaler, Inhale 2 puffs Every 4 (Four) Hours As Needed for Wheezing or Shortness of Air., Disp: 18 g, Rfl: 11    Past Medical History:   Diagnosis Date   • Acid reflux    • Anxiety    • Depressed    • Hard to intubate    • Hyperlipidemia    • Hypertension    • Sleep apnea      Past Surgical History:   Procedure Laterality Date   • ABDOMINAL SURGERY     • CARDIAC CATHETERIZATION N/A 8/13/2018    Procedure: Left Heart Cath 8/13/2018 @ 9;00;  Surgeon: Kuldip Frank MD;  Location: [facility] INVASIVE LOCATION;  Service: Cardiology   • CYSTOSCOPY     • ENDOMETRIAL ABLATION  2015   • LAPAROSCOPIC TUBAL LIGATION  1995   • [place] ERCP DX COLLECTION SPECIMEN BRUSHING/WASHING Left 7/31/2017    Procedure: ENDOSCOPIC RETROGRADE CHOLANGIOPANCREATOGRAPHY;  Surgeon: Samuel Redding DO;  Location: [facility] ENDOSCOPY;  Service: Gastroenterology   • [place] LAP,CHOLECYSTECTOMY N/A 7/24/2017    Procedure: CHOLECYSTECTOMY LAPAROSCOPIC, also umbillical hernia repair;  Surgeon: Pk العلي MD;  Location: [facility] OR;  Service: General     Social History     Social History   • Marital status:      Social History Main Topics   • Smoking status: Never Smoker   • Smokeless tobacco: Never Used   • Alcohol use Yes      Comment: socially   • Drug use: No   • Sexual activity: Defer     Other Topics Concern   • Not on  "file     Family History   Problem Relation Age of Onset   • Diabetes Mother    • Hypertension Mother    • Hypertension Father           Objective     Blood pressure 123/78, pulse 77, height 165.1 cm (65\"), weight (!) 141 kg (311 lb 12.8 oz), SpO2 97 %, not currently breastfeeding.  Physical Exam   Constitutional: She is oriented to person, place, and time. Vital signs are normal. She appears well-developed and well-nourished.   Morbidly obese   HENT:   Head: Normocephalic and atraumatic.   Nose: Nose normal.   Mouth/Throat: Uvula is midline, oropharynx is clear and moist and mucous membranes are normal.   Mallampati 4, tongue ring   Eyes: Pupils are equal, round, and reactive to light. Conjunctivae, EOM and lids are normal.   Neck: Trachea normal and normal range of motion. No tracheal tenderness present. No thyroid mass present.   Cardiovascular: Normal rate, regular rhythm and S1 normal.  PMI is not displaced.  Exam reveals distant heart sounds. Exam reveals no gallop.    No murmur heard.  Pulmonary/Chest: Effort normal and breath sounds normal. No respiratory distress. She has no decreased breath sounds. She has no wheezes. She has no rhonchi. Chest wall is not dull to percussion. She exhibits no tenderness.   Abdominal: Soft. Normal appearance and bowel sounds are normal. There is no hepatomegaly. There is no tenderness.   Obese   Musculoskeletal:   Normal gait, trace BLE edema     Vascular Status -  Her right foot exhibits abnormal foot edema. Her left foot exhibits abnormal foot edema.  Lymphadenopathy:        Head (right side): No submandibular adenopathy present.        Head (left side): No submandibular adenopathy present.     She has no cervical adenopathy.        Right: No supraclavicular adenopathy present.        Left: No supraclavicular adenopathy present.   Neurological: She is alert and oriented to person, place, and time.   Skin: Skin is warm and dry. No rash noted. No cyanosis. Nails show no " clubbing.   Psychiatric: She has a normal mood and affect. Her speech is normal and behavior is normal. Judgment normal.   Nursing note and vitals reviewed.      PFTs: 10/8/18 (independently reviewed and interpreted by me)  Ratio 85  FVC 2.5/ 67%  FEV1 2.12/ 71%  TLC 3.59/ 69%  DLCO 18.64/ 72%  Poor effort.  Mild restriction with no significant bronchodilator response.  Mildly reduced diffusing capacity, likely due to obesity.  No comparative data available.    Radiology (independently reviewed and interpreted by me): CXR 1/16/18 showed NACPD    PSG 10/13/17:  1. I will start AutoCPAP from 5-20cm H2O with heated humidification and a mask per patient’s choice.  2. Follow-up in the sleep disorders clinic 30-90 days after the initiation of positive airway pressure therapy to evaluate compliance and resolution of symptoms.    3. If patient is intolerant of positive pressure therapy, a dental evaluation may be helpful in mild ELSIE.   4. Avoidance of sedatives, alcohol, and the supine position, all of which can worsen apneic events.  5. Since obesity is known to exacerbate ELSIE and weight loss can improve ELSIE symptoms, weight management should be a long-term goal.  6. Medically supervised weight reduction to achieve a BMI < 30 if appropriate.  7. Encourage good sleep hygiene practice and avoid sleep deprivation     Assessment/Plan     Maricarmen was seen today for shortness of breath.    Diagnoses and all orders for this visit:    Dyspnea on exertion  -     Full Pulmonary Function Test With Bronchodilator  -     albuterol (PROVENTIL HFA;VENTOLIN HFA) 108 (90 Base) MCG/ACT inhaler; Inhale 2 puffs Every 4 (Four) Hours As Needed for Wheezing or Shortness of Air.    Restrictive lung disease secondary to obesity    Morbid obesity with BMI of 50.0-59.9, adult (CMS/[facility])    Pre-op chest exam    Physical deconditioning         Discussion/ Recommendations:   PFTs showed poor effort and were not reproducible, but there was a pattern of  restriction likely due to underlying obesity.  Chest imaging was unremarkable.  I think her dyspnea is most related to her morbid obesity with significant weight gain as well as deconditioning.  She does have some symptoms that may suggest intermittent asthma, so I think it would be worth trying a short acting beta agonist to assess her response.  I did  the patient that I think it is unlikely that she will have a significant response to albuterol.  I have encouraged her to continue efforts with weight loss through diet and exercise as well as maintaining compliance with CPAP.    -Start albuterol as needed for dyspnea or wheeze.  Counseled on proper technique.  -Encourage CPAP compliance, especially if she is hospitalized.  -Counseled on dietary modifications as well as regular, progressive exercise for weight loss.  -Recommend flu vaccine.    Based on spirometry, imaging, and exam I think the patient is medically optimized and is a reasonable candidate to undergo general anesthesia, with the following precautions:  -Extubation immediately to CPAP or BiPAP  -Early ambulation and incentive spirometry while in bed  -DVT prophylaxis  -Continue outpatient bronchodilators   -Wear CPAP with sleep      Patient's Body mass index is 51.89 kg/m². BMI is above normal parameters. Recommendations include: exercise counseling.         Return in about 4 weeks (around 11/5/2018) for Recheck dyspnea.      Thank you for allowing me to participate in the care of Maricarmen Ferris. Please do not hesitate to contact me with any questions.         This document has been electronically signed by Rachel Enciso MD on October 8, 2018 10:12 AM      Dictated using Dragon

## 2018-10-08 ENCOUNTER — PROCEDURE VISIT (OUTPATIENT)
Dept: PULMONOLOGY | Facility: CLINIC | Age: 48
End: 2018-10-08

## 2018-10-08 ENCOUNTER — OFFICE VISIT (OUTPATIENT)
Dept: PULMONOLOGY | Facility: CLINIC | Age: 48
End: 2018-10-08

## 2018-10-08 VITALS
OXYGEN SATURATION: 97 % | DIASTOLIC BLOOD PRESSURE: 78 MMHG | HEART RATE: 77 BPM | SYSTOLIC BLOOD PRESSURE: 123 MMHG | WEIGHT: 293 LBS | BODY MASS INDEX: 48.82 KG/M2 | HEIGHT: 65 IN

## 2018-10-08 DIAGNOSIS — J98.4 RESTRICTIVE LUNG DISEASE SECONDARY TO OBESITY: ICD-10-CM

## 2018-10-08 DIAGNOSIS — R06.09 DYSPNEA ON EXERTION: Primary | ICD-10-CM

## 2018-10-08 DIAGNOSIS — Z01.811 PRE-OP CHEST EXAM: ICD-10-CM

## 2018-10-08 DIAGNOSIS — E66.9 RESTRICTIVE LUNG DISEASE SECONDARY TO OBESITY: ICD-10-CM

## 2018-10-08 DIAGNOSIS — R53.81 PHYSICAL DECONDITIONING: ICD-10-CM

## 2018-10-08 DIAGNOSIS — E66.01 MORBID OBESITY WITH BMI OF 50.0-59.9, ADULT (HCC): ICD-10-CM

## 2018-10-08 PROCEDURE — 94729 DIFFUSING CAPACITY: CPT | Performed by: INTERNAL MEDICINE

## 2018-10-08 PROCEDURE — 94727 GAS DIL/WSHOT DETER LNG VOL: CPT | Performed by: INTERNAL MEDICINE

## 2018-10-08 PROCEDURE — 99214 OFFICE O/P EST MOD 30 MIN: CPT | Performed by: INTERNAL MEDICINE

## 2018-10-08 PROCEDURE — 94060 EVALUATION OF WHEEZING: CPT | Performed by: INTERNAL MEDICINE

## 2018-10-08 RX ORDER — ALBUTEROL SULFATE 90 UG/1
2 AEROSOL, METERED RESPIRATORY (INHALATION) EVERY 4 HOURS PRN
Qty: 18 G | Refills: 11 | Status: SHIPPED | OUTPATIENT
Start: 2018-10-08 | End: 2019-03-28 | Stop reason: SDUPTHER

## 2018-10-08 RX ORDER — METHOCARBAMOL 500 MG/1
500 TABLET, FILM COATED ORAL 3 TIMES DAILY
COMMUNITY
Start: 2018-10-04 | End: 2019-05-19 | Stop reason: HOSPADM

## 2018-10-10 ENCOUNTER — APPOINTMENT (OUTPATIENT)
Dept: PHYSICAL THERAPY | Facility: HOSPITAL | Age: 48
End: 2018-10-10

## 2018-10-11 ENCOUNTER — HOSPITAL ENCOUNTER (OUTPATIENT)
Dept: PHYSICAL THERAPY | Facility: HOSPITAL | Age: 48
Setting detail: THERAPIES SERIES
Discharge: HOME | End: 2018-10-11

## 2018-10-11 DIAGNOSIS — M54.40 ACUTE LOW BACK PAIN WITH SCIATICA, SCIATICA LATERALITY UNSPECIFIED, UNSPECIFIED BACK PAIN LATERALITY: ICD-10-CM

## 2018-10-11 DIAGNOSIS — M54.2 CERVICALGIA: Primary | ICD-10-CM

## 2018-10-11 PROCEDURE — 97110 THERAPEUTIC EXERCISES: CPT

## 2018-10-11 NOTE — THERAPY TREATMENT NOTE
Outpatient Physical Therapy Ortho Treatment Note  [facility] [place]   Sarah Farnsworth       Patient Name: Maricarmen Ferris  : 1970  MRN: [id number]  Today's Date: 10/11/2018      Visit Date: 10/11/2018   Pt reports 6/10 back pain; 8/10 neck pain pre treatment, 6/10 back pain; 8/10 neck pain post treatment  Reports 60% of improvement.  Attended 14/ visits.  Insurance available: 6 visits 10/1-10/31   Next MD appt: 10/17/2018.  Recertification: 10/18/2018.    Visit Dx:    ICD-10-CM ICD-9-CM   1. Cervicalgia M54.2 723.1   2. Acute low back pain with sciatica, sciatica laterality unspecified, unspecified back pain laterality M54.40 724.2     724.3       Patient Active Problem List   Diagnosis   • Chronic cholecystitis   • Umbilical hernia without obstruction and without gangrene   • Abdominal pain   • Abdominal fluid collection   • Intra-hepatic bile leak   • Abnormal cardiovascular function study   • Chest pain   • Morbid obesity with BMI of 50.0-59.9, adult (CMS/HCC)   • Restrictive lung disease secondary to obesity        Past Medical History:   Diagnosis Date   • Acid reflux    • Anxiety    • Depressed    • Hard to intubate    • Hyperlipidemia    • Hypertension    • Sleep apnea         Past Surgical History:   Procedure Laterality Date   • ABDOMINAL SURGERY     • CARDIAC CATHETERIZATION N/A 2018    Procedure: Left Heart Cath 2018 @ 9;00;  Surgeon: Kuldip Frank MD;  Location: [facility] INVASIVE LOCATION;  Service: Cardiology   • CYSTOSCOPY     • ENDOMETRIAL ABLATION     • LAPAROSCOPIC TUBAL LIGATION     • [place] ERCP DX COLLECTION SPECIMEN BRUSHING/WASHING Left 2017    Procedure: ENDOSCOPIC RETROGRADE CHOLANGIOPANCREATOGRAPHY;  Surgeon: Samuel Redding DO;  Location: [facility] ENDOSCOPY;  Service: Gastroenterology   • [place] LAP,CHOLECYSTECTOMY N/A 2017    Procedure: CHOLECYSTECTOMY LAPAROSCOPIC, also umbillical hernia repair;  Surgeon: Pk العلي MD;  Location: [facility] OR;   "Service: General                             PT Assessment/Plan     Row Name 10/11/18 1200          PT Assessment    Assessment Comments (P)  Patient had frequent shortness of breath with all therex. Patient reports subjective improvement of 60%. \"I can bike now\". Patient reports increased stamina since beginning physical therapy. Patient reports bilat thigh pain post alt leg ext and denies ice. \"I going to go home and take some medicine\". Decreased back and neck pain post-therex  -MC        PT Plan    PT Frequency (P)  2x/week  -     Predicted Duration of Therapy Intervention (Therapy Eval) (P)  3 weeks  -     PT Plan Comments (P)  Recert next visit. Patient will need therapy progress note for MD  -       User Key  (r) = Recorded By, (t) = Taken By, (c) = Cosigned By    Initials Name Provider Type    Sarah Reed                 Modalities     Row Name 10/11/18 1100             Subjective Pain    Post-Treatment Pain Level (P)  --   4/10 back pain; 6/10 neck pain  -        User Key  (r) = Recorded By, (t) = Taken By, (c) = Cosigned By    Initials Name Provider Type    Sarah Reed                 Exercises     Row Name 10/11/18 1100             Subjective Comments    Subjective Comments (P)  Patient reports that she feels as though she \"slept wrong\" on her neck lsat night so it is hurting more than usual  -         Subjective Pain    Able to rate subjective pain? (P)  yes  -      Pre-Treatment Pain Level (P)  --   back:6; neck:8  -      Post-Treatment Pain Level (P)  --   4/10 back pain; 6/10 neck pain  -      Subjective Pain Comment (P)  patient denies ice post-therex  -MC         Aquatics    Aquatics performed? (P)  No  -         Exercise 1    Exercise Name 1 (P)  PRO II, UE and LE  -      Time 1 (P)  10 min fwd/rev   -      Additional Comments (P)  seat 9  -         Exercise 2    Exercise Name 2 (P)  Incline stretch  -      Sets 2 (P)  3  " "-MC      Time 2 (P)  30\"  -MC      Additional Comments (P)  bilat  -MC         Exercise 3    Exercise Name 3 (P)  Standing HS stretch  -MC      Sets 3 (P)  3  -MC      Time 3 (P)  30\"  -MC      Additional Comments (P)  bilat   -MC         Exercise 4    Exercise Name 4 (P)  Standing hip flexor lunge stretch  -MC      Sets 4 (P)  3  -MC      Time 4 (P)  30\"  -MC         Exercise 5    Exercise Name 5 (P)  UT stretch  -MC      Sets 5 (P)  3  -MC      Time 5 (P)  30\"  -MC         Exercise 6    Exercise Name 6 (P)  LS stretch  -MC      Sets 6 (P)  3  -MC      Time 6 (P)  30\"  -MC         Exercise 7    Exercise Name 7 (P)  Scap squeezes  -MC      Reps 7 (P)  20  -MC         Exercise 8    Exercise Name 8 (P)  Supine DKTC   -MC      Sets 8 (P)  3  -MC      Time 8 (P)  30\"  -MC         Exercise 9    Exercise Name 9 (P)  Supine LTR w/ tball  -MC      Sets 9 (P)  10  -MC      Time 9 (P)  10\"  -MC         Exercise 10    Exercise Name 10 (P)  Supine trans ab   -MC      Reps 10 (P)  20  -MC      Time 10 (P)  5 sec hold  -MC         Exercise 11    Exercise Name 11 (P)  Supine ham ball iso  -MC      Reps 11 (P)  20  -MC      Time 11 (P)  5 sec hold   -MC         Exercise 12    Exercise Name 12 (P)  Seated trans ab w/ tball adduction with alt leg extension  -MC      Reps 12 (P)  20  -MC      Time 12 (P)  5 sec hold   -MC        User Key  (r) = Recorded By, (t) = Taken By, (c) = Cosigned By    Initials Name Provider Type     Sarah Farnsworth                                PT OP Goals     Row Name 10/11/18 1100          PT Short Term Goals    STG Date to Achieve (P)  10/19/18  -     STG 1 (P)  Patient will be independent with HEP  -     STG 1 Progress (P)  Ongoing  -     STG 2 (P)  Patient will imporve oswestry by 15 %   -     STG 2 Progress (P)  Progressing  -     STG 3 (P)  Patient will report subjective improvement of 30 %   -     STG 3 Progress (P)  Met  -     STG 4 (P)  Patient will have decreased " left radicular symtoms  -     STG 4 Progress (P)  Progressing  -        Long Term Goals    LTG Date to Achieve (P)  10/19/18  -     LTG 1 (P)  Patient will improve oswestry score to less than 20 % disability  -     LTG 1 Progress (P)  Progressing  -     LTG 2 (P)  Patient will have negative neural tension testing SLR/PKB on left  -     LTG 2 Progress (P)  Progressing  -     LTG 3 (P)  Patient will improve arom testing of lumbar spine to WFL  -     LTG 3 Progress (P)  Progressing  -     LTG 4 (P)  Patient will report subjective improvement of 50 %   -     LTG 4 Progress (P)  Met  -     LTG 5 (P)  Pt will improve neck AROM by 10 deg in each plane from eval  -     LTG 5 Progress (P)  New  -        Time Calculation    PT Goal Re-Cert Due Date (P)  10/19/18  -       User Key  (r) = Recorded By, (t) = Taken By, (c) = Cosigned By    Initials Name Provider Type     Sarah Farnsworth           Therapy Education  Given: (P) HEP  How Provided: (P) Verbal  Provided to: (P) Patient  Level of Understanding: (P) Verbalized              Time Calculation:   Start Time: (P) 1101  Stop Time: (P) 1144  Time Calculation (min): (P) 43 min  Therapy Suggested Charges     Code   Minutes Charges    None           Therapy Charges for Today     Code Description Service Date Service Provider Modifiers Qty    [id number] HC PT THER PROC EA 15 MIN 10/11/2018 Sarah Farnsworth GP 3                    Sarah Farnsworth  10/11/2018

## 2018-10-12 ENCOUNTER — APPOINTMENT (OUTPATIENT)
Dept: PHYSICAL THERAPY | Facility: HOSPITAL | Age: 48
End: 2018-10-12

## 2018-10-16 ENCOUNTER — HOSPITAL ENCOUNTER (OUTPATIENT)
Dept: PHYSICAL THERAPY | Facility: HOSPITAL | Age: 48
Setting detail: THERAPIES SERIES
Discharge: HOME | End: 2018-10-16

## 2018-10-16 DIAGNOSIS — M54.40 ACUTE LOW BACK PAIN WITH SCIATICA, SCIATICA LATERALITY UNSPECIFIED, UNSPECIFIED BACK PAIN LATERALITY: ICD-10-CM

## 2018-10-16 DIAGNOSIS — M54.2 CERVICALGIA: Primary | ICD-10-CM

## 2018-10-16 PROCEDURE — 97110 THERAPEUTIC EXERCISES: CPT

## 2018-10-16 NOTE — THERAPY PROGRESS REPORT/RE-CERT
Outpatient Physical Therapy Ortho Progress Note  [facility]     Patient Name: Maricarmen Ferris  : 1970  MRN: [id number]  Today's Date: 10/16/2018      Visit Date: 10/16/2018   Reports 60% of improvement.  Attended 15/23 visits.  Insurance available: 6 visits 10/1-10/31   Next MD appt: [id number]  Recertification: 2018.    Patient Active Problem List   Diagnosis   • Chronic cholecystitis   • Umbilical hernia without obstruction and without gangrene   • Abdominal pain   • Abdominal fluid collection   • Intra-hepatic bile leak   • Abnormal cardiovascular function study   • Chest pain   • Morbid obesity with BMI of 50.0-59.9, adult (CMS/[facility])   • Restrictive lung disease secondary to obesity        Past Medical History:   Diagnosis Date   • Acid reflux    • Anxiety    • Depressed    • Hard to intubate    • Hyperlipidemia    • Hypertension    • Sleep apnea         Past Surgical History:   Procedure Laterality Date   • ABDOMINAL SURGERY     • CARDIAC CATHETERIZATION N/A 2018    Procedure: Left Heart Cath 2018 @ 9;00;  Surgeon: Kuldip Frank MD;  Location: [facility] INVASIVE LOCATION;  Service: Cardiology   • CYSTOSCOPY     • ENDOMETRIAL ABLATION     • LAPAROSCOPIC TUBAL LIGATION     • ID ERCP DX COLLECTION SPECIMEN BRUSHING/WASHING Left 2017    Procedure: ENDOSCOPIC RETROGRADE CHOLANGIOPANCREATOGRAPHY;  Surgeon: Samuel Redding DO;  Location: [facility] ENDOSCOPY;  Service: Gastroenterology   • ID LAP,CHOLECYSTECTOMY N/A 2017    Procedure: CHOLECYSTECTOMY LAPAROSCOPIC, also umbillical hernia repair;  Surgeon: Pk العلي MD;  Location: [facility] OR;  Service: General       Visit Dx:     ICD-10-CM ICD-9-CM   1. Cervicalgia M54.2 723.1   2. Acute low back pain with sciatica, sciatica laterality unspecified, unspecified back pain laterality M54.40 724.2     724.3                 PT Ortho     Row Name 10/16/18 0800       Subjective Comments    Subjective Comments The pt returns  to therapy today for reassessment. The pt states she went to see her neurologist yesterday and he is wanting her reassessment faxed over to him today. She states he wanted assessment measures sent to him yesterday prior to her appointment. She states her f/u w/ him was supposed to be Thursday of this week but it got moved up. She states she can tell therapy is helping some when she is here. Pt states therapy helps for a couple days after therapy, but as the week progresses the pain comes back. The pt states she is currently seeing a psychiatrist. The pt states she has been having a lot of emotional break downs. The pt reports she has been gaining a lot of weight in the past year and she has recently gained 22 pounds over the past 2 weeks. The pt states she feels as though something is majorly wrong w/ her health. She states she has multiple medications she is taking and that are being switched around on her and she wonders if this is attributing to her pain. The pt states she has a PCP appointment today that requires her to leave therapy early today. She consents to reassess.   -MW       Subjective Pain    Able to rate subjective pain? yes  -MW       Posture/Observations    Posture/Observations Comments Pt has flattened Lordosis, severely increased Kyphosis, severe forward head and rounded shoulders.   -MW       Myotomal Screen- Lower Quarter Clearing    Hip flexion (L2) Bilateral:;5 (Normal)  -MW    Knee extension (L3) Bilateral:;5 (Normal)  -MW    Ankle DF (L4) Bilateral:;5 (Normal)  -MW    Knee flexion (S2) Bilateral:;5 (Normal)  -MW       General ROM    GENERAL ROM COMMENTS AROM cervical ext and bilat lat flexion cause pain.  -MW       Head/Neck/Trunk    Neck Extension AROM 30  -MW    Neck Flexion AROM 45  -MW    Neck Lt Lateral Flexion AROM 35  -MW    Neck Rt Lateral Flexion AROM 35  -MW    Neck Lt Rotation AROM 45  -MW    Neck Rt Rotation AROM 35  -MW    Head/Neck/Trunk Comments Lumbar flexion- pt touches  patellas, lumbar extension to neutral (completely limited), L SB- pt able to move fingertips 1 in, R SB- pt able to move fingertips 1 in, B lumbar rotation 50% limited  -MW       MMT (Manual Muscle Testing)    General MMT Comments B UE MMT WFL  -MW       Gait/Stairs Assessment/Training    Comment (Gait/Stairs) Gait moderately antalgic w/ increased forward trunk lean  -MW      User Key  (r) = Recorded By, (t) = Taken By, (c) = Cosigned By    Initials Name Provider Type    MW Bebe Griffin, PT Physical Therapist                                  PT OP Goals     Row Name 10/16/18 1000          PT Short Term Goals    STG Date to Achieve 11/05/18  -MW     STG 1 Patient will be independent with HEP  -MW     STG 1 Progress Ongoing  -MW     STG 2 Patient will imporve oswestry by 15 %   -MW     STG 2 Progress Not Met  -MW     STG 3 Patient will report subjective improvement of 30 %   -MW     STG 3 Progress Met  -MW     STG 4 Patient will have decreased left radicular symtoms  -MW     STG 4 Progress Not Met  -MW        Long Term Goals    LTG Date to Achieve 11/05/18  -MW     LTG 1 Patient will improve oswestry score to less than 20 % disability  -MW     LTG 1 Progress Not Met  -MW     LTG 2 Patient will have negative neural tension testing SLR/PKB on left  -MW     LTG 2 Progress Not Met  -MW     LTG 3 Patient will improve arom testing of lumbar spine to WFL  -MW     LTG 3 Progress Not Met  -MW     LTG 4 Patient will report subjective improvement of 50 %   -MW     LTG 4 Progress Met  -MW     LTG 5 Pt will improve neck AROM by 10 deg in each plane from eval  -MW     LTG 5 Progress Not Met  -MW        Time Calculation    PT Goal Re-Cert Due Date 11/05/18  -MW       User Key  (r) = Recorded By, (t) = Taken By, (c) = Cosigned By    Initials Name Provider Type    MW Bebe Griffin, PT Physical Therapist                PT Assessment/Plan     Row Name 10/16/18 0900          PT Assessment    Functional Limitations Impaired  gait;Limitation in home management;Limitations in community activities;Limitations in functional capacity and performance;Performance in self-care ADL  -MW     Impairments Gait;Joint mobility;Muscle strength;Pain;Poor body mechanics;Posture;Range of motion  -MW     Assessment Comments The pt presented for reassess today. The pt has made subtle LE strength improvements; however, her overall objective improvement is very limited compared to last reassessment. The pt continues to have severe pain, decreased lumbar and cervical AROM, and difficulty w/ daily functional activities. The pt seems to have made some subjective improvement w/ rehab over the past month. The pt would benefit from continued PT services to address her remaining deficits. The pt has limited yearly PT visit insurance coverage. Pt expressing concern this visit in regards to not wanting to use up all of her PT coverage. Pt electing to be placed on hold w/ PT to attempt self-management of her sxs. The pt is to f/u w/ PT PRN and to schedule f/u w/in 30 days if needed. The pt voiced understanding of this and her HEP. It is my impression it is w/in the pt's best interest to be placed on hold at this time secondary to limited coverage and pt's multiple health issues going on at this time.   -MW        PT Plan    PT Plan Comments Holding PT   -       User Key  (r) = Recorded By, (t) = Taken By, (c) = Cosigned By    Initials Name Provider Type    Bebe Jasmine PT Physical Therapist                  Exercises     Row Name 10/16/18 0800             Subjective Comments    Subjective Comments The pt returns to therapy today for reassessment. The pt states she went to see her neurologist yesterday and he is wanting her reassessment faxed over to him today. She states he wanted assessment measures sent to him yesterday prior to her appointment. She states her f/u w/ him was supposed to be Thursday of this week but it got moved up. She states she can tell  therapy is helping some when she is here. Pt states therapy helps for a couple days after therapy, but as the week progresses the pain comes back. The pt states she is currently seeing a psychiatrist. The pt states she has been having a lot of emotional break downs. The pt reports she has been gaining a lot of weight in the past year and she has recently gained 22 pounds over the past 2 weeks. The pt states she feels as though something is majorly wrong w/ her health. She states she has multiple medications she is taking and that are being switched around on her and she wonders if this is attributing to her pain. The pt states she has a PCP appointment today that requires her to leave therapy early today. She consents to reassess.    -MW         Subjective Pain    Able to rate subjective pain? yes  -MW         Exercise 1    Exercise Name 1 POC discussion/HEP review  -MW      Time 1 10'  -MW         Exercise 2    Exercise Name 2 AROM lumbar and cervical spine  -MW      Time 2 5'  -MW        User Key  (r) = Recorded By, (t) = Taken By, (c) = Cosigned By    Initials Name Provider Type    MW Bebe Griffin PT Physical Therapist                           Modified Oswestry  Modified Oswestry Score/Comments: 33/50      Time Calculation:         Start Time: 0845  Stop Time: 0906  Time Calculation (min): 21 min  PT Non-Billable Time (min): 6 min  Total Timed Code Minutes- PT: 15 minute(s) (Pt left early to attend PCP appointment.)    Therapy Charges for Today     Code Description Service Date Service Provider Modifiers Qty    86804705425 HC PT THER PROC EA 15 MIN 10/16/2018 Bebe Griffin PT GP 1                   Bebe Griffin PT  10/16/2018

## 2018-10-18 ENCOUNTER — APPOINTMENT (OUTPATIENT)
Dept: PHYSICAL THERAPY | Facility: HOSPITAL | Age: 48
End: 2018-10-18

## 2018-10-29 ENCOUNTER — OFFICE VISIT (OUTPATIENT)
Dept: GASTROENTEROLOGY | Facility: CLINIC | Age: 48
End: 2018-10-29

## 2018-10-29 VITALS
DIASTOLIC BLOOD PRESSURE: 64 MMHG | HEIGHT: 65 IN | WEIGHT: 293 LBS | SYSTOLIC BLOOD PRESSURE: 122 MMHG | BODY MASS INDEX: 48.82 KG/M2 | HEART RATE: 87 BPM

## 2018-10-29 DIAGNOSIS — R11.0 NAUSEA: ICD-10-CM

## 2018-10-29 DIAGNOSIS — K58.2 IRRITABLE BOWEL SYNDROME WITH BOTH CONSTIPATION AND DIARRHEA: ICD-10-CM

## 2018-10-29 DIAGNOSIS — R10.10 PAIN OF UPPER ABDOMEN: Primary | ICD-10-CM

## 2018-10-29 DIAGNOSIS — K21.00 GASTROESOPHAGEAL REFLUX DISEASE WITH ESOPHAGITIS: ICD-10-CM

## 2018-10-29 DIAGNOSIS — R63.5 WEIGHT GAIN, ABNORMAL: ICD-10-CM

## 2018-10-29 DIAGNOSIS — R13.10 DYSPHAGIA, UNSPECIFIED TYPE: ICD-10-CM

## 2018-10-29 PROCEDURE — 99214 OFFICE O/P EST MOD 30 MIN: CPT | Performed by: PHYSICIAN ASSISTANT

## 2018-10-29 RX ORDER — DEXTROSE AND SODIUM CHLORIDE 5; .45 G/100ML; G/100ML
30 INJECTION, SOLUTION INTRAVENOUS CONTINUOUS PRN
Status: CANCELLED | OUTPATIENT
Start: 2018-11-19

## 2018-10-30 ENCOUNTER — APPOINTMENT (OUTPATIENT)
Dept: LAB | Facility: HOSPITAL | Age: 48
End: 2018-10-30

## 2018-10-30 LAB
T4 FREE SERPL-MCNC: 1.03 NG/DL (ref 0.78–2.19)
TSH SERPL DL<=0.05 MIU/L-ACNC: 2.61 MIU/ML (ref 0.46–4.68)

## 2018-10-30 PROCEDURE — 82247 BILIRUBIN TOTAL: CPT | Performed by: PHYSICIAN ASSISTANT

## 2018-10-30 PROCEDURE — 82465 ASSAY BLD/SERUM CHOLESTEROL: CPT | Performed by: PHYSICIAN ASSISTANT

## 2018-10-30 PROCEDURE — 86003 ALLG SPEC IGE CRUDE XTRC EA: CPT | Performed by: PHYSICIAN ASSISTANT

## 2018-10-30 PROCEDURE — 83010 ASSAY OF HAPTOGLOBIN QUANT: CPT | Performed by: PHYSICIAN ASSISTANT

## 2018-10-30 PROCEDURE — 82947 ASSAY GLUCOSE BLOOD QUANT: CPT | Performed by: PHYSICIAN ASSISTANT

## 2018-10-30 PROCEDURE — 84481 FREE ASSAY (FT-3): CPT | Performed by: PHYSICIAN ASSISTANT

## 2018-10-30 PROCEDURE — 86008 ALLG SPEC IGE RECOMB EA: CPT | Performed by: PHYSICIAN ASSISTANT

## 2018-10-30 PROCEDURE — 36415 COLL VENOUS BLD VENIPUNCTURE: CPT | Performed by: PHYSICIAN ASSISTANT

## 2018-10-30 PROCEDURE — 84460 ALANINE AMINO (ALT) (SGPT): CPT | Performed by: PHYSICIAN ASSISTANT

## 2018-10-30 PROCEDURE — 84450 TRANSFERASE (AST) (SGOT): CPT | Performed by: PHYSICIAN ASSISTANT

## 2018-10-30 PROCEDURE — 80074 ACUTE HEPATITIS PANEL: CPT | Performed by: PHYSICIAN ASSISTANT

## 2018-10-30 PROCEDURE — 83516 IMMUNOASSAY NONANTIBODY: CPT | Performed by: PHYSICIAN ASSISTANT

## 2018-10-30 PROCEDURE — 84478 ASSAY OF TRIGLYCERIDES: CPT | Performed by: PHYSICIAN ASSISTANT

## 2018-10-30 PROCEDURE — 84439 ASSAY OF FREE THYROXINE: CPT | Performed by: PHYSICIAN ASSISTANT

## 2018-10-30 PROCEDURE — 82172 ASSAY OF APOLIPOPROTEIN: CPT | Performed by: PHYSICIAN ASSISTANT

## 2018-10-30 PROCEDURE — 84443 ASSAY THYROID STIM HORMONE: CPT | Performed by: PHYSICIAN ASSISTANT

## 2018-10-30 PROCEDURE — 82977 ASSAY OF GGT: CPT | Performed by: PHYSICIAN ASSISTANT

## 2018-10-30 PROCEDURE — 83883 ASSAY NEPHELOMETRY NOT SPEC: CPT | Performed by: PHYSICIAN ASSISTANT

## 2018-10-31 LAB
GLIADIN PEPTIDE IGA SER-ACNC: 5 UNITS (ref 0–19)
GLIADIN PEPTIDE IGG SER-ACNC: 3 UNITS (ref 0–19)
HAV IGM SERPL QL IA: NEGATIVE
HBV CORE IGM SERPL QL IA: NEGATIVE
HBV SURFACE AG SERPL QL IA: NEGATIVE
HCV AB SER DONR QL: NEGATIVE
T3FREE SERPL-MCNC: 3.2 PG/ML (ref 2–4.4)
TTG IGA SER-ACNC: <2 U/ML (ref 0–3)
TTG IGG SER-ACNC: 4 U/ML (ref 0–5)

## 2018-11-01 LAB
A2 MACROGLOB SERPL-MCNC: 169 MG/DL (ref 110–276)
ALT SERPL W P-5'-P-CCNC: 19 IU/L (ref 0–40)
APO A-I SERPL-MCNC: 159 MG/DL (ref 116–209)
AST SERPL W P-5'-P-CCNC: 26 IU/L (ref 0–40)
BILIRUB SERPL-MCNC: 0.2 MG/DL (ref 0–1.2)
CHOLEST SERPL-MCNC: 184 MG/DL (ref 100–199)
FIBROSIS SCORING:: ABNORMAL
FIBROSIS STAGE SERPL QL: ABNORMAL
GGT SERPL-CCNC: 22 IU/L (ref 0–60)
GLUCOSE SERPL-MCNC: 107 MG/DL (ref 65–99)
HAPTOGLOB SERPL-MCNC: 168 MG/DL (ref 34–200)
INTERPRETATIONS: (REFERENCE): ABNORMAL
LABORATORY COMMENT REPORT: ABNORMAL
LIMITATIONS: (REFERENCE): ABNORMAL
LIVER FIBR SCORE SERPL CALC.FIBROSURE: 0.04 (ref 0–0.21)
NASH GRADE (REFERENCE): ABNORMAL
NASH SCORE (REFERENCE): 0.5
NASH SCORING (REFERENCE): ABNORMAL
STEATOSIS GRADE (REFERENCE): ABNORMAL
STEATOSIS GRADING (REFERENCE): ABNORMAL
STEATOSIS SCORE (REFERENCE): 0.49 (ref 0–0.3)
TRIGL SERPL-MCNC: 117 MG/DL (ref 0–149)
WEIGHT: (REFERENCE): 306 LBS

## 2018-11-02 LAB
ALPHA GAL IGE: 0.1 KU/L
BEEF IGE QN: <0.1 KU/L
LAMB IGE QN: <0.1 KU/L
Lab: 0
PORK IGE: <0.1 KU/L

## 2018-11-03 LAB
CALIF WALNUT POLN IGE QN: <0.1 KU/L
CODFISH IGE QN: <0.1 KU/L
CONV CLASS DESCRIPTION: ABNORMAL
COW MILK IGE QN: <0.1 KU/L
EGG WHITE IGE QN: <0.1 KU/L
GLUTEN IGE QN: <0.1 KU/L
HAZELNUT IGE QN: <0.1 KU/L
PEANUT IGE QN: <0.1 KU/L
SCALLOP IGE QN: <0.1 KU/L
SESAME SEED IGE: <0.1 KU/L
SHRIMP IGE: 0.24 KU/L
SOYBEAN IGE QN: <0.1 KU/L
WHEAT IGE QN: <0.1 KU/L

## 2018-11-06 NOTE — PROGRESS NOTES
Pulmonary Office Follow-up    Maricarmen Ferris is seen in the office today for   Chief Complaint   Patient presents with   • 4 week follow up   • Shortness of Breath   .    Subjective     History of Present Illness  Maricarmen Ferris is a 48 y.o. female with a PMH significant for morbid obesity, ELSIE on CPAP, HTN, GERD, and depression who presents for evaluation of dyspnea.  She was last seen on 10/8/18, at which time I recommended starting albuterol as needed and encouraged her to remain compliant with CPAP.  She reports that she did try the albuterol and has perceived some benefit with dyspnea, but primarily when she has wheezing.  Patient has used up to 2 times a day, but states there are some days when she does not use it at all.  For the most part, she is using it almost every day though.  Patient states that she still feels that a significant component of her breathing issues are related to her obesity.  She is nearing the end of her six-month wait and and has completed her preoperative visit.  Patient is hoping to get scheduled with the bariatric surgeon in the next month.  She denies any allergies, cough, or chest pain.  Patient has been having ongoing issues with abdominal pain so she is scheduled to undergo upper and lower endoscopy soon.    Review of Systems: History obtained from chart review and the patient.  Review of Systems   Constitutional: Positive for fatigue. Negative for fever and unexpected weight change.   HENT: Positive for voice change. Negative for ear pain, rhinorrhea and sore throat.    Respiratory: Positive for shortness of breath and wheezing.    Cardiovascular: Negative for chest pain, palpitations and leg swelling.   Gastrointestinal: Positive for abdominal pain.     As described in the HPI. Otherwise, remainder of ROS (14 systems) were negative.    Patient Active Problem List   Diagnosis   • Chronic cholecystitis   • Umbilical hernia without obstruction and without  "gangrene   • Abdominal pain   • Abdominal fluid collection   • Intra-hepatic bile leak   • Abnormal cardiovascular function study   • Chest pain   • Morbid obesity with BMI of 50.0-59.9, adult (CMS/HCC)   • Restrictive lung disease secondary to obesity   • Nausea   • Weight gain, abnormal   • Pain of upper abdomen   • Gastroesophageal reflux disease with esophagitis   • Irritable bowel syndrome with both constipation and diarrhea   • Dysphagia   • Mild persistent asthma without complication       Medications, Allergies, Social, and Family Histories reviewed as per EMR.         Objective     Blood pressure 118/74, pulse 77, height 165.1 cm (65\"), weight (!) 140 kg (308 lb 8 oz), SpO2 98 %, not currently breastfeeding.  Physical Exam   Constitutional: She is oriented to person, place, and time. Vital signs are normal. She appears well-developed and well-nourished.   Morbidly obese   HENT:   Head: Normocephalic and atraumatic.   Nose: Nose normal.   Mouth/Throat: Uvula is midline, oropharynx is clear and moist and mucous membranes are normal.   Mallampati 4, tongue ring   Eyes: Pupils are equal, round, and reactive to light. Conjunctivae, EOM and lids are normal.   Neck: Trachea normal and normal range of motion. No tracheal tenderness present. No thyroid mass present.   Cardiovascular: Normal rate, regular rhythm and S1 normal.  PMI is not displaced.  Exam reveals distant heart sounds. Exam reveals no gallop.    No murmur heard.  Pulmonary/Chest: Effort normal and breath sounds normal. No respiratory distress. She has no decreased breath sounds. She has no wheezes. She has no rhonchi. Chest wall is not dull to percussion. She exhibits no tenderness.   Abdominal: Soft. Normal appearance and bowel sounds are normal. There is no hepatomegaly. There is no tenderness.   Obese   Musculoskeletal:   Normal gait, trace BLE edema     Vascular Status -  Her right foot exhibits abnormal foot edema. Her left foot exhibits abnormal " foot edema.  Lymphadenopathy:        Head (right side): No submandibular adenopathy present.        Head (left side): No submandibular adenopathy present.     She has no cervical adenopathy.        Right: No supraclavicular adenopathy present.        Left: No supraclavicular adenopathy present.   Neurological: She is alert and oriented to person, place, and time.   Skin: Skin is warm and dry. No rash noted. No cyanosis. Nails show no clubbing.   Psychiatric: She has a normal mood and affect. Her speech is normal and behavior is normal. Judgment normal.   Nursing note and vitals reviewed.      PFTs: 10/8/18 (independently reviewed and interpreted by me)  Ratio 85  FVC 2.5/ 67%  FEV1 2.12/ 71%  TLC 3.59/ 69%  DLCO 18.64/ 72%  Poor effort.  Mild restriction with no significant bronchodilator response.  Mildly reduced diffusing capacity, likely due to obesity.  No comparative data available.       Assessment/Plan     Maricarmen was seen today for 4 week follow up and shortness of breath.    Diagnoses and all orders for this visit:    Dyspnea on exertion    Mild persistent asthma without complication  -     Mometasone Furoate 100 MCG/ACT aerosol; Inhale 2 puffs 2 (Two) Times a Day.    Restrictive lung disease secondary to obesity    Morbid obesity with BMI of 50.0-59.9, adult (CMS/[facility])         Discussion/ Recommendations:   Given that she has perceived benefit with albuterol and is using it almost daily, I think it is worth escalating to an ICS to treat for asthma.  I do think that her weight is contributing to her dyspnea and it will likely improve with significant weight loss.    -Start Asmanex 100, 2 puffs twice daily.  Sample provided and instructed on use.  Counseled on possible side effects as well as instructed to rinse mouth well after use.  -Continue albuterol as needed for dyspnea or wheeze.  -Encourage CPAP compliance, especially if she is hospitalized.  -Encouraged ongoing efforts with weight loss through diet  and exercise.  Preop evaluation already forwarded to Dr. Sunshine' office.  -Annual flu vaccine.    Patient's Body mass index is 51.34 kg/m². BMI is above normal parameters. Recommendations include: exercise counseling.         Return in about 4 weeks (around 12/5/2018) for Recheck asthma.      Thank you for allowing me to participate in the care of Maricarmen Ferris. Please do not hesitate to contact me with any questions.         This document has been electronically signed by Rachel Enciso MD on November 7, 2018 2:20 PM      Dictated using Dragon

## 2018-11-07 ENCOUNTER — OFFICE VISIT (OUTPATIENT)
Dept: PULMONOLOGY | Facility: CLINIC | Age: 48
End: 2018-11-07

## 2018-11-07 VITALS
DIASTOLIC BLOOD PRESSURE: 74 MMHG | HEART RATE: 77 BPM | BODY MASS INDEX: 48.82 KG/M2 | OXYGEN SATURATION: 98 % | HEIGHT: 65 IN | SYSTOLIC BLOOD PRESSURE: 118 MMHG | WEIGHT: 293 LBS

## 2018-11-07 DIAGNOSIS — R06.09 DYSPNEA ON EXERTION: Primary | ICD-10-CM

## 2018-11-07 DIAGNOSIS — E66.9 RESTRICTIVE LUNG DISEASE SECONDARY TO OBESITY: ICD-10-CM

## 2018-11-07 DIAGNOSIS — J98.4 RESTRICTIVE LUNG DISEASE SECONDARY TO OBESITY: ICD-10-CM

## 2018-11-07 DIAGNOSIS — E66.01 MORBID OBESITY WITH BMI OF 50.0-59.9, ADULT (HCC): ICD-10-CM

## 2018-11-07 DIAGNOSIS — J45.30 MILD PERSISTENT ASTHMA WITHOUT COMPLICATION: ICD-10-CM

## 2018-11-07 PROCEDURE — 99214 OFFICE O/P EST MOD 30 MIN: CPT | Performed by: INTERNAL MEDICINE

## 2018-11-19 ENCOUNTER — HOSPITAL ENCOUNTER (OUTPATIENT)
Facility: HOSPITAL | Age: 48
Setting detail: HOSPITAL OUTPATIENT SURGERY
Discharge: HOME | End: 2018-11-19
Attending: INTERNAL MEDICINE | Admitting: INTERNAL MEDICINE

## 2018-11-19 ENCOUNTER — ANESTHESIA (OUTPATIENT)
Dept: GASTROENTEROLOGY | Facility: HOSPITAL | Age: 48
End: 2018-11-19

## 2018-11-19 ENCOUNTER — ANESTHESIA EVENT (OUTPATIENT)
Dept: GASTROENTEROLOGY | Facility: HOSPITAL | Age: 48
End: 2018-11-19

## 2018-11-19 VITALS
WEIGHT: 293 LBS | SYSTOLIC BLOOD PRESSURE: 117 MMHG | DIASTOLIC BLOOD PRESSURE: 60 MMHG | TEMPERATURE: 96.8 F | OXYGEN SATURATION: 97 % | BODY MASS INDEX: 48.82 KG/M2 | HEIGHT: 65 IN | RESPIRATION RATE: 18 BRPM | HEART RATE: 75 BPM

## 2018-11-19 DIAGNOSIS — R13.10 DYSPHAGIA, UNSPECIFIED TYPE: ICD-10-CM

## 2018-11-19 DIAGNOSIS — R11.0 NAUSEA: ICD-10-CM

## 2018-11-19 DIAGNOSIS — K58.2 IRRITABLE BOWEL SYNDROME WITH BOTH CONSTIPATION AND DIARRHEA: ICD-10-CM

## 2018-11-19 DIAGNOSIS — R63.5 WEIGHT GAIN, ABNORMAL: ICD-10-CM

## 2018-11-19 DIAGNOSIS — K21.00 GASTROESOPHAGEAL REFLUX DISEASE WITH ESOPHAGITIS: ICD-10-CM

## 2018-11-19 DIAGNOSIS — R10.10 PAIN OF UPPER ABDOMEN: ICD-10-CM

## 2018-11-19 PROCEDURE — 88342 IMHCHEM/IMCYTCHM 1ST ANTB: CPT | Performed by: INTERNAL MEDICINE

## 2018-11-19 PROCEDURE — 88305 TISSUE EXAM BY PATHOLOGIST: CPT | Performed by: PATHOLOGY

## 2018-11-19 PROCEDURE — 43239 EGD BIOPSY SINGLE/MULTIPLE: CPT | Performed by: INTERNAL MEDICINE

## 2018-11-19 PROCEDURE — 25010000002 PROPOFOL 10 MG/ML EMULSION: Performed by: NURSE ANESTHETIST, CERTIFIED REGISTERED

## 2018-11-19 PROCEDURE — 45380 COLONOSCOPY AND BIOPSY: CPT | Performed by: INTERNAL MEDICINE

## 2018-11-19 PROCEDURE — 88305 TISSUE EXAM BY PATHOLOGIST: CPT | Performed by: INTERNAL MEDICINE

## 2018-11-19 PROCEDURE — 25010000002 MIDAZOLAM PER 1 MG: Performed by: NURSE ANESTHETIST, CERTIFIED REGISTERED

## 2018-11-19 PROCEDURE — 88342 IMHCHEM/IMCYTCHM 1ST ANTB: CPT | Performed by: PATHOLOGY

## 2018-11-19 RX ORDER — DEXAMETHASONE SODIUM PHOSPHATE 4 MG/ML
8 INJECTION, SOLUTION INTRA-ARTICULAR; INTRALESIONAL; INTRAMUSCULAR; INTRAVENOUS; SOFT TISSUE ONCE AS NEEDED
Status: DISCONTINUED | OUTPATIENT
Start: 2018-11-19 | End: 2018-11-19 | Stop reason: HOSPADM

## 2018-11-19 RX ORDER — ONDANSETRON 2 MG/ML
4 INJECTION INTRAMUSCULAR; INTRAVENOUS ONCE AS NEEDED
Status: DISCONTINUED | OUTPATIENT
Start: 2018-11-19 | End: 2018-11-19 | Stop reason: HOSPADM

## 2018-11-19 RX ORDER — DEXTROSE AND SODIUM CHLORIDE 5; .45 G/100ML; G/100ML
30 INJECTION, SOLUTION INTRAVENOUS CONTINUOUS PRN
Status: DISCONTINUED | OUTPATIENT
Start: 2018-11-19 | End: 2018-11-19 | Stop reason: HOSPADM

## 2018-11-19 RX ORDER — LIDOCAINE HYDROCHLORIDE 10 MG/ML
INJECTION, SOLUTION INFILTRATION; PERINEURAL AS NEEDED
Status: DISCONTINUED | OUTPATIENT
Start: 2018-11-19 | End: 2018-11-19 | Stop reason: SURG

## 2018-11-19 RX ORDER — MIDAZOLAM HYDROCHLORIDE 1 MG/ML
INJECTION INTRAMUSCULAR; INTRAVENOUS AS NEEDED
Status: DISCONTINUED | OUTPATIENT
Start: 2018-11-19 | End: 2018-11-19 | Stop reason: SURG

## 2018-11-19 RX ORDER — PROMETHAZINE HYDROCHLORIDE 25 MG/1
25 TABLET ORAL ONCE AS NEEDED
Status: DISCONTINUED | OUTPATIENT
Start: 2018-11-19 | End: 2018-11-19 | Stop reason: HOSPADM

## 2018-11-19 RX ORDER — PROMETHAZINE HYDROCHLORIDE 25 MG/1
25 SUPPOSITORY RECTAL ONCE AS NEEDED
Status: DISCONTINUED | OUTPATIENT
Start: 2018-11-19 | End: 2018-11-19 | Stop reason: HOSPADM

## 2018-11-19 RX ORDER — PROMETHAZINE HYDROCHLORIDE 25 MG/ML
12.5 INJECTION, SOLUTION INTRAMUSCULAR; INTRAVENOUS ONCE AS NEEDED
Status: DISCONTINUED | OUTPATIENT
Start: 2018-11-19 | End: 2018-11-19 | Stop reason: HOSPADM

## 2018-11-19 RX ORDER — PROPOFOL 10 MG/ML
VIAL (ML) INTRAVENOUS AS NEEDED
Status: DISCONTINUED | OUTPATIENT
Start: 2018-11-19 | End: 2018-11-19 | Stop reason: SURG

## 2018-11-19 RX ADMIN — PROPOFOL 20 MG: 10 INJECTION, EMULSION INTRAVENOUS at 15:44

## 2018-11-19 RX ADMIN — PROPOFOL 20 MG: 10 INJECTION, EMULSION INTRAVENOUS at 15:49

## 2018-11-19 RX ADMIN — MIDAZOLAM HYDROCHLORIDE 2 MG: 2 INJECTION, SOLUTION INTRAMUSCULAR; INTRAVENOUS at 15:38

## 2018-11-19 RX ADMIN — PROPOFOL 60 MG: 10 INJECTION, EMULSION INTRAVENOUS at 15:42

## 2018-11-19 RX ADMIN — LIDOCAINE HYDROCHLORIDE 100 MG: 10 INJECTION, SOLUTION INFILTRATION; PERINEURAL at 15:42

## 2018-11-19 RX ADMIN — PROPOFOL 20 MG: 10 INJECTION, EMULSION INTRAVENOUS at 15:48

## 2018-11-19 RX ADMIN — PROPOFOL 50 MG: 10 INJECTION, EMULSION INTRAVENOUS at 15:51

## 2018-11-19 RX ADMIN — PROPOFOL 50 MG: 10 INJECTION, EMULSION INTRAVENOUS at 15:55

## 2018-11-19 RX ADMIN — DEXTROSE AND SODIUM CHLORIDE 30 ML/HR: 5; 450 INJECTION, SOLUTION INTRAVENOUS at 15:36

## 2018-11-19 RX ADMIN — PROPOFOL 20 MG: 10 INJECTION, EMULSION INTRAVENOUS at 15:46

## 2018-11-19 NOTE — ANESTHESIA POSTPROCEDURE EVALUATION
Patient: Maricarmen Ferris    Procedure Summary     Date:  11/19/18 Room / Location:  [facility] ENDOSCOPY 1 / [facility] ENDOSCOPY    Anesthesia Start:  1540 Anesthesia Stop:  1558    Procedures:       ESOPHAGOGASTRODUODENOSCOPYwith dilation (N/A )      COLONOSCOPY (N/A ) Diagnosis:       Nausea      Weight gain, abnormal      Pain of upper abdomen      Gastroesophageal reflux disease with esophagitis      Irritable bowel syndrome with both constipation and diarrhea      Dysphagia, unspecified type      (Nausea [R11.0])      (Weight gain, abnormal [R63.5])      (Pain of upper abdomen [R10.10])      (Gastroesophageal reflux disease with esophagitis [K21.0])      (Irritable bowel syndrome with both constipation and diarrhea [K58.2])      (Dysphagia, unspecified type [R13.10])    Surgeon:  Bro Whitaker MD Provider:  Dannielle Velazquez CRNA    Anesthesia Type:  MAC ASA Status:  3          Anesthesia Type: MAC  Last vitals  BP   (!) 151/101 (11/19/18 1515)   Temp   98.1 °F (36.7 °C) (11/19/18 1515)   Pulse   72 (11/19/18 1515)   Resp   18 (11/19/18 1515)     SpO2   97 % (11/19/18 1515)     Post Anesthesia Care and Evaluation    Patient location during evaluation: bedside  Level of consciousness: awake  Pain score: 0  Pain management: adequate  Airway patency: patent  Anesthetic complications: No anesthetic complications  PONV Status: none  Cardiovascular status: acceptable  Respiratory status: acceptable  Hydration status: acceptable

## 2018-11-19 NOTE — ANESTHESIA PREPROCEDURE EVALUATION
Anesthesia Evaluation     Patient summary reviewed   history of anesthetic complications: difficult airway  NPO Solid Status: > 8 hours  NPO Liquid Status: > 6 hours           Airway   Mallampati: III  TM distance: >3 FB  Neck ROM: limited  Difficult intubation highly probable  Dental - normal exam     Pulmonary - normal exam   (+) asthma, sleep apnea on CPAP,   Cardiovascular - normal exam    (+) hypertension 2 medications or greater, hyperlipidemia,       Neuro/Psych  (+) psychiatric history Anxiety and Depression,     GI/Hepatic/Renal/Endo    (+) morbid obesity, GERD poorly controlled,  liver disease,     Musculoskeletal     (+) back pain,   Abdominal   (+) obese,    Substance History      OB/GYN          Other                        Anesthesia Plan    ASA 3     MAC     intravenous induction   Anesthetic plan, all risks, benefits, and alternatives have been provided, discussed and informed consent has been obtained with: patient.

## 2018-11-21 LAB
LAB AP CASE REPORT: NORMAL
LAB AP DIAGNOSIS COMMENT: NORMAL
PATH REPORT.FINAL DX SPEC: NORMAL
PATH REPORT.GROSS SPEC: NORMAL

## 2018-12-03 ENCOUNTER — OFFICE VISIT (OUTPATIENT)
Dept: GASTROENTEROLOGY | Facility: CLINIC | Age: 48
End: 2018-12-03

## 2018-12-03 VITALS
HEART RATE: 63 BPM | SYSTOLIC BLOOD PRESSURE: 132 MMHG | BODY MASS INDEX: 48.82 KG/M2 | HEIGHT: 65 IN | WEIGHT: 293 LBS | DIASTOLIC BLOOD PRESSURE: 74 MMHG

## 2018-12-03 DIAGNOSIS — K29.70 HELICOBACTER PYLORI GASTRITIS: ICD-10-CM

## 2018-12-03 DIAGNOSIS — B96.81 HELICOBACTER PYLORI GASTRITIS: ICD-10-CM

## 2018-12-03 DIAGNOSIS — R10.10 PAIN OF UPPER ABDOMEN: Primary | ICD-10-CM

## 2018-12-03 DIAGNOSIS — K76.0 FATTY LIVER: ICD-10-CM

## 2018-12-03 DIAGNOSIS — K21.00 GASTROESOPHAGEAL REFLUX DISEASE WITH ESOPHAGITIS: ICD-10-CM

## 2018-12-03 PROCEDURE — 99214 OFFICE O/P EST MOD 30 MIN: CPT | Performed by: PHYSICIAN ASSISTANT

## 2018-12-03 RX ORDER — OMEPRAZOLE 20 MG/1
20 CAPSULE, DELAYED RELEASE ORAL 2 TIMES DAILY
Qty: 30 CAPSULE | Refills: 0 | Status: SHIPPED | OUTPATIENT
Start: 2018-12-03 | End: 2018-12-27

## 2018-12-03 RX ORDER — METRONIDAZOLE 500 MG/1
500 TABLET ORAL 2 TIMES DAILY
Qty: 28 TABLET | Refills: 0 | Status: SHIPPED | OUTPATIENT
Start: 2018-12-03 | End: 2018-12-27

## 2018-12-03 RX ORDER — CLARITHROMYCIN 500 MG/1
500 TABLET, COATED ORAL 2 TIMES DAILY
Qty: 28 TABLET | Refills: 0 | Status: SHIPPED | OUTPATIENT
Start: 2018-12-03 | End: 2018-12-27

## 2018-12-03 RX ORDER — SERTRALINE HYDROCHLORIDE 100 MG/1
100 TABLET, FILM COATED ORAL DAILY
COMMUNITY
End: 2019-03-28 | Stop reason: ALTCHOICE

## 2018-12-03 NOTE — PROGRESS NOTES
Chief Complaint   Patient presents with   • Heartburn   • Nausea   • Difficulty Swallowing   • Irritable Bowel Syndrome       ENDO PROCEDURE ORDERED:    Subjective    Maricarmen Ferris is a 48 y.o. female. she is here today for follow-up.    History of Present Illness    Patient is seen on a recheck of her GERD, nausea, dysphagia, and IBS.  Last seen 10/29/18.  Patient had laboratories on 10/30/18.  Recurrent GI distress panel was positive for shrimp 0.24; the rest of the panel was negative.  Negative hepatitis diagnostic panel.  Negative alpha-gal.  Normal TSH, T3, and T4.  LAWRENCE FibroSure 0.04/F0, steatosis 0.49/S1-S2, 0.50/N1.  Glucose was 107 within the panel.         Patient underwent EGD/colonoscopy on 11/19/18 that showed esophagitis, diffuse gastritis.  Distal esophageal biopsy showed reactive change.  Antral biopsy showed chronic gastritis, positive H. pylori.  Colonoscopy showed internal and external hemorrhoids with an adequate prep.  Random colon biopsy negative.  Recommended repeat colonoscopy in 5 years.            Patient states she feels like her medications stick in her throat.  She is still having nausea.  GERD is not clearly controlled on the Protonix.  She is on the Bentyl 10 mg for her IBS symptoms.  She denies blood or mucus in the stool.  Weight is up 6.3 pounds since last visit.      A/P:  Patient with esophagitis, H. pylori positive gastritis, hemorrhoids.      Recommended high fiber diet.  Recommended that she avoid gastric irritants.  Again, reviewed the importance of dietary modification and weight loss given her fatty liver, with mild steatosis, encouraged her to avoid fructose.  She has a mild shrimp allergy and I suggested she avoid that.  She states that she is considering bariatric surgery.  I recommended a 14-day treatment for her H. pylori.  Will plan follow up in a few months as long as she is better, sooner if needed.  Further pending clinical course and the results of the  above.         The following portions of the patient's history were reviewed and updated as appropriate:   Past Medical History:   Diagnosis Date   • Acid reflux    • Anxiety    • Depressed    • Dysphagia    • GERD (gastroesophageal reflux disease)    • Hard to intubate    • Hyperlipidemia    • Hypertension    • IBS (irritable bowel syndrome)    • Nausea    • Sleep apnea      Past Surgical History:   Procedure Laterality Date   • ABDOMINAL SURGERY     • CYSTOSCOPY     • ENDOMETRIAL ABLATION     • LAPAROSCOPIC TUBAL LIGATION     • UPPER GASTROINTESTINAL ENDOSCOPY  2018     Family History   Problem Relation Age of Onset   • Diabetes Mother    • Hypertension Mother    • Cirrhosis Mother    • Hypertension Father      OB History      Para Term  AB Living    3 3 3          SAB TAB Ectopic Molar Multiple Live Births                       Allergies   Allergen Reactions   • Sulfa Antibiotics Anaphylaxis and GI Intolerance     Social History     Socioeconomic History   • Marital status:      Spouse name: Not on file   • Number of children: Not on file   • Years of education: Not on file   • Highest education level: Not on file   Tobacco Use   • Smoking status: Never Smoker   • Smokeless tobacco: Never Used   Substance and Sexual Activity   • Alcohol use: Yes     Comment: socially   • Drug use: No   • Sexual activity: Defer     Birth control/protection: Surgical       Current Outpatient Medications:   •  albuterol (PROVENTIL HFA;VENTOLIN HFA) 108 (90 Base) MCG/ACT inhaler, Inhale 2 puffs Every 4 (Four) Hours As Needed for Wheezing or Shortness of Air., Disp: 18 g, Rfl: 11  •  buPROPion (WELLBUTRIN) 100 MG tablet, Take 100 mg by mouth 2 (Two) Times a Day., Disp: , Rfl:   •  dicyclomine (BENTYL) 10 MG capsule, Take 10 mg by mouth 3 (Three) Times a Day., Disp: , Rfl: 1  •  furosemide (LASIX) 20 MG tablet, Take 20 mg by mouth Daily., Disp: , Rfl:   •  gabapentin (NEURONTIN) 100 MG capsule, Take  "100 mg by mouth 3 (Three) Times a Day., Disp: , Rfl:   •  isosorbide mononitrate (IMDUR) 30 MG 24 hr tablet, Take 30 mg by mouth Daily., Disp: , Rfl:   •  lisinopril (PRINIVIL,ZESTRIL) 20 MG tablet, Take 20 mg by mouth Daily., Disp: , Rfl:   •  methocarbamol (ROBAXIN) 500 MG tablet, Take 500 mg by mouth 2 (Two) Times a Day., Disp: , Rfl:   •  Mometasone Furoate 100 MCG/ACT aerosol, Inhale 2 puffs 2 (Two) Times a Day., Disp: 1 inhaler, Rfl: 11  •  naproxen (NAPROSYN) 500 MG tablet, Take 500 mg by mouth 2 (Two) Times a Day As Needed for Mild Pain ., Disp: , Rfl:   •  oxybutynin (DITROPAN) 5 MG tablet, Take 5 mg by mouth 2 (Two) Times a Day., Disp: , Rfl:   •  pantoprazole (PROTONIX) 40 MG EC tablet, Take 40 mg by mouth Daily., Disp: , Rfl:   •  Phentermine HCl 37.5 MG tablet dispersible, Take 1 tablet by mouth Daily., Disp: , Rfl:   •  pravastatin (PRAVACHOL) 20 MG tablet, Take 20 mg by mouth Every Other Day. At night, Disp: , Rfl:   •  promethazine (PHENERGAN) 25 MG tablet, Take 1 tablet by mouth Every 6 (Six) Hours As Needed for Nausea or Vomiting for up to 15 doses., Disp: 15 tablet, Rfl: 0  •  rOPINIRole (REQUIP) 0.25 MG tablet, Take 0.25 mg by mouth Every Night., Disp: , Rfl:   •  sertraline (ZOLOFT) 100 MG tablet, Take 100 mg by mouth Daily., Disp: , Rfl:   •  traMADol (ULTRAM) 50 MG tablet, Take 50 mg by mouth Every Night., Disp: , Rfl:   •  clarithromycin (BIAXIN) 500 MG tablet, Take 1 tablet by mouth 2 (Two) Times a Day., Disp: 28 tablet, Rfl: 0  •  metroNIDAZOLE (FLAGYL) 500 MG tablet, Take 1 tablet by mouth 2 (Two) Times a Day., Disp: 28 tablet, Rfl: 0  •  omeprazole (priLOSEC) 20 MG capsule, Take 1 capsule by mouth 2 (Two) Times a Day., Disp: 30 capsule, Rfl: 0  Review of Systems  Review of Systems       Objective    /74 (BP Location: Left arm)   Pulse 63   Ht 165.1 cm (65\")   Wt (!) 142 kg (312 lb 12.8 oz)   BMI 52.05 kg/m²   Physical Exam   Constitutional: She is oriented to person, place, and " time. She appears well-developed and well-nourished. No distress.   HENT:   Head: Normocephalic and atraumatic.   Eyes: EOM are normal. Pupils are equal, round, and reactive to light.   Neck: Normal range of motion.   Cardiovascular: Normal rate, regular rhythm and normal heart sounds.   Pulmonary/Chest: Effort normal and breath sounds normal.   Abdominal: Soft. Bowel sounds are normal. She exhibits no shifting dullness, no distension, no abdominal bruit, no ascites and no mass. There is no hepatosplenomegaly. There is tenderness. There is no rigidity, no rebound, no guarding and no CVA tenderness. No hernia. Hernia confirmed negative in the ventral area.   Obese, mild diffuse   Musculoskeletal: Normal range of motion.   Neurological: She is alert and oriented to person, place, and time.   Skin: Skin is warm and dry.   Psychiatric: She has a normal mood and affect. Her behavior is normal. Judgment and thought content normal.   Nursing note and vitals reviewed.    Assessment/Plan      1. Pain of upper abdomen    2. Gastroesophageal reflux disease with esophagitis    3. Helicobacter pylori gastritis    4. Fatty liver    .   Maricarmen was seen today for heartburn, nausea, difficulty swallowing and irritable bowel syndrome.    Diagnoses and all orders for this visit:    Pain of upper abdomen    Gastroesophageal reflux disease with esophagitis    Helicobacter pylori gastritis    Fatty liver  Comments:  F0/S1-S2N1    Other orders  -     omeprazole (priLOSEC) 20 MG capsule; Take 1 capsule by mouth 2 (Two) Times a Day.  -     clarithromycin (BIAXIN) 500 MG tablet; Take 1 tablet by mouth 2 (Two) Times a Day.  -     metroNIDAZOLE (FLAGYL) 500 MG tablet; Take 1 tablet by mouth 2 (Two) Times a Day.        Orders placed during this encounter include:  No orders of the defined types were placed in this encounter.      Medications prescribed:  New Medications Ordered This Visit   Medications   • omeprazole (priLOSEC) 20 MG capsule      Sig: Take 1 capsule by mouth 2 (Two) Times a Day.     Dispense:  30 capsule     Refill:  0   • clarithromycin (BIAXIN) 500 MG tablet     Sig: Take 1 tablet by mouth 2 (Two) Times a Day.     Dispense:  28 tablet     Refill:  0   • metroNIDAZOLE (FLAGYL) 500 MG tablet     Sig: Take 1 tablet by mouth 2 (Two) Times a Day.     Dispense:  28 tablet     Refill:  0     Discontinued Medications       Reason for Discontinue    sertraline (ZOLOFT) 50 MG tablet Discontinued by another clinician        Requested Prescriptions     Signed Prescriptions Disp Refills   • omeprazole (priLOSEC) 20 MG capsule 30 capsule 0     Sig: Take 1 capsule by mouth 2 (Two) Times a Day.   • clarithromycin (BIAXIN) 500 MG tablet 28 tablet 0     Sig: Take 1 tablet by mouth 2 (Two) Times a Day.   • metroNIDAZOLE (FLAGYL) 500 MG tablet 28 tablet 0     Sig: Take 1 tablet by mouth 2 (Two) Times a Day.       Review and/or summary of lab tests, radiology, procedures, medications. Review and summary of old records and obtaining of history. The risks and benefits of my recommendations, as well as other treatment options were discussed with the patient today. Questions were answered.    Follow-up: Return in about 3 months (around 3/3/2019), or if symptoms worsen or fail to improve.     * Surgery not found *      This document has been electronically signed by Carlo Colindres PA-C on December 3, 2018 6:22 PM      Results for orders placed or performed during the hospital encounter of 11/19/18   Tissue Pathology Exam   Result Value Ref Range    Case Report       Surgical Pathology Report                         Case: [id number]                                  Authorizing Provider:  Bro Whitaker MD        Collected:           11/19/2018 03:50 PM          Ordering Location:     [facility]             Received:            11/20/2018 08:41 AM                                 [facility] SUITES                                                      Pathologist:           Tevin Quintanilla MD                                                         Specimens:   1) - Gastric, Antrum                                                                                2) - Esophagus, Distal                                                                              3) - Large Intestine, colonic mucousa                                                      Final Diagnosis       1.  MUCOSA, ANTRUM OF STOMACH:   CHRONIC GASTRITIS.   POSITIVE FOR HELICOBACTER PYLORI (HP IMMUNOSTAIN).     2.  MUCOSA, DISTAL ESOPHAGUS:   REACTIVE CHANGE OF SQUAMOUS MUCOSA.     3.  MUCOSA, COLON:   NO SIGNIFICANT HISTOLOGIC ABNORMALITY.       Comment       Helicobacter pylori (HP) immunostain is performed (block 1) because an appropriate inflammatory milieu is present and organisms are not seen on H & E stained slides.     HP immunostain was developed and its performance characteristics determined by [facility].  It has not been cleared or approved by the U.S.Food and Drug Administration.  The FDA has determined that such clearance of approval is not necessary.  This test is used for clinical purposes.  It should not be regarded as investigational or for research.  This laboratory is certified under the Clinical Laboratory Amendments of 1988 (CLIA-88) as qualified to perform high complexity clinical laboratory testing.         Gross Description       Received for examination are 3 containers, each of which have nodular bits of white soft tissue measuring 0.3-0.5 cc in aggregate.  All specimens are embedded as labeled:  1A antrum of stomach; 2A distal esophagus; 3A mucosa of colon.      Results for orders placed or performed in visit on 10/29/18   LAWRENCE Fibrosure   Result Value Ref Range    Fibrosis Score (References) 0.04 0.00 - 0.21    Fibrosis Stage (Reference) Comment     Steatosis Score (Reference) 0.49 (H) 0.00 - 0.30    Steatosis Grade (Reference)  Comment     LAWRENCE Score (Reference) 0.50 0.25    Lawrence Grade (Reference) Comment     Height: (Reference) 65 in    Weight: (Reference) 306 LBS    Alpha 2-Macroglobulins, Qn 169 110 - 276 mg/dL    Haptoglobin 168 34 - 200 mg/dL    Apolipoprotein A-1 159 116 - 209 mg/dL    Total Bilirubin 0.2 0.0 - 1.2 mg/dL    GGT 22 0 - 60 IU/L    ALT (SGPT) 19 0 - 40 IU/L    AST (SGOT) P5P (Reference) 26 0 - 40 IU/L    Cholesterol, Total (Reference) 184 100 - 199 mg/dL    Glucose, Serum (Reference) 107 (H) 65 - 99 mg/dL    Triglycerides 117 0 - 149 mg/dL    Interpretations: (Reference) Comment     Fibrosis Scoring: Comment     Steatosis Grading (Reference) Comment     Lawrence Scoring (Reference) Comment     Limitations: (Reference) Comment     Comment (Reference) Comment    Glia(IgA / G) & TTG(IgA / G)   Result Value Ref Range    Gliadin Deamidated Peptide Ab, IgA 5 0 - 19 units    Deaminated Gliadin Ab IgG 3 0 - 19 units    Tissue Transglutaminase IgA <2 0 - 3 U/mL    Tissue Transglutaminase IgG 4 0 - 5 U/mL   Allergens (12) Foods   Result Value Ref Range    Class Description Comment     Egg White <0.10 Class 0 kU/L    Milk, Cow's <0.10 Class 0 kU/L    CodFish <0.10 Class 0 kU/L    Sesame Seed <0.10 Class 0 kU/L    Peanut <0.10 Class 0 kU/L    Soybean <0.10 Class 0 kU/L    Hazelnut <0.10 Class 0 kU/L    Shrimp 0.24 (A) Class 0/I kU/L    Scallop <0.10 Class 0 kU/L    Gluten <0.10 Class 0 kU/L    [place] <0.10 Class 0 kU/L    Wheat <0.10 Class 0 kU/L   Alpha - Gal Panel   Result Value Ref Range    Beef <0.10 <0.35 kU/L    Class Description 0     Lamb <0.10 <0.35 kU/L    Class Interpretation 0     Pork <0.10 <0.35 kU/L    Class Interpretation 0     Alpha Gal IgE 0.10 (H) <0.10 kU/L   Hepatitis Panel, Acute   Result Value Ref Range    Hepatitis C Ab Negative Negative    Hep A IgM Negative Negative    Hep B C IgM Negative Negative    Hepatitis B Surface Ag Negative Negative   T3, Free   Result Value Ref Range    T3, Free 3.2 2.0 - 4.4 pg/mL    TSH   Result Value Ref Range    TSH 2.610 0.460 - 4.680 mIU/mL   T4, Free   Result Value Ref Range    Free T4 1.03 0.78 - 2.19 ng/dL   Results for orders placed or performed during the hospital encounter of 09/18/18   Gold Top - SST   Result Value Ref Range    Extra Tube Hold for add-ons.    Green Top (Gel)   Result Value Ref Range    Extra Tube Hold for add-ons.    Urinalysis With Microscopic If Indicated (No Culture) - Urine, Clean Catch   Result Value Ref Range    Color, UA Yellow Yellow, Straw, Dark Yellow, Angela    Appearance, UA Clear Clear    pH, UA 5.5 5.0 - 9.0    Specific Gravity, UA 1.025 1.003 - 1.030    Glucose, UA Negative Negative    Ketones, UA Trace (A) Negative    Bilirubin, UA Negative Negative    Blood, UA Negative Negative    Protein, UA Negative Negative    Leuk Esterase, UA Negative Negative    Nitrite, UA Negative Negative    Urobilinogen, UA 0.2 E.U./dL 0.2 - 1.0 E.U./dL   CBC Auto Differential   Result Value Ref Range    WBC 9.21 3.20 - 9.80 10*3/mm3    RBC 4.55 3.77 - 5.16 10*6/mm3    Hemoglobin 12.8 12.0 - 15.5 g/dL    Hematocrit 38.1 35.0 - 45.0 %    MCV 83.7 80.0 - 98.0 fL    MCH 28.1 26.5 - 34.0 pg    MCHC 33.6 31.4 - 36.0 g/dL    RDW 14.3 11.5 - 14.5 %    RDW-SD 43.9 36.4 - 46.3 fl    MPV 10.5 8.0 - 12.0 fL    Platelets 264 150 - 450 10*3/mm3    Neutrophil % 47.6 37.0 - 80.0 %    Lymphocyte % 41.3 10.0 - 50.0 %    Monocyte % 7.1 0.0 - 12.0 %    Eosinophil % 3.5 0.0 - 7.0 %    Basophil % 0.2 0.0 - 2.0 %    Immature Grans % 0.3 0.0 - 0.5 %    Neutrophils, Absolute 4.39 2.00 - 8.60 10*3/mm3    Lymphocytes, Absolute 3.80 0.60 - 4.20 10*3/mm3    Monocytes, Absolute 0.65 0.00 - 0.90 10*3/mm3    Eosinophils, Absolute 0.32 0.00 - 0.70 10*3/mm3    Basophils, Absolute 0.02 0.00 - 0.20 10*3/mm3    Immature Grans, Absolute 0.03 (H) 0.00 - 0.02 10*3/mm3   Lavender Top   Result Value Ref Range    Extra Tube hold for add-on    Light Blue Top   Result Value Ref Range    Extra Tube hold for add-on     Lipase   Result Value Ref Range    Lipase 212 23 - 300 U/L   Comprehensive Metabolic Panel   Result Value Ref Range    Glucose 118 (H) 60 - 100 mg/dL    BUN 19 7 - 21 mg/dL    Creatinine 0.69 0.50 - 1.00 mg/dL    Sodium 140 137 - 145 mmol/L    Potassium 3.3 (L) 3.5 - 5.1 mmol/L    Chloride 104 95 - 110 mmol/L    CO2 24.0 22.0 - 31.0 mmol/L    Calcium 8.6 8.4 - 10.2 mg/dL    Total Protein 6.6 6.3 - 8.6 g/dL    Albumin 3.50 3.40 - 4.80 g/dL    ALT (SGPT) 22 9 - 52 U/L    AST (SGOT) 28 14 - 36 U/L    Alkaline Phosphatase 55 38 - 126 U/L    Total Bilirubin 0.2 0.2 - 1.3 mg/dL    eGFR Non  Amer 91 58 - 135 mL/min/1.73    Globulin 3.1 2.3 - 3.5 gm/dL    A/G Ratio 1.1 1.1 - 1.8 g/dL    BUN/Creatinine Ratio 27.5 (H) 7.0 - 25.0    Anion Gap 12.0 5.0 - 15.0 mmol/L     *Note: Due to a large number of results and/or encounters for the requested time period, some results have not been displayed. A complete set of results can be found in Results Review.       Some portions of this note have been dictated using voice recognition software and may contain errors and/or omissions.

## 2018-12-03 NOTE — PATIENT INSTRUCTIONS

## 2018-12-04 RX ORDER — FLUCONAZOLE 150 MG/1
150 TABLET ORAL ONCE
Qty: 1 TABLET | Refills: 0 | Status: SHIPPED | OUTPATIENT
Start: 2018-12-04 | End: 2018-12-04

## 2018-12-17 ENCOUNTER — OFFICE VISIT (OUTPATIENT)
Dept: PULMONOLOGY | Facility: CLINIC | Age: 48
End: 2018-12-17

## 2018-12-17 VITALS
HEIGHT: 62 IN | HEART RATE: 98 BPM | SYSTOLIC BLOOD PRESSURE: 134 MMHG | DIASTOLIC BLOOD PRESSURE: 78 MMHG | BODY MASS INDEX: 53.92 KG/M2 | OXYGEN SATURATION: 97 % | WEIGHT: 293 LBS

## 2018-12-17 DIAGNOSIS — J98.4 RESTRICTIVE LUNG DISEASE SECONDARY TO OBESITY: ICD-10-CM

## 2018-12-17 DIAGNOSIS — J45.30 MILD PERSISTENT ASTHMA WITHOUT COMPLICATION: Primary | ICD-10-CM

## 2018-12-17 DIAGNOSIS — G47.33 OSA ON CPAP: ICD-10-CM

## 2018-12-17 DIAGNOSIS — E66.9 RESTRICTIVE LUNG DISEASE SECONDARY TO OBESITY: ICD-10-CM

## 2018-12-17 DIAGNOSIS — Z99.89 OSA ON CPAP: ICD-10-CM

## 2018-12-17 DIAGNOSIS — E66.01 MORBID OBESITY WITH BMI OF 50.0-59.9, ADULT (HCC): ICD-10-CM

## 2018-12-17 PROCEDURE — 99214 OFFICE O/P EST MOD 30 MIN: CPT | Performed by: INTERNAL MEDICINE

## 2018-12-17 NOTE — PROGRESS NOTES
Pulmonary Office Follow-up    Maricarmen Ferris is seen in the office today for   Chief Complaint   Patient presents with   • Follow-up   • Shortness of Breath   .    Subjective     History of Present Illness  Maricarmen Ferris is a 48 y.o. female with a PMH significant for morbid obesity, ELSIE on CPAP, HTN, GERD, and depression who presents for evaluation of dyspnea.  She was last seen on 11/7/18, at which time I recommended escalating to an ICS due to frequency of albuterol use.  She does feel like her breathing has improved since starting Asmanex, but she continues to need her albuterol pre-4 times a week.  Patient reports some relief with her albuterol, but often finds herself limiting her activity to avoid dyspnea.  She denies cough, wheeze, chest pain or edema.  Patient does admit that she previously smoked crack cocaine for over 20 years, but she has been clean for the last 8 months.  She is motivated to continue being abstinent from crack so she can get weight loss surgery and to be reunited with her grandchildren.  She continues to follow with a therapist regularly and he has provided her with contacts should she wish to get assistance with her prior addiction.  Patient is now on antibiotics for H. pylori as it was felt that her reflux was causing issues.  Otherwise, she has 1 more month of weigh in then is hoping to be scheduled with Dr. Sunshine to discuss bariatric surgery after the first of the year.      Review of Systems: History obtained from chart review and the patient.  Review of Systems   Constitutional: Positive for fatigue. Negative for fever and unexpected weight change.   HENT: Positive for sore throat and voice change. Negative for ear pain and rhinorrhea.    Respiratory: Positive for shortness of breath. Negative for wheezing.    Cardiovascular: Negative for chest pain, palpitations and leg swelling.   Gastrointestinal: Positive for abdominal pain.     As described in the HPI.  "Otherwise, remainder of ROS (14 systems) were negative.    Patient Active Problem List   Diagnosis   • Chronic cholecystitis   • Umbilical hernia without obstruction and without gangrene   • Abdominal pain   • Abdominal fluid collection   • Intra-hepatic bile leak   • Abnormal cardiovascular function study   • Chest pain   • Morbid obesity with BMI of 50.0-59.9, adult (CMS/HCC)   • Restrictive lung disease secondary to obesity   • Nausea   • Weight gain, abnormal   • Pain of upper abdomen   • Gastroesophageal reflux disease with esophagitis   • Irritable bowel syndrome with both constipation and diarrhea   • Dysphagia   • Mild persistent asthma without complication   • ELSIE on CPAP       Medications, Allergies, Social, and Family Histories reviewed as per EMR.         Objective     Blood pressure 134/78, pulse 98, height 157.5 cm (62\"), weight (!) 139 kg (307 lb 6 oz), last menstrual period 02/17/2015, SpO2 97 %, not currently breastfeeding.  Physical Exam   Constitutional: She is oriented to person, place, and time. Vital signs are normal. She appears well-developed and well-nourished.   Morbidly obese   HENT:   Head: Normocephalic and atraumatic.   Nose: Nose normal.   Mouth/Throat: Uvula is midline, oropharynx is clear and moist and mucous membranes are normal.   Mallampati 4, tongue ring   Eyes: Conjunctivae, EOM and lids are normal. Pupils are equal, round, and reactive to light.   Neck: Trachea normal and normal range of motion. No tracheal tenderness present. No thyroid mass present.   Cardiovascular: Normal rate, regular rhythm and S1 normal. PMI is not displaced. Exam reveals distant heart sounds. Exam reveals no gallop.   No murmur heard.  Pulmonary/Chest: Effort normal and breath sounds normal. No respiratory distress. She has no decreased breath sounds. She has no wheezes. She has no rhonchi. Chest wall is not dull to percussion. She exhibits no tenderness.   Abdominal: Soft. Normal appearance and bowel " sounds are normal. There is no hepatomegaly. There is no tenderness.   Obese   Musculoskeletal:   Normal gait, no extermity edema     Vascular Status -  Her right foot exhibits no edema. Her left foot exhibits no edema.  Lymphadenopathy:        Head (right side): No submandibular adenopathy present.        Head (left side): No submandibular adenopathy present.     She has no cervical adenopathy.        Right: No supraclavicular adenopathy present.        Left: No supraclavicular adenopathy present.   Neurological: She is alert and oriented to person, place, and time.   Skin: Skin is warm and dry. No rash noted. No cyanosis. Nails show no clubbing.   Psychiatric: She has a normal mood and affect. Her speech is normal and behavior is normal. Judgment normal.   Nursing note and vitals reviewed.      PFTs: 10/8/18 (independently reviewed and interpreted by me)  Ratio 85  FVC 2.5/ 67%  FEV1 2.12/ 71%  TLC 3.59/ 69%  DLCO 18.64/ 72%  Poor effort.  Mild restriction with no significant bronchodilator response.  Mildly reduced diffusing capacity, likely due to obesity.  No comparative data available.       Assessment/Plan     Maricarmen was seen today for follow-up and shortness of breath.    Diagnoses and all orders for this visit:    Mild persistent asthma without complication  -     Mometasone Furoate 200 MCG/ACT aerosol; Inhale 2 puffs 2 (Two) Times a Day.    Restrictive lung disease secondary to obesity    Morbid obesity with BMI of 50.0-59.9, adult (CMS/HCC)    ELSIE on CPAP         Discussion/ Recommendations:   While she has had a positive response to initiating Asmanex, I am concerned about her persistent symptoms and albuterol use which I think necessitate escalation to a higher dose ICS.  I still think some of her dyspnea is related to her obesity and the patient voiced her understanding.  I did discuss with her that long-standing inhalational injury from anything, including crack cocaine, can cause lung damage.  I  have encouraged her to continue to seek help regarding her addiction to help her maintain lifelong abstinence.    -Increase Asmanex to 200.  -Continue albuterol as needed for dyspnea or wheeze.  -Encourage CPAP compliance, especially if she is hospitalized.  -Encouraged ongoing efforts with weight loss through diet and exercise.  Preop evaluation already forwarded to Dr. Sunshine' office.  -Annual flu vaccine.    Patient's Body mass index is 56.22 kg/m². BMI is above normal parameters. Recommendations include: exercise counseling.         Return in about 6 weeks (around 1/28/2019) for Recheck asthma.      Thank you for allowing me to participate in the care of Maricarmen Ferris. Please do not hesitate to contact me with any questions.         This document has been electronically signed by Rachel Enciso MD on December 17, 2018 2:49 PM      Dictated using Dragon

## 2019-01-06 ENCOUNTER — HOSPITAL ENCOUNTER (EMERGENCY)
Facility: HOSPITAL | Age: 49
Discharge: HOME | End: 2019-01-06
Attending: FAMILY MEDICINE | Admitting: FAMILY MEDICINE

## 2019-01-06 VITALS
HEIGHT: 65 IN | TEMPERATURE: 98.1 F | DIASTOLIC BLOOD PRESSURE: 49 MMHG | BODY MASS INDEX: 48.82 KG/M2 | SYSTOLIC BLOOD PRESSURE: 101 MMHG | RESPIRATION RATE: 18 BRPM | OXYGEN SATURATION: 98 % | WEIGHT: 293 LBS | HEART RATE: 68 BPM

## 2019-01-06 DIAGNOSIS — R25.2 LEG CRAMPS: Primary | ICD-10-CM

## 2019-01-06 LAB
ALBUMIN SERPL-MCNC: 4.5 G/DL (ref 3.4–4.8)
ALBUMIN/GLOB SERPL: 1.5 G/DL (ref 1.1–1.8)
ALP SERPL-CCNC: 72 U/L (ref 38–126)
ALT SERPL W P-5'-P-CCNC: 22 U/L (ref 9–52)
ANION GAP SERPL CALCULATED.3IONS-SCNC: 10 MMOL/L (ref 5–15)
AST SERPL-CCNC: 40 U/L (ref 14–36)
BASOPHILS # BLD AUTO: 0.02 10*3/MM3 (ref 0–0.2)
BASOPHILS NFR BLD AUTO: 0.2 % (ref 0–2)
BILIRUB SERPL-MCNC: 0.5 MG/DL (ref 0.2–1.3)
BUN BLD-MCNC: 19 MG/DL (ref 7–21)
BUN/CREAT SERPL: 19.6 (ref 7–25)
CALCIUM SPEC-SCNC: 9.3 MG/DL (ref 8.4–10.2)
CHLORIDE SERPL-SCNC: 95 MMOL/L (ref 95–110)
CK SERPL-CCNC: 353 U/L (ref 30–135)
CO2 SERPL-SCNC: 28 MMOL/L (ref 22–31)
CREAT BLD-MCNC: 0.97 MG/DL (ref 0.5–1)
DEPRECATED RDW RBC AUTO: 40.8 FL (ref 36.4–46.3)
EOSINOPHIL # BLD AUTO: 0.08 10*3/MM3 (ref 0–0.7)
EOSINOPHIL NFR BLD AUTO: 0.6 % (ref 0–7)
ERYTHROCYTE [DISTWIDTH] IN BLOOD BY AUTOMATED COUNT: 14.2 % (ref 11.5–14.5)
GFR SERPL CREATININE-BSD FRML MDRD: 61 ML/MIN/1.73 (ref 58–135)
GLOBULIN UR ELPH-MCNC: 3 GM/DL (ref 2.3–3.5)
GLUCOSE BLD-MCNC: 99 MG/DL (ref 60–100)
HCT VFR BLD AUTO: 41.5 % (ref 35–45)
HGB BLD-MCNC: 14.2 G/DL (ref 12–15.5)
HOLD SPECIMEN: NORMAL
IMM GRANULOCYTES # BLD AUTO: 0.03 10*3/MM3 (ref 0–0.02)
IMM GRANULOCYTES NFR BLD AUTO: 0.2 % (ref 0–0.5)
LYMPHOCYTES # BLD AUTO: 3.22 10*3/MM3 (ref 0.6–4.2)
LYMPHOCYTES NFR BLD AUTO: 26 % (ref 10–50)
MAGNESIUM SERPL-MCNC: 1.9 MG/DL (ref 1.6–2.3)
MCH RBC QN AUTO: 27.3 PG (ref 26.5–34)
MCHC RBC AUTO-ENTMCNC: 34.2 G/DL (ref 31.4–36)
MCV RBC AUTO: 79.7 FL (ref 80–98)
MONOCYTES # BLD AUTO: 0.93 10*3/MM3 (ref 0–0.9)
MONOCYTES NFR BLD AUTO: 7.5 % (ref 0–12)
NEUTROPHILS # BLD AUTO: 8.12 10*3/MM3 (ref 2–8.6)
NEUTROPHILS NFR BLD AUTO: 65.5 % (ref 37–80)
PLATELET # BLD AUTO: 303 10*3/MM3 (ref 150–450)
PMV BLD AUTO: 10.2 FL (ref 8–12)
POTASSIUM BLD-SCNC: 3.9 MMOL/L (ref 3.5–5.1)
PROT SERPL-MCNC: 7.5 G/DL (ref 6.3–8.6)
RBC # BLD AUTO: 5.21 10*6/MM3 (ref 3.77–5.16)
SODIUM BLD-SCNC: 133 MMOL/L (ref 137–145)
WBC NRBC COR # BLD: 12.4 10*3/MM3 (ref 3.2–9.8)
WHOLE BLOOD HOLD SPECIMEN: NORMAL

## 2019-01-06 PROCEDURE — 85025 COMPLETE CBC W/AUTO DIFF WBC: CPT | Performed by: FAMILY MEDICINE

## 2019-01-06 PROCEDURE — 96360 HYDRATION IV INFUSION INIT: CPT

## 2019-01-06 PROCEDURE — 80053 COMPREHEN METABOLIC PANEL: CPT | Performed by: FAMILY MEDICINE

## 2019-01-06 PROCEDURE — 83735 ASSAY OF MAGNESIUM: CPT | Performed by: FAMILY MEDICINE

## 2019-01-06 PROCEDURE — 82550 ASSAY OF CK (CPK): CPT | Performed by: FAMILY MEDICINE

## 2019-01-06 PROCEDURE — 99283 EMERGENCY DEPT VISIT LOW MDM: CPT

## 2019-01-06 RX ADMIN — SODIUM CHLORIDE 1000 ML: 9 INJECTION, SOLUTION INTRAVENOUS at 18:15

## 2019-01-06 NOTE — ED PROVIDER NOTES
"Subjective   Pt has been having muscle (leg) cramps for the past 3 days. Left hand began to \"draw up\" today. Lasix was increased from 20 mg po daily to 40 mg po daily on 12/29/18 and patient was instructed to alternate spironolactone on a daily basis with lasix.  SS happened last year and lasix was decreased.         Lower Extremity Issue   Location:  Leg  Time since incident:  3 days  Injury: no    Leg location:  R leg and L leg  Pain details:     Quality:  Aching and cramping    Radiates to:  Does not radiate    Severity:  Moderate    Onset quality:  Gradual    Duration:  3 days    Timing:  Constant    Progression:  Waxing and waning  Chronicity:  Recurrent  Dislocation: no    Foreign body present:  No foreign bodies  Prior injury to area:  Unable to specify  Relieved by:  Nothing  Worsened by:  Nothing  Ineffective treatments:  None tried  Associated symptoms: no fatigue, no fever, no muscle weakness, no neck pain, no numbness, no swelling and no tingling    Risk factors: obesity    Risk factors: no concern for non-accidental trauma        Review of Systems   Constitutional: Negative for appetite change, chills, diaphoresis, fatigue and fever.   HENT: Negative for congestion, ear discharge, ear pain, nosebleeds, rhinorrhea, sinus pressure, sore throat and trouble swallowing.    Eyes: Negative for discharge and redness.   Respiratory: Positive for cough (currently on abx). Negative for apnea, chest tightness, shortness of breath and wheezing.    Cardiovascular: Negative for chest pain.   Gastrointestinal: Negative for abdominal pain, diarrhea, nausea and vomiting.   Endocrine: Negative for polyuria.   Genitourinary: Negative for dysuria, frequency and urgency.   Musculoskeletal: Negative for myalgias and neck pain.   Skin: Negative for color change and rash.   Allergic/Immunologic: Negative for immunocompromised state.   Neurological: Negative for dizziness, seizures, syncope, weakness, light-headedness and " headaches.   Hematological: Negative for adenopathy. Does not bruise/bleed easily.   Psychiatric/Behavioral: Negative for behavioral problems and confusion.   All other systems reviewed and are negative.      Past Medical History:   Diagnosis Date   • Acid reflux    • Anxiety    • Depressed    • Dysphagia    • GERD (gastroesophageal reflux disease)    • Hard to intubate    • Hyperlipidemia    • Hypertension    • IBS (irritable bowel syndrome)    • Nausea    • Sleep apnea        Allergies   Allergen Reactions   • Sulfa Antibiotics Anaphylaxis and GI Intolerance       Past Surgical History:   Procedure Laterality Date   • ABDOMINAL SURGERY     • CARDIAC CATHETERIZATION N/A 8/13/2018    Procedure: Left Heart Cath 8/13/2018 @ 9;00;  Surgeon: Kuldip Frank MD;  Location: [facility] CATH INVASIVE LOCATION;  Service: Cardiology   • COLONOSCOPY N/A 11/19/2018    Procedure: COLONOSCOPY;  Surgeon: Bro Whitaker MD;  Location: [facility] ENDOSCOPY;  Service: Gastroenterology   • CYSTOSCOPY     • ENDOMETRIAL ABLATION  2015   • ENDOSCOPY N/A 11/19/2018    Procedure: ESOPHAGOGASTRODUODENOSCOPYwith dilation;  Surgeon: Bro Whitaker MD;  Location: [facility] ENDOSCOPY;  Service: Gastroenterology   • LAPAROSCOPIC TUBAL LIGATION  1995   • ID ERCP DX COLLECTION SPECIMEN BRUSHING/WASHING Left 7/31/2017    Procedure: ENDOSCOPIC RETROGRADE CHOLANGIOPANCREATOGRAPHY;  Surgeon: Smauel Redding DO;  Location: [facility] ENDOSCOPY;  Service: Gastroenterology   • ID LAP,CHOLECYSTECTOMY N/A 7/24/2017    Procedure: CHOLECYSTECTOMY LAPAROSCOPIC, also umbillical hernia repair;  Surgeon: Pk العلي MD;  Location: [facility] OR;  Service: General   • UPPER GASTROINTESTINAL ENDOSCOPY  11/19/2018       Family History   Problem Relation Age of Onset   • Diabetes Mother    • Hypertension Mother    • Cirrhosis Mother    • Hypertension Father        Social History     Socioeconomic History   • Marital status:      Spouse name: Not on file   • Number of  children: Not on file   • Years of education: Not on file   • Highest education level: Not on file   Tobacco Use   • Smoking status: Never Smoker   • Smokeless tobacco: Never Used   Substance and Sexual Activity   • Alcohol use: Yes     Comment: socially   • Drug use: No   • Sexual activity: Defer     Birth control/protection: Surgical           Objective   Physical Exam   Constitutional: She is oriented to person, place, and time. She appears well-developed and well-nourished.   HENT:   Head: Normocephalic and atraumatic.   Nose: Nose normal.   Mouth/Throat: Oropharynx is clear and moist.   Eyes: Conjunctivae and EOM are normal. Pupils are equal, round, and reactive to light. Right eye exhibits no discharge. Left eye exhibits no discharge. No scleral icterus.   Neck: Normal range of motion. Neck supple. No tracheal deviation present.   Cardiovascular: Normal rate, regular rhythm and normal heart sounds.   No murmur heard.  Pulmonary/Chest: Effort normal and breath sounds normal. No stridor. No respiratory distress. She has no wheezes. She has no rales.   Abdominal: Soft. Bowel sounds are normal. She exhibits no distension and no mass. There is no tenderness. There is no rebound and no guarding.   Musculoskeletal: She exhibits no edema.   Neurological: She is alert and oriented to person, place, and time. Coordination normal.   Skin: Skin is warm and dry. No rash noted. No erythema.   Psychiatric: She has a normal mood and affect. Her behavior is normal. Thought content normal.   Nursing note and vitals reviewed.      Procedures           ED Course  ED Course as of Jan 06 1904   Sun Jan 06, 2019   1900 Patient signed out to me by Dr. Siddiqui at shift change.  Patient is alert and resting comfortably.  She feels better after treatment with IV fluids.  Her labs were reviewed.  I advised her to discontinue Pravastatin and to follow-up with her primary care physician in 1 to 2 days.  I advised her to return to the ER if  her symptoms change or worsen.  [DR]      ED Course User Index  [DR] Joe Whittaker MD          Labs Reviewed   COMPREHENSIVE METABOLIC PANEL - Abnormal; Notable for the following components:       Result Value    Sodium 133 (*)     AST (SGOT) 40 (*)     All other components within normal limits   CK - Abnormal; Notable for the following components:    Creatine Kinase 353 (*)     All other components within normal limits   CBC WITH AUTO DIFFERENTIAL - Abnormal; Notable for the following components:    WBC 12.40 (*)     RBC 5.21 (*)     MCV 79.7 (*)     Monocytes, Absolute 0.93 (*)     Immature Grans, Absolute 0.03 (*)     All other components within normal limits   MAGNESIUM - Normal   RAINBOW DRAW    Narrative:     The following orders were created for panel order [place] Draw.  Procedure                               Abnormality         Status                     ---------                               -----------         ------                     Light Blue Top[[id number]]                                   In process                 Gold Top - [facility] [email or web address]]                                   In process                   Please view results for these tests on the individual orders.   CBC AND DIFFERENTIAL    Narrative:     The following orders were created for panel order CBC & Differential.  Procedure                               Abnormality         Status                     ---------                               -----------         ------                     CBC Auto Differential[[id number]]        Abnormal            Final result                 Please view results for these tests on the individual orders.   LIGHT BLUE TOP   GOLD TOP - [facility] [[email or web address]]       No orders to display     Labs Reviewed   COMPREHENSIVE METABOLIC PANEL - Abnormal; Notable for the following components:       Result Value    Sodium 133 (*)     AST (SGOT) 40 (*)     All other components within normal limits   CK - Abnormal; Notable for the following  components:    Creatine Kinase 353 (*)     All other components within normal limits   CBC WITH AUTO DIFFERENTIAL - Abnormal; Notable for the following components:    WBC 12.40 (*)     RBC 5.21 (*)     MCV 79.7 (*)     Monocytes, Absolute 0.93 (*)     Immature Grans, Absolute 0.03 (*)     All other components within normal limits   MAGNESIUM - Normal   RAINBOW DRAW    Narrative:     The following orders were created for panel order [place] Draw.  Procedure                               Abnormality         Status                     ---------                               -----------         ------                     Light Blue Top[[id number]]                                   In process                 Gold Top - [facility][[id number]]                                   In process                   Please view results for these tests on the individual orders.   CBC AND DIFFERENTIAL    Narrative:     The following orders were created for panel order CBC & Differential.  Procedure                               Abnormality         Status                     ---------                               -----------         ------                     CBC Auto Differential[[id number]]        Abnormal            Final result                 Please view results for these tests on the individual orders.   LIGHT BLUE TOP   GOLD TOP - [facility]     No results found.            [facility]      Final diagnoses:   Leg cramps            Joe Whittaker MD  01/06/19 6874     Recover from surgery Please call [facility] at [phone] to arrange a follow up with Dr. Rebolledo.  Please come to ED or Call Cardio thoracic office at [phone] if Chest pain, Shortness of Breath, persistent Nausea & vomiting, oozing from wounds, 2 lb increase in weight in 24 hours. Continue lasix & potassium Continue cardizem & digoxin

## 2019-01-18 DIAGNOSIS — R06.09 DYSPNEA ON EXERTION: ICD-10-CM

## 2019-01-30 ENCOUNTER — OFFICE VISIT (OUTPATIENT)
Dept: PULMONOLOGY | Facility: CLINIC | Age: 49
End: 2019-01-30

## 2019-01-30 VITALS
SYSTOLIC BLOOD PRESSURE: 144 MMHG | HEART RATE: 80 BPM | BODY MASS INDEX: 48.82 KG/M2 | DIASTOLIC BLOOD PRESSURE: 73 MMHG | WEIGHT: 293 LBS | HEIGHT: 65 IN

## 2019-01-30 DIAGNOSIS — J98.4 RESTRICTIVE LUNG DISEASE SECONDARY TO OBESITY: ICD-10-CM

## 2019-01-30 DIAGNOSIS — J45.30 MILD PERSISTENT ASTHMA WITHOUT COMPLICATION: Primary | ICD-10-CM

## 2019-01-30 DIAGNOSIS — G47.33 OSA ON CPAP: ICD-10-CM

## 2019-01-30 DIAGNOSIS — Z99.89 OSA ON CPAP: ICD-10-CM

## 2019-01-30 DIAGNOSIS — E66.9 RESTRICTIVE LUNG DISEASE SECONDARY TO OBESITY: ICD-10-CM

## 2019-01-30 DIAGNOSIS — E66.01 MORBID OBESITY WITH BMI OF 50.0-59.9, ADULT (HCC): ICD-10-CM

## 2019-01-30 PROCEDURE — 99214 OFFICE O/P EST MOD 30 MIN: CPT | Performed by: INTERNAL MEDICINE

## 2019-01-30 NOTE — PATIENT INSTRUCTIONS

## 2019-03-28 ENCOUNTER — OFFICE VISIT (OUTPATIENT)
Dept: GASTROENTEROLOGY | Facility: CLINIC | Age: 49
End: 2019-03-28

## 2019-03-28 VITALS
BODY MASS INDEX: 48.82 KG/M2 | SYSTOLIC BLOOD PRESSURE: 122 MMHG | DIASTOLIC BLOOD PRESSURE: 74 MMHG | WEIGHT: 293 LBS | HEIGHT: 65 IN | HEART RATE: 76 BPM

## 2019-03-28 DIAGNOSIS — K76.0 FATTY LIVER: ICD-10-CM

## 2019-03-28 DIAGNOSIS — R06.09 DYSPNEA ON EXERTION: ICD-10-CM

## 2019-03-28 DIAGNOSIS — R10.10 PAIN OF UPPER ABDOMEN: Primary | ICD-10-CM

## 2019-03-28 DIAGNOSIS — K21.00 GASTROESOPHAGEAL REFLUX DISEASE WITH ESOPHAGITIS: ICD-10-CM

## 2019-03-28 DIAGNOSIS — R11.0 NAUSEA: ICD-10-CM

## 2019-03-28 DIAGNOSIS — R13.10 DYSPHAGIA, UNSPECIFIED TYPE: ICD-10-CM

## 2019-03-28 PROCEDURE — 99214 OFFICE O/P EST MOD 30 MIN: CPT | Performed by: PHYSICIAN ASSISTANT

## 2019-03-28 RX ORDER — TRAMADOL HYDROCHLORIDE 50 MG/1
50 TABLET ORAL DAILY
Refills: 0 | COMMUNITY
Start: 2019-03-01 | End: 2019-05-19 | Stop reason: HOSPADM

## 2019-03-28 RX ORDER — BUPROPION HYDROCHLORIDE 150 MG/1
150 TABLET, EXTENDED RELEASE ORAL 2 TIMES DAILY
Refills: 5 | COMMUNITY
Start: 2019-03-22 | End: 2020-02-28 | Stop reason: SDUPTHER

## 2019-03-28 RX ORDER — FUROSEMIDE 40 MG/1
40 TABLET ORAL DAILY
Refills: 12 | Status: ON HOLD | COMMUNITY
Start: 2019-03-11 | End: 2019-07-29 | Stop reason: SDUPTHER

## 2019-03-28 RX ORDER — FENOFIBRATE 145 MG/1
145 TABLET, COATED ORAL DAILY
Refills: 0 | COMMUNITY
Start: 2019-03-05 | End: 2019-08-25

## 2019-03-28 RX ORDER — ALBUTEROL SULFATE 90 UG/1
2 AEROSOL, METERED RESPIRATORY (INHALATION) EVERY 4 HOURS PRN
Qty: 18 G | Refills: 6 | Status: SHIPPED | OUTPATIENT
Start: 2019-03-28 | End: 2021-06-30

## 2019-03-28 NOTE — PATIENT INSTRUCTIONS

## 2019-03-28 NOTE — PROGRESS NOTES
Chief Complaint   Patient presents with   • Abdominal Pain   • Heartburn   • H-Pylori Gastritis       ENDO PROCEDURE ORDERED:    Subjective    Maricarmen Ferris is a 48 y.o. female. she is here today for follow-up.    History of Present Illness    The patient is seen on a recheck of her GERD, abdominal pain, H. pylori, fatty liver, F0/S1-S2/N1. Last seen 12/03/2018. Patient completed her medications for the H. pylori. She is still having a lot of burning in her chest with 6 out of 10 mid-abdominal pain. She states she has been vomiting bile. She had previously gotten better after completing her treatment, but she ran out of her Protonix and started having increasing chest pain and a sore throat. She is also having some difficulty swallowing. She denied nausea and vomiting. She states she has a sore throat all of the time. She is on Bentyl for her IBS and states that does help. Weight is down half a pound since last visit. Last EGD/colonoscopy 11/19/2018 showed esophagitis, gastritis, and hemorrhoids.     Most recent laboratories 01/06/2019 showed , normal magnesium. CMP showed sodium 133, AST 40, otherwise normal. CBC showed white count 12.4, otherwise normal. The patient states Dr. Ash did some laboratories on her; we tried to get those but were unable to do so.     A/P: Patient with severe GERD, dysphagia, abdominal pain, possible motility disorder, and it is not clear if she cleared the H. Pylori. I suggested stool for H. pylori. Will obtain a barium swallow to look at her motility. Will consider bile salt reflux. She will continue on current regimen but will plan followup in 1 month, further pending clinical course and the results of the above. The patient was agreeable.        The following portions of the patient's history were reviewed and updated as appropriate:   Past Medical History:   Diagnosis Date   • Acid reflux    • Anxiety    • Depressed    • Dysphagia    • GERD (gastroesophageal reflux  disease)    • Hard to intubate    • Hyperlipidemia    • Hypertension    • IBS (irritable bowel syndrome)    • Nausea    • Sleep apnea      Past Surgical History:   Procedure Laterality Date   • ABDOMINAL SURGERY     • CARDIAC CATHETERIZATION N/A 2018    Procedure: Left Heart Cath 2018 @ 9;00;  Surgeon: Kuldip Frank MD;  Location: [facility] CATH INVASIVE LOC[facility];  Service: Cardiology   • COLONOSCOPY N/A 2018    Procedure: COLONOSCOPY;  Surgeon: Bro Whitaker MD;  Location: Health system ENDOSCOPY;  Service: Gastroenterology   • CYSTOSCOPY     • ENDOMETRIAL ABLATION     • ENDOSCOPY N/A 2018    Procedure: ESOPHAGOGASTRODUODENOSCOPYwith dilation;  Surgeon: Bro Whitaker MD;  Location: Health system ENDOSCOPY;  Service: Gastroenterology   • LAPAROSCOPIC TUBAL LIGATION     • VT ERCP DX COLLECTION SPECIMEN BRUSHING/WASHING Left 2017    Procedure: ENDOSCOPIC RETROGRADE CHOLANGIOPANCREATOGRAPHY;  Surgeon: Samuel Redding DO;  Location: Health system ENDOSCOPY;  Service: Gastroenterology   • VT LAP,CHOLECYSTECTOMY N/A 2017    Procedure: CHOLECYSTECTOMY LAPAROSCOPIC, also umbillical hernia repair;  Surgeon: Pk العلي MD;  Location: Health system OR;  Service: General   • UPPER GASTROINTESTINAL ENDOSCOPY  2018     Family History   Problem Relation Age of Onset   • Diabetes Mother    • Hypertension Mother    • Cirrhosis Mother    • Hypertension Father      OB History      Para Term  AB Living    3 3 3          SAB TAB Ectopic Molar Multiple Live Births                       Allergies   Allergen Reactions   • Sulfa Antibiotics Anaphylaxis and GI Intolerance     Social History     Socioeconomic History   • Marital status:      Spouse name: Not on file   • Number of children: Not on file   • Years of education: Not on file   • Highest education level: Not on file   Tobacco Use   • Smoking status: Never Smoker   • Smokeless tobacco: Never Used   Substance and Sexual Activity   •  Alcohol use: Yes     Comment: socially   • Drug use: No   • Sexual activity: Defer     Birth control/protection: Surgical       Current Outpatient Medications:   •  albuterol sulfate  (90 Base) MCG/ACT inhaler, Inhale 2 puffs Every 4 (Four) Hours As Needed for Wheezing or Shortness of Air., Disp: 18 g, Rfl: 6  •  buPROPion SR (WELLBUTRIN SR) 150 MG 12 hr tablet, Take 150 mg by mouth 2 (Two) Times a Day., Disp: , Rfl: 5  •  dicyclomine (BENTYL) 10 MG capsule, Take 10 mg by mouth 3 (Three) Times a Day., Disp: , Rfl: 1  •  fenofibrate (TRICOR) 145 MG tablet, Take 145 mg by mouth Daily., Disp: , Rfl: 0  •  Fluticasone Furoate 200 MCG/ACT aerosol powder , Inhale 1 puff Daily. Rinse mouth well after use, Disp: 1 each, Rfl: 11  •  furosemide (LASIX) 40 MG tablet, Take 40 mg by mouth Daily., Disp: , Rfl: 12  •  gabapentin (NEURONTIN) 100 MG capsule, Take 100 mg by mouth 3 (Three) Times a Day., Disp: , Rfl:   •  isosorbide mononitrate (IMDUR) 30 MG 24 hr tablet, Take 30 mg by mouth Daily., Disp: , Rfl:   •  lisinopril (PRINIVIL,ZESTRIL) 20 MG tablet, Take 20 mg by mouth Daily., Disp: , Rfl:   •  methocarbamol (ROBAXIN) 500 MG tablet, Take 500 mg by mouth 2 (Two) Times a Day., Disp: , Rfl:   •  naproxen (NAPROSYN) 500 MG tablet, Take 500 mg by mouth 2 (Two) Times a Day As Needed for Mild Pain ., Disp: , Rfl:   •  oxybutynin (DITROPAN) 5 MG tablet, Take 5 mg by mouth 2 (Two) Times a Day., Disp: , Rfl:   •  pantoprazole (PROTONIX) 40 MG EC tablet, Take 40 mg by mouth Daily., Disp: , Rfl:   •  rOPINIRole (REQUIP) 0.25 MG tablet, Take 0.25 mg by mouth Every Night., Disp: , Rfl:   •  Sertraline HCl (ZOLOFT PO), Take 150 mg by mouth Daily., Disp: , Rfl:   •  spironolactone (ALDACTONE) 25 MG tablet, Take 25 mg by mouth Daily. Alternates days with lasix., Disp: , Rfl: 12  •  traMADol (ULTRAM) 50 MG tablet, Take 50 mg by mouth Daily., Disp: , Rfl: 0  •  amoxicillin (AMOXIL) 500 MG capsule, Take 1 capsule by mouth 3 (Three)  "Times a Day., Disp: 30 capsule, Rfl: 0  •  guaiFENesin (MUCINEX) 600 MG 12 hr tablet, Take 1 tablet by mouth 2 (Two) Times a Day., Disp: 18 tablet, Rfl: 0  •  naproxen (EC NAPROSYN) 500 MG EC tablet, Take 1 tablet by mouth 2 (Two) Times a Day As Needed for Mild Pain ., Disp: 30 tablet, Rfl: 1  No current facility-administered medications for this visit.   Review of Systems  Review of Systems       Objective    /74 (BP Location: Left arm)   Pulse 76   Ht 165.1 cm (65\")   Wt (!) 142 kg (312 lb 3.2 oz)   BMI 51.95 kg/m²   Physical Exam   Constitutional: She is oriented to person, place, and time. She appears well-developed and well-nourished. No distress.   HENT:   Head: Normocephalic and atraumatic.   Eyes: EOM are normal. Pupils are equal, round, and reactive to light.   Neck: Normal range of motion.   Cardiovascular: Normal rate, regular rhythm and normal heart sounds.   Pulmonary/Chest: Effort normal and breath sounds normal.   Abdominal: Soft. Bowel sounds are normal. She exhibits no shifting dullness, no distension, no abdominal bruit, no ascites and no mass. There is no hepatosplenomegaly. There is tenderness. There is no rigidity, no rebound, no guarding and no CVA tenderness. No hernia. Hernia confirmed negative in the ventral area.   Obese, mild diffuse   Musculoskeletal: Normal range of motion.   Neurological: She is alert and oriented to person, place, and time.   Skin: Skin is warm and dry.   Psychiatric: She has a normal mood and affect. Her behavior is normal. Judgment and thought content normal.   Nursing note and vitals reviewed.    Assessment/Plan      1. Pain of upper abdomen    2. Gastroesophageal reflux disease with esophagitis    3. Fatty liver    4. Nausea    5. Dysphagia, unspecified type    .   Maricarmen was seen today for abdominal pain, heartburn and h-pylori gastritis.    Diagnoses and all orders for this visit:    Pain of upper abdomen  -     FL Esophagram Complete  -     H. Pylori " Antigen, Stool - Stool, Per Rectum    Gastroesophageal reflux disease with esophagitis  -     FL Esophagram Complete  -     H. Pylori Antigen, Stool - Stool, Per Rectum    Fatty liver    Nausea  -     FL Esophagram Complete  -     H. Pylori Antigen, Stool - Stool, Per Rectum    Dysphagia, unspecified type  -     FL Esophagram Complete        Orders placed during this encounter include:  Orders Placed This Encounter   Procedures   • FL Esophagram Complete     Order Specific Question:   Patient Pregnant     Answer:   No     Order Specific Question:   Reason for Exam:     Answer:   dysphagia, gerd   • H. Pylori Antigen, Stool - Stool, Per Rectum       Medications prescribed:  No orders of the defined types were placed in this encounter.    Discontinued Medications       Reason for Discontinue    furosemide (LASIX) 20 MG tablet Discontinued by another clinician    buPROPion (WELLBUTRIN) 100 MG tablet Discontinued by another clinician    sertraline (ZOLOFT) 100 MG tablet Discontinued by another clinician    Phentermine HCl 37.5 MG tablet dispersible *Therapy completed        Requested Prescriptions      No prescriptions requested or ordered in this encounter       Review and/or summary of lab tests, radiology, procedures, medications. Review and summary of old records and obtaining of history. The risks and benefits of my recommendations, as well as other treatment options were discussed with the patient today. Questions were answered.    Follow-up: Return in about 1 month (around 4/28/2019), or if symptoms worsen or fail to improve.     * Surgery not found *      This document has been electronically signed by Carlo Colindres PA-C on March 31, 2019 1:38 PM      Results for orders placed or performed during the hospital encounter of 01/06/19   [facility] Top - SST   Result Value Ref Range    Extra Tube Hold for add-ons.    CBC Auto Differential   Result Value Ref Range    WBC 12.40 (H) 3.20 - 9.80 10*3/mm3    RBC 5.21 (H)  3.77 - 5.16 10*6/mm3    Hemoglobin 14.2 12.0 - 15.5 g/dL    Hematocrit 41.5 35.0 - 45.0 %    MCV 79.7 (L) 80.0 - 98.0 fL    MCH 27.3 26.5 - 34.0 pg    MCHC 34.2 31.4 - 36.0 g/dL    RDW 14.2 11.5 - 14.5 %    RDW-SD 40.8 36.4 - 46.3 fl    MPV 10.2 8.0 - 12.0 fL    Platelets 303 150 - 450 10*3/mm3    Neutrophil % 65.5 37.0 - 80.0 %    Lymphocyte % 26.0 10.0 - 50.0 %    Monocyte % 7.5 0.0 - 12.0 %    Eosinophil % 0.6 0.0 - 7.0 %    Basophil % 0.2 0.0 - 2.0 %    Immature Grans % 0.2 0.0 - 0.5 %    Neutrophils, Absolute 8.12 2.00 - 8.60 10*3/mm3    Lymphocytes, Absolute 3.22 0.60 - 4.20 10*3/mm3    Monocytes, Absolute 0.93 (H) 0.00 - 0.90 10*3/mm3    Eosinophils, Absolute 0.08 0.00 - 0.70 10*3/mm3    Basophils, Absolute 0.02 0.00 - 0.20 10*3/mm3    Immature Grans, Absolute 0.03 (H) 0.00 - 0.02 10*3/mm3   Light Blue Top   Result Value Ref Range    Extra Tube hold for add-on    Magnesium   Result Value Ref Range    Magnesium 1.9 1.6 - 2.3 mg/dL   CK   Result Value Ref Range    Creatine Kinase 353 (H) 30 - 135 U/L   Comprehensive Metabolic Panel   Result Value Ref Range    Glucose 99 60 - 100 mg/dL    BUN 19 7 - 21 mg/dL    Creatinine 0.97 0.50 - 1.00 mg/dL    Sodium 133 (L) 137 - 145 mmol/L    Potassium 3.9 3.5 - 5.1 mmol/L    Chloride 95 95 - 110 mmol/L    CO2 28.0 22.0 - 31.0 mmol/L    Calcium 9.3 8.4 - 10.2 mg/dL    Total Protein 7.5 6.3 - 8.6 g/dL    Albumin 4.50 3.40 - 4.80 g/dL    ALT (SGPT) 22 9 - 52 U/L    AST (SGOT) 40 (H) 14 - 36 U/L    Alkaline Phosphatase 72 38 - 126 U/L    Total Bilirubin 0.5 0.2 - 1.3 mg/dL    eGFR Non  Amer 61 58 - 135 mL/min/1.73    Globulin 3.0 2.3 - 3.5 gm/dL    A/G Ratio 1.5 1.1 - 1.8 g/dL    BUN/Creatinine Ratio 19.6 7.0 - 25.0    Anion Gap 10.0 5.0 - 15.0 mmol/L   Results for orders placed or performed during the hospital encounter of 11/19/18   Tissue Pathology Exam   Result Value Ref Range    Case Report       Surgical Pathology Report                         Case: [id number]                                   Authorizing Provider:  Bro Whitaker MD        Collected:           11/19/2018 03:50 PM          Ordering Location:     [facility]             Received:            11/20/2018 08:41 AM                                 [facility]                                                     Pathologist:           Tevin Quintanilla MD                                                         Specimens:   1) - Gastric, Antrum                                                                                2) - Esophagus, Distal                                                                              3) - Large Intestine, colonic mucousa                                                      Final Diagnosis       1.  MUCOSA, ANTRUM OF STOMACH:   CHRONIC GASTRITIS.   POSITIVE FOR HELICOBACTER PYLORI (HP IMMUNOSTAIN).     2.  MUCOSA, DISTAL ESOPHAGUS:   REACTIVE CHANGE OF SQUAMOUS MUCOSA.     3.  MUCOSA, COLON:   NO SIGNIFICANT HISTOLOGIC ABNORMALITY.       Comment       Helicobacter pylori (HP) immunostain is performed (block 1) because an appropriate inflammatory milieu is present and organisms are not seen on H & E stained slides.     HP immunostain was developed and its performance characteristics determined by [facility].  It has not been cleared or approved by the U.S.Food and Drug Administration.  The FDA has determined that such clearance of approval is not necessary.  This test is used for clinical purposes.  It should not be regarded as investigational or for research.  This laboratory is certified under the Clinical Laboratory Amendments of 1988 (CLIA-88) as qualified to perform high complexity clinical laboratory testing.         Gross Description       Received for examination are 3 containers, each of which have nodular bits of white soft tissue measuring 0.3-0.5 cc in aggregate.  All specimens are embedded as labeled:  1A antrum of  stomach; 2A distal esophagus; 3A mucosa of colon.      Results for orders placed or performed in visit on 10/29/18   LAWRENCE Fibrosure   Result Value Ref Range    Fibrosis Score (References) 0.04 0.00 - 0.21    Fibrosis Stage (Reference) Comment     Steatosis Score (Reference) 0.49 (H) 0.00 - 0.30    Steatosis Grade (Reference) Comment     LAWRENCE Score (Reference) 0.50 0.25    Lawrence Grade (Reference) Comment     Height: (Reference) 65 in    Weight: (Reference) 306 LBS    Alpha 2-Macroglobulins, Qn 169 110 - 276 mg/dL    Haptoglobin 168 34 - 200 mg/dL    Apolipoprotein A-1 159 116 - 209 mg/dL    Total Bilirubin 0.2 0.0 - 1.2 mg/dL    GGT 22 0 - 60 IU/L    ALT (SGPT) 19 0 - 40 IU/L    AST (SGOT) P5P (Reference) 26 0 - 40 IU/L    Cholesterol, Total (Reference) 184 100 - 199 mg/dL    Glucose, Serum (Reference) 107 (H) 65 - 99 mg/dL    Triglycerides 117 0 - 149 mg/dL    Interpretations: (Reference) Comment     Fibrosis Scoring: Comment     Steatosis Grading (Reference) Comment     Lawrence Scoring (Reference) Comment     Limitations: (Reference) Comment     Comment (Reference) Comment    Glia(IgA / G) & TTG(IgA / G)   Result Value Ref Range    Gliadin Deamidated Peptide Ab, IgA 5 0 - 19 units    Deaminated Gliadin Ab IgG 3 0 - 19 units    Tissue Transglutaminase IgA <2 0 - 3 U/mL    Tissue Transglutaminase IgG 4 0 - 5 U/mL   Allergens (12) Foods   Result Value Ref Range    Class Description Comment     Egg White <0.10 Class 0 kU/L    Milk, Cow's <0.10 Class 0 kU/L    CodFish <0.10 Class 0 kU/L    Sesame Seed <0.10 Class 0 kU/L    Peanut <0.10 Class 0 kU/L    Soybean <0.10 Class 0 kU/L    Hazelnut <0.10 Class 0 kU/L    Shrimp 0.24 (A) Class 0/I kU/L    Scallop <0.10 Class 0 kU/L    Gluten <0.10 Class 0 kU/L    [place] <0.10 Class 0 kU/L    Wheat <0.10 Class 0 kU/L   Alpha - Gal Panel   Result Value Ref Range    Beef <0.10 <0.35 kU/L    Class Description 0     Lamb <0.10 <0.35 kU/L    Class Interpretation 0     Pork <0.10 <0.35 kU/L     Class Interpretation 0     Alpha Gal IgE 0.10 (H) <0.10 kU/L   Hepatitis Panel, Acute   Result Value Ref Range    Hepatitis C Ab Negative Negative    Hep A IgM Negative Negative    Hep B C IgM Negative Negative    Hepatitis B Surface Ag Negative Negative     *Note: Due to a large number of results and/or encounters for the requested time period, some results have not been displayed. A complete set of results can be found in Results Review.       Some portions of this note have been dictated using voice recognition software and may contain errors and/or omissions.

## 2019-04-03 ENCOUNTER — HOSPITAL ENCOUNTER (OUTPATIENT)
Dept: GENERAL RADIOLOGY | Facility: HOSPITAL | Age: 49
Discharge: HOME | End: 2019-04-03
Admitting: PHYSICIAN ASSISTANT

## 2019-04-03 ENCOUNTER — APPOINTMENT (OUTPATIENT)
Dept: LAB | Facility: HOSPITAL | Age: 49
End: 2019-04-03

## 2019-04-03 PROCEDURE — 87338 HPYLORI STOOL AG IA: CPT | Performed by: PHYSICIAN ASSISTANT

## 2019-04-03 PROCEDURE — 74220 X-RAY XM ESOPHAGUS 1CNTRST: CPT

## 2019-04-03 RX ADMIN — BARIUM SULFATE 183 ML: 960 POWDER, FOR SUSPENSION ORAL at 07:45

## 2019-04-05 LAB — H PYLORI AG STL QL IA: NEGATIVE

## 2019-05-08 PROBLEM — Z87.891 PERSONAL HISTORY OF TOBACCO USE, PRESENTING HAZARDS TO HEALTH: Status: ACTIVE | Noted: 2019-05-08

## 2019-05-14 ENCOUNTER — HOSPITAL ENCOUNTER (INPATIENT)
Facility: HOSPITAL | Age: 49
LOS: 5 days | Discharge: HOME | End: 2019-05-19
Attending: FAMILY MEDICINE | Admitting: FAMILY MEDICINE

## 2019-05-14 DIAGNOSIS — E66.01 MORBID OBESITY WITH BMI OF 50.0-59.9, ADULT (HCC): ICD-10-CM

## 2019-05-14 DIAGNOSIS — Z78.9 IMPAIRED MOBILITY AND ACTIVITIES OF DAILY LIVING: ICD-10-CM

## 2019-05-14 DIAGNOSIS — N17.9 ACUTE KIDNEY INJURY (HCC): ICD-10-CM

## 2019-05-14 DIAGNOSIS — N17.9 AKI (ACUTE KIDNEY INJURY) (HCC): Primary | ICD-10-CM

## 2019-05-14 DIAGNOSIS — Z74.09 IMPAIRED MOBILITY AND ACTIVITIES OF DAILY LIVING: ICD-10-CM

## 2019-05-14 DIAGNOSIS — E87.5 HYPERKALEMIA: ICD-10-CM

## 2019-05-14 PROBLEM — E78.5 HYPERLIPIDEMIA: Status: ACTIVE | Noted: 2019-05-14

## 2019-05-14 PROBLEM — I10 HYPERTENSION: Status: ACTIVE | Noted: 2019-05-14

## 2019-05-14 LAB
ALBUMIN SERPL-MCNC: 4.3 G/DL (ref 3.5–5.2)
ALBUMIN/GLOB SERPL: 1.1 G/DL
ALP SERPL-CCNC: 36 U/L (ref 39–117)
ALT SERPL W P-5'-P-CCNC: 26 U/L (ref 1–33)
ANION GAP SERPL CALCULATED.3IONS-SCNC: 21 MMOL/L
AST SERPL-CCNC: 40 U/L (ref 1–32)
BACTERIA UR QL AUTO: ABNORMAL /HPF
BACTERIA UR QL AUTO: ABNORMAL /HPF
BASOPHILS # BLD AUTO: 0.01 10*3/MM3 (ref 0–0.2)
BASOPHILS NFR BLD AUTO: 0.1 % (ref 0–1.5)
BILIRUB SERPL-MCNC: 0.3 MG/DL (ref 0.2–1.2)
BILIRUB UR QL STRIP: NEGATIVE
BILIRUB UR QL STRIP: NEGATIVE
BUN BLD-MCNC: 104 MG/DL (ref 6–20)
BUN/CREAT SERPL: 17.2 (ref 7–25)
CALCIUM SPEC-SCNC: 10.3 MG/DL (ref 8.6–10.5)
CHLORIDE SERPL-SCNC: 92 MMOL/L (ref 98–107)
CLARITY UR: ABNORMAL
CLARITY UR: CLEAR
CO2 SERPL-SCNC: 20 MMOL/L (ref 22–29)
COLOR UR: YELLOW
COLOR UR: YELLOW
CREAT BLD-MCNC: 6.05 MG/DL (ref 0.57–1)
DEPRECATED RDW RBC AUTO: 41.9 FL (ref 37–54)
EOSINOPHIL # BLD AUTO: 0.09 10*3/MM3 (ref 0–0.4)
EOSINOPHIL NFR BLD AUTO: 1.1 % (ref 0.3–6.2)
ERYTHROCYTE [DISTWIDTH] IN BLOOD BY AUTOMATED COUNT: 14.2 % (ref 12.3–15.4)
GFR SERPL CREATININE-BSD FRML MDRD: 7 ML/MIN/1.73
GFR SERPL CREATININE-BSD FRML MDRD: ABNORMAL ML/MIN/1.73
GLOBULIN UR ELPH-MCNC: 3.8 GM/DL
GLUCOSE BLD-MCNC: 77 MG/DL (ref 65–99)
GLUCOSE BLDC GLUCOMTR-MCNC: 104 MG/DL (ref 70–130)
GLUCOSE UR STRIP-MCNC: ABNORMAL MG/DL
GLUCOSE UR STRIP-MCNC: ABNORMAL MG/DL
GRAN CASTS URNS QL MICRO: ABNORMAL /LPF
HCT VFR BLD AUTO: 34.5 % (ref 34–46.6)
HGB BLD-MCNC: 11.4 G/DL (ref 12–15.9)
HGB UR QL STRIP.AUTO: ABNORMAL
HGB UR QL STRIP.AUTO: ABNORMAL
HOLD SPECIMEN: NORMAL
HOLD SPECIMEN: NORMAL
HYALINE CASTS UR QL AUTO: ABNORMAL /LPF
HYALINE CASTS UR QL AUTO: ABNORMAL /LPF
IMM GRANULOCYTES # BLD AUTO: 0.02 10*3/MM3 (ref 0–0.05)
IMM GRANULOCYTES NFR BLD AUTO: 0.2 % (ref 0–0.5)
KETONES UR QL STRIP: NEGATIVE
KETONES UR QL STRIP: NEGATIVE
LEUKOCYTE ESTERASE UR QL STRIP.AUTO: ABNORMAL
LEUKOCYTE ESTERASE UR QL STRIP.AUTO: NEGATIVE
LYMPHOCYTES # BLD AUTO: 1.91 10*3/MM3 (ref 0.7–3.1)
LYMPHOCYTES NFR BLD AUTO: 23.8 % (ref 19.6–45.3)
MCH RBC QN AUTO: 27.1 PG (ref 26.6–33)
MCHC RBC AUTO-ENTMCNC: 33 G/DL (ref 31.5–35.7)
MCV RBC AUTO: 81.9 FL (ref 79–97)
MONOCYTES # BLD AUTO: 0.59 10*3/MM3 (ref 0.1–0.9)
MONOCYTES NFR BLD AUTO: 7.4 % (ref 5–12)
NEUTROPHILS # BLD AUTO: 5.4 10*3/MM3 (ref 1.7–7)
NEUTROPHILS NFR BLD AUTO: 67.4 % (ref 42.7–76)
NITRITE UR QL STRIP: NEGATIVE
NITRITE UR QL STRIP: NEGATIVE
NRBC BLD AUTO-RTO: 0 /100 WBC (ref 0–0.2)
OSMOLALITY UR: 267 MOSM/KG (ref 38–1400)
PH UR STRIP.AUTO: <=5 [PH] (ref 5–9)
PH UR STRIP.AUTO: <=5 [PH] (ref 5–9)
PLATELET # BLD AUTO: 250 10*3/MM3 (ref 140–450)
PMV BLD AUTO: 11.4 FL (ref 6–12)
POTASSIUM BLD-SCNC: 5.6 MMOL/L (ref 3.5–5.2)
PROT SERPL-MCNC: 8.1 G/DL (ref 6–8.5)
PROT UR QL STRIP: NEGATIVE
PROT UR QL STRIP: NEGATIVE
RBC # BLD AUTO: 4.21 10*6/MM3 (ref 3.77–5.28)
RBC # UR: ABNORMAL /HPF
RBC # UR: ABNORMAL /HPF
REF LAB TEST METHOD: ABNORMAL
REF LAB TEST METHOD: ABNORMAL
SODIUM BLD-SCNC: 133 MMOL/L (ref 136–145)
SODIUM UR-SCNC: 56 MMOL/L
SP GR UR STRIP: 1.01 (ref 1–1.03)
SP GR UR STRIP: 1.01 (ref 1–1.03)
SQUAMOUS #/AREA URNS HPF: ABNORMAL /HPF
SQUAMOUS #/AREA URNS HPF: ABNORMAL /HPF
UROBILINOGEN UR QL STRIP: ABNORMAL
UROBILINOGEN UR QL STRIP: ABNORMAL
WBC NRBC COR # BLD: 8.02 10*3/MM3 (ref 3.4–10.8)
WBC UR QL AUTO: ABNORMAL /HPF
WBC UR QL AUTO: ABNORMAL /HPF
WHOLE BLOOD HOLD SPECIMEN: NORMAL
WHOLE BLOOD HOLD SPECIMEN: NORMAL

## 2019-05-14 PROCEDURE — 81001 URINALYSIS AUTO W/SCOPE: CPT | Performed by: FAMILY MEDICINE

## 2019-05-14 PROCEDURE — 83935 ASSAY OF URINE OSMOLALITY: CPT | Performed by: NURSE PRACTITIONER

## 2019-05-14 PROCEDURE — 63710000001 INSULIN REGULAR HUMAN PER 5 UNITS: Performed by: NURSE PRACTITIONER

## 2019-05-14 PROCEDURE — 84300 ASSAY OF URINE SODIUM: CPT | Performed by: NURSE PRACTITIONER

## 2019-05-14 PROCEDURE — 87205 SMEAR GRAM STAIN: CPT | Performed by: NURSE PRACTITIONER

## 2019-05-14 PROCEDURE — 25010000002 CALCIUM GLUCONATE PER 10 ML: Performed by: NURSE PRACTITIONER

## 2019-05-14 PROCEDURE — 80053 COMPREHEN METABOLIC PANEL: CPT | Performed by: FAMILY MEDICINE

## 2019-05-14 PROCEDURE — 85025 COMPLETE CBC W/AUTO DIFF WBC: CPT | Performed by: FAMILY MEDICINE

## 2019-05-14 PROCEDURE — 99284 EMERGENCY DEPT VISIT MOD MDM: CPT

## 2019-05-14 PROCEDURE — 82962 GLUCOSE BLOOD TEST: CPT

## 2019-05-14 PROCEDURE — 81001 URINALYSIS AUTO W/SCOPE: CPT | Performed by: NURSE PRACTITIONER

## 2019-05-14 RX ORDER — SODIUM CHLORIDE 0.9 % (FLUSH) 0.9 %
10 SYRINGE (ML) INJECTION AS NEEDED
Status: DISCONTINUED | OUTPATIENT
Start: 2019-05-14 | End: 2019-05-19 | Stop reason: HOSPADM

## 2019-05-14 RX ORDER — ONDANSETRON 2 MG/ML
4 INJECTION INTRAMUSCULAR; INTRAVENOUS EVERY 6 HOURS PRN
Status: DISCONTINUED | OUTPATIENT
Start: 2019-05-14 | End: 2019-05-19 | Stop reason: HOSPADM

## 2019-05-14 RX ORDER — SODIUM CHLORIDE 0.9 % (FLUSH) 0.9 %
3 SYRINGE (ML) INJECTION EVERY 12 HOURS SCHEDULED
Status: DISCONTINUED | OUTPATIENT
Start: 2019-05-14 | End: 2019-05-19 | Stop reason: HOSPADM

## 2019-05-14 RX ORDER — SODIUM CHLORIDE 9 MG/ML
125 INJECTION, SOLUTION INTRAVENOUS CONTINUOUS
Status: DISCONTINUED | OUTPATIENT
Start: 2019-05-14 | End: 2019-05-15

## 2019-05-14 RX ORDER — PANTOPRAZOLE SODIUM 40 MG/1
40 TABLET, DELAYED RELEASE ORAL DAILY
Status: DISCONTINUED | OUTPATIENT
Start: 2019-05-15 | End: 2019-05-19 | Stop reason: HOSPADM

## 2019-05-14 RX ORDER — GABAPENTIN 100 MG/1
100 CAPSULE ORAL EVERY 8 HOURS SCHEDULED
Status: DISCONTINUED | OUTPATIENT
Start: 2019-05-14 | End: 2019-05-19 | Stop reason: HOSPADM

## 2019-05-14 RX ORDER — DICYCLOMINE HYDROCHLORIDE 10 MG/1
10 CAPSULE ORAL 3 TIMES DAILY
Status: DISCONTINUED | OUTPATIENT
Start: 2019-05-14 | End: 2019-05-19 | Stop reason: HOSPADM

## 2019-05-14 RX ORDER — BUDESONIDE 0.5 MG/2ML
0.5 INHALANT ORAL
Status: DISCONTINUED | OUTPATIENT
Start: 2019-05-14 | End: 2019-05-19 | Stop reason: HOSPADM

## 2019-05-14 RX ORDER — TRAMADOL HYDROCHLORIDE 50 MG/1
50 TABLET ORAL DAILY
Status: DISCONTINUED | OUTPATIENT
Start: 2019-05-15 | End: 2019-05-19 | Stop reason: HOSPADM

## 2019-05-14 RX ORDER — BUPROPION HYDROCHLORIDE 150 MG/1
150 TABLET, EXTENDED RELEASE ORAL 2 TIMES DAILY
Status: DISCONTINUED | OUTPATIENT
Start: 2019-05-14 | End: 2019-05-19 | Stop reason: HOSPADM

## 2019-05-14 RX ORDER — OXYBUTYNIN CHLORIDE 5 MG/1
5 TABLET ORAL 2 TIMES DAILY
Status: DISCONTINUED | OUTPATIENT
Start: 2019-05-14 | End: 2019-05-17

## 2019-05-14 RX ORDER — ALBUTEROL SULFATE 2.5 MG/3ML
2.5 SOLUTION RESPIRATORY (INHALATION) EVERY 4 HOURS PRN
Status: DISCONTINUED | OUTPATIENT
Start: 2019-05-14 | End: 2019-05-19 | Stop reason: HOSPADM

## 2019-05-14 RX ORDER — SODIUM CHLORIDE 0.9 % (FLUSH) 0.9 %
3-10 SYRINGE (ML) INJECTION AS NEEDED
Status: DISCONTINUED | OUTPATIENT
Start: 2019-05-14 | End: 2019-05-19 | Stop reason: HOSPADM

## 2019-05-14 RX ORDER — ACETAMINOPHEN 325 MG/1
650 TABLET ORAL EVERY 4 HOURS PRN
Status: DISCONTINUED | OUTPATIENT
Start: 2019-05-14 | End: 2019-05-19 | Stop reason: HOSPADM

## 2019-05-14 RX ORDER — ROPINIROLE 0.25 MG/1
0.25 TABLET, FILM COATED ORAL NIGHTLY
Status: DISCONTINUED | OUTPATIENT
Start: 2019-05-14 | End: 2019-05-19 | Stop reason: HOSPADM

## 2019-05-14 RX ORDER — ISOSORBIDE MONONITRATE 30 MG/1
30 TABLET, EXTENDED RELEASE ORAL DAILY
Status: DISCONTINUED | OUTPATIENT
Start: 2019-05-15 | End: 2019-05-17

## 2019-05-14 RX ORDER — DEXTROSE MONOHYDRATE 25 G/50ML
50 INJECTION, SOLUTION INTRAVENOUS ONCE
Status: COMPLETED | OUTPATIENT
Start: 2019-05-14 | End: 2019-05-14

## 2019-05-14 RX ADMIN — DEXTROSE MONOHYDRATE 50 ML: 25 INJECTION, SOLUTION INTRAVENOUS at 22:38

## 2019-05-14 RX ADMIN — HUMAN INSULIN 10 UNITS: 100 INJECTION, SOLUTION SUBCUTANEOUS at 22:39

## 2019-05-14 RX ADMIN — SODIUM CHLORIDE, PRESERVATIVE FREE 3 ML: 5 INJECTION INTRAVENOUS at 22:56

## 2019-05-14 RX ADMIN — ROPINIROLE HYDROCHLORIDE 0.25 MG: 0.25 TABLET, FILM COATED ORAL at 22:55

## 2019-05-14 RX ADMIN — GABAPENTIN 100 MG: 100 CAPSULE ORAL at 22:55

## 2019-05-14 RX ADMIN — SODIUM BICARBONATE 50 MEQ: 84 INJECTION, SOLUTION INTRAVENOUS at 22:39

## 2019-05-14 RX ADMIN — SODIUM CHLORIDE 1000 ML: 900 INJECTION, SOLUTION INTRAVENOUS at 18:26

## 2019-05-14 RX ADMIN — BUPROPION HYDROCHLORIDE 150 MG: 150 TABLET, EXTENDED RELEASE ORAL at 22:55

## 2019-05-14 RX ADMIN — SODIUM CHLORIDE 125 ML/HR: 900 INJECTION, SOLUTION INTRAVENOUS at 18:21

## 2019-05-14 RX ADMIN — CALCIUM GLUCONATE 1 G: 98 INJECTION, SOLUTION INTRAVENOUS at 22:37

## 2019-05-14 RX ADMIN — OXYBUTYNIN CHLORIDE 5 MG: 5 TABLET ORAL at 22:55

## 2019-05-14 RX ADMIN — DICYCLOMINE HYDROCHLORIDE 10 MG: 10 CAPSULE ORAL at 22:56

## 2019-05-14 NOTE — ED PROVIDER NOTES
"Subjective   Pt states that she started dieting and consuming \"protein drinks\" for preparation of her gastric sleeve surgery to be done in 2 weeks. Diet was started 8 days ago. According to patient, she went to [facility] 5 days ago and had a creatinine of 3.89 (5/9/19) and stopped her protein drinks and was seen by Alethea Ash yesterday and creatinine was 6.         Abnormal Lab   Severity:  Moderate  Onset quality:  Unable to specify  Duration:  1 week  Timing:  Constant  Progression:  Worsening  Chronicity:  New  Relieved by:  Noting  Worsened by:  Diet  Ineffective treatments:  None tried  Associated symptoms: diarrhea and fatigue    Associated symptoms: no abdominal pain, no chest pain, no congestion, no cough, no ear pain, no fever, no headaches, no myalgias, no nausea, no rash, no rhinorrhea, no shortness of breath, no sore throat, no vomiting and no wheezing        Review of Systems   Constitutional: Positive for activity change, appetite change and fatigue. Negative for chills, diaphoresis and fever.   HENT: Negative for congestion, ear discharge, ear pain, nosebleeds, rhinorrhea, sinus pressure, sore throat and trouble swallowing.    Eyes: Negative for discharge and redness.   Respiratory: Negative for apnea, cough, chest tightness, shortness of breath and wheezing.    Cardiovascular: Negative for chest pain.   Gastrointestinal: Positive for diarrhea. Negative for abdominal pain, nausea and vomiting.   Endocrine: Negative for polyuria.   Genitourinary: Negative for dysuria, frequency and urgency.   Musculoskeletal: Negative for myalgias and neck pain.   Skin: Negative for color change and rash.   Allergic/Immunologic: Negative for immunocompromised state.   Neurological: Positive for dizziness and weakness. Negative for seizures, syncope, light-headedness and headaches.   Hematological: Negative for adenopathy. Does not bruise/bleed easily.   Psychiatric/Behavioral: Negative for behavioral problems and " confusion.   All other systems reviewed and are negative.      Past Medical History:   Diagnosis Date   • Acid reflux    • Anxiety    • Depressed    • Dysphagia    • GERD (gastroesophageal reflux disease)    • Hard to intubate    • Hyperlipidemia    • Hypertension    • IBS (irritable bowel syndrome)    • Nausea    • Sleep apnea        Allergies   Allergen Reactions   • Sulfa Antibiotics Anaphylaxis and GI Intolerance       Past Surgical History:   Procedure Laterality Date   • ABDOMINAL SURGERY     • CARDIAC CATHETERIZATION N/A 8/13/2018    Procedure: Left Heart Cath 8/13/2018 @ 9;00;  Surgeon: Kuldip Frank MD;  Location: [facility] CATH INVASIVE LOCATION;  Service: Cardiology   • COLONOSCOPY N/A 11/19/2018    Procedure: COLONOSCOPY;  Surgeon: Bro Whitaker MD;  Location: [facility] ENDOSCOPY;  Service: Gastroenterology   • CYSTOSCOPY     • ENDOMETRIAL ABLATION  2015   • ENDOSCOPY N/A 11/19/2018    Procedure: ESOPHAGOGASTRODUODENOSCOPYwith dilation;  Surgeon: Bro Whitaker MD;  Location: [facility] ENDOSCOPY;  Service: Gastroenterology   • LAPAROSCOPIC TUBAL LIGATION  1995   • NV ERCP DX COLLECTION SPECIMEN BRUSHING/WASHING Left 7/31/2017    Procedure: ENDOSCOPIC RETROGRADE CHOLANGIOPANCREATOGRAPHY;  Surgeon: Samuel Redding DO;  Location: [facility] ENDOSCOPY;  Service: Gastroenterology   • NV LAP,CHOLECYSTECTOMY N/A 7/24/2017    Procedure: CHOLECYSTECTOMY LAPAROSCOPIC, also umbillical hernia repair;  Surgeon: Pk العلي MD;  Location: [facility] OR;  Service: General   • UPPER GASTROINTESTINAL ENDOSCOPY  11/19/2018       Family History   Problem Relation Age of Onset   • Diabetes Mother    • Hypertension Mother    • Cirrhosis Mother    • Hypertension Father        Social History     Socioeconomic History   • Marital status:      Spouse name: Not on file   • Number of children: Not on file   • Years of education: Not on file   • Highest education level: Not on file   Tobacco Use   • Smoking status: Never Smoker    • Smokeless tobacco: Never Used   Substance and Sexual Activity   • Alcohol use: Yes     Comment: socially   • Drug use: No   • Sexual activity: Defer     Birth control/protection: Surgical           Objective   Physical Exam   Constitutional: She is oriented to person, place, and time. She appears well-developed and well-nourished.   HENT:   Head: Normocephalic and atraumatic.   Nose: Nose normal.   Mouth/Throat: Oropharynx is clear and moist.   Eyes: Conjunctivae and EOM are normal. Pupils are equal, round, and reactive to light. Right eye exhibits no discharge. Left eye exhibits no discharge. No scleral icterus.   Neck: Normal range of motion. Neck supple. No tracheal deviation present.   Cardiovascular: Normal rate, regular rhythm and normal heart sounds.   No murmur heard.  Pulmonary/Chest: Effort normal and breath sounds normal. No stridor. No respiratory distress. She has no wheezes. She has no rales.   Abdominal: Soft. Bowel sounds are normal. She exhibits no distension and no mass. There is no tenderness. There is no rebound and no guarding.   Musculoskeletal: She exhibits no edema.   Neurological: She is alert and oriented to person, place, and time. Coordination normal.   Skin: Skin is warm and dry. No rash noted. No erythema.   Psychiatric: She has a normal mood and affect. Her behavior is normal. Thought content normal.   Nursing note and vitals reviewed.      Procedures           ED Course          Labs Reviewed   COMPREHENSIVE METABOLIC PANEL - Abnormal; Notable for the following components:       Result Value     (*)     Creatinine 6.05 (*)     Sodium 133 (*)     Potassium 5.6 (*)     Chloride 92 (*)     CO2 20.0 (*)     AST (SGOT) 40 (*)     Alkaline Phosphatase 36 (*)     eGFR Non  Amer 7 (*)     All other components within normal limits    Narrative:     GFR Normal >60  Chronic Kidney Disease <60  Kidney Failure <15   URINALYSIS W/ MICROSCOPIC IF INDICATED (NO CULTURE) - Abnormal;  Notable for the following components:    Appearance, UA Cloudy (*)     Glucose,  mg/dL (Trace) (*)     Blood, UA Trace (*)     Leuk Esterase, UA Small (1+) (*)     All other components within normal limits   CBC WITH AUTO DIFFERENTIAL - Abnormal; Notable for the following components:    Hemoglobin 11.4 (*)     All other components within normal limits   URINALYSIS, MICROSCOPIC ONLY - Abnormal; Notable for the following components:    RBC, UA 3-5 (*)     WBC, UA 6-12 (*)     Squamous Epithelial Cells, UA 6-12 (*)     All other components within normal limits   RAINBOW DRAW    Narrative:     The following orders were created for panel order [place] Draw.  Procedure                               Abnormality         Status                     ---------                               -----------         ------                     Light Blue Top[[id number]]                                   Final result               Green Top (Gel)[[id number]]                                  Final result               Lavender Top[[id number]]                                     Final result               Gold Top - SST[[id number]]                                   Final result                 Please view results for these tests on the individual orders.   CBC AND DIFFERENTIAL    Narrative:     The following orders were created for panel order CBC & Differential.  Procedure                               Abnormality         Status                     ---------                               -----------         ------                     CBC Auto Differential[[id number]]        Abnormal            Final result                 Please view results for these tests on the individual orders.   LIGHT BLUE TOP   GREEN TOP   LAVENDER TOP   GOLD TOP - SST       No orders to display                 MDM      Final diagnoses:   DYAN (acute kidney injury) (CMS/[facility])   Hyperkalemia            Ambrosio Siddiqui MD  05/14/19 1998

## 2019-05-14 NOTE — ED TRIAGE NOTES
"Pt states \"my kidney function went from 3 to 6 and my potassium is high. I also have E. coli.\" This nurse clarified the E. coli is in her urine.  "

## 2019-05-14 NOTE — H&P
[facility] Medicine Admission      Date of Admission: 5/14/2019      Primary Care Physician: Alethea Ash MD      Chief Complaint: abnormal labs    HPI:48 year old  female with past medical history of GERD, HTN, HLD, IBS, ELSIE, morbid obesity with BMI of 50.90 who presented on 5/14/19 with elevated creatinine.  The patient reports that she is in the preparation phases of weight loss surgery with Dr. Sunshine in [place].  She reports that she was seen last week in the weight loss clinic and noted to have creatinine level of 3.  She reports being on a high protein diet prior to this appointment and was instructed to stop taking her protein shakes at that time.  The patient reports she has been mostly eating a diet of boiled eggs and chicken since Thursday.  She was also noted to have E. coli in her urine at this appointment as well and she reports being started on Cipro for treatment.  The patient went to her primary care provider's office yesterday for follow-up and labs were repeated and creatinine is now 6.  The patient denies any complaints other than fatigue and decreased urine output.    Concurrent Medical History:  has a past medical history of Acid reflux, Anxiety, Depressed, Dysphagia, GERD (gastroesophageal reflux disease), Hard to intubate, Hyperlipidemia, Hypertension, IBS (irritable bowel syndrome), Nausea, and Sleep apnea.    Past Surgical History:  has a past surgical history that includes Laparoscopic tubal ligation (1995); Endometrial ablation (2015); Cystoscopy; pr lap,cholecystectomy (N/A, 7/24/2017); Abdominal surgery; pr ercp dx collection specimen brushing/washing (Left, 7/31/2017); Cardiac catheterization (N/A, 8/13/2018); Esophagogastroduodenoscopy (N/A, 11/19/2018); Colonoscopy (N/A, 11/19/2018); and Upper gastrointestinal endoscopy (11/19/2018).    Family History: family history includes Cirrhosis in her mother; Diabetes in her  mother; Hypertension in her father and mother.    Social History:  reports that she has never smoked. She has never used smokeless tobacco. She reports that she drinks alcohol. She reports that she does not use drugs.    Allergies:   Allergies   Allergen Reactions   • Sulfa Antibiotics Anaphylaxis and GI Intolerance       Medications:   Prior to Admission medications    Medication Sig Start Date End Date Taking? Authorizing Provider   albuterol sulfate  (90 Base) MCG/ACT inhaler Inhale 2 puffs Every 4 (Four) Hours As Needed for Wheezing or Shortness of Air. 3/28/19  Yes Rachel Enciso MD   buPROPion SR (WELLBUTRIN SR) 150 MG 12 hr tablet Take 150 mg by mouth 2 (Two) Times a Day. 3/22/19  Yes ProviderHowie MD   Calcium Carb-Cholecalciferol (CALCIUM 1000 + D) 1000-800 MG-UNIT tablet Take  by mouth.   Yes ProviderHowie MD   dicyclomine (BENTYL) 10 MG capsule Take 10 mg by mouth 3 (Three) Times a Day. 9/19/18  Yes Emergency, Nurse Epic, RN   fenofibrate (TRICOR) 145 MG tablet Take 145 mg by mouth Daily. 3/5/19  Yes ProviderHowie MD   Fluticasone Furoate 200 MCG/ACT aerosol powder  Inhale 1 puff Daily. Rinse mouth well after use 1/18/19  Yes Rachel Enciso MD   furosemide (LASIX) 40 MG tablet Take 40 mg by mouth Daily. 3/11/19  Yes ProviderHowie MD   gabapentin (NEURONTIN) 100 MG capsule Take 100 mg by mouth 3 (Three) Times a Day.   Yes ProviderHowie MD   isosorbide mononitrate (IMDUR) 30 MG 24 hr tablet Take 30 mg by mouth Daily.   Yes ProviderHowie MD   lisinopril (PRINIVIL,ZESTRIL) 20 MG tablet Take 20 mg by mouth Daily. 1/2/18  Yes Howie Phillips MD   methocarbamol (ROBAXIN) 500 MG tablet Take 500 mg by mouth 2 (Two) Times a Day. 10/4/18  Yes Howie Phillips MD   oxybutynin (DITROPAN) 5 MG tablet Take 5 mg by mouth 2 (Two) Times a Day.   Yes Howie Phillips MD   pantoprazole (PROTONIX) 40 MG EC tablet Take 40 mg by mouth Daily. 7/10/17   Yes Howie Phillips MD   rOPINIRole (REQUIP) 0.25 MG tablet Take 0.25 mg by mouth Every Night. 2/6/18  Yes Emergency, Nurse Epic, RN   Sertraline HCl (ZOLOFT PO) Take 150 mg by mouth Daily.   Yes Howie Phillips MD   spironolactone (ALDACTONE) 25 MG tablet Take 25 mg by mouth Daily. Alternates days with lasix. 12/20/18  Yes Emergency, Nurse MARCELA Iyer   traMADol (ULTRAM) 50 MG tablet Take 50 mg by mouth Daily. 3/1/19  Yes Howie Phillips MD   amoxicillin (AMOXIL) 500 MG capsule Take 1 capsule by mouth 3 (Three) Times a Day. 3/30/19   Rk Wiggins MD   guaiFENesin (MUCINEX) 600 MG 12 hr tablet Take 1 tablet by mouth 2 (Two) Times a Day. 3/30/19   Rk Wiggins MD   naproxen (EC NAPROSYN) 500 MG EC tablet Take 1 tablet by mouth 2 (Two) Times a Day As Needed for Mild Pain . 3/30/19   Rk Wiggins MD   naproxen (NAPROSYN) 500 MG tablet Take 500 mg by mouth 2 (Two) Times a Day As Needed for Mild Pain . 1/2/18   ProviderHowie MD       Review of Systems:  Review of Systems   Constitutional: Positive for fatigue. Negative for chills and fever.   Respiratory: Negative for cough, shortness of breath and wheezing.    Cardiovascular: Negative for chest pain and palpitations.   Gastrointestinal: Negative for abdominal pain, nausea and vomiting.   Genitourinary: Positive for decreased urine volume, difficulty urinating and dysuria. Negative for urgency.   Musculoskeletal: Negative for back pain and neck pain.   Skin: Negative for pallor.   Neurological: Positive for weakness. Negative for dizziness.   Psychiatric/Behavioral: Negative for confusion. The patient is not nervous/anxious.             Physical Exam:   Temp:  [97.7 °F (36.5 °C)] 97.7 °F (36.5 °C)  Heart Rate:  [62-79] 62  Resp:  [20] 20  BP: (104-113)/(44-51) 104/44  Physical Exam   Constitutional: She is oriented to person, place, and time. She appears well-developed and well-nourished.   HENT:   Head: Normocephalic and  atraumatic.   Eyes: Conjunctivae are normal.   Neck: Normal range of motion. Neck supple.   Cardiovascular: Normal rate, normal heart sounds and intact distal pulses.   Pulmonary/Chest: Effort normal and breath sounds normal.   Abdominal: Soft. Bowel sounds are normal.   Musculoskeletal: Normal range of motion. She exhibits no edema.   Neurological: She is alert and oriented to person, place, and time.   Skin: Skin is warm and dry.   Psychiatric: She has a normal mood and affect. Her behavior is normal.   Nursing note and vitals reviewed.        Results Reviewed:  I have personally reviewed current lab, radiology, and data and agree with results.  Lab Results (last 24 hours)     Procedure Component Value Units Date/Time    [place] Draw [[id number]] Collected:  05/14/19 1735    Specimen:  Blood Updated:  05/14/19 1846    Narrative:       The following orders were created for panel order [place] Draw.  Procedure                               Abnormality         Status                     ---------                               -----------         ------                     Light Blue Top[[id number]]                                   Final result               Green Top (Gel)[[id number]]                                  Final result               Lavender Top[[id number]]                                     Final result               Gold Top - SST[[id number]]                                   Final result                 Please view results for these tests on the individual orders.    Light Blue Top [[id number]] Collected:  05/14/19 1735    Specimen:  Blood Updated:  05/14/19 1846     Extra Tube hold for add-on     Comment: Auto resulted       Green Top (Gel) [[id number]] Collected:  05/14/19 1735    Specimen:  Blood Updated:  05/14/19 1846     Extra Tube Hold for add-ons.     Comment: Auto resulted.       Lavender Top [[id number]] Collected:  05/14/19 1735    Specimen:  Blood Updated:  05/14/19 1846     Extra Tube hold for  add-on     Comment: Auto resulted       Gold Top - SST [[id number]] Collected:  05/14/19 1735    Specimen:  Blood Updated:  05/14/19 1846     Extra Tube Hold for add-ons.     Comment: Auto resulted.       Comprehensive Metabolic Panel [[id number]]  (Abnormal) Collected:  05/14/19 1735    Specimen:  Blood Updated:  05/14/19 1807     Glucose 77 mg/dL       mg/dL      Creatinine 6.05 mg/dL      Sodium 133 mmol/L      Potassium 5.6 mmol/L      Chloride 92 mmol/L      CO2 20.0 mmol/L      Calcium 10.3 mg/dL      Total Protein 8.1 g/dL      Albumin 4.30 g/dL      ALT (SGPT) 26 U/L      AST (SGOT) 40 U/L      Alkaline Phosphatase 36 U/L      Total Bilirubin 0.3 mg/dL      eGFR Non African Amer 7 mL/min/1.73      Comment: <15 Indicative of kidney failure.        eGFR   Amer -- mL/min/1.73      Comment: <15 Indicative of kidney failure.        Globulin 3.8 gm/dL      A/G Ratio 1.1 g/dL      BUN/Creatinine Ratio 17.2     Anion Gap 21.0 mmol/L     Narrative:       GFR Normal >60  Chronic Kidney Disease <60  Kidney Failure <15    Urinalysis, Microscopic Only - Urine, Clean Catch [[id number]]  (Abnormal) Collected:  05/14/19 1736    Specimen:  Urine, Clean Catch Updated:  05/14/19 1750     RBC, UA 3-5 /HPF      WBC, UA 6-12 /HPF      Bacteria, UA None Seen /HPF      Squamous Epithelial Cells, UA 6-12 /HPF      Hyaline Casts, UA 0-2 /LPF      Methodology Automated Microscopy    Urinalysis With Microscopic If Indicated (No Culture) - Urine, Clean Catch [[id number]]  (Abnormal) Collected:  05/14/19 1736    Specimen:  Urine, Clean Catch Updated:  05/14/19 1748     Color, UA Yellow     Appearance, UA Cloudy     pH, UA <=5.0     Specific Gravity, UA 1.011     Glucose,  mg/dL (Trace)     Ketones, UA Negative     Bilirubin, UA Negative     Blood, UA Trace     Protein, UA Negative     Leuk Esterase, UA Small (1+)     Nitrite, UA Negative     Urobilinogen, UA 0.2 E.U./dL    CBC & Differential [[id number]] Collected:   05/14/19 1735    Specimen:  Blood Updated:  05/14/19 1745    Narrative:       The following orders were created for panel order CBC & Differential.  Procedure                               Abnormality         Status                     ---------                               -----------         ------                     CBC Auto Differential[[id number]]        Abnormal            Final result                 Please view results for these tests on the individual orders.    CBC Auto Differential [[id number]]  (Abnormal) Collected:  05/14/19 1735    Specimen:  Blood Updated:  05/14/19 1745     WBC 8.02 10*3/mm3      RBC 4.21 10*6/mm3      Hemoglobin 11.4 g/dL      Hematocrit 34.5 %      MCV 81.9 fL      MCH 27.1 pg      MCHC 33.0 g/dL      RDW 14.2 %      RDW-SD 41.9 fl      MPV 11.4 fL      Platelets 250 10*3/mm3      Neutrophil % 67.4 %      Lymphocyte % 23.8 %      Monocyte % 7.4 %      Eosinophil % 1.1 %      Basophil % 0.1 %      Immature Grans % 0.2 %      Neutrophils, Absolute 5.40 10*3/mm3      Lymphocytes, Absolute 1.91 10*3/mm3      Monocytes, Absolute 0.59 10*3/mm3      Eosinophils, Absolute 0.09 10*3/mm3      Basophils, Absolute 0.01 10*3/mm3      Immature Grans, Absolute 0.02 10*3/mm3      nRBC 0.0 /100 WBC         Imaging Results (last 24 hours)     ** No results found for the last 24 hours. **            Assessment:    Active Hospital Problems    Diagnosis   • **Acute kidney injury (CMS/[facility])   • DYAN (acute kidney injury) (CMS/[facility])   • Hypertension   • Hyperlipidemia   • ELSIE on CPAP   • Gastroesophageal reflux disease with esophagitis     Added automatically from request for surgery [id number]     • Restrictive lung disease secondary to obesity   • Morbid obesity with BMI of 50.0-59.9, adult (CMS/[facility])             Plan:  #1 acute kidney injury: IV hydration, nephrology consulted.  Renal ultrasound ordered.  Dario stain.  Lasix, Aldactone, Lisinopril held. Recheck BMP in AM.   2. Hyperkalemia: insulin,  dextrose, calcium, bicarb ordered.  Recheck BMP in AM.    3. HTN: Lisinopril, Lasix, Aldactone held.    4. HLD:   5. Morbid obesity with BMI of 50.90: dietitian consult.  Patient scheduled for weight loss surgery with Dr. Sunshine in [place] next week.    Further orders will depend upon hospital course.  Plan of care discussed with patient.  CODE STATUS confirmed and patient wishes for full CODE STATUS.          This document has been electronically signed by MARIA TERESA Oleary on May 14, 2019 6:53 PM

## 2019-05-14 NOTE — ED NOTES
Patient is 5 days away from gastric surgery.  Patient had labs done yesterday and was called today and told to come here.  Elevated kidney enzyme and potassium.      Lore Franz RN  05/14/19 [id number]

## 2019-05-15 ENCOUNTER — APPOINTMENT (OUTPATIENT)
Dept: ULTRASOUND IMAGING | Facility: HOSPITAL | Age: 49
End: 2019-05-15

## 2019-05-15 LAB
ANION GAP SERPL CALCULATED.3IONS-SCNC: 16 MMOL/L
BASOPHILS # BLD AUTO: 0.02 10*3/MM3 (ref 0–0.2)
BASOPHILS NFR BLD AUTO: 0.3 % (ref 0–1.5)
BUN BLD-MCNC: 94 MG/DL (ref 6–20)
BUN/CREAT SERPL: 19.1 (ref 7–25)
CALCIUM SPEC-SCNC: 9.4 MG/DL (ref 8.6–10.5)
CHLORIDE SERPL-SCNC: 100 MMOL/L (ref 98–107)
CO2 SERPL-SCNC: 20 MMOL/L (ref 22–29)
CREAT BLD-MCNC: 4.92 MG/DL (ref 0.57–1)
DEPRECATED RDW RBC AUTO: 41.5 FL (ref 37–54)
EOSINOPHIL # BLD AUTO: 0.07 10*3/MM3 (ref 0–0.4)
EOSINOPHIL NFR BLD AUTO: 1.2 % (ref 0.3–6.2)
ERYTHROCYTE [DISTWIDTH] IN BLOOD BY AUTOMATED COUNT: 14.1 % (ref 12.3–15.4)
GFR SERPL CREATININE-BSD FRML MDRD: 9 ML/MIN/1.73
GFR SERPL CREATININE-BSD FRML MDRD: ABNORMAL ML/MIN/1.73
GLUCOSE BLD-MCNC: 83 MG/DL (ref 65–99)
HANSEL STAIN: NEGATIVE
HCT VFR BLD AUTO: 29.9 % (ref 34–46.6)
HGB BLD-MCNC: 9.8 G/DL (ref 12–15.9)
IMM GRANULOCYTES # BLD AUTO: 0.02 10*3/MM3 (ref 0–0.05)
IMM GRANULOCYTES NFR BLD AUTO: 0.3 % (ref 0–0.5)
LYMPHOCYTES # BLD AUTO: 1.74 10*3/MM3 (ref 0.7–3.1)
LYMPHOCYTES NFR BLD AUTO: 29.3 % (ref 19.6–45.3)
MCH RBC QN AUTO: 26.8 PG (ref 26.6–33)
MCHC RBC AUTO-ENTMCNC: 32.8 G/DL (ref 31.5–35.7)
MCV RBC AUTO: 81.7 FL (ref 79–97)
MONOCYTES # BLD AUTO: 0.56 10*3/MM3 (ref 0.1–0.9)
MONOCYTES NFR BLD AUTO: 9.4 % (ref 5–12)
NEUTROPHILS # BLD AUTO: 3.52 10*3/MM3 (ref 1.7–7)
NEUTROPHILS NFR BLD AUTO: 59.5 % (ref 42.7–76)
NRBC BLD AUTO-RTO: 0 /100 WBC (ref 0–0.2)
PLATELET # BLD AUTO: 208 10*3/MM3 (ref 140–450)
PMV BLD AUTO: 11.4 FL (ref 6–12)
POTASSIUM BLD-SCNC: 5.8 MMOL/L (ref 3.5–5.2)
RBC # BLD AUTO: 3.66 10*6/MM3 (ref 3.77–5.28)
SODIUM BLD-SCNC: 136 MMOL/L (ref 136–145)
WBC NRBC COR # BLD: 5.93 10*3/MM3 (ref 3.4–10.8)

## 2019-05-15 PROCEDURE — 76775 US EXAM ABDO BACK WALL LIM: CPT

## 2019-05-15 PROCEDURE — 87205 SMEAR GRAM STAIN: CPT | Performed by: INTERNAL MEDICINE

## 2019-05-15 PROCEDURE — 85025 COMPLETE CBC W/AUTO DIFF WBC: CPT | Performed by: NURSE PRACTITIONER

## 2019-05-15 PROCEDURE — 80048 BASIC METABOLIC PNL TOTAL CA: CPT | Performed by: NURSE PRACTITIONER

## 2019-05-15 PROCEDURE — 25010000002 CEFTRIAXONE PER 250 MG: Performed by: FAMILY MEDICINE

## 2019-05-15 PROCEDURE — 94799 UNLISTED PULMONARY SVC/PX: CPT

## 2019-05-15 PROCEDURE — 94640 AIRWAY INHALATION TREATMENT: CPT

## 2019-05-15 PROCEDURE — 94760 N-INVAS EAR/PLS OXIMETRY 1: CPT

## 2019-05-15 RX ORDER — DOCUSATE SODIUM 100 MG/1
100 CAPSULE, LIQUID FILLED ORAL 2 TIMES DAILY
Status: DISCONTINUED | OUTPATIENT
Start: 2019-05-15 | End: 2019-05-19 | Stop reason: HOSPADM

## 2019-05-15 RX ORDER — NYSTATIN 100000 [USP'U]/G
POWDER TOPICAL EVERY 12 HOURS SCHEDULED
Status: DISCONTINUED | OUTPATIENT
Start: 2019-05-15 | End: 2019-05-19 | Stop reason: HOSPADM

## 2019-05-15 RX ADMIN — GABAPENTIN 100 MG: 100 CAPSULE ORAL at 16:37

## 2019-05-15 RX ADMIN — ISOSORBIDE MONONITRATE 30 MG: 30 TABLET, EXTENDED RELEASE ORAL at 11:06

## 2019-05-15 RX ADMIN — SERTRALINE HYDROCHLORIDE 150 MG: 50 TABLET ORAL at 11:06

## 2019-05-15 RX ADMIN — DICYCLOMINE HYDROCHLORIDE 10 MG: 10 CAPSULE ORAL at 11:06

## 2019-05-15 RX ADMIN — TRAMADOL HYDROCHLORIDE 50 MG: 50 TABLET, FILM COATED ORAL at 00:21

## 2019-05-15 RX ADMIN — TRAMADOL HYDROCHLORIDE 50 MG: 50 TABLET, FILM COATED ORAL at 20:55

## 2019-05-15 RX ADMIN — SODIUM BICARBONATE 150 ML/HR: 84 INJECTION, SOLUTION INTRAVENOUS at 11:07

## 2019-05-15 RX ADMIN — OXYBUTYNIN CHLORIDE 5 MG: 5 TABLET ORAL at 11:06

## 2019-05-15 RX ADMIN — GABAPENTIN 100 MG: 100 CAPSULE ORAL at 21:01

## 2019-05-15 RX ADMIN — OXYBUTYNIN CHLORIDE 5 MG: 5 TABLET ORAL at 20:54

## 2019-05-15 RX ADMIN — SODIUM CHLORIDE, PRESERVATIVE FREE 3 ML: 5 INJECTION INTRAVENOUS at 11:12

## 2019-05-15 RX ADMIN — ROPINIROLE HYDROCHLORIDE 0.25 MG: 0.25 TABLET, FILM COATED ORAL at 20:54

## 2019-05-15 RX ADMIN — GABAPENTIN 100 MG: 100 CAPSULE ORAL at 06:26

## 2019-05-15 RX ADMIN — PANTOPRAZOLE SODIUM 40 MG: 40 TABLET, DELAYED RELEASE ORAL at 11:06

## 2019-05-15 RX ADMIN — BUPROPION HYDROCHLORIDE 150 MG: 150 TABLET, EXTENDED RELEASE ORAL at 20:54

## 2019-05-15 RX ADMIN — DICYCLOMINE HYDROCHLORIDE 10 MG: 10 CAPSULE ORAL at 20:54

## 2019-05-15 RX ADMIN — BUDESONIDE 0.5 MG: 0.5 INHALANT RESPIRATORY (INHALATION) at 08:12

## 2019-05-15 RX ADMIN — NYSTATIN: 100000 POWDER TOPICAL at 11:07

## 2019-05-15 RX ADMIN — DOCUSATE SODIUM 100 MG: 100 CAPSULE, LIQUID FILLED ORAL at 20:54

## 2019-05-15 RX ADMIN — NYSTATIN: 100000 POWDER TOPICAL at 20:55

## 2019-05-15 RX ADMIN — CEFTRIAXONE SODIUM 1 G: 1 INJECTION, POWDER, FOR SOLUTION INTRAMUSCULAR; INTRAVENOUS at 06:26

## 2019-05-15 RX ADMIN — BUDESONIDE 0.5 MG: 0.5 INHALANT RESPIRATORY (INHALATION) at 19:04

## 2019-05-15 RX ADMIN — SODIUM BICARBONATE 150 ML/HR: 84 INJECTION, SOLUTION INTRAVENOUS at 20:55

## 2019-05-15 RX ADMIN — Medication 1 TABLET: at 11:07

## 2019-05-15 RX ADMIN — BUPROPION HYDROCHLORIDE 150 MG: 150 TABLET, EXTENDED RELEASE ORAL at 11:06

## 2019-05-15 RX ADMIN — DICYCLOMINE HYDROCHLORIDE 10 MG: 10 CAPSULE ORAL at 16:37

## 2019-05-15 NOTE — PROGRESS NOTES
[facility] Medicine Services  INPATIENT PROGRESS NOTE    Length of Stay: 1  Date of Admission: 5/14/2019  Primary Care Physician: Alethea Ash MD    Subjective   Chief Complaint:   Abnormal lab values     HPI:  48 year old  female with past medical history of GERD, HTN, HLD, IBS, ELSIE, morbid obesity with BMI of 50.90 who presented on 5/14/19 with elevated creatinine.  The patient reports that she is in the preparation phases of weight loss surgery with Dr. Sunshine in [place].  She reports that she was seen last week in the weight loss clinic and noted to have creatinine level of 3.  She reports being on a high protein diet prior to this appointment and was instructed to stop taking her protein shakes at that time.  The patient reports she has been mostly eating a diet of boiled eggs and chicken since Thursday.  She was also noted to have E. coli in her urine at this appointment as well and she reports being started on Cipro for treatment.  The patient went to her primary care provider's office yesterday for follow-up and labs were repeated and creatinine was 6.  The patient denies any complaints other than fatigue and decreased urine output.  Has L side arm/leg pain, tingling, and numbness.  Reports decrease BP in LUE.         Review of Systems   Constitutional: Positive for malaise/fatigue and weight loss (Intenitional  11 lbs ). Negative for chills and decreased appetite.   HENT: Negative for hearing loss, hoarse voice and tinnitus.    Eyes: Negative for blurred vision.   Respiratory: Negative for cough, shortness of breath and wheezing.    Cardiovascular: Negative for chest pain, claudication, leg swelling and near-syncope.   Gastrointestinal: Negative for abdominal pain and change in bowel habit.   Genitourinary: Negative for dysuria.   Musculoskeletal: Positive for back pain.   Skin: Negative for nail changes and poor wound healing.    Allergic/Immunologic: Negative for hives.   Neurological: Positive for weakness and paresthesias (Left extremities ).   Hematological: Does not bruise/bleed easily.      All pertinent negatives and positives are as above. All other systems have been reviewed and are negative unless otherwise stated.     Objective    Temp:  [96.4 °F (35.8 °C)-98 °F (36.7 °C)] 96.5 °F (35.8 °C)  Heart Rate:  [57-74] 63  Resp:  [16-22] 18  BP: ()/(39-71) 117/63    Physical Exam   Constitutional: She is oriented to person, place, and time. She appears well-nourished.   HENT:   Head: Normocephalic.   Eyes: EOM are normal. Pupils are equal, round, and reactive to light.   Neck: Neck supple. No JVD present.   Cardiovascular: Normal rate, regular rhythm and normal heart sounds.   Pulses:       Carotid pulses are 1+ on the right side, and 1+ on the left side.       Radial pulses are 2+ on the right side, and 1+ on the left side.        Dorsalis pedis pulses are 2+ on the right side, and 2+ on the left side.        Posterior tibial pulses are 2+ on the right side, and 1+ on the left side.   Left radial/brachial 1-2   Allens test refill in 5 sec    Pulmonary/Chest: Effort normal and breath sounds normal.   distant   Abdominal: Soft. Bowel sounds are normal. She exhibits no distension. There is no tenderness.   obese   Musculoskeletal: She exhibits edema (pedal < 1+). She exhibits no tenderness or deformity.   Neurological: She is alert and oriented to person, place, and time. No cranial nerve deficit or sensory deficit. She exhibits normal muscle tone.   Skin: Skin is warm. Capillary refill takes 2 to 3 seconds. No erythema. There is pallor (L hand as compared to right  No blanching ).   Psychiatric: Her behavior is normal.   Nursing note and vitals reviewed.  BP LUE 74/39  /63        Results Review:  I have reviewed the labs, radiology results, and diagnostic studies.    Laboratory Data:   Results from last 7 days   Lab Units  05/15/19  0551 05/14/19  1735   SODIUM mmol/L 136 133*   POTASSIUM mmol/L 5.8* 5.6*   CHLORIDE mmol/L 100 92*   CO2 mmol/L 20.0* 20.0*   BUN mg/dL 94* 104*   CREATININE mg/dL 4.92* 6.05*   GLUCOSE mg/dL 83 77   CALCIUM mg/dL 9.4 10.3   BILIRUBIN mg/dL  --  0.3   ALK PHOS U/L  --  36*   ALT (SGPT) U/L  --  26   AST (SGOT) U/L  --  40*   ANION GAP mmol/L 16.0 21.0     Estimated Creatinine Clearance: 19.8 mL/min (A) (by C-G formula based on SCr of 4.92 mg/dL (H)).          Results from last 7 days   Lab Units 05/15/19  0551 05/14/19  1735   WBC 10*3/mm3 5.93 8.02   HEMOGLOBIN g/dL 9.8* 11.4*   HEMATOCRIT % 29.9* 34.5   PLATELETS 10*3/mm3 208 250           Culture Data:   No results found for: BLOODCX  No results found for: URINECX  No results found for: RESPCX  No results found for: WOUNDCX  No results found for: STOOLCX  No components found for: BODYFLD    Radiology Data:   Imaging Results (last 24 hours)     Procedure Component Value Units Date/Time    US Renal Bilateral [[id number]] Collected:  05/15/19 0840     Updated:  05/15/19 0931    Narrative:       Ultrasound renal complete    HISTORY:  Acute kidney injury. Acute kidney failure.  Hyperkalemia.    Ultrasound examination of the kidneys and urinary bladder was  performed.    COMPARISON: None. Correlation CT September 18, 2018.    The right kidney measures 11.51 cm in length by 6.26 cm x 5.43 cm  transverse.  The left kidney measures 7.24 cm in length by 5.59 cm x 4.81 cm  transverse.  The kidneys are of normal echotexture.  No hydronephrosis.  No solid or cystic renal mass.    Images of the urinary bladder are unremarkable.      Impression:       CONCLUSION:  Normal study.    [id number]    Electronically signed by:  Christiano Enciso MD  5/15/2019 9:30 AM CDT  Workstation: [id number]          I have reviewed the patient's current medications.   Medication Dose/Rate, Route, Frequency Last Action   budesonide (PULMICORT) nebulizer solution 0.5 mg 0.5 mg, NEBULIZATION, BID - RT  Given: 05/15 08   buPROPion SR (WELLBUTRIN SR) 12 hr tablet 150 mg 150 mg, PO, BID Given: 05/15 1106   calcium carbonate-vitamin d 600-400 MG-UNIT per tablet 1 tablet 1 tablet, PO, Daily Given: 05/15 1107   cefTRIAXone (ROCEPHIN) 1 g/100 mL 0.9% NS (MBP) 1 g, IV, Q24H New Ba/15 06   dicyclomine (BENTYL) capsule 10 mg 10 mg, PO, TID Given: 05/15 1637   gabapentin (NEURONTIN) capsule 100 mg 100 mg, PO, Q8H Given: 05/15 1637   isosorbide mononitrate (IMDUR) 24 hr tablet 30 mg 30 mg, PO, Daily Given: 05/15 1106   nystatin (MYCOSTATIN) powder No Dose/Rate, TOP, Q12H Given: 05/15 1107   oxybutynin (DITROPAN) tablet 5 mg 5 mg, PO, BID Given: 05/15 1106   pantoprazole (PROTONIX) EC tablet 40 mg 40 mg, PO, Daily Given: 05/15 1106   rOPINIRole (REQUIP) tablet 0.25 mg 0.25 mg, PO, Nightly Given: 2255   sertraline (ZOLOFT) tablet 150 mg 150 mg, PO, Daily Given: 05/15 1106   sodium chloride 0.9 % flush 3 mL 3 mL, IV, Q12H Given: 05/15 1112   traMADol (ULTRAM) tablet 50 mg 50 mg, PO, Daily Given: 05/15 0021      Continuous     Medication Dose/Rate, Route, Frequency Last Action   sodium bicarbonate 8.4 % 75 mEq in sodium chloride 0.45 % 1,075 mL infusion (less than 75 mEq) 150 mL/hr, IV, Continuous New Ba/15 1107      PRN          Assessment/Plan     Active Hospital Problems    Diagnosis   • **Acute kidney injury (CMS/[facility])   • DYAN (acute kidney injury) (CMS/[facility])   • Hypertension   • Hyperlipidemia   • Hyperkalemia   • ELSIE on CPAP   • Gastroesophageal reflux disease with esophagitis     Added automatically from request for surgery [id number]     • Restrictive lung disease secondary to obesity   • Morbid obesity with BMI of 50.0-59.9, adult (CMS/[facility])       Plan:    DYAN with Hyperkalemia:  Nephrology consulted and input appreciated.  IVF.  US Renal WNL.  Continue renal diet.   Continue IVFs as above.  Renal diet.   HTN:  None at this time.  Parameters added to imdur.  Unequal BP bilateral UE, RBI as outpatient.     HLP:  Continue weight loss plan  Restart Tricor as pt condition improves.   ELSIE:  May use home CPAP   Morbid Obesity:  Renal diet.   Vascular Disease:  Occlusive disease with symptoms.  Recommend OP carotid US with RBI.            Discharge Planning: I expect patient to be discharged when renal function improved.            This document has been electronically signed by MARIA TERESA Storm on May 15, 2019 6:12 PM

## 2019-05-15 NOTE — CONSULTS
[facility]  [address] [place] [address]. [address]   - [phone]  F  [phone]     Consultation         PATIENT  DEMOGRAPHICS   PATIENT NAME: Maricarmen Ferris                      PHYSICIAN: Key Cota MD  : 1970  MRN: [id number]    Subjective   SUBJECTIVE   Referring Provider: Saloni Snyder  Reason for Consultation: aleksandar  History of present illness:     Ms. Ferris is a 48-year-old female who has past medical history of GERD dyslipidemia morbid obesity and hypertension who is awaiting gastric sleeve surgery from Dr. Sunshine and was on a high-protein diet prior to the surgery.  She is on Lasix and spironolactone for chronic edema.  She is also on lisinopril at home.  She has been treated with antibiotics for E.coli UTI with amoxicillin.     Patient has seen Dr. Alethea Ash and had lab done which showed creatinine of 6.21 potassium of 5.8 and carbon dioxide of 16. Case is discussed with me   Patient is therefore transferred here for further evaluation.  Overnight with IV fluid her creatinine did come down to 4.9.  Her diuretics are currently on hold.  She has 2 days of diarrhea as well.  She was taking close to 90 g of protein per day.    Past Medical History:   Diagnosis Date   • Acid reflux    • Anxiety    • Depressed    • Dysphagia    • GERD (gastroesophageal reflux disease)    • Hard to intubate    • Hyperlipidemia    • Hypertension    • IBS (irritable bowel syndrome)    • Nausea    • Sleep apnea      Past Surgical History:   Procedure Laterality Date   • ABDOMINAL SURGERY     • CARDIAC CATHETERIZATION N/A 2018    Procedure: Left Heart Cath 2018 @ 9;00;  Surgeon: Kuldip Frank MD;  Location: [facility] CATH INVASIVE LOCATION;  Service: Cardiology   • COLONOSCOPY N/A 2018    Procedure: COLONOSCOPY;  Surgeon: Bro Whitaker MD;  Location: [facility] ENDOSCOPY;  Service: Gastroenterology   • CYSTOSCOPY     • ENDOMETRIAL ABLATION     • ENDOSCOPY N/A  "11/19/2018    Procedure: ESOPHAGOGASTRODUODENOSCOPYwith dilation;  Surgeon: Bro Whitaker MD;  Location: [facility] ENDOSCOPY;  Service: Gastroenterology   • LAPAROSCOPIC TUBAL LIGATION  1995   • MA ERCP DX COLLECTION SPECIMEN BRUSHING/WASHING Left 7/31/2017    Procedure: ENDOSCOPIC RETROGRADE CHOLANGIOPANCREATOGRAPHY;  Surgeon: Samuel Redding DO;  Location: [facility] ENDOSCOPY;  Service: Gastroenterology   • MA LAP,CHOLECYSTECTOMY N/A 7/24/2017    Procedure: CHOLECYSTECTOMY LAPAROSCOPIC, also umbillical hernia repair;  Surgeon: Pk العلي MD;  Location: [facility] OR;  Service: General   • UPPER GASTROINTESTINAL ENDOSCOPY  11/19/2018     Family History   Problem Relation Age of Onset   • Diabetes Mother    • Hypertension Mother    • Cirrhosis Mother    • Hypertension Father      Social History     Tobacco Use   • Smoking status: Never Smoker   • Smokeless tobacco: Never Used   Substance Use Topics   • Alcohol use: Yes     Comment: socially   • Drug use: No     Allergies:  Sulfa antibiotics     REVIEW OF SYSTEMS    Review of Systems   Constitutional: Negative for chills and fever.   Respiratory: Negative for chest tightness and shortness of breath.    Cardiovascular: Negative for chest pain and leg swelling.   Gastrointestinal: Positive for diarrhea. Negative for abdominal pain and nausea.   Genitourinary: Negative for dysuria, flank pain and hematuria.   Neurological: Negative for dizziness, syncope and weakness.       Objective   OBJECTIVE   Vital Signs  Temp:  [97.6 °F (36.4 °C)-98 °F (36.7 °C)] 97.6 °F (36.4 °C)  Heart Rate:  [57-79] 61  Resp:  [16-22] 16  BP: (102-133)/(44-57) 102/50    Flowsheet Rows      First Filed Value   Admission Height  165.1 cm (65\") Documented at 05/14/2019 1433   Admission Weight  139 kg (305 lb 14.4 oz)  (Abnormal)  Documented at 05/14/2019 1433           I/O last 3 completed shifts:  In: 1100 [IV Piggyback:1100]  Out: 600 [Urine:600]    PHYSICAL EXAM    Physical Exam   Constitutional: " She is oriented to person, place, and time. She appears well-developed.   HENT:   Head: Normocephalic.   Eyes: Pupils are equal, round, and reactive to light.   Cardiovascular: Normal rate, regular rhythm and normal heart sounds.   Pulmonary/Chest: Effort normal and breath sounds normal.   Abdominal: Soft. Bowel sounds are normal.   Musculoskeletal: She exhibits no edema.   Neurological: She is alert and oriented to person, place, and time.       RESULTS   Results Review:    Results from last 7 days   Lab Units 05/15/19  0551 05/14/19  1735   SODIUM mmol/L 136 133*   POTASSIUM mmol/L 5.8* 5.6*   CHLORIDE mmol/L 100 92*   CO2 mmol/L 20.0* 20.0*   BUN mg/dL 94* 104*   CREATININE mg/dL 4.92* 6.05*   CALCIUM mg/dL 9.4 10.3   BILIRUBIN mg/dL  --  0.3   ALK PHOS U/L  --  36*   ALT (SGPT) U/L  --  26   AST (SGOT) U/L  --  40*   GLUCOSE mg/dL 83 77       Estimated Creatinine Clearance: 19.8 mL/min (A) (by C-G formula based on SCr of 4.92 mg/dL (H)).                Results from last 7 days   Lab Units 05/15/19  0551 05/14/19  1735   WBC 10*3/mm3 5.93 8.02   HEMOGLOBIN g/dL 9.8* 11.4*   PLATELETS 10*3/mm3 208 250              MEDICATIONS      budesonide 0.5 mg Nebulization BID - RT   buPROPion  mg Oral BID   calcium carbonate-vitamin d 1 tablet Oral Daily   ceftriaxone 1 g Intravenous Q24H   dicyclomine 10 mg Oral TID   gabapentin 100 mg Oral Q8H   isosorbide mononitrate 30 mg Oral Daily   nystatin  Topical Q12H   oxybutynin 5 mg Oral BID   pantoprazole 40 mg Oral Daily   rOPINIRole 0.25 mg Oral Nightly   sertraline 150 mg Oral Daily   sodium chloride 3 mL Intravenous Q12H   traMADol 50 mg Oral Daily       sodium bicarbonate drip less than 75 mEq/bag 150 mL/hr     Medications Prior to Admission   Medication Sig Dispense Refill Last Dose   • albuterol sulfate  (90 Base) MCG/ACT inhaler Inhale 2 puffs Every 4 (Four) Hours As Needed for Wheezing or Shortness of Air. 18 g 6 Taking   • buPROPion SR (WELLBUTRIN SR) 150  MG 12 hr tablet Take 150 mg by mouth 2 (Two) Times a Day.  5 5/14/2019 at 0900   • Calcium Carb-Cholecalciferol (CALCIUM 1000 + D) 1000-800 MG-UNIT tablet Take 1 tablet by mouth Daily.   5/14/2019 at Unknown time   • dicyclomine (BENTYL) 10 MG capsule Take 10 mg by mouth 3 (Three) Times a Day.  1 5/14/2019 at Unknown time   • fenofibrate (TRICOR) 145 MG tablet Take 145 mg by mouth Daily.  0 5/14/2019 at Unknown time   • Fluticasone Furoate 200 MCG/ACT aerosol powder  Inhale 1 puff Daily. Rinse mouth well after use 1 each 11 5/14/2019 at Unknown time   • furosemide (LASIX) 40 MG tablet Take 40 mg by mouth Daily.  12 5/13/2019 at Unknown time   • gabapentin (NEURONTIN) 100 MG capsule Take 200 mg by mouth 3 (Three) Times a Day.   5/14/2019 at Unknown time   • isosorbide mononitrate (IMDUR) 30 MG 24 hr tablet Take 30 mg by mouth Daily.   5/6/2019   • lisinopril (PRINIVIL,ZESTRIL) 20 MG tablet Take 20 mg by mouth Daily.   5/14/2019 at Unknown time   • methocarbamol (ROBAXIN) 500 MG tablet Take 500 mg by mouth 3 (Three) Times a Day.   5/13/2019   • oxybutynin (DITROPAN) 5 MG tablet Take 5 mg by mouth 2 (Two) Times a Day.   5/14/2019 at Unknown time   • pantoprazole (PROTONIX) 40 MG EC tablet Take 40 mg by mouth Daily.   5/14/2019 at Unknown time   • rOPINIRole (REQUIP) 0.25 MG tablet Take 0.25 mg by mouth Every Night.   5/13/2019 at Unknown time   • Sertraline HCl (ZOLOFT PO) Take 150 mg by mouth Daily.   5/14/2019 at Unknown time   • spironolactone (ALDACTONE) 25 MG tablet Take 25 mg by mouth Daily. Alternates days with lasix.  12 5/12/2019   • traMADol (ULTRAM) 50 MG tablet Take 50 mg by mouth Daily.  0 5/14/2019 at Unknown time     Assessment/Plan   ASSESSMENT / PLAN      Acute kidney injury (CMS/HCC)    Morbid obesity with BMI of 50.0-59.9, adult (CMS/HCC)    Restrictive lung disease secondary to obesity    Gastroesophageal reflux disease with esophagitis    ELSIE on CPAP    DYAN (acute kidney injury) (CMS/HCC)     Hypertension    Hyperlipidemia    Hyperkalemia    1.acute kidney injury it appears to be prerenal in nature while on diuretics plus may have worsened by antibiotic and needs to rule out allergic interstitial nephritis.  Also she is on high protein diet and have some contribution to DYAN though there is no albuminuria. HER BP IS also on low side on arrival    Patient is improving with IV fluids and I will change it to bicarb drip.  Her ultrasound is negative for any hydronephrosis. Check claudy stain    2.hyperkalemia and metabolic acidosis plan as above.  Patient also received insulin dextrose and calcium gluconate this morning.  She has received sodium bicarbonate yesterday.  I will observe it for now    3.morbid obesity awaiting gastric sleeve surgery     4.hypertension patient lisinopril will be on hold    5.history of dyslipidemia/restless leg syndrome    Thank you for the referral       I discussed the patients findings and my recommendations with patient and family         This document has been electronically signed by Key Cota MD on May 15, 2019 10:18 AM

## 2019-05-15 NOTE — PAYOR COMM NOTE
"Raheem Ferris (48 y.o. Female)     Date of Birth Social Security Number Address Home Phone MRN    1970  [id number] [place] [id number] [phone] [id number]    [employer]          [employer]        Admission Date Admission Type Admitting Provider Attending Provider Department, Room/Bed    5/14/19 Emergency Chito Melvin MD Gibson, Bryce, MD [address], 423/1    Discharge Date Discharge Disposition Discharge Destination                       Attending Provider:  Chito Melvin MD    Allergies:  Sulfa Antibiotics    Isolation:  None   Infection:  None   Code Status:  CPR    Ht:  165.1 cm (65\")   Wt:  139 kg (306 lb 1.6 oz)    Admission Cmt:  None   Principal Problem:  Acute kidney injury (CMS/HCC) [N17.9]                 Active Insurance as of 5/14/2019     Primary Coverage     Payor Plan Insurance Group Employer/Plan Group    [employer] [place] [employer] [place]ICA[place]      Payor Plan Address Payor Plan Phone Number Payor Plan Fax Number Effective Dates    [address] [address] [phone]  3/3/2017 - None Entered    [place] [address]       Subscriber Name Subscriber Birth Date Member ID       RAHEEM FERRIS 1970 [id number]                 Emergency Contacts      (Rel.) Home Phone Work Phone Mobile Phone    Joyce Bolton (Mother) [phone] -- [phone]            Insurance Information                [facility] MEDICAID Phone: [phone]    Subscriber: Raheem Ferris Subscriber#: [id number]    Group#:  Precert#:              History & Physical      Saloni Snyder APRN at 5/14/2019  6:21 PM     Attestation signed by Chito Melvin MD at 5/15/2019  8:25 AM    I personally evaluated and examined the patient in conjunction with MARIA TERESA Oleary and agree with the assessment, treatment plan, and disposition of the patient as recorded.    Exam:  General: A&O x3  CV: S1 + S2  Chest: Clear  Abdo: " S, NT, ND            This document has been electronically signed by Chito Melvin MD on May 15, 2019 8:25 AM                              [facility] Medicine Admission      Date of Admission: 5/14/2019      Primary Care Physician: Alethea Ash MD      Chief Complaint: abnormal labs    HPI:48 year old  female with past medical history of GERD, HTN, HLD, IBS, ELSIE, morbid obesity with BMI of 50.90 who presented on 5/14/19 with elevated creatinine.  The patient reports that she is in the preparation phases of weight loss surgery with Dr. Sunshine in [place].  She reports that she was seen last week in the weight loss clinic and noted to have creatinine level of 3.  She reports being on a high protein diet prior to this appointment and was instructed to stop taking her protein shakes at that time.  The patient reports she has been mostly eating a diet of boiled eggs and chicken since Thursday.  She was also noted to have E. coli in her urine at this appointment as well and she reports being started on Cipro for treatment.  The patient went to her primary care provider's office yesterday for follow-up and labs were repeated and creatinine is now 6.  The patient denies any complaints other than fatigue and decreased urine output.    Concurrent Medical History:  has a past medical history of Acid reflux, Anxiety, Depressed, Dysphagia, GERD (gastroesophageal reflux disease), Hard to intubate, Hyperlipidemia, Hypertension, IBS (irritable bowel syndrome), Nausea, and Sleep apnea.    Past Surgical History:  has a past surgical history that includes Laparoscopic tubal ligation (1995); Endometrial ablation (2015); Cystoscopy; pr lap,cholecystectomy (N/A, 7/24/2017); Abdominal surgery; pr ercp dx collection specimen brushing/washing (Left, 7/31/2017); Cardiac catheterization (N/A, 8/13/2018); Esophagogastroduodenoscopy (N/A, 11/19/2018); Colonoscopy (N/A, 11/19/2018); and  Upper gastrointestinal endoscopy (11/19/2018).    Family History: family history includes Cirrhosis in her mother; Diabetes in her mother; Hypertension in her father and mother.    Social History:  reports that she has never smoked. She has never used smokeless tobacco. She reports that she drinks alcohol. She reports that she does not use drugs.    Allergies:   Allergies   Allergen Reactions   • Sulfa Antibiotics Anaphylaxis and GI Intolerance       Medications:   Prior to Admission medications    Medication Sig Start Date End Date Taking? Authorizing Provider   albuterol sulfate  (90 Base) MCG/ACT inhaler Inhale 2 puffs Every 4 (Four) Hours As Needed for Wheezing or Shortness of Air. 3/28/19  Yes Rachel Enciso MD   buPROPion SR (WELLBUTRIN SR) 150 MG 12 hr tablet Take 150 mg by mouth 2 (Two) Times a Day. 3/22/19  Yes Howie Phillips MD   Calcium Carb-Cholecalciferol (CALCIUM 1000 + D) 1000-800 MG-UNIT tablet Take  by mouth.   Yes ProviderHowie MD   dicyclomine (BENTYL) 10 MG capsule Take 10 mg by mouth 3 (Three) Times a Day. 9/19/18  Yes Emergency, Nurse Epic, RN   fenofibrate (TRICOR) 145 MG tablet Take 145 mg by mouth Daily. 3/5/19  Yes ProviderHowie MD   Fluticasone Furoate 200 MCG/ACT aerosol powder  Inhale 1 puff Daily. Rinse mouth well after use 1/18/19  Yes Rachel Enciso MD   furosemide (LASIX) 40 MG tablet Take 40 mg by mouth Daily. 3/11/19  Yes ProviderHowie MD   gabapentin (NEURONTIN) 100 MG capsule Take 100 mg by mouth 3 (Three) Times a Day.   Yes ProviderHowie MD   isosorbide mononitrate (IMDUR) 30 MG 24 hr tablet Take 30 mg by mouth Daily.   Yes Howie Phillips MD   lisinopril (PRINIVIL,ZESTRIL) 20 MG tablet Take 20 mg by mouth Daily. 1/2/18  Yes Howie Phillips MD   methocarbamol (ROBAXIN) 500 MG tablet Take 500 mg by mouth 2 (Two) Times a Day. 10/4/18  Yes Howie Phillips MD   oxybutynin (DITROPAN) 5 MG tablet Take 5 mg by mouth  2 (Two) Times a Day.   Yes Howie Phillips MD   pantoprazole (PROTONIX) 40 MG EC tablet Take 40 mg by mouth Daily. 7/10/17  Yes Howie Phillips MD   rOPINIRole (REQUIP) 0.25 MG tablet Take 0.25 mg by mouth Every Night. 2/6/18  Yes Emergency, Nurse Epic, RN   Sertraline HCl (ZOLOFT PO) Take 150 mg by mouth Daily.   Yes Howie Phillips MD   spironolactone (ALDACTONE) 25 MG tablet Take 25 mg by mouth Daily. Alternates days with lasix. 12/20/18  Yes Emergency, Nurse Jaylon RN   traMADol (ULTRAM) 50 MG tablet Take 50 mg by mouth Daily. 3/1/19  Yes Howie Phillips MD   amoxicillin (AMOXIL) 500 MG capsule Take 1 capsule by mouth 3 (Three) Times a Day. 3/30/19   Rk Wiggins MD   guaiFENesin (MUCINEX) 600 MG 12 hr tablet Take 1 tablet by mouth 2 (Two) Times a Day. 3/30/19   Rk Wiggins MD   naproxen (EC NAPROSYN) 500 MG EC tablet Take 1 tablet by mouth 2 (Two) Times a Day As Needed for Mild Pain . 3/30/19   Rk Wiggins MD   naproxen (NAPROSYN) 500 MG tablet Take 500 mg by mouth 2 (Two) Times a Day As Needed for Mild Pain . 1/2/18   Howie Phillips MD       Review of Systems:  Review of Systems   Constitutional: Positive for fatigue. Negative for chills and fever.   Respiratory: Negative for cough, shortness of breath and wheezing.    Cardiovascular: Negative for chest pain and palpitations.   Gastrointestinal: Negative for abdominal pain, nausea and vomiting.   Genitourinary: Positive for decreased urine volume, difficulty urinating and dysuria. Negative for urgency.   Musculoskeletal: Negative for back pain and neck pain.   Skin: Negative for pallor.   Neurological: Positive for weakness. Negative for dizziness.   Psychiatric/Behavioral: Negative for confusion. The patient is not nervous/anxious.             Physical Exam:   Temp:  [97.7 °F (36.5 °C)] 97.7 °F (36.5 °C)  Heart Rate:  [62-79] 62  Resp:  [20] 20  BP: (104-113)/(44-51) 104/44  Physical Exam    Constitutional: She is oriented to person, place, and time. She appears well-developed and well-nourished.   HENT:   Head: Normocephalic and atraumatic.   Eyes: Conjunctivae are normal.   Neck: Normal range of motion. Neck supple.   Cardiovascular: Normal rate, normal heart sounds and intact distal pulses.   Pulmonary/Chest: Effort normal and breath sounds normal.   Abdominal: Soft. Bowel sounds are normal.   Musculoskeletal: Normal range of motion. She exhibits no edema.   Neurological: She is alert and oriented to person, place, and time.   Skin: Skin is warm and dry.   Psychiatric: She has a normal mood and affect. Her behavior is normal.   Nursing note and vitals reviewed.        Results Reviewed:  I have personally reviewed current lab, radiology, and data and agree with results.  Lab Results (last 24 hours)     Procedure Component Value Units Date/Time    [place] Draw [[id number]] Collected:  05/14/19 1735    Specimen:  Blood Updated:  05/14/19 1846    Narrative:       The following orders were created for panel order [place] Draw.  Procedure                               Abnormality         Status                     ---------                               -----------         ------                     Light Blue Top[[id number]]                                   Final result               Green Top (Gel)[[id number]]                                  Final result               Lavender Top[[id number]]                                     Final result               Gold Top - SST[[id number]]                                   Final result                 Please view results for these tests on the individual orders.    Light Blue Top [[id number]] Collected:  05/14/19 1735    Specimen:  Blood Updated:  05/14/19 1846     Extra Tube hold for add-on     Comment: Auto resulted       Green Top (Gel) [[id number]] Collected:  05/14/19 1735    Specimen:  Blood Updated:  05/14/19 1846     Extra Tube Hold for add-ons.     Comment:  Auto resulted.       Lavender Top [[id number]] Collected:  05/14/19 1735    Specimen:  Blood Updated:  05/14/19 1846     Extra Tube hold for add-on     Comment: Auto resulted       Gold Top - SST [[id number]] Collected:  05/14/19 1735    Specimen:  Blood Updated:  05/14/19 1846     Extra Tube Hold for add-ons.     Comment: Auto resulted.       Comprehensive Metabolic Panel [[id number]]  (Abnormal) Collected:  05/14/19 1735    Specimen:  Blood Updated:  05/14/19 1807     Glucose 77 mg/dL       mg/dL      Creatinine 6.05 mg/dL      Sodium 133 mmol/L      Potassium 5.6 mmol/L      Chloride 92 mmol/L      CO2 20.0 mmol/L      Calcium 10.3 mg/dL      Total Protein 8.1 g/dL      Albumin 4.30 g/dL      ALT (SGPT) 26 U/L      AST (SGOT) 40 U/L      Alkaline Phosphatase 36 U/L      Total Bilirubin 0.3 mg/dL      eGFR Non African Amer 7 mL/min/1.73      Comment: <15 Indicative of kidney failure.        eGFR   Amer -- mL/min/1.73      Comment: <15 Indicative of kidney failure.        Globulin 3.8 gm/dL      A/G Ratio 1.1 g/dL      BUN/Creatinine Ratio 17.2     Anion Gap 21.0 mmol/L     Narrative:       GFR Normal >60  Chronic Kidney Disease <60  Kidney Failure <15    Urinalysis, Microscopic Only - Urine, Clean Catch [[id number]]  (Abnormal) Collected:  05/14/19 1736    Specimen:  Urine, Clean Catch Updated:  05/14/19 1750     RBC, UA 3-5 /HPF      WBC, UA 6-12 /HPF      Bacteria, UA None Seen /HPF      Squamous Epithelial Cells, UA 6-12 /HPF      Hyaline Casts, UA 0-2 /LPF      Methodology Automated Microscopy    Urinalysis With Microscopic If Indicated (No Culture) - Urine, Clean Catch [[id number]]  (Abnormal) Collected:  05/14/19 1736    Specimen:  Urine, Clean Catch Updated:  05/14/19 1748     Color, UA Yellow     Appearance, UA Cloudy     pH, UA <=5.0     Specific Gravity, UA 1.011     Glucose,  mg/dL (Trace)     Ketones, UA Negative     Bilirubin, UA Negative     Blood, UA Trace     Protein, UA Negative      Leuk Esterase, UA Small (1+)     Nitrite, UA Negative     Urobilinogen, UA 0.2 E.U./dL    CBC & Differential [[id number]] Collected:  05/14/19 1735    Specimen:  Blood Updated:  05/14/19 1745    Narrative:       The following orders were created for panel order CBC & Differential.  Procedure                               Abnormality         Status                     ---------                               -----------         ------                     CBC Auto Differential[[id number]]        Abnormal            Final result                 Please view results for these tests on the individual orders.    CBC Auto Differential [[id number]]  (Abnormal) Collected:  05/14/19 1735    Specimen:  Blood Updated:  05/14/19 1745     WBC 8.02 10*3/mm3      RBC 4.21 10*6/mm3      Hemoglobin 11.4 g/dL      Hematocrit 34.5 %      MCV 81.9 fL      MCH 27.1 pg      MCHC 33.0 g/dL      RDW 14.2 %      RDW-SD 41.9 fl      MPV 11.4 fL      Platelets 250 10*3/mm3      Neutrophil % 67.4 %      Lymphocyte % 23.8 %      Monocyte % 7.4 %      Eosinophil % 1.1 %      Basophil % 0.1 %      Immature Grans % 0.2 %      Neutrophils, Absolute 5.40 10*3/mm3      Lymphocytes, Absolute 1.91 10*3/mm3      Monocytes, Absolute 0.59 10*3/mm3      Eosinophils, Absolute 0.09 10*3/mm3      Basophils, Absolute 0.01 10*3/mm3      Immature Grans, Absolute 0.02 10*3/mm3      nRBC 0.0 /100 WBC         Imaging Results (last 24 hours)     ** No results found for the last 24 hours. **            Assessment:    Active Hospital Problems    Diagnosis   • **Acute kidney injury (CMS/[facility])   • DYAN (acute kidney injury) (CMS/[facility])   • Hypertension   • Hyperlipidemia   • ELSIE on CPAP   • Gastroesophageal reflux disease with esophagitis     Added automatically from request for surgery [id number]     • Restrictive lung disease secondary to obesity   • Morbid obesity with BMI of 50.0-59.9, adult (CMS/[facility])             Plan:  #1 acute kidney injury: IV hydration, nephrology consulted.  " Renal ultrasound ordered.  Dario stain.  Lasix, Aldactone, Lisinopril held. Recheck BMP in AM.   2. Hyperkalemia: insulin, dextrose, calcium, bicarb ordered.  Recheck BMP in AM.    3. HTN: Lisinopril, Lasix, Aldactone held.    4. HLD:   5. Morbid obesity with BMI of 50.90: dietitian consult.  Patient scheduled for weight loss surgery with Dr. Sunshine in [place] next week.    Further orders will depend upon hospital course.  Plan of care discussed with patient.  CODE STATUS confirmed and patient wishes for full CODE STATUS.          This document has been electronically signed by MARIA TERESA Oleary on May 14, 2019 6:53 PM                    Electronically signed by Chito Melvin MD at 5/15/2019  8:25 AM          Emergency Department Notes      Chong Mars RN at 5/14/2019  2:30 PM        Pt states \"my kidney function went from 3 to 6 and my potassium is high. I also have E. coli.\" This nurse clarified the E. coli is in her urine.    Electronically signed by Chong Mars RN at 5/14/2019  2:31 PM     Ambrosio Siddiqui MD at 5/14/2019  4:57 PM          Subjective   Pt states that she started dieting and consuming \"protein drinks\" for preparation of her gastric sleeve surgery to be done in 2 weeks. Diet was started 8 days ago. According to patient, she went to [facility] 5 days ago and had a creatinine of 3.89 (5/9/19) and stopped her protein drinks and was seen by Altehea Ash yesterday and creatinine was 6.         Abnormal Lab   Severity:  Moderate  Onset quality:  Unable to specify  Duration:  1 week  Timing:  Constant  Progression:  Worsening  Chronicity:  New  Relieved by:  Noting  Worsened by:  Diet  Ineffective treatments:  None tried  Associated symptoms: diarrhea and fatigue    Associated symptoms: no abdominal pain, no chest pain, no congestion, no cough, no ear pain, no fever, no headaches, no myalgias, no nausea, no rash, no rhinorrhea, no shortness of breath, no sore throat, no vomiting " and no wheezing        Review of Systems   Constitutional: Positive for activity change, appetite change and fatigue. Negative for chills, diaphoresis and fever.   HENT: Negative for congestion, ear discharge, ear pain, nosebleeds, rhinorrhea, sinus pressure, sore throat and trouble swallowing.    Eyes: Negative for discharge and redness.   Respiratory: Negative for apnea, cough, chest tightness, shortness of breath and wheezing.    Cardiovascular: Negative for chest pain.   Gastrointestinal: Positive for diarrhea. Negative for abdominal pain, nausea and vomiting.   Endocrine: Negative for polyuria.   Genitourinary: Negative for dysuria, frequency and urgency.   Musculoskeletal: Negative for myalgias and neck pain.   Skin: Negative for color change and rash.   Allergic/Immunologic: Negative for immunocompromised state.   Neurological: Positive for dizziness and weakness. Negative for seizures, syncope, light-headedness and headaches.   Hematological: Negative for adenopathy. Does not bruise/bleed easily.   Psychiatric/Behavioral: Negative for behavioral problems and confusion.   All other systems reviewed and are negative.      Past Medical History:   Diagnosis Date   • Acid reflux    • Anxiety    • Depressed    • Dysphagia    • GERD (gastroesophageal reflux disease)    • Hard to intubate    • Hyperlipidemia    • Hypertension    • IBS (irritable bowel syndrome)    • Nausea    • Sleep apnea        Allergies   Allergen Reactions   • Sulfa Antibiotics Anaphylaxis and GI Intolerance       Past Surgical History:   Procedure Laterality Date   • ABDOMINAL SURGERY     • CARDIAC CATHETERIZATION N/A 8/13/2018    Procedure: Left Heart Cath 8/13/2018 @ 9;00;  Surgeon: Kuldip Frank MD;  Location: [facility] CATH INVASIVE LOCATION;  Service: Cardiology   • COLONOSCOPY N/A 11/19/2018    Procedure: COLONOSCOPY;  Surgeon: Bro Whitaker MD;  Location: [facility] ENDOSCOPY;  Service: Gastroenterology   • CYSTOSCOPY     • ENDOMETRIAL  ABLATION  2015   • ENDOSCOPY N/A 11/19/2018    Procedure: ESOPHAGOGASTRODUODENOSCOPYwith dilation;  Surgeon: Bro Whitaker MD;  Location: [facility] ENDOSCOPY;  Service: Gastroenterology   • LAPAROSCOPIC TUBAL LIGATION  1995   • NV ERCP DX COLLECTION SPECIMEN BRUSHING/WASHING Left 7/31/2017    Procedure: ENDOSCOPIC RETROGRADE CHOLANGIOPANCREATOGRAPHY;  Surgeon: Samuel Redding DO;  Location: [facility] ENDOSCOPY;  Service: Gastroenterology   • NV LAP,CHOLECYSTECTOMY N/A 7/24/2017    Procedure: CHOLECYSTECTOMY LAPAROSCOPIC, also umbillical hernia repair;  Surgeon: Pk العلي MD;  Location: [facility] OR;  Service: General   • UPPER GASTROINTESTINAL ENDOSCOPY  11/19/2018       Family History   Problem Relation Age of Onset   • Diabetes Mother    • Hypertension Mother    • Cirrhosis Mother    • Hypertension Father        Social History     Socioeconomic History   • Marital status:      Spouse name: Not on file   • Number of children: Not on file   • Years of education: Not on file   • Highest education level: Not on file   Tobacco Use   • Smoking status: Never Smoker   • Smokeless tobacco: Never Used   Substance and Sexual Activity   • Alcohol use: Yes     Comment: socially   • Drug use: No   • Sexual activity: Defer     Birth control/protection: Surgical           Objective   Physical Exam   Constitutional: She is oriented to person, place, and time. She appears well-developed and well-nourished.   HENT:   Head: Normocephalic and atraumatic.   Nose: Nose normal.   Mouth/Throat: Oropharynx is clear and moist.   Eyes: Conjunctivae and EOM are normal. Pupils are equal, round, and reactive to light. Right eye exhibits no discharge. Left eye exhibits no discharge. No scleral icterus.   Neck: Normal range of motion. Neck supple. No tracheal deviation present.   Cardiovascular: Normal rate, regular rhythm and normal heart sounds.   No murmur heard.  Pulmonary/Chest: Effort normal and breath sounds normal. No stridor. No  respiratory distress. She has no wheezes. She has no rales.   Abdominal: Soft. Bowel sounds are normal. She exhibits no distension and no mass. There is no tenderness. There is no rebound and no guarding.   Musculoskeletal: She exhibits no edema.   Neurological: She is alert and oriented to person, place, and time. Coordination normal.   Skin: Skin is warm and dry. No rash noted. No erythema.   Psychiatric: She has a normal mood and affect. Her behavior is normal. Thought content normal.   Nursing note and vitals reviewed.      Procedures          ED Course          Labs Reviewed   COMPREHENSIVE METABOLIC PANEL - Abnormal; Notable for the following components:       Result Value     (*)     Creatinine 6.05 (*)     Sodium 133 (*)     Potassium 5.6 (*)     Chloride 92 (*)     CO2 20.0 (*)     AST (SGOT) 40 (*)     Alkaline Phosphatase 36 (*)     eGFR Non  Amer 7 (*)     All other components within normal limits    Narrative:     GFR Normal >60  Chronic Kidney Disease <60  Kidney Failure <15   URINALYSIS W/ MICROSCOPIC IF INDICATED (NO CULTURE) - Abnormal; Notable for the following components:    Appearance, UA Cloudy (*)     Glucose,  mg/dL (Trace) (*)     Blood, UA Trace (*)     Leuk Esterase, UA Small (1+) (*)     All other components within normal limits   CBC WITH AUTO DIFFERENTIAL - Abnormal; Notable for the following components:    Hemoglobin 11.4 (*)     All other components within normal limits   URINALYSIS, MICROSCOPIC ONLY - Abnormal; Notable for the following components:    RBC, UA 3-5 (*)     WBC, UA 6-12 (*)     Squamous Epithelial Cells, UA 6-12 (*)     All other components within normal limits   RAINBOW DRAW    Narrative:     The following orders were created for panel order [place] Draw.  Procedure                               Abnormality         Status                     ---------                               -----------         ------                     Light Blue  Top[[id number]]                                   Final result               Green Top (Gel)[[id number]]                                  Final result               Lavender Top[[id number]]                                     Final result               Gold Top - SST[[id number]]                                   Final result                 Please view results for these tests on the individual orders.   CBC AND DIFFERENTIAL    Narrative:     The following orders were created for panel order CBC & Differential.  Procedure                               Abnormality         Status                     ---------                               -----------         ------                     CBC Auto Differential[[id number]]        Abnormal            Final result                 Please view results for these tests on the individual orders.   LIGHT BLUE TOP   GREEN TOP   LAVENDER TOP   GOLD TOP - SST       No orders to display                 MDM      Final diagnoses:   DYAN (acute kidney injury) (CMS/[facility])   Hyperkalemia            Ambrosio Siddiqui MD  05/14/19 1850      Electronically signed by Ambrosio Siddiqui MD at 5/14/2019  6:50 PM     Lore Franz RN at 5/14/2019  4:59 PM        Patient is 5 days away from gastric surgery.  Patient had labs done yesterday and was called today and told to come here.  Elevated kidney enzyme and potassium.      Lore Franz RN  05/14/19 1659      Electronically signed by Lore Franz RN at 5/14/2019  4:59 PM     Lore Franz RN at 5/14/2019  7:50 PM        Called telemetry, patient coming in on the monitor.     Lore Franz RN  05/14/19 1950      Electronically signed by Lore Franz RN at 5/14/2019  7:50 PM       ICU Vital Signs     Row Name 05/15/19 0822 05/15/19 0820 05/15/19 0812 05/15/19 0502 05/15/19 0315       Height and Weight    Weight  --  --  --  139 kg (306 lb 1.6 oz)  (Abnormal)   --    Weight Method  --  --  --  Standing scale  --       Vitals    Temp   "--  --  --  --  97.6 °F (36.4 °C)    Temp src  --  --  --  --  Axillary    Pulse  61  57  60  --  63    Heart Rate Source  Monitor  --  Other (Comment) pulse ox  --  Monitor    Resp  --  --  16  --  22    Resp Rate Source  --  --  Visual  --  Visual    BP  --  --  --  --  102/50    Noninvasive MAP (mmHg)  --  --  --  --  64    BP Location  --  --  --  --  Left arm    BP Method  --  --  --  --  Automatic    Patient Position  --  --  --  --  Lying       Oxygen Therapy    SpO2  --  --  94 %  --  93 %    Pulse Oximetry Type  --  --  --  --  Intermittent    Device (Oxygen Therapy)  --  room air  room air  --  room air    Row Name 05/15/19 0208 05/14/19 2338 05/14/19 2057 05/14/19 2025 05/14/19 1826       Height and Weight    Height  --  165.1 cm (65\")  --  --  --    Height Method  --  Stated  --  --  --    Weight  --  139 kg (306 lb 1.6 oz)  (Abnormal)   --  --  --    Weight Method  --  Standing scale  --  --  --    Ideal Body Weight (IBW) (kg)  --  57.29  --  --  --    BSA (Calculated - sq m)  --  2.37 sq meters  --  --  --    BMI (Calculated)  --  50.9  --  --  --    Weight in (lb) to have BMI = 25  --  149.9  --  --  --       Vitals    Temp  --  98 °F (36.7 °C)  --  --  --    Temp src  --  Temporal  --  --  --    Pulse  63  66  74  65  62    Heart Rate Source  Monitor  Monitor  Monitor  Monitor  Monitor    Resp  --  18  --  18  20    Resp Rate Source  --  Visual  --  Visual  Visual    BP  --  110/55  --  133/57  104/44    Noninvasive MAP (mmHg)  --  79  --  --  --    BP Location  --  Left arm  --  Left arm  Left arm    BP Method  --  Automatic  --  Automatic  Automatic    Patient Position  --  Lying  --  Lying  Lying       Oxygen Therapy    SpO2  --  95 %  --  100 %  99 %    Pulse Oximetry Type  --  Intermittent  --  --  --    Device (Oxygen Therapy)  --  room air  --  room air  room air    Row Name 05/14/19 16:58:33 05/14/19 1433 05/14/19 1420             Height and Weight    Height  --  165.1 cm (65\")  --      " Height Method  --  Stated  --      Weight  --  139 kg (305 lb 14.4 oz)  (Abnormal)   --      Weight Method  --  Standing scale  --      Ideal Body Weight (IBW) (kg)  --  57.29  --      BSA (Calculated - sq m)  --  2.37 sq meters  --      BMI (Calculated)  --  50.9  --      Weight in (lb) to have BMI = 25  --  149.9  --         Vitals    Temp  --  --  97.7 °F (36.5 °C)      Temp src  --  --  Oral      Pulse  65  --  79      Heart Rate Source  Monitor  --  Monitor      Resp  20  --  20      Resp Rate Source  Visual  --  Visual      BP  109/48  --  113/51      BP Location  Left arm  --  Left arm      BP Method  Automatic  --  Automatic      Patient Position  Lying  --  Sitting         Oxygen Therapy    SpO2  97 %  --  94 %      Pulse Oximetry Type  --  --  Continuous      Device (Oxygen Therapy)  room air  --  room air          Hospital Medications (active)       Dose Frequency Start End    acetaminophen (TYLENOL) tablet 650 mg 650 mg Every 4 Hours PRN 5/14/2019     Sig - Route: Take 2 tablets by mouth Every 4 (Four) Hours As Needed for Mild Pain . - Oral    albuterol (PROVENTIL) nebulizer solution 0.083% 2.5 mg/3mL 2.5 mg Every 4 Hours PRN 5/14/2019     Sig - Route: Take 2.5 mg by nebulization Every 4 (Four) Hours As Needed for Wheezing or Shortness of Air. - Nebulization    budesonide (PULMICORT) nebulizer solution 0.5 mg 0.5 mg 2 Times Daily - RT 5/14/2019     Sig - Route: Take 2 mL by nebulization 2 (Two) Times a Day. - Nebulization    buPROPion SR (WELLBUTRIN SR) 12 hr tablet 150 mg 150 mg 2 Times Daily 5/14/2019     Sig - Route: Take 1 tablet by mouth 2 (Two) Times a Day. - Oral    calcium carbonate-vitamin d 600-400 MG-UNIT per tablet 1 tablet 1 tablet Daily 5/15/2019     Sig - Route: Take 1 tablet by mouth Daily. - Oral    calcium gluconate 1 g in sodium chloride 0.9 % 100 mL IVPB 1 g Once 5/14/2019 5/14/2019    Sig - Route: Infuse 1 g into a venous catheter 1 (One) Time. - Intravenous    cefTRIAXone (ROCEPHIN)  "1 g/100 mL 0.9% NS (MBP) 1 g Every 24 Hours 5/15/2019 5/20/2019    Sig - Route: Infuse 100 mL into a venous catheter Daily. - Intravenous    dextrose (D50W) 25 g/ 50mL Intravenous Solution 50 mL 50 mL Once 5/14/2019 5/14/2019    Sig - Route: Infuse 50 mL into a venous catheter 1 (One) Time. - Intravenous    Linked Group 1:  \"And\" Linked Group Details        dicyclomine (BENTYL) capsule 10 mg 10 mg 3 Times Daily 5/14/2019     Sig - Route: Take 1 capsule by mouth 3 (Three) Times a Day. - Oral    gabapentin (NEURONTIN) capsule 100 mg 100 mg Every 8 Hours Scheduled 5/14/2019     Sig - Route: Take 1 capsule by mouth Every 8 (Eight) Hours. - Oral    insulin regular (humuLIN R,novoLIN R) injection 10 Units 10 Units Once 5/14/2019 5/14/2019    Sig - Route: Infuse 10 Units into a venous catheter 1 (One) Time. - Intravenous    Linked Group 1:  \"And\" Linked Group Details        isosorbide mononitrate (IMDUR) 24 hr tablet 30 mg 30 mg Daily 5/15/2019     Sig - Route: Take 1 tablet by mouth Daily. - Oral    nystatin (MYCOSTATIN) powder  Every 12 Hours Scheduled 5/15/2019     Sig - Route: Apply  topically to the appropriate area as directed Every 12 (Twelve) Hours. - Topical    ondansetron (ZOFRAN) injection 4 mg 4 mg Every 6 Hours PRN 5/14/2019     Sig - Route: Infuse 2 mL into a venous catheter Every 6 (Six) Hours As Needed for Nausea or Vomiting. - Intravenous    oxybutynin (DITROPAN) tablet 5 mg 5 mg 2 Times Daily 5/14/2019     Sig - Route: Take 1 tablet by mouth 2 (Two) Times a Day. - Oral    pantoprazole (PROTONIX) EC tablet 40 mg 40 mg Daily 5/15/2019     Sig - Route: Take 1 tablet by mouth Daily. - Oral    rOPINIRole (REQUIP) tablet 0.25 mg 0.25 mg Nightly 5/14/2019     Sig - Route: Take 1 tablet by mouth Every Night. - Oral    sertraline (ZOLOFT) tablet 150 mg 150 mg Daily 5/15/2019     Sig - Route: Take 3 tablets by mouth Daily. - Oral    sodium bicarbonate 8.4 % 75 mEq in sodium chloride 0.45 % 1,075 mL infusion (less " "than 75 mEq) 150 mL/hr Continuous 5/15/2019     Sig - Route: Infuse 150 mL/hr into a venous catheter Continuous. - Intravenous    sodium bicarbonate injection 8.4% 50 mEq 50 mEq Once 5/14/2019 5/14/2019    Sig - Route: Infuse 50 mL into a venous catheter 1 (One) Time. - Intravenous    sodium chloride 0.9 % bolus 1,000 mL 1,000 mL Once 5/14/2019 5/14/2019    Sig - Route: Infuse 1,000 mL into a venous catheter 1 (One) Time. - Intravenous    sodium chloride 0.9 % flush 10 mL 10 mL As Needed 5/14/2019     Sig - Route: Infuse 10 mL into a venous catheter As Needed for Line Care. - Intravenous    Cosign for Ordering: Accepted by Ambrosio Siddiqui MD on 5/14/2019  5:48 PM    Linked Group 2:  \"And\" Linked Group Details        sodium chloride 0.9 % flush 3 mL 3 mL Every 12 Hours Scheduled 5/14/2019     Sig - Route: Infuse 3 mL into a venous catheter Every 12 (Twelve) Hours. - Intravenous    sodium chloride 0.9 % flush 3-10 mL 3-10 mL As Needed 5/14/2019     Sig - Route: Infuse 3-10 mL into a venous catheter As Needed for Line Care. - Intravenous    traMADol (ULTRAM) tablet 50 mg 50 mg Daily 5/15/2019     Sig - Route: Take 1 tablet by mouth Daily. - Oral    cefTRIAXone (ROCEPHIN) 1 g/100 mL 0.9% NS (MBP) (Discontinued) 1 g Every 24 Hours 5/14/2019 5/14/2019    Sig - Route: Infuse 100 mL into a venous catheter Daily. - Intravenous    sodium chloride 0.9 % infusion (Discontinued) 125 mL/hr Continuous 5/14/2019 5/15/2019    Sig - Route: Infuse 125 mL/hr into a venous catheter Continuous. - Intravenous    sodium chloride 0.9 % infusion (Discontinued) 125 mL/hr Continuous 5/14/2019 5/15/2019    Sig - Route: Infuse 125 mL/hr into a venous catheter Continuous. - Intravenous            Lab Results (last 24 hours)     Procedure Component Value Units Date/Time    Basic Metabolic Panel [[id number]]  (Abnormal) Collected:  05/15/19 0551    Specimen:  Blood Updated:  05/15/19 0640     Glucose 83 mg/dL      BUN 94 mg/dL      Creatinine " 4.92 mg/dL      Sodium 136 mmol/L      Potassium 5.8 mmol/L      Chloride 100 mmol/L      CO2 20.0 mmol/L      Calcium 9.4 mg/dL      eGFR  African Amer -- mL/min/1.73      Comment: <15 Indicative of kidney failure.        eGFR Non African Amer 9 mL/min/1.73      Comment: <15 Indicative of kidney failure.        BUN/Creatinine Ratio 19.1     Anion Gap 16.0 mmol/L     Narrative:       GFR Normal >60  Chronic Kidney Disease <60  Kidney Failure <15    CBC & Differential [[id number]] Collected:  05/15/19 0551    Specimen:  Blood Updated:  05/15/19 0614    Narrative:       The following orders were created for panel order CBC & Differential.  Procedure                               Abnormality         Status                     ---------                               -----------         ------                     CBC Auto Differential[[id number]]        Abnormal            Final result                 Please view results for these tests on the individual orders.    CBC Auto Differential [[id number]]  (Abnormal) Collected:  05/15/19 0551    Specimen:  Blood Updated:  05/15/19 0614     WBC 5.93 10*3/mm3      RBC 3.66 10*6/mm3      Hemoglobin 9.8 g/dL      Hematocrit 29.9 %      MCV 81.7 fL      MCH 26.8 pg      MCHC 32.8 g/dL      RDW 14.1 %      RDW-SD 41.5 fl      MPV 11.4 fL      Platelets 208 10*3/mm3      Neutrophil % 59.5 %      Lymphocyte % 29.3 %      Monocyte % 9.4 %      Eosinophil % 1.2 %      Basophil % 0.3 %      Immature Grans % 0.3 %      Neutrophils, Absolute 3.52 10*3/mm3      Lymphocytes, Absolute 1.74 10*3/mm3      Monocytes, Absolute 0.56 10*3/mm3      Eosinophils, Absolute 0.07 10*3/mm3      Basophils, Absolute 0.02 10*3/mm3      Immature Grans, Absolute 0.02 10*3/mm3      nRBC 0.0 /100 WBC     POC Glucose Once [[id number]]  (Normal) Collected:  05/14/19 2236    Specimen:  Blood Updated:  05/14/19 2310     Glucose 104 mg/dL      Comment: : [id number] ALMAZAN Urszulaer ID: [id number]        Osmolality, Urine - Urine, Clean Catch [[id number]]  (Normal) Collected:  05/14/19 2059    Specimen:  Urine, Clean Catch Updated:  05/14/19 2132     Osmolality, Urine 267 mOsm/kg     Urinalysis With Culture If Indicated - Urine, Clean Catch [[id number]]  (Abnormal) Collected:  05/14/19 2059    Specimen:  Urine, Clean Catch Updated:  05/14/19 2131     Color, UA Yellow     Appearance, UA Clear     pH, UA <=5.0     Specific Gravity, UA 1.010     Glucose,  mg/dL (Trace)     Ketones, UA Negative     Bilirubin, UA Negative     Blood, UA Trace     Protein, UA Negative     Leuk Esterase, UA Negative     Nitrite, UA Negative     Urobilinogen, UA 0.2 E.U./dL    Urinalysis, Microscopic Only - Urine, Clean Catch [[id number]]  (Abnormal) Collected:  05/14/19 2059    Specimen:  Urine, Clean Catch Updated:  05/14/19 2131     RBC, UA None Seen /HPF      WBC, UA 3-5 /HPF      Bacteria, UA 1+ /HPF      Squamous Epithelial Cells, UA 3-5 /HPF      Hyaline Casts, UA None Seen /LPF      Granular Casts, UA 0-2 /LPF      Methodology Manual Light Microscopy    Sodium, Urine, Random - Urine, Clean Catch [[id number]] Collected:  05/14/19 2059    Specimen:  Urine, Clean Catch Updated:  05/14/19 2115     Sodium, Urine 56 mmol/L     Narrative:       Reference intervals for random urine have not been established.  Clinical usage is dependent upon physician's interpretation in combination with other laboratory tests.     Urine Eosinophils, Dario's Stain - Urine, Clean Catch [[id number]] Collected:  05/14/19 2059    Specimen:  Urine, Clean Catch Updated:  05/14/19 2059    [facility] Draw [[id number]] Collected:  05/14/19 1735    Specimen:  Blood Updated:  05/14/19 1846    Narrative:       The following orders were created for panel order [facility] Draw.  Procedure                               Abnormality         Status                     ---------                               -----------         ------                     Light Blue Top[[id number]]                                    Final result               Green Top (Gel)[[id number]]                                  Final result               Lavender Top[[id number]]                                     Final result               Gold Top - SST[[id number]]                                   Final result                 Please view results for these tests on the individual orders.    Light Blue Top [[id number]] Collected:  05/14/19 1735    Specimen:  Blood Updated:  05/14/19 1846     Extra Tube hold for add-on     Comment: Auto resulted       Green Top (Gel) [[id number]] Collected:  05/14/19 1735    Specimen:  Blood Updated:  05/14/19 1846     Extra Tube Hold for add-ons.     Comment: Auto resulted.       Lavender Top [[id number]] Collected:  05/14/19 1735    Specimen:  Blood Updated:  05/14/19 1846     Extra Tube hold for add-on     Comment: Auto resulted       Gold Top - SST [[id number]] Collected:  05/14/19 1735    Specimen:  Blood Updated:  05/14/19 1846     Extra Tube Hold for add-ons.     Comment: Auto resulted.       Comprehensive Metabolic Panel [[id number]]  (Abnormal) Collected:  05/14/19 1735    Specimen:  Blood Updated:  05/14/19 1807     Glucose 77 mg/dL       mg/dL      Creatinine 6.05 mg/dL      Sodium 133 mmol/L      Potassium 5.6 mmol/L      Chloride 92 mmol/L      CO2 20.0 mmol/L      Calcium 10.3 mg/dL      Total Protein 8.1 g/dL      Albumin 4.30 g/dL      ALT (SGPT) 26 U/L      AST (SGOT) 40 U/L      Alkaline Phosphatase 36 U/L      Total Bilirubin 0.3 mg/dL      eGFR Non African Amer 7 mL/min/1.73      Comment: <15 Indicative of kidney failure.        eGFR   Amer -- mL/min/1.73      Comment: <15 Indicative of kidney failure.        Globulin 3.8 gm/dL      A/G Ratio 1.1 g/dL      BUN/Creatinine Ratio 17.2     Anion Gap 21.0 mmol/L     Narrative:       GFR Normal >60  Chronic Kidney Disease <60  Kidney Failure <15    Urinalysis, Microscopic Only - Urine, Clean Catch [[id number]]   (Abnormal) Collected:  05/14/19 1736    Specimen:  Urine, Clean Catch Updated:  05/14/19 1750     RBC, UA 3-5 /HPF      WBC, UA 6-12 /HPF      Bacteria, UA None Seen /HPF      Squamous Epithelial Cells, UA 6-12 /HPF      Hyaline Casts, UA 0-2 /LPF      Methodology Automated Microscopy    Urinalysis With Microscopic If Indicated (No Culture) - Urine, Clean Catch [[id number]]  (Abnormal) Collected:  05/14/19 1736    Specimen:  Urine, Clean Catch Updated:  05/14/19 1748     Color, UA Yellow     Appearance, UA Cloudy     pH, UA <=5.0     Specific Gravity, UA 1.011     Glucose,  mg/dL (Trace)     Ketones, UA Negative     Bilirubin, UA Negative     Blood, UA Trace     Protein, UA Negative     Leuk Esterase, UA Small (1+)     Nitrite, UA Negative     Urobilinogen, UA 0.2 E.U./dL    CBC & Differential [[id number]] Collected:  05/14/19 1735    Specimen:  Blood Updated:  05/14/19 1745    Narrative:       The following orders were created for panel order CBC & Differential.  Procedure                               Abnormality         Status                     ---------                               -----------         ------                     CBC Auto Differential[[id number]]        Abnormal            Final result                 Please view results for these tests on the individual orders.    CBC Auto Differential [[id number]]  (Abnormal) Collected:  05/14/19 1735    Specimen:  Blood Updated:  05/14/19 1745     WBC 8.02 10*3/mm3      RBC 4.21 10*6/mm3      Hemoglobin 11.4 g/dL      Hematocrit 34.5 %      MCV 81.9 fL      MCH 27.1 pg      MCHC 33.0 g/dL      RDW 14.2 %      RDW-SD 41.9 fl      MPV 11.4 fL      Platelets 250 10*3/mm3      Neutrophil % 67.4 %      Lymphocyte % 23.8 %      Monocyte % 7.4 %      Eosinophil % 1.1 %      Basophil % 0.1 %      Immature Grans % 0.2 %      Neutrophils, Absolute 5.40 10*3/mm3      Lymphocytes, Absolute 1.91 10*3/mm3      Monocytes, Absolute 0.59 10*3/mm3      Eosinophils, Absolute  0.09 10*3/mm3      Basophils, Absolute 0.01 10*3/mm3      Immature Grans, Absolute 0.02 10*3/mm3      nRBC 0.0 /100 WBC         Physician Progress Notes (last 24 hours) (Notes from 2019 11:02 AM through 5/15/2019 11:02 AM)     No notes of this type exist for this encounter.           Consult Notes (last 24 hours) (Notes from 2019 11:02 AM through 5/15/2019 11:02 AM)      Key Cota MD at 5/15/2019 10:18 AM      Consult Orders    1. Nephrology - FRANSISCO (on-call MD from group unless specified) [[id number]] ordered by Felicity Benson APRN at 05/15/19 0936                [facility]  [address] [place] [address]. [address]  T - [phone]  F - [phone]     Consultation         PATIENT  DEMOGRAPHICS   PATIENT NAME: Maricarmen Ferris                      PHYSICIAN: Key Cota MD  : 1970  MRN: [id number]    Subjective   SUBJECTIVE   Referring Provider: Saloni Snyder  Reason for Consultation: aleksandar  History of present illness:     Ms. Ferris is a 48-year-old female who has past medical history of GERD dyslipidemia morbid obesity and hypertension who is awaiting gastric sleeve surgery from Dr. Sunshine and was on a high-protein diet prior to the surgery.  She is on Lasix and spironolactone for chronic edema.  She is also on lisinopril at home.  She has been treated with antibiotics for E.coli UTI with amoxicillin.     Patient has seen Dr. Alethea Ash and had lab done which showed creatinine of 6.21 potassium of 5.8 and carbon dioxide of 16. Case is discussed with me   Patient is therefore transferred here for further evaluation.  Overnight with IV fluid her creatinine did come down to 4.9.  Her diuretics are currently on hold.  She has 2 days of diarrhea as well.  She was taking close to 90 g of protein per day.    Past Medical History:   Diagnosis Date   • Acid reflux    • Anxiety    • Depressed    • Dysphagia    • GERD (gastroesophageal reflux disease)    • Hard to  intubate    • Hyperlipidemia    • Hypertension    • IBS (irritable bowel syndrome)    • Nausea    • Sleep apnea      Past Surgical History:   Procedure Laterality Date   • ABDOMINAL SURGERY     • CARDIAC CATHETERIZATION N/A 8/13/2018    Procedure: Left Heart Cath 8/13/2018 @ 9;00;  Surgeon: Kuldip Frank MD;  Location: [facility] CATH INVASIVE LOCATION;  Service: Cardiology   • COLONOSCOPY N/A 11/19/2018    Procedure: COLONOSCOPY;  Surgeon: Bro Whitaker MD;  Location: [facility] ENDOSCOPY;  Service: Gastroenterology   • CYSTOSCOPY     • ENDOMETRIAL ABLATION  2015   • ENDOSCOPY N/A 11/19/2018    Procedure: ESOPHAGOGASTRODUODENOSCOPYwith dilation;  Surgeon: Bro Whitaker MD;  Location: [facility] ENDOSCOPY;  Service: Gastroenterology   • LAPAROSCOPIC TUBAL LIGATION  1995   • UT ERCP DX COLLECTION SPECIMEN BRUSHING/WASHING Left 7/31/2017    Procedure: ENDOSCOPIC RETROGRADE CHOLANGIOPANCREATOGRAPHY;  Surgeon: Samuel Redding DO;  Location: [facility] ENDOSCOPY;  Service: Gastroenterology   • UT LAP,CHOLECYSTECTOMY N/A 7/24/2017    Procedure: CHOLECYSTECTOMY LAPAROSCOPIC, also umbillical hernia repair;  Surgeon: Pk العلي MD;  Location: [facility] OR;  Service: General   • UPPER GASTROINTESTINAL ENDOSCOPY  11/19/2018     Family History   Problem Relation Age of Onset   • Diabetes Mother    • Hypertension Mother    • Cirrhosis Mother    • Hypertension Father      Social History     Tobacco Use   • Smoking status: Never Smoker   • Smokeless tobacco: Never Used   Substance Use Topics   • Alcohol use: Yes     Comment: socially   • Drug use: No     Allergies:  Sulfa antibiotics     REVIEW OF SYSTEMS    Review of Systems   Constitutional: Negative for chills and fever.   Respiratory: Negative for chest tightness and shortness of breath.    Cardiovascular: Negative for chest pain and leg swelling.   Gastrointestinal: Positive for diarrhea. Negative for abdominal pain and nausea.   Genitourinary: Negative for dysuria, flank pain and  "hematuria.   Neurological: Negative for dizziness, syncope and weakness.       Objective   OBJECTIVE   Vital Signs  Temp:  [97.6 °F (36.4 °C)-98 °F (36.7 °C)] 97.6 °F (36.4 °C)  Heart Rate:  [57-79] 61  Resp:  [16-22] 16  BP: (102-133)/(44-57) 102/50    Flowsheet Rows      First Filed Value   Admission Height  165.1 cm (65\") Documented at 05/14/2019 1433   Admission Weight  139 kg (305 lb 14.4 oz)  (Abnormal)  Documented at 05/14/2019 1433           I/O last 3 completed shifts:  In: 1100 [IV Piggyback:1100]  Out: 600 [Urine:600]    PHYSICAL EXAM    Physical Exam   Constitutional: She is oriented to person, place, and time. She appears well-developed.   HENT:   Head: Normocephalic.   Eyes: Pupils are equal, round, and reactive to light.   Cardiovascular: Normal rate, regular rhythm and normal heart sounds.   Pulmonary/Chest: Effort normal and breath sounds normal.   Abdominal: Soft. Bowel sounds are normal.   Musculoskeletal: She exhibits no edema.   Neurological: She is alert and oriented to person, place, and time.       RESULTS   Results Review:    Results from last 7 days   Lab Units 05/15/19  0551 05/14/19  1735   SODIUM mmol/L 136 133*   POTASSIUM mmol/L 5.8* 5.6*   CHLORIDE mmol/L 100 92*   CO2 mmol/L 20.0* 20.0*   BUN mg/dL 94* 104*   CREATININE mg/dL 4.92* 6.05*   CALCIUM mg/dL 9.4 10.3   BILIRUBIN mg/dL  --  0.3   ALK PHOS U/L  --  36*   ALT (SGPT) U/L  --  26   AST (SGOT) U/L  --  40*   GLUCOSE mg/dL 83 77       Estimated Creatinine Clearance: 19.8 mL/min (A) (by C-G formula based on SCr of 4.92 mg/dL (H)).                Results from last 7 days   Lab Units 05/15/19  0551 05/14/19  1735   WBC 10*3/mm3 5.93 8.02   HEMOGLOBIN g/dL 9.8* 11.4*   PLATELETS 10*3/mm3 208 250              MEDICATIONS      budesonide 0.5 mg Nebulization BID - RT   buPROPion  mg Oral BID   calcium carbonate-vitamin d 1 tablet Oral Daily   ceftriaxone 1 g Intravenous Q24H   dicyclomine 10 mg Oral TID   gabapentin 100 mg Oral " Q8H   isosorbide mononitrate 30 mg Oral Daily   nystatin  Topical Q12H   oxybutynin 5 mg Oral BID   pantoprazole 40 mg Oral Daily   rOPINIRole 0.25 mg Oral Nightly   sertraline 150 mg Oral Daily   sodium chloride 3 mL Intravenous Q12H   traMADol 50 mg Oral Daily       sodium bicarbonate drip less than 75 mEq/bag 150 mL/hr     Medications Prior to Admission   Medication Sig Dispense Refill Last Dose   • albuterol sulfate  (90 Base) MCG/ACT inhaler Inhale 2 puffs Every 4 (Four) Hours As Needed for Wheezing or Shortness of Air. 18 g 6 Taking   • buPROPion SR (WELLBUTRIN SR) 150 MG 12 hr tablet Take 150 mg by mouth 2 (Two) Times a Day.  5 5/14/2019 at 0900   • Calcium Carb-Cholecalciferol (CALCIUM 1000 + D) 1000-800 MG-UNIT tablet Take 1 tablet by mouth Daily.   5/14/2019 at Unknown time   • dicyclomine (BENTYL) 10 MG capsule Take 10 mg by mouth 3 (Three) Times a Day.  1 5/14/2019 at Unknown time   • fenofibrate (TRICOR) 145 MG tablet Take 145 mg by mouth Daily.  0 5/14/2019 at Unknown time   • Fluticasone Furoate 200 MCG/ACT aerosol powder  Inhale 1 puff Daily. Rinse mouth well after use 1 each 11 5/14/2019 at Unknown time   • furosemide (LASIX) 40 MG tablet Take 40 mg by mouth Daily.  12 5/13/2019 at Unknown time   • gabapentin (NEURONTIN) 100 MG capsule Take 200 mg by mouth 3 (Three) Times a Day.   5/14/2019 at Unknown time   • isosorbide mononitrate (IMDUR) 30 MG 24 hr tablet Take 30 mg by mouth Daily.   5/6/2019   • lisinopril (PRINIVIL,ZESTRIL) 20 MG tablet Take 20 mg by mouth Daily.   5/14/2019 at Unknown time   • methocarbamol (ROBAXIN) 500 MG tablet Take 500 mg by mouth 3 (Three) Times a Day.   5/13/2019   • oxybutynin (DITROPAN) 5 MG tablet Take 5 mg by mouth 2 (Two) Times a Day.   5/14/2019 at Unknown time   • pantoprazole (PROTONIX) 40 MG EC tablet Take 40 mg by mouth Daily.   5/14/2019 at Unknown time   • rOPINIRole (REQUIP) 0.25 MG tablet Take 0.25 mg by mouth Every Night.   5/13/2019 at Unknown  time   • Sertraline HCl (ZOLOFT PO) Take 150 mg by mouth Daily.   5/14/2019 at Unknown time   • spironolactone (ALDACTONE) 25 MG tablet Take 25 mg by mouth Daily. Alternates days with lasix.  12 5/12/2019   • traMADol (ULTRAM) 50 MG tablet Take 50 mg by mouth Daily.  0 5/14/2019 at Unknown time     Assessment/Plan   ASSESSMENT / PLAN      Acute kidney injury (CMS/[facility])    Morbid obesity with BMI of 50.0-59.9, adult (CMS/[facility])    Restrictive lung disease secondary to obesity    Gastroesophageal reflux disease with esophagitis    ELSIE on CPAP    DYAN (acute kidney injury) (CMS/[facility])    Hypertension    Hyperlipidemia    Hyperkalemia    1.acute kidney injury it appears to be prerenal in nature while on diuretics plus may have worsened by antibiotic and needs to rule out allergic interstitial nephritis.  Also she is on high protein diet and have some contribution to DYAN though there is no albuminuria. HER BP IS also on low side on arrival    Patient is improving with IV fluids and I will change it to bicarb drip.  Her ultrasound is negative for any hydronephrosis. Check claudy stain    2.hyperkalemia and metabolic acidosis plan as above.  Patient also received insulin dextrose and calcium gluconate this morning.  She has received sodium bicarbonate yesterday.  I will observe it for now    3.morbid obesity awaiting gastric sleeve surgery     4.hypertension patient lisinopril will be on hold    5.history of dyslipidemia/restless leg syndrome    Thank you for the referral       I discussed the patients findings and my recommendations with patient and family         This document has been electronically signed by Key Cota MD on May 15, 2019 10:18 AM             Electronically signed by Key Cota MD at 5/15/2019 10:28 AM

## 2019-05-15 NOTE — PLAN OF CARE
Problem: Patient Care Overview  Goal: Plan of Care Review  Outcome: Ongoing (interventions implemented as appropriate)   05/15/19 1680   Coping/Psychosocial   Plan of Care Reviewed With patient   Plan of Care Review   Progress no change   OTHER   Outcome Summary Pt admitted from the ER. Sleeping at this time. US renal in AM and recheck of BMP       Problem: Renal Failure/Kidney Injury, Acute (Adult)  Goal: Signs and Symptoms of Listed Potential Problems Will be Absent, Minimized or Managed (Renal Failure/Kidney Injury, Acute)  Outcome: Ongoing (interventions implemented as appropriate)      Problem: Pain, Chronic (Adult)  Goal: Identify Related Risk Factors and Signs and Symptoms  Outcome: Outcome(s) achieved Date Met: 05/15/19    Goal: Acceptable Pain/Comfort Level and Functional Ability  Outcome: Ongoing (interventions implemented as appropriate)

## 2019-05-16 LAB
ANION GAP SERPL CALCULATED.3IONS-SCNC: 14 MMOL/L
BASOPHILS # BLD AUTO: 0.02 10*3/MM3 (ref 0–0.2)
BASOPHILS NFR BLD AUTO: 0.3 % (ref 0–1.5)
BUN BLD-MCNC: 70 MG/DL (ref 6–20)
BUN/CREAT SERPL: 20.5 (ref 7–25)
CALCIUM SPEC-SCNC: 9.2 MG/DL (ref 8.6–10.5)
CHLORIDE SERPL-SCNC: 98 MMOL/L (ref 98–107)
CO2 SERPL-SCNC: 25 MMOL/L (ref 22–29)
CREAT BLD-MCNC: 3.42 MG/DL (ref 0.57–1)
DEPRECATED RDW RBC AUTO: 44.3 FL (ref 37–54)
EOSINOPHIL # BLD AUTO: 0.12 10*3/MM3 (ref 0–0.4)
EOSINOPHIL NFR BLD AUTO: 2.1 % (ref 0.3–6.2)
ERYTHROCYTE [DISTWIDTH] IN BLOOD BY AUTOMATED COUNT: 14.5 % (ref 12.3–15.4)
GFR SERPL CREATININE-BSD FRML MDRD: 14 ML/MIN/1.73
GFR SERPL CREATININE-BSD FRML MDRD: ABNORMAL ML/MIN/1.73
GLUCOSE BLD-MCNC: 144 MG/DL (ref 65–99)
HANSEL STAIN: NEGATIVE
HCT VFR BLD AUTO: 32.1 % (ref 34–46.6)
HGB BLD-MCNC: 10.2 G/DL (ref 12–15.9)
HOLD SPECIMEN: NORMAL
IMM GRANULOCYTES # BLD AUTO: 0.01 10*3/MM3 (ref 0–0.05)
IMM GRANULOCYTES NFR BLD AUTO: 0.2 % (ref 0–0.5)
LYMPHOCYTES # BLD AUTO: 2.34 10*3/MM3 (ref 0.7–3.1)
LYMPHOCYTES NFR BLD AUTO: 40.2 % (ref 19.6–45.3)
MCH RBC QN AUTO: 26.9 PG (ref 26.6–33)
MCHC RBC AUTO-ENTMCNC: 31.8 G/DL (ref 31.5–35.7)
MCV RBC AUTO: 84.7 FL (ref 79–97)
MONOCYTES # BLD AUTO: 0.41 10*3/MM3 (ref 0.1–0.9)
MONOCYTES NFR BLD AUTO: 7 % (ref 5–12)
NEUTROPHILS # BLD AUTO: 2.92 10*3/MM3 (ref 1.7–7)
NEUTROPHILS NFR BLD AUTO: 50.2 % (ref 42.7–76)
NRBC BLD AUTO-RTO: 0 /100 WBC (ref 0–0.2)
PLATELET # BLD AUTO: 202 10*3/MM3 (ref 140–450)
PMV BLD AUTO: 12 FL (ref 6–12)
POTASSIUM BLD-SCNC: 4.5 MMOL/L (ref 3.5–5.2)
RBC # BLD AUTO: 3.79 10*6/MM3 (ref 3.77–5.28)
SODIUM BLD-SCNC: 137 MMOL/L (ref 136–145)
WBC NRBC COR # BLD: 5.82 10*3/MM3 (ref 3.4–10.8)

## 2019-05-16 PROCEDURE — 85025 COMPLETE CBC W/AUTO DIFF WBC: CPT | Performed by: NURSE PRACTITIONER

## 2019-05-16 PROCEDURE — 80048 BASIC METABOLIC PNL TOTAL CA: CPT | Performed by: NURSE PRACTITIONER

## 2019-05-16 PROCEDURE — 25010000002 CEFTRIAXONE PER 250 MG: Performed by: FAMILY MEDICINE

## 2019-05-16 PROCEDURE — 94760 N-INVAS EAR/PLS OXIMETRY 1: CPT

## 2019-05-16 PROCEDURE — 94799 UNLISTED PULMONARY SVC/PX: CPT

## 2019-05-16 RX ORDER — SODIUM CHLORIDE 9 MG/ML
125 INJECTION, SOLUTION INTRAVENOUS CONTINUOUS
Status: DISCONTINUED | OUTPATIENT
Start: 2019-05-16 | End: 2019-05-18

## 2019-05-16 RX ADMIN — GABAPENTIN 100 MG: 100 CAPSULE ORAL at 20:44

## 2019-05-16 RX ADMIN — DICYCLOMINE HYDROCHLORIDE 10 MG: 10 CAPSULE ORAL at 10:04

## 2019-05-16 RX ADMIN — TRAMADOL HYDROCHLORIDE 50 MG: 50 TABLET, FILM COATED ORAL at 20:43

## 2019-05-16 RX ADMIN — SODIUM CHLORIDE, PRESERVATIVE FREE 3 ML: 5 INJECTION INTRAVENOUS at 10:07

## 2019-05-16 RX ADMIN — SODIUM CHLORIDE, PRESERVATIVE FREE 3 ML: 5 INJECTION INTRAVENOUS at 20:43

## 2019-05-16 RX ADMIN — BUPROPION HYDROCHLORIDE 150 MG: 150 TABLET, EXTENDED RELEASE ORAL at 20:43

## 2019-05-16 RX ADMIN — DICYCLOMINE HYDROCHLORIDE 10 MG: 10 CAPSULE ORAL at 20:43

## 2019-05-16 RX ADMIN — SODIUM CHLORIDE 100 ML/HR: 9 INJECTION, SOLUTION INTRAVENOUS at 15:07

## 2019-05-16 RX ADMIN — OXYBUTYNIN CHLORIDE 5 MG: 5 TABLET ORAL at 10:04

## 2019-05-16 RX ADMIN — NYSTATIN: 100000 POWDER TOPICAL at 10:10

## 2019-05-16 RX ADMIN — DICYCLOMINE HYDROCHLORIDE 10 MG: 10 CAPSULE ORAL at 15:07

## 2019-05-16 RX ADMIN — SODIUM BICARBONATE 150 ML/HR: 84 INJECTION, SOLUTION INTRAVENOUS at 04:54

## 2019-05-16 RX ADMIN — DOCUSATE SODIUM 100 MG: 100 CAPSULE, LIQUID FILLED ORAL at 20:43

## 2019-05-16 RX ADMIN — BUDESONIDE 0.5 MG: 0.5 INHALANT RESPIRATORY (INHALATION) at 19:15

## 2019-05-16 RX ADMIN — GABAPENTIN 100 MG: 100 CAPSULE ORAL at 15:07

## 2019-05-16 RX ADMIN — OXYBUTYNIN CHLORIDE 5 MG: 5 TABLET ORAL at 20:43

## 2019-05-16 RX ADMIN — BUDESONIDE 0.5 MG: 0.5 INHALANT RESPIRATORY (INHALATION) at 07:01

## 2019-05-16 RX ADMIN — NYSTATIN: 100000 POWDER TOPICAL at 22:04

## 2019-05-16 RX ADMIN — CEFTRIAXONE SODIUM 1 G: 1 INJECTION, POWDER, FOR SOLUTION INTRAMUSCULAR; INTRAVENOUS at 06:08

## 2019-05-16 RX ADMIN — ROPINIROLE HYDROCHLORIDE 0.25 MG: 0.25 TABLET, FILM COATED ORAL at 20:43

## 2019-05-16 RX ADMIN — Medication 1 TABLET: at 10:05

## 2019-05-16 RX ADMIN — DOCUSATE SODIUM 100 MG: 100 CAPSULE, LIQUID FILLED ORAL at 10:05

## 2019-05-16 RX ADMIN — BUPROPION HYDROCHLORIDE 150 MG: 150 TABLET, EXTENDED RELEASE ORAL at 10:04

## 2019-05-16 RX ADMIN — GABAPENTIN 100 MG: 100 CAPSULE ORAL at 06:08

## 2019-05-16 RX ADMIN — SERTRALINE HYDROCHLORIDE 150 MG: 50 TABLET ORAL at 10:05

## 2019-05-16 RX ADMIN — PANTOPRAZOLE SODIUM 40 MG: 40 TABLET, DELAYED RELEASE ORAL at 10:04

## 2019-05-16 NOTE — CONSULTS
Adult Nutrition  Assessment    Patient Name:  Maricarmen Ferris  YOB: 1970  MRN: [id number]  Admit Date:  5/14/2019    Assessment Date:  5/16/2019    Comments:  Pt admit for DYAN w/ h//o IBS. Pt is working w/ RD at wt loss clinic and is pending weight loss surgery- has recently increased pro and fluids while decreasing carbohydrates. Presents with BMI 52 and % which is indicative of morbid obesity. Completed brief diet ed on lifestyle changes for healthier BMI which also included portion sizes and tips for eating out, handouts provided.  RD to follow hospital course.    Reason for Assessment     Row Name 05/16/19 140          Reason for Assessment    Reason For Assessment  identified at risk by screening criteria     Diagnosis  renal disease     Identified At Risk by Screening Criteria  BMI         Nutrition/Diet History     Row Name 05/16/19 1403          Nutrition/Diet History    Typical Food/Fluid Intake  Pt denies any food allergies, c/d problems. Pt states she does not have n/v/d but sometimes she has some stomach upset/quesy. Pt states her appetite is always good. She works w/ RD at weight loss clinic in [place] and is pending wt loss surgery- has been increasing protein and fluid while decreasing carbohydrates. Been eating a lot of chicken and eggs and protein shakes.           Labs/Tests/Procedures/Meds     Row Name 05/16/19 9916          Labs/Procedures/Meds    Lab Results Reviewed  reviewed     Lab Results Comments  Glu 865875, BUN 70H/Cr 3.4H and GFR 14L (this is improved from admit), H&H low, Alb 4.3        Diagnostic Tests/Procedures    Diagnostic Test/Procedure Reviewed  reviewed        Medications    Pertinent Medications Reviewed  reviewed     Pertinent Medications Comments  Ca/D, bentyl, protonix           Estimated/Assessed Needs     Row Name 05/16/19 7206          Calculation Measurements    Weight Used For Calculations  76.2 kg (168 lb)        Estimated/Assessed  Needs    Additional Documentation  Calorie Requirements (Group);Protein Requirements (Group);KCAL/KG (Group);Manakin Sabot-St. Jeor Equation (Group);Fluid Requirements (Group)        Calorie Requirements    Estimated Calorie Requirement (kcal/day)  1900        KCAL/KG    14 Kcal/Kg (kcal)  1066.86     15 Kcal/Kg (kcal)  1143.06     18 Kcal/Kg (kcal)  1371.67     20 Kcal/Kg (kcal)  1524.08     25 Kcal/Kg (kcal)  1905.1     30 Kcal/Kg (kcal)  2286.12     35 Kcal/Kg (kcal)  2667.14     40 Kcal/Kg (kcal)  3048.16     45 Kcal/Kg (kcal)  3429.18     50 Kcal/Kg (kcal)  3810.2        Manakin Sabot-St. Jeor Equation    RMR (Manakin Sabot-St. Jeor Equation)  1392.92        Protein Requirements    Est Protein Requirement Amount (gms/kg)  0.8 gm protein     Estimated Protein Requirements (gms/day)  60.96        Fluid Requirements    Estimated Fluid Requirements (mL/day)  1900     RDA Method (mL)  1900     Enrique-Celestear Method (over 20 kg)  3024.08         Nutrition Prescription Ordered     Row Name 05/16/19 1409          Nutrition Prescription PO    Current PO Diet  Regular     Common Modifiers  Cardiac;Renal         Evaluation of Received Nutrient/Fluid Intake     Row Name 05/16/19 1409 05/16/19 1407       Calculation Measurements    Weight Used For Calculations  --  76.2 kg (168 lb)       PO Evaluation    Number of Meals  1  --    % PO Intake  100  --        Evaluation of Prescribed Nutrient/Fluid Intake     Row Name 05/16/19 1407          Calculation Measurements    Weight Used For Calculations  76.2 kg (168 lb)             Electronically signed by:  Cee Salinas RD  05/16/19 2:11 PM

## 2019-05-16 NOTE — PROGRESS NOTES
[facility] Medicine Services  INPATIENT PROGRESS NOTE    Length of Stay: 2  Date of Admission: 5/14/2019  Primary Care Physician: Alethea Ash MD    Subjective   Chief Complaint/HPI: This 48-year-old  female was referred to hospitalist services secondary to an elevated creatinine and hyperkalemia.  Potassium was 5.6 and creatinine was 6.05.  Patient had been on a high protein diet preparing for weight loss surgery.  Creatinine has now improved to 3.42.  Patient has no complaints of dysuria, fever, chills, nausea, vomiting, abdominal pain.  She does report that she is still urinating less than normal.    Review of Systems   Constitutional: Positive for fatigue. Negative for chills and fever.   Respiratory: Negative for shortness of breath.    Cardiovascular: Negative for chest pain and palpitations.   Gastrointestinal: Negative for abdominal pain, constipation, diarrhea and nausea.   Genitourinary: Positive for decreased urine volume. Negative for dysuria.   Neurological: Negative for dizziness and light-headedness.      All pertinent negatives and positives are as above. All other systems have been reviewed and are negative unless otherwise stated.     Objective    Temp:  [96.4 °F (35.8 °C)-97.7 °F (36.5 °C)] 96.6 °F (35.9 °C)  Heart Rate:  [58-74] 63  Resp:  [18-20] 18  BP: ()/(39-71) 92/55    Physical Exam   Constitutional: She is oriented to person, place, and time. She appears well-developed and well-nourished. No distress.   HENT:   Head: Normocephalic and atraumatic.   Eyes: No scleral icterus.   Cardiovascular: Normal rate and regular rhythm.   Pulmonary/Chest: Effort normal and breath sounds normal. No stridor. No respiratory distress.   Abdominal: Soft. Bowel sounds are normal. She exhibits no distension. There is no tenderness.   Neurological: She is alert and oriented to person, place, and time.   Skin: Skin is warm and dry. Capillary refill  takes less than 2 seconds. She is not diaphoretic.   Psychiatric: She has a normal mood and affect. Her behavior is normal.     Results Review:  I have reviewed the labs, radiology results, and diagnostic studies.    Laboratory Data:   Results from last 7 days   Lab Units 05/16/19  0646 05/15/19  0551 05/14/19  1735   SODIUM mmol/L 137 136 133*   POTASSIUM mmol/L 4.5 5.8* 5.6*   CHLORIDE mmol/L 98 100 92*   CO2 mmol/L 25.0 20.0* 20.0*   BUN mg/dL 70* 94* 104*   CREATININE mg/dL 3.42* 4.92* 6.05*   GLUCOSE mg/dL 144* 83 77   CALCIUM mg/dL 9.2 9.4 10.3   BILIRUBIN mg/dL  --   --  0.3   ALK PHOS U/L  --   --  36*   ALT (SGPT) U/L  --   --  26   AST (SGOT) U/L  --   --  40*   ANION GAP mmol/L 14.0 16.0 21.0     Estimated Creatinine Clearance: 28.9 mL/min (A) (by C-G formula based on SCr of 3.42 mg/dL (H)).          Results from last 7 days   Lab Units 05/16/19  0600 05/15/19  0551 05/14/19  1735   WBC 10*3/mm3 5.82 5.93 8.02   HEMOGLOBIN g/dL 10.2* 9.8* 11.4*   HEMATOCRIT % 32.1* 29.9* 34.5   PLATELETS 10*3/mm3 202 208 250           Culture Data:   No results found for: BLOODCX  No results found for: URINECX  No results found for: RESPCX  No results found for: WOUNDCX  No results found for: STOOLCX  No components found for: BODYFLD    Radiology Data:   Imaging Results (last 24 hours)     ** No results found for the last 24 hours. **          I have reviewed the patient's current medications.     Assessment/Plan     Active Hospital Problems    Diagnosis   • **Acute kidney injury (CMS/HCC)   • DYAN (acute kidney injury) (CMS/HCC)   • Hypertension   • Hyperlipidemia   • Hyperkalemia   • ELSIE on CPAP   • Gastroesophageal reflux disease with esophagitis     Added automatically from request for surgery [id number]     • Restrictive lung disease secondary to obesity   • Morbid obesity with BMI of 50.0-59.9, adult (CMS/[facility])       Plan: Continue per nephrology.              This document has been electronically signed by Unruly GOMEZ  JANET Cuadra on May 16, 2019 10:07 AM

## 2019-05-16 NOTE — PROGRESS NOTES
"[facility]  [address] [place] [address]. [address]   - [phone]  F - [phone]     Progress Note          PATIENT  DEMOGRAPHICS   PATIENT NAME: Maricarmen Ferris                      PHYSICIAN: Key Cota MD  : 1970  MRN: [id number]   LOS: 2 days    Patient Care Team:  Alethea Ash MD as PCP - General (Family Medicine)  Subjective   SUBJECTIVE   Feels well no soa         Objective   OBJECTIVE   Vital Signs  Temp:  [96.5 °F (35.8 °C)-98 °F (36.7 °C)] 98 °F (36.7 °C)  Heart Rate:  [58-74] 60  Resp:  [18-20] 18  BP: ()/(39-63) 99/53    Flowsheet Rows      First Filed Value   Admission Height  165.1 cm (65\") Documented at 2019 1433   Admission Weight  139 kg (305 lb 14.4 oz)  (Abnormal)  Documented at 2019 1433           I/O last 3 completed shifts:  In: 1640 [P.O.:240; I.V.:300; IV Piggyback:1100]  Out: 2000 [Urine:2000]    PHYSICAL EXAM    Physical Exam   Constitutional: She is oriented to person, place, and time. She appears well-developed.   HENT:   Head: Normocephalic.   Eyes: Pupils are equal, round, and reactive to light.   Cardiovascular: Normal rate, regular rhythm and normal heart sounds.   Pulmonary/Chest: Effort normal and breath sounds normal.   Abdominal: Soft. Bowel sounds are normal.   Musculoskeletal: She exhibits no edema.   Neurological: She is alert and oriented to person, place, and time.       RESULTS   Results Review:    Results from last 7 days   Lab Units 19  0646 05/15/19  0551 19  1735   SODIUM mmol/L 137 136 133*   POTASSIUM mmol/L 4.5 5.8* 5.6*   CHLORIDE mmol/L 98 100 92*   CO2 mmol/L 25.0 20.0* 20.0*   BUN mg/dL 70* 94* 104*   CREATININE mg/dL 3.42* 4.92* 6.05*   CALCIUM mg/dL 9.2 9.4 10.3   BILIRUBIN mg/dL  --   --  0.3   ALK PHOS U/L  --   --  36*   ALT (SGPT) U/L  --   --  26   AST (SGOT) U/L  --   --  40*   GLUCOSE mg/dL 144* 83 77       Estimated Creatinine Clearance: 28.9 mL/min (A) (by C-G " formula based on SCr of 3.42 mg/dL (H)).                Results from last 7 days   Lab Units 05/16/19  0600 05/15/19  0551 05/14/19  1735   WBC 10*3/mm3 5.82 5.93 8.02   HEMOGLOBIN g/dL 10.2* 9.8* 11.4*   PLATELETS 10*3/mm3 202 208 250               Imaging Results (last 24 hours)     ** No results found for the last 24 hours. **           MEDICATIONS      budesonide 0.5 mg Nebulization BID - RT   buPROPion  mg Oral BID   calcium carbonate-vitamin d 1 tablet Oral Daily   ceftriaxone 1 g Intravenous Q24H   dicyclomine 10 mg Oral TID   docusate sodium 100 mg Oral BID   gabapentin 100 mg Oral Q8H   isosorbide mononitrate 30 mg Oral Daily   nystatin  Topical Q12H   oxybutynin 5 mg Oral BID   pantoprazole 40 mg Oral Daily   rOPINIRole 0.25 mg Oral Nightly   sertraline 150 mg Oral Daily   sodium chloride 3 mL Intravenous Q12H   traMADol 50 mg Oral Daily       sodium bicarbonate drip less than 75 mEq/bag 150 mL/hr Last Rate: 150 mL/hr (05/16/19 0454)       Assessment/Plan   ASSESSMENT / PLAN      Acute kidney injury (CMS/HCC)    Morbid obesity with BMI of 50.0-59.9, adult (CMS/HCC)    Restrictive lung disease secondary to obesity    Gastroesophageal reflux disease with esophagitis    ELSIE on CPAP    DYAN (acute kidney injury) (CMS/HCC)    Hypertension    Hyperlipidemia    Hyperkalemia    1.acute kidney injury it appears to be prerenal in nature while on diuretics. Dario negative.  Also she is on high protein diet and have some contribution to DYAN though there is no albuminuria. HER BP IS also on low side on arrival     Patient cr is improving with IV fluids . Bicarb and k are corrected and will change ivf to NS.  Her ultrasound is negative for any hydronephrosis.     2.hyperkalemia and metabolic acidosis now corrected     3.morbid obesity awaiting gastric sleeve surgery      4.hypertension patient lisinopril will be on hold     5.history of dyslipidemia/restless leg syndrome                This document has been  electronically signed by Key Cota MD on May 16, 2019 12:35 PM

## 2019-05-16 NOTE — PLAN OF CARE
Problem: Patient Care Overview  Goal: Plan of Care Review  Outcome: Ongoing (interventions implemented as appropriate)   05/16/19 0326   Coping/Psychosocial   Plan of Care Reviewed With patient   Plan of Care Review   Progress no change   OTHER   Outcome Summary Pt has slept well during the night. No complaints at this time. Applied ice packs to left side, effective. VSS. Will continue to monitor       Problem: Renal Failure/Kidney Injury, Acute (Adult)  Goal: Signs and Symptoms of Listed Potential Problems Will be Absent, Minimized or Managed (Renal Failure/Kidney Injury, Acute)  Outcome: Outcome(s) achieved Date Met: 05/16/19      Problem: Pain, Chronic (Adult)  Goal: Acceptable Pain/Comfort Level and Functional Ability  Outcome: Ongoing (interventions implemented as appropriate)

## 2019-05-17 LAB
ANION GAP SERPL CALCULATED.3IONS-SCNC: 14 MMOL/L
ANISOCYTOSIS BLD QL: NORMAL
BASOPHILS # BLD AUTO: 0.02 10*3/MM3 (ref 0–0.2)
BASOPHILS NFR BLD AUTO: 0.3 % (ref 0–1.5)
BUN BLD-MCNC: 44 MG/DL (ref 6–20)
BUN/CREAT SERPL: 21.5 (ref 7–25)
BURR CELLS BLD QL SMEAR: NORMAL
CALCIUM SPEC-SCNC: 9.1 MG/DL (ref 8.6–10.5)
CHLORIDE SERPL-SCNC: 105 MMOL/L (ref 98–107)
CLUMPED PLATELETS: PRESENT
CO2 SERPL-SCNC: 24 MMOL/L (ref 22–29)
CREAT BLD-MCNC: 2.05 MG/DL (ref 0.57–1)
DEPRECATED RDW RBC AUTO: 42.9 FL (ref 37–54)
EOSINOPHIL # BLD AUTO: 0.1 10*3/MM3 (ref 0–0.4)
EOSINOPHIL NFR BLD AUTO: 1.7 % (ref 0.3–6.2)
ERYTHROCYTE [DISTWIDTH] IN BLOOD BY AUTOMATED COUNT: 14.3 % (ref 12.3–15.4)
GFR SERPL CREATININE-BSD FRML MDRD: 26 ML/MIN/1.73
GLUCOSE BLD-MCNC: 95 MG/DL (ref 65–99)
HCT VFR BLD AUTO: 30.2 % (ref 34–46.6)
HGB BLD-MCNC: 9.9 G/DL (ref 12–15.9)
IMM GRANULOCYTES # BLD AUTO: 0.01 10*3/MM3 (ref 0–0.05)
IMM GRANULOCYTES NFR BLD AUTO: 0.2 % (ref 0–0.5)
LARGE PLATELETS: NORMAL
LYMPHOCYTES # BLD AUTO: 2.65 10*3/MM3 (ref 0.7–3.1)
LYMPHOCYTES NFR BLD AUTO: 45.8 % (ref 19.6–45.3)
MCH RBC QN AUTO: 27 PG (ref 26.6–33)
MCHC RBC AUTO-ENTMCNC: 32.8 G/DL (ref 31.5–35.7)
MCV RBC AUTO: 82.5 FL (ref 79–97)
MONOCYTES # BLD AUTO: 0.48 10*3/MM3 (ref 0.1–0.9)
MONOCYTES NFR BLD AUTO: 8.3 % (ref 5–12)
NEUTROPHILS # BLD AUTO: 2.53 10*3/MM3 (ref 1.7–7)
NEUTROPHILS NFR BLD AUTO: 43.7 % (ref 42.7–76)
NRBC BLD AUTO-RTO: 0 /100 WBC (ref 0–0.2)
PLATELET # BLD AUTO: 171 10*3/MM3 (ref 140–450)
PMV BLD AUTO: 12 FL (ref 6–12)
POTASSIUM BLD-SCNC: 4.5 MMOL/L (ref 3.5–5.2)
RBC # BLD AUTO: 3.66 10*6/MM3 (ref 3.77–5.28)
SMALL PLATELETS BLD QL SMEAR: ADEQUATE
SODIUM BLD-SCNC: 143 MMOL/L (ref 136–145)
TARGETS BLD QL SMEAR: NORMAL
WBC MORPH BLD: NORMAL
WBC NRBC COR # BLD: 5.79 10*3/MM3 (ref 3.4–10.8)

## 2019-05-17 PROCEDURE — 80048 BASIC METABOLIC PNL TOTAL CA: CPT | Performed by: NURSE PRACTITIONER

## 2019-05-17 PROCEDURE — 85007 BL SMEAR W/DIFF WBC COUNT: CPT | Performed by: NURSE PRACTITIONER

## 2019-05-17 PROCEDURE — 94760 N-INVAS EAR/PLS OXIMETRY 1: CPT

## 2019-05-17 PROCEDURE — 85025 COMPLETE CBC W/AUTO DIFF WBC: CPT | Performed by: NURSE PRACTITIONER

## 2019-05-17 PROCEDURE — 25010000002 CEFTRIAXONE PER 250 MG: Performed by: FAMILY MEDICINE

## 2019-05-17 PROCEDURE — 94799 UNLISTED PULMONARY SVC/PX: CPT

## 2019-05-17 RX ADMIN — CEFTRIAXONE SODIUM 1 G: 1 INJECTION, POWDER, FOR SOLUTION INTRAMUSCULAR; INTRAVENOUS at 05:38

## 2019-05-17 RX ADMIN — TRAMADOL HYDROCHLORIDE 50 MG: 50 TABLET, FILM COATED ORAL at 20:28

## 2019-05-17 RX ADMIN — OXYBUTYNIN CHLORIDE 5 MG: 5 TABLET ORAL at 08:42

## 2019-05-17 RX ADMIN — DICYCLOMINE HYDROCHLORIDE 10 MG: 10 CAPSULE ORAL at 08:44

## 2019-05-17 RX ADMIN — DOCUSATE SODIUM 100 MG: 100 CAPSULE, LIQUID FILLED ORAL at 20:28

## 2019-05-17 RX ADMIN — BUPROPION HYDROCHLORIDE 150 MG: 150 TABLET, EXTENDED RELEASE ORAL at 08:43

## 2019-05-17 RX ADMIN — SODIUM CHLORIDE 125 ML/HR: 9 INJECTION, SOLUTION INTRAVENOUS at 20:27

## 2019-05-17 RX ADMIN — DOCUSATE SODIUM 100 MG: 100 CAPSULE, LIQUID FILLED ORAL at 08:44

## 2019-05-17 RX ADMIN — NYSTATIN: 100000 POWDER TOPICAL at 20:29

## 2019-05-17 RX ADMIN — BUDESONIDE 0.5 MG: 0.5 INHALANT RESPIRATORY (INHALATION) at 06:53

## 2019-05-17 RX ADMIN — SODIUM CHLORIDE 125 ML/HR: 9 INJECTION, SOLUTION INTRAVENOUS at 12:17

## 2019-05-17 RX ADMIN — BUPROPION HYDROCHLORIDE 150 MG: 150 TABLET, EXTENDED RELEASE ORAL at 20:28

## 2019-05-17 RX ADMIN — SODIUM CHLORIDE 100 ML/HR: 9 INJECTION, SOLUTION INTRAVENOUS at 02:19

## 2019-05-17 RX ADMIN — GABAPENTIN 100 MG: 100 CAPSULE ORAL at 21:05

## 2019-05-17 RX ADMIN — SERTRALINE HYDROCHLORIDE 150 MG: 50 TABLET ORAL at 08:42

## 2019-05-17 RX ADMIN — DICYCLOMINE HYDROCHLORIDE 10 MG: 10 CAPSULE ORAL at 15:24

## 2019-05-17 RX ADMIN — PANTOPRAZOLE SODIUM 40 MG: 40 TABLET, DELAYED RELEASE ORAL at 08:42

## 2019-05-17 RX ADMIN — SODIUM CHLORIDE, PRESERVATIVE FREE 3 ML: 5 INJECTION INTRAVENOUS at 08:44

## 2019-05-17 RX ADMIN — DICYCLOMINE HYDROCHLORIDE 10 MG: 10 CAPSULE ORAL at 20:28

## 2019-05-17 RX ADMIN — NYSTATIN: 100000 POWDER TOPICAL at 08:45

## 2019-05-17 RX ADMIN — Medication 1 TABLET: at 08:43

## 2019-05-17 RX ADMIN — GABAPENTIN 100 MG: 100 CAPSULE ORAL at 13:01

## 2019-05-17 RX ADMIN — BUDESONIDE 0.5 MG: 0.5 INHALANT RESPIRATORY (INHALATION) at 19:44

## 2019-05-17 RX ADMIN — GABAPENTIN 100 MG: 100 CAPSULE ORAL at 05:38

## 2019-05-17 RX ADMIN — ROPINIROLE HYDROCHLORIDE 0.25 MG: 0.25 TABLET, FILM COATED ORAL at 20:28

## 2019-05-17 NOTE — PLAN OF CARE
Problem: Patient Care Overview  Goal: Plan of Care Review  Outcome: Ongoing (interventions implemented as appropriate)   05/16/19 3295   Coping/Psychosocial   Plan of Care Reviewed With patient   Plan of Care Review   Progress improving     Goal: Individualization and Mutuality  Outcome: Ongoing (interventions implemented as appropriate)    Goal: Discharge Needs Assessment  Outcome: Ongoing (interventions implemented as appropriate)    Goal: Interprofessional Rounds/Family Conf  Outcome: Ongoing (interventions implemented as appropriate)      Problem: Pain, Chronic (Adult)  Goal: Acceptable Pain/Comfort Level and Functional Ability  Outcome: Ongoing (interventions implemented as appropriate)

## 2019-05-17 NOTE — PROGRESS NOTES
[facility] Medicine Services  INPATIENT PROGRESS NOTE    Length of Stay: 3  Date of Admission: 5/14/2019  Primary Care Physician: Alethea Ash MD    Subjective   Please note that all previous progress notes, lab findings, radiographical findings, medication changes, and physical exam findings have been noted and updated as appropriate.    5/17/2019: Creatinine continues to improve, but patient having some hypotension.      Chief Complaint/HPI: This 48-year-old  female was referred to hospitalist services secondary to an elevated creatinine and hyperkalemia.  Potassium was 5.6 and creatinine was 6.05.  Patient had been on a high protein diet preparing for weight loss surgery.  Creatinine has now improved to 3.42.  Patient has no complaints of dysuria, fever, chills, nausea, vomiting, abdominal pain.  She does report that she is still urinating less than normal.    Review of Systems   Constitutional: Positive for fatigue. Negative for chills and fever.   Respiratory: Negative for shortness of breath.    Cardiovascular: Negative for chest pain and palpitations.   Gastrointestinal: Negative for abdominal pain, constipation, diarrhea and nausea.   Genitourinary: Negative for dysuria.   Neurological: Negative for dizziness and light-headedness.      All pertinent negatives and positives are as above. All other systems have been reviewed and are negative unless otherwise stated.     Objective    Temp:  [96.6 °F (35.9 °C)-98.5 °F (36.9 °C)] 98.5 °F (36.9 °C)  Heart Rate:  [55-84] 84  Resp:  [16-18] 18  BP: ()/(40-60) 104/60    Physical Exam   Constitutional: She is oriented to person, place, and time. She appears well-developed and well-nourished. No distress.   HENT:   Head: Normocephalic and atraumatic.   Eyes: No scleral icterus.   Cardiovascular: Normal rate and regular rhythm.   Pulmonary/Chest: Effort normal and breath sounds normal. No stridor. No  respiratory distress.   Abdominal: Soft. Bowel sounds are normal. She exhibits no distension. There is no tenderness.   Neurological: She is alert and oriented to person, place, and time.   Skin: Skin is warm and dry. Capillary refill takes less than 2 seconds. She is not diaphoretic.   Psychiatric: She has a normal mood and affect. Her behavior is normal.     Results Review:  I have reviewed the labs, radiology results, and diagnostic studies.    Laboratory Data:   Results from last 7 days   Lab Units 05/17/19  0629 05/16/19  0646 05/15/19  0551 05/14/19  1735   SODIUM mmol/L 143 137 136 133*   POTASSIUM mmol/L 4.5 4.5 5.8* 5.6*   CHLORIDE mmol/L 105 98 100 92*   CO2 mmol/L 24.0 25.0 20.0* 20.0*   BUN mg/dL 44* 70* 94* 104*   CREATININE mg/dL 2.05* 3.42* 4.92* 6.05*   GLUCOSE mg/dL 95 144* 83 77   CALCIUM mg/dL 9.1 9.2 9.4 10.3   BILIRUBIN mg/dL  --   --   --  0.3   ALK PHOS U/L  --   --   --  36*   ALT (SGPT) U/L  --   --   --  26   AST (SGOT) U/L  --   --   --  40*   ANION GAP mmol/L 14.0 14.0 16.0 21.0     Estimated Creatinine Clearance: 47.8 mL/min (A) (by C-G formula based on SCr of 2.05 mg/dL (H)).          Results from last 7 days   Lab Units 05/17/19  0629 05/16/19  0600 05/15/19  0551 05/14/19  1735   WBC 10*3/mm3 5.79 5.82 5.93 8.02   HEMOGLOBIN g/dL 9.9* 10.2* 9.8* 11.4*   HEMATOCRIT % 30.2* 32.1* 29.9* 34.5   PLATELETS 10*3/mm3 171 202 208 250           Culture Data:   No results found for: BLOODCX  No results found for: URINECX  No results found for: RESPCX  No results found for: WOUNDCX  No results found for: STOOLCX  No components found for: BODYFLD    Radiology Data:   Imaging Results (last 24 hours)     ** No results found for the last 24 hours. **          I have reviewed the patient's current medications.     Assessment/Plan     Active Hospital Problems    Diagnosis   • **Acute kidney injury (CMS/HCC)   • DYAN (acute kidney injury) (CMS/HCC)   • Hypertension   • Hyperlipidemia   • Hyperkalemia   •  ELSIE on CPAP   • Gastroesophageal reflux disease with esophagitis     Added automatically from request for surgery [id number]     • Restrictive lung disease secondary to obesity   • Morbid obesity with BMI of 50.0-59.9, adult (CMS/[facility])       Plan: Continue per nephrology.  Monitor blood pressure.              This document has been electronically signed by JANET Nathan on May 17, 2019 12:49 PM

## 2019-05-17 NOTE — PAYOR COMM NOTE
"Raheem Ferris (48 y.o. Female)     Date of Birth Social Security Number Address Home Phone MRN    1970  [id number] [place] [address] [phone] [id number]    Shinto Marital Status          Adventism        Admission Date Admission Type Admitting Provider Attending Provider Department, Room/Bed    5/14/19 Emergency Chito Melvin MD Gibson, Bryce, MD [address], 423/1    Discharge Date Discharge Disposition Discharge Destination                       Attending Provider:  Chito Melvin MD    Allergies:  Sulfa Antibiotics    Isolation:  None   Infection:  None   Code Status:  CPR    Ht:  165.1 cm (65\")   Wt:  140 kg (308 lb 3.2 oz)    Admission Cmt:  None   Principal Problem:  Acute kidney injury (CMS/HCC) [N17.9]                 Active Insurance as of 5/14/2019     Primary Coverage     Payor Plan Insurance Group Employer/Plan Group    [employer] [place] [employer] [place]      Payor Plan Address Payor Plan Phone Number Payor Plan Fax Number Effective Dates    [address] [address] [phone]  3/3/2017 - None Entered    [facility] [id number]       Subscriber Name Subscriber Birth Date Member ID       RAHEEM FERRIS 1970 [id number]                 Emergency Contacts      (Rel.) Home Phone Work Phone Mobile Phone    Joyce Bolton (Mother) [phone] -- [phone]        Update   Insurance Information                [facility] MEDICAID Phone: [phone]    Subscriber: Raheem Ferris Subscriber#: [id number]    Group#:  Precert#:           ICU Vital Signs     Row Name 05/17/19 1106 05/17/19 1000 05/17/19 0828 05/17/19 0659 05/17/19 0653       Vitals    Temp  98.5 °F (36.9 °C)  --  96.7 °F (35.9 °C)  --  --    Temp src  --  --  Oral  --  --    Pulse  84  --  73 Simultaneous filing. User may be unaware of other data.  57  58    Heart Rate Source  Monitor  --  Monitor Simultaneous filing. User may be unaware " of other data.  Monitor  Monitor    Resp  18  --  16 16 16    Resp Rate Source  Visual  --  Visual  Visual  Visual    BP  --  104/60  78/40  (Abnormal)   --  --    BP Location  --  Right arm  Right arm  --  --    BP Method  --  Manual  Manual  --  --    Patient Position  --  Lying  --  --  --       Oxygen Therapy    SpO2  97 %  --  96 %  --  97 %    Pulse Oximetry Type  Intermittent  --  --  Intermittent  Intermittent    Device (Oxygen Therapy)  room air  --  room air  room air  room air    Row Name 05/17/19 0500 05/17/19 0300 05/17/19 0110 05/16/19 2343 05/16/19 2338       Height and Weight    Weight  140 kg (308 lb 3.2 oz)  (Abnormal)   --  --  --  --    Weight Method  Standing scale  --  --  --  --       Vitals    Temp  --  96.6 °F (35.9 °C)  --  --  --    Temp src  --  Oral  --  --  --    Pulse  --  59  55  --  67    Heart Rate Source  --  Monitor  Monitor  --  Monitor    Resp  --  18  --  --  --    Resp Rate Source  --  Visual  --  --  --    BP  --  110/54  --  104/50  --    BP Location  --  Right arm  --  --  --    BP Method  --  Automatic  --  --  --    Patient Position  --  Lying  --  --  --       Oxygen Therapy    SpO2  --  95 %  --  --  --    Pulse Oximetry Type  --  Intermittent  --  --  --    Device (Oxygen Therapy)  --  room air  --  --  --    Row Name 05/16/19 2300 05/16/19 2233 05/16/19 2227 05/16/19 2004 05/16/19 1923       Vitals    Temp  97.2 °F (36.2 °C)  --  --  97.8 °F (36.6 °C)  --    Temp src  Oral  --  --  Oral  --    Pulse  59  --  --  62  69    Heart Rate Source  Monitor  --  Monitor  Monitor  Monitor    Resp  18  --  --  18  --    Resp Rate Source  Visual  --  --  Visual  --    BP  --  --  --  112/56  --    BP Location  Right arm  --  --  Right arm  --    BP Method  Manual  --  --  --  --    Patient Position  Lying  --  --  Lying  --       Oxygen Therapy    SpO2  95 %  --  --  97 %  --    Pulse Oximetry Type  Intermittent  --  --  --  --    Device (Oxygen Therapy)  room air  room air  --   room air  --    Row Name 05/16/19 1921 05/16/19 1915 05/16/19 1801 05/16/19 1558 05/16/19 1516       Vitals    Temp  --  --  --  --  98.4 °F (36.9 °C)    Temp src  --  --  --  --  Temporal    Pulse  57  68  65  --  67    Heart Rate Source  Monitor  Monitor  --  --  Monitor    Resp  18  18  --  --  18    Resp Rate Source  Visual  Visual  --  --  Visual    BP  --  --  106/47  92/52  84/54  (Abnormal)     BP Location  --  --  Right arm  Right arm  Right arm    BP Method  --  --  Automatic  Manual  Automatic    Patient Position  --  --  Sitting  Lying  Lying       Oxygen Therapy    SpO2  --  96 %  --  --  97 %    Pulse Oximetry Type  --  Intermittent  --  --  Intermittent    Device (Oxygen Therapy)  room air  room air  --  --  room air        Hospital Medications (active)       Dose Frequency Start End    acetaminophen (TYLENOL) tablet 650 mg 650 mg Every 4 Hours PRN 5/14/2019     Sig - Route: Take 2 tablets by mouth Every 4 (Four) Hours As Needed for Mild Pain . - Oral    albuterol (PROVENTIL) nebulizer solution 0.083% 2.5 mg/3mL 2.5 mg Every 4 Hours PRN 5/14/2019     Sig - Route: Take 2.5 mg by nebulization Every 4 (Four) Hours As Needed for Wheezing or Shortness of Air. - Nebulization    budesonide (PULMICORT) nebulizer solution 0.5 mg 0.5 mg 2 Times Daily - RT 5/14/2019     Sig - Route: Take 2 mL by nebulization 2 (Two) Times a Day. - Nebulization    buPROPion SR (WELLBUTRIN SR) 12 hr tablet 150 mg 150 mg 2 Times Daily 5/14/2019     Sig - Route: Take 1 tablet by mouth 2 (Two) Times a Day. - Oral    calcium carbonate-vitamin d 600-400 MG-UNIT per tablet 1 tablet 1 tablet Daily 5/15/2019     Sig - Route: Take 1 tablet by mouth Daily. - Oral    cefTRIAXone (ROCEPHIN) 1 g/100 mL 0.9% NS (MBP) 1 g Every 24 Hours 5/15/2019 5/20/2019    Sig - Route: Infuse 100 mL into a venous catheter Daily. - Intravenous    dicyclomine (BENTYL) capsule 10 mg 10 mg 3 Times Daily 5/14/2019     Sig - Route: Take 1 capsule by mouth 3  "(Three) Times a Day. - Oral    docusate sodium (COLACE) capsule 100 mg 100 mg 2 Times Daily 5/15/2019     Sig - Route: Take 1 capsule by mouth 2 (Two) Times a Day. - Oral    gabapentin (NEURONTIN) capsule 100 mg 100 mg Every 8 Hours Scheduled 5/14/2019     Sig - Route: Take 1 capsule by mouth Every 8 (Eight) Hours. - Oral    nystatin (MYCOSTATIN) powder  Every 12 Hours Scheduled 5/15/2019     Sig - Route: Apply  topically to the appropriate area as directed Every 12 (Twelve) Hours. - Topical    ondansetron (ZOFRAN) injection 4 mg 4 mg Every 6 Hours PRN 5/14/2019     Sig - Route: Infuse 2 mL into a venous catheter Every 6 (Six) Hours As Needed for Nausea or Vomiting. - Intravenous    pantoprazole (PROTONIX) EC tablet 40 mg 40 mg Daily 5/15/2019     Sig - Route: Take 1 tablet by mouth Daily. - Oral    rOPINIRole (REQUIP) tablet 0.25 mg 0.25 mg Nightly 5/14/2019     Sig - Route: Take 1 tablet by mouth Every Night. - Oral    sertraline (ZOLOFT) tablet 150 mg 150 mg Daily 5/15/2019     Sig - Route: Take 3 tablets by mouth Daily. - Oral    sodium chloride 0.9 % flush 10 mL 10 mL As Needed 5/14/2019     Sig - Route: Infuse 10 mL into a venous catheter As Needed for Line Care. - Intravenous    Cosign for Ordering: Accepted by Ambrosio Siddiqui MD on 5/14/2019  5:48 PM    Linked Group 1:  \"And\" Linked Group Details        sodium chloride 0.9 % flush 3 mL 3 mL Every 12 Hours Scheduled 5/14/2019     Sig - Route: Infuse 3 mL into a venous catheter Every 12 (Twelve) Hours. - Intravenous    sodium chloride 0.9 % flush 3-10 mL 3-10 mL As Needed 5/14/2019     Sig - Route: Infuse 3-10 mL into a venous catheter As Needed for Line Care. - Intravenous    sodium chloride 0.9 % infusion 125 mL/hr Continuous 5/16/2019     Sig - Route: Infuse 125 mL/hr into a venous catheter Continuous. - Intravenous    traMADol (ULTRAM) tablet 50 mg 50 mg Daily 5/15/2019     Sig - Route: Take 1 tablet by mouth Daily. - Oral    isosorbide mononitrate " (IMDUR) 24 hr tablet 30 mg (Discontinued) 30 mg Daily 5/15/2019 5/17/2019    Sig - Route: Take 1 tablet by mouth Daily. - Oral    oxybutynin (DITROPAN) tablet 5 mg (Discontinued) 5 mg 2 Times Daily 5/14/2019 5/17/2019    Sig - Route: Take 1 tablet by mouth 2 (Two) Times a Day. - Oral             Physician Progress Notes (last 24 hours) (Notes from 5/16/2019  2:55 PM through 5/17/2019  2:55 PM)      Unruly Cuadra PA at 5/17/2019 12:47 PM              [facility] Medicine Services  INPATIENT PROGRESS NOTE    Length of Stay: 3  Date of Admission: 5/14/2019  Primary Care Physician: Alethea Ash MD    Subjective   Please note that all previous progress notes, lab findings, radiographical findings, medication changes, and physical exam findings have been noted and updated as appropriate.    5/17/2019: Creatinine continues to improve, but patient having some hypotension.      Chief Complaint/HPI: This 48-year-old  female was referred to hospitalist services secondary to an elevated creatinine and hyperkalemia.  Potassium was 5.6 and creatinine was 6.05.  Patient had been on a high protein diet preparing for weight loss surgery.  Creatinine has now improved to 3.42.  Patient has no complaints of dysuria, fever, chills, nausea, vomiting, abdominal pain.  She does report that she is still urinating less than normal.    Review of Systems   Constitutional: Positive for fatigue. Negative for chills and fever.   Respiratory: Negative for shortness of breath.    Cardiovascular: Negative for chest pain and palpitations.   Gastrointestinal: Negative for abdominal pain, constipation, diarrhea and nausea.   Genitourinary: Negative for dysuria.   Neurological: Negative for dizziness and light-headedness.      All pertinent negatives and positives are as above. All other systems have been reviewed and are negative unless otherwise stated.     Objective    Temp:  [96.6 °F (35.9  °C)-98.5 °F (36.9 °C)] 98.5 °F (36.9 °C)  Heart Rate:  [55-84] 84  Resp:  [16-18] 18  BP: ()/(40-60) 104/60    Physical Exam   Constitutional: She is oriented to person, place, and time. She appears well-developed and well-nourished. No distress.   HENT:   Head: Normocephalic and atraumatic.   Eyes: No scleral icterus.   Cardiovascular: Normal rate and regular rhythm.   Pulmonary/Chest: Effort normal and breath sounds normal. No stridor. No respiratory distress.   Abdominal: Soft. Bowel sounds are normal. She exhibits no distension. There is no tenderness.   Neurological: She is alert and oriented to person, place, and time.   Skin: Skin is warm and dry. Capillary refill takes less than 2 seconds. She is not diaphoretic.   Psychiatric: She has a normal mood and affect. Her behavior is normal.     Results Review:  I have reviewed the labs, radiology results, and diagnostic studies.    Laboratory Data:   Results from last 7 days   Lab Units 05/17/19  0629 05/16/19  0646 05/15/19  0551 05/14/19  1735   SODIUM mmol/L 143 137 136 133*   POTASSIUM mmol/L 4.5 4.5 5.8* 5.6*   CHLORIDE mmol/L 105 98 100 92*   CO2 mmol/L 24.0 25.0 20.0* 20.0*   BUN mg/dL 44* 70* 94* 104*   CREATININE mg/dL 2.05* 3.42* 4.92* 6.05*   GLUCOSE mg/dL 95 144* 83 77   CALCIUM mg/dL 9.1 9.2 9.4 10.3   BILIRUBIN mg/dL  --   --   --  0.3   ALK PHOS U/L  --   --   --  36*   ALT (SGPT) U/L  --   --   --  26   AST (SGOT) U/L  --   --   --  40*   ANION GAP mmol/L 14.0 14.0 16.0 21.0     Estimated Creatinine Clearance: 47.8 mL/min (A) (by C-G formula based on SCr of 2.05 mg/dL (H)).          Results from last 7 days   Lab Units 05/17/19  0629 05/16/19  0600 05/15/19  0551 05/14/19  1735   WBC 10*3/mm3 5.79 5.82 5.93 8.02   HEMOGLOBIN g/dL 9.9* 10.2* 9.8* 11.4*   HEMATOCRIT % 30.2* 32.1* 29.9* 34.5   PLATELETS 10*3/mm3 171 202 208 250           Culture Data:   No results found for: BLOODCX  No results found for: URINECX  No results found for:  "RESPCX  No results found for: WOUNDCX  No results found for: STOOLCX  No components found for: BODYFLD    Radiology Data:   Imaging Results (last 24 hours)     ** No results found for the last 24 hours. **          I have reviewed the patient's current medications.     Assessment/Plan     Active Hospital Problems    Diagnosis   • **Acute kidney injury (CMS/HCC)   • DYAN (acute kidney injury) (CMS/[facility])   • Hypertension   • Hyperlipidemia   • Hyperkalemia   • ELSIE on CPAP   • Gastroesophageal reflux disease with esophagitis     Added automatically from request for surgery [id number]     • Restrictive lung disease secondary to obesity   • Morbid obesity with BMI of 50.0-59.9, adult (CMS/[facility])       Plan: Continue per nephrology.  Monitor blood pressure.              This document has been electronically signed by JANET Nathan on May 17, 2019 12:49 PM        Electronically signed by Unruly Cuadra PA at 2019 12:52 PM     Key Cota MD at 2019 11:27 AM          [facility]  [address] [place] [address]. [address]  T - [phone]  F - [phone]     Progress Note          PATIENT  DEMOGRAPHICS   PATIENT NAME: Maricarmen Ferris                      PHYSICIAN: Key Cota MD  : 1970  MRN: [id number]   LOS: 3 days    Patient Care Team:  Alethea Ash MD as PCP - General (Family Medicine)  Subjective   SUBJECTIVE   bp low - making good urine          Objective   OBJECTIVE   Vital Signs  Temp:  [96.6 °F (35.9 °C)-98.5 °F (36.9 °C)] 98.5 °F (36.9 °C)  Heart Rate:  [55-84] 84  Resp:  [16-18] 18  BP: ()/(40-60) 104/60    Flowsheet Rows      First Filed Value   Admission Height  165.1 cm (65\") Documented at 2019 1433   Admission Weight  139 kg (305 lb 14.4 oz)  (Abnormal)  Documented at 2019 1433           I/O last 3 completed shifts:  In: 1650 [P.O.:800; I.V.:850]  Out: 1000 [Urine:1000]    PHYSICAL EXAM    Physical Exam   Constitutional: She " is oriented to person, place, and time. She appears well-developed.   HENT:   Head: Normocephalic.   Eyes: Pupils are equal, round, and reactive to light.   Cardiovascular: Normal rate, regular rhythm and normal heart sounds.   Pulmonary/Chest: Effort normal and breath sounds normal.   Abdominal: Soft. Bowel sounds are normal.   Musculoskeletal: She exhibits no edema.   Neurological: She is alert and oriented to person, place, and time.       RESULTS   Results Review:    Results from last 7 days   Lab Units 05/17/19  0629 05/16/19  0646 05/15/19  0551 05/14/19  1735   SODIUM mmol/L 143 137 136 133*   POTASSIUM mmol/L 4.5 4.5 5.8* 5.6*   CHLORIDE mmol/L 105 98 100 92*   CO2 mmol/L 24.0 25.0 20.0* 20.0*   BUN mg/dL 44* 70* 94* 104*   CREATININE mg/dL 2.05* 3.42* 4.92* 6.05*   CALCIUM mg/dL 9.1 9.2 9.4 10.3   BILIRUBIN mg/dL  --   --   --  0.3   ALK PHOS U/L  --   --   --  36*   ALT (SGPT) U/L  --   --   --  26   AST (SGOT) U/L  --   --   --  40*   GLUCOSE mg/dL 95 144* 83 77       Estimated Creatinine Clearance: 47.8 mL/min (A) (by C-G formula based on SCr of 2.05 mg/dL (H)).                Results from last 7 days   Lab Units 05/17/19  0629 05/16/19  0600 05/15/19  0551 05/14/19  1735   WBC 10*3/mm3 5.79 5.82 5.93 8.02   HEMOGLOBIN g/dL 9.9* 10.2* 9.8* 11.4*   PLATELETS 10*3/mm3 171 202 208 250               Imaging Results (last 24 hours)     ** No results found for the last 24 hours. **           MEDICATIONS      budesonide 0.5 mg Nebulization BID - RT   buPROPion  mg Oral BID   calcium carbonate-vitamin d 1 tablet Oral Daily   ceftriaxone 1 g Intravenous Q24H   dicyclomine 10 mg Oral TID   docusate sodium 100 mg Oral BID   gabapentin 100 mg Oral Q8H   isosorbide mononitrate 30 mg Oral Daily   nystatin  Topical Q12H   oxybutynin 5 mg Oral BID   pantoprazole 40 mg Oral Daily   rOPINIRole 0.25 mg Oral Nightly   sertraline 150 mg Oral Daily   sodium chloride 3 mL Intravenous Q12H   traMADol 50 mg Oral Daily        sodium chloride 125 mL/hr Last Rate: 125 mL/hr (05/17/19 0842)       Assessment/Plan   ASSESSMENT / PLAN      Acute kidney injury (CMS/[facility])    Morbid obesity with BMI of 50.0-59.9, adult (CMS/[facility])    Restrictive lung disease secondary to obesity    Gastroesophageal reflux disease with esophagitis    ELSIE on CPAP    DYAN (acute kidney injury) (CMS/[facility])    Hypertension    Hyperlipidemia    Hyperkalemia    1.acute kidney injury it appears to be prerenal in nature while on diuretics. Dario negative.  Also she is on high protein diet and have some contribution to DYAN though there is no albuminuria. Her bp IS also on low side on arrival     Patient cr is improving with IV fluids . bp is low with mild symptoms and Ivf is increased to 125ml/hr. Stop imdur and oxybutynin. I think we can observe her for now with low bp. Off all anti htn. Her ultrasound is negative for any hydronephrosis.     2.hyperkalemia and metabolic acidosis now corrected     3.morbid obesity awaiting gastric sleeve surgery      4.hypertension patient lisinopril will be on hold     5.history of dyslipidemia/restless leg syndrome                This document has been electronically signed by Key Cota MD on May 17, 2019 11:27 AM           Electronically signed by Key Cota MD at 5/17/2019 11:31 AM       Consult Notes (last 24 hours) (Notes from 5/16/2019  2:55 PM through 5/17/2019  2:55 PM)     No notes of this type exist for this encounter.

## 2019-05-17 NOTE — PLAN OF CARE
Problem: Patient Care Overview  Goal: Plan of Care Review  Outcome: Ongoing (interventions implemented as appropriate)   05/16/19 6643   Coping/Psychosocial   Plan of Care Reviewed With patient   Plan of Care Review   Progress improving     Goal: Individualization and Mutuality  Outcome: Ongoing (interventions implemented as appropriate)    Goal: Discharge Needs Assessment  Outcome: Ongoing (interventions implemented as appropriate)    Goal: Interprofessional Rounds/Family Conf  Outcome: Ongoing (interventions implemented as appropriate)      Problem: Pain, Chronic (Adult)  Goal: Acceptable Pain/Comfort Level and Functional Ability  Outcome: Ongoing (interventions implemented as appropriate)

## 2019-05-17 NOTE — PROGRESS NOTES
"[facility]  [address] [place] [address]. [address]   - [phone]  F - [phone]     Progress Note          PATIENT  DEMOGRAPHICS   PATIENT NAME: Maricarmen Ferris                      PHYSICIAN: Key Cota MD  : 1970  MRN: [id number]   LOS: 3 days    Patient Care Team:  Alethea Ash MD as PCP - General (Family Medicine)  Subjective   SUBJECTIVE   bp low - making good urine          Objective   OBJECTIVE   Vital Signs  Temp:  [96.6 °F (35.9 °C)-98.5 °F (36.9 °C)] 98.5 °F (36.9 °C)  Heart Rate:  [55-84] 84  Resp:  [16-18] 18  BP: ()/(40-60) 104/60    Flowsheet Rows      First Filed Value   Admission Height  165.1 cm (65\") Documented at 2019 1433   Admission Weight  139 kg (305 lb 14.4 oz)  (Abnormal)  Documented at 2019 1433           I/O last 3 completed shifts:  In: 1650 [P.O.:800; I.V.:850]  Out: 1000 [Urine:1000]    PHYSICAL EXAM    Physical Exam   Constitutional: She is oriented to person, place, and time. She appears well-developed.   HENT:   Head: Normocephalic.   Eyes: Pupils are equal, round, and reactive to light.   Cardiovascular: Normal rate, regular rhythm and normal heart sounds.   Pulmonary/Chest: Effort normal and breath sounds normal.   Abdominal: Soft. Bowel sounds are normal.   Musculoskeletal: She exhibits no edema.   Neurological: She is alert and oriented to person, place, and time.       RESULTS   Results Review:    Results from last 7 days   Lab Units 19  0629 19  0646 05/15/19  0551 19  1735   SODIUM mmol/L 143 137 136 133*   POTASSIUM mmol/L 4.5 4.5 5.8* 5.6*   CHLORIDE mmol/L 105 98 100 92*   CO2 mmol/L 24.0 25.0 20.0* 20.0*   BUN mg/dL 44* 70* 94* 104*   CREATININE mg/dL 2.05* 3.42* 4.92* 6.05*   CALCIUM mg/dL 9.1 9.2 9.4 10.3   BILIRUBIN mg/dL  --   --   --  0.3   ALK PHOS U/L  --   --   --  36*   ALT (SGPT) U/L  --   --   --  26   AST (SGOT) U/L  --   --   --  40*   GLUCOSE mg/dL 95 144* 83 77 "       Estimated Creatinine Clearance: 47.8 mL/min (A) (by C-G formula based on SCr of 2.05 mg/dL (H)).                Results from last 7 days   Lab Units 05/17/19  0629 05/16/19  0600 05/15/19  0551 05/14/19  1735   WBC 10*3/mm3 5.79 5.82 5.93 8.02   HEMOGLOBIN g/dL 9.9* 10.2* 9.8* 11.4*   PLATELETS 10*3/mm3 171 202 208 250               Imaging Results (last 24 hours)     ** No results found for the last 24 hours. **           MEDICATIONS      budesonide 0.5 mg Nebulization BID - RT   buPROPion  mg Oral BID   calcium carbonate-vitamin d 1 tablet Oral Daily   ceftriaxone 1 g Intravenous Q24H   dicyclomine 10 mg Oral TID   docusate sodium 100 mg Oral BID   gabapentin 100 mg Oral Q8H   isosorbide mononitrate 30 mg Oral Daily   nystatin  Topical Q12H   oxybutynin 5 mg Oral BID   pantoprazole 40 mg Oral Daily   rOPINIRole 0.25 mg Oral Nightly   sertraline 150 mg Oral Daily   sodium chloride 3 mL Intravenous Q12H   traMADol 50 mg Oral Daily       sodium chloride 125 mL/hr Last Rate: 125 mL/hr (05/17/19 0842)       Assessment/Plan   ASSESSMENT / PLAN      Acute kidney injury (CMS/HCC)    Morbid obesity with BMI of 50.0-59.9, adult (CMS/[facility])    Restrictive lung disease secondary to obesity    Gastroesophageal reflux disease with esophagitis    ELSIE on CPAP    DYAN (acute kidney injury) (CMS/HCC)    Hypertension    Hyperlipidemia    Hyperkalemia    1.acute kidney injury it appears to be prerenal in nature while on diuretics. Dario negative.  Also she is on high protein diet and have some contribution to DYAN though there is no albuminuria. Her bp IS also on low side on arrival     Patient cr is improving with IV fluids . bp is low with mild symptoms and Ivf is increased to 125ml/hr. Stop imdur and oxybutynin. I think we can observe her for now with low bp. Off all anti htn. Her ultrasound is negative for any hydronephrosis.     2.hyperkalemia and metabolic acidosis now corrected     3.morbid obesity awaiting gastric  sleeve surgery      4.hypertension patient lisinopril will be on hold     5.history of dyslipidemia/restless leg syndrome                This document has been electronically signed by Key Cota MD on May 17, 2019 11:27 AM

## 2019-05-18 LAB
ANION GAP SERPL CALCULATED.3IONS-SCNC: 12 MMOL/L
BASOPHILS # BLD AUTO: 0.03 10*3/MM3 (ref 0–0.2)
BASOPHILS NFR BLD AUTO: 0.4 % (ref 0–1.5)
BUN BLD-MCNC: 28 MG/DL (ref 6–20)
BUN/CREAT SERPL: 18.7 (ref 7–25)
CALCIUM SPEC-SCNC: 9.1 MG/DL (ref 8.6–10.5)
CHLORIDE SERPL-SCNC: 106 MMOL/L (ref 98–107)
CO2 SERPL-SCNC: 22 MMOL/L (ref 22–29)
CREAT BLD-MCNC: 1.5 MG/DL (ref 0.57–1)
DEPRECATED RDW RBC AUTO: 43.6 FL (ref 37–54)
EOSINOPHIL # BLD AUTO: 0.14 10*3/MM3 (ref 0–0.4)
EOSINOPHIL NFR BLD AUTO: 2 % (ref 0.3–6.2)
ERYTHROCYTE [DISTWIDTH] IN BLOOD BY AUTOMATED COUNT: 14.2 % (ref 12.3–15.4)
GFR SERPL CREATININE-BSD FRML MDRD: 37 ML/MIN/1.73
GLUCOSE BLD-MCNC: 93 MG/DL (ref 65–99)
HCT VFR BLD AUTO: 29.8 % (ref 34–46.6)
HGB BLD-MCNC: 9.7 G/DL (ref 12–15.9)
IMM GRANULOCYTES # BLD AUTO: 0.02 10*3/MM3 (ref 0–0.05)
IMM GRANULOCYTES NFR BLD AUTO: 0.3 % (ref 0–0.5)
LYMPHOCYTES # BLD AUTO: 3.18 10*3/MM3 (ref 0.7–3.1)
LYMPHOCYTES NFR BLD AUTO: 45 % (ref 19.6–45.3)
MCH RBC QN AUTO: 27.2 PG (ref 26.6–33)
MCHC RBC AUTO-ENTMCNC: 32.6 G/DL (ref 31.5–35.7)
MCV RBC AUTO: 83.5 FL (ref 79–97)
MONOCYTES # BLD AUTO: 0.43 10*3/MM3 (ref 0.1–0.9)
MONOCYTES NFR BLD AUTO: 6.1 % (ref 5–12)
NEUTROPHILS # BLD AUTO: 3.27 10*3/MM3 (ref 1.7–7)
NEUTROPHILS NFR BLD AUTO: 46.2 % (ref 42.7–76)
NRBC BLD AUTO-RTO: 0 /100 WBC (ref 0–0.2)
PLATELET # BLD AUTO: 204 10*3/MM3 (ref 140–450)
PMV BLD AUTO: 11.3 FL (ref 6–12)
POTASSIUM BLD-SCNC: 4.2 MMOL/L (ref 3.5–5.2)
RBC # BLD AUTO: 3.57 10*6/MM3 (ref 3.77–5.28)
SODIUM BLD-SCNC: 140 MMOL/L (ref 136–145)
WBC NRBC COR # BLD: 7.07 10*3/MM3 (ref 3.4–10.8)

## 2019-05-18 PROCEDURE — 25010000002 CEFTRIAXONE PER 250 MG: Performed by: FAMILY MEDICINE

## 2019-05-18 PROCEDURE — 94799 UNLISTED PULMONARY SVC/PX: CPT

## 2019-05-18 PROCEDURE — 97162 PT EVAL MOD COMPLEX 30 MIN: CPT | Performed by: PHYSICAL THERAPIST

## 2019-05-18 PROCEDURE — 80048 BASIC METABOLIC PNL TOTAL CA: CPT | Performed by: NURSE PRACTITIONER

## 2019-05-18 PROCEDURE — 85025 COMPLETE CBC W/AUTO DIFF WBC: CPT | Performed by: NURSE PRACTITIONER

## 2019-05-18 RX ADMIN — CEFTRIAXONE SODIUM 1 G: 1 INJECTION, POWDER, FOR SOLUTION INTRAMUSCULAR; INTRAVENOUS at 05:30

## 2019-05-18 RX ADMIN — BUDESONIDE 0.5 MG: 0.5 INHALANT RESPIRATORY (INHALATION) at 19:36

## 2019-05-18 RX ADMIN — NYSTATIN: 100000 POWDER TOPICAL at 20:49

## 2019-05-18 RX ADMIN — SERTRALINE HYDROCHLORIDE 150 MG: 50 TABLET ORAL at 09:04

## 2019-05-18 RX ADMIN — DOCUSATE SODIUM 100 MG: 100 CAPSULE, LIQUID FILLED ORAL at 09:04

## 2019-05-18 RX ADMIN — GABAPENTIN 100 MG: 100 CAPSULE ORAL at 21:33

## 2019-05-18 RX ADMIN — DOCUSATE SODIUM 100 MG: 100 CAPSULE, LIQUID FILLED ORAL at 20:48

## 2019-05-18 RX ADMIN — SODIUM CHLORIDE, PRESERVATIVE FREE 3 ML: 5 INJECTION INTRAVENOUS at 09:08

## 2019-05-18 RX ADMIN — ROPINIROLE HYDROCHLORIDE 0.25 MG: 0.25 TABLET, FILM COATED ORAL at 20:48

## 2019-05-18 RX ADMIN — Medication 1 TABLET: at 09:04

## 2019-05-18 RX ADMIN — BUPROPION HYDROCHLORIDE 150 MG: 150 TABLET, EXTENDED RELEASE ORAL at 20:48

## 2019-05-18 RX ADMIN — TRAMADOL HYDROCHLORIDE 50 MG: 50 TABLET, FILM COATED ORAL at 20:48

## 2019-05-18 RX ADMIN — ACETAMINOPHEN 650 MG: 325 TABLET, FILM COATED ORAL at 23:45

## 2019-05-18 RX ADMIN — BUPROPION HYDROCHLORIDE 150 MG: 150 TABLET, EXTENDED RELEASE ORAL at 09:04

## 2019-05-18 RX ADMIN — DICYCLOMINE HYDROCHLORIDE 10 MG: 10 CAPSULE ORAL at 20:48

## 2019-05-18 RX ADMIN — PANTOPRAZOLE SODIUM 40 MG: 40 TABLET, DELAYED RELEASE ORAL at 09:04

## 2019-05-18 RX ADMIN — DICYCLOMINE HYDROCHLORIDE 10 MG: 10 CAPSULE ORAL at 09:07

## 2019-05-18 RX ADMIN — GABAPENTIN 100 MG: 100 CAPSULE ORAL at 13:22

## 2019-05-18 RX ADMIN — ACETAMINOPHEN 650 MG: 325 TABLET, FILM COATED ORAL at 13:25

## 2019-05-18 RX ADMIN — GABAPENTIN 100 MG: 100 CAPSULE ORAL at 05:30

## 2019-05-18 RX ADMIN — NYSTATIN: 100000 POWDER TOPICAL at 09:03

## 2019-05-18 RX ADMIN — SODIUM CHLORIDE, PRESERVATIVE FREE 3 ML: 5 INJECTION INTRAVENOUS at 20:48

## 2019-05-18 NOTE — PLAN OF CARE
Problem: Patient Care Overview  Goal: Plan of Care Review  Outcome: Ongoing (interventions implemented as appropriate)  Pt creatinine levels improved today; pt IV fluids d'cd and pt may get to go home tomorrow if kidney function continues to improve.   05/18/19 7480   Coping/Psychosocial   Plan of Care Reviewed With patient   Plan of Care Review   Progress improving     Goal: Individualization and Mutuality  Outcome: Ongoing (interventions implemented as appropriate)    Goal: Discharge Needs Assessment  Outcome: Ongoing (interventions implemented as appropriate)    Goal: Interprofessional Rounds/Family Conf  Outcome: Ongoing (interventions implemented as appropriate)      Problem: Pain, Chronic (Adult)  Goal: Acceptable Pain/Comfort Level and Functional Ability  Outcome: Ongoing (interventions implemented as appropriate)

## 2019-05-18 NOTE — PLAN OF CARE
Problem: Patient Care Overview  Goal: Plan of Care Review  Outcome: Ongoing (interventions implemented as appropriate)   05/18/19 0025   Coping/Psychosocial   Plan of Care Reviewed With patient   Plan of Care Review   Progress improving   OTHER   Outcome Summary pt vs stable, no new events overnight will continue to monitor.     Goal: Individualization and Mutuality  Outcome: Ongoing (interventions implemented as appropriate)    Goal: Discharge Needs Assessment  Outcome: Ongoing (interventions implemented as appropriate)    Goal: Interprofessional Rounds/Family Conf  Outcome: Ongoing (interventions implemented as appropriate)      Problem: Pain, Chronic (Adult)  Goal: Acceptable Pain/Comfort Level and Functional Ability  Outcome: Ongoing (interventions implemented as appropriate)

## 2019-05-18 NOTE — PLAN OF CARE
Problem: Patient Care Overview  Goal: Plan of Care Review  Outcome: Outcome(s) achieved Date Met: 05/18/19 05/18/19 7847   Coping/Psychosocial   Plan of Care Reviewed With patient;mother   Plan of Care Review   Progress improving   OTHER   Outcome Summary PT evaluation completed. PT evaluation only required at this time as pt demonstrated independence with all moblility tasks including all bed mobility and transfer tasks. Able to ambulate x 300+ ft with no AD independently with increased cadene and slight dyspnea noted. No further PT intervention required at this time. Discharged from skilled PT. No follow up required when discharged home.

## 2019-05-18 NOTE — PROGRESS NOTES
"[facility]  [address] [place] [address]. [address]  T - [phone]  F - [phone]     Progress Note          PATIENT  DEMOGRAPHICS   PATIENT NAME: Maricarmen Ferris                      PHYSICIAN: MARIA TERESA Rocha  : 1970  MRN: [id number]   LOS: 4 days    Patient Care Team:  Alethea Ash MD as PCP - General (Family Medicine)  Subjective   SUBJECTIVE   BP better, UO is excellent.         Objective   OBJECTIVE   Vital Signs  Temp:  [97.3 °F (36.3 °C)-98.7 °F (37.1 °C)] 97.7 °F (36.5 °C)  Heart Rate:  [60-84] 73  Resp:  [18-20] 18  BP: ()/(49-81) 135/63    Flowsheet Rows      First Filed Value   Admission Height  165.1 cm (65\") Documented at 2019 1433   Admission Weight  139 kg (305 lb 14.4 oz)  (Abnormal)  Documented at 2019 1433           I/O last 3 completed shifts:  In: 4307.1 [P.O.:1080; I.V.:3227.1]  Out: 6000 [Urine:6000]    PHYSICAL EXAM    Physical Exam   Constitutional: She is oriented to person, place, and time. She appears well-developed.   HENT:   Head: Normocephalic.   Eyes: Pupils are equal, round, and reactive to light.   Cardiovascular: Normal rate, regular rhythm and normal heart sounds.   Pulmonary/Chest: Effort normal and breath sounds normal.   Abdominal: Soft. Bowel sounds are normal.   Musculoskeletal: She exhibits edema (small BLE).   Neurological: She is alert and oriented to person, place, and time.       RESULTS   Results Review:    Results from last 7 days   Lab Units 19  0526 19  0629 19  0646  19  1735   SODIUM mmol/L 140 143 137   < > 133*   POTASSIUM mmol/L 4.2 4.5 4.5   < > 5.6*   CHLORIDE mmol/L 106 105 98   < > 92*   CO2 mmol/L 22.0 24.0 25.0   < > 20.0*   BUN mg/dL 28* 44* 70*   < > 104*   CREATININE mg/dL 1.50* 2.05* 3.42*   < > 6.05*   CALCIUM mg/dL 9.1 9.1 9.2   < > 10.3   BILIRUBIN mg/dL  --   --   --   --  0.3   ALK PHOS U/L  --   --   --   --  36*   ALT (SGPT) U/L  --   --   --   --  " 26   AST (SGOT) U/L  --   --   --   --  40*   GLUCOSE mg/dL 93 95 144*   < > 77    < > = values in this interval not displayed.       Estimated Creatinine Clearance: 65.6 mL/min (A) (by C-G formula based on SCr of 1.5 mg/dL (H)).                Results from last 7 days   Lab Units 05/18/19  0526 05/17/19  0629 05/16/19  0600 05/15/19  0551 05/14/19  1735   WBC 10*3/mm3 7.07 5.79 5.82 5.93 8.02   HEMOGLOBIN g/dL 9.7* 9.9* 10.2* 9.8* 11.4*   PLATELETS 10*3/mm3 204 171 202 208 250               Imaging Results (last 24 hours)     ** No results found for the last 24 hours. **           MEDICATIONS      budesonide 0.5 mg Nebulization BID - RT   buPROPion  mg Oral BID   calcium carbonate-vitamin d 1 tablet Oral Daily   ceftriaxone 1 g Intravenous Q24H   dicyclomine 10 mg Oral TID   docusate sodium 100 mg Oral BID   gabapentin 100 mg Oral Q8H   nystatin  Topical Q12H   pantoprazole 40 mg Oral Daily   rOPINIRole 0.25 mg Oral Nightly   sertraline 150 mg Oral Daily   sodium chloride 3 mL Intravenous Q12H   traMADol 50 mg Oral Daily       sodium chloride 125 mL/hr Last Rate: 125 mL/hr (05/17/19 2027)       Assessment/Plan   ASSESSMENT / PLAN      Acute kidney injury (CMS/HCC)    Morbid obesity with BMI of 50.0-59.9, adult (CMS/[facility])    Restrictive lung disease secondary to obesity    Gastroesophageal reflux disease with esophagitis    ELSIE on CPAP    DYAN (acute kidney injury) (CMS/HCC)    Hypertension    Hyperlipidemia    Hyperkalemia    1. DYAN- it appears to be prerenal in nature while on diuretics. Dario negative.  Also she is on high protein diet and have some contribution to DYAN though there is no albuminuria. Her BP was also on low side on arrival     Patient Cr is improving with IV fluids. BP was low with mild symptoms and IVF was increased to 125ml/hr. Stopped imdur and oxybutynin. BP improved. Off all anti htn. Her ultrasound is negative for any hydronephrosis.     -Cr and UO improving.    -Stop IVF.    2.  Hyperkalemia/Metabolic acidosis- now corrected     3. Morbid obesity- awaiting gastric sleeve surgery      4. HTN-  patient lisinopril will be on hold     5. History of dyslipidemia    6. Restless leg syndrome           This document has been electronically signed by MARIA TERESA Rocha on May 18, 2019 9:22 AM

## 2019-05-18 NOTE — THERAPY DISCHARGE NOTE
Acute Care - Physical Therapy Initial Eval/Discharge  [facility]     Patient Name: Maricarmen Ferris  : 1970  MRN: [id number]  Today's Date: 2019   Onset of Illness/Injury or Date of Surgery: 19  Date of Referral to PT: 19  Referring Physician: JANET Lara      Admit Date: 2019    Visit Dx:    ICD-10-CM ICD-9-CM   1. DYAN (acute kidney injury) (CMS/[facility]) N17.9 584.9   2. Hyperkalemia E87.5 276.7   3. Morbid obesity with BMI of 50.0-59.9, adult (CMS/[facility]) E66.01 278.01    Z68.43 V85.43   4. Acute kidney injury (CMS/[facility]) N17.9 584.9     Patient Active Problem List   Diagnosis   • Chronic cholecystitis   • Umbilical hernia without obstruction and without gangrene   • Abdominal pain   • Abdominal fluid collection   • Intra-hepatic bile leak   • Abnormal cardiovascular function study   • Chest pain   • Morbid obesity with BMI of 50.0-59.9, adult (CMS/[facility])   • Restrictive lung disease secondary to obesity   • Nausea   • Weight gain, abnormal   • Pain of upper abdomen   • Gastroesophageal reflux disease with esophagitis   • Irritable bowel syndrome with both constipation and diarrhea   • Dysphagia   • Mild persistent asthma without complication   • ELSIE on CPAP   • Personal history of tobacco use, presenting hazards to health   • Acute kidney injury (CMS/[facility])   • DYAN (acute kidney injury) (CMS/[facility])   • Hypertension   • Hyperlipidemia   • Hyperkalemia     Past Medical History:   Diagnosis Date   • Acid reflux    • Anxiety    • Depressed    • Dysphagia    • GERD (gastroesophageal reflux disease)    • Hard to intubate    • Hyperlipidemia    • Hypertension    • IBS (irritable bowel syndrome)    • Nausea    • Sleep apnea      Past Surgical History:   Procedure Laterality Date   • ABDOMINAL SURGERY     • CARDIAC CATHETERIZATION N/A 2018    Procedure: Left Heart Cath 2018 @ 9;00;  Surgeon: Kuldip Frank MD;  Location: [facility] INVASIVE LOCATION;  Service: Cardiology   •  COLONOSCOPY N/A 11/19/2018    Procedure: COLONOSCOPY;  Surgeon: Bro Whitaker MD;  Location: [facility] ENDOSCOPY;  Service: Gastroenterology   • CYSTOSCOPY     • ENDOMETRIAL ABLATION  2015   • ENDOSCOPY N/A 11/19/2018    Procedure: ESOPHAGOGASTRODUODENOSCOPYwith dilation;  Surgeon: Bro Whitaker MD;  Location: [facility] ENDOSCOPY;  Service: Gastroenterology   • LAPAROSCOPIC TUBAL LIGATION  1995   • WA ERCP DX COLLECTION SPECIMEN BRUSHING/WASHING Left 7/31/2017    Procedure: ENDOSCOPIC RETROGRADE CHOLANGIOPANCREATOGRAPHY;  Surgeon: Samuel Redding DO;  Location: [facility] ENDOSCOPY;  Service: Gastroenterology   • WA LAP,CHOLECYSTECTOMY N/A 7/24/2017    Procedure: CHOLECYSTECTOMY LAPAROSCOPIC, also umbillical hernia repair;  Surgeon: Pk العلي MD;  Location: [facility] OR;  Service: General   • UPPER GASTROINTESTINAL ENDOSCOPY  11/19/2018          PT ASSESSMENT (last 12 hours)      Physical Therapy Evaluation     Row Name 05/18/19 1045          PT Evaluation Time/Intention    Subjective Information  complains of;pain  -BS     Document Type  evaluation  -BS     Mode of Treatment  individual therapy;physical therapy  -BS     Patient Effort  excellent  -BS     Symptoms Noted During/After Treatment  fatigue  -BS     Row Name 05/18/19 1045          General Information    Patient Profile Reviewed?  yes  -BS     Onset of Illness/Injury or Date of Surgery  05/14/19  -BS     Referring Physician  JANET Lara  -BS     Patient Observations  alert;cooperative;agree to therapy  -BS     Prior Level of Function  independent:;all household mobility;ADL's;feeding;dressing;bathing;cooking;cleaning;driving;shopping  -BS     Equipment Currently Used at Home  bipap/ cpap;shower chair  -BS     Pertinent History of Current Functional Problem  47 yo female admitted with abnormal labs, high creatinine.  -BS     Existing Precautions/Restrictions  other (see comments) morbid obesity  -BS     Equipment Issued to Patient  gait belt  -BS      Row Name 05/18/19 1045          Relationship/Environment    Primary Source of Support/Comfort  parent  -BS     Lives With  alone  -BS     Row Name 05/18/19 1045          Resource/Environmental Concerns    Current Living Arrangements  home/apartment/condo  -BS     Row Name 05/18/19 1045          Stairs Within Home, Primary    Stairs, Within Home, Primary  1  -BS     Stair Railings, Within Home, Primary  none  -BS     Row Name 05/18/19 1045          Cognitive Assessment/Intervention- PT/OT    Orientation Status (Cognition)  oriented x 4  -BS     Follows Commands (Cognition)  follows multi-step commands  -BS     Row Name 05/18/19 1045          Bed Mobility Assessment/Treatment    Bed Mobility Assessment/Treatment  supine-sit;sit-supine  -BS     Supine-Sit [place] (Bed Mobility)  independent  -BS     Sit-Supine [place] (Bed Mobility)  independent  -BS     Row Name 05/18/19 1045          Transfer Assessment/Treatment    Transfer Assessment/Treatment  sit-stand transfer;stand-sit transfer  -BS     Sit-Stand [place] (Transfers)  independent  -BS     Stand-Sit [place] (Transfers)  independent  -BS     Row Name 05/18/19 1045          Sit-Stand Transfer    Assistive Device (Sit-Stand Transfers)  other (see comments) no AD  -BS     Row Name 05/18/19 1045          Gait/Stairs Assessment/Training    [place] Level (Gait)  independent  -BS     Assistive Device (Gait)  other (see comments) no AD  -BS     Distance in Feet (Gait)  300+ ft  -BS     Pattern (Gait)  step-through  -BS     Deviations/Abnormal Patterns (Gait)  -- selina increased  -BS     Row Name 05/18/19 1045          General ROM    GENERAL ROM COMMENTS  B LE's grossly WFL for AROM  -BS     Row Name 05/18/19 1045          MMT (Manual Muscle Testing)    General MMT Comments  B LE's 5/5 except 4/5 L hip flex  -BS     Row Name 05/18/19 1045          Vision Assessment/Intervention    Visual Impairment/Limitations  corrective lenses full time  -BS      Row Name 05/18/19 1045          Pain Assessment    Additional Documentation  Pain Scale: Numbers Pre/Post-Treatment (Group)  -BS     Row Name 05/18/19 1045          Pain Scale: Numbers Pre/Post-Treatment    Pain Scale: Numbers, Pretreatment  3/10  -BS     Pain Scale: Numbers, Post-Treatment  3/10  -BS     Pain Location  other (see comments) L thigh  -BS     Pain Intervention(s)  Medication (See MAR)  -BS     Row Name 05/18/19 1045          Physical Therapy Clinical Impression    Date of Referral to PT  05/17/19  -BS     PT Diagnosis (PT Clinical Impression)  hyperkalemia  -BS     Patient/Family Goals Statement (PT Clinical Impression)  return home  -BS     Criteria for Skilled Interventions Met (PT Clinical Impression)  no;no problems identified which require skilled intervention  -BS     Impairments Found (describe specific impairments)  aerobic capacity/endurance  -BS     Rehab Potential (PT Clinical Summary)  -- N/A  -BS     Predicted Duration of Therapy (PT)  1 session  -BS     Row Name 05/18/19 1045          Vital Signs    Pre Systolic BP Rehab  130  -BS     Pre Treatment Diastolic BP  58  -BS     Pretreatment Heart Rate (beats/min)  57  -BS     Pre SpO2 (%)  94  -BS     O2 Delivery Pre Treatment  room air  -BS     Pre Patient Position  Supine  -BS     Intra Patient Position  Sitting  -BS     Post Patient Position  Supine  -BS     Row Name 05/18/19 1045          Positioning and Restraints    Pre-Treatment Position  in bed  -BS     Post Treatment Position  bed  -BS     In Bed  supine;with family/caregiver  -BS     Row Name 05/18/19 1045          Living Environment    Home Accessibility  stairs within home  -BS       User Key  (r) = Recorded By, (t) = Taken By, (c) = Cosigned By    Initials Name Provider Type    Demetrio Ely, PT Physical Therapist              PT Recommendation and Plan  Anticipated Discharge Disposition (PT): home  Therapy Frequency (PT Clinical Impression): evaluation only  Outcome  Summary/Treatment Plan (PT)  Anticipated Equipment Needs at Discharge (PT): (none)  Anticipated Discharge Disposition (PT): home  Plan of Care Reviewed With: patient, mother  Progress: improving  Outcome Summary: PT evaluation completed. PT evaluation only required at this time as pt demonstrated independence with all moblility tasks including all bed mobility and transfer tasks. Able to ambulate x 300+ ft with no AD independently with increased cadene and slight dyspnea noted. No further PT intervention required at this time. Discharged from skilled PT. No follow up required when discharged home.     Outcome Measures     Row Name 05/18/19 1045             How much help from another person do you currently need...    Turning from your back to your side while in flat bed without using bedrails?  4  -BS      Moving from lying on back to sitting on the side of a flat bed without bedrails?  4  -BS      Moving to and from a bed to a chair (including a wheelchair)?  4  -BS      Standing up from a chair using your arms (e.g., wheelchair, bedside chair)?  4  -BS      Climbing 3-5 steps with a railing?  4  -BS      To walk in hospital room?  4  -BS      AM-PAC 6 Clicks Score  24  -BS         Functional Assessment    Outcome Measure Options  AM-PAC 6 Clicks Basic Mobility (PT)  -BS        User Key  (r) = Recorded By, (t) = Taken By, (c) = Cosigned By    Initials Name Provider Type    Demetrio Ely, PT Physical Therapist           Time Calculation:   PT Charges     Row Name 05/18/19 1319             Time Calculation    Start Time  1045  -BS      Stop Time  1108  -BS      Time Calculation (min)  23 min  -BS      PT Received On  05/18/19  -BS      PT Goal Re-Cert Due Date  05/18/19  -BS        User Key  (r) = Recorded By, (t) = Taken By, (c) = Cosigned By    Initials Name Provider Type    Demetrio Ely, PT Physical Therapist        Therapy Charges for Today     Code Description Service Date Service Provider Modifiers Qty     [id number]  PT EVAL MOD COMPLEXITY 2 5/18/2019 Demetrio Drew, PT GP 1          PT G-Codes  Outcome Measure Options: AM-PAC 6 Clicks Basic Mobility (PT)  AM-PAC 6 Clicks Score: 24    PT Discharge Summary  Anticipated Discharge Disposition (PT): home    Demetrio Drew, PT  5/18/2019

## 2019-05-18 NOTE — PROGRESS NOTES
[facility] Medicine Services  INPATIENT PROGRESS NOTE    Length of Stay: 4  Date of Admission: 5/14/2019  Primary Care Physician: Alethea sAh MD    Subjective   Please note that all previous progress notes, lab findings, radiographical findings, medication changes, and physical exam findings have been noted and updated as appropriate.    5/18/2019: Creatinine has continued to improve.  Hypotension has resolved after removing all hypotension inducing medications including her Neurontin and tramadol.  Her pain is well controlled with ice packs.  Secondary to developing edema, nephrology has locked normal saline off and will continue to monitor fluid status.  Denies chest pain, shortness of air, nausea, vomiting, fever, chills.    5/17/2019: Creatinine continues to improve, but patient having some hypotension.      Chief Complaint/HPI: This 48-year-old  female was referred to hospitalist services secondary to an elevated creatinine and hyperkalemia.  Potassium was 5.6 and creatinine was 6.05.  Patient had been on a high protein diet preparing for weight loss surgery.  Creatinine has now improved to 3.42.  Patient has no complaints of dysuria, fever, chills, nausea, vomiting, abdominal pain.  She does report that she is still urinating less than normal.    Review of Systems   Constitutional: Negative for chills and fever.   Respiratory: Negative for cough, shortness of breath and wheezing.    Cardiovascular: Negative for chest pain and palpitations.   Gastrointestinal: Negative for abdominal pain, constipation, diarrhea and nausea.   Genitourinary: Negative for decreased urine volume, difficulty urinating and dysuria.   Musculoskeletal: Positive for arthralgias.   Neurological: Negative for dizziness and light-headedness.   Psychiatric/Behavioral: Negative for confusion.      All pertinent negatives and positives are as above. All other systems have been  reviewed and are negative unless otherwise stated.     Objective    Temp:  [97.3 °F (36.3 °C)-98.7 °F (37.1 °C)] 97.7 °F (36.5 °C)  Heart Rate:  [60-81] 73  Resp:  [18-20] 18  BP: ()/(49-81) 135/63    Physical Exam   Constitutional: She is oriented to person, place, and time. She appears well-developed and well-nourished. No distress.   HENT:   Head: Normocephalic and atraumatic.   Eyes: No scleral icterus.   Cardiovascular: Normal rate and regular rhythm.   Pulmonary/Chest: Effort normal and breath sounds normal. No stridor. No respiratory distress.   Abdominal: Soft. Bowel sounds are normal. She exhibits no distension. There is no tenderness.   Neurological: She is alert and oriented to person, place, and time.   Skin: Skin is warm and dry. Capillary refill takes less than 2 seconds. She is not diaphoretic.   Psychiatric: She has a normal mood and affect. Her behavior is normal.     Results Review:  I have reviewed the labs, radiology results, and diagnostic studies.    Laboratory Data:   Results from last 7 days   Lab Units 05/18/19  0526 05/17/19  0629 05/16/19  0646  05/14/19  1735   SODIUM mmol/L 140 143 137   < > 133*   POTASSIUM mmol/L 4.2 4.5 4.5   < > 5.6*   CHLORIDE mmol/L 106 105 98   < > 92*   CO2 mmol/L 22.0 24.0 25.0   < > 20.0*   BUN mg/dL 28* 44* 70*   < > 104*   CREATININE mg/dL 1.50* 2.05* 3.42*   < > 6.05*   GLUCOSE mg/dL 93 95 144*   < > 77   CALCIUM mg/dL 9.1 9.1 9.2   < > 10.3   BILIRUBIN mg/dL  --   --   --   --  0.3   ALK PHOS U/L  --   --   --   --  36*   ALT (SGPT) U/L  --   --   --   --  26   AST (SGOT) U/L  --   --   --   --  40*   ANION GAP mmol/L 12.0 14.0 14.0   < > 21.0    < > = values in this interval not displayed.     Estimated Creatinine Clearance: 65.6 mL/min (A) (by C-G formula based on SCr of 1.5 mg/dL (H)).          Results from last 7 days   Lab Units 05/18/19  0526 05/17/19  0629 05/16/19  0600 05/15/19  0551 05/14/19  1735   WBC 10*3/mm3 7.07 5.79 5.82 5.93 8.02    HEMOGLOBIN g/dL 9.7* 9.9* 10.2* 9.8* 11.4*   HEMATOCRIT % 29.8* 30.2* 32.1* 29.9* 34.5   PLATELETS 10*3/mm3 204 171 202 208 250           Culture Data:   No results found for: BLOODCX  No results found for: URINECX  No results found for: RESPCX  No results found for: WOUNDCX  No results found for: STOOLCX  No components found for: BODYFLD    Radiology Data:   Imaging Results (last 24 hours)     ** No results found for the last 24 hours. **          I have reviewed the patient's current medications.     Assessment/Plan     Active Hospital Problems    Diagnosis   • **Acute kidney injury (CMS/HCC)   • DYAN (acute kidney injury) (CMS/HCC)   • Hypertension   • Hyperlipidemia   • Hyperkalemia   • ELSIE on CPAP   • Gastroesophageal reflux disease with esophagitis     Added automatically from request for surgery [id number]     • Restrictive lung disease secondary to obesity   • Morbid obesity with BMI of 50.0-59.9, adult (CMS/HCC)       Plan: Hold normal saline, monitor creatinine over the next 24 hours.  Suspect discharge within 24 to 48 hours if no complications and if creatinine continues to improve.              This document has been electronically signed by JANET Nathan on May 18, 2019 11:23 AM

## 2019-05-19 VITALS
WEIGHT: 293 LBS | SYSTOLIC BLOOD PRESSURE: 115 MMHG | RESPIRATION RATE: 18 BRPM | BODY MASS INDEX: 48.82 KG/M2 | TEMPERATURE: 96.6 F | HEART RATE: 66 BPM | OXYGEN SATURATION: 94 % | DIASTOLIC BLOOD PRESSURE: 58 MMHG | HEIGHT: 65 IN

## 2019-05-19 LAB
ANION GAP SERPL CALCULATED.3IONS-SCNC: 12 MMOL/L
BASOPHILS # BLD AUTO: 0.02 10*3/MM3 (ref 0–0.2)
BASOPHILS NFR BLD AUTO: 0.3 % (ref 0–1.5)
BUN BLD-MCNC: 21 MG/DL (ref 6–20)
BUN/CREAT SERPL: 14.1 (ref 7–25)
CALCIUM SPEC-SCNC: 9 MG/DL (ref 8.6–10.5)
CHLORIDE SERPL-SCNC: 105 MMOL/L (ref 98–107)
CO2 SERPL-SCNC: 23 MMOL/L (ref 22–29)
CREAT BLD-MCNC: 1.49 MG/DL (ref 0.57–1)
DEPRECATED RDW RBC AUTO: 42.9 FL (ref 37–54)
EOSINOPHIL # BLD AUTO: 0.15 10*3/MM3 (ref 0–0.4)
EOSINOPHIL NFR BLD AUTO: 1.9 % (ref 0.3–6.2)
ERYTHROCYTE [DISTWIDTH] IN BLOOD BY AUTOMATED COUNT: 14.2 % (ref 12.3–15.4)
GFR SERPL CREATININE-BSD FRML MDRD: 37 ML/MIN/1.73
GLUCOSE BLD-MCNC: 99 MG/DL (ref 65–99)
HCT VFR BLD AUTO: 30.5 % (ref 34–46.6)
HGB BLD-MCNC: 9.8 G/DL (ref 12–15.9)
IMM GRANULOCYTES # BLD AUTO: 0.02 10*3/MM3 (ref 0–0.05)
IMM GRANULOCYTES NFR BLD AUTO: 0.3 % (ref 0–0.5)
LYMPHOCYTES # BLD AUTO: 2.87 10*3/MM3 (ref 0.7–3.1)
LYMPHOCYTES NFR BLD AUTO: 36.5 % (ref 19.6–45.3)
MCH RBC QN AUTO: 26.7 PG (ref 26.6–33)
MCHC RBC AUTO-ENTMCNC: 32.1 G/DL (ref 31.5–35.7)
MCV RBC AUTO: 83.1 FL (ref 79–97)
MONOCYTES # BLD AUTO: 0.52 10*3/MM3 (ref 0.1–0.9)
MONOCYTES NFR BLD AUTO: 6.6 % (ref 5–12)
NEUTROPHILS # BLD AUTO: 4.28 10*3/MM3 (ref 1.7–7)
NEUTROPHILS NFR BLD AUTO: 54.4 % (ref 42.7–76)
NRBC BLD AUTO-RTO: 0 /100 WBC (ref 0–0.2)
PLATELET # BLD AUTO: 201 10*3/MM3 (ref 140–450)
PMV BLD AUTO: 11.5 FL (ref 6–12)
POTASSIUM BLD-SCNC: 4.4 MMOL/L (ref 3.5–5.2)
RBC # BLD AUTO: 3.67 10*6/MM3 (ref 3.77–5.28)
SODIUM BLD-SCNC: 140 MMOL/L (ref 136–145)
WBC NRBC COR # BLD: 7.86 10*3/MM3 (ref 3.4–10.8)

## 2019-05-19 PROCEDURE — 25010000002 CEFTRIAXONE PER 250 MG: Performed by: FAMILY MEDICINE

## 2019-05-19 PROCEDURE — 80048 BASIC METABOLIC PNL TOTAL CA: CPT | Performed by: NURSE PRACTITIONER

## 2019-05-19 PROCEDURE — 97165 OT EVAL LOW COMPLEX 30 MIN: CPT

## 2019-05-19 PROCEDURE — 85025 COMPLETE CBC W/AUTO DIFF WBC: CPT | Performed by: NURSE PRACTITIONER

## 2019-05-19 RX ORDER — FUROSEMIDE 40 MG/1
40 TABLET ORAL DAILY
Status: DISCONTINUED | OUTPATIENT
Start: 2019-05-19 | End: 2019-05-19 | Stop reason: HOSPADM

## 2019-05-19 RX ADMIN — Medication 1 TABLET: at 08:28

## 2019-05-19 RX ADMIN — DICYCLOMINE HYDROCHLORIDE 10 MG: 10 CAPSULE ORAL at 08:28

## 2019-05-19 RX ADMIN — GABAPENTIN 100 MG: 100 CAPSULE ORAL at 06:22

## 2019-05-19 RX ADMIN — FUROSEMIDE 40 MG: 40 TABLET ORAL at 11:19

## 2019-05-19 RX ADMIN — BUPROPION HYDROCHLORIDE 150 MG: 150 TABLET, EXTENDED RELEASE ORAL at 08:28

## 2019-05-19 RX ADMIN — DOCUSATE SODIUM 100 MG: 100 CAPSULE, LIQUID FILLED ORAL at 08:29

## 2019-05-19 RX ADMIN — NYSTATIN: 100000 POWDER TOPICAL at 08:29

## 2019-05-19 RX ADMIN — PANTOPRAZOLE SODIUM 40 MG: 40 TABLET, DELAYED RELEASE ORAL at 08:29

## 2019-05-19 RX ADMIN — SERTRALINE HYDROCHLORIDE 150 MG: 50 TABLET ORAL at 08:28

## 2019-05-19 RX ADMIN — SODIUM CHLORIDE, PRESERVATIVE FREE 3 ML: 5 INJECTION INTRAVENOUS at 08:29

## 2019-05-19 RX ADMIN — CEFTRIAXONE SODIUM 1 G: 1 INJECTION, POWDER, FOR SOLUTION INTRAMUSCULAR; INTRAVENOUS at 06:22

## 2019-05-19 NOTE — PLAN OF CARE
Problem: Patient Care Overview  Goal: Plan of Care Review  Outcome: Ongoing (interventions implemented as appropriate)   05/19/19 1144   Coping/Psychosocial   Plan of Care Reviewed With patient   OTHER   Outcome Summary OT eval on this date. Pt demo independence with all functional mobility, toilet transfer and LE dress. No problem identified which require skilled OT intervention. Will complete OT order.

## 2019-05-19 NOTE — DISCHARGE SUMMARY
[facility] Medicine Services  DISCHARGE SUMMARY       Date of Admission: 5/14/2019  Date of Discharge:  5/19/2019  Primary Care Physician: Alethea Ash MD    Presenting Problem/History of Present Illness:  Hyperkalemia [E87.5]  DYAN (acute kidney injury) (CMS/HCC) [N17.9]  Acute kidney injury (CMS/HCC) [N17.9]  DYAN (acute kidney injury) (CMS/[facility]) [N17.9]     Final Discharge Diagnoses:  Active Hospital Problems    Diagnosis   • **Acute kidney injury (CMS/HCC)   • DYAN (acute kidney injury) (CMS/[facility])   • Hypertension   • Hyperlipidemia   • Hyperkalemia   • ELSIE on CPAP   • Gastroesophageal reflux disease with esophagitis     Added automatically from request for surgery [id number]     • Restrictive lung disease secondary to obesity   • Morbid obesity with BMI of 50.0-59.9, adult (CMS/[facility])       Consults:   Consults     Date and Time Order Name Status Description    5/15/2019 0936 Nephrology - FRANSISCO (on-call MD from [facility] unless specified) Completed     5/14/2019 1849 Hospitalist (on-call MD unless specified)          Pertinent Test Results:   Lab Results (most recent)     Procedure Component Value Units Date/Time    Basic Metabolic Panel [[id number]]  (Abnormal) Collected:  05/19/19 0603    Specimen:  Blood Updated:  05/19/19 0719     Glucose 99 mg/dL      BUN 21 mg/dL      Creatinine 1.49 mg/dL      Sodium 140 mmol/L      Potassium 4.4 mmol/L      Chloride 105 mmol/L      CO2 23.0 mmol/L      Calcium 9.0 mg/dL      eGFR Non African Amer 37 mL/min/1.73      BUN/Creatinine Ratio 14.1     Anion Gap 12.0 mmol/L     Narrative:       GFR Normal >60  Chronic Kidney Disease <60  Kidney Failure <15    CBC & Differential [[id number]] Collected:  05/19/19 0603    Specimen:  Blood Updated:  05/19/19 0644    Narrative:       The following orders were created for panel order CBC & Differential.  Procedure                               Abnormality         Status                     ---------                                -----------         ------                     CBC Auto Differential[[id number]]        Abnormal            Final result                 Please view results for these tests on the individual orders.    CBC Auto Differential [[id number]]  (Abnormal) Collected:  05/19/19 0603    Specimen:  Blood Updated:  05/19/19 0644     WBC 7.86 10*3/mm3      RBC 3.67 10*6/mm3      Hemoglobin 9.8 g/dL      Hematocrit 30.5 %      MCV 83.1 fL      MCH 26.7 pg      MCHC 32.1 g/dL      RDW 14.2 %      RDW-SD 42.9 fl      MPV 11.5 fL      Platelets 201 10*3/mm3      Neutrophil % 54.4 %      Lymphocyte % 36.5 %      Monocyte % 6.6 %      Eosinophil % 1.9 %      Basophil % 0.3 %      Immature Grans % 0.3 %      Neutrophils, Absolute 4.28 10*3/mm3      Lymphocytes, Absolute 2.87 10*3/mm3      Monocytes, Absolute 0.52 10*3/mm3      Eosinophils, Absolute 0.15 10*3/mm3      Basophils, Absolute 0.02 10*3/mm3      Immature Grans, Absolute 0.02 10*3/mm3      nRBC 0.0 /100 WBC     Basic Metabolic Panel [[id number]]  (Abnormal) Collected:  05/18/19 0526    Specimen:  Blood Updated:  05/18/19 0653     Glucose 93 mg/dL      BUN 28 mg/dL      Creatinine 1.50 mg/dL      Sodium 140 mmol/L      Potassium 4.2 mmol/L      Chloride 106 mmol/L      CO2 22.0 mmol/L      Calcium 9.1 mg/dL      eGFR Non African Amer 37 mL/min/1.73      BUN/Creatinine Ratio 18.7     Anion Gap 12.0 mmol/L     Narrative:       GFR Normal >60  Chronic Kidney Disease <60  Kidney Failure <15    CBC & Differential [[id number]] Collected:  05/18/19 0526    Specimen:  Blood Updated:  05/18/19 0634    Narrative:       The following orders were created for panel order CBC & Differential.  Procedure                               Abnormality         Status                     ---------                               -----------         ------                     CBC Auto Differential[[id number]]        Abnormal            Final result                 Please view  results for these tests on the individual orders.    CBC Auto Differential [[id number]]  (Abnormal) Collected:  05/18/19 0526    Specimen:  Blood Updated:  05/18/19 0634     WBC 7.07 10*3/mm3      RBC 3.57 10*6/mm3      Hemoglobin 9.7 g/dL      Hematocrit 29.8 %      MCV 83.5 fL      MCH 27.2 pg      MCHC 32.6 g/dL      RDW 14.2 %      RDW-SD 43.6 fl      MPV 11.3 fL      Platelets 204 10*3/mm3      Neutrophil % 46.2 %      Lymphocyte % 45.0 %      Monocyte % 6.1 %      Eosinophil % 2.0 %      Basophil % 0.4 %      Immature Grans % 0.3 %      Neutrophils, Absolute 3.27 10*3/mm3      Lymphocytes, Absolute 3.18 10*3/mm3      Monocytes, Absolute 0.43 10*3/mm3      Eosinophils, Absolute 0.14 10*3/mm3      Basophils, Absolute 0.03 10*3/mm3      Immature Grans, Absolute 0.02 10*3/mm3      nRBC 0.0 /100 WBC     Scan Slide [[id number]] Collected:  05/17/19 0629    Specimen:  Blood Updated:  05/17/19 1034     Anisocytosis Slight/1+     Crenated RBC's Slight/1+     Target Cells --     Comment: Rare target cells observed        WBC Morphology Normal     Platelet Estimate Adequate     Clumped Platelets Present     Large Platelets Slight/1+    Urine Eosinophils, Dario's Stain - Urine, Clean Catch [[id number]]  (Normal) Collected:  05/15/19 1421    Specimen:  Urine, Clean Catch Updated:  05/16/19 1224     Dario Stain Negative    Extra Tubes [[id number]] Collected:  05/16/19 0600    Specimen:  Blood, Venous Line Updated:  05/16/19 0700    Narrative:       The following orders were created for panel order Extra Tubes.  Procedure                               Abnormality         Status                     ---------                               -----------         ------                     Red Top[[id number]]                                          Final result                 Please view results for these tests on the individual orders.    Red Top [[id number]] Collected:  05/16/19 0600    Specimen:  Blood Updated:  05/16/19 0700      Extra Tube Hold for add-ons.     Comment: Auto resulted.       Urine Eosinophils, Dario's Stain - Urine, Clean Catch [[id number]]  (Normal) Collected:  05/14/19 2059    Specimen:  Urine, Clean Catch Updated:  05/15/19 1149     Dario Stain Negative    POC Glucose Once [[id number]]  (Normal) Collected:  05/14/19 2236    Specimen:  Blood Updated:  05/14/19 2310     Glucose 104 mg/dL      Comment: : [id number] NORAH PORTILLOHLEENMeter ID: [id number]       Osmolality, Urine - Urine, Clean Catch [[id number]]  (Normal) Collected:  05/14/19 2059    Specimen:  Urine, Clean Catch Updated:  05/14/19 2132     Osmolality, Urine 267 mOsm/kg     Urinalysis With Culture If Indicated - Urine, Clean Catch [[id number]]  (Abnormal) Collected:  05/14/19 2059    Specimen:  Urine, Clean Catch Updated:  05/14/19 2131     Color, UA Yellow     Appearance, UA Clear     pH, UA <=5.0     Specific Gravity, UA 1.010     Glucose,  mg/dL (Trace)     Ketones, UA Negative     Bilirubin, UA Negative     Blood, UA Trace     Protein, UA Negative     Leuk Esterase, UA Negative     Nitrite, UA Negative     Urobilinogen, UA 0.2 E.U./dL    Urinalysis, Microscopic Only - Urine, Clean Catch [[id number]]  (Abnormal) Collected:  05/14/19 2059    Specimen:  Urine, Clean Catch Updated:  05/14/19 2131     RBC, UA None Seen /HPF      WBC, UA 3-5 /HPF      Bacteria, UA 1+ /HPF      Squamous Epithelial Cells, UA 3-5 /HPF      Hyaline Casts, UA None Seen /LPF      Granular Casts, UA 0-2 /LPF      Methodology Manual Light Microscopy    Sodium, Urine, Random - Urine, Clean Catch [[id number]] Collected:  05/14/19 2059    Specimen:  Urine, Clean Catch Updated:  05/14/19 2115     Sodium, Urine 56 mmol/L     Narrative:       Reference intervals for random urine have not been established.  Clinical usage is dependent upon physician's interpretation in combination with other laboratory tests.     [place] Draw [[id number]] Collected:  05/14/19 1735    Specimen:  Blood  Updated:  05/14/19 1846    Narrative:       The following orders were created for panel order [place] Draw.  Procedure                               Abnormality         Status                     ---------                               -----------         ------                     Light Blue Top[[id number]]                                   Final result               Green Top (Gel)[[id number]]                                  Final result               Lavender Top[[id number]]                                     Final result               Gold Top - SST[[id number]]                                   Final result                 Please view results for these tests on the individual orders.    Light Blue Top [[id number]] Collected:  05/14/19 1735    Specimen:  Blood Updated:  05/14/19 1846     Extra Tube hold for add-on     Comment: Auto resulted       Green Top (Gel) [[id number]] Collected:  05/14/19 1735    Specimen:  Blood Updated:  05/14/19 1846     Extra Tube Hold for add-ons.     Comment: Auto resulted.       Lavender Top [[id number]] Collected:  05/14/19 1735    Specimen:  Blood Updated:  05/14/19 1846     Extra Tube hold for add-on     Comment: Auto resulted       Gold Top - SST [[id number]] Collected:  05/14/19 1735    Specimen:  Blood Updated:  05/14/19 1846     Extra Tube Hold for add-ons.     Comment: Auto resulted.       Comprehensive Metabolic Panel [[id number]]  (Abnormal) Collected:  05/14/19 1735    Specimen:  Blood Updated:  05/14/19 1807     Glucose 77 mg/dL       mg/dL      Creatinine 6.05 mg/dL      Sodium 133 mmol/L      Potassium 5.6 mmol/L      Chloride 92 mmol/L      CO2 20.0 mmol/L      Calcium 10.3 mg/dL      Total Protein 8.1 g/dL      Albumin 4.30 g/dL      ALT (SGPT) 26 U/L      AST (SGOT) 40 U/L      Alkaline Phosphatase 36 U/L      Total Bilirubin 0.3 mg/dL      eGFR Non African Amer 7 mL/min/1.73      Comment: <15 Indicative of kidney failure.        eGFR   Amer -- mL/min/1.73       Comment: <15 Indicative of kidney failure.        Globulin 3.8 gm/dL      A/G Ratio 1.1 g/dL      BUN/Creatinine Ratio 17.2     Anion Gap 21.0 mmol/L     Narrative:       GFR Normal >60  Chronic Kidney Disease <60  Kidney Failure <15    Urinalysis, Microscopic Only - Urine, Clean Catch [[id number]]  (Abnormal) Collected:  05/14/19 1736    Specimen:  Urine, Clean Catch Updated:  05/14/19 1750     RBC, UA 3-5 /HPF      WBC, UA 6-12 /HPF      Bacteria, UA None Seen /HPF      Squamous Epithelial Cells, UA 6-12 /HPF      Hyaline Casts, UA 0-2 /LPF      Methodology Automated Microscopy    Urinalysis With Microscopic If Indicated (No Culture) - Urine, Clean Catch [[id number]]  (Abnormal) Collected:  05/14/19 1736    Specimen:  Urine, Clean Catch Updated:  05/14/19 1748     Color, UA Yellow     Appearance, UA Cloudy     pH, UA <=5.0     Specific Gravity, UA 1.011     Glucose,  mg/dL (Trace)     Ketones, UA Negative     Bilirubin, UA Negative     Blood, UA Trace     Protein, UA Negative     Leuk Esterase, UA Small (1+)     Nitrite, UA Negative     Urobilinogen, UA 0.2 E.U./dL        Imaging Results (most recent)     Procedure Component Value Units Date/Time    US Renal Bilateral [[id number]] Collected:  05/15/19 0840     Updated:  05/15/19 0931    Narrative:       Ultrasound renal complete    HISTORY:  Acute kidney injury. Acute kidney failure.  Hyperkalemia.    Ultrasound examination of the kidneys and urinary bladder was  performed.    COMPARISON: None. Correlation CT September 18, 2018.    The right kidney measures 11.51 cm in length by 6.26 cm x 5.43 cm  transverse.  The left kidney measures 7.24 cm in length by 5.59 cm x 4.81 cm  transverse.  The kidneys are of normal echotexture.  No hydronephrosis.  No solid or cystic renal mass.    Images of the urinary bladder are unremarkable.      Impression:       CONCLUSION:  Normal study.    [id number]    Electronically signed by:  Christiano Enciso MD  5/15/2019 9:30 AM  "CDT  Workstation: [id number]        Hospital Course:  The patient is a 48 y.o. female who presented to [facility] with elevated creatinine and hyperkalemia.  Potassium was 5.6 and creatinine was 6.05.  Patient had been on a high-protein diet preparing for weight loss surgery.  Creatinine has now improved to 1.49 even after discontinuation of fluids.  She did experience some hypotension while here and his hypotension has resolved after discontinuation of all hypotension inducing medications.  Patient has had no issues with pain after discontinuation of her tramadol and methocarbamol.  Patient was cleared for discharge by nephrology for outpatient BMP and follow-up in their clinic.  She will also be referred her primary care provider to discuss medication changes.  Patient states she felt well and safe and ready to go home.  She had no acute complaints.  She was instructed to return with any concerning or worsening symptoms including dizziness, syncope, weakness, decreased urine output, fever, chills, any other worsening or concerning symptoms.    Condition on Discharge: Stable, improved    Physical Exam on Discharge:  /58 (BP Location: Right arm, Patient Position: Lying)   Pulse 66   Temp 96.6 °F (35.9 °C) (Oral)   Resp 18   Ht 165.1 cm (65\")   Wt (!) 141 kg (309 lb 14.4 oz)   SpO2 94%   BMI 51.57 kg/m²   Physical Exam   Constitutional: She is oriented to person, place, and time. She appears well-developed and well-nourished.   HENT:   Head: Normocephalic and atraumatic.   Cardiovascular: Normal rate and regular rhythm.   Pulmonary/Chest: Effort normal and breath sounds normal. No stridor. No respiratory distress.   Abdominal: Soft. Bowel sounds are normal. She exhibits no distension. There is no tenderness.   Musculoskeletal: She exhibits edema.   Neurological: She is alert and oriented to person, place, and time.   Skin: Skin is warm and dry. Capillary refill takes less than 2 seconds. "   Psychiatric: She has a normal mood and affect. Her behavior is normal.     Discharge Disposition:  Home or Self Care    Discharge Medications:     Discharge Medications      Continue These Medications      Instructions Start Date   albuterol sulfate  (90 Base) MCG/ACT inhaler  Commonly known as:  PROVENTIL HFA;VENTOLIN HFA;PROAIR HFA   2 puffs, Inhalation, Every 4 Hours PRN      buPROPion  MG 12 hr tablet  Commonly known as:  WELLBUTRIN SR   150 mg, Oral, 2 Times Daily      CALCIUM 1000 + D 1000-800 MG-UNIT tablet  Generic drug:  Calcium Carb-Cholecalciferol   1 tablet, Oral, Daily      dicyclomine 10 MG capsule  Commonly known as:  BENTYL   10 mg, Oral, 3 Times Daily      fenofibrate 145 MG tablet  Commonly known as:  TRICOR   145 mg, Oral, Daily      Fluticasone Furoate 200 MCG/ACT aerosol powder    1 puff, Inhalation, Daily, Rinse mouth well after use      oxybutynin 5 MG tablet  Commonly known as:  DITROPAN   5 mg, Oral, 2 Times Daily      pantoprazole 40 MG EC tablet  Commonly known as:  PROTONIX   40 mg, Oral, Daily      rOPINIRole 0.25 MG tablet  Commonly known as:  REQUIP   0.25 mg, Oral, Nightly      ZOLOFT PO   150 mg, Oral, Daily         Stop These Medications    furosemide 40 MG tablet  Commonly known as:  LASIX     gabapentin 100 MG capsule  Commonly known as:  NEURONTIN     isosorbide mononitrate 30 MG 24 hr tablet  Commonly known as:  IMDUR     lisinopril 20 MG tablet  Commonly known as:  PRINIVIL,ZESTRIL     methocarbamol 500 MG tablet  Commonly known as:  ROBAXIN     spironolactone 25 MG tablet  Commonly known as:  ALDACTONE     traMADol 50 MG tablet  Commonly known as:  ULTRAM          Update:  Lasix 40 mg daily continued per nephrology.    Discharge Diet:   Diet Instructions     Diet: Renal      Discharge Diet:  Renal          Activity at Discharge:   Activity Instructions     Activity as Tolerated            Discharge Care Plan/Instructions: Follow-up with PCP, follow-up with  nephrology, return with any concerning worsening symptoms.        This document has been electronically signed by JANET Nathan on May 19, 2019 10:39 AM        Time: Please note the greater than 30 minutes was spent on the discharge planning and summary this patient.

## 2019-05-19 NOTE — PROGRESS NOTES
"[facility]  [address] [place] [address]. [id number]  T - [phone]  F - [phone]     Progress Note          PATIENT  DEMOGRAPHICS   PATIENT NAME: Maricarmen Ferris                      PHYSICIAN: MARIA TERESA Rocha  : 1970  MRN: [id number]   LOS: 5 days    Patient Care Team:  Alethea Ash MD as PCP - General (Family Medicine)  Subjective   SUBJECTIVE   BP better, UO is excellent.         Objective   OBJECTIVE   Vital Signs  Temp:  [96.3 °F (35.7 °C)-97.9 °F (36.6 °C)] 96.6 °F (35.9 °C)  Heart Rate:  [57-66] 66  Resp:  [16-18] 18  BP: (100-159)/(55-70) 115/58    Flowsheet Rows      First Filed Value   Admission Height  165.1 cm (65\") Documented at 2019 1433   Admission Weight  139 kg (305 lb 14.4 oz)  (Abnormal)  Documented at 2019 1433           I/O last 3 completed shifts:  In: 4307.1 [P.O.:1080; I.V.:3227.1]  Out: 6000 [Urine:6000]    PHYSICAL EXAM    Physical Exam   Constitutional: She is oriented to person, place, and time. She appears well-developed.   HENT:   Head: Normocephalic.   Eyes: Pupils are equal, round, and reactive to light.   Cardiovascular: Normal rate, regular rhythm and normal heart sounds.   Pulmonary/Chest: Effort normal and breath sounds normal.   Abdominal: Soft. Bowel sounds are normal.   Musculoskeletal: She exhibits edema (1-2+ BLE).   Neurological: She is alert and oriented to person, place, and time.       RESULTS   Results Review:    Results from last 7 days   Lab Units 19  0603 19  0526 19  0629  19  1735   SODIUM mmol/L 140 140 143   < > 133*   POTASSIUM mmol/L 4.4 4.2 4.5   < > 5.6*   CHLORIDE mmol/L 105 106 105   < > 92*   CO2 mmol/L 23.0 22.0 24.0   < > 20.0*   BUN mg/dL 21* 28* 44*   < > 104*   CREATININE mg/dL 1.49* 1.50* 2.05*   < > 6.05*   CALCIUM mg/dL 9.0 9.1 9.1   < > 10.3   BILIRUBIN mg/dL  --   --   --   --  0.3   ALK PHOS U/L  --   --   --   --  36*   ALT (SGPT) U/L  --   --   --   -- "  26   AST (SGOT) U/L  --   --   --   --  40*   GLUCOSE mg/dL 99 93 95   < > 77    < > = values in this interval not displayed.       Estimated Creatinine Clearance: 66 mL/min (A) (by C-G formula based on SCr of 1.49 mg/dL (H)).                Results from last 7 days   Lab Units 05/19/19  0603 05/18/19  0526 05/17/19  0629 05/16/19  0600 05/15/19  0551   WBC 10*3/mm3 7.86 7.07 5.79 5.82 5.93   HEMOGLOBIN g/dL 9.8* 9.7* 9.9* 10.2* 9.8*   PLATELETS 10*3/mm3 201 204 171 202 208               Imaging Results (last 24 hours)     ** No results found for the last 24 hours. **           MEDICATIONS      budesonide 0.5 mg Nebulization BID - RT   buPROPion  mg Oral BID   calcium carbonate-vitamin d 1 tablet Oral Daily   dicyclomine 10 mg Oral TID   docusate sodium 100 mg Oral BID   gabapentin 100 mg Oral Q8H   nystatin  Topical Q12H   pantoprazole 40 mg Oral Daily   rOPINIRole 0.25 mg Oral Nightly   sertraline 150 mg Oral Daily   sodium chloride 3 mL Intravenous Q12H   traMADol 50 mg Oral Daily          Assessment/Plan   ASSESSMENT / PLAN      Acute kidney injury (CMS/HCC)    Morbid obesity with BMI of 50.0-59.9, adult (CMS/HCC)    Restrictive lung disease secondary to obesity    Gastroesophageal reflux disease with esophagitis    ELSIE on CPAP    DYAN (acute kidney injury) (CMS/HCC)    Hypertension    Hyperlipidemia    Hyperkalemia    1. DYAN- it appears to be prerenal in nature while on diuretics. Dario negative.  Also she is on high protein diet and have some contribution to DYAN though there is no albuminuria. Her BP was also on low side on arrival     Patient Cr is improving with IV fluids. BP was low with mild symptoms and IVF was increased to 125ml/hr. Stopped imdur and oxybutynin. BP improved. Off all anti htn. Her ultrasound is negative for any hydronephrosis.     -Cr and UO improving.    -OK to discharge from a renal standpoint. Continue to hold lisinopril, imdur, and spironolactone. We will resume home  lasix.    2. Hyperkalemia/Metabolic acidosis- now corrected     3. Morbid obesity- awaiting gastric sleeve surgery      4. HTN-  patient lisinopril will be on hold     5. History of dyslipidemia    6. Restless leg syndrome           This document has been electronically signed by MARIA TERESA Rocha on May 19, 2019 9:36 AM

## 2019-05-19 NOTE — PLAN OF CARE
Problem: Patient Care Overview  Goal: Plan of Care Review  Outcome: Ongoing (interventions implemented as appropriate)   05/19/19 0005   Coping/Psychosocial   Plan of Care Reviewed With patient   Plan of Care Review   Progress improving   OTHER   Outcome Summary pt vs stable awaiting lab results in the morning possible discharge will continue to monitor.     Goal: Individualization and Mutuality  Outcome: Ongoing (interventions implemented as appropriate)    Goal: Discharge Needs Assessment  Outcome: Ongoing (interventions implemented as appropriate)    Goal: Interprofessional Rounds/Family Conf  Outcome: Ongoing (interventions implemented as appropriate)      Problem: Pain, Chronic (Adult)  Goal: Acceptable Pain/Comfort Level and Functional Ability  Outcome: Ongoing (interventions implemented as appropriate)

## 2019-05-19 NOTE — THERAPY DISCHARGE NOTE
Acute Care - Occupational Therapy Initial Eval/Discharge  [facility]     Patient Name: Maricarmen Ferris  : 1970  MRN: [id number]  Today's Date: 2019  Onset of Illness/Injury or Date of Surgery: 19  Date of Referral to OT: 19  Referring Physician: MAYDA GONZALES      Admit Date: 2019       ICD-10-CM ICD-9-CM   1. DYAN (acute kidney injury) (CMS/[facility]) N17.9 584.9   2. Hyperkalemia E87.5 276.7   3. Morbid obesity with BMI of 50.0-59.9, adult (CMS/[facility]) E66.01 278.01    Z68.43 V85.43   4. Acute kidney injury (CMS/[facility]) N17.9 584.9   5. Impaired mobility and activities of daily living Z74.09 799.89     Patient Active Problem List   Diagnosis   • Chronic cholecystitis   • Umbilical hernia without obstruction and without gangrene   • Abdominal pain   • Abdominal fluid collection   • Intra-hepatic bile leak   • Abnormal cardiovascular function study   • Chest pain   • Morbid obesity with BMI of 50.0-59.9, adult (CMS/[facility])   • Restrictive lung disease secondary to obesity   • Nausea   • Weight gain, abnormal   • Pain of upper abdomen   • Gastroesophageal reflux disease with esophagitis   • Irritable bowel syndrome with both constipation and diarrhea   • Dysphagia   • Mild persistent asthma without complication   • ELSIE on CPAP   • Personal history of tobacco use, presenting hazards to health   • Acute kidney injury (CMS/[facility])   • DYAN (acute kidney injury) (CMS/[facility])   • Hypertension   • Hyperlipidemia   • Hyperkalemia     Past Medical History:   Diagnosis Date   • Acid reflux    • Anxiety    • Depressed    • Dysphagia    • GERD (gastroesophageal reflux disease)    • Hard to intubate    • Hyperlipidemia    • Hypertension    • IBS (irritable bowel syndrome)    • Nausea    • Sleep apnea      Past Surgical History:   Procedure Laterality Date   • ABDOMINAL SURGERY     • CARDIAC CATHETERIZATION N/A 2018    Procedure: Left Heart Cath 2018 @ 9;00;  Surgeon: Kuldip Frank MD;  Location:  [facility] CATH INVASIVE LOCATION;  Service: Cardiology   • COLONOSCOPY N/A 11/19/2018    Procedure: COLONOSCOPY;  Surgeon: Bro Whitaker MD;  Location: [facility] ENDOSCOPY;  Service: Gastroenterology   • CYSTOSCOPY     • ENDOMETRIAL ABLATION  2015   • ENDOSCOPY N/A 11/19/2018    Procedure: ESOPHAGOGASTRODUODENOSCOPYwith dilation;  Surgeon: Bro Whitaker MD;  Location: [facility] ENDOSCOPY;  Service: Gastroenterology   • LAPAROSCOPIC TUBAL LIGATION  1995   • ND ERCP DX COLLECTION SPECIMEN BRUSHING/WASHING Left 7/31/2017    Procedure: ENDOSCOPIC RETROGRADE CHOLANGIOPANCREATOGRAPHY;  Surgeon: Samuel Redding DO;  Location: [facility] ENDOSCOPY;  Service: Gastroenterology   • ND LAP,CHOLECYSTECTOMY N/A 7/24/2017    Procedure: CHOLECYSTECTOMY LAPAROSCOPIC, also umbillical hernia repair;  Surgeon: Pk العلي MD;  Location: [facility] OR;  Service: General   • UPPER GASTROINTESTINAL ENDOSCOPY  11/19/2018          OT ASSESSMENT FLOWSHEET (last 12 hours)      Occupational Therapy Evaluation     Row Name 05/19/19 0953                   OT Evaluation Time/Intention    Subjective Information  no complaints  -RB        Document Type  evaluation  -RB        Mode of Treatment  individual therapy;occupational therapy  -RB        Patient Effort  excellent  -RB           General Information    Patient Profile Reviewed?  yes  -RB        Onset of Illness/Injury or Date of Surgery  05/17/19  -RB        Referring Physician  MAYDA GONZALES  -RB        Patient Observations  alert;cooperative;agree to therapy  -RB        General Observations of Patient  Supine in bed.  -RB        Prior Level of Function  independent:;gait;transfer;ADL's;home management;driving  -RB        Equipment Currently Used at Home  bipap/ cpap;shower chair  -RB        Existing Precautions/Restrictions  other (see comments) morbid obesity  -RB        Equipment Issued to Patient  gait belt  -RB        Risks Reviewed  patient:;LOB  -RB           Relationship/Environment     Primary Source of Support/Comfort  parent  -RB        Lives With  alone  -RB           Resource/Environmental Concerns    Current Living Arrangements  home/apartment/condo  -RB           Home Main Entrance    Number of Stairs, Main Entrance  one  -RB        Stair Railings, Main Entrance  none  -RB           Cognitive Assessment/Intervention- PT/OT    Orientation Status (Cognition)  oriented x 4  -RB        Follows Commands (Cognition)  follows multi-step commands  -RB           Bed Mobility Assessment/Treatment    Bed Mobility Assessment/Treatment  supine-sit;sit-supine  -RB        Supine-Sit [place] (Bed Mobility)  independent  -RB        Sit-Supine [place] (Bed Mobility)  independent  -RB           Functional Mobility    Functional Mobility- Ind. Level  independent  -RB           Transfer Assessment/Treatment    Transfer Assessment/Treatment  sit-stand transfer;stand-sit transfer;toilet transfer  -RB           Sit-Stand Transfer    Sit-Stand [place] (Transfers)  independent  -RB        Assistive Device (Sit-Stand Transfers)  other (see comments) no AD  -RB           Stand-Sit Transfer    Stand-Sit [place] (Transfers)  independent  -RB           Toilet Transfer    Type (Toilet Transfer)  sit-stand;stand-sit  -RB        [place] Level (Toilet Transfer)  independent  -RB           ADL Assessment/Intervention    BADL Assessment/Intervention  lower body dressing  -RB           Lower Body Dressing Assessment/Training    Lower Body Dressing [place] Level  lower body dressing skills;doff;don;socks;independent  -RB        Lower Body Dressing Position  edge of bed sitting  -RB           General ROM    GENERAL ROM COMMENTS  B UE AROM was WNL  -RB           MMT (Manual Muscle Testing)    General MMT Comments  5/5 for B UE strength.  -RB           Vision Assessment/Intervention    Visual Impairment/Limitations  corrective lenses full time  -RB           Pain Scale: Numbers Pre/Post-Treatment     Pain Scale: Numbers, Pretreatment  0/10 - no pain  -RB        Pain Scale: Numbers, Post-Treatment  0/10 - no pain  -RB        Pain Location  --  -RB           Plan of Care Review    Plan of Care Reviewed With  patient  -RB           Clinical Impression (OT)    Date of Referral to OT  05/17/19  -RB        OT Diagnosis  ? impaired mobility and ADLs.  -RB        Functional Level at Time of Evaluation (OT Eval)  Pt independent with LE dress, mobility and toilet transfer.  -RB        Criteria for Skilled Therapeutic Interventions Met (OT Eval)  no problems identified which require skilled intervention  -RB        Anticipated Discharge Disposition (OT)  home  -RB           Vital Signs    Pre Systolic BP Rehab  137  -RB        Pre Treatment Diastolic BP  88  -RB        Post Systolic BP Rehab  125  -RB        Post Treatment Diastolic BP  69  -RB        Pretreatment Heart Rate (beats/min)  70  -RB        Posttreatment Heart Rate (beats/min)  60  -RB        Pre SpO2 (%)  95  -RB        O2 Delivery Pre Treatment  room air  -RB        Pre Patient Position  Supine  -RB        Intra Patient Position  Standing  -RB        Post Patient Position  Supine  -RB           Living Environment    Home Accessibility  stairs to enter home  -RB          User Key  (r) = Recorded By, (t) = Taken By, (c) = Cosigned By    Initials Name Effective Dates    RB Shyam Polk OT 06/15/16 -           Occupational Therapy Education     Title: PT OT SLP Therapies (In Progress)     Topic: Occupational Therapy (In Progress)     Point: Precautions (Resolved)     Description: Instruct learner(s) on prescribed precautions during self-care and functional transfers.    Learning Progress Summary           Patient Acceptance, E, VU by RB at 5/19/2019 11:48 AM    Comment:  Edu pt on use of non skid socks when OOB.                               User Key     Initials Effective Dates Name Provider Type Discipline     06/15/16 -  Shyam Polk OT Occupational  Therapist OT                OT Recommendation and Plan  Outcome Summary/Treatment Plan (OT)  Anticipated Discharge Disposition (OT): home  Plan of Care Review  Plan of Care Reviewed With: patient  Plan of Care Reviewed With: patient  Outcome Summary: OT garcia on this date.  Pt demo independence with all functional mobility, toilet transfer and LE dress.  No problem identified which require skilled OT intervention.  Will complete OT order.         Outcome Measures     Row Name 05/19/19 0953 05/18/19 1045          How much help from another person do you currently need...    Turning from your back to your side while in flat bed without using bedrails?  --  4  -BS     Moving from lying on back to sitting on the side of a flat bed without bedrails?  --  4  -BS     Moving to and from a bed to a chair (including a wheelchair)?  --  4  -BS     Standing up from a chair using your arms (e.g., wheelchair, bedside chair)?  --  4  -BS     Climbing 3-5 steps with a railing?  --  4  -BS     To walk in hospital room?  --  4  -BS     AM-PAC 6 Clicks Score  --  24  -BS        How much help from another is currently needed...    Putting on and taking off regular lower body clothing?  4  -RB  --     Bathing (including washing, rinsing, and drying)  4  -RB  --     Toileting (which includes using toilet bed pan or urinal)  4  -RB  --     Putting on and taking off regular upper body clothing  4  -RB  --     Taking care of personal grooming (such as brushing teeth)  4  -RB  --     Eating meals  4  -RB  --     Score  24  -RB  --        Functional Assessment    Outcome Measure Options  AM-PAC 6 Clicks Daily Activity (OT)  -RB  AM-PAC 6 Clicks Basic Mobility (PT)  -BS       User Key  (r) = Recorded By, (t) = Taken By, (c) = Cosigned By    Initials Name Provider Type    RB Shyam Polk, OT Occupational Therapist    Demetrio Ely, PT Physical Therapist          Time Calculation:   Time Calculation- OT     Row Name 05/19/19 9476              Time Calculation- OT    OT Start Time  0953  -RB      OT Stop Time  1010  -RB      OT Time Calculation (min)  17 min  -RB      OT Received On  05/19/19  -        User Key  (r) = Recorded By, (t) = Taken By, (c) = Cosigned By    Initials Name Provider Type    Shyam Hyman OT Occupational Therapist        Therapy Suggested Charges     Code   Minutes Charges    None           Therapy Charges for Today     Code Description Service Date Service Provider Modifiers Qty    [id number]  OT EVAL LOW COMPLEXITY 1 5/19/2019 Shyam Polk OT GO 1               OT Discharge Summary  Anticipated Discharge Disposition (OT): home  Reason for Discharge: Independent, Patient/Caregiver request(Pt demo independence with LE dress, ambulation and toilet transfer.  No problem identified which require skilled OT intervention.)    Shyam Polk OT  5/19/2019

## 2019-05-20 ENCOUNTER — READMISSION MANAGEMENT (OUTPATIENT)
Dept: CALL CENTER | Facility: HOSPITAL | Age: 49
End: 2019-05-20

## 2019-05-20 ENCOUNTER — TRANSCRIBE ORDERS (OUTPATIENT)
Dept: LAB | Facility: HOSPITAL | Age: 49
End: 2019-05-20

## 2019-05-20 DIAGNOSIS — N17.9 ACUTE NONTRAUMATIC KIDNEY INJURY (HCC): Primary | ICD-10-CM

## 2019-05-20 NOTE — OUTREACH NOTE
Prep Survey      Responses   Facility patient discharged from?  [place]   Is patient eligible?  Yes   Discharge diagnosis  Acute kidney injury    Does the patient have one of the following disease processes/diagnoses(primary or secondary)?  Other   Does the patient have Home health ordered?  No   What is the Home health agency?   declined transition visit    Is there a DME ordered?  No   Prep survey completed?  Yes          Ailin Walton RN

## 2019-05-20 NOTE — PAYOR COMM NOTE
"Raheem Ferris (48 y.o. Female)     Date of Birth Social Security Number Address Home Phone MRN    1970  [address] [place] [id number] [phone] [id number]    [place] Marital Status          Religious        Admission Date Admission Type Admitting Provider Attending Provider Department, Room/Bed    5/14/19 Emergency Chito Melvin MD  [address], 423/1    Discharge Date Discharge Disposition Discharge Destination        5/19/2019 Home or Self Care              Attending Provider:  (none)   Allergies:  Sulfa Antibiotics    Isolation:  None   Infection:  None   Code Status:  Prior    Ht:  165.1 cm (65\")   Wt:  141 kg (309 lb 14.4 oz)    Admission Cmt:  None   Principal Problem:  Acute kidney injury (CMS/HCC) [N17.9]                 Active Insurance as of 5/14/2019     Primary Coverage     Payor Plan Insurance Group Employer/Plan Group    [employer] [place] [employer] [place]      Payor Plan Address Payor Plan Phone Number Payor Plan Fax Number Effective Dates    [address] [address] [phone]  3/3/2017 - None Entered    [facility] [id number]       Subscriber Name Subscriber Birth Date Member ID       RAHEEM FERRIS 1970 [id number]                 Emergency Contacts      (Rel.) Home Phone Work Phone Mobile Phone    Joyce Bolton (Mother) [phone] -- [phone]                                                      Ofe Jay  [place]  (P) [phone]  (F) [phone]        ID# [id number]  Discharged 5/19 to home      Please fax back with case determination.                                         Discharge Summary      Unruly Cuadra PA at 5/19/2019 10:35 AM     Attestation signed by Nestor Villaseñor MD at 5/19/2019 11:23 AM    I have discussed the case with JANET Lara and reviewed the note. I agree with the clinical decision as outlined in this note.      Nestor Villaseñor MD  05/19/19  11:22 " AM                              [facility] Medicine Services  DISCHARGE SUMMARY       Date of Admission: 5/14/2019  Date of Discharge:  5/19/2019  Primary Care Physician: Alethea Ash MD    Presenting Problem/History of Present Illness:  Hyperkalemia [E87.5]  DYAN (acute kidney injury) (CMS/HCC) [N17.9]  Acute kidney injury (CMS/HCC) [N17.9]  DYAN (acute kidney injury) (CMS/HCC) [N17.9]     Final Discharge Diagnoses:  Active Hospital Problems    Diagnosis   • **Acute kidney injury (CMS/HCC)   • DYAN (acute kidney injury) (CMS/[facility])   • Hypertension   • Hyperlipidemia   • Hyperkalemia   • ELSIE on CPAP   • Gastroesophageal reflux disease with esophagitis     Added automatically from request for surgery [id number]     • Restrictive lung disease secondary to obesity   • Morbid obesity with BMI of 50.0-59.9, adult (CMS/[facility])       Consults:   Consults     Date and Time Order Name Status Description    5/15/2019 0936 Nephrology - FRANSISCO (on-call MD from [facility] unless specified) Completed     5/14/2019 1849 Hospitalist (on-call MD unless specified)          Pertinent Test Results:   Lab Results (most recent)     Procedure Component Value Units Date/Time    Basic Metabolic Panel [[id number]]  (Abnormal) Collected:  05/19/19 0603    Specimen:  Blood Updated:  05/19/19 0719     Glucose 99 mg/dL      BUN 21 mg/dL      Creatinine 1.49 mg/dL      Sodium 140 mmol/L      Potassium 4.4 mmol/L      Chloride 105 mmol/L      CO2 23.0 mmol/L      Calcium 9.0 mg/dL      eGFR Non African Amer 37 mL/min/1.73      BUN/Creatinine Ratio 14.1     Anion Gap 12.0 mmol/L     Narrative:       GFR Normal >60  Chronic Kidney Disease <60  Kidney Failure <15    CBC & Differential [[id number]] Collected:  05/19/19 0603    Specimen:  Blood Updated:  05/19/19 0644    Narrative:       The following orders were created for panel order CBC & Differential.  Procedure                               Abnormality         Status                      ---------                               -----------         ------                     CBC Auto Differential[[id number]]        Abnormal            Final result                 Please view results for these tests on the individual orders.    CBC Auto Differential [[id number]]  (Abnormal) Collected:  05/19/19 0603    Specimen:  Blood Updated:  05/19/19 0644     WBC 7.86 10*3/mm3      RBC 3.67 10*6/mm3      Hemoglobin 9.8 g/dL      Hematocrit 30.5 %      MCV 83.1 fL      MCH 26.7 pg      MCHC 32.1 g/dL      RDW 14.2 %      RDW-SD 42.9 fl      MPV 11.5 fL      Platelets 201 10*3/mm3      Neutrophil % 54.4 %      Lymphocyte % 36.5 %      Monocyte % 6.6 %      Eosinophil % 1.9 %      Basophil % 0.3 %      Immature Grans % 0.3 %      Neutrophils, Absolute 4.28 10*3/mm3      Lymphocytes, Absolute 2.87 10*3/mm3      Monocytes, Absolute 0.52 10*3/mm3      Eosinophils, Absolute 0.15 10*3/mm3      Basophils, Absolute 0.02 10*3/mm3      Immature Grans, Absolute 0.02 10*3/mm3      nRBC 0.0 /100 WBC     Basic Metabolic Panel [[id number]]  (Abnormal) Collected:  05/18/19 0526    Specimen:  Blood Updated:  05/18/19 0653     Glucose 93 mg/dL      BUN 28 mg/dL      Creatinine 1.50 mg/dL      Sodium 140 mmol/L      Potassium 4.2 mmol/L      Chloride 106 mmol/L      CO2 22.0 mmol/L      Calcium 9.1 mg/dL      eGFR Non African Amer 37 mL/min/1.73      BUN/Creatinine Ratio 18.7     Anion Gap 12.0 mmol/L     Narrative:       GFR Normal >60  Chronic Kidney Disease <60  Kidney Failure <15    CBC & Differential [[id number]] Collected:  05/18/19 0526    Specimen:  Blood Updated:  05/18/19 0634    Narrative:       The following orders were created for panel order CBC & Differential.  Procedure                               Abnormality         Status                     ---------                               -----------         ------                     CBC Auto Differential[[id number]]        Abnormal            Final result                  Please view results for these tests on the individual orders.    CBC Auto Differential [[id number]]  (Abnormal) Collected:  05/18/19 0526    Specimen:  Blood Updated:  05/18/19 0634     WBC 7.07 10*3/mm3      RBC 3.57 10*6/mm3      Hemoglobin 9.7 g/dL      Hematocrit 29.8 %      MCV 83.5 fL      MCH 27.2 pg      MCHC 32.6 g/dL      RDW 14.2 %      RDW-SD 43.6 fl      MPV 11.3 fL      Platelets 204 10*3/mm3      Neutrophil % 46.2 %      Lymphocyte % 45.0 %      Monocyte % 6.1 %      Eosinophil % 2.0 %      Basophil % 0.4 %      Immature Grans % 0.3 %      Neutrophils, Absolute 3.27 10*3/mm3      Lymphocytes, Absolute 3.18 10*3/mm3      Monocytes, Absolute 0.43 10*3/mm3      Eosinophils, Absolute 0.14 10*3/mm3      Basophils, Absolute 0.03 10*3/mm3      Immature Grans, Absolute 0.02 10*3/mm3      nRBC 0.0 /100 WBC     Scan Slide [[id number]] Collected:  05/17/19 0629    Specimen:  Blood Updated:  05/17/19 1034     Anisocytosis Slight/1+     Crenated RBC's Slight/1+     Target Cells --     Comment: Rare target cells observed        WBC Morphology Normal     Platelet Estimate Adequate     Clumped Platelets Present     Large Platelets Slight/1+    Urine Eosinophils, Dario's Stain - Urine, Clean Catch [[id number]]  (Normal) Collected:  05/15/19 1421    Specimen:  Urine, Clean Catch Updated:  05/16/19 1224     Dario Stain Negative    Extra Tubes [[id number]] Collected:  05/16/19 0600    Specimen:  Blood, Venous Line Updated:  05/16/19 0700    Narrative:       The following orders were created for panel order Extra Tubes.  Procedure                               Abnormality         Status                     ---------                               -----------         ------                     Red Top[[id number]]                                          Final result                 Please view results for these tests on the individual orders.    Red Top [[id number]] Collected:  05/16/19 0600    Specimen:  Blood  Updated:  05/16/19 0700     Extra Tube Hold for add-ons.     Comment: Auto resulted.       Urine Eosinophils, Dario's Stain - Urine, Clean Catch [[id number]]  (Normal) Collected:  05/14/19 2059    Specimen:  Urine, Clean Catch Updated:  05/15/19 1149     Dario Stain Negative    POC Glucose Once [[id number]]  (Normal) Collected:  05/14/19 2236    Specimen:  Blood Updated:  05/14/19 2310     Glucose 104 mg/dL      Comment: : [id number] NORAH MURPHYEENMeter ID: [id number]       Osmolality, Urine - Urine, Clean Catch [[id number]]  (Normal) Collected:  05/14/19 2059    Specimen:  Urine, Clean Catch Updated:  05/14/19 2132     Osmolality, Urine 267 mOsm/kg     Urinalysis With Culture If Indicated - Urine, Clean Catch [[id number]]  (Abnormal) Collected:  05/14/19 2059    Specimen:  Urine, Clean Catch Updated:  05/14/19 2131     Color, UA Yellow     Appearance, UA Clear     pH, UA <=5.0     Specific Gravity, UA 1.010     Glucose,  mg/dL (Trace)     Ketones, UA Negative     Bilirubin, UA Negative     Blood, UA Trace     Protein, UA Negative     Leuk Esterase, UA Negative     Nitrite, UA Negative     Urobilinogen, UA 0.2 E.U./dL    Urinalysis, Microscopic Only - Urine, Clean Catch [[id number]]  (Abnormal) Collected:  05/14/19 2059    Specimen:  Urine, Clean Catch Updated:  05/14/19 2131     RBC, UA None Seen /HPF      WBC, UA 3-5 /HPF      Bacteria, UA 1+ /HPF      Squamous Epithelial Cells, UA 3-5 /HPF      Hyaline Casts, UA None Seen /LPF      Granular Casts, UA 0-2 /LPF      Methodology Manual Light Microscopy    Sodium, Urine, Random - Urine, Clean Catch [[id number]] Collected:  05/14/19 2059    Specimen:  Urine, Clean Catch Updated:  05/14/19 2115     Sodium, Urine 56 mmol/L     Narrative:       Reference intervals for random urine have not been established.  Clinical usage is dependent upon physician's interpretation in combination with other laboratory tests.     [place] Draw [[id number]] Collected:   05/14/19 1735    Specimen:  Blood Updated:  05/14/19 1846    Narrative:       The following orders were created for panel order [place] Draw.  Procedure                               Abnormality         Status                     ---------                               -----------         ------                     Light Blue Top[[id number]]                                   Final result               Green Top (Gel)[[id number]]                                  Final result               Lavender Top[[id number]]                                     Final result               Gold Top - SST[[id number]]                                   Final result                 Please view results for these tests on the individual orders.    Light Blue Top [[id number]] Collected:  05/14/19 1735    Specimen:  Blood Updated:  05/14/19 1846     Extra Tube hold for add-on     Comment: Auto resulted       Green Top (Gel) [[id number]] Collected:  05/14/19 1735    Specimen:  Blood Updated:  05/14/19 1846     Extra Tube Hold for add-ons.     Comment: Auto resulted.       Lavender Top [[id number]] Collected:  05/14/19 1735    Specimen:  Blood Updated:  05/14/19 1846     Extra Tube hold for add-on     Comment: Auto resulted       Gold Top - SST [[id number]] Collected:  05/14/19 1735    Specimen:  Blood Updated:  05/14/19 1846     Extra Tube Hold for add-ons.     Comment: Auto resulted.       Comprehensive Metabolic Panel [[id number]]  (Abnormal) Collected:  05/14/19 1735    Specimen:  Blood Updated:  05/14/19 1807     Glucose 77 mg/dL       mg/dL      Creatinine 6.05 mg/dL      Sodium 133 mmol/L      Potassium 5.6 mmol/L      Chloride 92 mmol/L      CO2 20.0 mmol/L      Calcium 10.3 mg/dL      Total Protein 8.1 g/dL      Albumin 4.30 g/dL      ALT (SGPT) 26 U/L      AST (SGOT) 40 U/L      Alkaline Phosphatase 36 U/L      Total Bilirubin 0.3 mg/dL      eGFR Non African Amer 7 mL/min/1.73      Comment: <15 Indicative of kidney failure.         eGFR   Amer -- mL/min/1.73      Comment: <15 Indicative of kidney failure.        Globulin 3.8 gm/dL      A/G Ratio 1.1 g/dL      BUN/Creatinine Ratio 17.2     Anion Gap 21.0 mmol/L     Narrative:       GFR Normal >60  Chronic Kidney Disease <60  Kidney Failure <15    Urinalysis, Microscopic Only - Urine, Clean Catch [[id number]]  (Abnormal) Collected:  05/14/19 1736    Specimen:  Urine, Clean Catch Updated:  05/14/19 1750     RBC, UA 3-5 /HPF      WBC, UA 6-12 /HPF      Bacteria, UA None Seen /HPF      Squamous Epithelial Cells, UA 6-12 /HPF      Hyaline Casts, UA 0-2 /LPF      Methodology Automated Microscopy    Urinalysis With Microscopic If Indicated (No Culture) - Urine, Clean Catch [[id number]]  (Abnormal) Collected:  05/14/19 1736    Specimen:  Urine, Clean Catch Updated:  05/14/19 1748     Color, UA Yellow     Appearance, UA Cloudy     pH, UA <=5.0     Specific Gravity, UA 1.011     Glucose,  mg/dL (Trace)     Ketones, UA Negative     Bilirubin, UA Negative     Blood, UA Trace     Protein, UA Negative     Leuk Esterase, UA Small (1+)     Nitrite, UA Negative     Urobilinogen, UA 0.2 E.U./dL        Imaging Results (most recent)     Procedure Component Value Units Date/Time    US Renal Bilateral [[id number]] Collected:  05/15/19 0840     Updated:  05/15/19 0931    Narrative:       Ultrasound renal complete    HISTORY:  Acute kidney injury. Acute kidney failure.  Hyperkalemia.    Ultrasound examination of the kidneys and urinary bladder was  performed.    COMPARISON: None. Correlation CT September 18, 2018.    The right kidney measures 11.51 cm in length by 6.26 cm x 5.43 cm  transverse.  The left kidney measures 7.24 cm in length by 5.59 cm x 4.81 cm  transverse.  The kidneys are of normal echotexture.  No hydronephrosis.  No solid or cystic renal mass.    Images of the urinary bladder are unremarkable.      Impression:       CONCLUSION:  Normal study.    [id number]    Electronically signed by:  Christiano  "Fernie KHAN  5/15/2019 9:30 AM CDT  Workstation: [id number]        Hospital Course:  The patient is a 48 y.o. female who presented to [facility] with elevated creatinine and hyperkalemia.  Potassium was 5.6 and creatinine was 6.05.  Patient had been on a high-protein diet preparing for weight loss surgery.  Creatinine has now improved to 1.49 even after discontinuation of fluids.  She did experience some hypotension while here and his hypotension has resolved after discontinuation of all hypotension inducing medications.  Patient has had no issues with pain after discontinuation of her tramadol and methocarbamol.  Patient was cleared for discharge by nephrology for outpatient BMP and follow-up in their clinic.  She will also be referred her primary care provider to discuss medication changes.  Patient states she felt well and safe and ready to go home.  She had no acute complaints.  She was instructed to return with any concerning or worsening symptoms including dizziness, syncope, weakness, decreased urine output, fever, chills, any other worsening or concerning symptoms.    Condition on Discharge: Stable, improved    Physical Exam on Discharge:  /58 (BP Location: Right arm, Patient Position: Lying)   Pulse 66   Temp 96.6 °F (35.9 °C) (Oral)   Resp 18   Ht 165.1 cm (65\")   Wt (!) 141 kg (309 lb 14.4 oz)   SpO2 94%   BMI 51.57 kg/m²    Physical Exam   Constitutional: She is oriented to person, place, and time. She appears well-developed and well-nourished.   HENT:   Head: Normocephalic and atraumatic.   Cardiovascular: Normal rate and regular rhythm.   Pulmonary/Chest: Effort normal and breath sounds normal. No stridor. No respiratory distress.   Abdominal: Soft. Bowel sounds are normal. She exhibits no distension. There is no tenderness.   Musculoskeletal: She exhibits edema.   Neurological: She is alert and oriented to person, place, and time.   Skin: Skin is warm and dry. Capillary " refill takes less than 2 seconds.   Psychiatric: She has a normal mood and affect. Her behavior is normal.     Discharge Disposition:  Home or Self Care    Discharge Medications:     Discharge Medications      Continue These Medications      Instructions Start Date   albuterol sulfate  (90 Base) MCG/ACT inhaler  Commonly known as:  PROVENTIL HFA;VENTOLIN HFA;PROAIR HFA   2 puffs, Inhalation, Every 4 Hours PRN      buPROPion  MG 12 hr tablet  Commonly known as:  WELLBUTRIN SR   150 mg, Oral, 2 Times Daily      CALCIUM 1000 + D 1000-800 MG-UNIT tablet  Generic drug:  Calcium Carb-Cholecalciferol   1 tablet, Oral, Daily      dicyclomine 10 MG capsule  Commonly known as:  BENTYL   10 mg, Oral, 3 Times Daily      fenofibrate 145 MG tablet  Commonly known as:  TRICOR   145 mg, Oral, Daily      Fluticasone Furoate 200 MCG/ACT aerosol powder    1 puff, Inhalation, Daily, Rinse mouth well after use      oxybutynin 5 MG tablet  Commonly known as:  DITROPAN   5 mg, Oral, 2 Times Daily      pantoprazole 40 MG EC tablet  Commonly known as:  PROTONIX   40 mg, Oral, Daily      rOPINIRole 0.25 MG tablet  Commonly known as:  REQUIP   0.25 mg, Oral, Nightly      ZOLOFT PO   150 mg, Oral, Daily         Stop These Medications    furosemide 40 MG tablet  Commonly known as:  LASIX     gabapentin 100 MG capsule  Commonly known as:  NEURONTIN     isosorbide mononitrate 30 MG 24 hr tablet  Commonly known as:  IMDUR     lisinopril 20 MG tablet  Commonly known as:  PRINIVIL,ZESTRIL     methocarbamol 500 MG tablet  Commonly known as:  ROBAXIN     spironolactone 25 MG tablet  Commonly known as:  ALDACTONE     traMADol 50 MG tablet  Commonly known as:  ULTRAM          Update:  Lasix 40 mg daily continued per nephrology.    Discharge Diet:   Diet Instructions     Diet: Renal      Discharge Diet:  Renal          Activity at Discharge:   Activity Instructions     Activity as Tolerated            Discharge Care Plan/Instructions:  Follow-up with PCP, follow-up with nephrology, return with any concerning worsening symptoms.        This document has been electronically signed by JANET Nathan on May 19, 2019 10:39 AM        Time: Please note the greater than 30 minutes was spent on the discharge planning and summary this patient.              Electronically signed by Nestor Villaseñor MD at 5/19/2019 11:23 AM

## 2019-05-21 ENCOUNTER — READMISSION MANAGEMENT (OUTPATIENT)
Dept: CALL CENTER | Facility: HOSPITAL | Age: 49
End: 2019-05-21

## 2019-05-21 NOTE — OUTREACH NOTE
Medical Week 1 Survey      Responses   Facility patient discharged from?  [facility]   Does the patient have one of the following disease processes/diagnoses(primary or secondary)?  Other   Is there a successful TCM telephone encounter documented?  No   Week 1 attempt successful?  No   Unsuccessful attempts  Attempt 1          Natasha Duvall RN

## 2019-05-22 ENCOUNTER — LAB (OUTPATIENT)
Dept: LAB | Facility: HOSPITAL | Age: 49
End: 2019-05-22

## 2019-05-22 ENCOUNTER — READMISSION MANAGEMENT (OUTPATIENT)
Dept: CALL CENTER | Facility: HOSPITAL | Age: 49
End: 2019-05-22

## 2019-05-22 DIAGNOSIS — N17.9 ACUTE KIDNEY INJURY (HCC): ICD-10-CM

## 2019-05-22 DIAGNOSIS — N17.9 AKI (ACUTE KIDNEY INJURY) (HCC): ICD-10-CM

## 2019-05-22 DIAGNOSIS — E66.01 MORBID OBESITY WITH BMI OF 50.0-59.9, ADULT (HCC): ICD-10-CM

## 2019-05-22 LAB
ANION GAP SERPL CALCULATED.3IONS-SCNC: 16 MMOL/L
BUN BLD-MCNC: 20 MG/DL (ref 6–20)
BUN/CREAT SERPL: 15.6 (ref 7–25)
CALCIUM SPEC-SCNC: 9.5 MG/DL (ref 8.6–10.5)
CHLORIDE SERPL-SCNC: 104 MMOL/L (ref 98–107)
CO2 SERPL-SCNC: 20 MMOL/L (ref 22–29)
CREAT BLD-MCNC: 1.28 MG/DL (ref 0.57–1)
GFR SERPL CREATININE-BSD FRML MDRD: 45 ML/MIN/1.73
GLUCOSE BLD-MCNC: 99 MG/DL (ref 65–99)
POTASSIUM BLD-SCNC: 3.9 MMOL/L (ref 3.5–5.2)
SODIUM BLD-SCNC: 140 MMOL/L (ref 136–145)

## 2019-05-22 PROCEDURE — 80048 BASIC METABOLIC PNL TOTAL CA: CPT

## 2019-05-22 PROCEDURE — 36415 COLL VENOUS BLD VENIPUNCTURE: CPT

## 2019-05-22 NOTE — OUTREACH NOTE
Medical Week 1 Survey      Responses   Facility patient discharged from?  [facility]   Does the patient have one of the following disease processes/diagnoses(primary or secondary)?  Other   Is there a successful TCM telephone encounter documented?  No   Week 1 attempt successful?  No   Unsuccessful attempts  Attempt 2          Camille Arnett RN

## 2019-05-28 ENCOUNTER — READMISSION MANAGEMENT (OUTPATIENT)
Dept: CALL CENTER | Facility: HOSPITAL | Age: 49
End: 2019-05-28

## 2019-05-28 NOTE — OUTREACH NOTE
Medical Week 2 Survey      Responses   Facility patient discharged from?  [facility]   Does the patient have one of the following disease processes/diagnoses(primary or secondary)?  Other   Week 2 attempt successful?  Yes   Call start time  1145   Discharge diagnosis  Acute kidney injury    Call end time  1152   Is patient permission given to speak with other caregiver?  Yes   List who call center can speak with  mother   Meds reviewed with patient/caregiver?  Yes   Is the patient having any side effects they believe may be caused by any medication additions or changes?  No   Does the patient have all medications ordered at discharge?  Yes   Is the patient taking all medications as directed (includes completed medication regime)?  Yes   Comments regarding appointments  Pt has seen pcp and will hopefully be scheduled for weight loss surgery on Monday.   Does the patient have a primary care provider?   Yes   Has the patient kept scheduled appointments due by today?  Yes   Psychosocial issues?  No   Did the patient receive a copy of their discharge instructions?  Yes   What is the patient's perception of their health status since discharge?  Improving   Is the patient/caregiver able to teach back signs and symptoms related to disease process for when to call PCP?  Yes   Is the patient/caregiver able to teach back signs and symptoms related to disease process for when to call 911?  Yes   Is the patient/caregiver able to teach back the hierarchy of who to call/visit for symptoms/problems? PCP, Specialist, Home health nurse, Urgent Care, ED, 911  Yes   Week 2 Call Completed?  Yes          Rosa Knox RN

## 2019-05-30 NOTE — PROGRESS NOTES
Pulmonary Office Follow-up    Maricarmen Ferris is seen in the office today for   Chief Complaint   Patient presents with   • Asthma   .    Subjective     History of Present Illness  Maricarmen Ferris is a 48 y.o. female with a PMH significant for morbid obesity, ELSIE on CPAP, HTN, GERD, and depression who presents for evaluation of dyspnea.  She was last seen on 1/30/19, at which time she was doing better on the increased ICS but her insurance would not cover Asmanex so I recommended changing to Flovent discus BID.  Unfortunately, we did have to make another change after her appointment and her maintenance was changed to Arnuity 200 daily.  She states that she is doing well on Arnuity though when she is exercising she will sometimes have to stop and use her albuterol.  Patient reports she is been able to lose 16 pounds and is scheduled to undergo gastric sleeve surgery next Monday.  She does report she required a hospitalization for acute renal failure which is attributed to diuretic use in the setting of high-protein shakes.  Her most recent creatinine had come down to 1.2.  She denies cough, sputum, wheeze, or chest pain.  Patient has not had any issues with leg swelling.  She continues to do well with her CPAP at night.      Review of Systems: History obtained from chart review and the patient.  Review of Systems   Constitutional: Negative for fever and unexpected weight change.   HENT: Negative for rhinorrhea.    Respiratory: Positive for shortness of breath. Negative for cough, chest tightness and wheezing.    Cardiovascular: Negative for chest pain and leg swelling.   Gastrointestinal: Negative for abdominal pain.     As described in the HPI. Otherwise, remainder of ROS (14 systems) were negative.    Patient Active Problem List   Diagnosis   • Chronic cholecystitis   • Umbilical hernia without obstruction and without gangrene   • Abdominal pain   • Abdominal fluid collection   • Intra-hepatic bile  "leak   • Abnormal cardiovascular function study   • Chest pain   • Morbid obesity with BMI of 50.0-59.9, adult (CMS/HCC)   • Restrictive lung disease secondary to obesity   • Nausea   • Weight gain, abnormal   • Pain of upper abdomen   • Gastroesophageal reflux disease with esophagitis   • Irritable bowel syndrome with both constipation and diarrhea   • Dysphagia   • Mild persistent asthma without complication   • ELSIE on CPAP   • Personal history of tobacco use, presenting hazards to health   • Acute kidney injury (CMS/HCC)   • DYAN (acute kidney injury) (CMS/[facility])   • Hypertension   • Hyperlipidemia   • Hyperkalemia       Medications, Allergies, Social, and Family Histories reviewed as per EMR.         Objective     Blood pressure 157/72, pulse 82, height 165.1 cm (65\"), weight 133 kg (293 lb), SpO2 96 %, not currently breastfeeding.  Physical Exam   Constitutional: She is oriented to person, place, and time. Vital signs are normal. She appears well-developed and well-nourished.   Morbidly obese   HENT:   Head: Normocephalic and atraumatic.   Nose: Nose normal.   Mouth/Throat: Uvula is midline, oropharynx is clear and moist and mucous membranes are normal.   Mallampati 4   Eyes: Conjunctivae, EOM and lids are normal. Pupils are equal, round, and reactive to light.   Neck: Trachea normal and normal range of motion. No tracheal tenderness present. No thyroid mass present.   Cardiovascular: Normal rate, regular rhythm and S1 normal. PMI is not displaced. Exam reveals distant heart sounds. Exam reveals no gallop.   No murmur heard.  Pulmonary/Chest: Effort normal and breath sounds normal. No respiratory distress. She has no decreased breath sounds. She has no wheezes. She has no rhonchi. Chest wall is not dull to percussion. She exhibits no tenderness.   Abdominal: Soft. Normal appearance and bowel sounds are normal. There is no hepatomegaly. There is no tenderness.   Obese   Musculoskeletal:   Normal gait, no extermity " edema     Vascular Status -  Her right foot exhibits no edema. Her left foot exhibits no edema.  Lymphadenopathy:        Head (right side): No submandibular adenopathy present.        Head (left side): No submandibular adenopathy present.     She has no cervical adenopathy.        Right: No supraclavicular adenopathy present.        Left: No supraclavicular adenopathy present.   Neurological: She is alert and oriented to person, place, and time.   Skin: Skin is warm and dry. No rash noted. No cyanosis. Nails show no clubbing.   Psychiatric: She has a normal mood and affect. Her speech is normal and behavior is normal. Judgment normal.   Nursing note and vitals reviewed.      PFTs: 10/8/18 (independently reviewed and interpreted by me)  Ratio 85  FVC 2.5/ 67%  FEV1 2.12/ 71%  TLC 3.59/ 69%  DLCO 18.64/ 72%  Poor effort.  Mild restriction with no significant bronchodilator response.  Mildly reduced diffusing capacity, likely due to obesity.  No comparative data available.       Assessment/Plan     Maricarmen was seen today for asthma.    Diagnoses and all orders for this visit:    Mild persistent asthma without complication    Restrictive lung disease secondary to obesity    ELSIE on CPAP    Morbid obesity with BMI of 50.0-59.9, adult (CMS/[facility])    Personal history of tobacco use, presenting hazards to health         Discussion/ Recommendations:   She is stable from a asthma standpoint and continues to do well on her CPAP.  At this time, I think she is stable to undergo planned gastric sleeve surgery next week but I did make recommendations for her to use her Arnuity while the hospital and ensure she takes her CPAP with her.    -Continue Arnuity 200 daily.  -Use albuterol as needed for dyspnea or wheeze.  -Continue CPAP with sleep.  -Encouraged ongoing efforts at weight loss through diet and exercise even after gastric sleeve surgery.  -Annual flu vaccine.    Patient's Body mass index is 48.76 kg/m². BMI is above normal  parameters. Recommendations include: exercise counseling.         Return in about 3 months (around 8/31/2019) for Recheck asthma.      Thank you for allowing me to participate in the care of Maricarmen Ferris. Please do not hesitate to contact me with any questions.         This document has been electronically signed by Rachel Enciso MD on May 31, 2019 2:27 PM      Dictated using Dragon

## 2019-05-31 ENCOUNTER — OFFICE VISIT (OUTPATIENT)
Dept: PULMONOLOGY | Facility: CLINIC | Age: 49
End: 2019-05-31

## 2019-05-31 VITALS
HEART RATE: 82 BPM | WEIGHT: 293 LBS | OXYGEN SATURATION: 96 % | SYSTOLIC BLOOD PRESSURE: 157 MMHG | BODY MASS INDEX: 48.82 KG/M2 | DIASTOLIC BLOOD PRESSURE: 72 MMHG | HEIGHT: 65 IN

## 2019-05-31 DIAGNOSIS — E66.9 RESTRICTIVE LUNG DISEASE SECONDARY TO OBESITY: ICD-10-CM

## 2019-05-31 DIAGNOSIS — Z87.891 PERSONAL HISTORY OF TOBACCO USE, PRESENTING HAZARDS TO HEALTH: ICD-10-CM

## 2019-05-31 DIAGNOSIS — J45.30 MILD PERSISTENT ASTHMA WITHOUT COMPLICATION: Primary | ICD-10-CM

## 2019-05-31 DIAGNOSIS — Z99.89 OSA ON CPAP: ICD-10-CM

## 2019-05-31 DIAGNOSIS — E66.01 MORBID OBESITY WITH BMI OF 50.0-59.9, ADULT (HCC): ICD-10-CM

## 2019-05-31 DIAGNOSIS — J98.4 RESTRICTIVE LUNG DISEASE SECONDARY TO OBESITY: ICD-10-CM

## 2019-05-31 DIAGNOSIS — G47.33 OSA ON CPAP: ICD-10-CM

## 2019-05-31 PROCEDURE — 99214 OFFICE O/P EST MOD 30 MIN: CPT | Performed by: INTERNAL MEDICINE

## 2019-05-31 RX ORDER — TRAMADOL HYDROCHLORIDE 50 MG/1
50 TABLET ORAL EVERY 8 HOURS PRN
COMMUNITY
End: 2021-06-30

## 2019-05-31 RX ORDER — GABAPENTIN 100 MG/1
600 CAPSULE ORAL 3 TIMES DAILY
COMMUNITY
End: 2021-12-16 | Stop reason: CLARIF

## 2019-06-05 ENCOUNTER — READMISSION MANAGEMENT (OUTPATIENT)
Dept: CALL CENTER | Facility: HOSPITAL | Age: 49
End: 2019-06-05

## 2019-06-05 NOTE — OUTREACH NOTE
Medical Week 3 Survey      Responses   Facility patient discharged from?  [facility]   Does the patient have one of the following disease processes/diagnoses(primary or secondary)?  Other   Week 3 attempt successful?  No   Unsuccessful attempts  Attempt 1          Loren Man RN

## 2019-06-22 ENCOUNTER — HOSPITAL ENCOUNTER (EMERGENCY)
Facility: HOSPITAL | Age: 49
Discharge: HOME | End: 2019-06-22
Attending: EMERGENCY MEDICINE | Admitting: EMERGENCY MEDICINE

## 2019-06-22 VITALS
DIASTOLIC BLOOD PRESSURE: 85 MMHG | SYSTOLIC BLOOD PRESSURE: 135 MMHG | BODY MASS INDEX: 46.65 KG/M2 | HEIGHT: 65 IN | RESPIRATION RATE: 16 BRPM | HEART RATE: 66 BPM | OXYGEN SATURATION: 98 % | WEIGHT: 280 LBS | TEMPERATURE: 97.7 F

## 2019-06-22 DIAGNOSIS — N39.0 URINARY TRACT INFECTION WITHOUT HEMATURIA, SITE UNSPECIFIED: Primary | ICD-10-CM

## 2019-06-22 DIAGNOSIS — R30.0 DYSURIA: ICD-10-CM

## 2019-06-22 DIAGNOSIS — E87.6 HYPOKALEMIA: ICD-10-CM

## 2019-06-22 LAB
ALBUMIN SERPL-MCNC: 3.8 G/DL (ref 3.5–5.2)
ALBUMIN/GLOB SERPL: 1.2 G/DL
ALP SERPL-CCNC: 48 U/L (ref 39–117)
ALT SERPL W P-5'-P-CCNC: 28 U/L (ref 1–33)
ANION GAP SERPL CALCULATED.3IONS-SCNC: 15 MMOL/L
AST SERPL-CCNC: 30 U/L (ref 1–32)
BACTERIA UR QL AUTO: ABNORMAL /HPF
BASOPHILS # BLD AUTO: 0.03 10*3/MM3 (ref 0–0.2)
BASOPHILS NFR BLD AUTO: 0.5 % (ref 0–1.5)
BILIRUB SERPL-MCNC: 0.4 MG/DL (ref 0.2–1.2)
BILIRUB UR QL STRIP: ABNORMAL
BUN BLD-MCNC: 10 MG/DL (ref 6–20)
BUN/CREAT SERPL: 12.8 (ref 7–25)
CALCIUM SPEC-SCNC: 9.4 MG/DL (ref 8.6–10.5)
CHLORIDE SERPL-SCNC: 103 MMOL/L (ref 98–107)
CLARITY UR: ABNORMAL
CO2 SERPL-SCNC: 28 MMOL/L (ref 22–29)
COLOR UR: ABNORMAL
CREAT BLD-MCNC: 0.78 MG/DL (ref 0.57–1)
DEPRECATED RDW RBC AUTO: 44.4 FL (ref 37–54)
EOSINOPHIL # BLD AUTO: 0.21 10*3/MM3 (ref 0–0.4)
EOSINOPHIL NFR BLD AUTO: 3.2 % (ref 0.3–6.2)
ERYTHROCYTE [DISTWIDTH] IN BLOOD BY AUTOMATED COUNT: 14.9 % (ref 12.3–15.4)
GFR SERPL CREATININE-BSD FRML MDRD: 79 ML/MIN/1.73
GLOBULIN UR ELPH-MCNC: 3.2 GM/DL
GLUCOSE BLD-MCNC: 92 MG/DL (ref 65–99)
GLUCOSE UR STRIP-MCNC: NEGATIVE MG/DL
HCT VFR BLD AUTO: 39.2 % (ref 34–46.6)
HGB BLD-MCNC: 12.9 G/DL (ref 12–15.9)
HGB UR QL STRIP.AUTO: NEGATIVE
HYALINE CASTS UR QL AUTO: ABNORMAL /LPF
IMM GRANULOCYTES # BLD AUTO: 0.02 10*3/MM3 (ref 0–0.05)
IMM GRANULOCYTES NFR BLD AUTO: 0.3 % (ref 0–0.5)
KETONES UR QL STRIP: NEGATIVE
LEUKOCYTE ESTERASE UR QL STRIP.AUTO: ABNORMAL
LYMPHOCYTES # BLD AUTO: 2.11 10*3/MM3 (ref 0.7–3.1)
LYMPHOCYTES NFR BLD AUTO: 32.5 % (ref 19.6–45.3)
MCH RBC QN AUTO: 27.3 PG (ref 26.6–33)
MCHC RBC AUTO-ENTMCNC: 32.9 G/DL (ref 31.5–35.7)
MCV RBC AUTO: 83.1 FL (ref 79–97)
MONOCYTES # BLD AUTO: 0.4 10*3/MM3 (ref 0.1–0.9)
MONOCYTES NFR BLD AUTO: 6.2 % (ref 5–12)
NEUTROPHILS # BLD AUTO: 3.72 10*3/MM3 (ref 1.7–7)
NEUTROPHILS NFR BLD AUTO: 57.3 % (ref 42.7–76)
NITRITE UR QL STRIP: NEGATIVE
NRBC BLD AUTO-RTO: 0 /100 WBC (ref 0–0.2)
PH UR STRIP.AUTO: <=5 [PH] (ref 5–9)
PLAT MORPH BLD: NORMAL
PLATELET # BLD AUTO: 212 10*3/MM3 (ref 140–450)
PMV BLD AUTO: 10.9 FL (ref 6–12)
POTASSIUM BLD-SCNC: 3.1 MMOL/L (ref 3.5–5.2)
PROT SERPL-MCNC: 7 G/DL (ref 6–8.5)
PROT UR QL STRIP: NEGATIVE
RBC # BLD AUTO: 4.72 10*6/MM3 (ref 3.77–5.28)
RBC # UR: ABNORMAL /HPF
REF LAB TEST METHOD: ABNORMAL
SODIUM BLD-SCNC: 146 MMOL/L (ref 136–145)
SP GR UR STRIP: 1.02 (ref 1–1.03)
SQUAMOUS #/AREA URNS HPF: ABNORMAL /HPF
TARGETS BLD QL SMEAR: NORMAL
UROBILINOGEN UR QL STRIP: ABNORMAL
WBC MORPH BLD: NORMAL
WBC NRBC COR # BLD: 6.49 10*3/MM3 (ref 3.4–10.8)
WBC UR QL AUTO: ABNORMAL /HPF

## 2019-06-22 PROCEDURE — 51702 INSERT TEMP BLADDER CATH: CPT

## 2019-06-22 PROCEDURE — 80053 COMPREHEN METABOLIC PANEL: CPT | Performed by: EMERGENCY MEDICINE

## 2019-06-22 PROCEDURE — 85025 COMPLETE CBC W/AUTO DIFF WBC: CPT

## 2019-06-22 PROCEDURE — 85007 BL SMEAR W/DIFF WBC COUNT: CPT

## 2019-06-22 PROCEDURE — 99283 EMERGENCY DEPT VISIT LOW MDM: CPT

## 2019-06-22 PROCEDURE — 81001 URINALYSIS AUTO W/SCOPE: CPT

## 2019-06-22 RX ORDER — POTASSIUM CHLORIDE 750 MG/1
10 TABLET, FILM COATED, EXTENDED RELEASE ORAL DAILY
Qty: 30 TABLET | Refills: 0 | Status: SHIPPED | OUTPATIENT
Start: 2019-06-22 | End: 2019-08-10 | Stop reason: HOSPADM

## 2019-06-22 RX ORDER — POTASSIUM CHLORIDE 750 MG/1
40 CAPSULE, EXTENDED RELEASE ORAL ONCE
Status: COMPLETED | OUTPATIENT
Start: 2019-06-22 | End: 2019-06-22

## 2019-06-22 RX ORDER — NITROFURANTOIN 25; 75 MG/1; MG/1
100 CAPSULE ORAL 2 TIMES DAILY
Qty: 14 CAPSULE | Refills: 0 | Status: SHIPPED | OUTPATIENT
Start: 2019-06-22 | End: 2019-07-29 | Stop reason: HOSPADM

## 2019-06-22 RX ORDER — PHENAZOPYRIDINE HYDROCHLORIDE 200 MG/1
200 TABLET, FILM COATED ORAL ONCE
Status: COMPLETED | OUTPATIENT
Start: 2019-06-22 | End: 2019-06-22

## 2019-06-22 RX ORDER — PHENAZOPYRIDINE HYDROCHLORIDE 200 MG/1
200 TABLET, FILM COATED ORAL 3 TIMES DAILY PRN
Qty: 12 TABLET | Refills: 0 | Status: SHIPPED | OUTPATIENT
Start: 2019-06-22 | End: 2019-08-25

## 2019-06-22 RX ORDER — NITROFURANTOIN 25; 75 MG/1; MG/1
100 CAPSULE ORAL ONCE
Status: COMPLETED | OUTPATIENT
Start: 2019-06-22 | End: 2019-06-22

## 2019-06-22 RX ADMIN — POTASSIUM CHLORIDE 20 MEQ: 750 CAPSULE, EXTENDED RELEASE ORAL at 18:31

## 2019-06-22 RX ADMIN — PHENAZOPYRIDINE HYDROCHLORIDE 200 MG: 200 TABLET, FILM COATED ORAL at 18:31

## 2019-06-22 RX ADMIN — NITROFURANTOIN (MONOHYDRATE/MACROCRYSTALS) 100 MG: 75; 25 CAPSULE ORAL at 18:31

## 2019-07-26 ENCOUNTER — HOSPITAL ENCOUNTER (INPATIENT)
Facility: HOSPITAL | Age: 49
LOS: 3 days | Discharge: HOME | End: 2019-07-29
Attending: FAMILY MEDICINE | Admitting: FAMILY MEDICINE

## 2019-07-26 ENCOUNTER — APPOINTMENT (OUTPATIENT)
Dept: GENERAL RADIOLOGY | Facility: HOSPITAL | Age: 49
End: 2019-07-26

## 2019-07-26 ENCOUNTER — APPOINTMENT (OUTPATIENT)
Dept: CT IMAGING | Facility: HOSPITAL | Age: 49
End: 2019-07-26

## 2019-07-26 DIAGNOSIS — R07.9 CHEST PAIN, UNSPECIFIED TYPE: ICD-10-CM

## 2019-07-26 DIAGNOSIS — E86.0 DEHYDRATION: Primary | ICD-10-CM

## 2019-07-26 DIAGNOSIS — N17.9 ACUTE RENAL FAILURE, UNSPECIFIED ACUTE RENAL FAILURE TYPE (HCC): ICD-10-CM

## 2019-07-26 DIAGNOSIS — E87.6 HYPOKALEMIA: ICD-10-CM

## 2019-07-26 LAB
ALBUMIN SERPL-MCNC: 4 G/DL (ref 3.5–5.2)
ALBUMIN/GLOB SERPL: 1.1 G/DL
ALP SERPL-CCNC: 44 U/L (ref 39–117)
ALT SERPL W P-5'-P-CCNC: 28 U/L (ref 1–33)
ANION GAP SERPL CALCULATED.3IONS-SCNC: 20 MMOL/L (ref 5–15)
AST SERPL-CCNC: 37 U/L (ref 1–32)
BASOPHILS # BLD AUTO: 0.04 10*3/MM3 (ref 0–0.2)
BASOPHILS NFR BLD AUTO: 0.3 % (ref 0–1.5)
BILIRUB SERPL-MCNC: 0.4 MG/DL (ref 0.2–1.2)
BUN BLD-MCNC: 19 MG/DL (ref 6–20)
BUN/CREAT SERPL: 7.8 (ref 7–25)
CALCIUM SPEC-SCNC: 9.3 MG/DL (ref 8.6–10.5)
CHLORIDE SERPL-SCNC: 97 MMOL/L (ref 98–107)
CO2 SERPL-SCNC: 26 MMOL/L (ref 22–29)
CREAT BLD-MCNC: 2.44 MG/DL (ref 0.57–1)
D-LACTATE SERPL-SCNC: 2.7 MMOL/L (ref 0.5–2)
DEPRECATED RDW RBC AUTO: 41 FL (ref 37–54)
EOSINOPHIL # BLD AUTO: 0.09 10*3/MM3 (ref 0–0.4)
EOSINOPHIL NFR BLD AUTO: 0.7 % (ref 0.3–6.2)
ERYTHROCYTE [DISTWIDTH] IN BLOOD BY AUTOMATED COUNT: 14 % (ref 12.3–15.4)
GFR SERPL CREATININE-BSD FRML MDRD: 21 ML/MIN/1.73
GLOBULIN UR ELPH-MCNC: 3.5 GM/DL
GLUCOSE BLD-MCNC: 115 MG/DL (ref 65–99)
HCG SERPL QL: NEGATIVE
HCT VFR BLD AUTO: 41.7 % (ref 34–46.6)
HGB BLD-MCNC: 13.7 G/DL (ref 12–15.9)
HOLD SPECIMEN: NORMAL
IMM GRANULOCYTES # BLD AUTO: 0.04 10*3/MM3 (ref 0–0.05)
IMM GRANULOCYTES NFR BLD AUTO: 0.3 % (ref 0–0.5)
LYMPHOCYTES # BLD AUTO: 2.41 10*3/MM3 (ref 0.7–3.1)
LYMPHOCYTES NFR BLD AUTO: 19.7 % (ref 19.6–45.3)
MAGNESIUM SERPL-MCNC: 1.6 MG/DL (ref 1.6–2.6)
MCH RBC QN AUTO: 26.8 PG (ref 26.6–33)
MCHC RBC AUTO-ENTMCNC: 32.9 G/DL (ref 31.5–35.7)
MCV RBC AUTO: 81.6 FL (ref 79–97)
MONOCYTES # BLD AUTO: 1.33 10*3/MM3 (ref 0.1–0.9)
MONOCYTES NFR BLD AUTO: 10.9 % (ref 5–12)
NEUTROPHILS # BLD AUTO: 8.33 10*3/MM3 (ref 1.7–7)
NEUTROPHILS NFR BLD AUTO: 68.1 % (ref 42.7–76)
NRBC BLD AUTO-RTO: 0 /100 WBC (ref 0–0.2)
NT-PROBNP SERPL-MCNC: 282.2 PG/ML (ref 5–450)
PLATELET # BLD AUTO: 282 10*3/MM3 (ref 140–450)
PMV BLD AUTO: 11.4 FL (ref 6–12)
POTASSIUM BLD-SCNC: 2.3 MMOL/L (ref 3.5–5.2)
PROT SERPL-MCNC: 7.5 G/DL (ref 6–8.5)
RBC # BLD AUTO: 5.11 10*6/MM3 (ref 3.77–5.28)
SODIUM BLD-SCNC: 143 MMOL/L (ref 136–145)
TROPONIN T SERPL-MCNC: <0.01 NG/ML (ref 0–0.03)
TROPONIN T SERPL-MCNC: <0.01 NG/ML (ref 0–0.03)
WBC NRBC COR # BLD: 12.24 10*3/MM3 (ref 3.4–10.8)
WHOLE BLOOD HOLD SPECIMEN: NORMAL
WHOLE BLOOD HOLD SPECIMEN: NORMAL

## 2019-07-26 PROCEDURE — 84703 CHORIONIC GONADOTROPIN ASSAY: CPT | Performed by: FAMILY MEDICINE

## 2019-07-26 PROCEDURE — 83605 ASSAY OF LACTIC ACID: CPT | Performed by: FAMILY MEDICINE

## 2019-07-26 PROCEDURE — 93005 ELECTROCARDIOGRAM TRACING: CPT

## 2019-07-26 PROCEDURE — 85025 COMPLETE CBC W/AUTO DIFF WBC: CPT | Performed by: FAMILY MEDICINE

## 2019-07-26 PROCEDURE — 99285 EMERGENCY DEPT VISIT HI MDM: CPT

## 2019-07-26 PROCEDURE — 84484 ASSAY OF TROPONIN QUANT: CPT | Performed by: FAMILY MEDICINE

## 2019-07-26 PROCEDURE — 80053 COMPREHEN METABOLIC PANEL: CPT | Performed by: FAMILY MEDICINE

## 2019-07-26 PROCEDURE — 83880 ASSAY OF NATRIURETIC PEPTIDE: CPT | Performed by: FAMILY MEDICINE

## 2019-07-26 PROCEDURE — 93005 ELECTROCARDIOGRAM TRACING: CPT | Performed by: FAMILY MEDICINE

## 2019-07-26 PROCEDURE — 71045 X-RAY EXAM CHEST 1 VIEW: CPT

## 2019-07-26 PROCEDURE — 93010 ELECTROCARDIOGRAM REPORT: CPT | Performed by: INTERNAL MEDICINE

## 2019-07-26 PROCEDURE — 83735 ASSAY OF MAGNESIUM: CPT | Performed by: FAMILY MEDICINE

## 2019-07-26 PROCEDURE — 25010000003 POTASSIUM CHLORIDE 10 MEQ/100ML SOLUTION: Performed by: FAMILY MEDICINE

## 2019-07-26 PROCEDURE — 36415 COLL VENOUS BLD VENIPUNCTURE: CPT | Performed by: FAMILY MEDICINE

## 2019-07-26 RX ORDER — POTASSIUM CHLORIDE 7.45 MG/ML
10 INJECTION INTRAVENOUS ONCE
Status: COMPLETED | OUTPATIENT
Start: 2019-07-26 | End: 2019-07-26

## 2019-07-26 RX ORDER — ASPIRIN 81 MG/1
324 TABLET, CHEWABLE ORAL ONCE
Status: COMPLETED | OUTPATIENT
Start: 2019-07-26 | End: 2019-07-26

## 2019-07-26 RX ORDER — SODIUM CHLORIDE 0.9 % (FLUSH) 0.9 %
10 SYRINGE (ML) INJECTION AS NEEDED
Status: DISCONTINUED | OUTPATIENT
Start: 2019-07-26 | End: 2019-07-29 | Stop reason: HOSPADM

## 2019-07-26 RX ORDER — POTASSIUM CHLORIDE 750 MG/1
40 CAPSULE, EXTENDED RELEASE ORAL ONCE
Status: COMPLETED | OUTPATIENT
Start: 2019-07-26 | End: 2019-07-26

## 2019-07-26 RX ADMIN — SODIUM CHLORIDE 1000 ML: 900 INJECTION, SOLUTION INTRAVENOUS at 19:55

## 2019-07-26 RX ADMIN — POTASSIUM CHLORIDE 10 MEQ: 10 INJECTION, SOLUTION INTRAVENOUS at 20:24

## 2019-07-26 RX ADMIN — POTASSIUM CHLORIDE 40 MEQ: 750 CAPSULE, EXTENDED RELEASE ORAL at 20:24

## 2019-07-26 RX ADMIN — ASPIRIN 81 MG 324 MG: 81 TABLET ORAL at 20:01

## 2019-07-26 RX ADMIN — SODIUM CHLORIDE 1000 ML: 900 INJECTION, SOLUTION INTRAVENOUS at 19:56

## 2019-07-27 LAB
ANION GAP SERPL CALCULATED.3IONS-SCNC: 14 MMOL/L (ref 5–15)
ANION GAP SERPL CALCULATED.3IONS-SCNC: 21 MMOL/L (ref 5–15)
BACTERIA UR QL AUTO: ABNORMAL /HPF
BASOPHILS # BLD AUTO: 0.02 10*3/MM3 (ref 0–0.2)
BASOPHILS NFR BLD AUTO: 0.3 % (ref 0–1.5)
BILIRUB UR QL STRIP: NEGATIVE
BUN BLD-MCNC: 18 MG/DL (ref 6–20)
BUN BLD-MCNC: 18 MG/DL (ref 6–20)
BUN/CREAT SERPL: 13.4 (ref 7–25)
BUN/CREAT SERPL: 9.5 (ref 7–25)
CALCIUM SPEC-SCNC: 8.2 MG/DL (ref 8.6–10.5)
CALCIUM SPEC-SCNC: 8.6 MG/DL (ref 8.6–10.5)
CHLORIDE SERPL-SCNC: 104 MMOL/L (ref 98–107)
CHLORIDE SERPL-SCNC: 108 MMOL/L (ref 98–107)
CLARITY UR: ABNORMAL
CO2 SERPL-SCNC: 18 MMOL/L (ref 22–29)
CO2 SERPL-SCNC: 29 MMOL/L (ref 22–29)
COLOR UR: YELLOW
CREAT BLD-MCNC: 1.34 MG/DL (ref 0.57–1)
CREAT BLD-MCNC: 1.9 MG/DL (ref 0.57–1)
CREAT UR-MCNC: 165.4 MG/DL
D-LACTATE SERPL-SCNC: 1.1 MMOL/L (ref 0.5–2)
D-LACTATE SERPL-SCNC: 1.6 MMOL/L (ref 0.5–2)
DEPRECATED RDW RBC AUTO: 43.2 FL (ref 37–54)
EOSINOPHIL # BLD AUTO: 0.08 10*3/MM3 (ref 0–0.4)
EOSINOPHIL NFR BLD AUTO: 1.1 % (ref 0.3–6.2)
ERYTHROCYTE [DISTWIDTH] IN BLOOD BY AUTOMATED COUNT: 14.3 % (ref 12.3–15.4)
GFR SERPL CREATININE-BSD FRML MDRD: 28 ML/MIN/1.73
GFR SERPL CREATININE-BSD FRML MDRD: 42 ML/MIN/1.73
GLUCOSE BLD-MCNC: 101 MG/DL (ref 65–99)
GLUCOSE BLD-MCNC: 99 MG/DL (ref 65–99)
GLUCOSE UR STRIP-MCNC: NEGATIVE MG/DL
HCT VFR BLD AUTO: 38.6 % (ref 34–46.6)
HGB BLD-MCNC: 12.4 G/DL (ref 12–15.9)
HGB UR QL STRIP.AUTO: NEGATIVE
HYALINE CASTS UR QL AUTO: ABNORMAL /LPF
IMM GRANULOCYTES # BLD AUTO: 0.01 10*3/MM3 (ref 0–0.05)
IMM GRANULOCYTES NFR BLD AUTO: 0.1 % (ref 0–0.5)
KETONES UR QL STRIP: NEGATIVE
LEUKOCYTE ESTERASE UR QL STRIP.AUTO: ABNORMAL
LYMPHOCYTES # BLD AUTO: 2.87 10*3/MM3 (ref 0.7–3.1)
LYMPHOCYTES NFR BLD AUTO: 37.9 % (ref 19.6–45.3)
MAGNESIUM SERPL-MCNC: 2.2 MG/DL (ref 1.6–2.6)
MCH RBC QN AUTO: 26.8 PG (ref 26.6–33)
MCHC RBC AUTO-ENTMCNC: 32.1 G/DL (ref 31.5–35.7)
MCV RBC AUTO: 83.5 FL (ref 79–97)
MONOCYTES # BLD AUTO: 0.72 10*3/MM3 (ref 0.1–0.9)
MONOCYTES NFR BLD AUTO: 9.5 % (ref 5–12)
NEUTROPHILS # BLD AUTO: 3.87 10*3/MM3 (ref 1.7–7)
NEUTROPHILS NFR BLD AUTO: 51.1 % (ref 42.7–76)
NITRITE UR QL STRIP: NEGATIVE
NRBC BLD AUTO-RTO: 0 /100 WBC (ref 0–0.2)
NT-PROBNP SERPL-MCNC: 162.5 PG/ML (ref 5–450)
NT-PROBNP SERPL-MCNC: 518.5 PG/ML (ref 5–450)
PH UR STRIP.AUTO: 5.5 [PH] (ref 5–9)
PLATELET # BLD AUTO: 207 10*3/MM3 (ref 140–450)
PMV BLD AUTO: 11.3 FL (ref 6–12)
POTASSIUM BLD-SCNC: 2.6 MMOL/L (ref 3.5–5.2)
POTASSIUM BLD-SCNC: 2.7 MMOL/L (ref 3.5–5.2)
POTASSIUM BLD-SCNC: 3.6 MMOL/L (ref 3.5–5.2)
POTASSIUM BLD-SCNC: 4 MMOL/L (ref 3.5–5.2)
PROT UR QL STRIP: NEGATIVE
RBC # BLD AUTO: 4.62 10*6/MM3 (ref 3.77–5.28)
RBC # UR: ABNORMAL /HPF
REF LAB TEST METHOD: ABNORMAL
SODIUM BLD-SCNC: 147 MMOL/L (ref 136–145)
SODIUM BLD-SCNC: 147 MMOL/L (ref 136–145)
SODIUM UR-SCNC: 44 MMOL/L
SP GR UR STRIP: 1.02 (ref 1–1.03)
SQUAMOUS #/AREA URNS HPF: ABNORMAL /HPF
TSH SERPL DL<=0.05 MIU/L-ACNC: 1.62 MIU/ML (ref 0.27–4.2)
UROBILINOGEN UR QL STRIP: ABNORMAL
WBC NRBC COR # BLD: 7.57 10*3/MM3 (ref 3.4–10.8)
WBC UR QL AUTO: ABNORMAL /HPF
YEAST URNS QL MICRO: ABNORMAL /HPF

## 2019-07-27 PROCEDURE — 25010000002 MAGNESIUM SULFATE 2 GM/50ML SOLUTION: Performed by: INTERNAL MEDICINE

## 2019-07-27 PROCEDURE — 84443 ASSAY THYROID STIM HORMONE: CPT | Performed by: INTERNAL MEDICINE

## 2019-07-27 PROCEDURE — 82570 ASSAY OF URINE CREATININE: CPT | Performed by: NURSE PRACTITIONER

## 2019-07-27 PROCEDURE — 83605 ASSAY OF LACTIC ACID: CPT | Performed by: FAMILY MEDICINE

## 2019-07-27 PROCEDURE — 84132 ASSAY OF SERUM POTASSIUM: CPT | Performed by: FAMILY MEDICINE

## 2019-07-27 PROCEDURE — 84300 ASSAY OF URINE SODIUM: CPT | Performed by: NURSE PRACTITIONER

## 2019-07-27 PROCEDURE — 81001 URINALYSIS AUTO W/SCOPE: CPT | Performed by: NURSE PRACTITIONER

## 2019-07-27 PROCEDURE — 83605 ASSAY OF LACTIC ACID: CPT | Performed by: INTERNAL MEDICINE

## 2019-07-27 PROCEDURE — 85025 COMPLETE CBC W/AUTO DIFF WBC: CPT | Performed by: INTERNAL MEDICINE

## 2019-07-27 PROCEDURE — 83880 ASSAY OF NATRIURETIC PEPTIDE: CPT | Performed by: INTERNAL MEDICINE

## 2019-07-27 PROCEDURE — 83735 ASSAY OF MAGNESIUM: CPT | Performed by: INTERNAL MEDICINE

## 2019-07-27 PROCEDURE — 84132 ASSAY OF SERUM POTASSIUM: CPT | Performed by: INTERNAL MEDICINE

## 2019-07-27 PROCEDURE — 25010000002 ENOXAPARIN PER 10 MG: Performed by: INTERNAL MEDICINE

## 2019-07-27 PROCEDURE — 80048 BASIC METABOLIC PNL TOTAL CA: CPT | Performed by: INTERNAL MEDICINE

## 2019-07-27 RX ORDER — BISACODYL 10 MG
10 SUPPOSITORY, RECTAL RECTAL DAILY
Status: DISCONTINUED | OUTPATIENT
Start: 2019-07-27 | End: 2019-07-29 | Stop reason: HOSPADM

## 2019-07-27 RX ORDER — ONDANSETRON 4 MG/1
4 TABLET, FILM COATED ORAL EVERY 6 HOURS PRN
Status: DISCONTINUED | OUTPATIENT
Start: 2019-07-27 | End: 2019-07-29 | Stop reason: HOSPADM

## 2019-07-27 RX ORDER — SODIUM CHLORIDE 9 MG/ML
100 INJECTION, SOLUTION INTRAVENOUS CONTINUOUS
Status: DISCONTINUED | OUTPATIENT
Start: 2019-07-27 | End: 2019-07-27

## 2019-07-27 RX ORDER — ONDANSETRON 2 MG/ML
4 INJECTION INTRAMUSCULAR; INTRAVENOUS EVERY 6 HOURS PRN
Status: DISCONTINUED | OUTPATIENT
Start: 2019-07-27 | End: 2019-07-29 | Stop reason: HOSPADM

## 2019-07-27 RX ORDER — SODIUM CHLORIDE 450 MG/100ML
100 INJECTION, SOLUTION INTRAVENOUS CONTINUOUS
Status: DISCONTINUED | OUTPATIENT
Start: 2019-07-27 | End: 2019-07-28

## 2019-07-27 RX ORDER — POTASSIUM CHLORIDE 1.5 G/1.77G
40 POWDER, FOR SOLUTION ORAL AS NEEDED
Status: DISCONTINUED | OUTPATIENT
Start: 2019-07-27 | End: 2019-07-29 | Stop reason: HOSPADM

## 2019-07-27 RX ORDER — SODIUM CHLORIDE 0.9 % (FLUSH) 0.9 %
3-10 SYRINGE (ML) INJECTION AS NEEDED
Status: DISCONTINUED | OUTPATIENT
Start: 2019-07-27 | End: 2019-07-29 | Stop reason: HOSPADM

## 2019-07-27 RX ORDER — ROPINIROLE 0.25 MG/1
0.25 TABLET, FILM COATED ORAL NIGHTLY
Status: DISCONTINUED | OUTPATIENT
Start: 2019-07-27 | End: 2019-07-29 | Stop reason: HOSPADM

## 2019-07-27 RX ORDER — MAGNESIUM SULFATE HEPTAHYDRATE 40 MG/ML
2 INJECTION, SOLUTION INTRAVENOUS AS NEEDED
Status: DISCONTINUED | OUTPATIENT
Start: 2019-07-27 | End: 2019-07-29 | Stop reason: HOSPADM

## 2019-07-27 RX ORDER — TRAMADOL HYDROCHLORIDE 50 MG/1
50 TABLET ORAL EVERY 8 HOURS PRN
Status: DISCONTINUED | OUTPATIENT
Start: 2019-07-27 | End: 2019-07-29 | Stop reason: HOSPADM

## 2019-07-27 RX ORDER — MAGNESIUM SULFATE 1 G/100ML
1 INJECTION INTRAVENOUS AS NEEDED
Status: DISCONTINUED | OUTPATIENT
Start: 2019-07-27 | End: 2019-07-29 | Stop reason: HOSPADM

## 2019-07-27 RX ORDER — GABAPENTIN 100 MG/1
100 CAPSULE ORAL 2 TIMES DAILY
Status: DISCONTINUED | OUTPATIENT
Start: 2019-07-27 | End: 2019-07-29 | Stop reason: HOSPADM

## 2019-07-27 RX ORDER — DOCUSATE SODIUM 100 MG/1
100 CAPSULE, LIQUID FILLED ORAL 2 TIMES DAILY
Status: DISCONTINUED | OUTPATIENT
Start: 2019-07-27 | End: 2019-07-29 | Stop reason: HOSPADM

## 2019-07-27 RX ORDER — DICYCLOMINE HYDROCHLORIDE 10 MG/1
10 CAPSULE ORAL 3 TIMES DAILY
Status: DISCONTINUED | OUTPATIENT
Start: 2019-07-27 | End: 2019-07-29 | Stop reason: HOSPADM

## 2019-07-27 RX ORDER — PANTOPRAZOLE SODIUM 40 MG/1
40 TABLET, DELAYED RELEASE ORAL DAILY
Status: DISCONTINUED | OUTPATIENT
Start: 2019-07-27 | End: 2019-07-29 | Stop reason: HOSPADM

## 2019-07-27 RX ORDER — SODIUM CHLORIDE 0.9 % (FLUSH) 0.9 %
3 SYRINGE (ML) INJECTION EVERY 12 HOURS SCHEDULED
Status: DISCONTINUED | OUTPATIENT
Start: 2019-07-27 | End: 2019-07-29 | Stop reason: HOSPADM

## 2019-07-27 RX ORDER — MAGNESIUM SULFATE HEPTAHYDRATE 40 MG/ML
4 INJECTION, SOLUTION INTRAVENOUS AS NEEDED
Status: DISCONTINUED | OUTPATIENT
Start: 2019-07-27 | End: 2019-07-29 | Stop reason: HOSPADM

## 2019-07-27 RX ORDER — POTASSIUM CHLORIDE 7.45 MG/ML
10 INJECTION INTRAVENOUS
Status: DISCONTINUED | OUTPATIENT
Start: 2019-07-27 | End: 2019-07-29 | Stop reason: HOSPADM

## 2019-07-27 RX ORDER — POTASSIUM CHLORIDE 750 MG/1
40 CAPSULE, EXTENDED RELEASE ORAL AS NEEDED
Status: DISCONTINUED | OUTPATIENT
Start: 2019-07-27 | End: 2019-07-29 | Stop reason: HOSPADM

## 2019-07-27 RX ORDER — BUPROPION HYDROCHLORIDE 150 MG/1
150 TABLET, EXTENDED RELEASE ORAL 2 TIMES DAILY
Status: DISCONTINUED | OUTPATIENT
Start: 2019-07-27 | End: 2019-07-29 | Stop reason: HOSPADM

## 2019-07-27 RX ADMIN — DICYCLOMINE HYDROCHLORIDE 10 MG: 10 CAPSULE ORAL at 09:12

## 2019-07-27 RX ADMIN — POTASSIUM CHLORIDE 40 MEQ: 1.5 POWDER, FOR SOLUTION ORAL at 03:58

## 2019-07-27 RX ADMIN — SODIUM CHLORIDE 100 ML/HR: 4.5 INJECTION, SOLUTION INTRAVENOUS at 12:01

## 2019-07-27 RX ADMIN — BUPROPION HYDROCHLORIDE 150 MG: 150 TABLET, EXTENDED RELEASE ORAL at 20:55

## 2019-07-27 RX ADMIN — GABAPENTIN 100 MG: 100 CAPSULE ORAL at 20:55

## 2019-07-27 RX ADMIN — BUPROPION HYDROCHLORIDE 150 MG: 150 TABLET, EXTENDED RELEASE ORAL at 09:12

## 2019-07-27 RX ADMIN — GABAPENTIN 100 MG: 100 CAPSULE ORAL at 09:11

## 2019-07-27 RX ADMIN — ROPINIROLE HYDROCHLORIDE 0.25 MG: 0.25 TABLET, FILM COATED ORAL at 20:55

## 2019-07-27 RX ADMIN — DICYCLOMINE HYDROCHLORIDE 10 MG: 10 CAPSULE ORAL at 20:55

## 2019-07-27 RX ADMIN — DOCUSATE SODIUM 100 MG: 100 CAPSULE, LIQUID FILLED ORAL at 20:55

## 2019-07-27 RX ADMIN — POTASSIUM CHLORIDE 40 MEQ: 1.5 POWDER, FOR SOLUTION ORAL at 13:47

## 2019-07-27 RX ADMIN — DOCUSATE SODIUM 100 MG: 100 CAPSULE, LIQUID FILLED ORAL at 09:12

## 2019-07-27 RX ADMIN — SODIUM CHLORIDE 100 ML/HR: 900 INJECTION, SOLUTION INTRAVENOUS at 04:53

## 2019-07-27 RX ADMIN — POTASSIUM CHLORIDE 40 MEQ: 1.5 POWDER, FOR SOLUTION ORAL at 09:11

## 2019-07-27 RX ADMIN — DICYCLOMINE HYDROCHLORIDE 10 MG: 10 CAPSULE ORAL at 18:03

## 2019-07-27 RX ADMIN — ENOXAPARIN SODIUM 40 MG: 40 INJECTION SUBCUTANEOUS at 02:43

## 2019-07-27 RX ADMIN — PANTOPRAZOLE SODIUM 40 MG: 40 TABLET, DELAYED RELEASE ORAL at 09:12

## 2019-07-27 RX ADMIN — MAGNESIUM SULFATE HEPTAHYDRATE 2 G: 40 INJECTION, SOLUTION INTRAVENOUS at 13:47

## 2019-07-27 RX ADMIN — ROPINIROLE HYDROCHLORIDE 0.25 MG: 0.25 TABLET, FILM COATED ORAL at 02:42

## 2019-07-28 LAB
ALBUMIN SERPL-MCNC: 2.9 G/DL (ref 3.5–5.2)
ALBUMIN/GLOB SERPL: 1.1 G/DL
ALP SERPL-CCNC: 33 U/L (ref 39–117)
ALT SERPL W P-5'-P-CCNC: 18 U/L (ref 1–33)
ANION GAP SERPL CALCULATED.3IONS-SCNC: 11 MMOL/L (ref 5–15)
AST SERPL-CCNC: 22 U/L (ref 1–32)
BASOPHILS # BLD AUTO: 0.02 10*3/MM3 (ref 0–0.2)
BASOPHILS NFR BLD AUTO: 0.4 % (ref 0–1.5)
BILIRUB SERPL-MCNC: 0.3 MG/DL (ref 0.2–1.2)
BUN BLD-MCNC: 13 MG/DL (ref 6–20)
BUN/CREAT SERPL: 13.4 (ref 7–25)
CALCIUM SPEC-SCNC: 8.4 MG/DL (ref 8.6–10.5)
CHLORIDE SERPL-SCNC: 106 MMOL/L (ref 98–107)
CO2 SERPL-SCNC: 26 MMOL/L (ref 22–29)
CREAT BLD-MCNC: 0.97 MG/DL (ref 0.57–1)
DEPRECATED RDW RBC AUTO: 43.8 FL (ref 37–54)
EOSINOPHIL # BLD AUTO: 0.16 10*3/MM3 (ref 0–0.4)
EOSINOPHIL NFR BLD AUTO: 3.1 % (ref 0.3–6.2)
ERYTHROCYTE [DISTWIDTH] IN BLOOD BY AUTOMATED COUNT: 14.4 % (ref 12.3–15.4)
GFR SERPL CREATININE-BSD FRML MDRD: 61 ML/MIN/1.73
GLOBULIN UR ELPH-MCNC: 2.6 GM/DL
GLUCOSE BLD-MCNC: 89 MG/DL (ref 65–99)
HCT VFR BLD AUTO: 35.9 % (ref 34–46.6)
HGB BLD-MCNC: 11.8 G/DL (ref 12–15.9)
IMM GRANULOCYTES # BLD AUTO: 0.01 10*3/MM3 (ref 0–0.05)
IMM GRANULOCYTES NFR BLD AUTO: 0.2 % (ref 0–0.5)
LYMPHOCYTES # BLD AUTO: 2.69 10*3/MM3 (ref 0.7–3.1)
LYMPHOCYTES NFR BLD AUTO: 52.4 % (ref 19.6–45.3)
MCH RBC QN AUTO: 27.4 PG (ref 26.6–33)
MCHC RBC AUTO-ENTMCNC: 32.9 G/DL (ref 31.5–35.7)
MCV RBC AUTO: 83.5 FL (ref 79–97)
MONOCYTES # BLD AUTO: 0.39 10*3/MM3 (ref 0.1–0.9)
MONOCYTES NFR BLD AUTO: 7.6 % (ref 5–12)
NEUTROPHILS # BLD AUTO: 1.86 10*3/MM3 (ref 1.7–7)
NEUTROPHILS NFR BLD AUTO: 36.3 % (ref 42.7–76)
NRBC BLD AUTO-RTO: 0 /100 WBC (ref 0–0.2)
PLATELET # BLD AUTO: 183 10*3/MM3 (ref 140–450)
PMV BLD AUTO: 11.9 FL (ref 6–12)
POTASSIUM BLD-SCNC: 2.8 MMOL/L (ref 3.5–5.2)
POTASSIUM BLD-SCNC: 3.4 MMOL/L (ref 3.5–5.2)
POTASSIUM BLD-SCNC: 3.5 MMOL/L (ref 3.5–5.2)
PROT SERPL-MCNC: 5.5 G/DL (ref 6–8.5)
RBC # BLD AUTO: 4.3 10*6/MM3 (ref 3.77–5.28)
SODIUM BLD-SCNC: 143 MMOL/L (ref 136–145)
WBC NRBC COR # BLD: 5.13 10*3/MM3 (ref 3.4–10.8)

## 2019-07-28 PROCEDURE — 84132 ASSAY OF SERUM POTASSIUM: CPT | Performed by: FAMILY MEDICINE

## 2019-07-28 PROCEDURE — 25010000002 ENOXAPARIN PER 10 MG: Performed by: FAMILY MEDICINE

## 2019-07-28 PROCEDURE — 85025 COMPLETE CBC W/AUTO DIFF WBC: CPT | Performed by: FAMILY MEDICINE

## 2019-07-28 PROCEDURE — 80053 COMPREHEN METABOLIC PANEL: CPT | Performed by: FAMILY MEDICINE

## 2019-07-28 RX ORDER — METRONIDAZOLE 7.5 MG/G
GEL VAGINAL EVERY 12 HOURS SCHEDULED
Status: DISCONTINUED | OUTPATIENT
Start: 2019-07-28 | End: 2019-07-29 | Stop reason: HOSPADM

## 2019-07-28 RX ADMIN — METRONIDAZOLE: 7.5 GEL VAGINAL at 10:04

## 2019-07-28 RX ADMIN — POTASSIUM CHLORIDE 40 MEQ: 1.5 POWDER, FOR SOLUTION ORAL at 09:37

## 2019-07-28 RX ADMIN — DICYCLOMINE HYDROCHLORIDE 10 MG: 10 CAPSULE ORAL at 21:33

## 2019-07-28 RX ADMIN — GABAPENTIN 100 MG: 100 CAPSULE ORAL at 21:33

## 2019-07-28 RX ADMIN — SODIUM CHLORIDE, PRESERVATIVE FREE 3 ML: 5 INJECTION INTRAVENOUS at 10:03

## 2019-07-28 RX ADMIN — SODIUM CHLORIDE 100 ML/HR: 4.5 INJECTION, SOLUTION INTRAVENOUS at 08:14

## 2019-07-28 RX ADMIN — PANTOPRAZOLE SODIUM 40 MG: 40 TABLET, DELAYED RELEASE ORAL at 08:14

## 2019-07-28 RX ADMIN — BUPROPION HYDROCHLORIDE 150 MG: 150 TABLET, EXTENDED RELEASE ORAL at 21:33

## 2019-07-28 RX ADMIN — DICYCLOMINE HYDROCHLORIDE 10 MG: 10 CAPSULE ORAL at 08:14

## 2019-07-28 RX ADMIN — ROPINIROLE HYDROCHLORIDE 0.25 MG: 0.25 TABLET, FILM COATED ORAL at 21:33

## 2019-07-28 RX ADMIN — POTASSIUM CHLORIDE 40 MEQ: 1.5 POWDER, FOR SOLUTION ORAL at 18:08

## 2019-07-28 RX ADMIN — GABAPENTIN 100 MG: 100 CAPSULE ORAL at 08:14

## 2019-07-28 RX ADMIN — ENOXAPARIN SODIUM 30 MG: 30 INJECTION SUBCUTANEOUS at 05:03

## 2019-07-28 RX ADMIN — METRONIDAZOLE: 7.5 GEL VAGINAL at 21:35

## 2019-07-28 RX ADMIN — POTASSIUM CHLORIDE 40 MEQ: 1.5 POWDER, FOR SOLUTION ORAL at 23:14

## 2019-07-28 RX ADMIN — POTASSIUM CHLORIDE 40 MEQ: 1.5 POWDER, FOR SOLUTION ORAL at 14:14

## 2019-07-28 RX ADMIN — DOCUSATE SODIUM 100 MG: 100 CAPSULE, LIQUID FILLED ORAL at 21:33

## 2019-07-28 RX ADMIN — SODIUM CHLORIDE, PRESERVATIVE FREE 3 ML: 5 INJECTION INTRAVENOUS at 21:33

## 2019-07-28 RX ADMIN — BUPROPION HYDROCHLORIDE 150 MG: 150 TABLET, EXTENDED RELEASE ORAL at 08:14

## 2019-07-28 RX ADMIN — DOCUSATE SODIUM 100 MG: 100 CAPSULE, LIQUID FILLED ORAL at 08:14

## 2019-07-28 RX ADMIN — DICYCLOMINE HYDROCHLORIDE 10 MG: 10 CAPSULE ORAL at 16:22

## 2019-07-29 VITALS
SYSTOLIC BLOOD PRESSURE: 134 MMHG | WEIGHT: 261.5 LBS | BODY MASS INDEX: 43.57 KG/M2 | DIASTOLIC BLOOD PRESSURE: 65 MMHG | HEIGHT: 65 IN | TEMPERATURE: 97.2 F | OXYGEN SATURATION: 100 % | RESPIRATION RATE: 18 BRPM | HEART RATE: 62 BPM

## 2019-07-29 LAB
ALBUMIN SERPL-MCNC: 3 G/DL (ref 3.5–5.2)
ALBUMIN/GLOB SERPL: 1.2 G/DL
ALP SERPL-CCNC: 32 U/L (ref 39–117)
ALT SERPL W P-5'-P-CCNC: 20 U/L (ref 1–33)
ANION GAP SERPL CALCULATED.3IONS-SCNC: 12 MMOL/L (ref 5–15)
AST SERPL-CCNC: 27 U/L (ref 1–32)
BASOPHILS # BLD AUTO: 0.02 10*3/MM3 (ref 0–0.2)
BASOPHILS NFR BLD AUTO: 0.3 % (ref 0–1.5)
BILIRUB SERPL-MCNC: 0.2 MG/DL (ref 0.2–1.2)
BUN BLD-MCNC: 9 MG/DL (ref 6–20)
BUN/CREAT SERPL: 11.1 (ref 7–25)
CALCIUM SPEC-SCNC: 8.8 MG/DL (ref 8.6–10.5)
CHLORIDE SERPL-SCNC: 110 MMOL/L (ref 98–107)
CO2 SERPL-SCNC: 22 MMOL/L (ref 22–29)
CREAT BLD-MCNC: 0.81 MG/DL (ref 0.57–1)
DEPRECATED RDW RBC AUTO: 44.3 FL (ref 37–54)
EOSINOPHIL # BLD AUTO: 0.16 10*3/MM3 (ref 0–0.4)
EOSINOPHIL NFR BLD AUTO: 2.7 % (ref 0.3–6.2)
ERYTHROCYTE [DISTWIDTH] IN BLOOD BY AUTOMATED COUNT: 14.4 % (ref 12.3–15.4)
GFR SERPL CREATININE-BSD FRML MDRD: 75 ML/MIN/1.73
GLOBULIN UR ELPH-MCNC: 2.5 GM/DL
GLUCOSE BLD-MCNC: 89 MG/DL (ref 65–99)
HCT VFR BLD AUTO: 39.3 % (ref 34–46.6)
HGB BLD-MCNC: 12.4 G/DL (ref 12–15.9)
IMM GRANULOCYTES # BLD AUTO: 0.01 10*3/MM3 (ref 0–0.05)
IMM GRANULOCYTES NFR BLD AUTO: 0.2 % (ref 0–0.5)
LYMPHOCYTES # BLD AUTO: 2.93 10*3/MM3 (ref 0.7–3.1)
LYMPHOCYTES NFR BLD AUTO: 50.1 % (ref 19.6–45.3)
MCH RBC QN AUTO: 27 PG (ref 26.6–33)
MCHC RBC AUTO-ENTMCNC: 31.6 G/DL (ref 31.5–35.7)
MCV RBC AUTO: 85.4 FL (ref 79–97)
MONOCYTES # BLD AUTO: 0.57 10*3/MM3 (ref 0.1–0.9)
MONOCYTES NFR BLD AUTO: 9.7 % (ref 5–12)
NEUTROPHILS # BLD AUTO: 2.16 10*3/MM3 (ref 1.7–7)
NEUTROPHILS NFR BLD AUTO: 37 % (ref 42.7–76)
NRBC BLD AUTO-RTO: 0 /100 WBC (ref 0–0.2)
PLATELET # BLD AUTO: 164 10*3/MM3 (ref 140–450)
PMV BLD AUTO: 11.7 FL (ref 6–12)
POTASSIUM BLD-SCNC: 3.8 MMOL/L (ref 3.5–5.2)
POTASSIUM BLD-SCNC: 3.9 MMOL/L (ref 3.5–5.2)
PROT SERPL-MCNC: 5.5 G/DL (ref 6–8.5)
RBC # BLD AUTO: 4.6 10*6/MM3 (ref 3.77–5.28)
SODIUM BLD-SCNC: 144 MMOL/L (ref 136–145)
WBC NRBC COR # BLD: 5.85 10*3/MM3 (ref 3.4–10.8)

## 2019-07-29 PROCEDURE — 25010000002 ENOXAPARIN PER 10 MG: Performed by: FAMILY MEDICINE

## 2019-07-29 PROCEDURE — 85025 COMPLETE CBC W/AUTO DIFF WBC: CPT | Performed by: FAMILY MEDICINE

## 2019-07-29 PROCEDURE — 80053 COMPREHEN METABOLIC PANEL: CPT | Performed by: FAMILY MEDICINE

## 2019-07-29 PROCEDURE — 84132 ASSAY OF SERUM POTASSIUM: CPT | Performed by: FAMILY MEDICINE

## 2019-07-29 RX ORDER — FUROSEMIDE 40 MG/1
20 TABLET ORAL DAILY PRN
Qty: 20 TABLET | Refills: 0 | Status: SHIPPED | OUTPATIENT
Start: 2019-07-29 | End: 2019-08-25

## 2019-07-29 RX ORDER — METRONIDAZOLE 7.5 MG/G
GEL VAGINAL EVERY 12 HOURS SCHEDULED
Qty: 70 G | Refills: 0 | Status: SHIPPED | OUTPATIENT
Start: 2019-07-29 | End: 2019-08-25

## 2019-07-29 RX ADMIN — METRONIDAZOLE: 7.5 GEL VAGINAL at 08:34

## 2019-07-29 RX ADMIN — PANTOPRAZOLE SODIUM 40 MG: 40 TABLET, DELAYED RELEASE ORAL at 08:31

## 2019-07-29 RX ADMIN — POTASSIUM CHLORIDE 40 MEQ: 1.5 POWDER, FOR SOLUTION ORAL at 03:00

## 2019-07-29 RX ADMIN — GABAPENTIN 100 MG: 100 CAPSULE ORAL at 08:31

## 2019-07-29 RX ADMIN — BUPROPION HYDROCHLORIDE 150 MG: 150 TABLET, EXTENDED RELEASE ORAL at 08:31

## 2019-07-29 RX ADMIN — ENOXAPARIN SODIUM 30 MG: 30 INJECTION SUBCUTANEOUS at 05:07

## 2019-07-29 RX ADMIN — DICYCLOMINE HYDROCHLORIDE 10 MG: 10 CAPSULE ORAL at 08:31

## 2019-07-30 ENCOUNTER — READMISSION MANAGEMENT (OUTPATIENT)
Dept: CALL CENTER | Facility: HOSPITAL | Age: 49
End: 2019-07-30

## 2019-07-30 NOTE — OUTREACH NOTE
Prep Survey      Responses   Facility patient discharged from?  [facility]   Is patient eligible?  Yes   Discharge diagnosis  GERD with esophagitis, IBS with both constipation and diarrhea, DYAN, dehydration   Does the patient have one of the following disease processes/diagnoses(primary or secondary)?  Other   Does the patient have Home health ordered?  No   Is there a DME ordered?  No   Comments regarding appointments  See AVS   Prep survey completed?  Yes          Cristy Larson RN

## 2019-07-31 ENCOUNTER — READMISSION MANAGEMENT (OUTPATIENT)
Dept: CALL CENTER | Facility: HOSPITAL | Age: 49
End: 2019-07-31

## 2019-07-31 NOTE — OUTREACH NOTE
Medical Week 1 Survey      Responses   Facility patient discharged from?  [place]   Does the patient have one of the following disease processes/diagnoses(primary or secondary)?  Other   Is there a successful TCM telephone encounter documented?  No   Week 1 attempt successful?  No   Unsuccessful attempts  Attempt 1          Jayna Saleem RN

## 2019-08-02 ENCOUNTER — READMISSION MANAGEMENT (OUTPATIENT)
Dept: CALL CENTER | Facility: HOSPITAL | Age: 49
End: 2019-08-02

## 2019-08-02 NOTE — OUTREACH NOTE
Medical Week 1 Survey      Responses   Facility patient discharged from?  [facility]   Does the patient have one of the following disease processes/diagnoses(primary or secondary)?  Other   Is there a successful TCM telephone encounter documented?  No   Week 1 attempt successful?  No   Unsuccessful attempts  Attempt 2          Carlo Belcher RN

## 2019-08-05 ENCOUNTER — READMISSION MANAGEMENT (OUTPATIENT)
Dept: CALL CENTER | Facility: HOSPITAL | Age: 49
End: 2019-08-05

## 2019-08-05 NOTE — OUTREACH NOTE
Medical Week 2 Survey      Responses   Facility patient discharged from?  [place]   Does the patient have one of the following disease processes/diagnoses(primary or secondary)?  Other   Week 2 attempt successful?  No   Unsuccessful attempts  Attempt 1          Ryan Ovalles RN

## 2019-08-06 ENCOUNTER — READMISSION MANAGEMENT (OUTPATIENT)
Dept: CALL CENTER | Facility: HOSPITAL | Age: 49
End: 2019-08-06

## 2019-08-06 NOTE — OUTREACH NOTE
Medical Week 2 Survey      Responses   Facility patient discharged from?  [place]   Does the patient have one of the following disease processes/diagnoses(primary or secondary)?  Other   Week 2 attempt successful?  No   Unsuccessful attempts  Attempt 2          Emilie Mcgowan RN

## 2019-08-08 ENCOUNTER — APPOINTMENT (OUTPATIENT)
Dept: CT IMAGING | Facility: HOSPITAL | Age: 49
End: 2019-08-08

## 2019-08-08 ENCOUNTER — HOSPITAL ENCOUNTER (OUTPATIENT)
Facility: HOSPITAL | Age: 49
Setting detail: OBSERVATION
Discharge: HOME | End: 2019-08-10
Attending: FAMILY MEDICINE | Admitting: FAMILY MEDICINE

## 2019-08-08 ENCOUNTER — READMISSION MANAGEMENT (OUTPATIENT)
Dept: CALL CENTER | Facility: HOSPITAL | Age: 49
End: 2019-08-08

## 2019-08-08 DIAGNOSIS — R31.9 URINARY TRACT INFECTION WITH HEMATURIA, SITE UNSPECIFIED: ICD-10-CM

## 2019-08-08 DIAGNOSIS — R55 SYNCOPE AND COLLAPSE: ICD-10-CM

## 2019-08-08 DIAGNOSIS — E87.6 HYPOKALEMIA: Primary | ICD-10-CM

## 2019-08-08 DIAGNOSIS — N39.0 URINARY TRACT INFECTION WITH HEMATURIA, SITE UNSPECIFIED: ICD-10-CM

## 2019-08-08 DIAGNOSIS — I10 ESSENTIAL HYPERTENSION, BENIGN: ICD-10-CM

## 2019-08-08 LAB
ALBUMIN SERPL-MCNC: 3.9 G/DL (ref 3.5–5.2)
ALBUMIN/GLOB SERPL: 1.2 G/DL
ALP SERPL-CCNC: 47 U/L (ref 39–117)
ALT SERPL W P-5'-P-CCNC: 15 U/L (ref 1–33)
AMPHET+METHAMPHET UR QL: NEGATIVE
AMPHETAMINES UR QL: NEGATIVE
ANION GAP SERPL CALCULATED.3IONS-SCNC: 11 MMOL/L (ref 5–15)
AST SERPL-CCNC: 21 U/L (ref 1–32)
BACTERIA UR QL AUTO: ABNORMAL /HPF
BARBITURATES UR QL SCN: NEGATIVE
BASOPHILS # BLD AUTO: 0.01 10*3/MM3 (ref 0–0.2)
BASOPHILS NFR BLD AUTO: 0.1 % (ref 0–1.5)
BENZODIAZ UR QL SCN: NEGATIVE
BILIRUB SERPL-MCNC: 0.4 MG/DL (ref 0.2–1.2)
BILIRUB UR QL STRIP: NEGATIVE
BUN BLD-MCNC: 12 MG/DL (ref 6–20)
BUN/CREAT SERPL: 15 (ref 7–25)
BUPRENORPHINE SERPL-MCNC: NEGATIVE NG/ML
CALCIUM SPEC-SCNC: 9.7 MG/DL (ref 8.6–10.5)
CANNABINOIDS SERPL QL: NEGATIVE
CHLORIDE SERPL-SCNC: 103 MMOL/L (ref 98–107)
CLARITY UR: CLEAR
CO2 SERPL-SCNC: 33 MMOL/L (ref 22–29)
COCAINE UR QL: NEGATIVE
COLOR UR: YELLOW
CREAT BLD-MCNC: 0.8 MG/DL (ref 0.57–1)
D-DIMER, QUANTITATIVE (MAD,POW, STR): 833 NG/ML (FEU) (ref 0–470)
DEPRECATED RDW RBC AUTO: 44.1 FL (ref 37–54)
EOSINOPHIL # BLD AUTO: 0.11 10*3/MM3 (ref 0–0.4)
EOSINOPHIL NFR BLD AUTO: 1.4 % (ref 0.3–6.2)
ERYTHROCYTE [DISTWIDTH] IN BLOOD BY AUTOMATED COUNT: 14.5 % (ref 12.3–15.4)
GFR SERPL CREATININE-BSD FRML MDRD: 77 ML/MIN/1.73
GLOBULIN UR ELPH-MCNC: 3.2 GM/DL
GLUCOSE BLD-MCNC: 105 MG/DL (ref 65–99)
GLUCOSE UR STRIP-MCNC: NEGATIVE MG/DL
HCT VFR BLD AUTO: 42.9 % (ref 34–46.6)
HGB BLD-MCNC: 14.1 G/DL (ref 12–15.9)
HGB UR QL STRIP.AUTO: NEGATIVE
HOLD SPECIMEN: NORMAL
HYALINE CASTS UR QL AUTO: ABNORMAL /LPF
IMM GRANULOCYTES # BLD AUTO: 0.02 10*3/MM3 (ref 0–0.05)
IMM GRANULOCYTES NFR BLD AUTO: 0.3 % (ref 0–0.5)
KETONES UR QL STRIP: NEGATIVE
LEUKOCYTE ESTERASE UR QL STRIP.AUTO: ABNORMAL
LYMPHOCYTES # BLD AUTO: 1.7 10*3/MM3 (ref 0.7–3.1)
LYMPHOCYTES NFR BLD AUTO: 21.8 % (ref 19.6–45.3)
MAGNESIUM SERPL-MCNC: 1.8 MG/DL (ref 1.6–2.6)
MCH RBC QN AUTO: 27.5 PG (ref 26.6–33)
MCHC RBC AUTO-ENTMCNC: 32.9 G/DL (ref 31.5–35.7)
MCV RBC AUTO: 83.6 FL (ref 79–97)
METHADONE UR QL SCN: NEGATIVE
MONOCYTES # BLD AUTO: 0.62 10*3/MM3 (ref 0.1–0.9)
MONOCYTES NFR BLD AUTO: 7.9 % (ref 5–12)
NEUTROPHILS # BLD AUTO: 5.34 10*3/MM3 (ref 1.7–7)
NEUTROPHILS NFR BLD AUTO: 68.5 % (ref 42.7–76)
NITRITE UR QL STRIP: NEGATIVE
NRBC BLD AUTO-RTO: 0 /100 WBC (ref 0–0.2)
OPIATES UR QL: NEGATIVE
OXYCODONE UR QL SCN: NEGATIVE
PCP UR QL SCN: NEGATIVE
PH UR STRIP.AUTO: 7 [PH] (ref 5–9)
PLATELET # BLD AUTO: 274 10*3/MM3 (ref 140–450)
PMV BLD AUTO: 11.1 FL (ref 6–12)
POTASSIUM BLD-SCNC: 2.8 MMOL/L (ref 3.5–5.2)
PROPOXYPH UR QL: NEGATIVE
PROT SERPL-MCNC: 7.1 G/DL (ref 6–8.5)
PROT UR QL STRIP: NEGATIVE
RBC # BLD AUTO: 5.13 10*6/MM3 (ref 3.77–5.28)
RBC # UR: ABNORMAL /HPF
REF LAB TEST METHOD: ABNORMAL
SODIUM BLD-SCNC: 147 MMOL/L (ref 136–145)
SP GR UR STRIP: 1.02 (ref 1–1.03)
SQUAMOUS #/AREA URNS HPF: ABNORMAL /HPF
TRICYCLICS UR QL SCN: NEGATIVE
UROBILINOGEN UR QL STRIP: ABNORMAL
WBC NRBC COR # BLD: 7.8 10*3/MM3 (ref 3.4–10.8)
WBC UR QL AUTO: ABNORMAL /HPF
WHOLE BLOOD HOLD SPECIMEN: NORMAL

## 2019-08-08 PROCEDURE — 87591 N.GONORRHOEAE DNA AMP PROB: CPT | Performed by: PHYSICIAN ASSISTANT

## 2019-08-08 PROCEDURE — G0378 HOSPITAL OBSERVATION PER HR: HCPCS

## 2019-08-08 PROCEDURE — 85379 FIBRIN DEGRADATION QUANT: CPT | Performed by: FAMILY MEDICINE

## 2019-08-08 PROCEDURE — 93010 ELECTROCARDIOGRAM REPORT: CPT | Performed by: INTERNAL MEDICINE

## 2019-08-08 PROCEDURE — 93005 ELECTROCARDIOGRAM TRACING: CPT | Performed by: PHYSICIAN ASSISTANT

## 2019-08-08 PROCEDURE — 81001 URINALYSIS AUTO W/SCOPE: CPT | Performed by: PHYSICIAN ASSISTANT

## 2019-08-08 PROCEDURE — 80306 DRUG TEST PRSMV INSTRMNT: CPT | Performed by: PHYSICIAN ASSISTANT

## 2019-08-08 PROCEDURE — 85025 COMPLETE CBC W/AUTO DIFF WBC: CPT | Performed by: PHYSICIAN ASSISTANT

## 2019-08-08 PROCEDURE — 25010000003 POTASSIUM CHLORIDE 10 MEQ/100ML SOLUTION: Performed by: PHYSICIAN ASSISTANT

## 2019-08-08 PROCEDURE — 83735 ASSAY OF MAGNESIUM: CPT | Performed by: PHYSICIAN ASSISTANT

## 2019-08-08 PROCEDURE — 96367 TX/PROPH/DG ADDL SEQ IV INF: CPT

## 2019-08-08 PROCEDURE — 80053 COMPREHEN METABOLIC PANEL: CPT | Performed by: PHYSICIAN ASSISTANT

## 2019-08-08 PROCEDURE — 96372 THER/PROPH/DIAG INJ SC/IM: CPT

## 2019-08-08 PROCEDURE — 96365 THER/PROPH/DIAG IV INF INIT: CPT

## 2019-08-08 PROCEDURE — 25010000002 ENOXAPARIN PER 10 MG: Performed by: FAMILY MEDICINE

## 2019-08-08 PROCEDURE — 87661 TRICHOMONAS VAGINALIS AMPLIF: CPT | Performed by: PHYSICIAN ASSISTANT

## 2019-08-08 PROCEDURE — 87491 CHLMYD TRACH DNA AMP PROBE: CPT | Performed by: PHYSICIAN ASSISTANT

## 2019-08-08 PROCEDURE — 25010000002 CEFTRIAXONE PER 250 MG: Performed by: PHYSICIAN ASSISTANT

## 2019-08-08 PROCEDURE — 99284 EMERGENCY DEPT VISIT MOD MDM: CPT

## 2019-08-08 PROCEDURE — 70450 CT HEAD/BRAIN W/O DYE: CPT

## 2019-08-08 RX ORDER — BUPROPION HYDROCHLORIDE 150 MG/1
150 TABLET, EXTENDED RELEASE ORAL 2 TIMES DAILY
Status: DISCONTINUED | OUTPATIENT
Start: 2019-08-08 | End: 2019-08-10 | Stop reason: HOSPADM

## 2019-08-08 RX ORDER — POTASSIUM CHLORIDE 1.5 G/1.77G
40 POWDER, FOR SOLUTION ORAL ONCE
Status: COMPLETED | OUTPATIENT
Start: 2019-08-08 | End: 2019-08-08

## 2019-08-08 RX ORDER — SODIUM CHLORIDE 0.9 % (FLUSH) 0.9 %
3-10 SYRINGE (ML) INJECTION AS NEEDED
Status: DISCONTINUED | OUTPATIENT
Start: 2019-08-08 | End: 2019-08-10 | Stop reason: HOSPADM

## 2019-08-08 RX ORDER — SODIUM CHLORIDE 450 MG/100ML
50 INJECTION, SOLUTION INTRAVENOUS CONTINUOUS
Status: ACTIVE | OUTPATIENT
Start: 2019-08-08 | End: 2019-08-09

## 2019-08-08 RX ORDER — POTASSIUM CHLORIDE 7.45 MG/ML
10 INJECTION INTRAVENOUS ONCE
Status: COMPLETED | OUTPATIENT
Start: 2019-08-08 | End: 2019-08-08

## 2019-08-08 RX ORDER — SODIUM CHLORIDE 0.9 % (FLUSH) 0.9 %
3 SYRINGE (ML) INJECTION EVERY 12 HOURS SCHEDULED
Status: DISCONTINUED | OUTPATIENT
Start: 2019-08-08 | End: 2019-08-10 | Stop reason: HOSPADM

## 2019-08-08 RX ORDER — SODIUM CHLORIDE 0.9 % (FLUSH) 0.9 %
10 SYRINGE (ML) INJECTION AS NEEDED
Status: DISCONTINUED | OUTPATIENT
Start: 2019-08-08 | End: 2019-08-10 | Stop reason: HOSPADM

## 2019-08-08 RX ORDER — ROPINIROLE 0.25 MG/1
0.25 TABLET, FILM COATED ORAL NIGHTLY
Status: DISCONTINUED | OUTPATIENT
Start: 2019-08-08 | End: 2019-08-10 | Stop reason: HOSPADM

## 2019-08-08 RX ORDER — GRANULES FOR ORAL 3 G/1
3 POWDER ORAL ONCE
Status: COMPLETED | OUTPATIENT
Start: 2019-08-08 | End: 2019-08-08

## 2019-08-08 RX ORDER — GABAPENTIN 100 MG/1
100 CAPSULE ORAL 2 TIMES DAILY
Status: DISCONTINUED | OUTPATIENT
Start: 2019-08-08 | End: 2019-08-10 | Stop reason: HOSPADM

## 2019-08-08 RX ORDER — TRAMADOL HYDROCHLORIDE 50 MG/1
50 TABLET ORAL EVERY 8 HOURS PRN
Status: DISCONTINUED | OUTPATIENT
Start: 2019-08-08 | End: 2019-08-10 | Stop reason: HOSPADM

## 2019-08-08 RX ORDER — DICYCLOMINE HYDROCHLORIDE 10 MG/1
10 CAPSULE ORAL 3 TIMES DAILY
Status: DISCONTINUED | OUTPATIENT
Start: 2019-08-08 | End: 2019-08-10 | Stop reason: HOSPADM

## 2019-08-08 RX ADMIN — BUPROPION HYDROCHLORIDE 150 MG: 150 TABLET, EXTENDED RELEASE ORAL at 22:00

## 2019-08-08 RX ADMIN — ENOXAPARIN SODIUM 40 MG: 40 INJECTION SUBCUTANEOUS at 17:25

## 2019-08-08 RX ADMIN — DICYCLOMINE HYDROCHLORIDE 10 MG: 10 CAPSULE ORAL at 17:25

## 2019-08-08 RX ADMIN — SODIUM CHLORIDE, PRESERVATIVE FREE 3 ML: 5 INJECTION INTRAVENOUS at 22:00

## 2019-08-08 RX ADMIN — SODIUM CHLORIDE 50 ML/HR: 4.5 INJECTION, SOLUTION INTRAVENOUS at 18:50

## 2019-08-08 RX ADMIN — DICYCLOMINE HYDROCHLORIDE 10 MG: 10 CAPSULE ORAL at 22:00

## 2019-08-08 RX ADMIN — FOSFOMYCIN TROMETHAMINE 3 G: 3 POWDER ORAL at 18:43

## 2019-08-08 RX ADMIN — ROPINIROLE HYDROCHLORIDE 0.25 MG: 0.25 TABLET, FILM COATED ORAL at 22:00

## 2019-08-08 RX ADMIN — POTASSIUM CHLORIDE 40 MEQ: 1.5 POWDER, FOR SOLUTION ORAL at 17:25

## 2019-08-08 RX ADMIN — POTASSIUM CHLORIDE 10 MEQ: 7.46 INJECTION, SOLUTION INTRAVENOUS at 13:57

## 2019-08-08 RX ADMIN — CEFTRIAXONE SODIUM 1 G: 1 INJECTION, POWDER, FOR SOLUTION INTRAMUSCULAR; INTRAVENOUS at 12:59

## 2019-08-08 RX ADMIN — GABAPENTIN 100 MG: 100 CAPSULE ORAL at 22:00

## 2019-08-08 NOTE — OUTREACH NOTE
Medical Week 3 Survey      Responses   Facility patient discharged from?  [place]   Does the patient have one of the following disease processes/diagnoses(primary or secondary)?  Other   Week 3 attempt successful?  No   Revoke  Readmitted          Giuliana Braden RN

## 2019-08-08 NOTE — ED PROVIDER NOTES
"Subjective   Patient presents to emergency department for positional dizziness.  States She woke up several times last night with \"swimmy feeling in head\" which is worse with standing up or laying flat.  States she has had some hypokalemia, low blood pressure, and elevated creatinine over the past week which is being treated by her PCP.  Denies vomiting, fever, chills, chest pain, abdominal pain, palpitations.          History provided by:  Patient   used: No    Dizziness   Quality:  Lightheadedness and vertigo  Severity:  Moderate  Onset quality:  Sudden  Duration:  8 hours  Timing:  Intermittent  Progression:  Unchanged  Chronicity:  New  Associated symptoms: nausea    Associated symptoms: no chest pain, no shortness of breath, no vomiting and no weakness    Nausea   The primary symptoms include nausea. Primary symptoms do not include fever, vomiting or dysuria.   The illness does not include chills or back pain.       Review of Systems   Constitutional: Negative for chills and fever.   HENT: Negative for sore throat and trouble swallowing.    Eyes: Negative for visual disturbance.   Respiratory: Negative for shortness of breath and wheezing.    Cardiovascular: Negative for chest pain.   Gastrointestinal: Positive for nausea. Negative for vomiting.   Genitourinary: Negative for dysuria and flank pain.   Musculoskeletal: Negative for back pain and neck pain.   Skin: Negative for color change.   Allergic/Immunologic: Negative for immunocompromised state.   Neurological: Positive for dizziness. Negative for syncope and weakness.   Hematological: Does not bruise/bleed easily.   Psychiatric/Behavioral: Negative for confusion.       Past Medical History:   Diagnosis Date   • Acid reflux    • Anxiety    • Depressed    • Dysphagia    • GERD (gastroesophageal reflux disease)    • Hard to intubate    • Hyperlipidemia    • Hypertension    • IBS (irritable bowel syndrome)    • Nausea    • Sleep apnea  "       Allergies   Allergen Reactions   • Sulfa Antibiotics Anaphylaxis and GI Intolerance       Past Surgical History:   Procedure Laterality Date   • ABDOMINAL SURGERY     • CARDIAC CATHETERIZATION N/A 8/13/2018    Procedure: Left Heart Cath 8/13/2018 @ 9;00;  Surgeon: Kuldip Frank MD;  Location: [facility] CATH INVASIVE LOCATION;  Service: Cardiology   • COLONOSCOPY N/A 11/19/2018    Procedure: COLONOSCOPY;  Surgeon: Bro Whitaker MD;  Location: [facility] ENDOSCOPY;  Service: Gastroenterology   • CYSTOSCOPY     • ENDOMETRIAL ABLATION  2015   • ENDOSCOPY N/A 11/19/2018    Procedure: ESOPHAGOGASTRODUODENOSCOPYwith dilation;  Surgeon: Bro Whitaker MD;  Location: [facility] ENDOSCOPY;  Service: Gastroenterology   • LAPAROSCOPIC TUBAL LIGATION  1995   • DE ERCP DX COLLECTION SPECIMEN BRUSHING/WASHING Left 7/31/2017    Procedure: ENDOSCOPIC RETROGRADE CHOLANGIOPANCREATOGRAPHY;  Surgeon: Samuel Redding DO;  Location: [facility] ENDOSCOPY;  Service: Gastroenterology   • DE LAP,CHOLECYSTECTOMY N/A 7/24/2017    Procedure: CHOLECYSTECTOMY LAPAROSCOPIC, also umbillical hernia repair;  Surgeon: Pk العلي MD;  Location: [facility] OR;  Service: General   • UPPER GASTROINTESTINAL ENDOSCOPY  11/19/2018       Family History   Problem Relation Age of Onset   • Diabetes Mother    • Hypertension Mother    • Cirrhosis Mother    • Hypertension Father        Social History     Socioeconomic History   • Marital status:      Spouse name: Not on file   • Number of children: Not on file   • Years of education: Not on file   • Highest education level: Not on file   Tobacco Use   • Smoking status: Never Smoker   • Smokeless tobacco: Never Used   Substance and Sexual Activity   • Alcohol use: Yes     Comment: socially   • Drug use: No   • Sexual activity: Defer     Birth control/protection: Surgical           Objective      /71 (BP Location: Left arm, Patient Position: Sitting)   Pulse 54   Temp 97.4 °F (36.3 °C) (Oral)   Resp  "16   Ht 165.1 cm (65\")   Wt 121 kg (266 lb)   SpO2 97%   BMI 44.26 kg/m²     Physical Exam   Constitutional: She is oriented to person, place, and time. She appears well-developed and well-nourished. No distress.   HENT:   Head: Normocephalic and atraumatic.   Eyes: Conjunctivae and EOM are normal. Pupils are equal, round, and reactive to light.   Cardiovascular: Normal rate, regular rhythm, normal heart sounds and intact distal pulses.   Pulmonary/Chest: Effort normal and breath sounds normal. No respiratory distress. She has no wheezes.   Abdominal: Soft. Bowel sounds are normal. She exhibits no distension and no mass. There is no tenderness. There is no rebound and no guarding.   Musculoskeletal: She exhibits no edema.   Neurological: She is alert and oriented to person, place, and time.   Skin: Capillary refill takes less than 2 seconds.   Psychiatric: She has a normal mood and affect. Her behavior is normal. Thought content normal.   Nursing note and vitals reviewed.      ECG 12 Lead    Date/Time: 8/8/2019 12:38 PM  Performed by: Jong Churchill PA-C  Authorized by: Jong Churchill PA-C   Interpreted by physician  Comparison: compared with previous ECG from 7/26/2019  Comparison to previous ECG: Flattened T waves  Rhythm: sinus bradycardia  Rate: bradycardic  BPM: 49  ST Segments: ST segments normal  T flattening: all  Clinical impression: abnormal ECG                 ED Course      Results for orders placed or performed during the hospital encounter of 08/08/19   Comprehensive Metabolic Panel   Result Value Ref Range    Glucose 105 (H) 65 - 99 mg/dL    BUN 12 6 - 20 mg/dL    Creatinine 0.80 0.57 - 1.00 mg/dL    Sodium 147 (H) 136 - 145 mmol/L    Potassium 2.8 (L) 3.5 - 5.2 mmol/L    Chloride 103 98 - 107 mmol/L    CO2 33.0 (H) 22.0 - 29.0 mmol/L    Calcium 9.7 8.6 - 10.5 mg/dL    Total Protein 7.1 6.0 - 8.5 g/dL    Albumin 3.90 3.50 - 5.20 g/dL    ALT (SGPT) 15 1 - 33 U/L    AST (SGOT) 21 1 - " 32 U/L    Alkaline Phosphatase 47 39 - 117 U/L    Total Bilirubin 0.4 0.2 - 1.2 mg/dL    eGFR Non African Amer 77 >60 mL/min/1.73    Globulin 3.2 gm/dL    A/G Ratio 1.2 g/dL    BUN/Creatinine Ratio 15.0 7.0 - 25.0    Anion Gap 11.0 5.0 - 15.0 mmol/L   Magnesium   Result Value Ref Range    Magnesium 1.8 1.6 - 2.6 mg/dL   CBC Auto Differential   Result Value Ref Range    WBC 7.80 3.40 - 10.80 10*3/mm3    RBC 5.13 3.77 - 5.28 10*6/mm3    Hemoglobin 14.1 12.0 - 15.9 g/dL    Hematocrit 42.9 34.0 - 46.6 %    MCV 83.6 79.0 - 97.0 fL    MCH 27.5 26.6 - 33.0 pg    MCHC 32.9 31.5 - 35.7 g/dL    RDW 14.5 12.3 - 15.4 %    RDW-SD 44.1 37.0 - 54.0 fl    MPV 11.1 6.0 - 12.0 fL    Platelets 274 140 - 450 10*3/mm3    Neutrophil % 68.5 42.7 - 76.0 %    Lymphocyte % 21.8 19.6 - 45.3 %    Monocyte % 7.9 5.0 - 12.0 %    Eosinophil % 1.4 0.3 - 6.2 %    Basophil % 0.1 0.0 - 1.5 %    Immature Grans % 0.3 0.0 - 0.5 %    Neutrophils, Absolute 5.34 1.70 - 7.00 10*3/mm3    Lymphocytes, Absolute 1.70 0.70 - 3.10 10*3/mm3    Monocytes, Absolute 0.62 0.10 - 0.90 10*3/mm3    Eosinophils, Absolute 0.11 0.00 - 0.40 10*3/mm3    Basophils, Absolute 0.01 0.00 - 0.20 10*3/mm3    Immature Grans, Absolute 0.02 0.00 - 0.05 10*3/mm3    nRBC 0.0 0.0 - 0.2 /100 WBC   Urinalysis With Microscopic If Indicated (No Culture) - Urine, Clean Catch   Result Value Ref Range    Color, UA Yellow Yellow, Straw, Dark Yellow, Angela    Appearance, UA Clear Clear    pH, UA 7.0 5.0 - 9.0    Specific Gravity, UA 1.016 1.003 - 1.030    Glucose, UA Negative Negative    Ketones, UA Negative Negative    Bilirubin, UA Negative Negative    Blood, UA Negative Negative    Protein, UA Negative Negative    Leuk Esterase, UA Small (1+) (A) Negative    Nitrite, UA Negative Negative    Urobilinogen, UA 1.0 E.U./dL 0.2 - 1.0 E.U./dL   Urinalysis, Microscopic Only - Urine, Clean Catch   Result Value Ref Range    RBC, UA 0-2 (A) None Seen /HPF    WBC, UA 6-12 (A) None Seen, 0-2, 3-5 /HPF     Bacteria, UA 2+ (A) None Seen /HPF    Squamous Epithelial Cells, UA 3-5 (A) None Seen, 0-2 /HPF    Hyaline Casts, UA 3-6 None Seen /LPF    Methodology Automated Microscopy    Urine Drug Screen - Urine, Clean Catch   Result Value Ref Range    THC, Screen, Urine Negative Negative    Phencyclidine (PCP), Urine Negative Negative    Cocaine Screen, Urine Negative Negative    Methamphetamine, Ur Negative Negative    Opiate Screen Negative Negative    Amphetamine Screen, Urine Negative Negative    Benzodiazepine Screen, Urine Negative Negative    Tricyclic Antidepressants Screen Negative Negative    Methadone Screen, Urine Negative Negative    Barbiturates Screen, Urine Negative Negative    Oxycodone Screen, Urine Negative Negative    Propoxyphene Screen Negative Negative    Buprenorphine, Screen, Urine Negative Negative   Light Blue Top   Result Value Ref Range    Extra Tube hold for add-on    Gold Top - SST   Result Value Ref Range    Extra Tube Hold for add-ons.        Ct Head Without Contrast    Result Date: 8/8/2019  Narrative: PROCEDURE: CT HEAD WO CONTRAST INDICATION:  Dizziness COMPARISON:  MRI brain with and without contrast dated 5/10/2018 TECHNIQUE:  CT head, without iv contrast.  This exam was performed according to our departmental dose-optimization program, which includes automated exposure control, adjustment of the mA and/or kV according to patient size and/or use of iterative reconstruction technique. DLP is 873.5  FINDINGS: The cerebral parenchyma is within normal limits for age. There is preservation of the gray-white matter differentiation.  No focal parenchymal lesions. There is no evidence of a hemorrhage or intracranial mass. There is no midline shift. The ventricles are normal in size and configuration. The brain stem, cerebellum, globes, paranasal sinuses, and osseous structures are within normal limits.     Impression: No acute intracranial abnormality. If pain or symptoms persist beyond  reasonable expectations, an MRI examination is suggested as is deemed clinically appropriate. Electronically signed by:  Elsy Carter MD  8/8/2019 1:45 PM CDT Workstation: [id number]    Xr Chest 1 View    Result Date: 7/26/2019  Narrative: PORTABLE CHEST HISTORY: Chest pain Portable AP upright film of the chest was obtained at 7:29 PM. COMPARISON: January 15, 2018 FINDINGS: EKG leads. The lungs are clear of an acute process. Stable minimal elevation of the right hemidiaphragm. The heart is not enlarged. The pulmonary vasculature is not increased. No pleural effusion. No pneumothorax. No acute osseous abnormality. Degenerative changes are present in the thoracic spine.     Impression: CONCLUSION: No Acute Disease 56503 Electronically signed by:  Christiano Enciso MD  7/26/2019 8:20 PM CDT Workstation: [id number]    Patient admitted to hospitalist.          [facility]      Final diagnoses:   Hypokalemia   Urinary tract infection with hematuria, site unspecified            Jong Churchill PA-C  08/08/19 2900

## 2019-08-08 NOTE — ED TRIAGE NOTES
Pt c/o dizziness and nausea since around 0200 this am. Pt states the dizziness is worse with positional changes.

## 2019-08-08 NOTE — PLAN OF CARE
Problem: Fluid Volume Deficit (Adult)  Goal: Optimal Fluid Balance  Outcome: Ongoing (interventions implemented as appropriate)   08/08/19 1555   Fluid Volume Deficit (Adult)   Optimal Fluid Balance making progress toward outcome

## 2019-08-08 NOTE — PLAN OF CARE
Problem: Patient Care Overview  Goal: Plan of Care Review  Outcome: Ongoing (interventions implemented as appropriate)   08/08/19 1600   Coping/Psychosocial   Plan of Care Reviewed With patient   Plan of Care Review   Progress no change

## 2019-08-08 NOTE — ED NOTES
Unsuccessful IV attempts by this RN - another RN to attempt.      Chiquita Dominguez, MARCELA  08/08/19 4612

## 2019-08-08 NOTE — H&P
HISTORY AND PHYSICAL  NAME: Maricarmen Ferris  : 1970  MRN: [id number]    DATE OF ADMISSION: 19    DATE & TIME SEEN: 19 2:10 PM    PCP: Alethea Ash MD    CODE STATUS:   Code Status and Medical Interventions:   Ordered at: 19 1615     Code Status:    CPR     Medical Interventions (Level of Support Prior to Arrest):    Full       CHIEF COMPLAINT Dizziness    HPI:  Maricarmen Ferris is a 48 y.o. female with medical history significant for GERD, depression, morbid obesity s/p gastric sleeve, ELSIE, HLD, and HTN presents with dizziness.     Patient states that she woke up this morning and was dizzy and lightheaded upon standing. She took her BP and checked her pulse at that time. She reports her blood pressure was approximately 140/90 and her pulse was in the low 50's. It was an isolated incident. Patient states that she has a resting heart rate near 60. Patient denies fever, chills, nausea, vomiting, diaphoresis, chest pain, dyspnea, or syncope.  Questionable near syncope on interview.    At time of exam she is resting comfortably on the side of her bed in no acute distress.      CONCURRENT MEDICAL HISTORY:  Past Medical History:   Diagnosis Date   • Acid reflux    • Anxiety    • Depressed    • Dysphagia    • GERD (gastroesophageal reflux disease)    • Hard to intubate    • Hyperlipidemia    • Hypertension    • IBS (irritable bowel syndrome)    • Nausea    • Sleep apnea        PAST SURGICAL HISTORY:  Past Surgical History:   Procedure Laterality Date   • ABDOMINAL SURGERY     • CARDIAC CATHETERIZATION N/A 2018    Procedure: Left Heart Cath 2018 @ 9;00;  Surgeon: Kuldip Frank MD;  Location: [facility] CATH INVASIVE LOCATION;  Service: Cardiology   • COLONOSCOPY N/A 2018    Procedure: COLONOSCOPY;  Surgeon: Bro Whitaker MD;  Location: [facility] ENDOSCOPY;  Service: Gastroenterology   • CYSTOSCOPY     • ENDOMETRIAL ABLATION     • ENDOSCOPY N/A 2018     Procedure: ESOPHAGOGASTRODUODENOSCOPYwith dilation;  Surgeon: Bro Whitaker MD;  Location: [facility] ENDOSCOPY;  Service: Gastroenterology   • LAPAROSCOPIC TUBAL LIGATION  1995   • OR ERCP DX COLLECTION SPECIMEN BRUSHING/WASHING Left 7/31/2017    Procedure: ENDOSCOPIC RETROGRADE CHOLANGIOPANCREATOGRAPHY;  Surgeon: Samuel Redding DO;  Location: [facility] ENDOSCOPY;  Service: Gastroenterology   • OR LAP,CHOLECYSTECTOMY N/A 7/24/2017    Procedure: CHOLECYSTECTOMY LAPAROSCOPIC, also umbillical hernia repair;  Surgeon: Pk العلي MD;  Location: [facility] OR;  Service: General   • UPPER GASTROINTESTINAL ENDOSCOPY  11/19/2018       FAMILY HISTORY:  Family History   Problem Relation Age of Onset   • Diabetes Mother    • Hypertension Mother    • Cirrhosis Mother    • Hypertension Father         SOCIAL HISTORY:  Social History     Socioeconomic History   • Marital status:      Spouse name: Not on file   • Number of children: Not on file   • Years of education: Not on file   • Highest education level: Not on file   Tobacco Use   • Smoking status: Never Smoker   • Smokeless tobacco: Never Used   Substance and Sexual Activity   • Alcohol use: Yes     Comment: socially   • Drug use: No   • Sexual activity: Defer     Birth control/protection: Surgical       HOME MEDICATIONS:  Prior to Admission medications    Medication Sig Start Date End Date Taking? Authorizing Provider   albuterol sulfate  (90 Base) MCG/ACT inhaler Inhale 2 puffs Every 4 (Four) Hours As Needed for Wheezing or Shortness of Air. 3/28/19  Yes Rachel Enciso MD   buPROPion SR (WELLBUTRIN SR) 150 MG 12 hr tablet Take 150 mg by mouth 2 (Two) Times a Day. 3/22/19  Yes ProviderHowie MD   Calcium Carb-Cholecalciferol (CALCIUM 1000 + D) 1000-800 MG-UNIT tablet Take 1 tablet by mouth Daily.   Yes ProviderHowie MD   dicyclomine (BENTYL) 10 MG capsule Take 10 mg by mouth 3 (Three) Times a Day. 9/19/18  Yes Emergency, Nurse Jaylon, RN    Fluticasone Furoate 200 MCG/ACT aerosol powder  Inhale 1 puff Daily. Rinse mouth well after use 1/18/19  Yes Rachel Enciso MD   furosemide (LASIX) 40 MG tablet Take 0.5 tablets by mouth Daily As Needed (for swelling). 7/29/19  Yes Fede Moreau MD   gabapentin (NEURONTIN) 100 MG capsule Take 100 mg by mouth 2 (Two) Times a Day.   Yes Howie Phillips MD   metroNIDAZOLE (METROGEL) 0.75 % vaginal gel Insert  into the vagina Every 12 (Twelve) Hours. 7/29/19  Yes Fede Moreau MD   pantoprazole (PROTONIX) 40 MG EC tablet Take 40 mg by mouth Daily. 7/10/17  Yes Howie Phillips MD   phenazopyridine (PYRIDIUM) 200 MG tablet Take 1 tablet by mouth 3 (Three) Times a Day As Needed for bladder spasms. 6/22/19  Yes Jah Barrios MD   potassium chloride (K-DUR) 10 MEQ CR tablet Take 1 tablet by mouth Daily. 6/22/19  Yes Jah Barrios MD   rOPINIRole (REQUIP) 0.25 MG tablet Take 0.25 mg by mouth Every Night. 2/6/18  Yes Emergency, Nurse Epic, RN   Sertraline HCl (ZOLOFT PO) Take 150 mg by mouth Daily.   Yes Howie Phillips MD   traMADol (ULTRAM) 50 MG tablet Take 50 mg by mouth Every 8 (Eight) Hours As Needed for Moderate Pain .   Yes Howie Phillips MD   fenofibrate (TRICOR) 145 MG tablet Take 145 mg by mouth Daily. 3/5/19   Howie Phillips MD       ALLERGIES:  Sulfa antibiotics    REVIEW OF SYSTEMS  Review of Systems   Constitutional: Negative for activity change, appetite change, fatigue and fever.   Respiratory: Negative for cough and shortness of breath.    Cardiovascular: Negative for chest pain and palpitations.   Gastrointestinal: Negative for abdominal pain and nausea.   Genitourinary: Positive for dysuria. Negative for difficulty urinating.   Musculoskeletal: Negative for arthralgias and gait problem.   Skin: Negative for color change and rash.   Neurological: Positive for dizziness and light-headedness. Negative for weakness and headaches.   Psychiatric/Behavioral: Negative  for agitation, confusion and sleep disturbance.       PHYSICAL EXAM:  Temp:  [97.4 °F (36.3 °C)] 97.4 °F (36.3 °C)  Heart Rate:  [46-68] 54  Resp:  [16-18] 16  BP: (112-158)/(63-86) 125/71  Body mass index is 44.26 kg/m².     Physical Exam   Constitutional: She is oriented to person, place, and time. She appears well-developed and well-nourished. No distress.   HENT:   Head: Normocephalic and atraumatic.   Right Ear: External ear normal.   Left Ear: External ear normal.   Nose: Nose normal.   Eyes: Conjunctivae and EOM are normal. Pupils are equal, round, and reactive to light.   Neck: Neck supple. No thyromegaly present.   Cardiovascular: Normal rate, regular rhythm and normal heart sounds.   Pulmonary/Chest: Effort normal and breath sounds normal. No respiratory distress. She has no wheezes. She has no rales. She exhibits no tenderness.   Abdominal: Soft. Bowel sounds are normal. She exhibits no distension and no mass. There is no tenderness. There is no rebound and no guarding.   Musculoskeletal: Normal range of motion. She exhibits no edema.   Neurological: She is alert and oriented to person, place, and time.   Skin: Skin is warm and dry. No rash noted. She is not diaphoretic. No erythema. No pallor.   Psychiatric: She has a normal mood and affect. Her behavior is normal.   Nursing note and vitals reviewed.      DIAGNOSTIC DATA:   Lab Results (last 24 hours)     Procedure Component Value Units Date/Time    Urine Drug Screen - Urine, Clean Catch [[id number]]  (Normal) Collected:  08/08/19 1046    Specimen:  Urine, Clean Catch Updated:  08/08/19 1311     THC, Screen, Urine Negative     Phencyclidine (PCP), Urine Negative     Cocaine Screen, Urine Negative     Methamphetamine, Ur Negative     Opiate Screen Negative     Amphetamine Screen, Urine Negative     Benzodiazepine Screen, Urine Negative     Tricyclic Antidepressants Screen Negative     Methadone Screen, Urine Negative     Barbiturates Screen, Urine Negative      Oxycodone Screen, Urine Negative     Propoxyphene Screen Negative     Buprenorphine, Screen, Urine Negative    Narrative:       Cutoff For Drugs Screened:    Amphetamines               500 ng/ml  Barbiturates               200 ng/ml  Benzodiazepines            150 ng/ml  Cocaine                    150 ng/ml  Methadone                  200 ng/ml  Opiates                    100 ng/ml  Phencyclidine               25 ng/ml  THC                            50 ng/ml  Methamphetamine            500 ng/ml  Tricyclic Antidepressants  300 ng/ml  Oxycodone                  100 ng/ml  Propoxyphene               300 ng/ml  Buprenorphine               10 ng/ml    The normal value for all drugs tested is negative. This report includes unconfirmed screening results, with the cutoff values listed, to be used for medical treatment purposes only.  Unconfirmed results must not be used for non-medical purposes such as employment or legal testing.  Clinical consideration should be applied to any drug of abuse test, particularly when unconfirmed results are used.      Extra Tubes [[id number]] Collected:  08/08/19 1142    Specimen:  Blood, Venous Line Updated:  08/08/19 1246    Narrative:       The following orders were created for panel order Extra Tubes.  Procedure                               Abnormality         Status                     ---------                               -----------         ------                     Light Blue Top[[id number]]                                   Final result               Gold Top - SST[[id number]]                                   Final result                 Please view results for these tests on the individual orders.    Light Blue Top [[id number]] Collected:  08/08/19 1142    Specimen:  Blood Updated:  08/08/19 1246     Extra Tube hold for add-on     Comment: Auto resulted       Gold Top - SST [[id number]] Collected:  08/08/19 1142    Specimen:  Blood Updated:  08/08/19 1246     Extra Tube Hold  for add-ons.     Comment: Auto resulted.       Chlamydia trachomatis, Neisseria gonorrhoeae, Trichomonas vaginalis, PCR - Urine, Urine, Clean Catch [[id number]] Collected:  08/08/19 1046    Specimen:  Urine, Clean Catch Updated:  08/08/19 1241    Comprehensive Metabolic Panel [[id number]]  (Abnormal) Collected:  08/08/19 1141    Specimen:  Blood Updated:  08/08/19 1218     Glucose 105 mg/dL      BUN 12 mg/dL      Creatinine 0.80 mg/dL      Sodium 147 mmol/L      Potassium 2.8 mmol/L      Chloride 103 mmol/L      CO2 33.0 mmol/L      Calcium 9.7 mg/dL      Total Protein 7.1 g/dL      Albumin 3.90 g/dL      ALT (SGPT) 15 U/L      AST (SGOT) 21 U/L      Alkaline Phosphatase 47 U/L      Total Bilirubin 0.4 mg/dL      eGFR Non African Amer 77 mL/min/1.73      Globulin 3.2 gm/dL      A/G Ratio 1.2 g/dL      BUN/Creatinine Ratio 15.0     Anion Gap 11.0 mmol/L     Narrative:       GFR Normal >60  Chronic Kidney Disease <60  Kidney Failure <15    Magnesium [[id number]]  (Normal) Collected:  08/08/19 1141    Specimen:  Blood Updated:  08/08/19 1212     Magnesium 1.8 mg/dL     CBC & Differential [[id number]] Collected:  08/08/19 1146    Specimen:  Blood Updated:  08/08/19 1147    Narrative:       The following orders were created for panel order CBC & Differential.  Procedure                               Abnormality         Status                     ---------                               -----------         ------                     CBC Auto Differential[[id number]]        Normal              Final result                 Please view results for these tests on the individual orders.    CBC Auto Differential [[id number]]  (Normal) Collected:  08/08/19 1146    Specimen:  Blood Updated:  08/08/19 1147     WBC 7.80 10*3/mm3      RBC 5.13 10*6/mm3      Hemoglobin 14.1 g/dL      Hematocrit 42.9 %      MCV 83.6 fL      MCH 27.5 pg      MCHC 32.9 g/dL      RDW 14.5 %      RDW-SD 44.1 fl      MPV 11.1 fL      Platelets 274 10*3/mm3       Neutrophil % 68.5 %      Lymphocyte % 21.8 %      Monocyte % 7.9 %      Eosinophil % 1.4 %      Basophil % 0.1 %      Immature Grans % 0.3 %      Neutrophils, Absolute 5.34 10*3/mm3      Lymphocytes, Absolute 1.70 10*3/mm3      Monocytes, Absolute 0.62 10*3/mm3      Eosinophils, Absolute 0.11 10*3/mm3      Basophils, Absolute 0.01 10*3/mm3      Immature Grans, Absolute 0.02 10*3/mm3      nRBC 0.0 /100 WBC     Urinalysis With Microscopic If Indicated (No Culture) - Urine, Clean Catch [[id number]]  (Abnormal) Collected:  08/08/19 1046    Specimen:  Urine, Clean Catch Updated:  08/08/19 1056     Color, UA Yellow     Appearance, UA Clear     pH, UA 7.0     Specific Gravity, UA 1.016     Glucose, UA Negative     Ketones, UA Negative     Bilirubin, UA Negative     Blood, UA Negative     Protein, UA Negative     Leuk Esterase, UA Small (1+)     Nitrite, UA Negative     Urobilinogen, UA 1.0 E.U./dL    Urinalysis, Microscopic Only - Urine, Clean Catch [[id number]]  (Abnormal) Collected:  08/08/19 1046    Specimen:  Urine, Clean Catch Updated:  08/08/19 1056     RBC, UA 0-2 /HPF      WBC, UA 6-12 /HPF      Bacteria, UA 2+ /HPF      Squamous Epithelial Cells, UA 3-5 /HPF      Hyaline Casts, UA 3-6 /LPF      Methodology Automated Microscopy        Estimated Creatinine Clearance: 112.1 mL/min (by C-G formula based on SCr of 0.8 mg/dL).     Imaging Results (last 24 hours)     Procedure Component Value Units Date/Time    CT Head Without Contrast [[id number]] Collected:  08/08/19 1328     Updated:  08/08/19 1346    Narrative:       PROCEDURE: CT HEAD WO CONTRAST    INDICATION:  Dizziness    COMPARISON:  MRI brain with and without contrast dated 5/10/2018    TECHNIQUE:  CT head, without iv contrast.      This exam was performed according to our departmental  dose-optimization program, which includes automated exposure  control, adjustment of the mA and/or kV according to patient size  and/or use of iterative reconstruction  technique.  DLP is 873.5     FINDINGS:    The cerebral parenchyma is within normal limits for age.    There is preservation of the gray-white matter differentiation.      No focal parenchymal lesions.    There is no evidence of a hemorrhage or intracranial mass.    There is no midline shift.    The ventricles are normal in size and configuration.    The brain stem, cerebellum, globes, paranasal sinuses, and  osseous structures are within normal limits.        Impression:       No acute intracranial abnormality.    If pain or symptoms persist beyond reasonable expectations, an  MRI examination is suggested as is deemed clinically appropriate.    Electronically signed by:  Elsy Carter MD  8/8/2019 1:45 PM CDT  Workstation: [id number]          I reviewed the patient's new clinical results.    ASSESSMENT AND PLAN: This is a 48 y.o. female with:    Active Hospital Problems    Diagnosis POA   • Hypokalemia [E87.6] Yes       #. Dizziness. ECHO. IVF's. Suspect related to sinus bradycardia. Orthostatics.  #. Sinus bradycardia. Symptomatic. ECHO. No milagro blocking agents identified. Tele. Consult cards as needed.  #. Hypokalemia. Replace. Mag WNL.  Results from last 7 days   Lab Units 08/08/19  1141   POTASSIUM mmol/L 2.8*   MAGNESIUM mg/dL 1.8   #. Depression. Wellbutrin. Zoloft.  #. Chronic pain. Gabapentin.  #. RLS. Requip.  #. Dysuria. UA reviewed. Culture ordered. Fosfomycin.  #. Morbid obesity. Dietary. S/p gastric sleeve.  Body mass index is 43.01 kg/m².      Will monitor patient's hospital course and adjust treatment as hospital course dictates.    DVT prophylaxis: Lovenox  Code status is   Code Status and Medical Interventions:   Ordered at: 08/08/19 1615     Code Status:    CPR     Medical Interventions (Level of Support Prior to Arrest):    Full      [facility] # [id number], reviewed and consistent with patient reported medications.      I discussed the patients findings and my recommendations with patient and nursing  staff.           This document has been electronically signed by Chito Melvin MD on August 8, 2019 2:10 PM

## 2019-08-09 ENCOUNTER — APPOINTMENT (OUTPATIENT)
Dept: CARDIOLOGY | Facility: HOSPITAL | Age: 49
End: 2019-08-09

## 2019-08-09 ENCOUNTER — APPOINTMENT (OUTPATIENT)
Dept: CT IMAGING | Facility: HOSPITAL | Age: 49
End: 2019-08-09

## 2019-08-09 LAB
ANION GAP SERPL CALCULATED.3IONS-SCNC: 10 MMOL/L (ref 5–15)
BASOPHILS # BLD AUTO: 0.03 10*3/MM3 (ref 0–0.2)
BASOPHILS NFR BLD AUTO: 0.5 % (ref 0–1.5)
BH CV ECHO MEAS - ACS: 1.7 CM
BH CV ECHO MEAS - AO ISTHMUS: 2.4 CM
BH CV ECHO MEAS - AO MAX PG (FULL): 4.6 MMHG
BH CV ECHO MEAS - AO MAX PG: 6.9 MMHG
BH CV ECHO MEAS - AO MEAN PG (FULL): 3 MMHG
BH CV ECHO MEAS - AO MEAN PG: 4 MMHG
BH CV ECHO MEAS - AO ROOT AREA (BSA CORRECTED): 1.4
BH CV ECHO MEAS - AO ROOT AREA: 7.1 CM^2
BH CV ECHO MEAS - AO ROOT DIAM: 3 CM
BH CV ECHO MEAS - AO V2 MAX: 131 CM/SEC
BH CV ECHO MEAS - AO V2 MEAN: 90.1 CM/SEC
BH CV ECHO MEAS - AO V2 VTI: 26.8 CM
BH CV ECHO MEAS - ASC AORTA: 3.2 CM
BH CV ECHO MEAS - AVA(I,A): 2 CM^2
BH CV ECHO MEAS - AVA(I,D): 2 CM^2
BH CV ECHO MEAS - AVA(V,A): 1.8 CM^2
BH CV ECHO MEAS - AVA(V,D): 1.8 CM^2
BH CV ECHO MEAS - BSA(HAYCOCK): 2.4 M^2
BH CV ECHO MEAS - BSA: 2.2 M^2
BH CV ECHO MEAS - BZI_BMI: 42.9 KILOGRAMS/M^2
BH CV ECHO MEAS - BZI_METRIC_HEIGHT: 165.1 CM
BH CV ECHO MEAS - BZI_METRIC_WEIGHT: 117 KG
BH CV ECHO MEAS - EDV(CUBED): 84.6 ML
BH CV ECHO MEAS - EDV(MOD-SP2): 73.4 ML
BH CV ECHO MEAS - EDV(MOD-SP4): 121 ML
BH CV ECHO MEAS - EDV(TEICH): 87.2 ML
BH CV ECHO MEAS - EF(CUBED): 67.4 %
BH CV ECHO MEAS - EF(MOD-SP2): 53.3 %
BH CV ECHO MEAS - EF(MOD-SP4): 56.3 %
BH CV ECHO MEAS - EF(TEICH): 59.2 %
BH CV ECHO MEAS - ESV(CUBED): 27.5 ML
BH CV ECHO MEAS - ESV(MOD-SP2): 34.3 ML
BH CV ECHO MEAS - ESV(MOD-SP4): 52.9 ML
BH CV ECHO MEAS - ESV(TEICH): 35.6 ML
BH CV ECHO MEAS - FS: 31.2 %
BH CV ECHO MEAS - IVS/LVPW: 1.1
BH CV ECHO MEAS - IVSD: 1.3 CM
BH CV ECHO MEAS - LA DIMENSION: 4.2 CM
BH CV ECHO MEAS - LA/AO: 1.4
BH CV ECHO MEAS - LV DIASTOLIC VOL/BSA (35-75): 54.9 ML/M^2
BH CV ECHO MEAS - LV MASS(C)D: 196.5 GRAMS
BH CV ECHO MEAS - LV MASS(C)DI: 89.1 GRAMS/M^2
BH CV ECHO MEAS - LV MAX PG: 2.2 MMHG
BH CV ECHO MEAS - LV MEAN PG: 1 MMHG
BH CV ECHO MEAS - LV SYSTOLIC VOL/BSA (12-30): 24 ML/M^2
BH CV ECHO MEAS - LV V1 MAX: 74.6 CM/SEC
BH CV ECHO MEAS - LV V1 MEAN: 54.2 CM/SEC
BH CV ECHO MEAS - LV V1 VTI: 17 CM
BH CV ECHO MEAS - LVIDD: 4.4 CM
BH CV ECHO MEAS - LVIDS: 3 CM
BH CV ECHO MEAS - LVLD AP2: 6.9 CM
BH CV ECHO MEAS - LVLD AP4: 7.6 CM
BH CV ECHO MEAS - LVLS AP2: 4.8 CM
BH CV ECHO MEAS - LVLS AP4: 4.9 CM
BH CV ECHO MEAS - LVOT AREA (M): 3.1 CM^2
BH CV ECHO MEAS - LVOT AREA: 3.1 CM^2
BH CV ECHO MEAS - LVOT DIAM: 2 CM
BH CV ECHO MEAS - LVPWD: 1.2 CM
BH CV ECHO MEAS - MV A MAX VEL: 93.6 CM/SEC
BH CV ECHO MEAS - MV DEC SLOPE: 661 CM/SEC^2
BH CV ECHO MEAS - MV E MAX VEL: 94.1 CM/SEC
BH CV ECHO MEAS - MV E/A: 1
BH CV ECHO MEAS - MV MAX PG: 6 MMHG
BH CV ECHO MEAS - MV MEAN PG: 2 MMHG
BH CV ECHO MEAS - MV P1/2T MAX VEL: 126 CM/SEC
BH CV ECHO MEAS - MV P1/2T: 55.8 MSEC
BH CV ECHO MEAS - MV V2 MAX: 122 CM/SEC
BH CV ECHO MEAS - MV V2 MEAN: 52.4 CM/SEC
BH CV ECHO MEAS - MV V2 VTI: 32.9 CM
BH CV ECHO MEAS - MVA P1/2T LCG: 1.7 CM^2
BH CV ECHO MEAS - MVA(P1/2T): 3.9 CM^2
BH CV ECHO MEAS - MVA(VTI): 1.6 CM^2
BH CV ECHO MEAS - PA MAX PG: 3.5 MMHG
BH CV ECHO MEAS - PA V2 MAX: 94.1 CM/SEC
BH CV ECHO MEAS - PI END-D VEL: 97.9 CM/SEC
BH CV ECHO MEAS - RVDD: 2.7 CM
BH CV ECHO MEAS - SI(AO): 85.9 ML/M^2
BH CV ECHO MEAS - SI(CUBED): 25.9 ML/M^2
BH CV ECHO MEAS - SI(LVOT): 24.2 ML/M^2
BH CV ECHO MEAS - SI(MOD-SP2): 17.7 ML/M^2
BH CV ECHO MEAS - SI(MOD-SP4): 30.9 ML/M^2
BH CV ECHO MEAS - SI(TEICH): 23.4 ML/M^2
BH CV ECHO MEAS - SV(AO): 189.4 ML
BH CV ECHO MEAS - SV(CUBED): 57.1 ML
BH CV ECHO MEAS - SV(LVOT): 53.4 ML
BH CV ECHO MEAS - SV(MOD-SP2): 39.1 ML
BH CV ECHO MEAS - SV(MOD-SP4): 68.1 ML
BH CV ECHO MEAS - SV(TEICH): 51.6 ML
BH CV ECHO MEAS - TR MAX VEL: 311 CM/SEC
BUN BLD-MCNC: 11 MG/DL (ref 6–20)
BUN/CREAT SERPL: 12.9 (ref 7–25)
C TRACH RRNA CVX QL NAA+PROBE: NEGATIVE
CALCIUM SPEC-SCNC: 9 MG/DL (ref 8.6–10.5)
CHLORIDE SERPL-SCNC: 104 MMOL/L (ref 98–107)
CO2 SERPL-SCNC: 31 MMOL/L (ref 22–29)
CREAT BLD-MCNC: 0.85 MG/DL (ref 0.57–1)
DEPRECATED RDW RBC AUTO: 43.6 FL (ref 37–54)
EOSINOPHIL # BLD AUTO: 0.13 10*3/MM3 (ref 0–0.4)
EOSINOPHIL NFR BLD AUTO: 2 % (ref 0.3–6.2)
ERYTHROCYTE [DISTWIDTH] IN BLOOD BY AUTOMATED COUNT: 14.3 % (ref 12.3–15.4)
GFR SERPL CREATININE-BSD FRML MDRD: 71 ML/MIN/1.73
GLUCOSE BLD-MCNC: 91 MG/DL (ref 65–99)
HCT VFR BLD AUTO: 39.4 % (ref 34–46.6)
HGB BLD-MCNC: 12.6 G/DL (ref 12–15.9)
IMM GRANULOCYTES # BLD AUTO: 0.01 10*3/MM3 (ref 0–0.05)
IMM GRANULOCYTES NFR BLD AUTO: 0.2 % (ref 0–0.5)
LYMPHOCYTES # BLD AUTO: 2.68 10*3/MM3 (ref 0.7–3.1)
LYMPHOCYTES NFR BLD AUTO: 40.4 % (ref 19.6–45.3)
MCH RBC QN AUTO: 26.6 PG (ref 26.6–33)
MCHC RBC AUTO-ENTMCNC: 32 G/DL (ref 31.5–35.7)
MCV RBC AUTO: 83.3 FL (ref 79–97)
MONOCYTES # BLD AUTO: 0.62 10*3/MM3 (ref 0.1–0.9)
MONOCYTES NFR BLD AUTO: 9.3 % (ref 5–12)
N GONORRHOEA RRNA SPEC QL NAA+PROBE: NEGATIVE
NEUTROPHILS # BLD AUTO: 3.17 10*3/MM3 (ref 1.7–7)
NEUTROPHILS NFR BLD AUTO: 47.6 % (ref 42.7–76)
NRBC BLD AUTO-RTO: 0 /100 WBC (ref 0–0.2)
PLATELET # BLD AUTO: 229 10*3/MM3 (ref 140–450)
PMV BLD AUTO: 11.1 FL (ref 6–12)
POTASSIUM BLD-SCNC: 2.7 MMOL/L (ref 3.5–5.2)
RBC # BLD AUTO: 4.73 10*6/MM3 (ref 3.77–5.28)
SODIUM BLD-SCNC: 145 MMOL/L (ref 136–145)
TRICHOMONAS VAGINALIS PCR: NEGATIVE
WBC NRBC COR # BLD: 6.64 10*3/MM3 (ref 3.4–10.8)

## 2019-08-09 PROCEDURE — G0378 HOSPITAL OBSERVATION PER HR: HCPCS

## 2019-08-09 PROCEDURE — 85025 COMPLETE CBC W/AUTO DIFF WBC: CPT | Performed by: FAMILY MEDICINE

## 2019-08-09 PROCEDURE — 94660 CPAP INITIATION&MGMT: CPT

## 2019-08-09 PROCEDURE — 25010000002 ENOXAPARIN PER 10 MG: Performed by: FAMILY MEDICINE

## 2019-08-09 PROCEDURE — 96372 THER/PROPH/DIAG INJ SC/IM: CPT

## 2019-08-09 PROCEDURE — 71275 CT ANGIOGRAPHY CHEST: CPT

## 2019-08-09 PROCEDURE — 25010000003 POTASSIUM CHLORIDE 10 MEQ/100ML SOLUTION: Performed by: PHYSICIAN ASSISTANT

## 2019-08-09 PROCEDURE — 96366 THER/PROPH/DIAG IV INF ADDON: CPT

## 2019-08-09 PROCEDURE — 80048 BASIC METABOLIC PNL TOTAL CA: CPT | Performed by: FAMILY MEDICINE

## 2019-08-09 PROCEDURE — 94799 UNLISTED PULMONARY SVC/PX: CPT

## 2019-08-09 PROCEDURE — 0 IOPAMIDOL PER 1 ML: Performed by: FAMILY MEDICINE

## 2019-08-09 PROCEDURE — 94760 N-INVAS EAR/PLS OXIMETRY 1: CPT

## 2019-08-09 PROCEDURE — 93306 TTE W/DOPPLER COMPLETE: CPT

## 2019-08-09 RX ORDER — POTASSIUM CHLORIDE 1.5 G/1.77G
40 POWDER, FOR SOLUTION ORAL ONCE
Status: COMPLETED | OUTPATIENT
Start: 2019-08-09 | End: 2019-08-09

## 2019-08-09 RX ORDER — POTASSIUM CHLORIDE 7.45 MG/ML
10 INJECTION INTRAVENOUS
Status: DISCONTINUED | OUTPATIENT
Start: 2019-08-09 | End: 2019-08-10 | Stop reason: HOSPADM

## 2019-08-09 RX ORDER — PHENAZOPYRIDINE HYDROCHLORIDE 100 MG/1
100 TABLET, FILM COATED ORAL
Status: DISCONTINUED | OUTPATIENT
Start: 2019-08-09 | End: 2019-08-10 | Stop reason: HOSPADM

## 2019-08-09 RX ADMIN — POTASSIUM CHLORIDE 10 MEQ: 7.46 INJECTION, SOLUTION INTRAVENOUS at 16:50

## 2019-08-09 RX ADMIN — PHENAZOPYRIDINE HYDROCHLORIDE 100 MG: 100 TABLET ORAL at 16:50

## 2019-08-09 RX ADMIN — SERTRALINE HYDROCHLORIDE 150 MG: 50 TABLET ORAL at 10:53

## 2019-08-09 RX ADMIN — BUPROPION HYDROCHLORIDE 150 MG: 150 TABLET, EXTENDED RELEASE ORAL at 10:53

## 2019-08-09 RX ADMIN — POTASSIUM CHLORIDE 40 MEQ: 1.5 POWDER, FOR SOLUTION ORAL at 10:52

## 2019-08-09 RX ADMIN — DICYCLOMINE HYDROCHLORIDE 10 MG: 10 CAPSULE ORAL at 15:07

## 2019-08-09 RX ADMIN — GABAPENTIN 100 MG: 100 CAPSULE ORAL at 10:53

## 2019-08-09 RX ADMIN — ENOXAPARIN SODIUM 40 MG: 40 INJECTION SUBCUTANEOUS at 15:07

## 2019-08-09 RX ADMIN — ROPINIROLE HYDROCHLORIDE 0.25 MG: 0.25 TABLET, FILM COATED ORAL at 20:22

## 2019-08-09 RX ADMIN — POTASSIUM CHLORIDE 10 MEQ: 7.46 INJECTION, SOLUTION INTRAVENOUS at 10:52

## 2019-08-09 RX ADMIN — GABAPENTIN 100 MG: 100 CAPSULE ORAL at 20:22

## 2019-08-09 RX ADMIN — PHENAZOPYRIDINE HYDROCHLORIDE 100 MG: 100 TABLET ORAL at 15:07

## 2019-08-09 RX ADMIN — SODIUM CHLORIDE, PRESERVATIVE FREE 3 ML: 5 INJECTION INTRAVENOUS at 20:22

## 2019-08-09 RX ADMIN — BUPROPION HYDROCHLORIDE 150 MG: 150 TABLET, EXTENDED RELEASE ORAL at 20:22

## 2019-08-09 RX ADMIN — DICYCLOMINE HYDROCHLORIDE 10 MG: 10 CAPSULE ORAL at 20:22

## 2019-08-09 RX ADMIN — POTASSIUM CHLORIDE 10 MEQ: 7.46 INJECTION, SOLUTION INTRAVENOUS at 15:08

## 2019-08-09 RX ADMIN — IOPAMIDOL 64 ML: 755 INJECTION, SOLUTION INTRAVENOUS at 15:00

## 2019-08-09 RX ADMIN — POTASSIUM CHLORIDE 10 MEQ: 7.46 INJECTION, SOLUTION INTRAVENOUS at 13:08

## 2019-08-09 RX ADMIN — POTASSIUM CHLORIDE 10 MEQ: 7.46 INJECTION, SOLUTION INTRAVENOUS at 18:30

## 2019-08-09 RX ADMIN — Medication 1 TABLET: at 10:53

## 2019-08-09 RX ADMIN — POTASSIUM CHLORIDE 10 MEQ: 7.46 INJECTION, SOLUTION INTRAVENOUS at 20:24

## 2019-08-09 RX ADMIN — DICYCLOMINE HYDROCHLORIDE 10 MG: 10 CAPSULE ORAL at 10:53

## 2019-08-09 NOTE — PLAN OF CARE
Problem: Patient Care Overview  Goal: Plan of Care Review  Outcome: Ongoing (interventions implemented as appropriate)   08/08/19 1628   Coping/Psychosocial   Plan of Care Reviewed With patient   Plan of Care Review   Progress no change   OTHER   Outcome Summary new admit       Problem: Fluid Volume Deficit (Adult)  Goal: Optimal Fluid Balance  Outcome: Ongoing (interventions implemented as appropriate)      Problem: Urine Elimination Impaired (Adult)  Goal: Effective or Improved Urinary Elimination  Outcome: Ongoing (interventions implemented as appropriate)

## 2019-08-09 NOTE — PLAN OF CARE
Problem: Patient Care Overview  Goal: Plan of Care Review  Outcome: Ongoing (interventions implemented as appropriate)   08/09/19 6951   Coping/Psychosocial   Plan of Care Reviewed With patient   Plan of Care Review   Progress no change   OTHER   Outcome Summary Pt presents with BMI 42.9 and %, indicative of morbid obesity, noted gastric sleeve 6/3/19 with 50# or more wt loss since then. Pt c/o no appetite, or only being able to eat a few bites at a time, guessing she may consume <1000cal/day. Encouraged to eat multiple small meals through the day and attempt to increase appetite with walking or chewing gum. RD following.

## 2019-08-09 NOTE — NURSING NOTE
Informed assigned provider about patient status with potassium replacement therapy. Assigned provider is agreeable for patient to remain in hospital for this calendar day.

## 2019-08-09 NOTE — PROGRESS NOTES
Adult Nutrition  Assessment    Patient Name:  Maricarmen Ferris  YOB: 1970  MRN: [id number]  Admit Date:  8/8/2019    Assessment Date:  8/9/2019    Comments:  Pt admit w/ low K levels(2.7L)- noted gastric sleeve procedure Divine 3 with 50# or more wt loss since then. Pt presents with BMI 42.9 and %, indicative of morbid obesity. Pt c/o no appetite, or only being able to eat a few bites at a time, guessing she may consume <1000cal/day. Diet ed completed on Basic nutrition needs and calorie goals of 2000cal/d although 1200cal/d may be the starting point. Encouraged to eat multiple small meals through the day and attempt to increase appetite with walking or chewing gum. Rd to follow hospital course.    Reason for Assessment     Row Name 08/09/19 1329          Reason for Assessment    Reason For Assessment  identified at risk by screening criteria     Diagnosis  -- low K level     Identified At Risk by Screening Criteria  BMI;MST SCORE 2+         Nutrition/Diet History     Row Name 08/09/19 1330          Nutrition/Diet History    Typical Food/Fluid Intake  Pt reports gastric sleeve procedure Divine 3- has lots 50# or more. She states she is unable to eat much- thinks she may be <1000cal/d.  She tries to drink 1/2 of a pro shake/day (a full one has 30g and MD has encouraged her to aim for 60g/d). Diet ed completed on calorie goals of 2000 although 1200may be the starting point. Pt is taking her MVI as prescribed by MD.      Factors Affecting Nutritional Intake  early satiety;anorexia           Labs/Tests/Procedures/Meds     Row Name 08/09/19 1333          Labs/Procedures/Meds    Lab Results Reviewed  reviewed     Lab Results Comments  Glu 105/91, Alb 3.9, K 2.7L        Diagnostic Tests/Procedures    Diagnostic Test/Procedure Reviewed  reviewed     Diagnostic Test/Procedures Comments  CT head, CTA and ECHO pending        Medications    Pertinent Medications Reviewed  reviewed     Pertinent  Medications Comments  Ca/D, KCL           Estimated/Assessed Needs     Row Name 08/09/19 1334          Calculation Measurements    Weight Used For Calculations  71.7 kg (158 lb) adjusted        Estimated/Assessed Needs    Additional Documentation  Fluid Requirements (Group);Protein Requirements (Group);Calorie Requirements (Group);KCAL/KG (Group)        Calorie Requirements    Estimated Calorie Requirement (kcal/day)  2000        Protein Requirements    Est Protein Requirement Amount (gms/kg)  1.0 gm protein     Estimated Protein Requirements (gms/day)  71.67        Fluid Requirements    Estimated Fluid Requirements (mL/day)  2000     RDA Method (mL)  2000     Enrique-Oliverio Method (over 20 kg)  2933.36         Nutrition Prescription Ordered     Row Name 08/09/19 1335          Nutrition Prescription PO    Current PO Diet  Regular         Evaluation of Received Nutrient/Fluid Intake     Row Name 08/09/19 1334          Calculation Measurements    Weight Used For Calculations  71.7 kg (158 lb) adjusted         Evaluation of Prescribed Nutrient/Fluid Intake     Row Name 08/09/19 1334          Calculation Measurements    Weight Used For Calculations  71.7 kg (158 lb) adjusted             Electronically signed by:  Cee Salinas RD  08/09/19 1:38 PM

## 2019-08-09 NOTE — SIGNIFICANT NOTE
08/09/19 1404   Rehab Time/Intention   Evaluation Not Performed patient unavailable for evaluation  (PT completed chart review in preparation for PT evaluation. Noted patient in process of getting potassium repleted from 2.7. Also patient has CTA pending to r/o PE. PT will  follow/up as appropriate.)   Rehab Treatment   Discipline physical therapist

## 2019-08-09 NOTE — PROGRESS NOTES
[facility]  INPATIENT PROGRESS NOTE    Length of Stay: 0  Date of Admission: 8/8/2019  Primary Care Physician: Alethea Ash MD    Subjective   Chief Complaint/HPI: This 48-year-old  female reported to the emergency department with complaints of dizziness.  Patient woke up and had dizziness and lightheadedness upon standing.  Took her blood pressure and pulse at that time and reported her blood pressure was approximately 140/90 and her pulse was in the low 50s.  Patient states her normal resting heart rate is near 60.  Currently the patient has no complaints of dizziness, syncope, presyncope, focal weakness, neuro deficits.  She has no other complaints aside from some urinary urgency and dysuria.  She has been treated with a dose of ceftriaxone and fosfomycin, she will be treated with Pyridium.  Awaiting potassium repletion and CTA to rule out pulmonary embolism.  Echocardiogram is also pending.    Review of Systems   Constitutional: Negative for chills and fever.   Respiratory: Negative for chest tightness and shortness of breath.    Cardiovascular: Negative for chest pain and palpitations.   Gastrointestinal: Negative for abdominal distention, abdominal pain, nausea and vomiting.   Genitourinary: Positive for dysuria and urgency. Negative for flank pain and hematuria.   Neurological: Negative for dizziness, seizures, syncope, facial asymmetry, speech difficulty, weakness, light-headedness, numbness and headaches.   Psychiatric/Behavioral: Negative for confusion.      All pertinent negatives and positives are as above. All other systems have been reviewed and are negative unless otherwise stated.     Objective    Temp:  [96.8 °F (36 °C)-97.7 °F (36.5 °C)] 97.7 °F (36.5 °C)  Heart Rate:  [46-67] 58  Resp:  [16-18] 18  BP: (125-166)/(67-79) 166/72    Physical Exam   Constitutional: She is oriented to person, place, and time. She appears  well-developed and well-nourished.   HENT:   Head: Normocephalic and atraumatic.   Eyes: Conjunctivae and EOM are normal. Pupils are equal, round, and reactive to light.   Cardiovascular: Regular rhythm.   Sinus bradycardia   Pulmonary/Chest: Effort normal and breath sounds normal. No stridor. No respiratory distress. She has no wheezes.   Abdominal: Soft. Bowel sounds are normal. She exhibits no distension. There is no tenderness. There is no guarding.   Neurological: She is alert and oriented to person, place, and time.   Skin: Skin is warm and dry. Capillary refill takes less than 2 seconds.   Psychiatric: She has a normal mood and affect. Her behavior is normal.     Results Review:  I have reviewed the labs, radiology results, and diagnostic studies.    Laboratory Data:   Results from last 7 days   Lab Units 08/09/19  0548 08/08/19  1141   SODIUM mmol/L 145 147*   POTASSIUM mmol/L 2.7* 2.8*   CHLORIDE mmol/L 104 103   CO2 mmol/L 31.0* 33.0*   BUN mg/dL 11 12   CREATININE mg/dL 0.85 0.80   GLUCOSE mg/dL 91 105*   CALCIUM mg/dL 9.0 9.7   BILIRUBIN mg/dL  --  0.4   ALK PHOS U/L  --  47   ALT (SGPT) U/L  --  15   AST (SGOT) U/L  --  21   ANION GAP mmol/L 10.0 11.0     Estimated Creatinine Clearance: 103.5 mL/min (by C-G formula based on SCr of 0.85 mg/dL).  Results from last 7 days   Lab Units 08/08/19  1141   MAGNESIUM mg/dL 1.8         Results from last 7 days   Lab Units 08/09/19  0548 08/08/19  1146   WBC 10*3/mm3 6.64 7.80   HEMOGLOBIN g/dL 12.6 14.1   HEMATOCRIT % 39.4 42.9   PLATELETS 10*3/mm3 229 274           Culture Data:   No results found for: BLOODCX  No results found for: URINECX  No results found for: RESPCX  No results found for: WOUNDCX  No results found for: STOOLCX  No components found for: BODYFLD    Radiology Data:   Imaging Results (last 24 hours)     Procedure Component Value Units Date/Time    CT Head Without Contrast [[id number]] Collected:  08/08/19 1328     Updated:  08/08/19 134     Narrative:       PROCEDURE: CT HEAD WO CONTRAST    INDICATION:  Dizziness    COMPARISON:  MRI brain with and without contrast dated 5/10/2018    TECHNIQUE:  CT head, without iv contrast.      This exam was performed according to our departmental  dose-optimization program, which includes automated exposure  control, adjustment of the mA and/or kV according to patient size  and/or use of iterative reconstruction technique.  DLP is 873.5     FINDINGS:    The cerebral parenchyma is within normal limits for age.    There is preservation of the gray-white matter differentiation.      No focal parenchymal lesions.    There is no evidence of a hemorrhage or intracranial mass.    There is no midline shift.    The ventricles are normal in size and configuration.    The brain stem, cerebellum, globes, paranasal sinuses, and  osseous structures are within normal limits.        Impression:       No acute intracranial abnormality.    If pain or symptoms persist beyond reasonable expectations, an  MRI examination is suggested as is deemed clinically appropriate.    Electronically signed by:  Elsy Carter MD  8/8/2019 1:45 PM CDT  Workstation: [id number]          I have reviewed the patient's current medications.     Assessment/Plan     Active Hospital Problems    Diagnosis   • Hypokalemia   History of gastric sleeve  Bradycardia      Plan: Replete potassium, CTA of the pulmonary arteries, echocardiogram, if all of these results are reviewed and no complications or abnormal results found, will discharge for outpatient follow-up.                This document has been electronically signed by JANET Nathan on August 9, 2019 12:48 PM

## 2019-08-10 VITALS
DIASTOLIC BLOOD PRESSURE: 75 MMHG | OXYGEN SATURATION: 93 % | TEMPERATURE: 98 F | HEART RATE: 63 BPM | SYSTOLIC BLOOD PRESSURE: 153 MMHG | HEIGHT: 65 IN | WEIGHT: 254.3 LBS | RESPIRATION RATE: 18 BRPM | BODY MASS INDEX: 42.37 KG/M2

## 2019-08-10 PROBLEM — E87.6 HYPOKALEMIA: Status: RESOLVED | Noted: 2019-08-08 | Resolved: 2019-08-10

## 2019-08-10 LAB
ANION GAP SERPL CALCULATED.3IONS-SCNC: 11 MMOL/L (ref 5–15)
BACTERIA UR QL AUTO: ABNORMAL /HPF
BASOPHILS # BLD AUTO: 0.02 10*3/MM3 (ref 0–0.2)
BASOPHILS NFR BLD AUTO: 0.4 % (ref 0–1.5)
BILIRUB UR QL STRIP: ABNORMAL
BUN BLD-MCNC: 10 MG/DL (ref 6–20)
BUN/CREAT SERPL: 13.5 (ref 7–25)
CALCIUM SPEC-SCNC: 9.1 MG/DL (ref 8.6–10.5)
CHLORIDE SERPL-SCNC: 105 MMOL/L (ref 98–107)
CLARITY UR: ABNORMAL
CO2 SERPL-SCNC: 28 MMOL/L (ref 22–29)
COLOR UR: ABNORMAL
CREAT BLD-MCNC: 0.74 MG/DL (ref 0.57–1)
DEPRECATED RDW RBC AUTO: 44.4 FL (ref 37–54)
EOSINOPHIL # BLD AUTO: 0.13 10*3/MM3 (ref 0–0.4)
EOSINOPHIL NFR BLD AUTO: 2.5 % (ref 0.3–6.2)
ERYTHROCYTE [DISTWIDTH] IN BLOOD BY AUTOMATED COUNT: 14.6 % (ref 12.3–15.4)
GFR SERPL CREATININE-BSD FRML MDRD: 84 ML/MIN/1.73
GLUCOSE BLD-MCNC: 90 MG/DL (ref 65–99)
GLUCOSE UR STRIP-MCNC: NEGATIVE MG/DL
HCT VFR BLD AUTO: 38.9 % (ref 34–46.6)
HGB BLD-MCNC: 12.5 G/DL (ref 12–15.9)
HGB UR QL STRIP.AUTO: NEGATIVE
HYALINE CASTS UR QL AUTO: ABNORMAL /LPF
IMM GRANULOCYTES # BLD AUTO: 0.01 10*3/MM3 (ref 0–0.05)
IMM GRANULOCYTES NFR BLD AUTO: 0.2 % (ref 0–0.5)
KETONES UR QL STRIP: ABNORMAL
LEUKOCYTE ESTERASE UR QL STRIP.AUTO: ABNORMAL
LYMPHOCYTES # BLD AUTO: 2.16 10*3/MM3 (ref 0.7–3.1)
LYMPHOCYTES NFR BLD AUTO: 40.9 % (ref 19.6–45.3)
MAGNESIUM SERPL-MCNC: 1.7 MG/DL (ref 1.6–2.6)
MCH RBC QN AUTO: 27 PG (ref 26.6–33)
MCHC RBC AUTO-ENTMCNC: 32.1 G/DL (ref 31.5–35.7)
MCV RBC AUTO: 84 FL (ref 79–97)
MONOCYTES # BLD AUTO: 0.42 10*3/MM3 (ref 0.1–0.9)
MONOCYTES NFR BLD AUTO: 8 % (ref 5–12)
NEUTROPHILS # BLD AUTO: 2.54 10*3/MM3 (ref 1.7–7)
NEUTROPHILS NFR BLD AUTO: 48 % (ref 42.7–76)
NITRITE UR QL STRIP: POSITIVE
NRBC BLD AUTO-RTO: 0 /100 WBC (ref 0–0.2)
PH UR STRIP.AUTO: <=5 [PH] (ref 5–9)
PLATELET # BLD AUTO: 229 10*3/MM3 (ref 140–450)
PMV BLD AUTO: 11 FL (ref 6–12)
POTASSIUM BLD-SCNC: 3.4 MMOL/L (ref 3.5–5.2)
POTASSIUM BLD-SCNC: 3.5 MMOL/L (ref 3.5–5.2)
PROT UR QL STRIP: NEGATIVE
RBC # BLD AUTO: 4.63 10*6/MM3 (ref 3.77–5.28)
RBC # UR: ABNORMAL /HPF
REF LAB TEST METHOD: ABNORMAL
SODIUM BLD-SCNC: 144 MMOL/L (ref 136–145)
SP GR UR STRIP: 1.04 (ref 1–1.03)
SQUAMOUS #/AREA URNS HPF: ABNORMAL /HPF
UROBILINOGEN UR QL STRIP: ABNORMAL
WBC NRBC COR # BLD: 5.28 10*3/MM3 (ref 3.4–10.8)
WBC UR QL AUTO: ABNORMAL /HPF

## 2019-08-10 PROCEDURE — 87086 URINE CULTURE/COLONY COUNT: CPT | Performed by: FAMILY MEDICINE

## 2019-08-10 PROCEDURE — 81001 URINALYSIS AUTO W/SCOPE: CPT | Performed by: FAMILY MEDICINE

## 2019-08-10 PROCEDURE — G0378 HOSPITAL OBSERVATION PER HR: HCPCS

## 2019-08-10 PROCEDURE — 83735 ASSAY OF MAGNESIUM: CPT | Performed by: FAMILY MEDICINE

## 2019-08-10 PROCEDURE — 25010000003 POTASSIUM CHLORIDE 10 MEQ/100ML SOLUTION: Performed by: PHYSICIAN ASSISTANT

## 2019-08-10 PROCEDURE — 84132 ASSAY OF SERUM POTASSIUM: CPT | Performed by: FAMILY MEDICINE

## 2019-08-10 PROCEDURE — 80048 BASIC METABOLIC PNL TOTAL CA: CPT | Performed by: FAMILY MEDICINE

## 2019-08-10 PROCEDURE — 85025 COMPLETE CBC W/AUTO DIFF WBC: CPT | Performed by: FAMILY MEDICINE

## 2019-08-10 RX ORDER — POTASSIUM CHLORIDE 1.5 G/1.77G
40 POWDER, FOR SOLUTION ORAL ONCE
Status: DISCONTINUED | OUTPATIENT
Start: 2019-08-10 | End: 2019-08-10 | Stop reason: HOSPADM

## 2019-08-10 RX ORDER — POTASSIUM CHLORIDE 20MEQ/15ML
20 LIQUID (ML) ORAL DAILY
Qty: 240 ML | Refills: 1 | Status: ON HOLD | OUTPATIENT
Start: 2019-08-10 | End: 2019-09-24 | Stop reason: SDUPTHER

## 2019-08-10 RX ADMIN — GABAPENTIN 100 MG: 100 CAPSULE ORAL at 09:07

## 2019-08-10 RX ADMIN — DICYCLOMINE HYDROCHLORIDE 10 MG: 10 CAPSULE ORAL at 09:07

## 2019-08-10 RX ADMIN — SERTRALINE HYDROCHLORIDE 150 MG: 50 TABLET ORAL at 09:07

## 2019-08-10 RX ADMIN — PHENAZOPYRIDINE HYDROCHLORIDE 100 MG: 100 TABLET ORAL at 09:07

## 2019-08-10 RX ADMIN — POTASSIUM CHLORIDE 10 MEQ: 7.46 INJECTION, SOLUTION INTRAVENOUS at 06:08

## 2019-08-10 RX ADMIN — BUPROPION HYDROCHLORIDE 150 MG: 150 TABLET, EXTENDED RELEASE ORAL at 09:07

## 2019-08-10 RX ADMIN — POTASSIUM CHLORIDE 10 MEQ: 7.46 INJECTION, SOLUTION INTRAVENOUS at 03:49

## 2019-08-10 RX ADMIN — Medication 1 TABLET: at 09:07

## 2019-08-10 RX ADMIN — POTASSIUM CHLORIDE 10 MEQ: 7.46 INJECTION, SOLUTION INTRAVENOUS at 08:15

## 2019-08-10 NOTE — PLAN OF CARE
Problem: Patient Care Overview  Goal: Plan of Care Review  Outcome: Ongoing (interventions implemented as appropriate)   08/10/19 0311   Coping/Psychosocial   Plan of Care Reviewed With patient   Plan of Care Review   Progress no change   OTHER   Outcome Summary Pt resting. K+ 3.4 this am. v/s stable. no complaints from pt. will continue to monitor pt      Goal: Individualization and Mutuality  Outcome: Ongoing (interventions implemented as appropriate)    Goal: Discharge Needs Assessment  Outcome: Ongoing (interventions implemented as appropriate)    Goal: Interprofessional Rounds/Family Conf  Outcome: Ongoing (interventions implemented as appropriate)      Problem: Fluid Volume Deficit (Adult)  Goal: Identify Related Risk Factors and Signs and Symptoms  Outcome: Outcome(s) achieved Date Met: 08/10/19      Problem: Urine Elimination Impaired (Adult)  Goal: Identify Related Risk Factors and Signs and Symptoms  Outcome: Outcome(s) achieved Date Met: 08/10/19    Goal: Effective or Improved Urinary Elimination  Outcome: Ongoing (interventions implemented as appropriate)    Goal: Effective Containment of Urine  Outcome: Ongoing (interventions implemented as appropriate)    Goal: Reduced Incontinence Episodes  Outcome: Ongoing (interventions implemented as appropriate)

## 2019-08-10 NOTE — PLAN OF CARE
Problem: NPPV/CPAP (Adult)  Goal: Signs and Symptoms of Listed Potential Problems Will be Absent, Minimized or Managed (NPPV/CPAP)  Outcome: Ongoing (interventions implemented as appropriate)  Pt was initially placed on CPAP by another Respiratory therapist at 2218. When I went to do the check at 0025, Pt had removed CPAP. CPAP was offered to pt two times by myself to which she refused and remained on room air. Pt in no distress at that time. Instructed to use call light if she decided she would like to wear CPAP

## 2019-08-10 NOTE — DISCHARGE SUMMARY
[facility]  DISCHARGE SUMMARY       Date of Admission: 8/8/2019  Date of Discharge:  8/10/2019  Primary Care Physician: Alethea Ash MD    Presenting Problem/History of Present Illness:  Hypokalemia [E87.6]  Urinary tract infection with hematuria, site unspecified [N39.0, R31.9]     Final Discharge Diagnoses:  Hypokalemia, resolved  Urinary tract infection, treated  Bradycardia, asymptomatic    Pertinent Test Results:   Lab Results (most recent)     Procedure Component Value Units Date/Time    Urinalysis With Culture If Indicated - Urine, Clean Catch [[id number]]  (Abnormal) Collected:  08/10/19 0819    Specimen:  Urine, Clean Catch Updated:  08/10/19 0854     Color, UA [place]     Appearance, UA Cloudy     pH, UA <=5.0     Specific Gravity, UA 1.042     Comment: Result obtained by Refractometer        Glucose, UA Negative     Ketones, UA Trace     Bilirubin, UA Small (1+)     Blood, UA Negative     Protein, UA Negative     Leuk Esterase, UA Trace     Nitrite, UA Positive     Urobilinogen, UA 1.0 E.U./dL    Urinalysis, Microscopic Only - Urine, Clean Catch [[id number]]  (Abnormal) Collected:  08/10/19 0819    Specimen:  Urine, Clean Catch Updated:  08/10/19 0854     RBC, UA 6-12 /HPF      WBC, UA 6-12 /HPF      Bacteria, UA None Seen /HPF      Squamous Epithelial Cells, UA 13-20 /HPF      Hyaline Casts, UA 7-12 /LPF      Methodology Automated Microscopy    Urine Culture - Urine, Urine, Clean Catch [[id number]] Collected:  08/10/19 0819    Specimen:  Urine, Clean Catch Updated:  08/10/19 0854    Basic Metabolic Panel [[id number]]  (Normal) Collected:  08/10/19 0539    Specimen:  Blood Updated:  08/10/19 0613     Glucose 90 mg/dL      BUN 10 mg/dL      Creatinine 0.74 mg/dL      Sodium 144 mmol/L      Potassium 3.5 mmol/L      Chloride 105 mmol/L      CO2 28.0 mmol/L      Calcium 9.1 mg/dL      eGFR Non African Amer 84 mL/min/1.73      BUN/Creatinine Ratio  13.5     Anion Gap 11.0 mmol/L     Narrative:       GFR Normal >60  Chronic Kidney Disease <60  Kidney Failure <15    CBC & Differential [[id number]] Collected:  08/10/19 0539    Specimen:  Blood Updated:  08/10/19 0552    Narrative:       The following orders were created for panel order CBC & Differential.  Procedure                               Abnormality         Status                     ---------                               -----------         ------                     CBC Auto Differential[[id number]]        Normal              Final result                 Please view results for these tests on the individual orders.    CBC Auto Differential [[id number]]  (Normal) Collected:  08/10/19 0539    Specimen:  Blood Updated:  08/10/19 0552     WBC 5.28 10*3/mm3      RBC 4.63 10*6/mm3      Hemoglobin 12.5 g/dL      Hematocrit 38.9 %      MCV 84.0 fL      MCH 27.0 pg      MCHC 32.1 g/dL      RDW 14.6 %      RDW-SD 44.4 fl      MPV 11.0 fL      Platelets 229 10*3/mm3      Neutrophil % 48.0 %      Lymphocyte % 40.9 %      Monocyte % 8.0 %      Eosinophil % 2.5 %      Basophil % 0.4 %      Immature Grans % 0.2 %      Neutrophils, Absolute 2.54 10*3/mm3      Lymphocytes, Absolute 2.16 10*3/mm3      Monocytes, Absolute 0.42 10*3/mm3      Eosinophils, Absolute 0.13 10*3/mm3      Basophils, Absolute 0.02 10*3/mm3      Immature Grans, Absolute 0.01 10*3/mm3      nRBC 0.0 /100 WBC     Magnesium [[id number]]  (Normal) Collected:  08/10/19 0228    Specimen:  Blood Updated:  08/10/19 0440     Magnesium 1.7 mg/dL     Potassium [[id number]]  (Abnormal) Collected:  08/10/19 0228    Specimen:  Blood Updated:  08/10/19 0313     Potassium 3.4 mmol/L     Chlamydia trachomatis, Neisseria gonorrhoeae, Trichomonas vaginalis, PCR - Urine, Urine, Clean Catch [[id number]] Collected:  08/08/19 1046    Specimen:  Urine, Clean Catch Updated:  08/09/19 1018     Chlamydia DNA by PCR Negative     Neisseria gonorrhoeae by PCR Negative     Trichomonas  vaginalis PCR Negative    Basic Metabolic Panel [[id number]]  (Abnormal) Collected:  08/09/19 0548    Specimen:  Blood Updated:  08/09/19 0644     Glucose 91 mg/dL      BUN 11 mg/dL      Creatinine 0.85 mg/dL      Sodium 145 mmol/L      Potassium 2.7 mmol/L      Chloride 104 mmol/L      CO2 31.0 mmol/L      Calcium 9.0 mg/dL      eGFR Non African Amer 71 mL/min/1.73      BUN/Creatinine Ratio 12.9     Anion Gap 10.0 mmol/L     Narrative:       GFR Normal >60  Chronic Kidney Disease <60  Kidney Failure <15    CBC & Differential [[id number]] Collected:  08/09/19 0548    Specimen:  Blood Updated:  08/09/19 0618    Narrative:       The following orders were created for panel order CBC & Differential.  Procedure                               Abnormality         Status                     ---------                               -----------         ------                     CBC Auto Differential[[id number]]        Normal              Final result                 Please view results for these tests on the individual orders.    CBC Auto Differential [[id number]]  (Normal) Collected:  08/09/19 0548    Specimen:  Blood Updated:  08/09/19 0618     WBC 6.64 10*3/mm3      RBC 4.73 10*6/mm3      Hemoglobin 12.6 g/dL      Hematocrit 39.4 %      MCV 83.3 fL      MCH 26.6 pg      MCHC 32.0 g/dL      RDW 14.3 %      RDW-SD 43.6 fl      MPV 11.1 fL      Platelets 229 10*3/mm3      Neutrophil % 47.6 %      Lymphocyte % 40.4 %      Monocyte % 9.3 %      Eosinophil % 2.0 %      Basophil % 0.5 %      Immature Grans % 0.2 %      Neutrophils, Absolute 3.17 10*3/mm3      Lymphocytes, Absolute 2.68 10*3/mm3      Monocytes, Absolute 0.62 10*3/mm3      Eosinophils, Absolute 0.13 10*3/mm3      Basophils, Absolute 0.03 10*3/mm3      Immature Grans, Absolute 0.01 10*3/mm3      nRBC 0.0 /100 WBC     D-dimer, Quantitative [[id number]]  (Abnormal) Collected:  08/08/19 1142    Specimen:  Blood Updated:  08/08/19 1849     D-Dimer, Quantitative 833 ng/mL  (FEU)     Narrative:       Dimer values <500 ng/ml FEU are FDA approved as aid in diagnosis of deep venous thrombosis and pulmonary embolism.  This test should not be used in an exclusion strategy with pretest probability alone.    A recent guideline regarding diagnosis for pulmonary thromboembolism recommends an adjusted exclusion criterion of age x 10 ng/ml FEU for patients >50 years of age ([employer] Intern Med 2015; 163: 701-711).    Urine Drug Screen - Urine, Clean Catch [[id number]]  (Normal) Collected:  08/08/19 1046    Specimen:  Urine, Clean Catch Updated:  08/08/19 1311     THC, Screen, Urine Negative     Phencyclidine (PCP), Urine Negative     Cocaine Screen, Urine Negative     Methamphetamine, Ur Negative     Opiate Screen Negative     Amphetamine Screen, Urine Negative     Benzodiazepine Screen, Urine Negative     Tricyclic Antidepressants Screen Negative     Methadone Screen, Urine Negative     Barbiturates Screen, Urine Negative     Oxycodone Screen, Urine Negative     Propoxyphene Screen Negative     Buprenorphine, Screen, Urine Negative    Narrative:       Cutoff For Drugs Screened:    Amphetamines               500 ng/ml  Barbiturates               200 ng/ml  Benzodiazepines            150 ng/ml  Cocaine                    150 ng/ml  Methadone                  200 ng/ml  Opiates                    100 ng/ml  Phencyclidine               25 ng/ml  THC                            50 ng/ml  Methamphetamine            500 ng/ml  Tricyclic Antidepressants  300 ng/ml  Oxycodone                  100 ng/ml  Propoxyphene               300 ng/ml  Buprenorphine               10 ng/ml    The normal value for all drugs tested is negative. This report includes unconfirmed screening results, with the cutoff values listed, to be used for medical treatment purposes only.  Unconfirmed results must not be used for non-medical purposes such as employment or legal testing.  Clinical consideration should be applied to any drug  of abuse test, particularly when unconfirmed results are used.      Extra Tubes [[id number]] Collected:  08/08/19 1142    Specimen:  Blood, Venous Line Updated:  08/08/19 1246    Narrative:       The following orders were created for panel order Extra Tubes.  Procedure                               Abnormality         Status                     ---------                               -----------         ------                     Light Blue Top[[id number]]                                   Final result               Gold Top - SST[[id number]]                                   Final result                 Please view results for these tests on the individual orders.    Light Blue Top [[id number]] Collected:  08/08/19 1142    Specimen:  Blood Updated:  08/08/19 1246     Extra Tube hold for add-on     Comment: Auto resulted       Gold Top - SST [[id number]] Collected:  08/08/19 1142    Specimen:  Blood Updated:  08/08/19 1246     Extra Tube Hold for add-ons.     Comment: Auto resulted.       Comprehensive Metabolic Panel [[id number]]  (Abnormal) Collected:  08/08/19 1141    Specimen:  Blood Updated:  08/08/19 1218     Glucose 105 mg/dL      BUN 12 mg/dL      Creatinine 0.80 mg/dL      Sodium 147 mmol/L      Potassium 2.8 mmol/L      Chloride 103 mmol/L      CO2 33.0 mmol/L      Calcium 9.7 mg/dL      Total Protein 7.1 g/dL      Albumin 3.90 g/dL      ALT (SGPT) 15 U/L      AST (SGOT) 21 U/L      Alkaline Phosphatase 47 U/L      Total Bilirubin 0.4 mg/dL      eGFR Non African Amer 77 mL/min/1.73      Globulin 3.2 gm/dL      A/G Ratio 1.2 g/dL      BUN/Creatinine Ratio 15.0     Anion Gap 11.0 mmol/L     Narrative:       GFR Normal >60  Chronic Kidney Disease <60  Kidney Failure <15    Magnesium [[id number]]  (Normal) Collected:  08/08/19 1141    Specimen:  Blood Updated:  08/08/19 1212     Magnesium 1.8 mg/dL     Urinalysis With Microscopic If Indicated (No Culture) - Urine, Clean Catch [[id number]]  (Abnormal) Collected:   08/08/19 1046    Specimen:  Urine, Clean Catch Updated:  08/08/19 1056     Color, UA Yellow     Appearance, UA Clear     pH, UA 7.0     Specific Gravity, UA 1.016     Glucose, UA Negative     Ketones, UA Negative     Bilirubin, UA Negative     Blood, UA Negative     Protein, UA Negative     Leuk Esterase, UA Small (1+)     Nitrite, UA Negative     Urobilinogen, UA 1.0 E.U./dL    Urinalysis, Microscopic Only - Urine, Clean Catch [[id number]]  (Abnormal) Collected:  08/08/19 1046    Specimen:  Urine, Clean Catch Updated:  08/08/19 1056     RBC, UA 0-2 /HPF      WBC, UA 6-12 /HPF      Bacteria, UA 2+ /HPF      Squamous Epithelial Cells, UA 3-5 /HPF      Hyaline Casts, UA 3-6 /LPF      Methodology Automated Microscopy        Imaging Results (most recent)     Procedure Component Value Units Date/Time    CT Angiogram Chest With Contrast [[id number]] Collected:  08/09/19 1405     Updated:  08/09/19 1443    Narrative:         PROCEDURE: CT -CTA Thorax/PE with contrast     REASON FOR EXAM: rule out PE, E87.6 Hypokalemia N39.0 Urinary  tract infection, site not specified R31.9 Hematuria, unspecified    FINDINGS: Comparison study dated  August 6, 2017. Axial computer  tomography sequential imaging was performed from the thoracic  inlet through the diaphragms after administration of IV contrast  .3-D chest sagittal and coronal reformates was performed. This  exam was performed according to our departmental dose  optimization program, which includes automated exposure control,  adjustment of the mA and/or KV according to patient size and/or  use of iterative reconstruction technique.     Pulmonary arteries are normal with no filling defect identified  to suggest pulmonary embolus. Mediastinal structures of the chest  are normal. There is no lymphadenopathy or pericardial effusion.  Lungs are clear. Pleural spaces are normal. Visualized upper  abdominal structures are normal. No acute osseous abnormality..      Impression:       1.  No evidence of pulmonary embolus..  2. Otherwise unremarkable CT chest..    Electronically signed by:  Demetrio Modi MD  8/9/2019 2:42 PM CDT  Workstation: [id number]    CT Head Without Contrast [[id number]] Collected:  08/08/19 1328     Updated:  08/08/19 1346    Narrative:       PROCEDURE: CT HEAD WO CONTRAST    INDICATION:  Dizziness    COMPARISON:  MRI brain with and without contrast dated 5/10/2018    TECHNIQUE:  CT head, without iv contrast.      This exam was performed according to our departmental  dose-optimization program, which includes automated exposure  control, adjustment of the mA and/or kV according to patient size  and/or use of iterative reconstruction technique.  DLP is 873.5     FINDINGS:    The cerebral parenchyma is within normal limits for age.    There is preservation of the gray-white matter differentiation.      No focal parenchymal lesions.    There is no evidence of a hemorrhage or intracranial mass.    There is no midline shift.    The ventricles are normal in size and configuration.    The brain stem, cerebellum, globes, paranasal sinuses, and  osseous structures are within normal limits.        Impression:       No acute intracranial abnormality.    If pain or symptoms persist beyond reasonable expectations, an  MRI examination is suggested as is deemed clinically appropriate.    Electronically signed by:  Elsy Carter MD  8/8/2019 1:45 PM CDT  Workstation: [id number]        Hospital Course:  The patient is a 48 y.o. female who presented to [facility] with dizziness.  Patient woke up and had dizziness and lightheadedness upon standing.  She took her blood pressure and pulse at that time to report her blood pressure was approximately 140/90 her pulse was in the low 50s.  Patient states her normal resting heart rate is near 60.  CTA of the thorax demonstrates no PE.  CT of the head negative.  Cardiogram reveals intact ejection fraction with no right ventricular strain or other  "concerning findings.  Patient did have hypokalemia that was to be repleted.  Her potassium was normal upon discharge.  Patient reports difficulty with swallowing potassium pills therefore she was prescribed potassium liquid.  She did have some nausea on the day of discharge, but reports that this is because 1 of her pills became lodged.  It has since passed and she is tolerating p.o. intake.  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  Patient was encouraged to follow-up with her primary care provider as well as her gastric surgeon.  Should also be noted the patient had a cardiac catheterization approximately 1 year ago which revealed no epicardial coronary artery disease.  She had a possible urinary tract infection on admission secondary to complaints of dysuria and urgency.  She was treated with fosfomycin.  She has Pyridium at home.  Her heart rate was 63 at the time of discharge.  Patient states she felt safe and well to go home.  She was instructed to return with any concerning worsening symptoms.  She verbalized understanding.    Condition on Discharge: Stable, improved    Physical Exam on Discharge:  /75   Pulse 63   Temp 98 °F (36.7 °C) (Temporal)   Resp 18   Ht 165.1 cm (65\")   Wt 115 kg (254 lb 4.8 oz)   SpO2 93%   BMI 42.32 kg/m²   Physical Exam   Constitutional: She is oriented to person, place, and time. She appears well-developed and well-nourished.   HENT:   Head: Normocephalic and atraumatic.   Eyes: Conjunctivae are normal.   Cardiovascular: Normal rate and regular rhythm.   Pulmonary/Chest: Effort normal and breath sounds normal. No stridor. No respiratory distress.   Abdominal: Soft. Bowel sounds are normal. She exhibits no distension. There is no tenderness.   Musculoskeletal: She exhibits no edema.   Neurological: She is alert and oriented to person, place, and time.   Skin: Skin is warm and dry. Capillary refill takes less than 2 " seconds.   Psychiatric: She has a normal mood and affect. Her behavior is normal.     Discharge Disposition:  Home or Self Care    Discharge Medications:     Discharge Medications      New Medications      Instructions Start Date   potassium chloride 20 MEQ/15ML (10%) solution  Commonly known as:  KAYCIEL  Replaces:  potassium chloride 10 MEQ CR tablet  Notes to patient:  08/11/2019 @ 9am   20 mEq, Oral, Daily         Continue These Medications      Instructions Start Date   albuterol sulfate  (90 Base) MCG/ACT inhaler  Commonly known as:  PROVENTIL HFA;VENTOLIN HFA;PROAIR HFA  Notes to patient:  As needed   2 puffs, Inhalation, Every 4 Hours PRN      buPROPion  MG 12 hr tablet  Commonly known as:  WELLBUTRIN SR  Notes to patient:  08/10/2019 @ 9pm   150 mg, Oral, 2 Times Daily      CALCIUM 1000 + D 1000-800 MG-UNIT tablet  Generic drug:  Calcium Carb-Cholecalciferol  Notes to patient:  08/11/2019 @ 9am   1 tablet, Oral, Daily      dicyclomine 10 MG capsule  Commonly known as:  BENTYL  Notes to patient:  08/10/2019 @ 2pm   10 mg, Oral, 3 Times Daily      fenofibrate 145 MG tablet  Commonly known as:  TRICOR  Notes to patient:  08/11/2019 @ 9am   145 mg, Oral, Daily      Fluticasone Furoate 200 MCG/ACT aerosol powder   Notes to patient:  08/11/2019 @ 9am   1 puff, Inhalation, Daily, Rinse mouth well after use      furosemide 40 MG tablet  Commonly known as:  LASIX  Notes to patient:  As needed   20 mg, Oral, Daily PRN      gabapentin 100 MG capsule  Commonly known as:  NEURONTIN  Notes to patient:  08/10/2019 @ 9pm   100 mg, Oral, 2 Times Daily      metroNIDAZOLE 0.75 % vaginal gel  Commonly known as:  METROGEL  Notes to patient:  08/10/2019 @ 9pm   Vaginal, Every 12 Hours Scheduled      pantoprazole 40 MG EC tablet  Commonly known as:  PROTONIX  Notes to patient:  08/11/2019 @ 9am   40 mg, Oral, Daily      phenazopyridine 200 MG tablet  Commonly known as:  PYRIDIUM  Notes to patient:  As needed   200  mg, Oral, 3 Times Daily PRN      rOPINIRole 0.25 MG tablet  Commonly known as:  REQUIP  Notes to patient:  08/10/2019 @ 9pm   0.25 mg, Oral, Nightly      traMADol 50 MG tablet  Commonly known as:  ULTRAM  Notes to patient:  As needed   50 mg, Oral, Every 8 Hours PRN      ZOLOFT PO  Notes to patient:  08/11/2019 @ 9am   150 mg, Oral, Daily         Stop These Medications    potassium chloride 10 MEQ CR tablet  Commonly known as:  K-DUR  Replaced by:  potassium chloride 20 MEQ/15ML (10%) solution            Discharge Diet:   Diet Instructions     Advance Diet As Tolerated            Activity at Discharge:   Activity Instructions     Activity as Tolerated            Discharge Care Plan/Instructions: Follow-up with her PCP, follow-up with gastric surgeon, continue potassium supplementation, return with any concerning worsening symptoms.    Follow-up Appointments:   Future Appointments   Date Time Provider Department Center   8/29/2019  1:30 PM Margie Bruce APRN [facility] None   9/4/2019  2:45 PM Rachel Enciso MD [facility] None       Test Results Pending at Discharge:    Order Current Status    Urine Culture - Urine, Urine, Clean Catch In process              This document has been electronically signed by JANET Nathan on August 10, 2019 12:36 PM

## 2019-08-11 ENCOUNTER — READMISSION MANAGEMENT (OUTPATIENT)
Dept: CALL CENTER | Facility: HOSPITAL | Age: 49
End: 2019-08-11

## 2019-08-11 LAB — BACTERIA SPEC AEROBE CULT: NORMAL

## 2019-08-11 NOTE — OUTREACH NOTE
Prep Survey      Responses   Facility patient discharged from?  [place]   Is patient eligible?  Yes   Discharge diagnosis  Hypokalemia, resolved, Urinary tract infection, treated, Bradycardia, asymptomatic   Does the patient have one of the following disease processes/diagnoses(primary or secondary)?  Other   Does the patient have Home health ordered?  No   Is there a DME ordered?  No   Prep survey completed?  Yes          Ailin Walton RN

## 2019-08-12 NOTE — PLAN OF CARE
Problem: Patient Care Overview (Adult)  Goal: Plan of Care Review  Outcome: Ongoing (interventions implemented as appropriate)    08/15/17 0808   Coping/Psychosocial Response Interventions   Plan Of Care Reviewed With patient   Patient Care Overview   Progress improving   Outcome Evaluation   Outcome Summary/Follow up Plan pt on RA upon entering room, pt 90% RA, up to 93% w/ PLB, pt t/zully sup to sit w/ HOB elevated w/ Mod Ind, sit <-> stand Ind, ambulated 180' O2 to 91 w/ short standing rest break, ambulated 180' O2 to 87 w/ short sitting rest break; pt ambulated 150' w/ O2 down to 90, recovered to 94 at end of tx, no new goals met this tx, but progressing toward goals         Problem: Inpatient Physical Therapy  Goal: Gait Training Goal LTG- PT  Outcome: Ongoing (interventions implemented as appropriate)    08/12/17 1727 08/14/17 1240 08/15/17 0808   Gait Training PT LTG   Gait Training Goal PT LTG, Date Established 08/12/17 --  --    Gait Training Goal PT LTG, Time to Achieve by discharge --  --    Gait Training Goal PT LTG, [place] Level independent --  --    Gait Training Goal PT LTG, Distance to Achieve [id number];O2 sats will not drop below 92% --  --    Gait Training Goal PT LTG, Additional Goal [id number];O2 sats will not drop below 92% --  --    Gait Training Goal PT LTG, Date Goal Reviewed --  --  08/15/17   Gait Training Goal PT LTG, Outcome --  goal partially met --        Goal: Stair Training Goal LTG- PT  Outcome: Ongoing (interventions implemented as appropriate)    08/12/17 1727 08/14/17 1240 08/15/17 0808   Stair Training PT LTG   Stair Training Goal PT LTG, Date Established 08/12/17 --  --    Stair Training Goal PT LTG, Time to Achieve by discharge --  --    Stair Training Goal PT LTG, Number of Steps 3 --  --    Stair Training Goal PT LTG, Assist Device 1 handrail --  --    Stair Training Goal PT LTG, Additional Goal O2 sats will not drop below 92% --  --    Stair Training Goal PT LTG, Date Goal  Reviewed --  --  08/15/17   Stair Training Goal PT LTG, Outcome --  goal ongoing --        Goal: Patient Education Goal LTG- PT  Outcome: Ongoing (interventions implemented as appropriate)    08/12/17 1727 08/15/17 0808   Patient Education PT LTG   Patient Education PT LTG, Date Established 08/12/17 --    Patient Education PT LTG, Time to Achieve by discharge --    Patient Education PT LTG, Education Type energy conservation;home safety --    Patient Education PT LTG, Date Goal Reviewed --  08/15/17   Patient Education PT LTG Outcome --  goal ongoing            Glabellar Complex Units: 20

## 2019-08-13 ENCOUNTER — READMISSION MANAGEMENT (OUTPATIENT)
Dept: CALL CENTER | Facility: HOSPITAL | Age: 49
End: 2019-08-13

## 2019-08-13 NOTE — OUTREACH NOTE
Medical Week 1 Survey      Responses   Facility patient discharged from?  [place]   Does the patient have one of the following disease processes/diagnoses(primary or secondary)?  Other   Is there a successful TCM telephone encounter documented?  No   Week 1 attempt successful?  Yes   Call start time  0905   Call end time  0908   Discharge diagnosis  Hypokalemia, resolved, Urinary tract infection, treated, Bradycardia, asymptomatic   Is patient permission given to speak with other caregiver?  No   Meds reviewed with patient/caregiver?  Yes   Is the patient having any side effects they believe may be caused by any medication additions or changes?  No   Does the patient have all medications ordered at discharge?  Yes   Is the patient taking all medications as directed (includes completed medication regime)?  Yes   Does the patient have a primary care provider?   Yes   Does the patient have an appointment with their PCP within 7 days of discharge?  Yes   Comments regarding PCP  Alethea Ash MD, 08/15/19 at 3:45pm.    Has the patient kept scheduled appointments due by today?  N/A   Has home health visited the patient within 72 hours of discharge?  N/A   Psychosocial issues?  No   Did the patient receive a copy of their discharge instructions?  Yes   Nursing interventions  Reviewed instructions with patient   What is the patient's perception of their health status since discharge?  Improving   Is the patient/caregiver able to teach back signs and symptoms related to disease process for when to call 911?  Yes   Is the patient/caregiver able to teach back the hierarchy of who to call/visit for symptoms/problems? PCP, Specialist, Home health nurse, Urgent Care, ED, 911  Yes   Week 1 call completed?  Yes          Cristy Kern RN

## 2019-08-15 ENCOUNTER — TRANSCRIBE ORDERS (OUTPATIENT)
Dept: LAB | Facility: HOSPITAL | Age: 49
End: 2019-08-15

## 2019-08-15 ENCOUNTER — LAB (OUTPATIENT)
Dept: LAB | Facility: HOSPITAL | Age: 49
End: 2019-08-15

## 2019-08-15 DIAGNOSIS — E87.6 HYPOPOTASSEMIA: ICD-10-CM

## 2019-08-15 DIAGNOSIS — E87.6 HYPOPOTASSEMIA: Primary | ICD-10-CM

## 2019-08-15 LAB
ALBUMIN SERPL-MCNC: 4 G/DL (ref 3.5–5.2)
ALBUMIN/GLOB SERPL: 1.1 G/DL
ALP SERPL-CCNC: 49 U/L (ref 39–117)
ALT SERPL W P-5'-P-CCNC: 16 U/L (ref 1–33)
ANION GAP SERPL CALCULATED.3IONS-SCNC: 14 MMOL/L (ref 5–15)
AST SERPL-CCNC: 23 U/L (ref 1–32)
BILIRUB SERPL-MCNC: 0.4 MG/DL (ref 0.2–1.2)
BUN BLD-MCNC: 12 MG/DL (ref 6–20)
BUN/CREAT SERPL: 15.2 (ref 7–25)
CALCIUM SPEC-SCNC: 9.9 MG/DL (ref 8.6–10.5)
CHLORIDE SERPL-SCNC: 106 MMOL/L (ref 98–107)
CO2 SERPL-SCNC: 24 MMOL/L (ref 22–29)
CREAT BLD-MCNC: 0.79 MG/DL (ref 0.57–1)
GFR SERPL CREATININE-BSD FRML MDRD: 77 ML/MIN/1.73
GLOBULIN UR ELPH-MCNC: 3.6 GM/DL
GLUCOSE BLD-MCNC: 107 MG/DL (ref 65–99)
POTASSIUM BLD-SCNC: 3.5 MMOL/L (ref 3.5–5.2)
PROT SERPL-MCNC: 7.6 G/DL (ref 6–8.5)
SODIUM BLD-SCNC: 144 MMOL/L (ref 136–145)

## 2019-08-15 PROCEDURE — 36415 COLL VENOUS BLD VENIPUNCTURE: CPT

## 2019-08-15 PROCEDURE — 80053 COMPREHEN METABOLIC PANEL: CPT

## 2019-08-20 ENCOUNTER — READMISSION MANAGEMENT (OUTPATIENT)
Dept: CALL CENTER | Facility: HOSPITAL | Age: 49
End: 2019-08-20

## 2019-08-20 NOTE — OUTREACH NOTE
Medical Week 2 Survey      Responses   Facility patient discharged from?  [place]   Does the patient have one of the following disease processes/diagnoses(primary or secondary)?  Other   Week 2 attempt successful?  No   Unsuccessful attempts  Attempt 1          Elena Duvall RN

## 2019-08-21 ENCOUNTER — READMISSION MANAGEMENT (OUTPATIENT)
Dept: CALL CENTER | Facility: HOSPITAL | Age: 49
End: 2019-08-21

## 2019-08-21 NOTE — OUTREACH NOTE
Medical Week 2 Survey      Responses   Facility patient discharged from?  [place]   Does the patient have one of the following disease processes/diagnoses(primary or secondary)?  Other   Week 2 attempt successful?  No   Unsuccessful attempts  Attempt 2          Modesta Cortes RN

## 2019-08-22 ENCOUNTER — READMISSION MANAGEMENT (OUTPATIENT)
Dept: CALL CENTER | Facility: HOSPITAL | Age: 49
End: 2019-08-22

## 2019-08-22 NOTE — OUTREACH NOTE
Medical Week 2 Survey      Responses   Facility patient discharged from?  [place]   Does the patient have one of the following disease processes/diagnoses(primary or secondary)?  Other   Week 2 attempt successful?  No   Unsuccessful attempts  Attempt 3          Loren Man RN

## 2019-08-25 ENCOUNTER — APPOINTMENT (OUTPATIENT)
Dept: GENERAL RADIOLOGY | Facility: HOSPITAL | Age: 49
End: 2019-08-25

## 2019-08-25 ENCOUNTER — HOSPITAL ENCOUNTER (OUTPATIENT)
Facility: HOSPITAL | Age: 49
Setting detail: OBSERVATION
Discharge: HOME | End: 2019-08-29
Attending: FAMILY MEDICINE | Admitting: HOSPITALIST

## 2019-08-25 DIAGNOSIS — E83.42 HYPOMAGNESEMIA: ICD-10-CM

## 2019-08-25 DIAGNOSIS — E87.6 HYPOKALEMIA: Primary | ICD-10-CM

## 2019-08-25 DIAGNOSIS — R07.2 PRECORDIAL PAIN: ICD-10-CM

## 2019-08-25 LAB
ALBUMIN SERPL-MCNC: 3.8 G/DL (ref 3.5–5.2)
ALBUMIN/GLOB SERPL: 1.4 G/DL
ALP SERPL-CCNC: 49 U/L (ref 39–117)
ALT SERPL W P-5'-P-CCNC: 31 U/L (ref 1–33)
ANION GAP SERPL CALCULATED.3IONS-SCNC: 14 MMOL/L (ref 5–15)
ANION GAP SERPL CALCULATED.3IONS-SCNC: 15 MMOL/L (ref 5–15)
AST SERPL-CCNC: 40 U/L (ref 1–32)
BASOPHILS # BLD AUTO: 0.02 10*3/MM3 (ref 0–0.2)
BASOPHILS # BLD AUTO: 0.03 10*3/MM3 (ref 0–0.2)
BASOPHILS NFR BLD AUTO: 0.2 % (ref 0–1.5)
BASOPHILS NFR BLD AUTO: 0.3 % (ref 0–1.5)
BILIRUB SERPL-MCNC: 0.3 MG/DL (ref 0.2–1.2)
BILIRUB UR QL STRIP: NEGATIVE
BUN BLD-MCNC: 7 MG/DL (ref 6–20)
BUN BLD-MCNC: 8 MG/DL (ref 6–20)
BUN/CREAT SERPL: 10.8 (ref 7–25)
BUN/CREAT SERPL: 9.7 (ref 7–25)
CALCIUM SPEC-SCNC: 9.1 MG/DL (ref 8.6–10.5)
CALCIUM SPEC-SCNC: 9.2 MG/DL (ref 8.6–10.5)
CHLORIDE SERPL-SCNC: 102 MMOL/L (ref 98–107)
CHLORIDE SERPL-SCNC: 103 MMOL/L (ref 98–107)
CK SERPL-CCNC: 71 U/L (ref 20–180)
CLARITY UR: CLEAR
CO2 SERPL-SCNC: 27 MMOL/L (ref 22–29)
CO2 SERPL-SCNC: 27 MMOL/L (ref 22–29)
COLOR UR: YELLOW
CREAT BLD-MCNC: 0.72 MG/DL (ref 0.57–1)
CREAT BLD-MCNC: 0.74 MG/DL (ref 0.57–1)
DEPRECATED RDW RBC AUTO: 41.4 FL (ref 37–54)
DEPRECATED RDW RBC AUTO: 41.5 FL (ref 37–54)
EOSINOPHIL # BLD AUTO: 0.14 10*3/MM3 (ref 0–0.4)
EOSINOPHIL # BLD AUTO: 0.15 10*3/MM3 (ref 0–0.4)
EOSINOPHIL NFR BLD AUTO: 1.5 % (ref 0.3–6.2)
EOSINOPHIL NFR BLD AUTO: 1.8 % (ref 0.3–6.2)
ERYTHROCYTE [DISTWIDTH] IN BLOOD BY AUTOMATED COUNT: 14.4 % (ref 12.3–15.4)
ERYTHROCYTE [DISTWIDTH] IN BLOOD BY AUTOMATED COUNT: 14.5 % (ref 12.3–15.4)
GFR SERPL CREATININE-BSD FRML MDRD: 83 ML/MIN/1.73
GFR SERPL CREATININE-BSD FRML MDRD: 86 ML/MIN/1.73
GLOBULIN UR ELPH-MCNC: 2.8 GM/DL
GLUCOSE BLD-MCNC: 90 MG/DL (ref 65–99)
GLUCOSE BLD-MCNC: 98 MG/DL (ref 65–99)
GLUCOSE UR STRIP-MCNC: NEGATIVE MG/DL
HCT VFR BLD AUTO: 42.3 % (ref 34–46.6)
HCT VFR BLD AUTO: 43.1 % (ref 34–46.6)
HGB BLD-MCNC: 14 G/DL (ref 12–15.9)
HGB BLD-MCNC: 14.2 G/DL (ref 12–15.9)
HGB UR QL STRIP.AUTO: NEGATIVE
HOLD SPECIMEN: NORMAL
HOLD SPECIMEN: NORMAL
IMM GRANULOCYTES # BLD AUTO: 0.02 10*3/MM3 (ref 0–0.05)
IMM GRANULOCYTES # BLD AUTO: 0.03 10*3/MM3 (ref 0–0.05)
IMM GRANULOCYTES NFR BLD AUTO: 0.2 % (ref 0–0.5)
IMM GRANULOCYTES NFR BLD AUTO: 0.3 % (ref 0–0.5)
KETONES UR QL STRIP: NEGATIVE
LEUKOCYTE ESTERASE UR QL STRIP.AUTO: NEGATIVE
LIPASE SERPL-CCNC: 32 U/L (ref 13–60)
LYMPHOCYTES # BLD AUTO: 2.64 10*3/MM3 (ref 0.7–3.1)
LYMPHOCYTES # BLD AUTO: 3.39 10*3/MM3 (ref 0.7–3.1)
LYMPHOCYTES NFR BLD AUTO: 27.8 % (ref 19.6–45.3)
LYMPHOCYTES NFR BLD AUTO: 40.5 % (ref 19.6–45.3)
MAGNESIUM SERPL-MCNC: 1.6 MG/DL (ref 1.6–2.6)
MAGNESIUM SERPL-MCNC: 1.9 MG/DL (ref 1.6–2.6)
MCH RBC QN AUTO: 26.7 PG (ref 26.6–33)
MCH RBC QN AUTO: 26.8 PG (ref 26.6–33)
MCHC RBC AUTO-ENTMCNC: 32.9 G/DL (ref 31.5–35.7)
MCHC RBC AUTO-ENTMCNC: 33.1 G/DL (ref 31.5–35.7)
MCV RBC AUTO: 81 FL (ref 79–97)
MCV RBC AUTO: 81 FL (ref 79–97)
MONOCYTES # BLD AUTO: 0.57 10*3/MM3 (ref 0.1–0.9)
MONOCYTES # BLD AUTO: 0.74 10*3/MM3 (ref 0.1–0.9)
MONOCYTES NFR BLD AUTO: 6.8 % (ref 5–12)
MONOCYTES NFR BLD AUTO: 7.8 % (ref 5–12)
NEUTROPHILS # BLD AUTO: 4.22 10*3/MM3 (ref 1.7–7)
NEUTROPHILS # BLD AUTO: 5.92 10*3/MM3 (ref 1.7–7)
NEUTROPHILS NFR BLD AUTO: 50.5 % (ref 42.7–76)
NEUTROPHILS NFR BLD AUTO: 62.3 % (ref 42.7–76)
NITRITE UR QL STRIP: NEGATIVE
NRBC BLD AUTO-RTO: 0 /100 WBC (ref 0–0.2)
NRBC BLD AUTO-RTO: 0 /100 WBC (ref 0–0.2)
NT-PROBNP SERPL-MCNC: 115.6 PG/ML (ref 5–450)
PH UR STRIP.AUTO: 5.5 [PH] (ref 5–9)
PLATELET # BLD AUTO: 249 10*3/MM3 (ref 140–450)
PLATELET # BLD AUTO: 255 10*3/MM3 (ref 140–450)
PMV BLD AUTO: 11.1 FL (ref 6–12)
PMV BLD AUTO: 11.1 FL (ref 6–12)
POTASSIUM BLD-SCNC: 2.9 MMOL/L (ref 3.5–5.2)
POTASSIUM BLD-SCNC: 3.2 MMOL/L (ref 3.5–5.2)
PROT SERPL-MCNC: 6.6 G/DL (ref 6–8.5)
PROT UR QL STRIP: NEGATIVE
RBC # BLD AUTO: 5.22 10*6/MM3 (ref 3.77–5.28)
RBC # BLD AUTO: 5.32 10*6/MM3 (ref 3.77–5.28)
SODIUM BLD-SCNC: 144 MMOL/L (ref 136–145)
SODIUM BLD-SCNC: 144 MMOL/L (ref 136–145)
SP GR UR STRIP: 1.01 (ref 1–1.03)
TROPONIN T SERPL-MCNC: <0.01 NG/ML (ref 0–0.03)
UROBILINOGEN UR QL STRIP: NORMAL
WBC NRBC COR # BLD: 8.37 10*3/MM3 (ref 3.4–10.8)
WBC NRBC COR # BLD: 9.5 10*3/MM3 (ref 3.4–10.8)
WHOLE BLOOD HOLD SPECIMEN: NORMAL
WHOLE BLOOD HOLD SPECIMEN: NORMAL

## 2019-08-25 PROCEDURE — 94760 N-INVAS EAR/PLS OXIMETRY 1: CPT

## 2019-08-25 PROCEDURE — 36415 COLL VENOUS BLD VENIPUNCTURE: CPT | Performed by: FAMILY MEDICINE

## 2019-08-25 PROCEDURE — 81003 URINALYSIS AUTO W/O SCOPE: CPT | Performed by: FAMILY MEDICINE

## 2019-08-25 PROCEDURE — 25010000003 POTASSIUM CHLORIDE 10 MEQ/100ML SOLUTION: Performed by: FAMILY MEDICINE

## 2019-08-25 PROCEDURE — 71045 X-RAY EXAM CHEST 1 VIEW: CPT

## 2019-08-25 PROCEDURE — 99284 EMERGENCY DEPT VISIT MOD MDM: CPT

## 2019-08-25 PROCEDURE — 93005 ELECTROCARDIOGRAM TRACING: CPT | Performed by: EMERGENCY MEDICINE

## 2019-08-25 PROCEDURE — G0378 HOSPITAL OBSERVATION PER HR: HCPCS

## 2019-08-25 PROCEDURE — 83735 ASSAY OF MAGNESIUM: CPT | Performed by: FAMILY MEDICINE

## 2019-08-25 PROCEDURE — 96366 THER/PROPH/DIAG IV INF ADDON: CPT

## 2019-08-25 PROCEDURE — 85025 COMPLETE CBC W/AUTO DIFF WBC: CPT | Performed by: FAMILY MEDICINE

## 2019-08-25 PROCEDURE — 80053 COMPREHEN METABOLIC PANEL: CPT | Performed by: FAMILY MEDICINE

## 2019-08-25 PROCEDURE — 93005 ELECTROCARDIOGRAM TRACING: CPT | Performed by: FAMILY MEDICINE

## 2019-08-25 PROCEDURE — 84484 ASSAY OF TROPONIN QUANT: CPT | Performed by: FAMILY MEDICINE

## 2019-08-25 PROCEDURE — 84484 ASSAY OF TROPONIN QUANT: CPT | Performed by: INTERNAL MEDICINE

## 2019-08-25 PROCEDURE — 94640 AIRWAY INHALATION TREATMENT: CPT

## 2019-08-25 PROCEDURE — 25010000002 MAGNESIUM SULFATE 2 GM/50ML SOLUTION: Performed by: FAMILY MEDICINE

## 2019-08-25 PROCEDURE — 96375 TX/PRO/DX INJ NEW DRUG ADDON: CPT

## 2019-08-25 PROCEDURE — 82550 ASSAY OF CK (CPK): CPT | Performed by: FAMILY MEDICINE

## 2019-08-25 PROCEDURE — 93010 ELECTROCARDIOGRAM REPORT: CPT | Performed by: INTERNAL MEDICINE

## 2019-08-25 PROCEDURE — 83880 ASSAY OF NATRIURETIC PEPTIDE: CPT | Performed by: FAMILY MEDICINE

## 2019-08-25 PROCEDURE — 83690 ASSAY OF LIPASE: CPT | Performed by: FAMILY MEDICINE

## 2019-08-25 PROCEDURE — 96365 THER/PROPH/DIAG IV INF INIT: CPT

## 2019-08-25 PROCEDURE — 25010000002 MORPHINE PER 10 MG: Performed by: FAMILY MEDICINE

## 2019-08-25 PROCEDURE — 96367 TX/PROPH/DG ADDL SEQ IV INF: CPT

## 2019-08-25 RX ORDER — POTASSIUM CHLORIDE 7.45 MG/ML
10 INJECTION INTRAVENOUS
Status: DISCONTINUED | OUTPATIENT
Start: 2019-08-25 | End: 2019-08-29 | Stop reason: HOSPADM

## 2019-08-25 RX ORDER — NALOXONE HCL 0.4 MG/ML
0.4 VIAL (ML) INJECTION
Status: DISCONTINUED | OUTPATIENT
Start: 2019-08-25 | End: 2019-08-29 | Stop reason: HOSPADM

## 2019-08-25 RX ORDER — OMEPRAZOLE 20 MG/1
20 CAPSULE, DELAYED RELEASE ORAL DAILY
Refills: 5 | COMMUNITY
Start: 2019-07-02 | End: 2021-08-19 | Stop reason: SDUPTHER

## 2019-08-25 RX ORDER — ALBUTEROL SULFATE 2.5 MG/3ML
2.5 SOLUTION RESPIRATORY (INHALATION) EVERY 4 HOURS PRN
Status: DISCONTINUED | OUTPATIENT
Start: 2019-08-25 | End: 2019-08-29 | Stop reason: HOSPADM

## 2019-08-25 RX ORDER — POTASSIUM CHLORIDE 1.5 G/1.77G
20 POWDER, FOR SOLUTION ORAL ONCE
Status: COMPLETED | OUTPATIENT
Start: 2019-08-25 | End: 2019-08-25

## 2019-08-25 RX ORDER — PROMETHAZINE HYDROCHLORIDE 12.5 MG/1
12.5 TABLET ORAL EVERY 6 HOURS PRN
Status: DISCONTINUED | OUTPATIENT
Start: 2019-08-25 | End: 2019-08-29 | Stop reason: HOSPADM

## 2019-08-25 RX ORDER — ROPINIROLE 0.25 MG/1
0.25 TABLET, FILM COATED ORAL NIGHTLY
Status: DISCONTINUED | OUTPATIENT
Start: 2019-08-25 | End: 2019-08-29 | Stop reason: HOSPADM

## 2019-08-25 RX ORDER — SODIUM CHLORIDE 0.9 % (FLUSH) 0.9 %
10 SYRINGE (ML) INJECTION AS NEEDED
Status: DISCONTINUED | OUTPATIENT
Start: 2019-08-25 | End: 2019-08-29 | Stop reason: HOSPADM

## 2019-08-25 RX ORDER — SODIUM CHLORIDE 9 MG/ML
INJECTION, SOLUTION INTRAVENOUS
Status: COMPLETED
Start: 2019-08-25 | End: 2019-08-25

## 2019-08-25 RX ORDER — SODIUM CHLORIDE 0.9 % (FLUSH) 0.9 %
3 SYRINGE (ML) INJECTION EVERY 12 HOURS SCHEDULED
Status: DISCONTINUED | OUTPATIENT
Start: 2019-08-25 | End: 2019-08-29 | Stop reason: HOSPADM

## 2019-08-25 RX ORDER — MAGNESIUM SULFATE HEPTAHYDRATE 40 MG/ML
2 INJECTION, SOLUTION INTRAVENOUS ONCE
Status: COMPLETED | OUTPATIENT
Start: 2019-08-25 | End: 2019-08-25

## 2019-08-25 RX ORDER — BUDESONIDE 0.5 MG/2ML
0.5 INHALANT ORAL
Status: DISCONTINUED | OUTPATIENT
Start: 2019-08-25 | End: 2019-08-29 | Stop reason: HOSPADM

## 2019-08-25 RX ORDER — POTASSIUM CHLORIDE 7.45 MG/ML
10 INJECTION INTRAVENOUS ONCE
Status: COMPLETED | OUTPATIENT
Start: 2019-08-25 | End: 2019-08-25

## 2019-08-25 RX ORDER — MORPHINE SULFATE 2 MG/ML
1 INJECTION, SOLUTION INTRAMUSCULAR; INTRAVENOUS EVERY 4 HOURS PRN
Status: DISCONTINUED | OUTPATIENT
Start: 2019-08-25 | End: 2019-08-29 | Stop reason: HOSPADM

## 2019-08-25 RX ORDER — SODIUM CHLORIDE 0.9 % (FLUSH) 0.9 %
3-10 SYRINGE (ML) INJECTION AS NEEDED
Status: DISCONTINUED | OUTPATIENT
Start: 2019-08-25 | End: 2019-08-29 | Stop reason: HOSPADM

## 2019-08-25 RX ORDER — PANTOPRAZOLE SODIUM 40 MG/1
40 TABLET, DELAYED RELEASE ORAL EVERY MORNING
Status: DISCONTINUED | OUTPATIENT
Start: 2019-08-26 | End: 2019-08-26

## 2019-08-25 RX ORDER — ONDANSETRON 2 MG/ML
4 INJECTION INTRAMUSCULAR; INTRAVENOUS EVERY 6 HOURS PRN
Status: DISCONTINUED | OUTPATIENT
Start: 2019-08-25 | End: 2019-08-29 | Stop reason: HOSPADM

## 2019-08-25 RX ORDER — PANTOPRAZOLE SODIUM 40 MG/1
40 TABLET, DELAYED RELEASE ORAL DAILY
Status: DISCONTINUED | OUTPATIENT
Start: 2019-08-25 | End: 2019-08-25 | Stop reason: SDUPTHER

## 2019-08-25 RX ORDER — PROMETHAZINE HYDROCHLORIDE 25 MG/1
TABLET ORAL
Refills: 0 | COMMUNITY
Start: 2019-06-06 | End: 2021-08-19 | Stop reason: SDUPTHER

## 2019-08-25 RX ORDER — ALBUTEROL SULFATE 90 UG/1
2 AEROSOL, METERED RESPIRATORY (INHALATION) EVERY 4 HOURS PRN
Status: DISCONTINUED | OUTPATIENT
Start: 2019-08-25 | End: 2019-08-25 | Stop reason: CLARIF

## 2019-08-25 RX ORDER — GABAPENTIN 100 MG/1
100 CAPSULE ORAL 2 TIMES DAILY
Status: DISCONTINUED | OUTPATIENT
Start: 2019-08-25 | End: 2019-08-29 | Stop reason: HOSPADM

## 2019-08-25 RX ORDER — ONDANSETRON 2 MG/ML
4 INJECTION INTRAMUSCULAR; INTRAVENOUS EVERY 6 HOURS PRN
Status: DISCONTINUED | OUTPATIENT
Start: 2019-08-25 | End: 2019-08-25 | Stop reason: SDUPTHER

## 2019-08-25 RX ORDER — BUPROPION HYDROCHLORIDE 150 MG/1
150 TABLET, EXTENDED RELEASE ORAL 2 TIMES DAILY
Status: DISCONTINUED | OUTPATIENT
Start: 2019-08-25 | End: 2019-08-29 | Stop reason: HOSPADM

## 2019-08-25 RX ORDER — TRAMADOL HYDROCHLORIDE 50 MG/1
50 TABLET ORAL EVERY 8 HOURS PRN
Status: DISCONTINUED | OUTPATIENT
Start: 2019-08-25 | End: 2019-08-29 | Stop reason: HOSPADM

## 2019-08-25 RX ORDER — ASPIRIN 81 MG/1
324 TABLET, CHEWABLE ORAL ONCE
Status: COMPLETED | OUTPATIENT
Start: 2019-08-25 | End: 2019-08-25

## 2019-08-25 RX ADMIN — BUDESONIDE 0.5 MG: 0.5 INHALANT RESPIRATORY (INHALATION) at 20:22

## 2019-08-25 RX ADMIN — BUPROPION HYDROCHLORIDE 150 MG: 150 TABLET, EXTENDED RELEASE ORAL at 20:19

## 2019-08-25 RX ADMIN — POTASSIUM CHLORIDE 10 MEQ: 7.46 INJECTION, SOLUTION INTRAVENOUS at 22:32

## 2019-08-25 RX ADMIN — POTASSIUM CHLORIDE 20 MEQ: 1.5 POWDER, FOR SOLUTION ORAL at 16:19

## 2019-08-25 RX ADMIN — MORPHINE SULFATE 1 MG: 2 INJECTION, SOLUTION INTRAMUSCULAR; INTRAVENOUS at 22:43

## 2019-08-25 RX ADMIN — GABAPENTIN 100 MG: 100 CAPSULE ORAL at 20:27

## 2019-08-25 RX ADMIN — ASPIRIN 81 MG 324 MG: 81 TABLET ORAL at 14:55

## 2019-08-25 RX ADMIN — POTASSIUM CHLORIDE 10 MEQ: 7.46 INJECTION, SOLUTION INTRAVENOUS at 15:22

## 2019-08-25 RX ADMIN — SODIUM CHLORIDE, PRESERVATIVE FREE 3 ML: 5 INJECTION INTRAVENOUS at 20:28

## 2019-08-25 RX ADMIN — POTASSIUM CHLORIDE 10 MEQ: 10 INJECTION, SOLUTION INTRAVENOUS at 17:21

## 2019-08-25 RX ADMIN — ROPINIROLE HYDROCHLORIDE 0.25 MG: 0.25 TABLET, FILM COATED ORAL at 20:20

## 2019-08-25 RX ADMIN — SODIUM CHLORIDE 1000 ML: 9 INJECTION, SOLUTION INTRAVENOUS at 22:43

## 2019-08-25 RX ADMIN — MAGNESIUM SULFATE HEPTAHYDRATE 2 G: 40 INJECTION, SOLUTION INTRAVENOUS at 18:32

## 2019-08-25 RX ADMIN — SODIUM CHLORIDE 250 ML/HR: 900 INJECTION, SOLUTION INTRAVENOUS at 16:20

## 2019-08-26 ENCOUNTER — READMISSION MANAGEMENT (OUTPATIENT)
Dept: CALL CENTER | Facility: HOSPITAL | Age: 49
End: 2019-08-26

## 2019-08-26 LAB
ANION GAP SERPL CALCULATED.3IONS-SCNC: 16 MMOL/L (ref 5–15)
BASOPHILS # BLD AUTO: 0.02 10*3/MM3 (ref 0–0.2)
BASOPHILS NFR BLD AUTO: 0.2 % (ref 0–1.5)
BUN BLD-MCNC: 8 MG/DL (ref 6–20)
BUN/CREAT SERPL: 11.1 (ref 7–25)
CALCIUM SPEC-SCNC: 9 MG/DL (ref 8.6–10.5)
CHLORIDE SERPL-SCNC: 101 MMOL/L (ref 98–107)
CO2 SERPL-SCNC: 26 MMOL/L (ref 22–29)
CREAT BLD-MCNC: 0.72 MG/DL (ref 0.57–1)
DEPRECATED RDW RBC AUTO: 42.5 FL (ref 37–54)
EOSINOPHIL # BLD AUTO: 0.17 10*3/MM3 (ref 0–0.4)
EOSINOPHIL NFR BLD AUTO: 1.9 % (ref 0.3–6.2)
ERYTHROCYTE [DISTWIDTH] IN BLOOD BY AUTOMATED COUNT: 14.2 % (ref 12.3–15.4)
GFR SERPL CREATININE-BSD FRML MDRD: 86 ML/MIN/1.73
GLUCOSE BLD-MCNC: 91 MG/DL (ref 65–99)
HCT VFR BLD AUTO: 41.7 % (ref 34–46.6)
HGB BLD-MCNC: 13.5 G/DL (ref 12–15.9)
IMM GRANULOCYTES # BLD AUTO: 0.05 10*3/MM3 (ref 0–0.05)
IMM GRANULOCYTES NFR BLD AUTO: 0.6 % (ref 0–0.5)
LARGE PLATELETS: NORMAL
LYMPHOCYTES # BLD AUTO: 3.65 10*3/MM3 (ref 0.7–3.1)
LYMPHOCYTES NFR BLD AUTO: 40.2 % (ref 19.6–45.3)
MAGNESIUM SERPL-MCNC: 1.7 MG/DL (ref 1.6–2.6)
MCH RBC QN AUTO: 26.8 PG (ref 26.6–33)
MCHC RBC AUTO-ENTMCNC: 32.4 G/DL (ref 31.5–35.7)
MCV RBC AUTO: 82.7 FL (ref 79–97)
MONOCYTES # BLD AUTO: 0.74 10*3/MM3 (ref 0.1–0.9)
MONOCYTES NFR BLD AUTO: 8.2 % (ref 5–12)
NEUTROPHILS # BLD AUTO: 4.44 10*3/MM3 (ref 1.7–7)
NEUTROPHILS NFR BLD AUTO: 48.9 % (ref 42.7–76)
NRBC BLD AUTO-RTO: 0 /100 WBC (ref 0–0.2)
PLATELET # BLD AUTO: 212 10*3/MM3 (ref 140–450)
PMV BLD AUTO: 11.9 FL (ref 6–12)
POTASSIUM BLD-SCNC: 3.1 MMOL/L (ref 3.5–5.2)
POTASSIUM BLD-SCNC: 3.4 MMOL/L (ref 3.5–5.2)
POTASSIUM BLD-SCNC: 4.2 MMOL/L (ref 3.5–5.2)
POTASSIUM UR-SCNC: 53.4 MMOL/L
RBC # BLD AUTO: 5.04 10*6/MM3 (ref 3.77–5.28)
RBC MORPH BLD: NORMAL
SODIUM BLD-SCNC: 143 MMOL/L (ref 136–145)
TROPONIN T SERPL-MCNC: <0.01 NG/ML (ref 0–0.03)
TROPONIN T SERPL-MCNC: <0.01 NG/ML (ref 0–0.03)
WBC MORPH BLD: NORMAL
WBC NRBC COR # BLD: 9.07 10*3/MM3 (ref 3.4–10.8)
WHOLE BLOOD HOLD SPECIMEN: NORMAL

## 2019-08-26 PROCEDURE — 85007 BL SMEAR W/DIFF WBC COUNT: CPT | Performed by: FAMILY MEDICINE

## 2019-08-26 PROCEDURE — 84133 ASSAY OF URINE POTASSIUM: CPT | Performed by: INTERNAL MEDICINE

## 2019-08-26 PROCEDURE — 96376 TX/PRO/DX INJ SAME DRUG ADON: CPT

## 2019-08-26 PROCEDURE — 25010000002 MORPHINE PER 10 MG: Performed by: FAMILY MEDICINE

## 2019-08-26 PROCEDURE — 84132 ASSAY OF SERUM POTASSIUM: CPT | Performed by: HOSPITALIST

## 2019-08-26 PROCEDURE — 84484 ASSAY OF TROPONIN QUANT: CPT | Performed by: INTERNAL MEDICINE

## 2019-08-26 PROCEDURE — 96366 THER/PROPH/DIAG IV INF ADDON: CPT

## 2019-08-26 PROCEDURE — 84132 ASSAY OF SERUM POTASSIUM: CPT | Performed by: FAMILY MEDICINE

## 2019-08-26 PROCEDURE — 80048 BASIC METABOLIC PNL TOTAL CA: CPT | Performed by: FAMILY MEDICINE

## 2019-08-26 PROCEDURE — 85025 COMPLETE CBC W/AUTO DIFF WBC: CPT | Performed by: FAMILY MEDICINE

## 2019-08-26 PROCEDURE — 25010000003 POTASSIUM CHLORIDE 10 MEQ/100ML SOLUTION: Performed by: FAMILY MEDICINE

## 2019-08-26 PROCEDURE — 83735 ASSAY OF MAGNESIUM: CPT | Performed by: FAMILY MEDICINE

## 2019-08-26 PROCEDURE — G0378 HOSPITAL OBSERVATION PER HR: HCPCS

## 2019-08-26 RX ORDER — POTASSIUM CHLORIDE 1.5 G/1.77G
40 POWDER, FOR SOLUTION ORAL ONCE
Status: COMPLETED | OUTPATIENT
Start: 2019-08-26 | End: 2019-08-26

## 2019-08-26 RX ORDER — POTASSIUM CHLORIDE 1.5 G/1.77G
40 POWDER, FOR SOLUTION ORAL AS NEEDED
Status: DISCONTINUED | OUTPATIENT
Start: 2019-08-26 | End: 2019-08-29 | Stop reason: HOSPADM

## 2019-08-26 RX ORDER — AMILORIDE HYDROCHLORIDE 5 MG/1
5 TABLET ORAL DAILY
Status: DISCONTINUED | OUTPATIENT
Start: 2019-08-26 | End: 2019-08-27

## 2019-08-26 RX ORDER — POTASSIUM CHLORIDE 7.45 MG/ML
10 INJECTION INTRAVENOUS
Status: DISCONTINUED | OUTPATIENT
Start: 2019-08-26 | End: 2019-08-29 | Stop reason: HOSPADM

## 2019-08-26 RX ORDER — POTASSIUM CHLORIDE 750 MG/1
40 CAPSULE, EXTENDED RELEASE ORAL AS NEEDED
Status: DISCONTINUED | OUTPATIENT
Start: 2019-08-26 | End: 2019-08-29 | Stop reason: HOSPADM

## 2019-08-26 RX ORDER — FAMOTIDINE 20 MG/1
20 TABLET, FILM COATED ORAL DAILY
Status: DISCONTINUED | OUTPATIENT
Start: 2019-08-26 | End: 2019-08-29 | Stop reason: HOSPADM

## 2019-08-26 RX ADMIN — SODIUM CHLORIDE, PRESERVATIVE FREE 3 ML: 5 INJECTION INTRAVENOUS at 22:01

## 2019-08-26 RX ADMIN — GABAPENTIN 100 MG: 100 CAPSULE ORAL at 21:58

## 2019-08-26 RX ADMIN — BUPROPION HYDROCHLORIDE 150 MG: 150 TABLET, EXTENDED RELEASE ORAL at 10:47

## 2019-08-26 RX ADMIN — TRAMADOL HYDROCHLORIDE 50 MG: 50 TABLET, FILM COATED ORAL at 02:23

## 2019-08-26 RX ADMIN — AMILORIDE HYDROCLORIDE 5 MG: 5 TABLET ORAL at 18:02

## 2019-08-26 RX ADMIN — POTASSIUM CHLORIDE 10 MEQ: 7.46 INJECTION, SOLUTION INTRAVENOUS at 00:53

## 2019-08-26 RX ADMIN — PANTOPRAZOLE SODIUM 40 MG: 40 TABLET, DELAYED RELEASE ORAL at 06:10

## 2019-08-26 RX ADMIN — POTASSIUM CHLORIDE 10 MEQ: 7.46 INJECTION, SOLUTION INTRAVENOUS at 02:19

## 2019-08-26 RX ADMIN — ROPINIROLE HYDROCHLORIDE 0.25 MG: 0.25 TABLET, FILM COATED ORAL at 21:58

## 2019-08-26 RX ADMIN — POTASSIUM CHLORIDE 40 MEQ: 1.5 POWDER, FOR SOLUTION ORAL at 13:19

## 2019-08-26 RX ADMIN — POTASSIUM CHLORIDE 10 MEQ: 7.46 INJECTION, SOLUTION INTRAVENOUS at 03:54

## 2019-08-26 RX ADMIN — BUPROPION HYDROCHLORIDE 150 MG: 150 TABLET, EXTENDED RELEASE ORAL at 21:58

## 2019-08-26 RX ADMIN — FAMOTIDINE 20 MG: 20 TABLET ORAL at 13:19

## 2019-08-26 RX ADMIN — MORPHINE SULFATE 1 MG: 2 INJECTION, SOLUTION INTRAMUSCULAR; INTRAVENOUS at 21:58

## 2019-08-26 RX ADMIN — SERTRALINE 150 MG: 50 TABLET, FILM COATED ORAL at 10:47

## 2019-08-26 RX ADMIN — GABAPENTIN 100 MG: 100 CAPSULE ORAL at 10:47

## 2019-08-26 NOTE — OUTREACH NOTE
Medical Week 3 Survey      Responses   Facility patient discharged from?  [place]   Does the patient have one of the following disease processes/diagnoses(primary or secondary)?  Other   Week 3 attempt successful?  No   Revoke  Readmitted          Rosmery Birch RN

## 2019-08-27 LAB
ANION GAP SERPL CALCULATED.3IONS-SCNC: 11 MMOL/L (ref 5–15)
BASOPHILS # BLD AUTO: 0.02 10*3/MM3 (ref 0–0.2)
BASOPHILS NFR BLD AUTO: 0.3 % (ref 0–1.5)
BUN BLD-MCNC: 9 MG/DL (ref 6–20)
BUN/CREAT SERPL: 13.8 (ref 7–25)
CALCIUM SPEC-SCNC: 9.3 MG/DL (ref 8.6–10.5)
CHLORIDE SERPL-SCNC: 104 MMOL/L (ref 98–107)
CO2 SERPL-SCNC: 27 MMOL/L (ref 22–29)
CREAT BLD-MCNC: 0.65 MG/DL (ref 0.57–1)
DEPRECATED RDW RBC AUTO: 41.4 FL (ref 37–54)
EOSINOPHIL # BLD AUTO: 0.16 10*3/MM3 (ref 0–0.4)
EOSINOPHIL NFR BLD AUTO: 2.6 % (ref 0.3–6.2)
ERYTHROCYTE [DISTWIDTH] IN BLOOD BY AUTOMATED COUNT: 14.3 % (ref 12.3–15.4)
GFR SERPL CREATININE-BSD FRML MDRD: 97 ML/MIN/1.73
GLUCOSE BLD-MCNC: 85 MG/DL (ref 65–99)
HCT VFR BLD AUTO: 40.6 % (ref 34–46.6)
HGB BLD-MCNC: 13.3 G/DL (ref 12–15.9)
HOLD SPECIMEN: NORMAL
IMM GRANULOCYTES # BLD AUTO: 0.01 10*3/MM3 (ref 0–0.05)
IMM GRANULOCYTES NFR BLD AUTO: 0.2 % (ref 0–0.5)
LYMPHOCYTES # BLD AUTO: 2.92 10*3/MM3 (ref 0.7–3.1)
LYMPHOCYTES NFR BLD AUTO: 47 % (ref 19.6–45.3)
MAGNESIUM SERPL-MCNC: 1.8 MG/DL (ref 1.6–2.6)
MCH RBC QN AUTO: 26.6 PG (ref 26.6–33)
MCHC RBC AUTO-ENTMCNC: 32.8 G/DL (ref 31.5–35.7)
MCV RBC AUTO: 81.2 FL (ref 79–97)
MONOCYTES # BLD AUTO: 0.52 10*3/MM3 (ref 0.1–0.9)
MONOCYTES NFR BLD AUTO: 8.4 % (ref 5–12)
NEUTROPHILS # BLD AUTO: 2.58 10*3/MM3 (ref 1.7–7)
NEUTROPHILS NFR BLD AUTO: 41.5 % (ref 42.7–76)
NRBC BLD AUTO-RTO: 0 /100 WBC (ref 0–0.2)
PLATELET # BLD AUTO: 217 10*3/MM3 (ref 140–450)
PMV BLD AUTO: 11.2 FL (ref 6–12)
POTASSIUM BLD-SCNC: 3.2 MMOL/L (ref 3.5–5.2)
POTASSIUM BLD-SCNC: 4.2 MMOL/L (ref 3.5–5.2)
RBC # BLD AUTO: 5 10*6/MM3 (ref 3.77–5.28)
SODIUM BLD-SCNC: 142 MMOL/L (ref 136–145)
WBC NRBC COR # BLD: 6.21 10*3/MM3 (ref 3.4–10.8)

## 2019-08-27 PROCEDURE — 84132 ASSAY OF SERUM POTASSIUM: CPT | Performed by: INTERNAL MEDICINE

## 2019-08-27 PROCEDURE — 83735 ASSAY OF MAGNESIUM: CPT | Performed by: FAMILY MEDICINE

## 2019-08-27 PROCEDURE — 85025 COMPLETE CBC W/AUTO DIFF WBC: CPT | Performed by: FAMILY MEDICINE

## 2019-08-27 PROCEDURE — 25010000002 MORPHINE PER 10 MG: Performed by: FAMILY MEDICINE

## 2019-08-27 PROCEDURE — 80048 BASIC METABOLIC PNL TOTAL CA: CPT | Performed by: FAMILY MEDICINE

## 2019-08-27 PROCEDURE — 93005 ELECTROCARDIOGRAM TRACING: CPT | Performed by: HOSPITALIST

## 2019-08-27 PROCEDURE — 96376 TX/PRO/DX INJ SAME DRUG ADON: CPT

## 2019-08-27 PROCEDURE — 93010 ELECTROCARDIOGRAM REPORT: CPT | Performed by: INTERNAL MEDICINE

## 2019-08-27 PROCEDURE — G0378 HOSPITAL OBSERVATION PER HR: HCPCS

## 2019-08-27 RX ORDER — AMILORIDE HYDROCHLORIDE 5 MG/1
10 TABLET ORAL DAILY
Status: DISCONTINUED | OUTPATIENT
Start: 2019-08-28 | End: 2019-08-29 | Stop reason: HOSPADM

## 2019-08-27 RX ORDER — NITROGLYCERIN 0.4 MG/1
TABLET SUBLINGUAL
Status: COMPLETED
Start: 2019-08-27 | End: 2019-08-27

## 2019-08-27 RX ORDER — NITROGLYCERIN 0.4 MG/1
0.4 TABLET SUBLINGUAL
Status: DISCONTINUED | OUTPATIENT
Start: 2019-08-27 | End: 2019-08-29 | Stop reason: HOSPADM

## 2019-08-27 RX ADMIN — SERTRALINE 150 MG: 50 TABLET, FILM COATED ORAL at 09:18

## 2019-08-27 RX ADMIN — NITROGLYCERIN 0.4 MG: 0.4 TABLET, ORALLY DISINTEGRATING SUBLINGUAL at 14:36

## 2019-08-27 RX ADMIN — GABAPENTIN 100 MG: 100 CAPSULE ORAL at 20:21

## 2019-08-27 RX ADMIN — MORPHINE SULFATE 1 MG: 2 INJECTION, SOLUTION INTRAMUSCULAR; INTRAVENOUS at 16:23

## 2019-08-27 RX ADMIN — GABAPENTIN 100 MG: 100 CAPSULE ORAL at 09:17

## 2019-08-27 RX ADMIN — SODIUM CHLORIDE, PRESERVATIVE FREE 3 ML: 5 INJECTION INTRAVENOUS at 09:17

## 2019-08-27 RX ADMIN — NITROGLYCERIN 0.4 MG: 0.4 TABLET, ORALLY DISINTEGRATING SUBLINGUAL at 14:46

## 2019-08-27 RX ADMIN — MORPHINE SULFATE 1 MG: 2 INJECTION, SOLUTION INTRAMUSCULAR; INTRAVENOUS at 20:38

## 2019-08-27 RX ADMIN — BUPROPION HYDROCHLORIDE 150 MG: 150 TABLET, EXTENDED RELEASE ORAL at 20:21

## 2019-08-27 RX ADMIN — POTASSIUM CHLORIDE 40 MEQ: 750 CAPSULE, EXTENDED RELEASE ORAL at 09:17

## 2019-08-27 RX ADMIN — ROPINIROLE HYDROCHLORIDE 0.25 MG: 0.25 TABLET, FILM COATED ORAL at 20:21

## 2019-08-27 RX ADMIN — SODIUM CHLORIDE, PRESERVATIVE FREE 10 ML: 5 INJECTION INTRAVENOUS at 16:23

## 2019-08-27 RX ADMIN — FAMOTIDINE 20 MG: 20 TABLET ORAL at 09:18

## 2019-08-27 RX ADMIN — BUPROPION HYDROCHLORIDE 150 MG: 150 TABLET, EXTENDED RELEASE ORAL at 09:18

## 2019-08-27 RX ADMIN — SODIUM CHLORIDE, PRESERVATIVE FREE 3 ML: 5 INJECTION INTRAVENOUS at 20:21

## 2019-08-27 RX ADMIN — POTASSIUM CHLORIDE 40 MEQ: 750 CAPSULE, EXTENDED RELEASE ORAL at 13:48

## 2019-08-27 RX ADMIN — AMILORIDE HYDROCLORIDE 5 MG: 5 TABLET ORAL at 09:17

## 2019-08-28 LAB
ANION GAP SERPL CALCULATED.3IONS-SCNC: 8 MMOL/L (ref 5–15)
BASOPHILS # BLD AUTO: 0.02 10*3/MM3 (ref 0–0.2)
BASOPHILS NFR BLD AUTO: 0.4 % (ref 0–1.5)
BUN BLD-MCNC: 10 MG/DL (ref 6–20)
BUN/CREAT SERPL: 13.5 (ref 7–25)
CALCIUM SPEC-SCNC: 9.4 MG/DL (ref 8.6–10.5)
CHLORIDE SERPL-SCNC: 105 MMOL/L (ref 98–107)
CO2 SERPL-SCNC: 30 MMOL/L (ref 22–29)
CREAT BLD-MCNC: 0.74 MG/DL (ref 0.57–1)
DEPRECATED RDW RBC AUTO: 44.1 FL (ref 37–54)
EOSINOPHIL # BLD AUTO: 0.12 10*3/MM3 (ref 0–0.4)
EOSINOPHIL NFR BLD AUTO: 2.3 % (ref 0.3–6.2)
ERYTHROCYTE [DISTWIDTH] IN BLOOD BY AUTOMATED COUNT: 14.5 % (ref 12.3–15.4)
GFR SERPL CREATININE-BSD FRML MDRD: 83 ML/MIN/1.73
GLUCOSE BLD-MCNC: 90 MG/DL (ref 65–99)
HCT VFR BLD AUTO: 41.2 % (ref 34–46.6)
HGB BLD-MCNC: 13.1 G/DL (ref 12–15.9)
HOLD SPECIMEN: NORMAL
IMM GRANULOCYTES # BLD AUTO: 0.01 10*3/MM3 (ref 0–0.05)
IMM GRANULOCYTES NFR BLD AUTO: 0.2 % (ref 0–0.5)
LYMPHOCYTES # BLD AUTO: 1.76 10*3/MM3 (ref 0.7–3.1)
LYMPHOCYTES NFR BLD AUTO: 33.4 % (ref 19.6–45.3)
MAGNESIUM SERPL-MCNC: 2 MG/DL (ref 1.6–2.6)
MCH RBC QN AUTO: 26.8 PG (ref 26.6–33)
MCHC RBC AUTO-ENTMCNC: 31.8 G/DL (ref 31.5–35.7)
MCV RBC AUTO: 84.4 FL (ref 79–97)
MONOCYTES # BLD AUTO: 0.46 10*3/MM3 (ref 0.1–0.9)
MONOCYTES NFR BLD AUTO: 8.7 % (ref 5–12)
NEUTROPHILS # BLD AUTO: 2.9 10*3/MM3 (ref 1.7–7)
NEUTROPHILS NFR BLD AUTO: 55 % (ref 42.7–76)
NRBC BLD AUTO-RTO: 0 /100 WBC (ref 0–0.2)
PLATELET # BLD AUTO: 207 10*3/MM3 (ref 140–450)
PMV BLD AUTO: 11.5 FL (ref 6–12)
POTASSIUM BLD-SCNC: 4.1 MMOL/L (ref 3.5–5.2)
RBC # BLD AUTO: 4.88 10*6/MM3 (ref 3.77–5.28)
SODIUM BLD-SCNC: 143 MMOL/L (ref 136–145)
WBC NRBC COR # BLD: 5.27 10*3/MM3 (ref 3.4–10.8)

## 2019-08-28 PROCEDURE — G0378 HOSPITAL OBSERVATION PER HR: HCPCS

## 2019-08-28 PROCEDURE — 63710000001 PROMETHAZINE PER 12.5 MG: Performed by: FAMILY MEDICINE

## 2019-08-28 PROCEDURE — 85025 COMPLETE CBC W/AUTO DIFF WBC: CPT | Performed by: FAMILY MEDICINE

## 2019-08-28 PROCEDURE — 83735 ASSAY OF MAGNESIUM: CPT | Performed by: FAMILY MEDICINE

## 2019-08-28 PROCEDURE — 80048 BASIC METABOLIC PNL TOTAL CA: CPT | Performed by: FAMILY MEDICINE

## 2019-08-28 RX ADMIN — BUPROPION HYDROCHLORIDE 150 MG: 150 TABLET, EXTENDED RELEASE ORAL at 20:38

## 2019-08-28 RX ADMIN — ROPINIROLE HYDROCHLORIDE 0.25 MG: 0.25 TABLET, FILM COATED ORAL at 20:39

## 2019-08-28 RX ADMIN — SERTRALINE 150 MG: 50 TABLET, FILM COATED ORAL at 09:18

## 2019-08-28 RX ADMIN — PROMETHAZINE HYDROCHLORIDE 12.5 MG: 12.5 TABLET ORAL at 20:39

## 2019-08-28 RX ADMIN — AMILORIDE HYDROCLORIDE 10 MG: 5 TABLET ORAL at 09:18

## 2019-08-28 RX ADMIN — BUPROPION HYDROCHLORIDE 150 MG: 150 TABLET, EXTENDED RELEASE ORAL at 09:18

## 2019-08-28 RX ADMIN — GABAPENTIN 100 MG: 100 CAPSULE ORAL at 20:38

## 2019-08-28 RX ADMIN — GABAPENTIN 100 MG: 100 CAPSULE ORAL at 09:18

## 2019-08-28 RX ADMIN — SODIUM CHLORIDE, PRESERVATIVE FREE 3 ML: 5 INJECTION INTRAVENOUS at 09:20

## 2019-08-28 RX ADMIN — TRAMADOL HYDROCHLORIDE 50 MG: 50 TABLET, FILM COATED ORAL at 20:38

## 2019-08-28 RX ADMIN — FAMOTIDINE 20 MG: 20 TABLET ORAL at 09:18

## 2019-08-29 VITALS
WEIGHT: 249 LBS | SYSTOLIC BLOOD PRESSURE: 154 MMHG | DIASTOLIC BLOOD PRESSURE: 77 MMHG | HEIGHT: 65 IN | BODY MASS INDEX: 41.48 KG/M2 | RESPIRATION RATE: 18 BRPM | TEMPERATURE: 97.9 F | OXYGEN SATURATION: 95 % | HEART RATE: 70 BPM

## 2019-08-29 LAB
ANION GAP SERPL CALCULATED.3IONS-SCNC: 12 MMOL/L (ref 5–15)
BASOPHILS # BLD AUTO: 0.02 10*3/MM3 (ref 0–0.2)
BASOPHILS NFR BLD AUTO: 0.3 % (ref 0–1.5)
BUN BLD-MCNC: 12 MG/DL (ref 6–20)
BUN/CREAT SERPL: 16.2 (ref 7–25)
CALCIUM SPEC-SCNC: 9.6 MG/DL (ref 8.6–10.5)
CHLORIDE SERPL-SCNC: 104 MMOL/L (ref 98–107)
CO2 SERPL-SCNC: 27 MMOL/L (ref 22–29)
CREAT BLD-MCNC: 0.74 MG/DL (ref 0.57–1)
DEPRECATED RDW RBC AUTO: 43.9 FL (ref 37–54)
EOSINOPHIL # BLD AUTO: 0.15 10*3/MM3 (ref 0–0.4)
EOSINOPHIL NFR BLD AUTO: 2.5 % (ref 0.3–6.2)
ERYTHROCYTE [DISTWIDTH] IN BLOOD BY AUTOMATED COUNT: 14.5 % (ref 12.3–15.4)
GFR SERPL CREATININE-BSD FRML MDRD: 83 ML/MIN/1.73
GLUCOSE BLD-MCNC: 79 MG/DL (ref 65–99)
HCT VFR BLD AUTO: 40.9 % (ref 34–46.6)
HGB BLD-MCNC: 13.1 G/DL (ref 12–15.9)
IMM GRANULOCYTES # BLD AUTO: 0.01 10*3/MM3 (ref 0–0.05)
IMM GRANULOCYTES NFR BLD AUTO: 0.2 % (ref 0–0.5)
LYMPHOCYTES # BLD AUTO: 2.47 10*3/MM3 (ref 0.7–3.1)
LYMPHOCYTES NFR BLD AUTO: 41 % (ref 19.6–45.3)
MAGNESIUM SERPL-MCNC: 1.9 MG/DL (ref 1.6–2.6)
MCH RBC QN AUTO: 26.8 PG (ref 26.6–33)
MCHC RBC AUTO-ENTMCNC: 32 G/DL (ref 31.5–35.7)
MCV RBC AUTO: 83.6 FL (ref 79–97)
MONOCYTES # BLD AUTO: 0.56 10*3/MM3 (ref 0.1–0.9)
MONOCYTES NFR BLD AUTO: 9.3 % (ref 5–12)
NEUTROPHILS # BLD AUTO: 2.81 10*3/MM3 (ref 1.7–7)
NEUTROPHILS NFR BLD AUTO: 46.7 % (ref 42.7–76)
NRBC BLD AUTO-RTO: 0 /100 WBC (ref 0–0.2)
PLATELET # BLD AUTO: 229 10*3/MM3 (ref 140–450)
PMV BLD AUTO: 11.1 FL (ref 6–12)
POTASSIUM BLD-SCNC: 3.6 MMOL/L (ref 3.5–5.2)
RBC # BLD AUTO: 4.89 10*6/MM3 (ref 3.77–5.28)
SODIUM BLD-SCNC: 143 MMOL/L (ref 136–145)
WBC NRBC COR # BLD: 6.02 10*3/MM3 (ref 3.4–10.8)

## 2019-08-29 PROCEDURE — G0378 HOSPITAL OBSERVATION PER HR: HCPCS

## 2019-08-29 PROCEDURE — 80048 BASIC METABOLIC PNL TOTAL CA: CPT | Performed by: FAMILY MEDICINE

## 2019-08-29 PROCEDURE — 83735 ASSAY OF MAGNESIUM: CPT | Performed by: FAMILY MEDICINE

## 2019-08-29 PROCEDURE — 94799 UNLISTED PULMONARY SVC/PX: CPT

## 2019-08-29 PROCEDURE — 85025 COMPLETE CBC W/AUTO DIFF WBC: CPT | Performed by: FAMILY MEDICINE

## 2019-08-29 RX ORDER — NITROGLYCERIN 0.4 MG/1
0.4 TABLET SUBLINGUAL
Qty: 30 TABLET | Refills: 12 | Status: SHIPPED | OUTPATIENT
Start: 2019-08-29 | End: 2021-08-19 | Stop reason: SDUPTHER

## 2019-08-29 RX ORDER — AMILORIDE HYDROCHLORIDE 5 MG/1
10 TABLET ORAL DAILY
Qty: 60 TABLET | Refills: 0 | Status: SHIPPED | OUTPATIENT
Start: 2019-08-30 | End: 2019-09-29

## 2019-08-29 RX ADMIN — BUPROPION HYDROCHLORIDE 150 MG: 150 TABLET, EXTENDED RELEASE ORAL at 08:17

## 2019-08-29 RX ADMIN — AMILORIDE HYDROCLORIDE 10 MG: 5 TABLET ORAL at 08:17

## 2019-08-29 RX ADMIN — POTASSIUM CHLORIDE 40 MEQ: 750 CAPSULE, EXTENDED RELEASE ORAL at 11:12

## 2019-08-29 RX ADMIN — SERTRALINE 150 MG: 50 TABLET, FILM COATED ORAL at 08:17

## 2019-08-29 RX ADMIN — FAMOTIDINE 20 MG: 20 TABLET ORAL at 08:18

## 2019-08-29 RX ADMIN — BUDESONIDE 0.5 MG: 0.5 INHALANT RESPIRATORY (INHALATION) at 07:48

## 2019-08-29 RX ADMIN — GABAPENTIN 100 MG: 100 CAPSULE ORAL at 08:17

## 2019-08-30 ENCOUNTER — READMISSION MANAGEMENT (OUTPATIENT)
Dept: CALL CENTER | Facility: HOSPITAL | Age: 49
End: 2019-08-30

## 2019-08-30 NOTE — OUTREACH NOTE
Prep Survey      Responses   Facility patient discharged from?  [facility]   Is patient eligible?  Yes   Discharge diagnosis  Hypokalemia   Does the patient have one of the following disease processes/diagnoses(primary or secondary)?  Other   Does the patient have Home health ordered?  No   What is the Home health agency?   Declined HH   Is there a DME ordered?  No   Comments regarding appointments  See AVS   Prep survey completed?  Yes          Cristy Larson RN

## 2019-09-03 ENCOUNTER — READMISSION MANAGEMENT (OUTPATIENT)
Dept: CALL CENTER | Facility: HOSPITAL | Age: 49
End: 2019-09-03

## 2019-09-03 ENCOUNTER — LAB (OUTPATIENT)
Dept: LAB | Facility: HOSPITAL | Age: 49
End: 2019-09-03

## 2019-09-03 DIAGNOSIS — E83.42 HYPOMAGNESEMIA: ICD-10-CM

## 2019-09-03 DIAGNOSIS — E87.6 HYPOKALEMIA: ICD-10-CM

## 2019-09-03 LAB
ANION GAP SERPL CALCULATED.3IONS-SCNC: 16 MMOL/L (ref 5–15)
BUN BLD-MCNC: 11 MG/DL (ref 6–20)
BUN/CREAT SERPL: 11.6 (ref 7–25)
CALCIUM SPEC-SCNC: 10.1 MG/DL (ref 8.6–10.5)
CHLORIDE SERPL-SCNC: 102 MMOL/L (ref 98–107)
CO2 SERPL-SCNC: 21 MMOL/L (ref 22–29)
CREAT BLD-MCNC: 0.95 MG/DL (ref 0.57–1)
GFR SERPL CREATININE-BSD FRML MDRD: 63 ML/MIN/1.73
GLUCOSE BLD-MCNC: 92 MG/DL (ref 65–99)
MAGNESIUM SERPL-MCNC: 2 MG/DL (ref 1.6–2.6)
POTASSIUM BLD-SCNC: 4.1 MMOL/L (ref 3.5–5.2)
SODIUM BLD-SCNC: 139 MMOL/L (ref 136–145)

## 2019-09-03 PROCEDURE — 83735 ASSAY OF MAGNESIUM: CPT

## 2019-09-03 PROCEDURE — 36415 COLL VENOUS BLD VENIPUNCTURE: CPT

## 2019-09-03 PROCEDURE — 80048 BASIC METABOLIC PNL TOTAL CA: CPT

## 2019-09-03 NOTE — OUTREACH NOTE
Medical Week 1 Survey      Responses   Facility patient discharged from?  [facility]   Does the patient have one of the following disease processes/diagnoses(primary or secondary)?  Other   Is there a successful TCM telephone encounter documented?  No   Week 1 attempt successful?  Yes   Call start time  1619   Call end time  1624   Discharge diagnosis  Hypokalemia   Is patient permission given to speak with other caregiver?  Yes   Person spoke with today (if not patient) and relationship  Joyce/ mother   Meds reviewed with patient/caregiver?  Yes   Is the patient having any side effects they believe may be caused by any medication additions or changes?  No   Does the patient have all medications ordered at discharge?  Yes   Is the patient taking all medications as directed (includes completed medication regime)?  Yes   Does the patient have an appointment with their PCP within 7 days of discharge?  Yes   Has the patient kept scheduled appointments due by today?  Yes   Has home health visited the patient within 72 hours of discharge?  N/A   Psychosocial issues?  No   Did the patient receive a copy of their discharge instructions?  Yes   Nursing interventions  Reviewed instructions with patient   Is the patient/caregiver able to teach back signs and symptoms related to disease process for when to call PCP?  Yes   Is the patient/caregiver able to teach back signs and symptoms related to disease process for when to call 911?  Yes   Is the patient/caregiver able to teach back the hierarchy of who to call/visit for symptoms/problems? PCP, Specialist, Home health nurse, Urgent Care, ED, 911  Yes   Week 1 call completed?  Yes          Ryan Ovalles RN

## 2019-09-10 ENCOUNTER — READMISSION MANAGEMENT (OUTPATIENT)
Dept: CALL CENTER | Facility: HOSPITAL | Age: 49
End: 2019-09-10

## 2019-09-10 NOTE — OUTREACH NOTE
Medical Week 2 Survey      Responses   Facility patient discharged from?  [place]   Does the patient have one of the following disease processes/diagnoses(primary or secondary)?  Other   Week 2 attempt successful?  Yes   Call start time  1626   Discharge diagnosis  Hypokalemia   Rescheduled  Rescheduled-pt requested   Call end time  1627   Person spoke with today (if not patient) and relationship  Joyce, mother          Camille Arnett RN

## 2019-09-11 ENCOUNTER — READMISSION MANAGEMENT (OUTPATIENT)
Dept: CALL CENTER | Facility: HOSPITAL | Age: 49
End: 2019-09-11

## 2019-09-11 NOTE — OUTREACH NOTE
Medical Week 2 Survey      Responses   Facility patient discharged from?  [place]   Does the patient have one of the following disease processes/diagnoses(primary or secondary)?  Other   Week 2 attempt successful?  Yes   Call start time  1209   Revoke  Decline to participate [Hung up on nurse after introduction. ]   Call end time  1209          Natasha Duvall RN

## 2019-09-12 PROBLEM — Z77.22 HISTORY OF SECOND HAND SMOKE EXPOSURE: Status: ACTIVE | Noted: 2019-09-12

## 2019-09-12 NOTE — PROGRESS NOTES
Pulmonary Office Follow-up    Maricarmen Ferris is seen in the office today for   Chief Complaint   Patient presents with   • Asthma   .    Subjective     History of Present Illness  Maricarmen Ferris is a 49 y.o. female with a PMH significant for asthma, secondhand smoke exposure, morbid obesity, ELSIE on CPAP, HTN, GERD, and depression who presents for follow-up of asthma.    She was last seen on 5/31/19, at which time she remained stable on Arnuity daily and I encouraged her to continue efforts with weight loss through diet and exercise.  She states that she has been able to lose 72 pounds since her gastric bypass surgery and she feels much better.  She states that she stopped using the Arnuity as she did not think she needed any longer.  Patient has been using her albuterol, but states she primarily needs it with exercise.  She has not felt the need to use her albuterol at other times.  She denies cough, sputum, wheeze, or edema.  Patient states she has been hospitalized several times recently with hypokalemia.  Patient states that she starts getting chest pain and that is how she knows her potassium is low.  She was last hospitalized last month and was started on amiloride in addition to oral potassium chloride supplementation.  Patient states she last had her potassium checked last week at which time was normal at 4.1.  She has been following with Dr. Cota as an outpatient.        Review of Systems: History obtained from chart review and the patient.  Review of Systems   Constitutional: Negative for fever and unexpected weight change.   HENT: Negative for rhinorrhea.    Respiratory: Positive for shortness of breath. Negative for cough, chest tightness and wheezing.    Cardiovascular: Positive for chest pain. Negative for leg swelling.   Gastrointestinal: Negative for abdominal pain.     As described in the HPI. Otherwise, remainder of ROS (14 systems) were negative.    Patient Active Problem List  "  Diagnosis   • Chronic cholecystitis   • Umbilical hernia without obstruction and without gangrene   • Abdominal pain   • Abdominal fluid collection   • Intra-hepatic bile leak   • Abnormal cardiovascular function study   • Chest pain   • Class 3 severe obesity due to excess calories without serious comorbidity with body mass index (BMI) of 40.0 to 44.9 in adult (CMS/HCC)   • Restrictive lung disease secondary to obesity   • Nausea   • Weight gain, abnormal   • Pain of upper abdomen   • Gastroesophageal reflux disease with esophagitis   • Irritable bowel syndrome with both constipation and diarrhea   • Dysphagia   • Mild persistent asthma without complication   • ELSIE on CPAP   • Acute kidney injury (CMS/HCC)   • DYAN (acute kidney injury) (CMS/[facility])   • Hypertension   • Hyperlipidemia   • Hyperkalemia   • Dehydration   • Hypokalemia   • History of second hand smoke exposure       Medications, Allergies, Social, and Family Histories reviewed as per EMR.         Objective     Blood pressure 148/95, pulse 71, height 165.1 cm (65\"), weight 110 kg (242 lb), SpO2 98 %, not currently breastfeeding.  Physical Exam   Constitutional: She is oriented to person, place, and time. Vital signs are normal. She appears well-developed and well-nourished.   Morbidly obese   HENT:   Head: Normocephalic and atraumatic.   Nose: Nose normal.   Mouth/Throat: Uvula is midline, oropharynx is clear and moist and mucous membranes are normal.   Mallampati 4   Eyes: Conjunctivae, EOM and lids are normal. Pupils are equal, round, and reactive to light.   Neck: Trachea normal and normal range of motion. No tracheal tenderness present. No thyroid mass present.   Cardiovascular: Normal rate, regular rhythm and S1 normal. PMI is not displaced. Exam reveals distant heart sounds. Exam reveals no gallop.   No murmur heard.  Pulmonary/Chest: Effort normal and breath sounds normal. No respiratory distress. She has no decreased breath sounds. She has no " wheezes. She has no rhonchi. Chest wall is not dull to percussion. She exhibits no tenderness.   Abdominal: Soft. Normal appearance and bowel sounds are normal. There is no hepatomegaly. There is no tenderness.   Obese   Musculoskeletal:   Normal gait, no extermity edema     Vascular Status -  Her right foot exhibits no edema. Her left foot exhibits no edema.  Lymphadenopathy:        Head (right side): No submandibular adenopathy present.        Head (left side): No submandibular adenopathy present.     She has no cervical adenopathy.        Right: No supraclavicular adenopathy present.        Left: No supraclavicular adenopathy present.   Neurological: She is alert and oriented to person, place, and time.   Skin: Skin is warm and dry. No rash noted. No cyanosis. Nails show no clubbing.   Psychiatric: She has a normal mood and affect. Her speech is normal and behavior is normal. Judgment normal.   Nursing note and vitals reviewed.      PFTs: 10/8/18 (independently reviewed and interpreted by me)  Ratio 85  FVC 2.5/ 67%  FEV1 2.12/ 71%  TLC 3.59/ 69%  DLCO 18.64/ 72%  Poor effort.  Mild restriction with no significant bronchodilator response.  Mildly reduced diffusing capacity, likely due to obesity.  No comparative data available.       Assessment/Plan     Maricarmen was seen today for asthma.    Diagnoses and all orders for this visit:    Mild persistent asthma without complication    Restrictive lung disease secondary to obesity    ELSIE on CPAP    Class 3 severe obesity due to excess calories without serious comorbidity with body mass index (BMI) of 40.0 to 44.9 in adult (CMS/[facility])    History of second hand smoke exposure    Hypokalemia         Discussion/ Recommendations:   I think it is reasonable for her to continue off Arnuity daily.  I counseled her is okay to use her albuterol prior to exercise, but if she needs it at other times, I would recommend restarting Arnuity.  She is concerned that her current chest  discomfort is a symptom of hypokalemia and states that she is planning to go the ED.  I recommended that she consider going to urgent care to have blood drawn as it would likely be quicker and she may be able to get away with oral supplementation at home.    -Use albuterol as needed for dyspnea or wheeze.  Okay to use prior to exercise.  -If albuterol use increases, I would recommend restarting Arnuity daily.  -Recommended she consider going to urgent care rather than the ED to get her potassium level checked.  -Continue CPAP with sleep.  -Encouraged ongoing efforts at weight loss through diet and exercise.  -Encouraged her to get annual flu vaccine.    Patient's Body mass index is 40.27 kg/m². BMI is above normal parameters. Recommendations include: exercise counseling.         Return in about 3 months (around 12/13/2019) for Recheck asthma.      Thank you for allowing me to participate in the care of Maricarmen Ferris. Please do not hesitate to contact me with any questions.         This document has been electronically signed by Rachel Enciso MD on September 13, 2019 1:52 PM      Dictated using Dragon

## 2019-09-13 ENCOUNTER — APPOINTMENT (OUTPATIENT)
Dept: GENERAL RADIOLOGY | Facility: HOSPITAL | Age: 49
End: 2019-09-13

## 2019-09-13 ENCOUNTER — OFFICE VISIT (OUTPATIENT)
Dept: PULMONOLOGY | Facility: CLINIC | Age: 49
End: 2019-09-13

## 2019-09-13 ENCOUNTER — HOSPITAL ENCOUNTER (EMERGENCY)
Facility: HOSPITAL | Age: 49
Discharge: HOME | End: 2019-09-13
Attending: FAMILY MEDICINE | Admitting: FAMILY MEDICINE

## 2019-09-13 VITALS
OXYGEN SATURATION: 97 % | HEIGHT: 65 IN | SYSTOLIC BLOOD PRESSURE: 115 MMHG | BODY MASS INDEX: 40.65 KG/M2 | WEIGHT: 244 LBS | HEART RATE: 56 BPM | RESPIRATION RATE: 20 BRPM | TEMPERATURE: 97.9 F | DIASTOLIC BLOOD PRESSURE: 64 MMHG

## 2019-09-13 VITALS
HEIGHT: 65 IN | BODY MASS INDEX: 40.32 KG/M2 | OXYGEN SATURATION: 98 % | DIASTOLIC BLOOD PRESSURE: 95 MMHG | HEART RATE: 71 BPM | WEIGHT: 242 LBS | SYSTOLIC BLOOD PRESSURE: 148 MMHG

## 2019-09-13 DIAGNOSIS — Z99.89 OSA ON CPAP: ICD-10-CM

## 2019-09-13 DIAGNOSIS — E87.6 HYPOKALEMIA: ICD-10-CM

## 2019-09-13 DIAGNOSIS — Z77.22 HISTORY OF SECOND HAND SMOKE EXPOSURE: ICD-10-CM

## 2019-09-13 DIAGNOSIS — J45.30 MILD PERSISTENT ASTHMA WITHOUT COMPLICATION: Primary | ICD-10-CM

## 2019-09-13 DIAGNOSIS — J98.4 RESTRICTIVE LUNG DISEASE SECONDARY TO OBESITY: ICD-10-CM

## 2019-09-13 DIAGNOSIS — E66.9 RESTRICTIVE LUNG DISEASE SECONDARY TO OBESITY: ICD-10-CM

## 2019-09-13 DIAGNOSIS — E66.01 CLASS 3 SEVERE OBESITY DUE TO EXCESS CALORIES WITHOUT SERIOUS COMORBIDITY WITH BODY MASS INDEX (BMI) OF 40.0 TO 44.9 IN ADULT (HCC): ICD-10-CM

## 2019-09-13 DIAGNOSIS — G47.33 OSA ON CPAP: ICD-10-CM

## 2019-09-13 DIAGNOSIS — E87.6 HYPOKALEMIA: Primary | ICD-10-CM

## 2019-09-13 LAB
ALBUMIN SERPL-MCNC: 4.2 G/DL (ref 3.5–5.2)
ALBUMIN/GLOB SERPL: 1.2 G/DL
ALP SERPL-CCNC: 50 U/L (ref 39–117)
ALT SERPL W P-5'-P-CCNC: 13 U/L (ref 1–33)
ANION GAP SERPL CALCULATED.3IONS-SCNC: 14 MMOL/L (ref 5–15)
AST SERPL-CCNC: 18 U/L (ref 1–32)
BASOPHILS # BLD AUTO: 0.02 10*3/MM3 (ref 0–0.2)
BASOPHILS NFR BLD AUTO: 0.2 % (ref 0–1.5)
BILIRUB SERPL-MCNC: 0.5 MG/DL (ref 0.2–1.2)
BUN BLD-MCNC: 11 MG/DL (ref 6–20)
BUN/CREAT SERPL: 12.5 (ref 7–25)
CALCIUM SPEC-SCNC: 9.8 MG/DL (ref 8.6–10.5)
CHLORIDE SERPL-SCNC: 99 MMOL/L (ref 98–107)
CO2 SERPL-SCNC: 30 MMOL/L (ref 22–29)
CREAT BLD-MCNC: 0.88 MG/DL (ref 0.57–1)
DEPRECATED RDW RBC AUTO: 43.1 FL (ref 37–54)
EOSINOPHIL # BLD AUTO: 0.15 10*3/MM3 (ref 0–0.4)
EOSINOPHIL NFR BLD AUTO: 1.7 % (ref 0.3–6.2)
ERYTHROCYTE [DISTWIDTH] IN BLOOD BY AUTOMATED COUNT: 14.7 % (ref 12.3–15.4)
GFR SERPL CREATININE-BSD FRML MDRD: 68 ML/MIN/1.73
GLOBULIN UR ELPH-MCNC: 3.5 GM/DL
GLUCOSE BLD-MCNC: 97 MG/DL (ref 65–99)
HCT VFR BLD AUTO: 46.7 % (ref 34–46.6)
HGB BLD-MCNC: 15.1 G/DL (ref 12–15.9)
HOLD SPECIMEN: NORMAL
HOLD SPECIMEN: NORMAL
IMM GRANULOCYTES # BLD AUTO: 0.02 10*3/MM3 (ref 0–0.05)
IMM GRANULOCYTES NFR BLD AUTO: 0.2 % (ref 0–0.5)
LYMPHOCYTES # BLD AUTO: 3.03 10*3/MM3 (ref 0.7–3.1)
LYMPHOCYTES NFR BLD AUTO: 34.4 % (ref 19.6–45.3)
MCH RBC QN AUTO: 26.3 PG (ref 26.6–33)
MCHC RBC AUTO-ENTMCNC: 32.3 G/DL (ref 31.5–35.7)
MCV RBC AUTO: 81.4 FL (ref 79–97)
MONOCYTES # BLD AUTO: 0.72 10*3/MM3 (ref 0.1–0.9)
MONOCYTES NFR BLD AUTO: 8.2 % (ref 5–12)
NEUTROPHILS # BLD AUTO: 4.88 10*3/MM3 (ref 1.7–7)
NEUTROPHILS NFR BLD AUTO: 55.3 % (ref 42.7–76)
NRBC BLD AUTO-RTO: 0 /100 WBC (ref 0–0.2)
NT-PROBNP SERPL-MCNC: 64.4 PG/ML (ref 5–450)
PLATELET # BLD AUTO: 263 10*3/MM3 (ref 140–450)
PMV BLD AUTO: 10.5 FL (ref 6–12)
POTASSIUM BLD-SCNC: 3.1 MMOL/L (ref 3.5–5.2)
PROT SERPL-MCNC: 7.7 G/DL (ref 6–8.5)
RBC # BLD AUTO: 5.74 10*6/MM3 (ref 3.77–5.28)
SODIUM BLD-SCNC: 143 MMOL/L (ref 136–145)
TROPONIN T SERPL-MCNC: <0.01 NG/ML (ref 0–0.03)
TROPONIN T SERPL-MCNC: <0.01 NG/ML (ref 0–0.03)
WBC NRBC COR # BLD: 8.82 10*3/MM3 (ref 3.4–10.8)
WHOLE BLOOD HOLD SPECIMEN: NORMAL
WHOLE BLOOD HOLD SPECIMEN: NORMAL

## 2019-09-13 PROCEDURE — 80053 COMPREHEN METABOLIC PANEL: CPT | Performed by: FAMILY MEDICINE

## 2019-09-13 PROCEDURE — 93010 ELECTROCARDIOGRAM REPORT: CPT | Performed by: INTERNAL MEDICINE

## 2019-09-13 PROCEDURE — 71046 X-RAY EXAM CHEST 2 VIEWS: CPT

## 2019-09-13 PROCEDURE — 99284 EMERGENCY DEPT VISIT MOD MDM: CPT

## 2019-09-13 PROCEDURE — 93005 ELECTROCARDIOGRAM TRACING: CPT | Performed by: FAMILY MEDICINE

## 2019-09-13 PROCEDURE — 96365 THER/PROPH/DIAG IV INF INIT: CPT

## 2019-09-13 PROCEDURE — 84484 ASSAY OF TROPONIN QUANT: CPT | Performed by: FAMILY MEDICINE

## 2019-09-13 PROCEDURE — 93005 ELECTROCARDIOGRAM TRACING: CPT

## 2019-09-13 PROCEDURE — 99214 OFFICE O/P EST MOD 30 MIN: CPT | Performed by: INTERNAL MEDICINE

## 2019-09-13 PROCEDURE — 25010000003 POTASSIUM CHLORIDE 10 MEQ/100ML SOLUTION: Performed by: FAMILY MEDICINE

## 2019-09-13 PROCEDURE — 83880 ASSAY OF NATRIURETIC PEPTIDE: CPT | Performed by: FAMILY MEDICINE

## 2019-09-13 PROCEDURE — 85025 COMPLETE CBC W/AUTO DIFF WBC: CPT

## 2019-09-13 PROCEDURE — 36415 COLL VENOUS BLD VENIPUNCTURE: CPT | Performed by: FAMILY MEDICINE

## 2019-09-13 RX ORDER — POTASSIUM CHLORIDE 7.45 MG/ML
10 INJECTION INTRAVENOUS ONCE
Status: COMPLETED | OUTPATIENT
Start: 2019-09-13 | End: 2019-09-13

## 2019-09-13 RX ORDER — SODIUM CHLORIDE 0.9 % (FLUSH) 0.9 %
10 SYRINGE (ML) INJECTION AS NEEDED
Status: DISCONTINUED | OUTPATIENT
Start: 2019-09-13 | End: 2019-09-13 | Stop reason: HOSPADM

## 2019-09-13 RX ORDER — ONDANSETRON 4 MG/1
4 TABLET, ORALLY DISINTEGRATING ORAL EVERY 6 HOURS PRN
Qty: 10 TABLET | Refills: 0 | Status: SHIPPED | OUTPATIENT
Start: 2019-09-13 | End: 2020-04-06

## 2019-09-13 RX ADMIN — POTASSIUM CHLORIDE 10 MEQ: 7.46 INJECTION, SOLUTION INTRAVENOUS at 19:25

## 2019-09-13 RX ADMIN — SODIUM CHLORIDE 1000 ML: 9 INJECTION, SOLUTION INTRAVENOUS at 19:25

## 2019-09-13 NOTE — ED PROVIDER NOTES
Subjective   Pt has h/o hypokalemia secondary to recent bariatric sx done on 6/7/19. She is on liquid potassium and amiloride. She has had NVD x 2 days secondary to possible food poisoning. CP since last night and has happened several time with hypokalemia.         Chest Pain   Pain location:  Substernal area  Pain quality: pressure    Pain radiates to:  Does not radiate  Pain severity:  Mild  Onset quality:  Unable to specify  Duration:  1 day  Timing:  Intermittent  Progression:  Waxing and waning  Chronicity:  Recurrent  Relieved by:  Nothing  Worsened by:  Nothing  Ineffective treatments:  None tried  Associated symptoms: nausea and vomiting    Associated symptoms: no abdominal pain, no cough, no diaphoresis, no dizziness, no dysphagia, no fatigue, no fever, no headache, no heartburn, no lower extremity edema, no shortness of breath and no weakness    Risk factors: obesity        Review of Systems   Constitutional: Negative for appetite change, chills, diaphoresis, fatigue and fever.   HENT: Negative for congestion, ear discharge, ear pain, nosebleeds, rhinorrhea, sinus pressure, sore throat and trouble swallowing.    Eyes: Negative for discharge and redness.   Respiratory: Negative for apnea, cough, chest tightness, shortness of breath and wheezing.    Cardiovascular: Positive for chest pain.   Gastrointestinal: Positive for nausea and vomiting. Negative for abdominal pain, diarrhea and heartburn.   Endocrine: Negative for polyuria.   Genitourinary: Negative for dysuria, frequency and urgency.   Musculoskeletal: Negative for myalgias and neck pain.   Skin: Negative for color change and rash.   Allergic/Immunologic: Negative for immunocompromised state.   Neurological: Negative for dizziness, seizures, syncope, weakness, light-headedness and headaches.   Hematological: Negative for adenopathy. Does not bruise/bleed easily.   Psychiatric/Behavioral: Negative for behavioral problems and confusion.   All other  systems reviewed and are negative.      Past Medical History:   Diagnosis Date   • Acid reflux    • Anxiety    • Depressed    • Dysphagia    • GERD (gastroesophageal reflux disease)    • Hard to intubate    • Hyperlipidemia    • Hypertension    • Hypokalemia    • IBS (irritable bowel syndrome)    • Nausea    • Sleep apnea        Allergies   Allergen Reactions   • Sulfa Antibiotics Anaphylaxis and GI Intolerance       Past Surgical History:   Procedure Laterality Date   • ABDOMINAL SURGERY     • CARDIAC CATHETERIZATION N/A 8/13/2018    Procedure: Left Heart Cath 8/13/2018 @ 9;00;  Surgeon: Kuldip Frank MD;  Location: [facility] CATH INVASIVE LOCATION;  Service: Cardiology   • COLONOSCOPY N/A 11/19/2018    Procedure: COLONOSCOPY;  Surgeon: Bro Whitaker MD;  Location: [facility] ENDOSCOPY;  Service: Gastroenterology   • CYSTOSCOPY     • ENDOMETRIAL ABLATION  2015   • ENDOSCOPY N/A 11/19/2018    Procedure: ESOPHAGOGASTRODUODENOSCOPYwith dilation;  Surgeon: Bro Whitaker MD;  Location: [facility] ENDOSCOPY;  Service: Gastroenterology   • GASTRIC SLEEVE LAPAROSCOPIC     • LAPAROSCOPIC TUBAL LIGATION  1995   • AR ERCP DX COLLECTION SPECIMEN BRUSHING/WASHING Left 7/31/2017    Procedure: ENDOSCOPIC RETROGRADE CHOLANGIOPANCREATOGRAPHY;  Surgeon: Samuel Redding DO;  Location: [facility] ENDOSCOPY;  Service: Gastroenterology   • AR LAP,CHOLECYSTECTOMY N/A 7/24/2017    Procedure: CHOLECYSTECTOMY LAPAROSCOPIC, also umbillical hernia repair;  Surgeon: Pk العلي MD;  Location: [facility] OR;  Service: General   • UPPER GASTROINTESTINAL ENDOSCOPY  11/19/2018       Family History   Problem Relation Age of Onset   • Diabetes Mother    • Hypertension Mother    • Cirrhosis Mother    • Hypertension Father        Social History     Socioeconomic History   • Marital status:      Spouse name: Not on file   • Number of children: Not on file   • Years of education: Not on file   • Highest education level: Not on file   Tobacco Use   •  Smoking status: Never Smoker   • Smokeless tobacco: Never Used   Substance and Sexual Activity   • Alcohol use: Yes     Comment: socially   • Drug use: No   • Sexual activity: Defer     Birth control/protection: Surgical           Objective   Physical Exam   Constitutional: She is oriented to person, place, and time. She appears well-developed and well-nourished.   HENT:   Head: Normocephalic and atraumatic.   Nose: Nose normal.   Mouth/Throat: Oropharynx is clear and moist.   Eyes: Conjunctivae and EOM are normal. Pupils are equal, round, and reactive to light. Right eye exhibits no discharge. Left eye exhibits no discharge. No scleral icterus.   Neck: Normal range of motion. Neck supple. No tracheal deviation present.   Cardiovascular: Normal rate, regular rhythm and normal heart sounds.   No murmur heard.  Pulmonary/Chest: Effort normal and breath sounds normal. No stridor. No respiratory distress. She has no wheezes. She has no rales.   Abdominal: Soft. Bowel sounds are normal. She exhibits no distension and no mass. There is no tenderness. There is no rebound and no guarding.   Musculoskeletal: She exhibits no edema.   Neurological: She is alert and oriented to person, place, and time. Coordination normal.   Skin: Skin is warm and dry. No rash noted. No erythema.   Psychiatric: She has a normal mood and affect. Her behavior is normal. Thought content normal.   Nursing note and vitals reviewed.      ECG 12 Lead    Date/Time: 9/13/2019 9:43 PM  Performed by: Ambrosio Siddiqui MD  Authorized by: Ambrosio Siddiqui MD   Interpreted by physician  Rhythm: sinus rhythm  Rate: normal  BPM: 73  ST Segments: ST segments normal                   ED Course          Labs Reviewed   COMPREHENSIVE METABOLIC PANEL - Abnormal; Notable for the following components:       Result Value    Potassium 3.1 (*)     CO2 30.0 (*)     All other components within normal limits    Narrative:     GFR Normal >60  Chronic Kidney Disease  <60  Kidney Failure <15   CBC WITH AUTO DIFFERENTIAL - Abnormal; Notable for the following components:    RBC 5.74 (*)     Hematocrit 46.7 (*)     MCH 26.3 (*)     All other components within normal limits   TROPONIN (IN-HOUSE) - Normal    Narrative:     Troponin T Reference Range:  <= 0.03 ng/mL-   Negative for AMI  >0.03 ng/mL-     Abnormal for myocardial necrosis.  Clinicians would have to utilize clinical acumen, EKG, Troponin and serial changes to determine if it is an Acute Myocardial Infarction or myocardial injury due to an underlying chronic condition.    BNP (IN-HOUSE) - Normal    Narrative:     Among patients with dyspnea, NT-proBNP is highly sensitive for the detection of acute congestive heart failure. In addition NT-proBNP of <300 pg/ml effectively rules out acute congestive heart failure with 99% negative predictive value.   RAINBOW DRAW    Narrative:     The following orders were created for panel order [place] Draw.  Procedure                               Abnormality         Status                     ---------                               -----------         ------                     Light Blue Top[[id number]]                                   Final result               Green Top (Gel)[[id number]]                                  Final result               Lavender Top[[id number]]                                     Final result               Gold Top - SST[[id number]]                                   Final result                 Please view results for these tests on the individual orders.   TROPONIN (IN-HOUSE)   CBC AND DIFFERENTIAL    Narrative:     The following orders were created for panel order CBC & Differential.  Procedure                               Abnormality         Status                     ---------                               -----------         ------                     CBC Auto Differential[[id number]]        Abnormal            Final result                 Please view  results for these tests on the individual orders.   LIGHT BLUE TOP   GREEN TOP   LAVENDER TOP   GOLD TOP - SST       XR Chest 2 View   Final Result   No acute cardiopulmonary process identified      Electronically signed by:  Elsy Carter MD  9/13/2019 7:36 PM CDT   Workstation: [id number]                    [facility]    Final diagnoses:   Hypokalemia              Ambrosio Siddiqui MD  09/13/19 [id number]

## 2019-09-23 ENCOUNTER — HOSPITAL ENCOUNTER (OUTPATIENT)
Facility: HOSPITAL | Age: 49
Setting detail: OBSERVATION
Discharge: HOME | End: 2019-09-24
Attending: EMERGENCY MEDICINE | Admitting: INTERNAL MEDICINE

## 2019-09-23 ENCOUNTER — APPOINTMENT (OUTPATIENT)
Dept: GENERAL RADIOLOGY | Facility: HOSPITAL | Age: 49
End: 2019-09-23

## 2019-09-23 DIAGNOSIS — E87.6 HYPOKALEMIA: Primary | ICD-10-CM

## 2019-09-23 DIAGNOSIS — R07.9 CHEST PAIN, UNSPECIFIED TYPE: ICD-10-CM

## 2019-09-23 PROBLEM — R11.2 NAUSEA VOMITING AND DIARRHEA: Status: ACTIVE | Noted: 2019-09-23

## 2019-09-23 PROBLEM — N30.00 ACUTE CYSTITIS WITHOUT HEMATURIA: Status: ACTIVE | Noted: 2019-09-23

## 2019-09-23 PROBLEM — E66.9 OBESITY (BMI 30-39.9): Status: ACTIVE | Noted: 2019-09-23

## 2019-09-23 PROBLEM — Z98.84 HISTORY OF WEIGHT LOSS SURGERY: Status: ACTIVE | Noted: 2019-09-23

## 2019-09-23 PROBLEM — R19.7 NAUSEA VOMITING AND DIARRHEA: Status: ACTIVE | Noted: 2019-09-23

## 2019-09-23 LAB
ALBUMIN SERPL-MCNC: 4.3 G/DL (ref 3.5–5.2)
ALBUMIN/GLOB SERPL: 1.2 G/DL
ALP SERPL-CCNC: 52 U/L (ref 39–117)
ALT SERPL W P-5'-P-CCNC: 17 U/L (ref 1–33)
ANION GAP SERPL CALCULATED.3IONS-SCNC: 15 MMOL/L (ref 5–15)
AST SERPL-CCNC: 24 U/L (ref 1–32)
BACTERIA UR QL AUTO: ABNORMAL /HPF
BASOPHILS # BLD AUTO: 0.02 10*3/MM3 (ref 0–0.2)
BASOPHILS NFR BLD AUTO: 0.3 % (ref 0–1.5)
BILIRUB SERPL-MCNC: 0.6 MG/DL (ref 0.2–1.2)
BILIRUB UR QL STRIP: ABNORMAL
BUN BLD-MCNC: 11 MG/DL (ref 6–20)
BUN/CREAT SERPL: 12.6 (ref 7–25)
CALCIUM SPEC-SCNC: 10.2 MG/DL (ref 8.6–10.5)
CHLORIDE SERPL-SCNC: 97 MMOL/L (ref 98–107)
CLARITY UR: ABNORMAL
CO2 SERPL-SCNC: 31 MMOL/L (ref 22–29)
COLOR UR: ABNORMAL
CREAT BLD-MCNC: 0.87 MG/DL (ref 0.57–1)
DEPRECATED RDW RBC AUTO: 40.3 FL (ref 37–54)
EOSINOPHIL # BLD AUTO: 0.14 10*3/MM3 (ref 0–0.4)
EOSINOPHIL NFR BLD AUTO: 1.8 % (ref 0.3–6.2)
ERYTHROCYTE [DISTWIDTH] IN BLOOD BY AUTOMATED COUNT: 14.1 % (ref 12.3–15.4)
GFR SERPL CREATININE-BSD FRML MDRD: 69 ML/MIN/1.73
GLOBULIN UR ELPH-MCNC: 3.6 GM/DL
GLUCOSE BLD-MCNC: 107 MG/DL (ref 65–99)
GLUCOSE UR STRIP-MCNC: NEGATIVE MG/DL
HCT VFR BLD AUTO: 44.5 % (ref 34–46.6)
HGB BLD-MCNC: 14.6 G/DL (ref 12–15.9)
HGB UR QL STRIP.AUTO: ABNORMAL
HOLD SPECIMEN: NORMAL
HOLD SPECIMEN: NORMAL
HYALINE CASTS UR QL AUTO: ABNORMAL /LPF
IMM GRANULOCYTES # BLD AUTO: 0.01 10*3/MM3 (ref 0–0.05)
IMM GRANULOCYTES NFR BLD AUTO: 0.1 % (ref 0–0.5)
KETONES UR QL STRIP: ABNORMAL
LEUKOCYTE ESTERASE UR QL STRIP.AUTO: ABNORMAL
LYMPHOCYTES # BLD AUTO: 2.62 10*3/MM3 (ref 0.7–3.1)
LYMPHOCYTES NFR BLD AUTO: 33.4 % (ref 19.6–45.3)
MAGNESIUM SERPL-MCNC: 1.7 MG/DL (ref 1.6–2.6)
MCH RBC QN AUTO: 26.2 PG (ref 26.6–33)
MCHC RBC AUTO-ENTMCNC: 32.8 G/DL (ref 31.5–35.7)
MCV RBC AUTO: 79.9 FL (ref 79–97)
MONOCYTES # BLD AUTO: 0.61 10*3/MM3 (ref 0.1–0.9)
MONOCYTES NFR BLD AUTO: 7.8 % (ref 5–12)
NEUTROPHILS # BLD AUTO: 4.44 10*3/MM3 (ref 1.7–7)
NEUTROPHILS NFR BLD AUTO: 56.6 % (ref 42.7–76)
NITRITE UR QL STRIP: POSITIVE
NRBC BLD AUTO-RTO: 0 /100 WBC (ref 0–0.2)
NT-PROBNP SERPL-MCNC: 64.3 PG/ML (ref 5–450)
PH UR STRIP.AUTO: 6 [PH] (ref 5–9)
PLATELET # BLD AUTO: 257 10*3/MM3 (ref 140–450)
PMV BLD AUTO: 11 FL (ref 6–12)
POTASSIUM BLD-SCNC: 2.6 MMOL/L (ref 3.5–5.2)
PROT SERPL-MCNC: 7.9 G/DL (ref 6–8.5)
PROT UR QL STRIP: ABNORMAL
RBC # BLD AUTO: 5.57 10*6/MM3 (ref 3.77–5.28)
RBC # UR: ABNORMAL /HPF
REF LAB TEST METHOD: ABNORMAL
SODIUM BLD-SCNC: 143 MMOL/L (ref 136–145)
SP GR UR STRIP: 1.02 (ref 1–1.03)
SQUAMOUS #/AREA URNS HPF: ABNORMAL /HPF
TROPONIN T SERPL-MCNC: <0.01 NG/ML (ref 0–0.03)
UROBILINOGEN UR QL STRIP: ABNORMAL
WBC NRBC COR # BLD: 7.84 10*3/MM3 (ref 3.4–10.8)
WBC UR QL AUTO: ABNORMAL /HPF
WHOLE BLOOD HOLD SPECIMEN: NORMAL
WHOLE BLOOD HOLD SPECIMEN: NORMAL

## 2019-09-23 PROCEDURE — 84484 ASSAY OF TROPONIN QUANT: CPT | Performed by: EMERGENCY MEDICINE

## 2019-09-23 PROCEDURE — G0378 HOSPITAL OBSERVATION PER HR: HCPCS

## 2019-09-23 PROCEDURE — 25010000002 ENOXAPARIN PER 10 MG: Performed by: NURSE PRACTITIONER

## 2019-09-23 PROCEDURE — 87186 SC STD MICRODIL/AGAR DIL: CPT | Performed by: EMERGENCY MEDICINE

## 2019-09-23 PROCEDURE — 94760 N-INVAS EAR/PLS OXIMETRY 1: CPT

## 2019-09-23 PROCEDURE — 25010000002 CEFTRIAXONE PER 250 MG: Performed by: EMERGENCY MEDICINE

## 2019-09-23 PROCEDURE — 80053 COMPREHEN METABOLIC PANEL: CPT | Performed by: EMERGENCY MEDICINE

## 2019-09-23 PROCEDURE — 96372 THER/PROPH/DIAG INJ SC/IM: CPT

## 2019-09-23 PROCEDURE — 96367 TX/PROPH/DG ADDL SEQ IV INF: CPT

## 2019-09-23 PROCEDURE — 85025 COMPLETE CBC W/AUTO DIFF WBC: CPT | Performed by: EMERGENCY MEDICINE

## 2019-09-23 PROCEDURE — 87086 URINE CULTURE/COLONY COUNT: CPT | Performed by: EMERGENCY MEDICINE

## 2019-09-23 PROCEDURE — 96375 TX/PRO/DX INJ NEW DRUG ADDON: CPT

## 2019-09-23 PROCEDURE — 96366 THER/PROPH/DIAG IV INF ADDON: CPT

## 2019-09-23 PROCEDURE — 93010 ELECTROCARDIOGRAM REPORT: CPT | Performed by: INTERNAL MEDICINE

## 2019-09-23 PROCEDURE — 25010000002 ONDANSETRON PER 1 MG: Performed by: EMERGENCY MEDICINE

## 2019-09-23 PROCEDURE — 96365 THER/PROPH/DIAG IV INF INIT: CPT

## 2019-09-23 PROCEDURE — 25010000002 MORPHINE PER 10 MG: Performed by: EMERGENCY MEDICINE

## 2019-09-23 PROCEDURE — 25010000003 POTASSIUM CHLORIDE 10 MEQ/100ML SOLUTION: Performed by: EMERGENCY MEDICINE

## 2019-09-23 PROCEDURE — 99285 EMERGENCY DEPT VISIT HI MDM: CPT

## 2019-09-23 PROCEDURE — 87077 CULTURE AEROBIC IDENTIFY: CPT | Performed by: EMERGENCY MEDICINE

## 2019-09-23 PROCEDURE — 93005 ELECTROCARDIOGRAM TRACING: CPT | Performed by: EMERGENCY MEDICINE

## 2019-09-23 PROCEDURE — 83735 ASSAY OF MAGNESIUM: CPT | Performed by: EMERGENCY MEDICINE

## 2019-09-23 PROCEDURE — 81001 URINALYSIS AUTO W/SCOPE: CPT | Performed by: EMERGENCY MEDICINE

## 2019-09-23 PROCEDURE — 83880 ASSAY OF NATRIURETIC PEPTIDE: CPT | Performed by: EMERGENCY MEDICINE

## 2019-09-23 PROCEDURE — 71045 X-RAY EXAM CHEST 1 VIEW: CPT

## 2019-09-23 RX ORDER — SODIUM CHLORIDE 0.9 % (FLUSH) 0.9 %
10 SYRINGE (ML) INJECTION EVERY 12 HOURS SCHEDULED
Status: DISCONTINUED | OUTPATIENT
Start: 2019-09-23 | End: 2019-09-24 | Stop reason: HOSPADM

## 2019-09-23 RX ORDER — POTASSIUM CHLORIDE 7.45 MG/ML
10 INJECTION INTRAVENOUS
Status: DISCONTINUED | OUTPATIENT
Start: 2019-09-23 | End: 2019-09-24 | Stop reason: HOSPADM

## 2019-09-23 RX ORDER — ACETAMINOPHEN 650 MG/1
650 SUPPOSITORY RECTAL EVERY 4 HOURS PRN
Status: DISCONTINUED | OUTPATIENT
Start: 2019-09-23 | End: 2019-09-24 | Stop reason: HOSPADM

## 2019-09-23 RX ORDER — SODIUM CHLORIDE 0.9 % (FLUSH) 0.9 %
10 SYRINGE (ML) INJECTION AS NEEDED
Status: DISCONTINUED | OUTPATIENT
Start: 2019-09-23 | End: 2019-09-24 | Stop reason: HOSPADM

## 2019-09-23 RX ORDER — ONDANSETRON 4 MG/1
4 TABLET, FILM COATED ORAL EVERY 6 HOURS PRN
Status: DISCONTINUED | OUTPATIENT
Start: 2019-09-23 | End: 2019-09-24 | Stop reason: HOSPADM

## 2019-09-23 RX ORDER — GABAPENTIN 100 MG/1
100 CAPSULE ORAL 2 TIMES DAILY
Status: DISCONTINUED | OUTPATIENT
Start: 2019-09-23 | End: 2019-09-24 | Stop reason: HOSPADM

## 2019-09-23 RX ORDER — SODIUM CHLORIDE 9 MG/ML
INJECTION, SOLUTION INTRAVENOUS
Status: COMPLETED
Start: 2019-09-23 | End: 2019-09-24

## 2019-09-23 RX ORDER — MAGNESIUM SULFATE HEPTAHYDRATE 40 MG/ML
2 INJECTION, SOLUTION INTRAVENOUS AS NEEDED
Status: DISCONTINUED | OUTPATIENT
Start: 2019-09-23 | End: 2019-09-24 | Stop reason: HOSPADM

## 2019-09-23 RX ORDER — ROPINIROLE 0.25 MG/1
0.25 TABLET, FILM COATED ORAL NIGHTLY
Status: DISCONTINUED | OUTPATIENT
Start: 2019-09-23 | End: 2019-09-24 | Stop reason: HOSPADM

## 2019-09-23 RX ORDER — ACETAMINOPHEN 160 MG/5ML
650 SOLUTION ORAL EVERY 4 HOURS PRN
Status: DISCONTINUED | OUTPATIENT
Start: 2019-09-23 | End: 2019-09-24 | Stop reason: HOSPADM

## 2019-09-23 RX ORDER — ALBUTEROL SULFATE 90 UG/1
2 AEROSOL, METERED RESPIRATORY (INHALATION) EVERY 4 HOURS PRN
Status: DISCONTINUED | OUTPATIENT
Start: 2019-09-23 | End: 2019-09-23

## 2019-09-23 RX ORDER — ACETAMINOPHEN 325 MG/1
650 TABLET ORAL EVERY 4 HOURS PRN
Status: DISCONTINUED | OUTPATIENT
Start: 2019-09-23 | End: 2019-09-24 | Stop reason: HOSPADM

## 2019-09-23 RX ORDER — MAGNESIUM SULFATE HEPTAHYDRATE 40 MG/ML
4 INJECTION, SOLUTION INTRAVENOUS AS NEEDED
Status: DISCONTINUED | OUTPATIENT
Start: 2019-09-23 | End: 2019-09-24 | Stop reason: HOSPADM

## 2019-09-23 RX ORDER — ASPIRIN 325 MG
325 TABLET ORAL ONCE
Status: COMPLETED | OUTPATIENT
Start: 2019-09-23 | End: 2019-09-23

## 2019-09-23 RX ORDER — ONDANSETRON 2 MG/ML
4 INJECTION INTRAMUSCULAR; INTRAVENOUS EVERY 6 HOURS PRN
Status: DISCONTINUED | OUTPATIENT
Start: 2019-09-23 | End: 2019-09-24 | Stop reason: HOSPADM

## 2019-09-23 RX ORDER — TRAMADOL HYDROCHLORIDE 50 MG/1
50 TABLET ORAL EVERY 8 HOURS PRN
Status: DISCONTINUED | OUTPATIENT
Start: 2019-09-23 | End: 2019-09-24 | Stop reason: HOSPADM

## 2019-09-23 RX ORDER — ONDANSETRON 2 MG/ML
4 INJECTION INTRAMUSCULAR; INTRAVENOUS ONCE
Status: COMPLETED | OUTPATIENT
Start: 2019-09-23 | End: 2019-09-23

## 2019-09-23 RX ORDER — BUPROPION HYDROCHLORIDE 150 MG/1
150 TABLET, EXTENDED RELEASE ORAL 2 TIMES DAILY
Status: DISCONTINUED | OUTPATIENT
Start: 2019-09-23 | End: 2019-09-24 | Stop reason: HOSPADM

## 2019-09-23 RX ORDER — POTASSIUM CHLORIDE 7.45 MG/ML
10 INJECTION INTRAVENOUS
Status: DISCONTINUED | OUTPATIENT
Start: 2019-09-23 | End: 2019-09-23 | Stop reason: SDUPTHER

## 2019-09-23 RX ORDER — ALBUTEROL SULFATE 2.5 MG/3ML
2.5 SOLUTION RESPIRATORY (INHALATION) EVERY 4 HOURS PRN
Status: DISCONTINUED | OUTPATIENT
Start: 2019-09-23 | End: 2019-09-24 | Stop reason: HOSPADM

## 2019-09-23 RX ORDER — PANTOPRAZOLE SODIUM 40 MG/1
40 TABLET, DELAYED RELEASE ORAL EVERY MORNING
Status: DISCONTINUED | OUTPATIENT
Start: 2019-09-24 | End: 2019-09-24 | Stop reason: HOSPADM

## 2019-09-23 RX ADMIN — TRAMADOL HYDROCHLORIDE 50 MG: 50 TABLET, FILM COATED ORAL at 23:44

## 2019-09-23 RX ADMIN — POTASSIUM CHLORIDE 10 MEQ: 7.46 INJECTION, SOLUTION INTRAVENOUS at 22:48

## 2019-09-23 RX ADMIN — SODIUM CHLORIDE, PRESERVATIVE FREE 10 ML: 5 INJECTION INTRAVENOUS at 22:51

## 2019-09-23 RX ADMIN — ASPIRIN 325 MG: 325 TABLET ORAL at 15:48

## 2019-09-23 RX ADMIN — Medication 10 ML: at 15:54

## 2019-09-23 RX ADMIN — ROPINIROLE HYDROCHLORIDE 0.25 MG: 0.25 TABLET, FILM COATED ORAL at 22:50

## 2019-09-23 RX ADMIN — POTASSIUM CHLORIDE 10 MEQ: 7.46 INJECTION, SOLUTION INTRAVENOUS at 18:04

## 2019-09-23 RX ADMIN — BUPROPION HYDROCHLORIDE 150 MG: 150 TABLET, EXTENDED RELEASE ORAL at 22:50

## 2019-09-23 RX ADMIN — ENOXAPARIN SODIUM 40 MG: 40 INJECTION SUBCUTANEOUS at 22:49

## 2019-09-23 RX ADMIN — GABAPENTIN 100 MG: 100 CAPSULE ORAL at 22:50

## 2019-09-23 RX ADMIN — MORPHINE SULFATE 4 MG: 4 INJECTION INTRAVENOUS at 15:53

## 2019-09-23 RX ADMIN — ONDANSETRON 4 MG: 2 INJECTION INTRAMUSCULAR; INTRAVENOUS at 15:51

## 2019-09-23 RX ADMIN — CEFTRIAXONE SODIUM 1 G: 1 INJECTION, POWDER, FOR SOLUTION INTRAMUSCULAR; INTRAVENOUS at 17:39

## 2019-09-23 NOTE — H&P
[facility] Medicine Admission      Date of Admission: 9/23/2019      Primary Care Physician: Alethea Ash MD      Chief Complaint: My potassium is low    HPI:  This is a 49 year old female with a history of morbid obesity s/p gastric sleeve, HTN, GERD, and ELSIE who presents to the ED with a complaint of low potassium.  She underwent gastric bypass in June and since then has had several episodes of hypokalemia where she experiences fatigue, dizziness, and pain in her chest.  She has had extensive cardiac work-up with a negative cardiac cath last year.  She notes she takes liquid potassium daily, but has had nausea, vomiting, and diarrhea intermittently over the past week.  She denies change in diet, sick contacts, or recent antibiotic use.  She was found to have a potassium of 2.6.  She also noted dysuria in the form of a sensation of increased pressure in her bladder.  UA is consistent with UTI.  No other known aggravating or alleviating factors. No other associated symptoms.     Concurrent Medical History:  has a past medical history of Acid reflux, Anxiety, Depressed, Dysphagia, GERD (gastroesophageal reflux disease), Hard to intubate, Hyperlipidemia, Hypertension, Hypokalemia, IBS (irritable bowel syndrome), Nausea, and Sleep apnea.    Past Surgical History:   Past Surgical History:   Procedure Laterality Date   • ABDOMINAL SURGERY     • CARDIAC CATHETERIZATION N/A 8/13/2018    Procedure: Left Heart Cath 8/13/2018 @ 9;00;  Surgeon: Kuldip Frank MD;  Location: [facility] CATH INVASIVE LOCATION;  Service: Cardiology   • COLONOSCOPY N/A 11/19/2018    Procedure: COLONOSCOPY;  Surgeon: Bro Whitaker MD;  Location: [facility] ENDOSCOPY;  Service: Gastroenterology   • CYSTOSCOPY     • ENDOMETRIAL ABLATION  2015   • ENDOSCOPY N/A 11/19/2018    Procedure: ESOPHAGOGASTRODUODENOSCOPYwith dilation;  Surgeon: Bro Whitaker MD;  Location: [facility] ENDOSCOPY;  Service:  Gastroenterology   • GASTRIC SLEEVE LAPAROSCOPIC     • LAPAROSCOPIC TUBAL LIGATION  1995   • [place] ERCP DX COLLECTION SPECIMEN BRUSHING/WASHING Left 7/31/2017    Procedure: ENDOSCOPIC RETROGRADE CHOLANGIOPANCREATOGRAPHY;  Surgeon: Samuel Redding DO;  Location: [facility] ENDOSCOPY;  Service: Gastroenterology   • NH LAP,CHOLECYSTECTOMY N/A 7/24/2017    Procedure: CHOLECYSTECTOMY LAPAROSCOPIC, also umbillical hernia repair;  Surgeon: Pk العلي MD;  Location: [facility] OR;  Service: General   • UPPER GASTROINTESTINAL ENDOSCOPY  11/19/2018       Family History:   Family History   Problem Relation Age of Onset   • Diabetes Mother    • Hypertension Mother    • Cirrhosis Mother    • Hypertension Father        Social History:   Social History     Socioeconomic History   • Marital status:      Spouse name: Not on file   • Number of children: Not on file   • Years of education: Not on file   • Highest education level: Not on file   Tobacco Use   • Smoking status: Never Smoker   • Smokeless tobacco: Never Used   Substance and Sexual Activity   • Alcohol use: Yes     Comment: socially   • Drug use: No   • Sexual activity: Defer     Birth control/protection: Surgical       Allergies:   Allergies   Allergen Reactions   • Sulfa Antibiotics Anaphylaxis and GI Intolerance       Medications:   No current facility-administered medications on file prior to encounter.      Current Outpatient Medications on File Prior to Encounter   Medication Sig Dispense Refill   • albuterol sulfate  (90 Base) MCG/ACT inhaler Inhale 2 puffs Every 4 (Four) Hours As Needed for Wheezing or Shortness of Air. 18 g 6   • aMILoride (MIDAMOR) 5 MG tablet Take 2 tablets by mouth Daily for 30 days. 60 tablet 0   • buPROPion SR (WELLBUTRIN SR) 150 MG 12 hr tablet Take 150 mg by mouth 2 (Two) Times a Day.  5   • Calcium Carb-Cholecalciferol (CALCIUM 1000 + D) 1000-800 MG-UNIT tablet Take 1 tablet by mouth Daily.     • gabapentin (NEURONTIN) 100 MG  capsule Take 100 mg by mouth 2 (Two) Times a Day.     • nitroglycerin (NITROSTAT) 0.4 MG SL tablet Place 1 tablet under the tongue Every 5 (Five) Minutes As Needed for Chest Pain. Take no more than 3 doses in 15 minutes. 30 tablet 12   • omeprazole (priLOSEC) 20 MG capsule Take 20 mg by mouth Daily.  5   • ondansetron ODT (ZOFRAN-ODT) 4 MG disintegrating tablet Take 1 tablet by mouth Every 6 (Six) Hours As Needed for Nausea or Vomiting. 10 tablet 0   • potassium chloride (KAYCIEL) 20 MEQ/15ML (10%) solution Take 15 mL by mouth Daily. 240 mL 1   • promethazine (PHENERGAN) 25 MG tablet TAKE 1/2 TAB BY MOUTH EVERY 6 HOURS AS NEEDED FOR NAUSEA  0   • rOPINIRole (REQUIP) 0.25 MG tablet Take 0.25 mg by mouth Every Night.     • Sertraline HCl (ZOLOFT PO) Take 150 mg by mouth Daily.     • traMADol (ULTRAM) 50 MG tablet Take 50 mg by mouth Every 8 (Eight) Hours As Needed for Moderate Pain .           Review of Systems:  Review of Systems   Constitutional: Positive for fatigue. Negative for appetite change, chills and fever.   HENT: Negative for congestion.    Eyes: Negative for visual disturbance.   Respiratory: Negative for cough, chest tightness, shortness of breath and wheezing.    Cardiovascular: Positive for chest pain. Negative for palpitations and leg swelling.   Gastrointestinal: Positive for diarrhea, nausea and vomiting. Negative for abdominal distention, abdominal pain, blood in stool and constipation.   Genitourinary: Positive for dysuria. Negative for difficulty urinating.   Musculoskeletal: Negative for arthralgias, back pain, myalgias and neck pain.   Skin: Negative for rash and wound.   Neurological: Positive for dizziness. Negative for syncope, weakness, light-headedness, numbness and headaches.   All other systems reviewed and are negative.     Otherwise complete ROS is negative except as mentioned above.    Physical Exam:   Temp:  [98.2 °F (36.8 °C)] 98.2 °F (36.8 °C)  Heart Rate:  [58-72] 58  Resp:   [16-19] 19  BP: (130-146)/(69-89) 140/70  Physical Exam   Constitutional: She is oriented to person, place, and time. She appears well-developed and well-nourished. No distress.   obese   HENT:   Head: Normocephalic and atraumatic.   Eyes: Conjunctivae and EOM are normal.   Neck: Normal range of motion. Neck supple.   Cardiovascular: Normal rate, regular rhythm, normal heart sounds and intact distal pulses. Exam reveals no gallop and no friction rub.   No murmur heard.  Pulmonary/Chest: Effort normal and breath sounds normal. No respiratory distress. She has no wheezes. She has no rales.   Abdominal: Soft. Bowel sounds are normal. She exhibits no distension. There is no tenderness.   Musculoskeletal: Normal range of motion. She exhibits no edema.   Neurological: She is alert and oriented to person, place, and time.   Skin: Skin is warm and dry. She is not diaphoretic. No erythema.   Psychiatric: She has a normal mood and affect. Her behavior is normal. Judgment and thought content normal.         Results Reviewed:  I have personally reviewed current lab, radiology, and data and agree with results.  Lab Results (last 24 hours)     Procedure Component Value Units Date/Time    Urinalysis With Culture If Indicated - Urine, Clean Catch [[id number]]  (Abnormal) Collected:  09/23/19 1547    Specimen:  Urine, Clean Catch Updated:  09/23/19 1616     Color, UA Dark Yellow     Appearance, UA Cloudy     pH, UA 6.0     Specific Gravity, UA 1.025     Glucose, UA Negative     Ketones, UA Trace     Bilirubin, UA Small (1+)     Blood, UA Trace     Protein, UA 30 mg/dL (1+)     Leuk Esterase, UA Moderate (2+)     Nitrite, UA Positive     Urobilinogen, UA 1.0 E.U./dL    Urinalysis, Microscopic Only - Urine, Clean Catch [[id number]]  (Abnormal) Collected:  09/23/19 1547    Specimen:  Urine, Clean Catch Updated:  09/23/19 1616     RBC, UA 6-12 /HPF      WBC, UA Too Numerous to Count /HPF      Bacteria, UA 4+ /HPF      Squamous Epithelial  Cells, UA 3-5 /HPF      Hyaline Casts, UA 3-6 /LPF      Methodology Automated Microscopy    Urine Culture - Urine, Urine, Clean Catch [[id number]] Collected:  09/23/19 1547    Specimen:  Urine, Clean Catch Updated:  09/23/19 1616    [facility] Draw [[id number]] Collected:  09/23/19 1424    Specimen:  Blood Updated:  09/23/19 1530    Narrative:       The following orders were created for panel order [facility] Draw.  Procedure                               Abnormality         Status                     ---------                               -----------         ------                     Light Blue Top[[id number]]                                   Final result               Green Top (Gel)[[id number]]                                  Final result               Lavender Top[[id number]]                                     Final result               Gold Top - SST[[id number]]                                   Final result                 Please view results for these tests on the individual orders.    Light Blue Top [[id number]] Collected:  09/23/19 1424    Specimen:  Blood from Arm, Right Updated:  09/23/19 1530     Extra Tube hold for add-on     Comment: Auto resulted       Green Top (Gel) [[id number]] Collected:  09/23/19 1424    Specimen:  Blood from Arm, Right Updated:  09/23/19 1530     Extra Tube Hold for add-ons.     Comment: Auto resulted.       Lavender Top [[id number]] Collected:  09/23/19 1424    Specimen:  Blood from Arm, Right Updated:  09/23/19 1530     Extra Tube hold for add-on     Comment: Auto resulted       Gold Top - SST [[id number]] Collected:  09/23/19 1424    Specimen:  Blood from Arm, Right Updated:  09/23/19 1530     Extra Tube Hold for add-ons.     Comment: Auto resulted.       Comprehensive Metabolic Panel [[id number]]  (Abnormal) Collected:  09/23/19 1424    Specimen:  Blood from Arm, Right Updated:  09/23/19 1517     Glucose 107 mg/dL      BUN 11 mg/dL      Creatinine 0.87 mg/dL      Sodium 143 mmol/L       Potassium 2.6 mmol/L      Chloride 97 mmol/L      CO2 31.0 mmol/L      Calcium 10.2 mg/dL      Total Protein 7.9 g/dL      Albumin 4.30 g/dL      ALT (SGPT) 17 U/L      AST (SGOT) 24 U/L      Alkaline Phosphatase 52 U/L      Total Bilirubin 0.6 mg/dL      eGFR Non African Amer 69 mL/min/1.73      Globulin 3.6 gm/dL      A/G Ratio 1.2 g/dL      BUN/Creatinine Ratio 12.6     Anion Gap 15.0 mmol/L     Narrative:       GFR Normal >60  Chronic Kidney Disease <60  Kidney Failure <15    Magnesium [[id number]]  (Normal) Collected:  09/23/19 1424    Specimen:  Blood from Arm, Right Updated:  09/23/19 1504     Magnesium 1.7 mg/dL     Troponin [[id number]]  (Normal) Collected:  09/23/19 1424    Specimen:  Blood from Arm, Right Updated:  09/23/19 1502     Troponin T <0.010 ng/mL     Narrative:       Troponin T Reference Range:  <= 0.03 ng/mL-   Negative for AMI  >0.03 ng/mL-     Abnormal for myocardial necrosis.  Clinicians would have to utilize clinical acumen, EKG, Troponin and serial changes to determine if it is an Acute Myocardial Infarction or myocardial injury due to an underlying chronic condition.     BNP [[id number]]  (Normal) Collected:  09/23/19 1424    Specimen:  Blood from Arm, Right Updated:  09/23/19 1501     proBNP 64.3 pg/mL     Narrative:       Among patients with dyspnea, NT-proBNP is highly sensitive for the detection of acute congestive heart failure. In addition NT-proBNP of <300 pg/ml effectively rules out acute congestive heart failure with 99% negative predictive value.    CBC & Differential [[id number]] Collected:  09/23/19 1424    Specimen:  Blood Updated:  09/23/19 1429    Narrative:       The following orders were created for panel order CBC & Differential.  Procedure                               Abnormality         Status                     ---------                               -----------         ------                     CBC Auto Differential[[id number]]        Abnormal            Final result                  Please view results for these tests on the individual orders.    CBC Auto Differential [[id number]]  (Abnormal) Collected:  09/23/19 1424    Specimen:  Blood from Arm, Right Updated:  09/23/19 1429     WBC 7.84 10*3/mm3      RBC 5.57 10*6/mm3      Hemoglobin 14.6 g/dL      Hematocrit 44.5 %      MCV 79.9 fL      MCH 26.2 pg      MCHC 32.8 g/dL      RDW 14.1 %      RDW-SD 40.3 fl      MPV 11.0 fL      Platelets 257 10*3/mm3      Neutrophil % 56.6 %      Lymphocyte % 33.4 %      Monocyte % 7.8 %      Eosinophil % 1.8 %      Basophil % 0.3 %      Immature Grans % 0.1 %      Neutrophils, Absolute 4.44 10*3/mm3      Lymphocytes, Absolute 2.62 10*3/mm3      Monocytes, Absolute 0.61 10*3/mm3      Eosinophils, Absolute 0.14 10*3/mm3      Basophils, Absolute 0.02 10*3/mm3      Immature Grans, Absolute 0.01 10*3/mm3      nRBC 0.0 /100 WBC         Imaging Results (last 24 hours)     Procedure Component Value Units Date/Time    XR Chest 1 View [[id number]] Collected:  09/23/19 1436     Updated:  09/23/19 1500    Narrative:         [facility] [place]    Patient Name: SHAWN VENCESFANIE    ATTENDING: MIREILLE SANTOS     REFERRING: MIREILLE SANTOS    ORDERING: MIREILLE SANTOS    -----------------------    PROCEDURE: Chest, AP portable    DATE OF EXAM: 9/23/2019 at 1414 hours    HISTORY: Chest pain    A single portable view of the chest was obtained. Study is  compared to prior chest of 9/13/2019.    FINDINGS:    The lungs are satisfactorily expanded and clear of infiltrates or  effusions. The heart size is normal and the vasculature does not  appear congested.The costophrenic angles are clear. Ribs and bony  thorax appear intact.      Impression:       No active disease.      Electronically signed by:  Arnol Ash MD  9/23/2019 2:59 PM  CDT Workstation: [id number]            Assessment:      Hypokalemia    ELSIE on CPAP    Obesity (BMI 30-39.9)    History of weight loss surgery    Nausea vomiting and  diarrhea    Acute cystitis without hematuria            Plan:  1. Observation, telemetry  2. Replace potassium by protocol  3. Rocephin 1 gram daily for 3 days  4. GI pathogen panel  5. Antiemetics  6. Pain control: tylenol, home tramadol  7. VTE PPx: lovenox  8. FULL CODE      ROBERT Request # : [id number]    I discussed the patient's findings and my recommendations with: patient    Lokesh Roblesliz Wright, APRN  09/23/19  6:13 PM

## 2019-09-23 NOTE — ED PROVIDER NOTES
Subjective   49-year-old female presents to the emergency department with complaint of midsternal chest pain that she describes as sharp and pressure-like.  She states she is had similar pain recently when she has had low potassium.  She states she is been dealing with low potassium for the last couple months after she had a gastric sleeve surgery in June.  She reports that over the last few days she has been having some vomiting and diarrhea and has been unable to tolerate her liquid potassium that she takes at home.  Denies any fevers or chills.  Denies any shortness of breath or leg swelling.    Family history, surgical history, social history, current medications and allergies are reviewed with the patient and triage documentation and vitals are reviewed.          History provided by:  Patient and medical records   used: No        Review of Systems   Constitutional: Negative for chills, diaphoresis and fever.   HENT: Negative.    Eyes: Negative.    Respiratory: Negative for cough, shortness of breath and wheezing.    Cardiovascular: Positive for chest pain. Negative for palpitations and leg swelling.   Gastrointestinal: Positive for diarrhea, nausea and vomiting. Negative for abdominal pain.   Endocrine: Negative.    Genitourinary: Negative for frequency and urgency.   Musculoskeletal: Negative for arthralgias, back pain, myalgias and neck pain.   Skin: Negative for color change, pallor, rash and wound.   Allergic/Immunologic: Negative.    Neurological: Negative.    Hematological: Negative.    Psychiatric/Behavioral: Negative.        Past Medical History:   Diagnosis Date   • Acid reflux    • Anxiety    • Depressed    • Dysphagia    • GERD (gastroesophageal reflux disease)    • Hard to intubate    • Hyperlipidemia    • Hypertension    • Hypokalemia    • IBS (irritable bowel syndrome)    • Nausea    • Sleep apnea        Allergies   Allergen Reactions   • Sulfa Antibiotics Anaphylaxis and GI  Intolerance       Past Surgical History:   Procedure Laterality Date   • ABDOMINAL SURGERY     • CARDIAC CATHETERIZATION N/A 8/13/2018    Procedure: Left Heart Cath 8/13/2018 @ 9;00;  Surgeon: Kuldip Frank MD;  Location: [facility] CATH INVASIVE LOCATION;  Service: Cardiology   • COLONOSCOPY N/A 11/19/2018    Procedure: COLONOSCOPY;  Surgeon: Bro Whitaker MD;  Location: [facility] ENDOSCOPY;  Service: Gastroenterology   • CYSTOSCOPY     • ENDOMETRIAL ABLATION  2015   • ENDOSCOPY N/A 11/19/2018    Procedure: ESOPHAGOGASTRODUODENOSCOPYwith dilation;  Surgeon: Bro Whitaker MD;  Location: [facility] ENDOSCOPY;  Service: Gastroenterology   • GASTRIC SLEEVE LAPAROSCOPIC     • LAPAROSCOPIC TUBAL LIGATION  1995   • [place] ERCP DX COLLECTION SPECIMEN BRUSHING/WASHING Left 7/31/2017    Procedure: ENDOSCOPIC RETROGRADE CHOLANGIOPANCREATOGRAPHY;  Surgeon: Samuel Redding DO;  Location: [facility] ENDOSCOPY;  Service: Gastroenterology   • [place] LAP,CHOLECYSTECTOMY N/A 7/24/2017    Procedure: CHOLECYSTECTOMY LAPAROSCOPIC, also umbillical hernia repair;  Surgeon: Pk العلي MD;  Location: [facility] OR;  Service: General   • UPPER GASTROINTESTINAL ENDOSCOPY  11/19/2018       Family History   Problem Relation Age of Onset   • Diabetes Mother    • Hypertension Mother    • Cirrhosis Mother    • Hypertension Father        Social History     Socioeconomic History   • Marital status:      Spouse name: Not on file   • Number of children: Not on file   • Years of education: Not on file   • Highest education level: Not on file   Tobacco Use   • Smoking status: Never Smoker   • Smokeless tobacco: Never Used   Substance and Sexual Activity   • Alcohol use: Yes     Comment: socially   • Drug use: No   • Sexual activity: Defer     Birth control/protection: Surgical           Objective   Physical Exam   Constitutional: She is oriented to person, place, and time. She appears well-developed and well-nourished.  Non-toxic appearance. She does not  appear ill. No distress.   HENT:   Head: Normocephalic.   Eyes: Pupils are equal, round, and reactive to light.   Neck: Normal range of motion. No hepatojugular reflux and no JVD present.   Cardiovascular: Normal rate, regular rhythm, intact distal pulses and normal pulses.  No extrasystoles are present.   No murmur heard.  Pulses:       Radial pulses are 2+ on the right side, and 2+ on the left side.        Dorsalis pedis pulses are 2+ on the right side, and 2+ on the left side.   Pulmonary/Chest: Effort normal and breath sounds normal. She has no decreased breath sounds. She has no wheezes. She has no rhonchi. She has no rales.   Abdominal: Soft. Bowel sounds are normal. She exhibits no distension. There is no tenderness.   Musculoskeletal: Normal range of motion.        Right lower leg: Normal. She exhibits no tenderness and no edema.        Left lower leg: Normal. She exhibits no tenderness and no edema.   Neurological: She is alert and oriented to person, place, and time.   Skin: Skin is warm and dry. Capillary refill takes less than 2 seconds. She is not diaphoretic.   Psychiatric: She has a normal mood and affect. Her behavior is normal.   Nursing note and vitals reviewed.      Procedures  none         ED Course      Labs Reviewed   COMPREHENSIVE METABOLIC PANEL - Abnormal; Notable for the following components:       Result Value    Glucose 107 (*)     Potassium 2.6 (*)     Chloride 97 (*)     CO2 31.0 (*)     All other components within normal limits    Narrative:     GFR Normal >60  Chronic Kidney Disease <60  Kidney Failure <15   CBC WITH AUTO DIFFERENTIAL - Abnormal; Notable for the following components:    RBC 5.57 (*)     MCH 26.2 (*)     All other components within normal limits   URINALYSIS W/ CULTURE IF INDICATED - Abnormal; Notable for the following components:    Appearance, UA Cloudy (*)     Ketones, UA Trace (*)     Bilirubin, UA Small (1+) (*)     Blood, UA Trace (*)     Protein, UA 30 mg/dL  (1+) (*)     Leuk Esterase, UA Moderate (2+) (*)     Nitrite, UA Positive (*)     All other components within normal limits   URINALYSIS, MICROSCOPIC ONLY - Abnormal; Notable for the following components:    RBC, UA 6-12 (*)     WBC, UA Too Numerous to Count (*)     Bacteria, UA 4+ (*)     Squamous Epithelial Cells, UA 3-5 (*)     All other components within normal limits   TROPONIN (IN-HOUSE) - Normal    Narrative:     Troponin T Reference Range:  <= 0.03 ng/mL-   Negative for AMI  >0.03 ng/mL-     Abnormal for myocardial necrosis.  Clinicians would have to utilize clinical acumen, EKG, Troponin and serial changes to determine if it is an Acute Myocardial Infarction or myocardial injury due to an underlying chronic condition.    BNP (IN-HOUSE) - Normal    Narrative:     Among patients with dyspnea, NT-proBNP is highly sensitive for the detection of acute congestive heart failure. In addition NT-proBNP of <300 pg/ml effectively rules out acute congestive heart failure with 99% negative predictive value.   MAGNESIUM - Normal   URINE CULTURE   GASTROINTESTINAL PANEL, PCR   RAINBOW DRAW    Narrative:     The following orders were created for panel order [place] Draw.  Procedure                               Abnormality         Status                     ---------                               -----------         ------                     Light Blue Top[[id number]]                                   Final result               Green Top (Gel)[[id number]]                                  Final result               Lavender Top[[id number]]                                     Final result               Gold Top - SST[[id number]]                                   Final result                 Please view results for these tests on the individual orders.   CBC AND DIFFERENTIAL    Narrative:     The following orders were created for panel order CBC & Differential.  Procedure                               Abnormality         Status                      ---------                               -----------         ------                     CBC Auto Differential[[id number]]        Abnormal            Final result                 Please view results for these tests on the individual orders.   LIGHT BLUE TOP   GREEN TOP   LAVENDER TOP   GOLD TOP - SST     Xr Chest 2 View    Result Date: 9/13/2019  Narrative: PROCEDURE: XR CHEST 2 VW VIEWS: PA/Lat INDICATION: Chest pain COMPARISON: CXR: 8/25/2019 FINDINGS:   - lines/tubes: none   - cardiac: size within normal limits.   - mediastinum: contour within normal limits.   - lungs: no evidence of a focal air space process, pulmonary interstitial edema, nodule(s)/mass.   - pleura: no evidence of  fluid.    - osseous: unremarkable for age.     Impression: No acute cardiopulmonary process identified Electronically signed by:  Elsy Carter MD  9/13/2019 7:36 PM CDT Workstation: [id number]    Xr Chest 1 View    Result Date: 9/23/2019  Narrative: [facility] Patient Name: RAHEEM VENCES ATTENDING: MIREILLE SANTOS REFERRING: MIREILLE SANTOS ORDERING: MIREILLE SANTOS ----------------------- PROCEDURE: Chest, AP portable DATE OF EXAM: 9/23/2019 at 1414 hours HISTORY: Chest pain A single portable view of the chest was obtained. Study is compared to prior chest of 9/13/2019. FINDINGS: The lungs are satisfactorily expanded and clear of infiltrates or effusions. The heart size is normal and the vasculature does not appear congested.The costophrenic angles are clear. Ribs and bony thorax appear intact.     Impression: No active disease. Electronically signed by:  Arnol Ash MD  9/23/2019 2:59 PM CDT Workstation: [id number]    Xr Chest 1 View    Result Date: 8/25/2019  Narrative: PORTABLE CHEST HISTORY: Chest pain Portable AP upright film of the chest was obtained at 2:24 PM. COMPARISON: July 26, 2019 FINDINGS: EKG leads. The lungs are clear of an acute process. Unchanged minimal elevation right  hemidiaphragm. The heart is not enlarged. The pulmonary vasculature is not increased. No pleural effusion. No pneumothorax. No acute osseous abnormality. Degenerative changes are present in the thoracic spine.     Impression: CONCLUSION: No Acute Disease 16190 Electronically signed by:  Christiano Enciso MD  8/25/2019 3:11 PM CDT Workstation: [id number]          MDM  Number of Diagnoses or Management Options  Chest pain, unspecified type:   Hypokalemia:      Amount and/or Complexity of Data Reviewed  Clinical lab tests: reviewed  Tests in the radiology section of CPT®: reviewed  Discuss the patient with other providers: yes    Patient Progress  Patient progress: stable    Patient found to be again hypokalemic at 2.6.  Magnesium is within normal range.  Patient is started on hypokalemia replacement protocol and will be admitted to the hospitalist.  Cardiac evaluation unremarkable.    Final diagnoses:   Hypokalemia   Chest pain, unspecified type              Zach Vallejo, DO  09/23/19 1237

## 2019-09-23 NOTE — ED NOTES
Patient placed on 2L O2 nasal cannula after her oxygen saturation level dropped to 83% while she was sleeping.     Rachelle Griffin RN  09/23/19 9906

## 2019-09-24 VITALS
BODY MASS INDEX: 40.37 KG/M2 | WEIGHT: 242.3 LBS | RESPIRATION RATE: 18 BRPM | DIASTOLIC BLOOD PRESSURE: 63 MMHG | HEIGHT: 65 IN | TEMPERATURE: 97.5 F | HEART RATE: 66 BPM | OXYGEN SATURATION: 98 % | SYSTOLIC BLOOD PRESSURE: 107 MMHG

## 2019-09-24 LAB
ANION GAP SERPL CALCULATED.3IONS-SCNC: 10 MMOL/L (ref 5–15)
BASOPHILS # BLD AUTO: 0.03 10*3/MM3 (ref 0–0.2)
BASOPHILS NFR BLD AUTO: 0.5 % (ref 0–1.5)
BUN BLD-MCNC: 9 MG/DL (ref 6–20)
BUN/CREAT SERPL: 12 (ref 7–25)
CALCIUM SPEC-SCNC: 8.9 MG/DL (ref 8.6–10.5)
CHLORIDE SERPL-SCNC: 101 MMOL/L (ref 98–107)
CO2 SERPL-SCNC: 32 MMOL/L (ref 22–29)
CREAT BLD-MCNC: 0.75 MG/DL (ref 0.57–1)
DEPRECATED RDW RBC AUTO: 43 FL (ref 37–54)
EOSINOPHIL # BLD AUTO: 0.18 10*3/MM3 (ref 0–0.4)
EOSINOPHIL NFR BLD AUTO: 3 % (ref 0.3–6.2)
ERYTHROCYTE [DISTWIDTH] IN BLOOD BY AUTOMATED COUNT: 14.2 % (ref 12.3–15.4)
GFR SERPL CREATININE-BSD FRML MDRD: 82 ML/MIN/1.73
GLUCOSE BLD-MCNC: 90 MG/DL (ref 65–99)
HCT VFR BLD AUTO: 40.4 % (ref 34–46.6)
HGB BLD-MCNC: 13.2 G/DL (ref 12–15.9)
IMM GRANULOCYTES # BLD AUTO: 0.02 10*3/MM3 (ref 0–0.05)
IMM GRANULOCYTES NFR BLD AUTO: 0.3 % (ref 0–0.5)
LYMPHOCYTES # BLD AUTO: 2.47 10*3/MM3 (ref 0.7–3.1)
LYMPHOCYTES NFR BLD AUTO: 41.1 % (ref 19.6–45.3)
MAGNESIUM SERPL-MCNC: 1.8 MG/DL (ref 1.6–2.6)
MCH RBC QN AUTO: 26.9 PG (ref 26.6–33)
MCHC RBC AUTO-ENTMCNC: 32.7 G/DL (ref 31.5–35.7)
MCV RBC AUTO: 82.4 FL (ref 79–97)
MONOCYTES # BLD AUTO: 0.42 10*3/MM3 (ref 0.1–0.9)
MONOCYTES NFR BLD AUTO: 7 % (ref 5–12)
NEUTROPHILS # BLD AUTO: 2.89 10*3/MM3 (ref 1.7–7)
NEUTROPHILS NFR BLD AUTO: 48.1 % (ref 42.7–76)
NRBC BLD AUTO-RTO: 0 /100 WBC (ref 0–0.2)
PLATELET # BLD AUTO: 217 10*3/MM3 (ref 140–450)
PMV BLD AUTO: 11.3 FL (ref 6–12)
POTASSIUM BLD-SCNC: 3.2 MMOL/L (ref 3.5–5.2)
POTASSIUM BLD-SCNC: 3.5 MMOL/L (ref 3.5–5.2)
RBC # BLD AUTO: 4.9 10*6/MM3 (ref 3.77–5.28)
SODIUM BLD-SCNC: 143 MMOL/L (ref 136–145)
WBC NRBC COR # BLD: 6.01 10*3/MM3 (ref 3.4–10.8)

## 2019-09-24 PROCEDURE — 83735 ASSAY OF MAGNESIUM: CPT | Performed by: NURSE PRACTITIONER

## 2019-09-24 PROCEDURE — 85025 COMPLETE CBC W/AUTO DIFF WBC: CPT | Performed by: NURSE PRACTITIONER

## 2019-09-24 PROCEDURE — 80048 BASIC METABOLIC PNL TOTAL CA: CPT | Performed by: NURSE PRACTITIONER

## 2019-09-24 PROCEDURE — G0378 HOSPITAL OBSERVATION PER HR: HCPCS

## 2019-09-24 PROCEDURE — 84132 ASSAY OF SERUM POTASSIUM: CPT | Performed by: INTERNAL MEDICINE

## 2019-09-24 PROCEDURE — 25010000003 POTASSIUM CHLORIDE 10 MEQ/100ML SOLUTION: Performed by: EMERGENCY MEDICINE

## 2019-09-24 PROCEDURE — 96366 THER/PROPH/DIAG IV INF ADDON: CPT

## 2019-09-24 RX ORDER — LEVOFLOXACIN 250 MG/1
250 TABLET ORAL DAILY
Qty: 3 TABLET | Refills: 0 | Status: SHIPPED | OUTPATIENT
Start: 2019-09-24 | End: 2019-09-27

## 2019-09-24 RX ORDER — ACETAMINOPHEN 325 MG/1
650 TABLET ORAL EVERY 4 HOURS PRN
Start: 2019-09-24 | End: 2019-09-27

## 2019-09-24 RX ORDER — POTASSIUM CHLORIDE 20MEQ/15ML
20 LIQUID (ML) ORAL 3 TIMES DAILY
Qty: 240 ML | Refills: 1 | Status: SHIPPED | OUTPATIENT
Start: 2019-09-24 | End: 2019-11-03 | Stop reason: ALTCHOICE

## 2019-09-24 RX ADMIN — SODIUM CHLORIDE: 900 INJECTION, SOLUTION INTRAVENOUS at 00:51

## 2019-09-24 RX ADMIN — PANTOPRAZOLE SODIUM 40 MG: 40 TABLET, DELAYED RELEASE ORAL at 06:05

## 2019-09-24 RX ADMIN — POTASSIUM CHLORIDE 10 MEQ: 7.46 INJECTION, SOLUTION INTRAVENOUS at 01:14

## 2019-09-24 RX ADMIN — POTASSIUM CHLORIDE 10 MEQ: 7.46 INJECTION, SOLUTION INTRAVENOUS at 05:46

## 2019-09-24 RX ADMIN — SERTRALINE HYDROCHLORIDE 150 MG: 50 TABLET ORAL at 09:19

## 2019-09-24 RX ADMIN — POTASSIUM CHLORIDE 10 MEQ: 7.46 INJECTION, SOLUTION INTRAVENOUS at 03:17

## 2019-09-24 RX ADMIN — GABAPENTIN 100 MG: 100 CAPSULE ORAL at 09:19

## 2019-09-24 RX ADMIN — BUPROPION HYDROCHLORIDE 150 MG: 150 TABLET, EXTENDED RELEASE ORAL at 09:19

## 2019-09-24 RX ADMIN — POTASSIUM CHLORIDE 10 MEQ: 7.46 INJECTION, SOLUTION INTRAVENOUS at 04:25

## 2019-09-24 RX ADMIN — SODIUM CHLORIDE, PRESERVATIVE FREE 10 ML: 5 INJECTION INTRAVENOUS at 09:19

## 2019-09-24 NOTE — PLAN OF CARE
Problem: Patient Care Overview  Goal: Plan of Care Review  Outcome: Ongoing (interventions implemented as appropriate)   09/24/19 0448   Coping/Psychosocial   Plan of Care Reviewed With patient   Plan of Care Review   Progress no change   OTHER   Outcome Summary Pt resting at this time; Pt c/o pain earlier in the shift; PRN Tramadol given and effective; no new complaints at this time; will continue to monitor this py.      09/24/19 0448   Coping/Psychosocial   Plan of Care Reviewed With patient   Plan of Care Review   Progress no change   OTHER   Outcome Summary Pt resting at this time; Pt c/o pain earlier in the shift; PRN Tramadol given and effective; no new complaints at this time; will continue to monitor this pt       Problem: Fluid Volume Deficit (Adult)  Goal: Identify Related Risk Factors and Signs and Symptoms  Outcome: Ongoing (interventions implemented as appropriate)    Goal: Optimal Fluid Balance  Outcome: Ongoing (interventions implemented as appropriate)      Problem: Nausea/Vomiting (Adult)  Goal: Identify Related Risk Factors and Signs and Symptoms  Outcome: Ongoing (interventions implemented as appropriate)    Goal: Symptom Relief  Outcome: Ongoing (interventions implemented as appropriate)    Goal: Adequate Hydration  Outcome: Ongoing (interventions implemented as appropriate)      Problem: Urinary Tract Infection (Adult)  Goal: Signs and Symptoms of Listed Potential Problems Will be Absent, Minimized or Managed (Urinary Tract Infection)  Outcome: Ongoing (interventions implemented as appropriate)

## 2019-09-24 NOTE — PLAN OF CARE
Pt states she sleeps with NIV unit at home. She states no one can bring her home unit to her tonight. Pt was offered one of our in house bipap machines to use for tonight and she refused. She states she cant tolerate our bipaps and mask. Pt instructed to use call light to get in contact with RN to contact Resp if she decides she wants to use our in house machine tonight.

## 2019-09-24 NOTE — DISCHARGE SUMMARY
Discharge Summary  Nestor Villaseñor MD  Hospitalist     Date of Discharge:  9/24/2019    Discharge Diagnosis:      Hypokalemia    Nausea vomiting and diarrhea    Obesity (BMI 30-39.9)    Acute cystitis without hematuria    History of weight loss surgery      Presenting Problem/History of Present Illness  Fatigue / weakness    Hospital Course  Patient is a 49 y.o. female admitted for fatigue / weakness usually associated with episodes of hypokalemia. Her admission serum K was 2.6.    She improved with the help of IV K and oral supplementation as well as gentle IV hydration, symptomatic relief. She received IV antibiotics for the UTI as well.    Her vital signs are normal and she wishes to go home. She will take the oral K supplement three times a day now instead of only daily.    Consults:   Consults     Date and Time Order Name Status Description    9/23/2019 1737 Hospitalist (on-call MD unless specified)      8/27/2019 1052 Inpatient Cardiology Consult Completed     8/26/2019 1107 Inpatient Nephrology Consult Completed           Pertinent Test Results: admission K of 2.6 - 3.2 on discharge.    Condition on Discharge:  stable    Vital Signs  Temp:  [96.6 °F (35.9 °C)-98.2 °F (36.8 °C)] 97.5 °F (36.4 °C)  Heart Rate:  [49-80] 66  Resp:  [16-19] 18  BP: (107-164)/(62-93) 107/63    Physical Exam:  Physical Exam   Constitutional: She is oriented to person, place, and time. No distress.   Obese    HENT:   Head: Normocephalic and atraumatic.   Eyes: EOM are normal. Pupils are equal, round, and reactive to light. No scleral icterus.   Neck: Normal range of motion. Neck supple.   Cardiovascular: Normal rate and regular rhythm.   Pulmonary/Chest: Effort normal and breath sounds normal. No stridor. No respiratory distress.   Abdominal: Soft. Bowel sounds are normal. She exhibits no distension. There is no tenderness. There is no guarding.   Musculoskeletal: Normal range of motion. She exhibits no edema or tenderness.    Neurological: She is alert and oriented to person, place, and time. No cranial nerve deficit. Coordination normal.   Skin: Skin is warm and dry. No rash noted. No erythema. No pallor.   Psychiatric: She has a normal mood and affect. Her behavior is normal.   Vitals reviewed.      Discharge Disposition:  Home or Self Care    Discharge Medications     Discharge Medications      New Medications      Instructions Start Date   acetaminophen 325 MG tablet  Commonly known as:  TYLENOL   650 mg, Oral, Every 4 Hours PRN      levoFLOXacin 250 MG tablet  Commonly known as:  LEVAQUIN   250 mg, Oral, Daily         Changes to Medications      Instructions Start Date   potassium chloride 20 MEQ/15ML (10%) solution  Commonly known as:  KAYCIEL  What changed:  when to take this   20 mEq, Oral, 3 Times Daily         Continue These Medications      Instructions Start Date   albuterol sulfate  (90 Base) MCG/ACT inhaler  Commonly known as:  PROVENTIL HFA;VENTOLIN HFA;PROAIR HFA   2 puffs, Inhalation, Every 4 Hours PRN      aMILoride 5 MG tablet  Commonly known as:  MIDAMOR   10 mg, Oral, Daily      buPROPion  MG 12 hr tablet  Commonly known as:  WELLBUTRIN SR   150 mg, Oral, 2 Times Daily      CALCIUM 1000 + D 1000-800 MG-UNIT tablet  Generic drug:  Calcium Carb-Cholecalciferol   1 tablet, Oral, Daily      gabapentin 100 MG capsule  Commonly known as:  NEURONTIN   100 mg, Oral, 2 Times Daily      nitroglycerin 0.4 MG SL tablet  Commonly known as:  NITROSTAT   0.4 mg, Sublingual, Every 5 Minutes PRN, Take no more than 3 doses in 15 minutes.      omeprazole 20 MG capsule  Commonly known as:  priLOSEC   20 mg, Oral, Daily      ondansetron ODT 4 MG disintegrating tablet  Commonly known as:  ZOFRAN-ODT   4 mg, Oral, Every 6 Hours PRN      promethazine 25 MG tablet  Commonly known as:  PHENERGAN   TAKE 1/2 TAB BY MOUTH EVERY 6 HOURS AS NEEDED FOR NAUSEA      rOPINIRole 0.25 MG tablet  Commonly known as:  REQUIP   0.25 mg, Oral,  Nightly      traMADol 50 MG tablet  Commonly known as:  ULTRAM   50 mg, Oral, Every 8 Hours PRN      ZOLOFT PO   150 mg, Oral, Daily             Discharge Diet:   Diet Instructions     Diet: Regular      Discharge Diet:  Regular          Activity at Discharge:   Activity Instructions     Activity as Tolerated            Follow-up Appointments  Future Appointments   Date Time Provider Department Center   9/27/2019 10:15 AM Bro Hurd Jr., MD [facility] None   12/16/2019  2:15 PM Rachel Enciso MD [facility] None     Additional Instructions for the Follow-ups that You Need to Schedule     Discharge Follow-up with Specified Provider: Dr. Omid Cardozo; 1 Week   As directed      To:  Dr. Omid Cardozo    Follow Up:  1 Week               Test Results Pending at Discharge   Order Current Status    Urine Culture - Urine, Urine, Clean Catch In process           Nestor Villaseñor MD  09/24/19  10:29 AM

## 2019-09-24 NOTE — PROGRESS NOTES
Adult Nutrition  Assessment    Patient Name:  Maricarmen Ferris  YOB: 1970  MRN: [id number]  Admit Date:  9/23/2019    Assessment Date:  9/24/2019    Comments:  Pt admit for low K+ and acute cystitis. PMH of gastric sleeve 6/2019 and IBS. Pt sttaes she has lost 77# since June. Wt hx 5/14 306#, 8/26 250# and currently 242#, BMI 40.3-193%IBW, indicative of obesity. K+ on admit 2.6L and now 3.2L. Pt states she has had plenty of diet ed for weight loss and has the list of high K+ foods at homes. She has d/c orders.     Anthropometrics     Row Name 09/24/19 0500          Anthropometrics    Weight  110 kg (242 lb 4.8 oz)         Electronically signed by:  Cee Salinas RD  09/24/19 11:35 AM

## 2019-09-25 ENCOUNTER — READMISSION MANAGEMENT (OUTPATIENT)
Dept: CALL CENTER | Facility: HOSPITAL | Age: 49
End: 2019-09-25

## 2019-09-25 LAB — BACTERIA SPEC AEROBE CULT: ABNORMAL

## 2019-09-25 NOTE — OUTREACH NOTE
Prep Survey      Responses   Facility patient discharged from?  [facility]   Is patient eligible?  Yes   Discharge diagnosis  Hypokalemia,  N/V/D,  weakness   Does the patient have one of the following disease processes/diagnoses(primary or secondary)?  Other   Does the patient have Home health ordered?  No   Is there a DME ordered?  No   Comments regarding appointments  see AVS   Prep survey completed?  Yes          Nadia Garner RN

## 2019-09-27 ENCOUNTER — READMISSION MANAGEMENT (OUTPATIENT)
Dept: CALL CENTER | Facility: HOSPITAL | Age: 49
End: 2019-09-27

## 2019-09-27 ENCOUNTER — OFFICE VISIT (OUTPATIENT)
Dept: FAMILY MEDICINE CLINIC | Facility: CLINIC | Age: 49
End: 2019-09-27

## 2019-09-27 ENCOUNTER — APPOINTMENT (OUTPATIENT)
Dept: LAB | Facility: HOSPITAL | Age: 49
End: 2019-09-27

## 2019-09-27 VITALS
SYSTOLIC BLOOD PRESSURE: 128 MMHG | HEIGHT: 65 IN | DIASTOLIC BLOOD PRESSURE: 82 MMHG | WEIGHT: 237 LBS | OXYGEN SATURATION: 98 % | HEART RATE: 65 BPM | BODY MASS INDEX: 39.49 KG/M2

## 2019-09-27 DIAGNOSIS — E78.5 HYPERLIPIDEMIA, UNSPECIFIED HYPERLIPIDEMIA TYPE: ICD-10-CM

## 2019-09-27 DIAGNOSIS — Z00.00 HEALTHCARE MAINTENANCE: ICD-10-CM

## 2019-09-27 DIAGNOSIS — E87.6 HYPOKALEMIA: Primary | ICD-10-CM

## 2019-09-27 LAB
ALBUMIN SERPL-MCNC: 4.4 G/DL (ref 3.5–5.2)
ALBUMIN/GLOB SERPL: 1.2 G/DL
ALP SERPL-CCNC: 58 U/L (ref 39–117)
ALT SERPL W P-5'-P-CCNC: 17 U/L (ref 1–33)
ANION GAP SERPL CALCULATED.3IONS-SCNC: 15.2 MMOL/L (ref 5–15)
AST SERPL-CCNC: 28 U/L (ref 1–32)
BILIRUB SERPL-MCNC: 0.3 MG/DL (ref 0.2–1.2)
BUN BLD-MCNC: 8 MG/DL (ref 6–20)
BUN/CREAT SERPL: 9.4 (ref 7–25)
CALCIUM SPEC-SCNC: 10.3 MG/DL (ref 8.6–10.5)
CHLORIDE SERPL-SCNC: 96 MMOL/L (ref 98–107)
CHOLEST SERPL-MCNC: 229 MG/DL (ref 0–200)
CO2 SERPL-SCNC: 29.8 MMOL/L (ref 22–29)
CREAT BLD-MCNC: 0.85 MG/DL (ref 0.57–1)
GFR SERPL CREATININE-BSD FRML MDRD: 71 ML/MIN/1.73
GLOBULIN UR ELPH-MCNC: 3.7 GM/DL
GLUCOSE BLD-MCNC: 96 MG/DL (ref 65–99)
HDLC SERPL-MCNC: 54 MG/DL (ref 40–60)
LDLC SERPL CALC-MCNC: 139 MG/DL (ref 0–100)
LDLC/HDLC SERPL: 2.57 {RATIO}
POTASSIUM BLD-SCNC: 4 MMOL/L (ref 3.5–5.2)
PROT SERPL-MCNC: 8.1 G/DL (ref 6–8.5)
SODIUM BLD-SCNC: 141 MMOL/L (ref 136–145)
TRIGL SERPL-MCNC: 181 MG/DL (ref 0–150)
VLDLC SERPL-MCNC: 36.2 MG/DL (ref 5–40)

## 2019-09-27 PROCEDURE — 80061 LIPID PANEL: CPT | Performed by: STUDENT IN AN ORGANIZED HEALTH CARE EDUCATION/TRAINING PROGRAM

## 2019-09-27 PROCEDURE — 80053 COMPREHEN METABOLIC PANEL: CPT | Performed by: STUDENT IN AN ORGANIZED HEALTH CARE EDUCATION/TRAINING PROGRAM

## 2019-09-27 PROCEDURE — 99213 OFFICE O/P EST LOW 20 MIN: CPT | Performed by: STUDENT IN AN ORGANIZED HEALTH CARE EDUCATION/TRAINING PROGRAM

## 2019-09-27 PROCEDURE — 36415 COLL VENOUS BLD VENIPUNCTURE: CPT | Performed by: STUDENT IN AN ORGANIZED HEALTH CARE EDUCATION/TRAINING PROGRAM

## 2019-09-27 RX ORDER — METRONIDAZOLE 7.5 MG/G
GEL VAGINAL
Refills: 0 | COMMUNITY
Start: 2019-09-20 | End: 2019-09-27

## 2019-09-27 NOTE — OUTREACH NOTE
Medical Week 1 Survey      Responses   Facility patient discharged from?  [facility]   Does the patient have one of the following disease processes/diagnoses(primary or secondary)?  Other   Is there a successful TCM telephone encounter documented?  No   Week 1 attempt successful?  Yes   Call start time  1223   Rescheduled  Rescheduled-pt requested   Call end time  1223   Discharge diagnosis  Hypokalemia,  N/V/D,  weakness          Ryan Ovalles RN

## 2019-10-01 ENCOUNTER — READMISSION MANAGEMENT (OUTPATIENT)
Dept: CALL CENTER | Facility: HOSPITAL | Age: 49
End: 2019-10-01

## 2019-10-01 NOTE — OUTREACH NOTE
Medical Week 1 Survey      Responses   Facility patient discharged from?  [facility]   Does the patient have one of the following disease processes/diagnoses(primary or secondary)?  Other   Is there a successful TCM telephone encounter documented?  No   Week 1 attempt successful?  No   Unsuccessful attempts  Attempt 1   Rescheduled  Revoked   Revoke  Decline to participate [Did not answer rescheduled call. ]          Elena Duvall RN

## 2019-10-28 ENCOUNTER — OFFICE VISIT (OUTPATIENT)
Dept: FAMILY MEDICINE CLINIC | Facility: CLINIC | Age: 49
End: 2019-10-28

## 2019-10-28 ENCOUNTER — APPOINTMENT (OUTPATIENT)
Dept: LAB | Facility: HOSPITAL | Age: 49
End: 2019-10-28

## 2019-10-28 VITALS
DIASTOLIC BLOOD PRESSURE: 80 MMHG | BODY MASS INDEX: 37.32 KG/M2 | HEIGHT: 65 IN | WEIGHT: 224 LBS | SYSTOLIC BLOOD PRESSURE: 120 MMHG | HEART RATE: 85 BPM | OXYGEN SATURATION: 98 %

## 2019-10-28 DIAGNOSIS — E87.6 HYPOKALEMIA: Primary | ICD-10-CM

## 2019-10-28 PROCEDURE — 80048 BASIC METABOLIC PNL TOTAL CA: CPT | Performed by: STUDENT IN AN ORGANIZED HEALTH CARE EDUCATION/TRAINING PROGRAM

## 2019-10-28 PROCEDURE — 36415 COLL VENOUS BLD VENIPUNCTURE: CPT | Performed by: STUDENT IN AN ORGANIZED HEALTH CARE EDUCATION/TRAINING PROGRAM

## 2019-10-28 PROCEDURE — 99213 OFFICE O/P EST LOW 20 MIN: CPT | Performed by: STUDENT IN AN ORGANIZED HEALTH CARE EDUCATION/TRAINING PROGRAM

## 2019-10-28 RX ORDER — POTASSIUM CHLORIDE 20 MEQ/1
20 TABLET, EXTENDED RELEASE ORAL 3 TIMES DAILY
Qty: 90 TABLET | Refills: 11 | Status: SHIPPED | OUTPATIENT
Start: 2019-10-28 | End: 2021-08-19 | Stop reason: SDUPTHER

## 2019-10-29 LAB
ANION GAP SERPL CALCULATED.3IONS-SCNC: 14.5 MMOL/L (ref 5–15)
BUN BLD-MCNC: 16 MG/DL (ref 6–20)
BUN/CREAT SERPL: 13.9 (ref 7–25)
CALCIUM SPEC-SCNC: 10.2 MG/DL (ref 8.6–10.5)
CHLORIDE SERPL-SCNC: 98 MMOL/L (ref 98–107)
CO2 SERPL-SCNC: 27.5 MMOL/L (ref 22–29)
CREAT BLD-MCNC: 1.15 MG/DL (ref 0.57–1)
GFR SERPL CREATININE-BSD FRML MDRD: 50 ML/MIN/1.73
GLUCOSE BLD-MCNC: 104 MG/DL (ref 65–99)
POTASSIUM BLD-SCNC: 4.8 MMOL/L (ref 3.5–5.2)
SODIUM BLD-SCNC: 140 MMOL/L (ref 136–145)

## 2019-10-30 NOTE — PROGRESS NOTES
I have seen the patient.  I have reviewed the notes, assessments, and/or procedures performed by Bro Hurd Jr., MD, I concur with her/his documentation and assessment and plan for Maricarmen Ferris.               This document has been electronically signed by Angel Carreon MD on October 30, 2019 11:52 AM

## 2019-10-30 NOTE — PATIENT INSTRUCTIONS
Hypokalemia  Hypokalemia means that the amount of potassium in the blood is lower than normal. Potassium is a chemical that helps regulate the amount of fluid in the body (electrolyte). It also stimulates muscle tightening (contraction) and helps nerves work properly. Normally, most of the body’s potassium is inside of cells, and only a very small amount is in the blood. Because the amount in the blood is so small, minor changes to potassium levels in the blood can be life-threatening.  What are the causes?  This condition may be caused by:  · Antibiotic medicine.  · Diarrhea or vomiting. Taking too much of a medicine that helps you have a bowel movement (laxative) can cause diarrhea and lead to hypokalemia.  · Chronic kidney disease (CKD).  · Medicines that help the body get rid of excess fluid (diuretics).  · Eating disorders, such as bulimia.  · Low magnesium levels in the body.  · Sweating a lot.  What are the signs or symptoms?  Symptoms of this condition include:  · Weakness.  · Constipation.  · Fatigue.  · Muscle cramps.  · Mental confusion.  · Skipped heartbeats or irregular heartbeat (palpitations).  · Tingling or numbness.  How is this diagnosed?  This condition is diagnosed with a blood test.  How is this treated?  Hypokalemia can be treated by taking potassium supplements by mouth or adjusting the medicines that you take. Treatment may also include eating more foods that contain a lot of potassium. If your potassium level is very low, you may need to get potassium through an IV tube in one of your veins and be monitored in the hospital.  Follow these instructions at home:    · Take over-the-counter and prescription medicines only as told by your health care provider. This includes vitamins and supplements.  · Eat a healthy diet. A healthy diet includes fresh fruits and vegetables, whole grains, healthy fats, and lean proteins.  · If instructed, eat more foods that contain a lot of potassium, such  as:  ? Nuts, such as peanuts and pistachios.  ? Seeds, such as sunflower seeds and pumpkin seeds.  ? Peas, lentils, and lima beans.  ? Whole grain and bran cereals and breads.  ? Fresh fruits and vegetables, such as apricots, avocado, bananas, cantaloupe, kiwi, oranges, tomatoes, asparagus, and potatoes.  ? Orange juice.  ? Tomato juice.  ? Red meats.  ? Yogurt.  · Keep all follow-up visits as told by your health care provider. This is important.  Contact a health care provider if:  · You have weakness that gets worse.  · You feel your heart pounding or racing.  · You vomit.  · You have diarrhea.  · You have diabetes (diabetes mellitus) and you have trouble keeping your blood sugar (glucose) in your target range.  Get help right away if:  · You have chest pain.  · You have shortness of breath.  · You have vomiting or diarrhea that lasts for more than 2 days.  · You faint.  This information is not intended to replace advice given to you by your health care provider. Make sure you discuss any questions you have with your health care provider.  Document Released: 12/18/2006 Document Revised: 08/05/2017 Document Reviewed: 08/05/2017  [employer] Patient Education © 2019 [employer]

## 2019-10-30 NOTE — PROGRESS NOTES
FAMILY MEDICINE  RESIDENCY CLINIC PROGRESS NOTE  Bro Hurd Jr, MD  Subjective   SUBJECTIVE:   CC: Hyperkalemia (Hospital F/U)  Maricarmen Ferris is a 49 y.o. female who presents to the [facility] today to establish care after a recent hospitalization for hypokalemia.   Recently presented to [facility] ED for fatigue/weakness & found to have K of 2.6.  She improved with IV potassium as well as oral replacement.  She was also treated with abx for a UTI while in patient.  Since discharge 4 days ago she has felt well.  She is taking 60 meq of potassium daily. She denies any weakness, chest pain, palpitations, or other complaints at this time.      I have reviewed the patient's medical, family, and social history in detail;there are no changes to the history as noted in the electronic medical record.   Concurrent Medical History:  Past Medical History:   Diagnosis Date   • Acid reflux    • Anxiety    • Depressed    • Dysphagia    • GERD (gastroesophageal reflux disease)    • Hard to intubate    • Hyperlipidemia    • Hypertension    • Hypokalemia    • IBS (irritable bowel syndrome)    • Nausea    • Sleep apnea      Past Surgical History:   Procedure Laterality Date   • LAPAROSCOPIC TUBAL LIGATION  1995   • ENDOMETRIAL ABLATION  2015   • TX LAP,CHOLECYSTECTOMY N/A 7/24/2017    Procedure: CHOLECYSTECTOMY LAPAROSCOPIC, also umbillical hernia repair;  Surgeon: Pk العلي MD;  Location: [facility] OR;  Service: General   • TX ERCP DX COLLECTION SPECIMEN BRUSHING/WASHING Left 7/31/2017    Procedure: ENDOSCOPIC RETROGRADE CHOLANGIOPANCREATOGRAPHY;  Surgeon: Samuel Redding DO;  Location: [facility] ENDOSCOPY;  Service: Gastroenterology   • CARDIAC CATHETERIZATION N/A 8/13/2018    Procedure: Left Heart Cath 8/13/2018 @ 9;00;  Surgeon: Kuldip Frank MD;  Location: [facility] CATH INVASIVE LOCATION;  Service: Cardiology   • ENDOSCOPY N/A 11/19/2018    Procedure: ESOPHAGOGASTRODUODENOSCOPYwith dilation;   Surgeon: Bro Whitaker MD;  Location: [facility] ENDOSCOPY;  Service: Gastroenterology   • COLONOSCOPY N/A 11/19/2018    Procedure: COLONOSCOPY;  Surgeon: Bro Whitaker MD;  Location: [facility] ENDOSCOPY;  Service: Gastroenterology   • UPPER GASTROINTESTINAL ENDOSCOPY  11/19/2018   • ABDOMINAL SURGERY     • CYSTOSCOPY     • GASTRIC SLEEVE LAPAROSCOPIC       Current Outpatient Medications on File Prior to Visit   Medication Sig   • albuterol sulfate  (90 Base) MCG/ACT inhaler Inhale 2 puffs Every 4 (Four) Hours As Needed for Wheezing or Shortness of Air.   • buPROPion SR (WELLBUTRIN SR) 150 MG 12 hr tablet Take 150 mg by mouth 2 (Two) Times a Day.   • Calcium Carb-Cholecalciferol (CALCIUM 1000 + D) 1000-800 MG-UNIT tablet Take 1 tablet by mouth Daily.   • gabapentin (NEURONTIN) 100 MG capsule Take 100 mg by mouth 2 (Two) Times a Day.   • nitroglycerin (NITROSTAT) 0.4 MG SL tablet Place 1 tablet under the tongue Every 5 (Five) Minutes As Needed for Chest Pain. Take no more than 3 doses in 15 minutes.   • omeprazole (priLOSEC) 20 MG capsule Take 20 mg by mouth Daily.   • ondansetron ODT (ZOFRAN-ODT) 4 MG disintegrating tablet Take 1 tablet by mouth Every 6 (Six) Hours As Needed for Nausea or Vomiting.   • potassium chloride (KAYCIEL) 20 MEQ/15ML (10%) solution Take 15 mL by mouth 3 (Three) Times a Day.   • promethazine (PHENERGAN) 25 MG tablet TAKE 1/2 TAB BY MOUTH EVERY 6 HOURS AS NEEDED FOR NAUSEA   • rOPINIRole (REQUIP) 0.25 MG tablet Take 0.25 mg by mouth Every Night.   • Sertraline HCl (ZOLOFT PO) Take 150 mg by mouth Daily.   • traMADol (ULTRAM) 50 MG tablet Take 50 mg by mouth Every 8 (Eight) Hours As Needed for Moderate Pain .     No current facility-administered medications on file prior to visit.      She is allergic to sulfa antibiotics.    family history includes Cirrhosis in her mother; Diabetes in her mother; Hypertension in her father and mother.  She  reports that she has never smoked. She has  "never used smokeless tobacco. She reports that she drinks alcohol. She reports that she does not use drugs.      Review of Systems General: negative for - fatigue or weight loss  PULM: no cough, shortness of breath, or wheezing  CV: no chest pain or dyspnea on exertion  MSK: Negative for gait disturbance, joint pain, joint swelling or muscular weakness  GI: no abdominal pain, change in bowel habits, or black or bloody stools  NEURO: no TIA or stroke symptoms; No confusion, dizziness, HA or seizures    Objective   OBJECTIVE:   /82   Pulse 65   Ht 165.1 cm (65\")   Wt 108 kg (237 lb)   SpO2 98%   BMI 39.44 kg/m²    Physical Exam GEN: Well developed, in no acute distress  HEENT: NCAT, without lesions or deformities  CV: Normal S1/S2, no murmurs or friction rubs  PULM: Normal effort, without wheezes or rhonchi  GI: Soft, non-tender & non-distended; +BSx4  Ext: Full ROM, without edema or deformities  Neuro: AxO x 3, normal gait  Psych: appropriate mood & affect    DATA REVIEWED:  Lab Results   Component Value Date    WBC 6.01 09/24/2019    HGB 13.2 09/24/2019    HCT 40.4 09/24/2019    MCV 82.4 09/24/2019     09/24/2019     Lab Results   Component Value Date    GLUCOSE 104 (H) 10/28/2019    BUN 16 10/28/2019    CREATININE 1.15 (H) 10/28/2019    EGFRIFNONA 50 (L) 10/28/2019    EGFRIFAFRI  05/16/2019      Comment:      <15 Indicative of kidney failure.    BCR 13.9 10/28/2019    K 4.8 10/28/2019    CO2 27.5 10/28/2019    CALCIUM 10.2 10/28/2019    ALBUMIN 4.40 09/27/2019    AST 28 09/27/2019    ALT 17 09/27/2019     Lab Results   Component Value Date    CHOL 229 (H) 09/27/2019    TRIG 181 (H) 09/27/2019    HDL 54 09/27/2019     (H) 09/27/2019     CMP:  Lab Results   Component Value Date    BUN 16 10/28/2019    CREATININE 1.15 (H) 10/28/2019    EGFRIFNONA 50 (L) 10/28/2019    EGFRIFAFRI  05/16/2019      Comment:      <15 Indicative of kidney failure.     10/28/2019    K 4.8 10/28/2019    CL 98 " 10/28/2019    CALCIUM 10.2 10/28/2019    ALBUMIN 4.40 09/27/2019    BILITOT 0.3 09/27/2019    ALKPHOS 58 09/27/2019    AST 28 09/27/2019    ALT 17 09/27/2019   , CBC:  Lab Results   Component Value Date    WBC 6.01 09/24/2019    RBC 4.90 09/24/2019    HGB 13.2 09/24/2019    HCT 40.4 09/24/2019    MCV 82.4 09/24/2019    MCH 26.9 09/24/2019    MCHC 32.7 09/24/2019    RDW 14.2 09/24/2019     09/24/2019   , ELECTROLYTES:  Lab Results   Component Value Date     10/28/2019    K 4.8 10/28/2019    CL 98 10/28/2019    CALCIUM 10.2 10/28/2019   , RENAL PROFILE:  Lab Results   Component Value Date    BUN 16 10/28/2019    CREATININE 1.15 (H) 10/28/2019    EGFRIFNONA 50 (L) 10/28/2019    EGFRIFAFRI  05/16/2019      Comment:      <15 Indicative of kidney failure.   , HGBA1C (LAST 3):  Lab Results   Component Value Date    HGBA1C 5.0 10/13/2015     -- Preventive Medicine Topics --   Health Maintenance Topics with due status: Overdue       Topic Date Due    ANNUAL PHYSICAL 08/11/1973    INFLUENZA VACCINE 08/01/2019   .  WEIGHT: Patient's Body mass index is 39.44 kg/m². BMI is above normal parameters. Recommendations include: educational material and nutrition counseling.    TOBACCO: Ms. Ferris  reports that she has never smoked. She has never used smokeless tobacco.  ALCOHOL: Ms. Ferris  reports that she drinks alcohol.  ILLICIT DRUGS: Ms. Ferris  reports that she does not use drugs.  Assessment/Plan   ASSESSMENT & PLAN:      Diagnosis Plan   1. Hypokalemia  Continue oral supplements, 60 meq daily  Continue amiloride  CMP today to check K levels  Comprehensive metabolic panel   2. Hyperlipidemia, unspecified hyperlipidemia type  Lipid Panel  ASCVD risk score: 1.7%   3. Healthcare maintenance  Lipid Panel    Comprehensive metabolic panel            Patient Instructions   Follow-up: Return in about 1 month (around 10/27/2019).     Future Appointments   Date Time Provider Department Center   12/16/2019  2:15 PM  Rachel Enciso MD [facility] PULM [facility] None   12/27/2019  9:30 AM Bro Hurd Jr., MD [facility] RES None      RISK SCORE: 4    ______________________________________  Bro Hurd Jr., M.D. PGY3  Family Practice Resident  [address] [place] [address]  Phone: [phone]  Fax: [phone]  ¯¯¯¯¯¯¯¯¯¯¯¯¯¯¯¯¯¯¯¯¯¯¯¯¯¯¯    This document has been electronically signed by  Bro Hrud Jr, MD on 09/27/2019 12:54 AM

## 2019-11-03 PROBLEM — R11.2 NAUSEA VOMITING AND DIARRHEA: Status: RESOLVED | Noted: 2019-09-23 | Resolved: 2019-11-03

## 2019-11-03 PROBLEM — E66.01 CLASS 3 SEVERE OBESITY DUE TO EXCESS CALORIES WITHOUT SERIOUS COMORBIDITY WITH BODY MASS INDEX (BMI) OF 40.0 TO 44.9 IN ADULT (HCC): Status: RESOLVED | Noted: 2018-10-07 | Resolved: 2019-11-03

## 2019-11-03 PROBLEM — R19.7 NAUSEA VOMITING AND DIARRHEA: Status: RESOLVED | Noted: 2019-09-23 | Resolved: 2019-11-03

## 2019-11-03 RX ORDER — OMEPRAZOLE 20 MG/1
20 CAPSULE, DELAYED RELEASE ORAL DAILY
COMMUNITY
End: 2019-11-03

## 2019-11-03 RX ORDER — DICYCLOMINE HYDROCHLORIDE 10 MG/1
10 CAPSULE ORAL 3 TIMES DAILY
Refills: 1 | COMMUNITY
End: 2020-01-20

## 2019-11-03 NOTE — PROGRESS NOTES
FAMILY MEDICINE  RESIDENCY CLINIC PROGRESS NOTE  Bro Hurd Jr, MD  Subjective   SUBJECTIVE:   CC: Hypokalemia   Maricarmen Ferris is a 49 y.o. female who presents to the [facility] today for hypokalemia.  Recently discharged from the hospital at the end of September when she was admitted for dehydration & hypokalemia with K down to 2.6.  She had improved with oral K supplements.  Continues to do well, she would like to change to the tablets. She would like her K checked again today.         I have reviewed the patient's medical, family, and social history in detail;there are no changes to the history as noted in the electronic medical record.   Concurrent Medical History:  Past Medical History:   Diagnosis Date   • Acid reflux    • Anxiety    • Depressed    • Dysphagia    • GERD (gastroesophageal reflux disease)    • Hard to intubate    • Hyperlipidemia    • Hypertension    • Hypokalemia    • IBS (irritable bowel syndrome)    • Nausea    • Sleep apnea      Past Surgical History:   Procedure Laterality Date   • LAPAROSCOPIC TUBAL LIGATION  1995   • ENDOMETRIAL ABLATION  2015   • [place] LAP,CHOLECYSTECTOMY N/A 7/24/2017    Procedure: CHOLECYSTECTOMY LAPAROSCOPIC, also umbillical hernia repair;  Surgeon: Pk العلي MD;  Location: [facility] OR;  Service: General   • PA ERCP DX COLLECTION SPECIMEN BRUSHING/WASHING Left 7/31/2017    Procedure: ENDOSCOPIC RETROGRADE CHOLANGIOPANCREATOGRAPHY;  Surgeon: Samuel Redding DO;  Location: [facility] ENDOSCOPY;  Service: Gastroenterology   • CARDIAC CATHETERIZATION N/A 8/13/2018    Procedure: Left Heart Cath 8/13/2018 @ 9;00;  Surgeon: Kuldip Frank MD;  Location: [facility] CATH INVASIVE LOCATION;  Service: Cardiology   • ENDOSCOPY N/A 11/19/2018    Procedure: ESOPHAGOGASTRODUODENOSCOPYwith dilation;  Surgeon: Bro Whitaker MD;  Location: [facility] ENDOSCOPY;  Service: Gastroenterology   • COLONOSCOPY N/A 11/19/2018    Procedure: COLONOSCOPY;  Surgeon:  Bro Whitaker MD;  Location: [facility] ENDOSCOPY;  Service: Gastroenterology   • UPPER GASTROINTESTINAL ENDOSCOPY  11/19/2018   • ABDOMINAL SURGERY     • CYSTOSCOPY     • GASTRIC SLEEVE LAPAROSCOPIC       Current Outpatient Medications on File Prior to Visit   Medication Sig   • albuterol sulfate  (90 Base) MCG/ACT inhaler Inhale 2 puffs Every 4 (Four) Hours As Needed for Wheezing or Shortness of Air.   • buPROPion SR (WELLBUTRIN SR) 150 MG 12 hr tablet Take 150 mg by mouth 2 (Two) Times a Day.   • Calcium Carb-Cholecalciferol (CALCIUM 1000 + D) 1000-800 MG-UNIT tablet Take 1 tablet by mouth Daily.   • dicyclomine (BENTYL) 10 MG capsule Take 10 mg by mouth 3 (Three) Times a Day.   • gabapentin (NEURONTIN) 100 MG capsule Take 100 mg by mouth 2 (Two) Times a Day.   • nitroglycerin (NITROSTAT) 0.4 MG SL tablet Place 1 tablet under the tongue Every 5 (Five) Minutes As Needed for Chest Pain. Take no more than 3 doses in 15 minutes.   • omeprazole (priLOSEC) 20 MG capsule Take 20 mg by mouth Daily.   • ondansetron ODT (ZOFRAN-ODT) 4 MG disintegrating tablet Take 1 tablet by mouth Every 6 (Six) Hours As Needed for Nausea or Vomiting.   • promethazine (PHENERGAN) 25 MG tablet TAKE 1/2 TAB BY MOUTH EVERY 6 HOURS AS NEEDED FOR NAUSEA   • rOPINIRole (REQUIP) 0.25 MG tablet Take 0.25 mg by mouth Every Night.   • Sertraline HCl (ZOLOFT PO) Take 150 mg by mouth Daily.   • traMADol (ULTRAM) 50 MG tablet Take 50 mg by mouth Every 8 (Eight) Hours As Needed for Moderate Pain .   • [DISCONTINUED] omeprazole (priLOSEC) 20 MG capsule Take 20 mg by mouth Daily.   • [DISCONTINUED] potassium chloride (KAYCIEL) 20 MEQ/15ML (10%) solution Take 15 mL by mouth 3 (Three) Times a Day.     No current facility-administered medications on file prior to visit.      She is allergic to sulfa antibiotics.    family history includes Cirrhosis in her mother; Diabetes in her mother; Hypertension in her father and mother.  She  reports that she has  "never smoked. She has never used smokeless tobacco. She reports that she drinks alcohol. She reports that she does not use drugs.      Review of Systems General: negative for - fatigue or weight loss  PULM: no cough, shortness of breath, or wheezing  CV: no chest pain or dyspnea on exertion  MSK: Negative for gait disturbance, joint pain, joint swelling or muscular weakness  GI: no abdominal pain, change in bowel habits, or black or bloody stools  NEURO: no TIA or stroke symptoms; No confusion, dizziness, HA or seizures    Objective   OBJECTIVE:   /80   Pulse 85   Ht 165.1 cm (65\")   Wt 102 kg (224 lb)   SpO2 98%   BMI 37.28 kg/m²    Physical Exam GEN: Well developed, in no acute distress  HEENT: NCAT, without lesions or deformities  CV: Normal S1/S2, no murmurs or friction rubs  PULM: Normal effort, without wheezes or rhonchi  GI: Soft, non-tender & non-distended; +BSx4  Ext: Full ROM, without edema or deformities  Neuro: AxO x 3, normal gait  Psych: appropriate mood & affect    DATA REVIEWED:  Lab Results   Component Value Date    WBC 6.01 09/24/2019    HGB 13.2 09/24/2019    HCT 40.4 09/24/2019    MCV 82.4 09/24/2019     09/24/2019     Lab Results   Component Value Date    GLUCOSE 104 (H) 10/28/2019    BUN 16 10/28/2019    CREATININE 1.15 (H) 10/28/2019    EGFRIFNONA 50 (L) 10/28/2019    EGFRIFAFRI  05/16/2019      Comment:      <15 Indicative of kidney failure.    BCR 13.9 10/28/2019    K 4.8 10/28/2019    CO2 27.5 10/28/2019    CALCIUM 10.2 10/28/2019    ALBUMIN 4.40 09/27/2019    AST 28 09/27/2019    ALT 17 09/27/2019     Lab Results   Component Value Date    CHOL 229 (H) 09/27/2019    TRIG 181 (H) 09/27/2019    HDL 54 09/27/2019     (H) 09/27/2019       Lab Results   Component Value Date    K 4.8 10/28/2019    K 4.0 09/27/2019    K 3.5 09/24/2019    K 3.2 (L) 09/24/2019    K 2.6 (C) 09/23/2019     -- Preventive Medicine Topics --   Health Maintenance Topics with due status: Overdue    "    Topic Date Due    ANNUAL PHYSICAL 08/11/1973    INFLUENZA VACCINE 08/01/2019   .  WEIGHT: Patient's Body mass index is 37.28 kg/m². BMI is above normal parameters. Recommendations include: educational material and nutrition counseling.    TOBACCO: Ms. Ferris  reports that she has never smoked. She has never used smokeless tobacco.  ALCOHOL: Ms. Ferris  reports that she drinks alcohol.  ILLICIT DRUGS: Ms. Ferris  reports that she does not use drugs.  Goals     None        Assessment/Plan   ASSESSMENT & PLAN:      Diagnosis Plan   1. Hypokalemia  Stable, patient's potassium has remained within normal limits since hospital discharge.  Continue to take 20 meq supplemental K tid.  potassium chloride (K-DUR,KLOR-CON) 20 MEQ CR tablet    Basic Metabolic Panel          Patient Instructions   Follow-up: Return in about 2 months (around 12/28/2019).     Future Appointments   Date Time Provider Department Center   12/16/2019  2:15 PM Rachel Enciso MD [facility] None   12/27/2019  9:30 AM Bro Hurd Jr., MD [facility]  RES None      RISK SCORE: 3    ______________________________________  Bro Hurd Jr., M.D. PGY3  Family Practice Resident  [address] [place] [address]  Phone: [phone]  Fax: [phone]  ¯¯¯¯¯¯¯¯¯¯¯¯¯¯¯¯¯¯¯¯¯¯¯¯¯¯¯    This document has been electronically signed by  Bro Hurd Jr, MD on 10/28/2019 2:31 PM

## 2019-11-03 NOTE — PATIENT INSTRUCTIONS
Hypokalemia  Hypokalemia means that the amount of potassium in the blood is lower than normal. Potassium is a chemical that helps regulate the amount of fluid in the body (electrolyte). It also stimulates muscle tightening (contraction) and helps nerves work properly. Normally, most of the body’s potassium is inside of cells, and only a very small amount is in the blood. Because the amount in the blood is so small, minor changes to potassium levels in the blood can be life-threatening.  What are the causes?  This condition may be caused by:  · Antibiotic medicine.  · Diarrhea or vomiting. Taking too much of a medicine that helps you have a bowel movement (laxative) can cause diarrhea and lead to hypokalemia.  · Chronic kidney disease (CKD).  · Medicines that help the body get rid of excess fluid (diuretics).  · Eating disorders, such as bulimia.  · Low magnesium levels in the body.  · Sweating a lot.  What are the signs or symptoms?  Symptoms of this condition include:  · Weakness.  · Constipation.  · Fatigue.  · Muscle cramps.  · Mental confusion.  · Skipped heartbeats or irregular heartbeat (palpitations).  · Tingling or numbness.  How is this diagnosed?  This condition is diagnosed with a blood test.  How is this treated?  Hypokalemia can be treated by taking potassium supplements by mouth or adjusting the medicines that you take. Treatment may also include eating more foods that contain a lot of potassium. If your potassium level is very low, you may need to get potassium through an IV tube in one of your veins and be monitored in the hospital.  Follow these instructions at home:    · Take over-the-counter and prescription medicines only as told by your health care provider. This includes vitamins and supplements.  · Eat a healthy diet. A healthy diet includes fresh fruits and vegetables, whole grains, healthy fats, and lean proteins.  · If instructed, eat more foods that contain a lot of potassium, such  as:  ? Nuts, such as peanuts and pistachios.  ? Seeds, such as sunflower seeds and pumpkin seeds.  ? Peas, lentils, and lima beans.  ? Whole grain and bran cereals and breads.  ? Fresh fruits and vegetables, such as apricots, avocado, bananas, cantaloupe, kiwi, oranges, tomatoes, asparagus, and potatoes.  ? Orange juice.  ? Tomato juice.  ? Red meats.  ? Yogurt.  · Keep all follow-up visits as told by your health care provider. This is important.  Contact a health care provider if:  · You have weakness that gets worse.  · You feel your heart pounding or racing.  · You vomit.  · You have diarrhea.  · You have diabetes (diabetes mellitus) and you have trouble keeping your blood sugar (glucose) in your target range.  Get help right away if:  · You have chest pain.  · You have shortness of breath.  · You have vomiting or diarrhea that lasts for more than 2 days.  · You faint.  This information is not intended to replace advice given to you by your health care provider. Make sure you discuss any questions you have with your health care provider.  Document Released: 12/18/2006 Document Revised: 08/05/2017 Document Reviewed: 08/05/2017  [employer] Patient Education © 2019 [employer]

## 2019-11-04 NOTE — PROGRESS NOTES
I have seen the patient.  I have reviewed the notes, assessments, and/or procedures performed by Bro Hurd Jr., MD, I concur with her/his documentation and assessment and plan for Maricarmen Ferris.               This document has been electronically signed by Angel Carreon MD on November 4, 2019 8:40 AM

## 2020-01-03 ENCOUNTER — OFFICE VISIT (OUTPATIENT)
Dept: FAMILY MEDICINE CLINIC | Facility: CLINIC | Age: 50
End: 2020-01-03

## 2020-01-03 ENCOUNTER — TRANSCRIBE ORDERS (OUTPATIENT)
Dept: LAB | Facility: HOSPITAL | Age: 50
End: 2020-01-03

## 2020-01-03 ENCOUNTER — LAB (OUTPATIENT)
Dept: LAB | Facility: HOSPITAL | Age: 50
End: 2020-01-03

## 2020-01-03 VITALS
HEART RATE: 88 BPM | SYSTOLIC BLOOD PRESSURE: 126 MMHG | HEIGHT: 65 IN | OXYGEN SATURATION: 98 % | DIASTOLIC BLOOD PRESSURE: 78 MMHG | WEIGHT: 208 LBS | BODY MASS INDEX: 34.66 KG/M2

## 2020-01-03 DIAGNOSIS — E66.01 MORBID OBESITY (HCC): ICD-10-CM

## 2020-01-03 DIAGNOSIS — E83.42 HYPOMAGNESEMIA: Primary | ICD-10-CM

## 2020-01-03 DIAGNOSIS — E87.6 HYPOKALEMIA: ICD-10-CM

## 2020-01-03 DIAGNOSIS — I10 ESSENTIAL (PRIMARY) HYPERTENSION: ICD-10-CM

## 2020-01-03 DIAGNOSIS — E83.42 HYPOMAGNESEMIA: ICD-10-CM

## 2020-01-03 DIAGNOSIS — E78.5 DYSLIPIDEMIA: ICD-10-CM

## 2020-01-03 DIAGNOSIS — E87.20 METABOLIC ACIDOSIS: ICD-10-CM

## 2020-01-03 DIAGNOSIS — N17.9 ACUTE NONTRAUMATIC KIDNEY INJURY (HCC): ICD-10-CM

## 2020-01-03 DIAGNOSIS — I10 ESSENTIAL HYPERTENSION: ICD-10-CM

## 2020-01-03 DIAGNOSIS — M54.9 CHRONIC BACK PAIN, UNSPECIFIED BACK LOCATION, UNSPECIFIED BACK PAIN LATERALITY: Primary | ICD-10-CM

## 2020-01-03 DIAGNOSIS — G89.29 CHRONIC BACK PAIN, UNSPECIFIED BACK LOCATION, UNSPECIFIED BACK PAIN LATERALITY: Primary | ICD-10-CM

## 2020-01-03 LAB
ALBUMIN SERPL-MCNC: 4 G/DL (ref 3.5–5.2)
ANION GAP SERPL CALCULATED.3IONS-SCNC: 13.5 MMOL/L (ref 5–15)
BUN BLD-MCNC: 11 MG/DL (ref 6–20)
BUN/CREAT SERPL: 11 (ref 7–25)
CALCIUM SPEC-SCNC: 9.7 MG/DL (ref 8.6–10.5)
CHLORIDE SERPL-SCNC: 97 MMOL/L (ref 98–107)
CO2 SERPL-SCNC: 27.5 MMOL/L (ref 22–29)
CREAT BLD-MCNC: 1 MG/DL (ref 0.57–1)
CREAT UR-MCNC: 67.8 MG/DL
GFR SERPL CREATININE-BSD FRML MDRD: 59 ML/MIN/1.73
GLUCOSE BLD-MCNC: 93 MG/DL (ref 65–99)
MAGNESIUM SERPL-MCNC: 2 MG/DL (ref 1.6–2.6)
PHOSPHATE SERPL-MCNC: 3.6 MG/DL (ref 2.5–4.5)
POTASSIUM BLD-SCNC: 3.7 MMOL/L (ref 3.5–5.2)
PROT UR-MCNC: 8 MG/DL
PROT/CREAT UR: 118 MG/G CREA (ref 0–200)
SODIUM BLD-SCNC: 138 MMOL/L (ref 136–145)

## 2020-01-03 PROCEDURE — 99213 OFFICE O/P EST LOW 20 MIN: CPT | Performed by: STUDENT IN AN ORGANIZED HEALTH CARE EDUCATION/TRAINING PROGRAM

## 2020-01-03 PROCEDURE — 83735 ASSAY OF MAGNESIUM: CPT

## 2020-01-03 PROCEDURE — 82570 ASSAY OF URINE CREATININE: CPT

## 2020-01-03 PROCEDURE — 80069 RENAL FUNCTION PANEL: CPT

## 2020-01-03 PROCEDURE — 84156 ASSAY OF PROTEIN URINE: CPT

## 2020-01-03 PROCEDURE — 36415 COLL VENOUS BLD VENIPUNCTURE: CPT

## 2020-01-03 RX ORDER — GUAIFENESIN 600 MG/1
600 TABLET, EXTENDED RELEASE ORAL 2 TIMES DAILY
Qty: 60 TABLET | Refills: 0 | Status: SHIPPED | OUTPATIENT
Start: 2020-01-03 | End: 2020-04-06 | Stop reason: SDUPTHER

## 2020-01-03 RX ORDER — FLUTICASONE PROPIONATE 50 MCG
2 SPRAY, SUSPENSION (ML) NASAL DAILY
Qty: 16 G | Refills: 1 | Status: SHIPPED | OUTPATIENT
Start: 2020-01-03 | End: 2020-05-20 | Stop reason: SDUPTHER

## 2020-01-03 NOTE — PROGRESS NOTES
Family Medicine Residency   Aissatou Cardona MD    Maricarmen Ferris is a 49 y.o. female who presents to family medicine residency clinic for the following:    Subjective:     She takes Wellbutrin SR and Zoloft for her depression. It is well controlled.     She had gastric sleeve done 7 months ago. Dr. Sunshine did the surgery. She has lost 107 pounds. She has a history of hypokalemia due to absorption issues. She takes 20meq TID. Last time it was checked was normal. She is having labs done for Dr. Cota today.    She has a history of hypertension. She had previously been taking Lisinopril 20mg. She has no longer requires the medication. Today BP is 126/78. She states that she checks her BP at home, and it has been running higher, up to 160/110. It has not been lower than SBP of 120-130s. She has been having headaches with the high BP.     She has chronic back pain. She has seen pain management in the past. She has taken Tramadol. She needs a new referral.     Her neurologist prescribes gabapentin that she takes PRN.     She also complains of cough, congestion that has been going on a few days.     Past Medical Hx:   Past Medical History:   Diagnosis Date   • Acid reflux    • Anxiety    • Depressed    • Dysphagia    • GERD (gastroesophageal reflux disease)    • Hard to intubate    • Hyperlipidemia    • Hypertension    • Hypokalemia    • IBS (irritable bowel syndrome)    • Nausea    • Sleep apnea        Past Surgical Hx:  Past Surgical History:   Procedure Laterality Date   • ABDOMINAL SURGERY     • CARDIAC CATHETERIZATION N/A 8/13/2018    Procedure: Left Heart Cath 8/13/2018 @ 9;00;  Surgeon: Kuldip Frank MD;  Location: [facility] CATH INVASIVE LOCATION;  Service: Cardiology   • COLONOSCOPY N/A 11/19/2018    Procedure: COLONOSCOPY;  Surgeon: Bro Whitaker MD;  Location: [facility] ENDOSCOPY;  Service: Gastroenterology   • CYSTOSCOPY     • ENDOMETRIAL ABLATION  2015   • ENDOSCOPY N/A 11/19/2018     Procedure: ESOPHAGOGASTRODUODENOSCOPYwith dilation;  Surgeon: Bro Whitaker MD;  Location: [facility] ENDOSCOPY;  Service: Gastroenterology   • GASTRIC SLEEVE LAPAROSCOPIC     • LAPAROSCOPIC TUBAL LIGATION  1995   • WI ERCP DX COLLECTION SPECIMEN BRUSHING/WASHING Left 7/31/2017    Procedure: ENDOSCOPIC RETROGRADE CHOLANGIOPANCREATOGRAPHY;  Surgeon: Samuel Redding DO;  Location: [facility] ENDOSCOPY;  Service: Gastroenterology   • WI LAP,CHOLECYSTECTOMY N/A 7/24/2017    Procedure: CHOLECYSTECTOMY LAPAROSCOPIC, also umbillical hernia repair;  Surgeon: Pk العلي MD;  Location: [facility] OR;  Service: General   • UPPER GASTROINTESTINAL ENDOSCOPY  11/19/2018       Current Meds:    Current Outpatient Medications:   •  albuterol sulfate  (90 Base) MCG/ACT inhaler, Inhale 2 puffs Every 4 (Four) Hours As Needed for Wheezing or Shortness of Air., Disp: 18 g, Rfl: 6  •  buPROPion SR (WELLBUTRIN SR) 150 MG 12 hr tablet, Take 150 mg by mouth 2 (Two) Times a Day., Disp: , Rfl: 5  •  Calcium Carb-Cholecalciferol (CALCIUM 1000 + D) 1000-800 MG-UNIT tablet, Take 1 tablet by mouth Daily., Disp: , Rfl:   •  dicyclomine (BENTYL) 10 MG capsule, Take 10 mg by mouth 3 (Three) Times a Day., Disp: , Rfl: 1  •  gabapentin (NEURONTIN) 100 MG capsule, Take 100 mg by mouth 2 (Two) Times a Day., Disp: , Rfl:   •  nitroglycerin (NITROSTAT) 0.4 MG SL tablet, Place 1 tablet under the tongue Every 5 (Five) Minutes As Needed for Chest Pain. Take no more than 3 doses in 15 minutes., Disp: 30 tablet, Rfl: 12  •  omeprazole (priLOSEC) 20 MG capsule, Take 20 mg by mouth Daily., Disp: , Rfl: 5  •  ondansetron ODT (ZOFRAN-ODT) 4 MG disintegrating tablet, Take 1 tablet by mouth Every 6 (Six) Hours As Needed for Nausea or Vomiting., Disp: 10 tablet, Rfl: 0  •  potassium chloride (K-DUR,KLOR-CON) 20 MEQ CR tablet, Take 1 tablet by mouth 3 (Three) Times a Day., Disp: 90 tablet, Rfl: 11  •  promethazine (PHENERGAN) 25 MG tablet, TAKE 1/2 TAB BY MOUTH EVERY  6 HOURS AS NEEDED FOR NAUSEA, Disp: , Rfl: 0  •  rOPINIRole (REQUIP) 0.25 MG tablet, Take 0.25 mg by mouth Every Night., Disp: , Rfl:   •  Sertraline HCl (ZOLOFT PO), Take 150 mg by mouth Daily., Disp: , Rfl:   •  traMADol (ULTRAM) 50 MG tablet, Take 50 mg by mouth Every 8 (Eight) Hours As Needed for Moderate Pain ., Disp: , Rfl:   •  fluticasone (FLONASE) 50 MCG/ACT nasal spray, 2 sprays into the nostril(s) as directed by provider Daily., Disp: 16 g, Rfl: 1  •  guaiFENesin (MUCINEX) 600 MG 12 hr tablet, Take 1 tablet by mouth 2 (Two) Times a Day., Disp: 60 tablet, Rfl: 0    Allergies:  Sulfa antibiotics    Family Hx:  Family History   Problem Relation Age of Onset   • Diabetes Mother    • Hypertension Mother    • Cirrhosis Mother    • Hypertension Father         Social History:  Social History     Socioeconomic History   • Marital status:      Spouse name: Not on file   • Number of children: Not on file   • Years of education: Not on file   • Highest education level: Not on file   Tobacco Use   • Smoking status: Never Smoker   • Smokeless tobacco: Never Used   Substance and Sexual Activity   • Alcohol use: Yes     Comment: socially   • Drug use: No   • Sexual activity: Defer     Birth control/protection: Surgical       Review of Systems  Review of Systems   Constitutional: Negative for chills, diaphoresis and fever.   HENT: Positive for congestion. Negative for sneezing and sore throat.    Eyes: Negative for pain and discharge.   Respiratory: Positive for cough. Negative for shortness of breath.    Gastrointestinal: Negative for constipation, diarrhea, nausea and vomiting.   Endocrine: Negative for cold intolerance and heat intolerance.   Genitourinary: Negative for difficulty urinating, dysuria, frequency and urgency.   Musculoskeletal: Negative for arthralgias and myalgias.   Skin: Negative for color change and pallor.   Allergic/Immunologic: Negative for environmental allergies and food allergies.      Neurological: Positive for headaches. Negative for dizziness, syncope and weakness.   Psychiatric/Behavioral: Negative for confusion and sleep disturbance.       Physical Exam:  Vitals:    01/03/20 1048   BP: 126/78   Pulse: 88   SpO2: 98%       Body mass index is 34.61 kg/m².   Physical Exam   Constitutional: She is oriented to person, place, and time. She appears well-developed and well-nourished. No distress.   HENT:   Head: Normocephalic and atraumatic.   Nose: Nose normal.   Eyes: Conjunctivae and EOM are normal.   Neck: Normal range of motion. Neck supple.   Cardiovascular: Normal rate, regular rhythm and normal heart sounds.   No murmur heard.  Pulmonary/Chest: Effort normal and breath sounds normal. No respiratory distress. She has no wheezes. She has no rales.   Abdominal: Soft. Bowel sounds are normal. She exhibits no distension. There is no tenderness. There is no guarding.   Musculoskeletal: Normal range of motion.   Neurological: She is alert and oriented to person, place, and time.   Skin: Skin is warm and dry.   Psychiatric: She has a normal mood and affect. Her behavior is normal.   Vitals reviewed.      Objective:     Data Reviewed:     LABS:   Lab Results   Component Value Date    GLUCOSE 104 (H) 10/28/2019    BUN 16 10/28/2019    CREATININE 1.15 (H) 10/28/2019    EGFRIFNONA 50 (L) 10/28/2019    EGFRIFAFRI  05/16/2019      Comment:      <15 Indicative of kidney failure.    BCR 13.9 10/28/2019    K 4.8 10/28/2019    CO2 27.5 10/28/2019    CALCIUM 10.2 10/28/2019    ALBUMIN 4.40 09/27/2019    AST 28 09/27/2019    ALT 17 09/27/2019     Lab Results   Component Value Date    WBC 6.01 09/24/2019    HGB 13.2 09/24/2019    HCT 40.4 09/24/2019    MCV 82.4 09/24/2019     09/24/2019     Lab Results   Component Value Date    CHOL 229 (H) 09/27/2019    TRIG 181 (H) 09/27/2019    HDL 54 09/27/2019     (H) 09/27/2019     Lab Results   Component Value Date    TSH 1.620 07/27/2019     Lab Results    Component Value Date    HGBA1C 5.0 10/13/2015     Lab Results   Component Value Date    CREATININE 1.15 (H) 10/28/2019     No results found for: IRON, TIBC, FERRITIN      Health Maintenance:   Weight  -Class: Obese Class I: 30-34.9kg/m2  -Patient's Body mass index is 34.61 kg/m². BMI is above normal parameters. Recommendations include: exercise counseling and nutrition counseling.      Alcohol use:  reports that she drinks alcohol.    Nicotine status  reports that she has never smoked. She has never used smokeless tobacco.   Maricarmen Ferris  reports that she has never smoked. She has never used smokeless tobacco.       Health Maintenance  Health Maintenance   Topic Date Due   • ANNUAL PHYSICAL  08/11/1973   • PNEUMOCOCCAL VACCINE (19-64 HIGHEST RISK) (1 of 3 - PCV13) 08/11/1989   • INFLUENZA VACCINE  08/01/2019   • LIPID PANEL  09/27/2020   • PAP SMEAR  07/25/2021   • COLONOSCOPY  11/19/2023   • TDAP/TD VACCINES (2 - Td) 06/06/2027        Goals:   Goals    None           Assessment/Plan:       Assessment/Plan   Maricarmen was seen today for hypokalemia.    Diagnoses and all orders for this visit:    Chronic back pain, unspecified back location, unspecified back pain laterality  -     Ambulatory Referral to Pain Management    Essential hypertension    Hypokalemia    Other orders  -     fluticasone (FLONASE) 50 MCG/ACT nasal spray; 2 sprays into the nostril(s) as directed by provider Daily.  -     guaiFENesin (MUCINEX) 600 MG 12 hr tablet; Take 1 tablet by mouth 2 (Two) Times a Day.      She has an appointment with Dr. Cota on Monday. She can discuss with him if he would like to start lisinopril in setting of her kidney disease. She was normotensive today. BP is 126/78. I told her to bring home home cuff to the appointment to make sure it is accurate.     Will give Flonase and Mucinex for cough and congestion.     Depression is controlled with Wellbutrin SR and Zoloft.     Hypokalemia- I asked for her to send  the labs to this office as well so we can monitor.      I will refer to pain management for her back pain. I will not refill Tramadol.           FOLLOW-UP  Return in about 3 months (around 4/3/2020) for Recheck.      RISK SCORE: 3        This document has been electronically signed by Aissatou Cardona MD on January 3, 2020 2:32 PM

## 2020-01-06 NOTE — PROGRESS NOTES
I have seen the patient.  I have reviewed the notes, assessments, and/or procedures performed by dr Cardona, I concur with her/his documentation and assessment and plan for Maricarmen Ferris.               This document has been electronically signed by Angel Carreon MD on January 6, 2020 9:44 AM

## 2020-01-09 ENCOUNTER — TELEPHONE (OUTPATIENT)
Dept: PULMONOLOGY | Facility: CLINIC | Age: 50
End: 2020-01-09

## 2020-01-22 PROBLEM — E66.09 CLASS 1 OBESITY DUE TO EXCESS CALORIES WITH SERIOUS COMORBIDITY AND BODY MASS INDEX (BMI) OF 34.0 TO 34.9 IN ADULT: Status: ACTIVE | Noted: 2018-10-07

## 2020-02-04 ENCOUNTER — TELEPHONE (OUTPATIENT)
Dept: PULMONOLOGY | Facility: CLINIC | Age: 50
End: 2020-02-04

## 2020-02-28 ENCOUNTER — APPOINTMENT (OUTPATIENT)
Dept: LAB | Facility: HOSPITAL | Age: 50
End: 2020-02-28

## 2020-02-28 ENCOUNTER — OFFICE VISIT (OUTPATIENT)
Dept: FAMILY MEDICINE CLINIC | Facility: CLINIC | Age: 50
End: 2020-02-28

## 2020-02-28 VITALS
SYSTOLIC BLOOD PRESSURE: 136 MMHG | HEIGHT: 65 IN | WEIGHT: 209.2 LBS | TEMPERATURE: 98.2 F | DIASTOLIC BLOOD PRESSURE: 82 MMHG | BODY MASS INDEX: 34.85 KG/M2 | HEART RATE: 80 BPM | OXYGEN SATURATION: 99 %

## 2020-02-28 DIAGNOSIS — Z98.84 GASTRIC BYPASS STATUS FOR OBESITY: Primary | ICD-10-CM

## 2020-02-28 PROCEDURE — 99212 OFFICE O/P EST SF 10 MIN: CPT | Performed by: STUDENT IN AN ORGANIZED HEALTH CARE EDUCATION/TRAINING PROGRAM

## 2020-02-28 PROCEDURE — 85025 COMPLETE CBC W/AUTO DIFF WBC: CPT | Performed by: STUDENT IN AN ORGANIZED HEALTH CARE EDUCATION/TRAINING PROGRAM

## 2020-02-28 PROCEDURE — 80053 COMPREHEN METABOLIC PANEL: CPT | Performed by: STUDENT IN AN ORGANIZED HEALTH CARE EDUCATION/TRAINING PROGRAM

## 2020-02-28 PROCEDURE — 36415 COLL VENOUS BLD VENIPUNCTURE: CPT | Performed by: STUDENT IN AN ORGANIZED HEALTH CARE EDUCATION/TRAINING PROGRAM

## 2020-02-28 RX ORDER — BUPROPION HYDROCHLORIDE 150 MG/1
150 TABLET, EXTENDED RELEASE ORAL 2 TIMES DAILY
Qty: 60 TABLET | Refills: 5 | Status: SHIPPED | OUTPATIENT
Start: 2020-02-28 | End: 2020-10-16

## 2020-02-28 RX ORDER — POTASSIUM CHLORIDE 20MEQ/15ML
LIQUID (ML) ORAL
COMMUNITY
Start: 2020-01-20 | End: 2020-04-02 | Stop reason: SDUPTHER

## 2020-02-28 RX ORDER — FUROSEMIDE 40 MG/1
40 TABLET ORAL AS NEEDED
Qty: 30 TABLET | Refills: 3 | Status: SHIPPED | OUTPATIENT
Start: 2020-02-28 | End: 2020-06-22 | Stop reason: SDUPTHER

## 2020-02-29 LAB
ALBUMIN SERPL-MCNC: 4 G/DL (ref 3.5–5.2)
ALBUMIN/GLOB SERPL: 1.5 G/DL
ALP SERPL-CCNC: 60 U/L (ref 39–117)
ALT SERPL W P-5'-P-CCNC: 11 U/L (ref 1–33)
ANION GAP SERPL CALCULATED.3IONS-SCNC: 11.7 MMOL/L (ref 5–15)
AST SERPL-CCNC: 18 U/L (ref 1–32)
BASOPHILS # BLD AUTO: 0.02 10*3/MM3 (ref 0–0.2)
BASOPHILS NFR BLD AUTO: 0.3 % (ref 0–1.5)
BILIRUB SERPL-MCNC: 0.2 MG/DL (ref 0.2–1.2)
BUN BLD-MCNC: 12 MG/DL (ref 6–20)
BUN/CREAT SERPL: 13.8 (ref 7–25)
CALCIUM SPEC-SCNC: 9.3 MG/DL (ref 8.6–10.5)
CHLORIDE SERPL-SCNC: 104 MMOL/L (ref 98–107)
CO2 SERPL-SCNC: 26.3 MMOL/L (ref 22–29)
CREAT BLD-MCNC: 0.87 MG/DL (ref 0.57–1)
DEPRECATED RDW RBC AUTO: 44.8 FL (ref 37–54)
EOSINOPHIL # BLD AUTO: 0.21 10*3/MM3 (ref 0–0.4)
EOSINOPHIL NFR BLD AUTO: 2.7 % (ref 0.3–6.2)
ERYTHROCYTE [DISTWIDTH] IN BLOOD BY AUTOMATED COUNT: 14.8 % (ref 12.3–15.4)
GFR SERPL CREATININE-BSD FRML MDRD: 69 ML/MIN/1.73
GLOBULIN UR ELPH-MCNC: 2.7 GM/DL
GLUCOSE BLD-MCNC: 71 MG/DL (ref 65–99)
HCT VFR BLD AUTO: 42 % (ref 34–46.6)
HGB BLD-MCNC: 13.8 G/DL (ref 12–15.9)
IMM GRANULOCYTES # BLD AUTO: 0.02 10*3/MM3 (ref 0–0.05)
IMM GRANULOCYTES NFR BLD AUTO: 0.3 % (ref 0–0.5)
LYMPHOCYTES # BLD AUTO: 3.13 10*3/MM3 (ref 0.7–3.1)
LYMPHOCYTES NFR BLD AUTO: 40 % (ref 19.6–45.3)
MCH RBC QN AUTO: 27.1 PG (ref 26.6–33)
MCHC RBC AUTO-ENTMCNC: 32.9 G/DL (ref 31.5–35.7)
MCV RBC AUTO: 82.5 FL (ref 79–97)
MONOCYTES # BLD AUTO: 0.61 10*3/MM3 (ref 0.1–0.9)
MONOCYTES NFR BLD AUTO: 7.8 % (ref 5–12)
NEUTROPHILS # BLD AUTO: 3.84 10*3/MM3 (ref 1.7–7)
NEUTROPHILS NFR BLD AUTO: 48.9 % (ref 42.7–76)
NRBC BLD AUTO-RTO: 0 /100 WBC (ref 0–0.2)
PLATELET # BLD AUTO: 325 10*3/MM3 (ref 140–450)
PMV BLD AUTO: 10.7 FL (ref 6–12)
POTASSIUM BLD-SCNC: 3.9 MMOL/L (ref 3.5–5.2)
PROT SERPL-MCNC: 6.7 G/DL (ref 6–8.5)
RBC # BLD AUTO: 5.09 10*6/MM3 (ref 3.77–5.28)
SODIUM BLD-SCNC: 142 MMOL/L (ref 136–145)
WBC NRBC COR # BLD: 7.83 10*3/MM3 (ref 3.4–10.8)

## 2020-03-06 NOTE — PROGRESS NOTES
I have seen the patient.  I have reviewed the notes, assessments, and/or procedures performed by **Dr Radha Nam* during office visit I concur with her/his documentation and assessment and plan for Maricarmen Lauramasha Ferris.              This document has been electronically signed by Unruly Horvath MD on March 6, 2020 4:08 PM

## 2020-03-06 NOTE — PROGRESS NOTES
ID: Maricarmen Ferris    CC:   Chief Complaint   Patient presents with   • Weight Loss     check up on potassium levels since weight loss surgery       Subjective:     HPI     Maricarmen Ferris is a 49 y.o. female who presents for lab check. Patient states since her gastric sleeve patient, she has to come monthly for her potassium check. Patient has not had it checked in a while. Patient states she has lost over 30 pounds. She states she has been eating healthy and exercising. Patient has no muscle weakness, chest pain, shortness of breath.     Preventative:  Over the past 2 weeks, have you felt down, depressed, or hopeless? no  Over the past 2 weeks, have you felt little interest or pleasure in doing things? no  Clinical depression screening refused by patient no    On osteoporosis therapy? no    Past Medical Hx:  Past Medical History:   Diagnosis Date   • Acid reflux    • Anxiety    • Depressed    • Dysphagia    • GERD (gastroesophageal reflux disease)    • Hard to intubate    • Hyperlipidemia    • Hypertension    • Hypokalemia    • IBS (irritable bowel syndrome)    • Nausea    • Sleep apnea        Past Surgical Hx:  Past Surgical History:   Procedure Laterality Date   • ABDOMINAL SURGERY     • CARDIAC CATHETERIZATION N/A 8/13/2018    Procedure: Left Heart Cath 8/13/2018 @ 9;00;  Surgeon: Kuldip Frank MD;  Location: [facility] CATH INVASIVE LOCATION;  Service: Cardiology   • COLONOSCOPY N/A 11/19/2018    Procedure: COLONOSCOPY;  Surgeon: Bro Whitaker MD;  Location: [facility] ENDOSCOPY;  Service: Gastroenterology   • CYSTOSCOPY     • ENDOMETRIAL ABLATION  2015   • ENDOSCOPY N/A 11/19/2018    Procedure: ESOPHAGOGASTRODUODENOSCOPYwith dilation;  Surgeon: Bro Whitaker MD;  Location: [facility] ENDOSCOPY;  Service: Gastroenterology   • GASTRIC SLEEVE LAPAROSCOPIC     • LAPAROSCOPIC TUBAL LIGATION  1995   • [place] ERCP DX COLLECTION SPECIMEN BRUSHING/WASHING Left 7/31/2017    Procedure: ENDOSCOPIC RETROGRADE  CHOLANGIOPANCREATOGRAPHY;  Surgeon: Samuel Redding DO;  Location: [facility] ENDOSCOPY;  Service: Gastroenterology   • [place] LAP,CHOLECYSTECTOMY N/A 7/24/2017    Procedure: CHOLECYSTECTOMY LAPAROSCOPIC, also umbillical hernia repair;  Surgeon: Pk العلي MD;  Location: [facility] OR;  Service: General   • UPPER GASTROINTESTINAL ENDOSCOPY  11/19/2018       Health Maintenance:  Health Maintenance   Topic Date Due   • ANNUAL PHYSICAL  08/11/1973   • PNEUMOCOCCAL VACCINE (19-64 MEDIUM RISK) (1 of 1 - PPSV23) 08/11/1989   • INFLUENZA VACCINE  08/01/2019   • LIPID PANEL  09/27/2020   • PAP SMEAR  07/25/2021   • COLONOSCOPY  11/19/2023   • TDAP/TD VACCINES (3 - Td) 06/06/2027       Current Meds:    Current Outpatient Medications:   •  albuterol sulfate  (90 Base) MCG/ACT inhaler, Inhale 2 puffs Every 4 (Four) Hours As Needed for Wheezing or Shortness of Air., Disp: 18 g, Rfl: 6  •  aMILoride (MIDAMOR) 5 MG tablet, Take 10 mg by mouth Daily., Disp: , Rfl:   •  buPROPion SR (WELLBUTRIN SR) 150 MG 12 hr tablet, Take 1 tablet by mouth 2 (Two) Times a Day., Disp: 60 tablet, Rfl: 5  •  Calcium Carb-Cholecalciferol (CALCIUM 1000 + D) 1000-800 MG-UNIT tablet, Take 1 tablet by mouth Daily., Disp: , Rfl:   •  fluticasone (FLONASE) 50 MCG/ACT nasal spray, 2 sprays into the nostril(s) as directed by provider Daily., Disp: 16 g, Rfl: 1  •  furosemide (LASIX) 40 MG tablet, Take 1 tablet by mouth As Needed (swelling)., Disp: 30 tablet, Rfl: 3  •  gabapentin (NEURONTIN) 100 MG capsule, Take 100 mg by mouth 2 (Two) Times a Day., Disp: , Rfl:   •  guaiFENesin (MUCINEX) 600 MG 12 hr tablet, Take 1 tablet by mouth 2 (Two) Times a Day., Disp: 60 tablet, Rfl: 0  •  lisinopril (PRINIVIL,ZESTRIL) 5 MG tablet, Take 5 mg by mouth Daily., Disp: , Rfl:   •  loratadine (CLARITIN) 10 MG tablet, Take 1 tablet by mouth Daily., Disp: 30 tablet, Rfl: 0  •  nitroglycerin (NITROSTAT) 0.4 MG SL tablet, Place 1 tablet under the tongue Every 5 (Five) Minutes  As Needed for Chest Pain. Take no more than 3 doses in 15 minutes., Disp: 30 tablet, Rfl: 12  •  omeprazole (priLOSEC) 20 MG capsule, Take 20 mg by mouth Daily., Disp: , Rfl: 5  •  ondansetron ODT (ZOFRAN-ODT) 4 MG disintegrating tablet, Take 1 tablet by mouth Every 6 (Six) Hours As Needed for Nausea or Vomiting., Disp: 10 tablet, Rfl: 0  •  potassium chloride (K-DUR,KLOR-CON) 20 MEQ CR tablet, Take 1 tablet by mouth 3 (Three) Times a Day., Disp: 90 tablet, Rfl: 11  •  potassium chloride (KAYCIEL) 20 MEQ/15ML (10%) solution, , Disp: , Rfl:   •  promethazine (PHENERGAN) 25 MG tablet, TAKE 1/2 TAB BY MOUTH EVERY 6 HOURS AS NEEDED FOR NAUSEA, Disp: , Rfl: 0  •  rOPINIRole (REQUIP) 0.25 MG tablet, Take 0.25 mg by mouth Every Night., Disp: , Rfl:   •  sertraline (ZOLOFT) 50 MG tablet, , Disp: , Rfl:   •  traMADol (ULTRAM) 50 MG tablet, Take 50 mg by mouth Every 8 (Eight) Hours As Needed for Moderate Pain ., Disp: , Rfl:     Allergies:  Sulfa antibiotics    Family Hx:  Family History   Problem Relation Age of Onset   • Diabetes Mother    • Hypertension Mother    • Cirrhosis Mother    • Hypertension Father         Social History:  Social History     Socioeconomic History   • Marital status:      Spouse name: Not on file   • Number of children: Not on file   • Years of education: Not on file   • Highest education level: Not on file   Tobacco Use   • Smoking status: Never Smoker   • Smokeless tobacco: Never Used   Substance and Sexual Activity   • Alcohol use: Yes     Comment: socially   • Drug use: No   • Sexual activity: Defer     Birth control/protection: Surgical       Review of Systems   Constitutional: Negative for activity change, appetite change, chills, fatigue and fever.   HENT: Negative for drooling, ear discharge, ear pain, facial swelling, hearing loss, mouth sores, rhinorrhea and sinus pain.    Eyes: Negative for pain, redness and itching.   Respiratory: Negative for cough, choking, chest tightness,  "shortness of breath and stridor.    Cardiovascular: Negative for chest pain, palpitations and leg swelling.   Gastrointestinal: Negative for abdominal distention, abdominal pain, anal bleeding, blood in stool, constipation, diarrhea and nausea.   Endocrine: Negative for heat intolerance, polydipsia and polyphagia.   Genitourinary: Negative for dysuria, flank pain, frequency and genital sores.   Musculoskeletal: Negative for back pain, gait problem, joint swelling and myalgias.   Skin: Negative for pallor and rash.   Neurological: Negative for seizures, facial asymmetry, speech difficulty, light-headedness, numbness and headaches.   Hematological: Negative for adenopathy. Does not bruise/bleed easily.   Psychiatric/Behavioral: Negative for confusion, decreased concentration, dysphoric mood and hallucinations. The patient is not nervous/anxious and is not hyperactive.            Objective:     /82   Pulse 80   Temp 98.2 °F (36.8 °C) (Temporal)   Ht 165.1 cm (65\")   Wt 94.9 kg (209 lb 3.2 oz)   SpO2 99%   BMI 34.81 kg/m²     Physical Exam   Constitutional: She is oriented to person, place, and time. She appears well-developed and well-nourished.   HENT:   Head: Normocephalic and atraumatic.   Right Ear: External ear normal.   Left Ear: External ear normal.   Nose: Nose normal.   Mouth/Throat: Oropharynx is clear and moist.   Eyes: Pupils are equal, round, and reactive to light. Conjunctivae and EOM are normal.   Neck: Normal range of motion. Neck supple.   Cardiovascular: Normal rate, regular rhythm, normal heart sounds and intact distal pulses.   Pulmonary/Chest: Effort normal and breath sounds normal.   Abdominal: Soft. Bowel sounds are normal.   Musculoskeletal: Normal range of motion.   Neurological: She is alert and oriented to person, place, and time.   Skin: Skin is warm and dry.   Psychiatric: She has a normal mood and affect. Her behavior is normal. Judgment and thought content normal.              "   Assessment/Plan:     Maricarmen was seen today for weight loss.    Diagnoses and all orders for this visit:    Gastric bypass status for obesity  -     Comprehensive Metabolic Panel  -     CBC & Differential  -     CBC Auto Differential    Other orders  -     buPROPion SR (WELLBUTRIN SR) 150 MG 12 hr tablet; Take 1 tablet by mouth 2 (Two) Times a Day.  -     furosemide (LASIX) 40 MG tablet; Take 1 tablet by mouth As Needed (swelling).      Will check patients labs today to make sure she does not have any electrolyte disturbances. Will recheck in 3 months.     Follow-up:     3 months     Goals:   Goals    None       Barriers to goals: obesity     Health Maintenance   Topic Date Due   • ANNUAL PHYSICAL  08/11/1973   • PNEUMOCOCCAL VACCINE (19-64 MEDIUM RISK) (1 of 1 - PPSV23) 08/11/1989   • INFLUENZA VACCINE  08/01/2019   • LIPID PANEL  09/27/2020   • PAP SMEAR  07/25/2021   • COLONOSCOPY  11/19/2023   • TDAP/TD VACCINES (3 - Td) 06/06/2027       Tobacco: nonsmoker  Alcohol: does not drink  Lifestyle: Patient's Body mass index is 34.81 kg/m². BMI is above normal parameters. Recommendations include: exercise counseling and nutrition counseling.   cut out extra servings, family to eat at dinner table more often, keep TV off during meals, plan meals and eat breakfast    RISK SCORE: 3         Radha Nam M.D. PGY2  [facility] Family Medicine Residency  [address] [place] [address]  Office: [phone]    This document has been electronically signed by Radha Nam MD on March 6, 2020 3:31 PM            This document has been electronically signed by Radha Nam MD on March 6, 2020 3:28 PM

## 2020-03-16 PROCEDURE — 87186 SC STD MICRODIL/AGAR DIL: CPT | Performed by: NURSE PRACTITIONER

## 2020-03-16 PROCEDURE — 87086 URINE CULTURE/COLONY COUNT: CPT | Performed by: NURSE PRACTITIONER

## 2020-03-16 PROCEDURE — 87077 CULTURE AEROBIC IDENTIFY: CPT | Performed by: NURSE PRACTITIONER

## 2020-04-02 RX ORDER — POTASSIUM CHLORIDE 20MEQ/15ML
20 LIQUID (ML) ORAL DAILY
Qty: 240 ML | Refills: 2 | Status: SHIPPED | OUTPATIENT
Start: 2020-04-02 | End: 2021-06-30

## 2020-04-02 NOTE — TELEPHONE ENCOUNTER
PATIENT CALLED AND IS NEEDING REFILLS ON THE FOLLOWING MEDICATIONS SENT TO [facility] IN [place]: potassium chloride (KAYCIEL) 20 MEQ/15ML (10%) solution. 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      THANK YOU,      SAMANTHA

## 2020-04-29 ENCOUNTER — TRANSCRIBE ORDERS (OUTPATIENT)
Dept: LAB | Facility: HOSPITAL | Age: 50
End: 2020-04-29

## 2020-04-29 ENCOUNTER — APPOINTMENT (OUTPATIENT)
Dept: LAB | Facility: HOSPITAL | Age: 50
End: 2020-04-29

## 2020-04-29 DIAGNOSIS — E87.6 HYPOKALEMIA: ICD-10-CM

## 2020-04-29 DIAGNOSIS — E83.42 HYPOMAGNESEMIA: ICD-10-CM

## 2020-04-29 DIAGNOSIS — I10 ESSENTIAL (PRIMARY) HYPERTENSION: ICD-10-CM

## 2020-04-29 DIAGNOSIS — E78.5 HYPERLIPIDEMIA, UNSPECIFIED HYPERLIPIDEMIA TYPE: ICD-10-CM

## 2020-04-29 DIAGNOSIS — N18.30 CHRONIC KIDNEY DISEASE, STAGE III (MODERATE) (HCC): ICD-10-CM

## 2020-04-29 DIAGNOSIS — N17.9 ACUTE NONTRAUMATIC KIDNEY INJURY (HCC): Primary | ICD-10-CM

## 2020-04-29 DIAGNOSIS — E66.01 MORBID OBESITY (HCC): ICD-10-CM

## 2020-04-29 DIAGNOSIS — E87.20 METABOLIC ACIDOSIS: ICD-10-CM

## 2020-04-29 LAB
25(OH)D3 SERPL-MCNC: 32.3 NG/ML (ref 30–100)
ALBUMIN SERPL-MCNC: 3.7 G/DL (ref 3.5–5.2)
ANION GAP SERPL CALCULATED.3IONS-SCNC: 14.9 MMOL/L (ref 5–15)
BILIRUB UR QL STRIP: NEGATIVE
BUN BLD-MCNC: 14 MG/DL (ref 6–20)
BUN/CREAT SERPL: 16.3 (ref 7–25)
CALCIUM SPEC-SCNC: 9.4 MG/DL (ref 8.6–10.5)
CHLORIDE SERPL-SCNC: 100 MMOL/L (ref 98–107)
CLARITY UR: CLEAR
CO2 SERPL-SCNC: 25.1 MMOL/L (ref 22–29)
COLOR UR: YELLOW
CREAT BLD-MCNC: 0.86 MG/DL (ref 0.57–1)
CREAT UR-MCNC: 15.8 MG/DL
GFR SERPL CREATININE-BSD FRML MDRD: 70 ML/MIN/1.73
GLUCOSE BLD-MCNC: 93 MG/DL (ref 65–99)
GLUCOSE UR STRIP-MCNC: NEGATIVE MG/DL
HGB UR QL STRIP.AUTO: NEGATIVE
KETONES UR QL STRIP: NEGATIVE
LEUKOCYTE ESTERASE UR QL STRIP.AUTO: NEGATIVE
NITRITE UR QL STRIP: NEGATIVE
PH UR STRIP.AUTO: 6 [PH] (ref 5–8)
PHOSPHATE SERPL-MCNC: 4.3 MG/DL (ref 2.5–4.5)
POTASSIUM BLD-SCNC: 4 MMOL/L (ref 3.5–5.2)
PROT UR QL STRIP: NEGATIVE
PROT UR-MCNC: <4 MG/DL
PROT/CREAT UR: NORMAL MG/G{CREAT}
PTH-INTACT SERPL-MCNC: 79.6 PG/ML (ref 15–65)
SODIUM BLD-SCNC: 140 MMOL/L (ref 136–145)
SP GR UR STRIP: 1.01 (ref 1–1.03)
UROBILINOGEN UR QL STRIP: NORMAL

## 2020-04-29 PROCEDURE — 80069 RENAL FUNCTION PANEL: CPT | Performed by: NURSE PRACTITIONER

## 2020-04-29 PROCEDURE — 36415 COLL VENOUS BLD VENIPUNCTURE: CPT | Performed by: NURSE PRACTITIONER

## 2020-04-29 PROCEDURE — 82570 ASSAY OF URINE CREATININE: CPT | Performed by: NURSE PRACTITIONER

## 2020-04-29 PROCEDURE — 81003 URINALYSIS AUTO W/O SCOPE: CPT | Performed by: NURSE PRACTITIONER

## 2020-04-29 PROCEDURE — 82306 VITAMIN D 25 HYDROXY: CPT | Performed by: NURSE PRACTITIONER

## 2020-04-29 PROCEDURE — 83970 ASSAY OF PARATHORMONE: CPT | Performed by: NURSE PRACTITIONER

## 2020-04-29 PROCEDURE — 84156 ASSAY OF PROTEIN URINE: CPT | Performed by: NURSE PRACTITIONER

## 2020-04-29 NOTE — TELEPHONE ENCOUNTER
PATIENT CAME IN AND THE MEDICATION FOR LORATADINE. [facility] HAS SENT OVER A REQUEST FOR HER TO GET THIS FILLED. NOTHING HAS BEEN DONE ON THIS AND SHE IS REALLY NEEDING THIS. CAN WE GET A PARTNER OR SOMEONE TO DO THIS PLEASE AND HAVE IT SENT TO [facility] IN [place].        THANK YOU,      SAMANTHA

## 2020-04-30 RX ORDER — LORATADINE 10 MG/1
10 TABLET ORAL DAILY
Qty: 30 TABLET | Refills: 0 | Status: SHIPPED | OUTPATIENT
Start: 2020-04-30 | End: 2020-05-04 | Stop reason: SDUPTHER

## 2020-05-01 ENCOUNTER — TELEPHONE (OUTPATIENT)
Dept: FAMILY MEDICINE CLINIC | Facility: CLINIC | Age: 50
End: 2020-05-01

## 2020-05-01 NOTE — TELEPHONE ENCOUNTER
PATIENT CALLED STATING THAT SHE SAW ON HER MYCHART THAT HER loratadine (Claritin) 10 MG tablet WAS CALLED IN. HOWEVER SHE SAID THAT WHEN SHE WENT TO THE PHARMACY TO PICK IT UP THEY TOLD HER THAT IT WAS SENT OVER WRONG. SHE DIDN'T KNOW WHAT THAT MEANT, SO I ASKED HER IF SHE COULD HAVE HER PHARMACY GIVE US A CALL SO WE KNOW WHAT IS NEEDED.    CALL BACK NUMBER FOR PATIENT [phone].    THANKS,  MARY

## 2020-05-01 NOTE — TELEPHONE ENCOUNTER
Called pharmacy and they said Vickey wasn't covered medicaid and asked to switch to another doctor, I told them to try Dr Cardona.    Called patient and informed her of this and to check back with the pharmacy later

## 2020-05-04 RX ORDER — LORATADINE 10 MG/1
10 TABLET ORAL DAILY
Qty: 30 TABLET | Refills: 0 | Status: SHIPPED | OUTPATIENT
Start: 2020-05-04 | End: 2020-05-27 | Stop reason: SDUPTHER

## 2020-05-20 RX ORDER — FLUTICASONE PROPIONATE 50 MCG
2 SPRAY, SUSPENSION (ML) NASAL DAILY
Qty: 16 G | Refills: 3 | Status: SHIPPED | OUTPATIENT
Start: 2020-05-20 | End: 2021-08-19

## 2020-05-26 NOTE — TELEPHONE ENCOUNTER
PATIENT CALLED AND IS NEEDING REFILLS ON HER loratadine (Claritin) 10 MG tablet AND WOULD LIKE FOR IT TO BE SENT TO [facility] IN [place].      THANK YOU,      SAMANTHA

## 2020-05-27 RX ORDER — LORATADINE 10 MG/1
10 TABLET ORAL DAILY
Qty: 30 TABLET | Refills: 5 | Status: SHIPPED | OUTPATIENT
Start: 2020-05-27 | End: 2021-08-19 | Stop reason: SDUPTHER

## 2020-06-08 ENCOUNTER — HOSPITAL ENCOUNTER (EMERGENCY)
Facility: HOSPITAL | Age: 50
Discharge: HOME | End: 2020-06-08
Attending: EMERGENCY MEDICINE | Admitting: EMERGENCY MEDICINE

## 2020-06-08 ENCOUNTER — APPOINTMENT (OUTPATIENT)
Dept: CT IMAGING | Facility: HOSPITAL | Age: 50
End: 2020-06-08

## 2020-06-08 VITALS
WEIGHT: 201 LBS | BODY MASS INDEX: 33.49 KG/M2 | TEMPERATURE: 97.5 F | OXYGEN SATURATION: 99 % | RESPIRATION RATE: 18 BRPM | SYSTOLIC BLOOD PRESSURE: 119 MMHG | HEIGHT: 65 IN | HEART RATE: 64 BPM | DIASTOLIC BLOOD PRESSURE: 68 MMHG

## 2020-06-08 DIAGNOSIS — N39.0 ACUTE UTI: ICD-10-CM

## 2020-06-08 DIAGNOSIS — R10.11 RUQ ABDOMINAL PAIN: Primary | ICD-10-CM

## 2020-06-08 LAB
ALBUMIN SERPL-MCNC: 4.1 G/DL (ref 3.5–5.2)
ALBUMIN/GLOB SERPL: 1.2 G/DL
ALP SERPL-CCNC: 73 U/L (ref 39–117)
ALT SERPL W P-5'-P-CCNC: 13 U/L (ref 1–33)
ANION GAP SERPL CALCULATED.3IONS-SCNC: 13 MMOL/L (ref 5–15)
AST SERPL-CCNC: 28 U/L (ref 1–32)
BACTERIA UR QL AUTO: ABNORMAL /HPF
BASOPHILS # BLD AUTO: 0.03 10*3/MM3 (ref 0–0.2)
BASOPHILS NFR BLD AUTO: 0.4 % (ref 0–1.5)
BILIRUB SERPL-MCNC: 0.4 MG/DL (ref 0.2–1.2)
BILIRUB UR QL STRIP: ABNORMAL
BUN BLD-MCNC: 12 MG/DL (ref 6–20)
BUN/CREAT SERPL: 15.6 (ref 7–25)
CALCIUM SPEC-SCNC: 9.2 MG/DL (ref 8.6–10.5)
CHLORIDE SERPL-SCNC: 100 MMOL/L (ref 98–107)
CLARITY UR: CLEAR
CO2 SERPL-SCNC: 25 MMOL/L (ref 22–29)
COLOR UR: YELLOW
CREAT BLD-MCNC: 0.77 MG/DL (ref 0.57–1)
DEPRECATED RDW RBC AUTO: 38.8 FL (ref 37–54)
EOSINOPHIL # BLD AUTO: 0.22 10*3/MM3 (ref 0–0.4)
EOSINOPHIL NFR BLD AUTO: 2.6 % (ref 0.3–6.2)
ERYTHROCYTE [DISTWIDTH] IN BLOOD BY AUTOMATED COUNT: 13.2 % (ref 12.3–15.4)
GFR SERPL CREATININE-BSD FRML MDRD: 80 ML/MIN/1.73
GLOBULIN UR ELPH-MCNC: 3.3 GM/DL
GLUCOSE BLD-MCNC: 105 MG/DL (ref 65–99)
GLUCOSE UR STRIP-MCNC: NEGATIVE MG/DL
HCG SERPL QL: NEGATIVE
HCT VFR BLD AUTO: 42.3 % (ref 34–46.6)
HGB BLD-MCNC: 14.6 G/DL (ref 12–15.9)
HGB UR QL STRIP.AUTO: NEGATIVE
HOLD SPECIMEN: NORMAL
HOLD SPECIMEN: NORMAL
HYALINE CASTS UR QL AUTO: ABNORMAL /LPF
IMM GRANULOCYTES # BLD AUTO: 0.03 10*3/MM3 (ref 0–0.05)
IMM GRANULOCYTES NFR BLD AUTO: 0.4 % (ref 0–0.5)
KETONES UR QL STRIP: ABNORMAL
LEUKOCYTE ESTERASE UR QL STRIP.AUTO: ABNORMAL
LIPASE SERPL-CCNC: 27 U/L (ref 13–60)
LYMPHOCYTES # BLD AUTO: 3.54 10*3/MM3 (ref 0.7–3.1)
LYMPHOCYTES NFR BLD AUTO: 42.2 % (ref 19.6–45.3)
MCH RBC QN AUTO: 28.4 PG (ref 26.6–33)
MCHC RBC AUTO-ENTMCNC: 34.5 G/DL (ref 31.5–35.7)
MCV RBC AUTO: 82.3 FL (ref 79–97)
MONOCYTES # BLD AUTO: 0.75 10*3/MM3 (ref 0.1–0.9)
MONOCYTES NFR BLD AUTO: 8.9 % (ref 5–12)
NEUTROPHILS # BLD AUTO: 3.81 10*3/MM3 (ref 1.7–7)
NEUTROPHILS NFR BLD AUTO: 45.5 % (ref 42.7–76)
NITRITE UR QL STRIP: NEGATIVE
NRBC BLD AUTO-RTO: 0 /100 WBC (ref 0–0.2)
PH UR STRIP.AUTO: 5.5 [PH] (ref 5–9)
PLATELET # BLD AUTO: 270 10*3/MM3 (ref 140–450)
PMV BLD AUTO: 10.2 FL (ref 6–12)
POTASSIUM BLD-SCNC: 3.4 MMOL/L (ref 3.5–5.2)
PROT SERPL-MCNC: 7.4 G/DL (ref 6–8.5)
PROT UR QL STRIP: ABNORMAL
RBC # BLD AUTO: 5.14 10*6/MM3 (ref 3.77–5.28)
RBC # UR: ABNORMAL /HPF
REF LAB TEST METHOD: ABNORMAL
SODIUM BLD-SCNC: 138 MMOL/L (ref 136–145)
SP GR UR STRIP: 1.03 (ref 1–1.03)
SQUAMOUS #/AREA URNS HPF: ABNORMAL /HPF
UROBILINOGEN UR QL STRIP: ABNORMAL
WBC NRBC COR # BLD: 8.38 10*3/MM3 (ref 3.4–10.8)
WBC UR QL AUTO: ABNORMAL /HPF
WHOLE BLOOD HOLD SPECIMEN: NORMAL
WHOLE BLOOD HOLD SPECIMEN: NORMAL

## 2020-06-08 PROCEDURE — 83690 ASSAY OF LIPASE: CPT | Performed by: EMERGENCY MEDICINE

## 2020-06-08 PROCEDURE — 80053 COMPREHEN METABOLIC PANEL: CPT | Performed by: EMERGENCY MEDICINE

## 2020-06-08 PROCEDURE — 85025 COMPLETE CBC W/AUTO DIFF WBC: CPT | Performed by: EMERGENCY MEDICINE

## 2020-06-08 PROCEDURE — 25010000002 IOPAMIDOL 61 % SOLUTION: Performed by: EMERGENCY MEDICINE

## 2020-06-08 PROCEDURE — 99283 EMERGENCY DEPT VISIT LOW MDM: CPT

## 2020-06-08 PROCEDURE — 96375 TX/PRO/DX INJ NEW DRUG ADDON: CPT

## 2020-06-08 PROCEDURE — 74177 CT ABD & PELVIS W/CONTRAST: CPT

## 2020-06-08 PROCEDURE — 84703 CHORIONIC GONADOTROPIN ASSAY: CPT | Performed by: EMERGENCY MEDICINE

## 2020-06-08 PROCEDURE — 25010000002 ONDANSETRON PER 1 MG: Performed by: EMERGENCY MEDICINE

## 2020-06-08 PROCEDURE — 96374 THER/PROPH/DIAG INJ IV PUSH: CPT

## 2020-06-08 PROCEDURE — 25010000002 MORPHINE PER 10 MG: Performed by: EMERGENCY MEDICINE

## 2020-06-08 PROCEDURE — 81001 URINALYSIS AUTO W/SCOPE: CPT | Performed by: EMERGENCY MEDICINE

## 2020-06-08 RX ORDER — NITROFURANTOIN 25; 75 MG/1; MG/1
100 CAPSULE ORAL ONCE
Status: COMPLETED | OUTPATIENT
Start: 2020-06-08 | End: 2020-06-08

## 2020-06-08 RX ORDER — POTASSIUM CHLORIDE 750 MG/1
20 CAPSULE, EXTENDED RELEASE ORAL ONCE
Status: COMPLETED | OUTPATIENT
Start: 2020-06-08 | End: 2020-06-08

## 2020-06-08 RX ORDER — ONDANSETRON 2 MG/ML
4 INJECTION INTRAMUSCULAR; INTRAVENOUS ONCE
Status: COMPLETED | OUTPATIENT
Start: 2020-06-08 | End: 2020-06-08

## 2020-06-08 RX ORDER — SODIUM CHLORIDE 0.9 % (FLUSH) 0.9 %
10 SYRINGE (ML) INJECTION AS NEEDED
Status: DISCONTINUED | OUTPATIENT
Start: 2020-06-08 | End: 2020-06-08 | Stop reason: HOSPADM

## 2020-06-08 RX ORDER — NITROFURANTOIN 25; 75 MG/1; MG/1
100 CAPSULE ORAL 2 TIMES DAILY
Qty: 14 CAPSULE | Refills: 0 | Status: SHIPPED | OUTPATIENT
Start: 2020-06-08 | End: 2020-06-15

## 2020-06-08 RX ADMIN — ONDANSETRON 4 MG: 2 INJECTION INTRAMUSCULAR; INTRAVENOUS at 05:09

## 2020-06-08 RX ADMIN — POTASSIUM CHLORIDE 20 MEQ: 10 CAPSULE, COATED, EXTENDED RELEASE ORAL at 05:37

## 2020-06-08 RX ADMIN — IOPAMIDOL 90 ML: 612 INJECTION, SOLUTION INTRAVENOUS at 06:09

## 2020-06-08 RX ADMIN — SODIUM CHLORIDE 1000 ML: 9 INJECTION, SOLUTION INTRAVENOUS at 05:09

## 2020-06-08 RX ADMIN — NITROFURANTOIN MONOHYDRATE/MACROCRYSTALLINE 100 MG: 25; 75 CAPSULE ORAL at 06:42

## 2020-06-08 RX ADMIN — MORPHINE SULFATE 4 MG: 4 INJECTION INTRAVENOUS at 05:09

## 2020-06-08 NOTE — ED PROVIDER NOTES
Subjective   49-year-old white female with history of multiple medical problems and status post cholecystectomy in 2017 presents to the emergency department with chief complaint of abdominal pain.  She complains of right upper quadrant pain for 4 days.  She relates nothing makes it better and nothing makes it worse.  She denies fever, sweats, chills, nausea, vomiting, or diarrhea.          Review of Systems   Constitutional: Negative for chills, diaphoresis and fever.   Respiratory: Negative for cough and shortness of breath.    Cardiovascular: Negative for chest pain.   Gastrointestinal: Positive for abdominal pain. Negative for blood in stool, diarrhea, nausea and vomiting.   Genitourinary: Negative for dysuria and hematuria.   Musculoskeletal: Positive for back pain and neck pain. Negative for gait problem and neck stiffness.   Neurological: Negative for syncope, weakness and headaches.   All other systems reviewed and are negative.      Past Medical History:   Diagnosis Date   • Acid reflux    • Anxiety    • Depressed    • Dysphagia    • GERD (gastroesophageal reflux disease)    • Hard to intubate    • Hyperlipidemia    • Hypertension    • Hypokalemia    • IBS (irritable bowel syndrome)    • Nausea    • Sleep apnea        Allergies   Allergen Reactions   • Sulfa Antibiotics Anaphylaxis and GI Intolerance       Past Surgical History:   Procedure Laterality Date   • ABDOMINAL SURGERY     • CARDIAC CATHETERIZATION N/A 8/13/2018    Procedure: Left Heart Cath 8/13/2018 @ 9;00;  Surgeon: Kuldip Frank MD;  Location: [facility] CATH INVASIVE LOCATION;  Service: Cardiology   • COLONOSCOPY N/A 11/19/2018    Procedure: COLONOSCOPY;  Surgeon: Bro Whitaker MD;  Location: [facility] ENDOSCOPY;  Service: Gastroenterology   • CYSTOSCOPY     • ENDOMETRIAL ABLATION  2015   • ENDOSCOPY N/A 11/19/2018    Procedure: ESOPHAGOGASTRODUODENOSCOPYwith dilation;  Surgeon: Bro Whitaker MD;  Location: [facility] ENDOSCOPY;  Service:  Gastroenterology   • GASTRIC SLEEVE LAPAROSCOPIC     • LAPAROSCOPIC TUBAL LIGATION  1995   • LA ERCP DX COLLECTION SPECIMEN BRUSHING/WASHING Left 7/31/2017    Procedure: ENDOSCOPIC RETROGRADE CHOLANGIOPANCREATOGRAPHY;  Surgeon: Samuel Redding DO;  Location: [facility] ENDOSCOPY;  Service: Gastroenterology   • LA LAP,CHOLECYSTECTOMY N/A 7/24/2017    Procedure: CHOLECYSTECTOMY LAPAROSCOPIC, also umbillical hernia repair;  Surgeon: Pk العلي MD;  Location: [facility] OR;  Service: General   • UPPER GASTROINTESTINAL ENDOSCOPY  11/19/2018       Family History   Problem Relation Age of Onset   • Diabetes Mother    • Hypertension Mother    • Cirrhosis Mother    • Hypertension Father        Social History     Socioeconomic History   • Marital status:      Spouse name: Not on file   • Number of children: Not on file   • Years of education: Not on file   • Highest education level: Not on file   Tobacco Use   • Smoking status: Never Smoker   • Smokeless tobacco: Never Used   Substance and Sexual Activity   • Alcohol use: Yes     Comment: socially   • Drug use: No   • Sexual activity: Defer     Birth control/protection: Surgical           Objective   Physical Exam   Constitutional: She is oriented to person, place, and time. She appears well-developed and well-nourished. No distress.   HENT:   Head: Normocephalic and atraumatic.   Right Ear: External ear normal.   Left Ear: External ear normal.   Nose: Nose normal.   Mouth/Throat: Oropharynx is clear and moist.   Eyes: Pupils are equal, round, and reactive to light. Conjunctivae are normal.   Neck: Normal range of motion. Neck supple.   Cardiovascular: Normal rate, regular rhythm, normal heart sounds and intact distal pulses.   Pulmonary/Chest: Effort normal and breath sounds normal.   Abdominal: Soft. Normal appearance and bowel sounds are normal. She exhibits no distension and no mass. There is tenderness in the right upper quadrant. There is no rigidity, no rebound and  no guarding.   Musculoskeletal: Normal range of motion. She exhibits no edema or tenderness.   Neurological: She is alert and oriented to person, place, and time. She exhibits normal muscle tone.   Skin: Skin is warm and dry. She is not diaphoretic.   Psychiatric: She has a normal mood and affect. Her behavior is normal.   Nursing note and vitals reviewed.      Procedures           ED Course  ED Course as of Jun 08 0631   Mon Jun 08, 2020   0629 Patient is alert and resting comfortably. I reviewed the results of the emergency department evaluation with the patient.  I recommended primary care follow-up.  I advised the patient to return to the emergency department if their symptoms change or worsen.     [DR]      ED Course User Index  [DR] Joe Whittaker MD           Labs Reviewed   COMPREHENSIVE METABOLIC PANEL - Abnormal; Notable for the following components:       Result Value    Glucose 105 (*)     Potassium 3.4 (*)     All other components within normal limits    Narrative:     GFR Normal >60  Chronic Kidney Disease <60  Kidney Failure <15     URINALYSIS W/ MICROSCOPIC IF INDICATED (NO CULTURE) - Abnormal; Notable for the following components:    Ketones, UA Trace (*)     Bilirubin, UA Moderate (2+) (*)     Protein, UA Trace (*)     Leuk Esterase, UA Large (3+) (*)     All other components within normal limits   CBC WITH AUTO DIFFERENTIAL - Abnormal; Notable for the following components:    Lymphocytes, Absolute 3.54 (*)     All other components within normal limits   URINALYSIS, MICROSCOPIC ONLY - Abnormal; Notable for the following components:    RBC, UA 0-2 (*)     All other components within normal limits   LIPASE - Normal   HCG, SERUM, QUALITATIVE - Normal   RAINBOW DRAW    Narrative:     The following orders were created for panel order [place] Draw.  Procedure                               Abnormality         Status                     ---------                               -----------         ------                      Light Blue Top[[id number]]                                   Final result               Green Top (Gel)[[id number]]                                  Final result               Lavender Top[[id number]]                                     Final result               Gold Top - SST[[id number]]                                   Final result                 Please view results for these tests on the individual orders.   CBC AND DIFFERENTIAL    Narrative:     The following orders were created for panel order CBC & Differential.  Procedure                               Abnormality         Status                     ---------                               -----------         ------                     CBC Auto Differential[[id number]]        Abnormal            Final result                 Please view results for these tests on the individual orders.   LIGHT BLUE TOP   GREEN TOP   LAVENDER TOP   GOLD TOP - SST     Ct Abdomen Pelvis With Contrast    Result Date: 6/8/2020  Narrative: CT abdomen and pelvis with contrast on  6/8/2020 CLINICAL INDICATION: Right upper quadrant pain TECHNIQUE: Multiple axial images are obtained throughout the abdomen and pelvis following the administration of IV contrast, 90 mL of Isovue-300 contrast was administered intravenously without complication. This exam was performed according to our departmental dose-optimization program, which includes automated exposure control, adjustment of the mA and/or kV according to patient size and/or use of iterative reconstruction technique. Total DLP is 674.6 mGy*cm. COMPARISON:  9/18/2019 FINDINGS: Abdomen: The lung bases are clear. The patient is status post gastric sleeve surgery. The patient is status post cholecystectomy. The solid abdominal organs are otherwise unremarkable. There is no abdominal adenopathy. There is no free fluid or free air within the abdomen. The abdominal portion of the GI tract is unremarkable. Pelvis: Pelvic organs appear  unremarkable by CT. There is no free fluid in the pelvis. There is no pelvic adenopathy. Pelvic portion of the GI tract including the appendix is unremarkable. Degenerative changes are noted in the spine. A few small foci of air are noted in the bladder most likely related to a recent catheterization but please correlate clinically to exclude infection as a cause.     Impression: 1. A few small foci of air in the bladder most likely related to recent catheterization but please correlate clinically to exclude infection as a cause. 2. Otherwise no acute abnormality. Electronically signed by:  Denis Power  6/8/2020 6:23 AM CDT Workstation: [id number]                                  [facility]    Final diagnoses:   RUQ abdominal pain   Acute UTI            Joe Whittaker MD  06/08/20 0631

## 2020-06-08 NOTE — ED NOTES
Patient presents to ED with complaint of upper right sided abdominal pain. She states this started Thursday. She states she tried to place an ice pack, and tramadol and Toradol with little to no relief.      Maryanne Barros RN  06/08/20 0439

## 2020-06-22 RX ORDER — FUROSEMIDE 40 MG/1
40 TABLET ORAL AS NEEDED
Qty: 30 TABLET | Refills: 3 | Status: SHIPPED | OUTPATIENT
Start: 2020-06-22 | End: 2021-01-26

## 2020-06-27 ENCOUNTER — APPOINTMENT (OUTPATIENT)
Dept: LAB | Facility: HOSPITAL | Age: 50
End: 2020-06-27
Payer: MEDICAID

## 2020-06-27 ENCOUNTER — TRANSCRIBE ORDERS (OUTPATIENT)
Dept: ADMINISTRATIVE | Facility: HOSPITAL | Age: 50
End: 2020-06-27

## 2020-06-27 DIAGNOSIS — Z79.891 ENCOUNTER FOR LONG-TERM METHADONE USE: ICD-10-CM

## 2020-06-27 DIAGNOSIS — Z79.899 ENCOUNTER FOR LONG-TERM (CURRENT) USE OF OTHER MEDICATIONS: Primary | ICD-10-CM

## 2020-06-27 PROCEDURE — 80307 DRUG TEST PRSMV CHEM ANLYZR: CPT | Performed by: PHYSICIAN ASSISTANT

## 2020-06-28 LAB
AMPHETAMINES UR QL SCN: NEGATIVE NG/ML
BARBITURATES UR QL SCN: NEGATIVE NG/ML
BENZODIAZ UR QL: NEGATIVE NG/ML
BZE UR QL: NEGATIVE NG/ML
CANNABINOIDS UR QL SCN: NEGATIVE NG/ML
METHADONE UR QL SCN: NEGATIVE NG/ML
OPIATES UR QL: NEGATIVE NG/ML
PCP UR QL: NEGATIVE NG/ML
PROPOXYPH UR QL SCN: NEGATIVE NG/ML

## 2020-07-14 ENCOUNTER — APPOINTMENT (OUTPATIENT)
Dept: LAB | Facility: HOSPITAL | Age: 50
End: 2020-07-14
Payer: COMMERCIAL

## 2020-07-14 ENCOUNTER — OFFICE VISIT (OUTPATIENT)
Dept: FAMILY MEDICINE CLINIC | Facility: CLINIC | Age: 50
End: 2020-07-14
Payer: COMMERCIAL

## 2020-07-14 VITALS
BODY MASS INDEX: 33.2 KG/M2 | HEART RATE: 72 BPM | RESPIRATION RATE: 16 BRPM | SYSTOLIC BLOOD PRESSURE: 120 MMHG | TEMPERATURE: 98.6 F | WEIGHT: 194.5 LBS | DIASTOLIC BLOOD PRESSURE: 70 MMHG | HEIGHT: 64 IN

## 2020-07-14 DIAGNOSIS — G25.81 RESTLESS LEGS SYNDROME (RLS): ICD-10-CM

## 2020-07-14 DIAGNOSIS — Z12.31 ENCOUNTER FOR SCREENING MAMMOGRAM FOR BREAST CANCER: ICD-10-CM

## 2020-07-14 DIAGNOSIS — M51.36 DEGENERATIVE DISC DISEASE, LUMBAR: ICD-10-CM

## 2020-07-14 DIAGNOSIS — F41.8 DEPRESSION WITH ANXIETY: ICD-10-CM

## 2020-07-14 DIAGNOSIS — R69 TAKING MEDICATION FOR CHRONIC DISEASE: ICD-10-CM

## 2020-07-14 DIAGNOSIS — G47.33 OBSTRUCTIVE SLEEP APNEA SYNDROME: ICD-10-CM

## 2020-07-14 DIAGNOSIS — E87.6 HYPOKALEMIA: ICD-10-CM

## 2020-07-14 DIAGNOSIS — M50.30 DEGENERATIVE DISC DISEASE, CERVICAL: ICD-10-CM

## 2020-07-14 DIAGNOSIS — I10 ESSENTIAL (PRIMARY) HYPERTENSION: ICD-10-CM

## 2020-07-14 DIAGNOSIS — Z23 NEED FOR VACCINATION: ICD-10-CM

## 2020-07-14 DIAGNOSIS — K21.9 GASTROESOPHAGEAL REFLUX DISEASE WITHOUT ESOPHAGITIS: ICD-10-CM

## 2020-07-14 DIAGNOSIS — K90.49 MALABSORPTION DUE TO INTOLERANCE, NOT ELSEWHERE CLASSIFIED: ICD-10-CM

## 2020-07-14 DIAGNOSIS — N18.30 CKD (CHRONIC KIDNEY DISEASE) STAGE 3, GFR 30-59 ML/MIN (HCC): Primary | ICD-10-CM

## 2020-07-14 DIAGNOSIS — N18.30 CKD (CHRONIC KIDNEY DISEASE) STAGE 3, GFR 30-59 ML/MIN (HCC): ICD-10-CM

## 2020-07-14 PROBLEM — M51.369 DEGENERATIVE DISC DISEASE, LUMBAR: Status: ACTIVE | Noted: 2020-07-14

## 2020-07-14 PROBLEM — J30.9 ALLERGIC RHINITIS: Status: ACTIVE | Noted: 2020-07-14

## 2020-07-14 LAB
ALBUMIN SERPL BCP-MCNC: 3.6 G/DL (ref 3.5–5)
ALP SERPL-CCNC: 64 U/L (ref 46–116)
ALT SERPL W P-5'-P-CCNC: 12 U/L (ref 12–78)
ANION GAP SERPL CALCULATED.3IONS-SCNC: 9 MMOL/L (ref 4–13)
AST SERPL W P-5'-P-CCNC: 22 U/L (ref 5–45)
BASOPHILS # BLD AUTO: 0.03 THOUSANDS/ΜL (ref 0–0.1)
BASOPHILS NFR BLD AUTO: 0 % (ref 0–1)
BILIRUB SERPL-MCNC: 0.42 MG/DL (ref 0.2–1)
BILIRUB UR QL STRIP: ABNORMAL
BUN SERPL-MCNC: 12 MG/DL (ref 5–25)
CALCIUM SERPL-MCNC: 9.2 MG/DL (ref 8.3–10.1)
CHLORIDE SERPL-SCNC: 103 MMOL/L (ref 100–108)
CHOLEST SERPL-MCNC: 210 MG/DL (ref 50–200)
CLARITY UR: ABNORMAL
CO2 SERPL-SCNC: 29 MMOL/L (ref 21–32)
COLOR UR: YELLOW
CREAT SERPL-MCNC: 0.89 MG/DL (ref 0.6–1.3)
EOSINOPHIL # BLD AUTO: 0.25 THOUSAND/ΜL (ref 0–0.61)
EOSINOPHIL NFR BLD AUTO: 3 % (ref 0–6)
ERYTHROCYTE [DISTWIDTH] IN BLOOD BY AUTOMATED COUNT: 13.2 % (ref 11.6–15.1)
GFR SERPL CREATININE-BSD FRML MDRD: 76 ML/MIN/1.73SQ M
GLUCOSE P FAST SERPL-MCNC: 88 MG/DL (ref 65–99)
GLUCOSE UR STRIP-MCNC: NEGATIVE MG/DL
HCT VFR BLD AUTO: 44.5 % (ref 34.8–46.1)
HDLC SERPL-MCNC: 51 MG/DL
HGB BLD-MCNC: 14.3 G/DL (ref 11.5–15.4)
HGB UR QL STRIP.AUTO: NEGATIVE
IMM GRANULOCYTES # BLD AUTO: 0.01 THOUSAND/UL (ref 0–0.2)
IMM GRANULOCYTES NFR BLD AUTO: 0 % (ref 0–2)
KETONES UR STRIP-MCNC: NEGATIVE MG/DL
LDLC SERPL CALC-MCNC: 137 MG/DL (ref 0–100)
LEUKOCYTE ESTERASE UR QL STRIP: NEGATIVE
LYMPHOCYTES # BLD AUTO: 3.21 THOUSANDS/ΜL (ref 0.6–4.47)
LYMPHOCYTES NFR BLD AUTO: 43 % (ref 14–44)
MCH RBC QN AUTO: 28 PG (ref 26.8–34.3)
MCHC RBC AUTO-ENTMCNC: 32.1 G/DL (ref 31.4–37.4)
MCV RBC AUTO: 87 FL (ref 82–98)
MONOCYTES # BLD AUTO: 0.51 THOUSAND/ΜL (ref 0.17–1.22)
MONOCYTES NFR BLD AUTO: 7 % (ref 4–12)
NEUTROPHILS # BLD AUTO: 3.53 THOUSANDS/ΜL (ref 1.85–7.62)
NEUTS SEG NFR BLD AUTO: 47 % (ref 43–75)
NITRITE UR QL STRIP: NEGATIVE
NRBC BLD AUTO-RTO: 0 /100 WBCS
PH UR STRIP.AUTO: 5.5 [PH]
PLATELET # BLD AUTO: 233 THOUSANDS/UL (ref 149–390)
PMV BLD AUTO: 10.1 FL (ref 8.9–12.7)
POTASSIUM SERPL-SCNC: 3.6 MMOL/L (ref 3.5–5.3)
PROT SERPL-MCNC: 7.2 G/DL (ref 6.4–8.2)
PROT UR STRIP-MCNC: NEGATIVE MG/DL
RBC # BLD AUTO: 5.1 MILLION/UL (ref 3.81–5.12)
SODIUM SERPL-SCNC: 141 MMOL/L (ref 136–145)
SP GR UR STRIP.AUTO: >=1.03 (ref 1–1.03)
TRIGL SERPL-MCNC: 112 MG/DL
TSH SERPL DL<=0.05 MIU/L-ACNC: 1.38 UIU/ML (ref 0.36–3.74)
UROBILINOGEN UR QL STRIP.AUTO: 0.2 E.U./DL
WBC # BLD AUTO: 7.54 THOUSAND/UL (ref 4.31–10.16)

## 2020-07-14 PROCEDURE — 84443 ASSAY THYROID STIM HORMONE: CPT

## 2020-07-14 PROCEDURE — 1036F TOBACCO NON-USER: CPT | Performed by: PHYSICIAN ASSISTANT

## 2020-07-14 PROCEDURE — 80053 COMPREHEN METABOLIC PANEL: CPT

## 2020-07-14 PROCEDURE — 90471 IMMUNIZATION ADMIN: CPT

## 2020-07-14 PROCEDURE — 85025 COMPLETE CBC W/AUTO DIFF WBC: CPT

## 2020-07-14 PROCEDURE — 80061 LIPID PANEL: CPT

## 2020-07-14 PROCEDURE — 81003 URINALYSIS AUTO W/O SCOPE: CPT | Performed by: PHYSICIAN ASSISTANT

## 2020-07-14 PROCEDURE — 99204 OFFICE O/P NEW MOD 45 MIN: CPT | Performed by: PHYSICIAN ASSISTANT

## 2020-07-14 PROCEDURE — 90715 TDAP VACCINE 7 YRS/> IM: CPT

## 2020-07-14 PROCEDURE — 36415 COLL VENOUS BLD VENIPUNCTURE: CPT

## 2020-07-14 RX ORDER — NITROGLYCERIN 0.4 MG/1
0.4 TABLET SUBLINGUAL
Qty: 30 TABLET | Refills: 0 | Status: SHIPPED | OUTPATIENT
Start: 2020-07-14

## 2020-07-14 RX ORDER — PROMETHAZINE HYDROCHLORIDE 25 MG/1
TABLET ORAL
COMMUNITY
End: 2020-07-14 | Stop reason: SDUPTHER

## 2020-07-14 RX ORDER — BUPROPION HYDROCHLORIDE 150 MG/1
150 TABLET, EXTENDED RELEASE ORAL 2 TIMES DAILY
Qty: 60 TABLET | Refills: 1 | Status: SHIPPED | OUTPATIENT
Start: 2020-07-14

## 2020-07-14 RX ORDER — GABAPENTIN 100 MG/1
100 CAPSULE ORAL 4 TIMES DAILY
Qty: 120 CAPSULE | Refills: 1 | Status: SHIPPED | OUTPATIENT
Start: 2020-07-14 | End: 2020-09-14

## 2020-07-14 RX ORDER — PROMETHAZINE HYDROCHLORIDE 25 MG/1
25 TABLET ORAL EVERY 6 HOURS PRN
Qty: 30 TABLET | Refills: 0 | Status: SHIPPED | OUTPATIENT
Start: 2020-07-14 | End: 2020-09-14

## 2020-07-14 RX ORDER — FUROSEMIDE 40 MG/1
40 TABLET ORAL DAILY
Qty: 30 TABLET | Refills: 0 | Status: SHIPPED | OUTPATIENT
Start: 2020-07-14 | End: 2020-08-10

## 2020-07-14 RX ORDER — FLUTICASONE PROPIONATE 50 MCG
SPRAY, SUSPENSION (ML) NASAL
COMMUNITY
End: 2020-10-05 | Stop reason: SDUPTHER

## 2020-07-14 RX ORDER — BUPROPION HYDROCHLORIDE 150 MG/1
TABLET, EXTENDED RELEASE ORAL
COMMUNITY
End: 2020-07-14 | Stop reason: SDUPTHER

## 2020-07-14 RX ORDER — GABAPENTIN 100 MG/1
CAPSULE ORAL
COMMUNITY
End: 2020-07-14 | Stop reason: SDUPTHER

## 2020-07-14 RX ORDER — ALBUTEROL SULFATE 90 UG/1
AEROSOL, METERED RESPIRATORY (INHALATION)
COMMUNITY

## 2020-07-14 RX ORDER — AMILORIDE HYDROCHLORIDE 5 MG/1
10 TABLET ORAL DAILY
Qty: 60 TABLET | Refills: 1 | Status: SHIPPED | OUTPATIENT
Start: 2020-07-14 | End: 2020-10-16

## 2020-07-14 RX ORDER — ROPINIROLE 0.25 MG/1
TABLET, FILM COATED ORAL
COMMUNITY
End: 2020-07-14 | Stop reason: SDUPTHER

## 2020-07-14 RX ORDER — NITROGLYCERIN 0.4 MG/1
TABLET SUBLINGUAL
COMMUNITY
End: 2020-07-14 | Stop reason: SDUPTHER

## 2020-07-14 RX ORDER — LISINOPRIL 2.5 MG/1
2.5 TABLET ORAL DAILY
Qty: 30 TABLET | Refills: 1 | Status: SHIPPED | OUTPATIENT
Start: 2020-07-14 | End: 2021-01-26

## 2020-07-14 RX ORDER — OMEPRAZOLE 20 MG/1
CAPSULE, DELAYED RELEASE ORAL
COMMUNITY
End: 2020-07-14 | Stop reason: SDUPTHER

## 2020-07-14 RX ORDER — POTASSIUM CHLORIDE 20 MEQ/1
20 TABLET, EXTENDED RELEASE ORAL 3 TIMES DAILY
Qty: 90 TABLET | Refills: 1 | Status: SHIPPED | OUTPATIENT
Start: 2020-07-14

## 2020-07-14 RX ORDER — LISINOPRIL 2.5 MG/1
2.5 TABLET ORAL DAILY
COMMUNITY
Start: 2020-07-08 | End: 2020-07-14 | Stop reason: SDUPTHER

## 2020-07-14 RX ORDER — OMEPRAZOLE 20 MG/1
20 CAPSULE, DELAYED RELEASE ORAL DAILY
Qty: 30 CAPSULE | Refills: 1 | Status: SHIPPED | OUTPATIENT
Start: 2020-07-14 | End: 2020-10-07

## 2020-07-14 RX ORDER — FUROSEMIDE 40 MG/1
TABLET ORAL
COMMUNITY
End: 2020-07-14 | Stop reason: SDUPTHER

## 2020-07-14 RX ORDER — POTASSIUM CHLORIDE 20 MEQ/1
TABLET, EXTENDED RELEASE ORAL
COMMUNITY
End: 2020-07-14 | Stop reason: SDUPTHER

## 2020-07-14 RX ORDER — ROPINIROLE 0.25 MG/1
0.25 TABLET, FILM COATED ORAL
Qty: 30 TABLET | Refills: 3 | Status: SHIPPED | OUTPATIENT
Start: 2020-07-14 | End: 2020-11-03

## 2020-07-14 RX ORDER — TRAMADOL HYDROCHLORIDE 50 MG/1
50 TABLET ORAL 3 TIMES DAILY
COMMUNITY
End: 2020-11-03 | Stop reason: SDUPTHER

## 2020-07-14 RX ORDER — AMILORIDE HYDROCHLORIDE 5 MG/1
TABLET ORAL
COMMUNITY
End: 2020-07-14 | Stop reason: SDUPTHER

## 2020-07-14 NOTE — PROGRESS NOTES
Assessment/Plan:    1  CKD (chronic kidney disease) stage 3, GFR 30-59 ml/min ([facility])    - by history, must obtain recent blood work, old records from nephro and will refer to [facility]) Nephrology to establish  - Lipid Panel with Direct LDL reflex; Future  - Comprehensive metabolic panel; Future  - CBC and differential; Future  - TSH, 3rd generation with Free T4 reflex; Future  - UA (URINE) with reflex to Scope  - AMILoride 5 mg tablet; Take 2 tablets (10 mg total) by mouth daily  Dispense: 60 tablet; Refill: 1  - furosemide (LASIX) 40 mg tablet; Take 1 tablet (40 mg total) by mouth daily  Dispense: 30 tablet; Refill: 0  - Ambulatory referral to Nephrology; Future    2  Essential (primary) hypertension    - well controlled on Lisinopril daily, must obtain old records  - Lipid Panel with Direct LDL reflex; Future  - Comprehensive metabolic panel; Future  - CBC and differential; Future  - TSH, 3rd generation with Free T4 reflex; Future  - UA (URINE) with reflex to Scope  - lisinopril (ZESTRIL) 2 5 mg tablet; Take 1 tablet (2 5 mg total) by mouth daily  Dispense: 30 tablet; Refill: 1  - nitroglycerin (NITROSTAT) 0 4 mg SL tablet; Place 1 tablet (0 4 mg total) under the tongue every 5 (five) minutes as needed for chest pain  Dispense: 30 tablet; Refill: 0    3  Hypokalemia    - by history, possibly due to Lasix? Will check CMP, c/w K+ supplements, will obtain old records  - Lipid Panel with Direct LDL reflex; Future  - Comprehensive metabolic panel; Future  - CBC and differential; Future  - TSH, 3rd generation with Free T4 reflex; Future  - UA (URINE) with reflex to Scope  - potassium chloride (Klor-Con M20) 20 mEq tablet; Take 1 tablet (20 mEq total) by mouth 3 (three) times a day  Dispense: 90 tablet; Refill: 1  - Ambulatory referral to Bariatric Surgery; Future    4  Restless legs syndrome (RLS)    - c/w Requip  - Lipid Panel with Direct LDL reflex; Future  - Comprehensive metabolic panel;  Future  - CBC and differential; Future  - TSH, 3rd generation with Free T4 reflex; Future  - UA (URINE) with reflex to Scope  - rOPINIRole (REQUIP) 0 25 mg tablet; Take 1 tablet (0 25 mg total) by mouth daily at bedtime  Dispense: 30 tablet; Refill: 3    5  Taking medication for chronic disease    - Lipid Panel with Direct LDL reflex; Future  - Comprehensive metabolic panel; Future  - CBC and differential; Future  - TSH, 3rd generation with Free T4 reflex; Future  - UA (URINE) with reflex to Scope    6  Depression with anxiety    - c/w Zoloft and Wellbutrin daily  - sertraline (ZOLOFT) 50 mg tablet; Take 1 tablet (50 mg total) by mouth daily  Dispense: 30 tablet; Refill: 1  - buPROPion (WELLBUTRIN SR) 150 mg 12 hr tablet; Take 1 tablet (150 mg total) by mouth 2 (two) times a day  Dispense: 60 tablet; Refill: 1    7  Gastroesophageal reflux disease without esophagitis    - c/w Omeprazole, must obtain records to see if EGD done  - promethazine (PHENERGAN) 25 mg tablet; Take 1 tablet (25 mg total) by mouth every 6 (six) hours as needed for nausea or vomiting  Dispense: 30 tablet; Refill: 0  - omeprazole (PriLOSEC) 20 mg delayed release capsule; Take 1 capsule (20 mg total) by mouth daily  Dispense: 30 capsule; Refill: 1    8  Degenerative disc disease, cervical    - will continue Gabapentin  - gabapentin (NEURONTIN) 100 mg capsule; Take 1 capsule (100 mg total) by mouth 4 (four) times a day  Dispense: 120 capsule; Refill: 1  - Ambulatory referral to Pain Management; Future    9  Degenerative disc disease, lumbar    - c/w gabapentin, tramadol, will set up with pain mangement here, obtain old records  - gabapentin (NEURONTIN) 100 mg capsule; Take 1 capsule (100 mg total) by mouth 4 (four) times a day  Dispense: 120 capsule; Refill: 1  - Ambulatory referral to Pain Management; Future    10   Malabsorption due to intolerance, not elsewhere classified    - refer to  Bariatrics to follow up from sleeve down in [place], must obtain old records  - Ambulatory referral to Bariatric Surgery; Future    11  Obstructive sleep apnea syndrome    - c/w CPAP refer to Dr Noni Gonzalez   - Ambulatory referral to Sleep Medicine; Future    12  BMI 33 0-33 9,adult    - s/p gastric sleeve   - encouraged patient to work on [employer], exercise  and adequate sleep for weight loss  Follow up if more help is needed      13  Encounter for screening mammogram for breast cancer    - Mammo screening bilateral w cad; Future    14  Need for vaccination    - TDAP VACCINE GREATER THAN OR EQUAL TO 8YO IM    See specialists, follow up when records in and specialist appt are complete, about 3 months  F/u as needed    Subjective:   Chief Complaint   Patient presents with    Np to be established      Patient ID: Cierra Quintanilla is a 52 y o  female  Patient here to establish care  Patient was dx CKD stage 3, Creatinine 6 last year was admitted and brought back to normal  Follows with nephro in [place] would lke to establish here  Gastric Sleeve done June 2019 now malabsoprtion, hypokalemia  Has labs done frequently, can "tell" when potassium is low  Has degenerative lumbar and cervical spine, gets injections, on gabapentin and tramadol  Has a history of HTN takes lisinopril and lasix for this  Has a history of GERD, takes omeprazole and phenergan for this  His of RLS on Requip with success  History of RIKKI on CPAP, needs eval     Takes Zoloft and Wellbutrin for depression, well controlled being here with son and grandchildren        The following portions of the patient's history were reviewed and updated as appropriate: allergies, current medications, past family history, past medical history, past social history, past surgical history and problem list     Past Medical History:   Diagnosis Date    Anxiety     Depression     Hypertension     Kidney disease      Past Surgical History:   Procedure Laterality Date    CHOLECYSTECTOMY      DENTAL SURGERY      Dyer teeth extraction    ENDOMETRIAL ABLATION      GASTRIC RESTRICTION SURGERY      LITHOTRIPSY      TUBAL LIGATION       Family History   Problem Relation Age of Onset    Cirrhosis Mother     Kidney failure Mother     Diabetes Mother     Hypertension Mother     Kidney failure Father     Heart disease Father     Hypertension Father     Alcohol abuse Father     Alcohol abuse Paternal Grandmother     Mental illness Maternal Uncle     Substance Abuse Neg Hx      Social History     Socioeconomic History    Marital status:      Spouse name: Not on file    Number of children: Not on file    Years of education: Not on file    Highest education level: Not on file   Occupational History    Not on file   Social Needs    Financial resource strain: Not on file    Food insecurity:     Worry: Not on file     Inability: Not on file    Transportation needs:     Medical: Not on file     Non-medical: Not on file   Tobacco Use    Smoking status: Never Smoker    Smokeless tobacco: Never Used   Substance and Sexual Activity    Alcohol use: Yes     Comment: Rarely    Drug use: Never    Sexual activity: Not Currently     Partners: Male   Lifestyle    Physical activity:     Days per week: Not on file     Minutes per session: Not on file    Stress: Not on file   Relationships    Social connections:     Talks on phone: Not on file     Gets together: Not on file     Attends [employer] service: Not on file     Active member of club or organization: Not on file     Attends meetings of clubs or organizations: Not on file     Relationship status: Not on file    Intimate partner violence:     Fear of current or ex partner: Not on file     Emotionally abused: Not on file     Physically abused: Not on file     Forced sexual activity: Not on file   Other Topics Concern    Not on file   Social History Narrative    Not on file       Current Outpatient Medications:     albuterol (PROVENTIL HFA,VENTOLIN HFA) 90 mcg/act inhaler, albuterol sulfate HFA 90 mcg/actuation aerosol inhaler  INHALE 2 PUFFS EVERY 4 HOURS AS NEEDED FOR WHEEZING OR SHORTNESS OF AIR , Disp: , Rfl:     AMILoride 5 mg tablet, amiloride 5 mg tablet, Disp: , Rfl:     buPROPion (WELLBUTRIN SR) 150 mg 12 hr tablet, bupropion HCl  mg tablet,12 hr sustained-release, Disp: , Rfl:     fluticasone (FLONASE) 50 mcg/act nasal spray, fluticasone propionate 50 mcg/actuation nasal spray,suspension, Disp: , Rfl:     furosemide (LASIX) 40 mg tablet, furosemide 40 mg tablet, Disp: , Rfl:     gabapentin (NEURONTIN) 100 mg capsule, gabapentin 100 mg capsule, Disp: , Rfl:     lisinopril (ZESTRIL) 2 5 mg tablet, Take 2 5 mg by mouth daily, Disp: , Rfl:     nitroglycerin (NITROSTAT) 0 4 mg SL tablet, nitroglycerin 0 4 mg sublingual tablet, Disp: , Rfl:     omeprazole (PriLOSEC) 20 mg delayed release capsule, omeprazole 20 mg capsule,delayed release  TAKE 1 CAPSULE BY MOUTH EVERY DAY, Disp: , Rfl:     potassium chloride (Klor-Con M20) 20 mEq tablet, Klor-Con M20 mEq tablet,extended release  TAKE 1 TABLET BY MOUTH THREE TIMES A DAY (MICROTAB), Disp: , Rfl:     promethazine (PHENERGAN) 25 mg tablet, promethazine 25 mg tablet  TAKE 1 TAB BY MOUTH EVERY 6 HOURS AS NEEDED FOR NAUSEA, Disp: , Rfl:     rOPINIRole (REQUIP) 0 25 mg tablet, ropinirole 0 25 mg tablet  TAKE 1 TABLET BY MOUTH EVERYDAY AT BEDTIME, Disp: , Rfl:     sertraline (ZOLOFT) 50 mg tablet, sertraline 50 mg tablet, Disp: , Rfl:     traMADol (ULTRAM) 50 mg tablet, tramadol 50 mg tablet, Disp: , Rfl:     Review of Systems          Objective:    Vitals:    07/14/20 1053   BP: 120/70   BP Location: Left arm   Patient Position: Sitting   Cuff Size: Large   Pulse: 72   Resp: 16   Temp: 98 6 °F (37 °C)   TempSrc: Temporal   Weight: 88 2 kg (194 lb 8 oz)   Height: 5' 4" (1 626 m)        Physical Exam   Constitutional: She is oriented to person, place, and time  She appears well-developed and well-nourished  Cardiovascular: Normal rate, regular rhythm and normal heart sounds  Pulmonary/Chest: Effort normal and breath sounds normal    Neurological: She is alert and oriented to person, place, and time  Skin: Skin is warm  Psychiatric: She has a normal mood and affect  BMI Counseling: Body mass index is 33 39 kg/m²  The BMI is above normal  Nutrition recommendations include reducing portion sizes and decreasing overall calorie intake  Exercise recommendations include moderate aerobic physical activity for 150 minutes/week

## 2020-07-15 ENCOUNTER — TELEPHONE (OUTPATIENT)
Dept: FAMILY MEDICINE CLINIC | Facility: CLINIC | Age: 50
End: 2020-07-15

## 2020-07-15 DIAGNOSIS — J30.9 ALLERGIC RHINITIS, UNSPECIFIED SEASONALITY, UNSPECIFIED TRIGGER: Primary | ICD-10-CM

## 2020-07-15 RX ORDER — LORATADINE 10 MG/1
10 TABLET ORAL DAILY
Qty: 90 TABLET | Refills: 1 | Status: SHIPPED | OUTPATIENT
Start: 2020-07-15 | End: 2021-06-11 | Stop reason: SDUPTHER

## 2020-07-16 ENCOUNTER — TELEMEDICINE (OUTPATIENT)
Dept: NEPHROLOGY | Facility: CLINIC | Age: 50
End: 2020-07-16
Payer: COMMERCIAL

## 2020-07-16 ENCOUNTER — TELEPHONE (OUTPATIENT)
Dept: FAMILY MEDICINE CLINIC | Facility: CLINIC | Age: 50
End: 2020-07-16

## 2020-07-16 DIAGNOSIS — E87.6 HYPOKALEMIA: ICD-10-CM

## 2020-07-16 DIAGNOSIS — I10 ESSENTIAL (PRIMARY) HYPERTENSION: ICD-10-CM

## 2020-07-16 DIAGNOSIS — G25.81 RESTLESS LEGS SYNDROME (RLS): ICD-10-CM

## 2020-07-16 DIAGNOSIS — N18.30 CKD (CHRONIC KIDNEY DISEASE) STAGE 3, GFR 30-59 ML/MIN (HCC): Primary | ICD-10-CM

## 2020-07-16 DIAGNOSIS — K90.49 MALABSORPTION DUE TO INTOLERANCE, NOT ELSEWHERE CLASSIFIED: ICD-10-CM

## 2020-07-16 DIAGNOSIS — K21.9 GASTROESOPHAGEAL REFLUX DISEASE WITHOUT ESOPHAGITIS: ICD-10-CM

## 2020-07-16 PROCEDURE — 1036F TOBACCO NON-USER: CPT | Performed by: INTERNAL MEDICINE

## 2020-07-16 PROCEDURE — 99204 OFFICE O/P NEW MOD 45 MIN: CPT | Performed by: INTERNAL MEDICINE

## 2020-07-16 NOTE — TELEPHONE ENCOUNTER
----- Message from Aneta Stern PA-C sent at 7/16/2020 11:15 AM EDT -----  Please let patient know her labs looked good, her potassium was within in a normal range, I can see she already saw nephrology which is good  If she continues to have pains that she thought were "low potassium" related I want her to let us know since her potassium is in fact not low  Thanks

## 2020-07-16 NOTE — PATIENT INSTRUCTIONS
I would like you to have repeat blood test in 2 months to make sure your electrolytes remain stable  Continue with regular follow-up with primary care doctor  I would like to see you back in the office in 1 year  Remember to avoid NSAIDs (no ibuprofen, Motrin, Advil, Aleve, naproxen)  Continue with low-salt diet, encouraged to use a high potassium diet  No changes on your medications at this moment  Stay well-hydrated

## 2020-07-16 NOTE — PROGRESS NOTES
Virtual Regular Visit      Assessment/Plan:    Problem List Items Addressed This Visit        Digestive    Gastroesophageal reflux disease without esophagitis    Malabsorption due to intolerance, not elsewhere classified       Cardiovascular and Mediastinum    Essential (primary) hypertension       Genitourinary    CKD (chronic kidney disease) stage 3, GFR 30-59 ml/min ([facility]) - Primary       Other    Restless legs syndrome (RLS)    Hypokalemia    Relevant Orders    Basic metabolic panel    Magnesium               Reason for visit is   Chief Complaint   Patient presents with    Chronic Kidney Disease    Consult    Virtual Regular Visit        Encounter provider Norman Shelton MD    Provider located at [address] [place] [id number]      Recent Visits  Date Type Provider Dept   07/15/20 Telephone Lissa Ferrara PA-C [address], [facility]    07/14/20 Office Visit Lissa Ferrara PA-C [address] [facility] recent visits within past 7 days and meeting all other requirements     Today's Visits  Date Type Provider Dept   07/16/20 [address] and meeting all other requirements     Future Appointments  No visits were found meeting these conditions  Showing future appointments within next 150 days and meeting all other requirements        The patient was identified by name and date of birth  Kathy Viera was informed that this is a telemedicine visit and that the visit is being conducted through [facility] and patient was informed that this is a secure, HIPAA-compliant platform  She agrees to proceed     My office door was closed  No one else was in the room  She acknowledged consent and understanding of privacy and security of the video platform  The patient has agreed to participate and understands they can discontinue the visit at any time      Patient is aware this is a billable service  CONSULT - Nephrology   Angela Gould 52 y o  female MRN: [id number]        ASSESSMENT and PLAN:  Javy Boland was seen today for chronic kidney disease, consult and virtual regular visit  Diagnoses and all orders for this visit:    CKD (chronic kidney disease) stage 3, GFR 30-59 ml/min ([facility])  -     Ambulatory referral to Nephrology    Hypokalemia  -     Basic metabolic panel; Future  -     Magnesium; Future    Malabsorption due to intolerance, not elsewhere classified    Gastroesophageal reflux disease without esophagitis    Essential (primary) hypertension    Restless legs syndrome (RLS)        This is a 43-year-old lady with history of severe acute kidney injury in May 2019 secondary to prerenal component in the setting of diarrhea, diuretic use as well as Ace inhibitors, history of hypokalemia suspected secondary to malabsorption after gastric sleeve surgery, hypertension, morbid obesity status post gastric sleeve surgery, who recently relocated to [place] from [place], given prior episode of acute renal failure patient was referred to Nephrology for further evaluation  1  Episode of severe acute kidney injury on May 2019, that time creatinine peak at 6 2  Based on most recent blood test renal function significantly improved with creatinine 0 89 and an estimated GFR of 76  Discussed with patient about importance of stay well-hydrated  Recommend to avoid NSAIDs  Keep a good weight, keep good blood pressure control  Agree with follow labs, I would like to see her back in the office in 1 year    2  Hypokalemia, suspected secondary to malabsorption after gastric sleeve surgery  Currently on amiloride and potassium supplementation 3 times a day  Noted patient also takes furosemide as needed as well as she is on PPI that can cause hypomagnesemia and associated hypokalemia  Most recent serum potassium acceptable at 3 6    No changes on her medications at this moment  I would like to repeat a BMP with magnesium level in 2 months to make sure electrolytes remain stable  3  Hypertension currently on very low-dose of lisinopril  Most recent blood pressure 2 days ago acceptable  Continue with low-salt diet, encourage high potassium intake  4  Morbid obesity status post gastric sleeve surgery  5  History of RLS on Requip  6  History of nephrolithiasis, as per patient last time stone was removed via lithotripsy was over 3 years ago  Advised to follow low-salt diet and stay well-hydrated  There are no Patient Instructions on file for this visit  HPI:  Aamir Schaefer is a 52 y  o female who was referred by [facility] for evaluation of Chronic Kidney Disease; Consult; and Virtual Regular Visit    Emely Zamorano is a 52 y o  female with past medical history of morbid obesity status post gastric sleeve surgery a year ago, prior history of hypertension, history of nephrolithiasis last time stone was removed via lithotripsy more than 3 years ago, episode of severe acute kidney injury on May 2019 while she was living in [place], as per review of nephrology note at that time her creatinine peaked at 6 2 and he was thought to be secondary to prerenal in the setting of diuretic use, loss of autoregulation for Ace inhibitors as well as diarrhea  Renal function improved after, patient was seen a nephrologist in [place] before moving to [place]  Also she has history of hypokalemia and is thought to be secondary to malabsorption, she is currently on Amiloride as well as potassium supplementation 3 times a day  Patient recently status care with her primary care doctor and given history of acute kidney injury on CKD she was referred to Nephrology for further recommendations  Today during my evaluation in general she is feeling okay, denies any complaints, no chest pain, no shortness of breath, no leg edema    She currently takes furosemide 40 milligrams as needed for fluid retention and is approximately every other day  Denies any abdominal pain, no nausea, no vomiting, no diarrhea or constipation  She states she has history of frequent UTIs and had approximately 6 episodes last year but nothing recently   Denies any skin rashes  Her most recent weight today was 194 5 pounds  Her weight last year before gastric sleeve surgery was 317 pounds  Her medication list was reviewed over the phone  She is not taking NSAIDs  Denies tobacco abuse  States that her mom and dad have kidney disease but they are not on dialysis  HPI     Past Medical History:   Diagnosis Date    Anxiety     Depression     Hypertension     Kidney disease        Past Surgical History:   Procedure Laterality Date    CHOLECYSTECTOMY      DENTAL SURGERY      [place] teeth extraction    ENDOMETRIAL ABLATION      GASTRIC RESTRICTION SURGERY      LITHOTRIPSY      TUBAL LIGATION         Current Outpatient Medications   Medication Sig Dispense Refill    albuterol (PROVENTIL HFA,VENTOLIN HFA) 90 mcg/act inhaler albuterol sulfate HFA 90 mcg/actuation aerosol inhaler   INHALE 2 PUFFS EVERY 4 HOURS AS NEEDED FOR WHEEZING OR SHORTNESS OF AIR        AMILoride 5 mg tablet Take 2 tablets (10 mg total) by mouth daily 60 tablet 1    buPROPion (WELLBUTRIN SR) 150 mg 12 hr tablet Take 1 tablet (150 mg total) by mouth 2 (two) times a day 60 tablet 1    fluticasone (FLONASE) 50 mcg/act nasal spray fluticasone propionate 50 mcg/actuation nasal spray,suspension      furosemide (LASIX) 40 mg tablet Take 1 tablet (40 mg total) by mouth daily (Patient taking differently: Take 40 mg by mouth once as needed ) 30 tablet 0    gabapentin (NEURONTIN) 100 mg capsule Take 1 capsule (100 mg total) by mouth 4 (four) times a day 120 capsule 1    lisinopril (ZESTRIL) 2 5 mg tablet Take 1 tablet (2 5 mg total) by mouth daily 30 tablet 1    loratadine (CLARITIN) 10 mg tablet Take 1 tablet (10 mg total) by mouth daily 90 tablet 1    nitroglycerin (NITROSTAT) 0 4 mg SL tablet Place 1 tablet (0 4 mg total) under the tongue every 5 (five) minutes as needed for chest pain 30 tablet 0    omeprazole (PriLOSEC) 20 mg delayed release capsule Take 1 capsule (20 mg total) by mouth daily 30 capsule 1    potassium chloride (Klor-Con M20) 20 mEq tablet Take 1 tablet (20 mEq total) by mouth 3 (three) times a day 90 tablet 1    promethazine (PHENERGAN) 25 mg tablet Take 1 tablet (25 mg total) by mouth every 6 (six) hours as needed for nausea or vomiting 30 tablet 0    rOPINIRole (REQUIP) 0 25 mg tablet Take 1 tablet (0 25 mg total) by mouth daily at bedtime 30 tablet 3    sertraline (ZOLOFT) 50 mg tablet Take 1 tablet (50 mg total) by mouth daily 30 tablet 1    traMADol (ULTRAM) 50 mg tablet Take 50 mg by mouth 3 (three) times a day        No current facility-administered medications for this visit  Allergies   Allergen Reactions    Sulfa Antibiotics Throat Swelling       ROS: All the systems were reviewed and were negative except as documented on the H&P  Video Exam    There were no vitals filed for this visit  Blood pressure 120/70 on 07/14  Weight 194 5 lbs today      Physical Exam   Based on our video conference she is alert and oriented x3  She does not look to be in any acute distress  There is no periorbital edema  There is no jaundice  Her mucous membranes are moist   She is speaking in full sentences without any shortness of breath  When I asked her to press her abdomen she denies any tenderness or pain  When I asked her to press her legs there is no fluid retention or edema  There is no visible skin rashes    Her mood seems normal      Lab Results:   Results from last 7 days   Lab Units 07/14/20  1324   WBC Thousand/uL 7 54   HEMOGLOBIN g/dL 14 3   HEMATOCRIT % 44 5   PLATELETS Thousands/uL 233   POTASSIUM mmol/L 3 6   CHLORIDE mmol/L 103   CO2 mmol/L 29   BUN mg/dL 12   CREATININE mg/dL 0 89   CALCIUM mg/dL 9 2   ALK PHOS U/L 64   ALT U/L 12   AST U/L 22       Laboratory Results:  Lab Results   Component Value Date    WBC 7 54 07/14/2020    HGB 14 3 07/14/2020    HCT 44 5 07/14/2020    MCV 87 07/14/2020     07/14/2020     Lab Results   Component Value Date    SODIUM 141 07/14/2020    K 3 6 07/14/2020     07/14/2020    CO2 29 07/14/2020    BUN 12 07/14/2020    CREATININE 0 89 07/14/2020    CALCIUM 9 2 07/14/2020         I spent 41 minutes with patient today in which greater than 50% of the time was spent in counseling/coordination of care regarding   Portions of the record may have been created with voice recognition software  Occasional wrong word or "sound a like" substitutions may have occurred due to the inherent limitations of voice recognition software  Read the chart carefully and recognize, using context, where substitutions have occurred  If you have any questions, please contact the dictating provider  VIRTUAL VISIT DISCLAIMER    Aleks Hodge acknowledges that she has consented to an online visit or consultation  She understands that the online visit is based solely on information provided by her, and that, in the absence of a face-to-face physical evaluation by the physician, the diagnosis she receives is both limited and provisional in terms of accuracy and completeness  This is not intended to replace a full medical face-to-face evaluation by the physician  Aleks Hodge understands and accepts these terms

## 2020-07-22 ENCOUNTER — OFFICE VISIT (OUTPATIENT)
Dept: SLEEP CENTER | Facility: CLINIC | Age: 50
End: 2020-07-22
Payer: COMMERCIAL

## 2020-07-22 VITALS
HEIGHT: 64 IN | DIASTOLIC BLOOD PRESSURE: 70 MMHG | TEMPERATURE: 97.7 F | BODY MASS INDEX: 33.39 KG/M2 | SYSTOLIC BLOOD PRESSURE: 112 MMHG

## 2020-07-22 DIAGNOSIS — E66.9 OBESITY (BMI 30-39.9): ICD-10-CM

## 2020-07-22 DIAGNOSIS — E87.6 HYPOKALEMIA: ICD-10-CM

## 2020-07-22 DIAGNOSIS — F41.8 DEPRESSION WITH ANXIETY: ICD-10-CM

## 2020-07-22 DIAGNOSIS — K21.9 GASTROESOPHAGEAL REFLUX DISEASE WITHOUT ESOPHAGITIS: ICD-10-CM

## 2020-07-22 DIAGNOSIS — G25.81 RESTLESS LEGS SYNDROME (RLS): ICD-10-CM

## 2020-07-22 DIAGNOSIS — G47.19 EXCESSIVE DAYTIME SLEEPINESS: ICD-10-CM

## 2020-07-22 DIAGNOSIS — G89.4 CHRONIC PAIN DISORDER: ICD-10-CM

## 2020-07-22 DIAGNOSIS — I10 ESSENTIAL (PRIMARY) HYPERTENSION: ICD-10-CM

## 2020-07-22 DIAGNOSIS — G47.33 OBSTRUCTIVE SLEEP APNEA SYNDROME: Primary | ICD-10-CM

## 2020-07-22 DIAGNOSIS — G47.62 NOCTURNAL LEG CRAMPS: ICD-10-CM

## 2020-07-22 PROCEDURE — 99244 OFF/OP CNSLTJ NEW/EST MOD 40: CPT | Performed by: INTERNAL MEDICINE

## 2020-07-22 NOTE — PATIENT INSTRUCTIONS
What is RIKKI? Obstructive sleep apnea is a common and serious sleep disorder that causes you to stop breathing during sleep  The airway repeatedly becomes blocked, limiting the amount of air that reaches your lungs  When this happens, you may snore loudly or making choking noises as you try to breathe  Your brain and body becomes oxygen deprived and you may wake up  This may happen a few times a night, or in more severe cases, several hundred times a night  Sleep apnea can make you wake up in the morning feeling tired or unrefreshed even though you have had a full night of sleep  During the day, you may feel fatigued, have difficulty concentrating or you may even unintentionally fall asleep  This is because your body is waking up numerous times throughout the night, even though you might not be conscious of each awakening  The lack of oxygen your body receives can have negative long-term consequences for your health  This includes:  High blood pressure  Heart disease  Irregular heart rhythms  Stroke  Pre-diabetes and diabetes  Depression    Testing  An objective evaluation of your sleep may be needed before your board certified sleep physician can make a diagnosis  Options include:   In-lab overnight sleep study  This type of sleep study requires you to stay overnight at a sleep center, in a bed that may resemble a hotel room  You will sleep with sensors hooked up to various parts of your body  These sensors record your brain waves, heartbeat, breathing and movement  An overnight sleep study also provides your doctor with the most complete information about your sleep  Learn more about an overnight sleep study      Home sleep apnea test  Some patients with high risk factors for obstructive sleep apnea and no other medical disorders may be candidates for a home sleep apnea test  The testing equipment differs in that it is less complicated than what is used in an overnight sleep study   As such, does not give all the data an in-lab will and if negative, may not mean you do not have the problem  Treatment for sleep apnea  includes using a continuous positive airway pressure (CPAP) machine to keep your airway open during sleep  A mask is placed over your nose and mouth, or just your nose  The mask is hooked to the CPAP machine that blows a gentle stream of air into the mask when you breathe  This helps keep your airway open so you can breathe more regularly  Extra oxygen may be given to you through the machine  You may be given a mouth device  It looks like a mouth guard or dental retainer and stops your tongue and mouth tissues from blocking your throat while you sleep  Surgery may be needed to remove extra tissues that block your mouth, throat, or nose  Manage sleep apnea:   Do not smoke  Nicotine and other chemicals in cigarettes and cigars can cause lung damage  Ask your healthcare provider for information if you currently smoke and need help to quit  E-cigarettes or smokeless tobacco still contain nicotine  Talk to your healthcare provider before you use these products  Do not drink alcohol or take sedative medicine before you go to sleep  Alcohol and sedatives can relax the muscles and tissues around your throat  This can block the airflow to your lungs  Maintain a healthy weight  Excess tissue around your throat may restrict your breathing  Ask your healthcare provider for information if you need to lose weight  Sleep on your side or use pillows designed to prevent sleep apnea  This prevents your tongue or other tissues from blocking your throat  You can also raise the head of your bed  Driving Safety  Refrain from driving when drowsy  Follow up with your healthcare provider as directed:  Write down your questions so you remember to ask them during your visits  Go to [employer] website for more information: Sleepeducation  org     What is RIKKI?    Obstructive sleep apnea is a common and serious sleep disorder that causes you to stop breathing during sleep  The airway repeatedly becomes blocked, limiting the amount of air that reaches your lungs  When this happens, you may snore loudly or making choking noises as you try to breathe  Your brain and body becomes oxygen deprived and you may wake up  This may happen a few times a night, or in more severe cases, several hundred times a night  Sleep apnea can make you wake up in the morning feeling tired or unrefreshed even though you have had a full night of sleep  During the day, you may feel fatigued, have difficulty concentrating or you may even unintentionally fall asleep  This is because your body is waking up numerous times throughout the night, even though you might not be conscious of each awakening  The lack of oxygen your body receives can have negative long-term consequences for your health  This includes:  High blood pressure  Heart disease  Irregular heart rhythms  Stroke  Pre-diabetes and diabetes  Depression    Testing  An objective evaluation of your sleep may be needed before your board certified sleep physician can make a diagnosis  Options include:   In-lab overnight sleep study  This type of sleep study requires you to stay overnight at a sleep center, in a bed that may resemble a hotel room  You will sleep with sensors hooked up to various parts of your body  These sensors record your brain waves, heartbeat, breathing and movement  An overnight sleep study also provides your doctor with the most complete information about your sleep  Learn more about an overnight sleep study      Home sleep apnea test  Some patients with high risk factors for obstructive sleep apnea and no other medical disorders may be candidates for a home sleep apnea test  The testing equipment differs in that it is less complicated than what is used in an overnight sleep study   As such, does not give all the data an in-lab will and if negative, may not mean you do not have the problem  Treatment for sleep apnea  includes using a continuous positive airway pressure (CPAP) machine to keep your airway open during sleep  A mask is placed over your nose and mouth, or just your nose  The mask is hooked to the CPAP machine that blows a gentle stream of air into the mask when you breathe  This helps keep your airway open so you can breathe more regularly  Extra oxygen may be given to you through the machine  You may be given a mouth device  It looks like a mouth guard or dental retainer and stops your tongue and mouth tissues from blocking your throat while you sleep  Surgery may be needed to remove extra tissues that block your mouth, throat, or nose  Manage sleep apnea:   Do not smoke  Nicotine and other chemicals in cigarettes and cigars can cause lung damage  Ask your healthcare provider for information if you currently smoke and need help to quit  E-cigarettes or smokeless tobacco still contain nicotine  Talk to your healthcare provider before you use these products  Do not drink alcohol or take sedative medicine before you go to sleep  Alcohol and sedatives can relax the muscles and tissues around your throat  This can block the airflow to your lungs  Maintain a healthy weight  Excess tissue around your throat may restrict your breathing  Ask your healthcare provider for information if you need to lose weight  Sleep on your side or use pillows designed to prevent sleep apnea  This prevents your tongue or other tissues from blocking your throat  You can also raise the head of your bed  Driving Safety  Refrain from driving when drowsy  Follow up with your healthcare provider as directed:  Write down your questions so you remember to ask them during your visits  Go to AAS website for more information: Sleepeducation  org           Nursing Support:  When: Monday through Friday 7A-5PM except holidays  Where: Our direct line is [phone]      If you are having a true emergency please call 911  In the event that the line is busy or it is after hours please leave a voice message and we will return your call  Please speak clearly, leaving your full name, birth date, best number to reach you and the reason for your call  Medication refills: We will need the name of the medication, the dosage, the ordering provider, whether you get a 30 or 90 day refill, and the pharmacy name and address  Medications will be ordered by the provider only  Nurses cannot call in prescriptions  Please allow 7 days for medication refills  Physician requested updates: If your provider requested that you call with an update after starting medication, please be ready to provide us the medication and dosage, what time you take your medication, the time you attempt to fall asleep, time you fall asleep, when you wake up, and what time you get out of bed  Sleep Study Results: We will contact you with sleep study results and/or next steps after the physician has reviewed your testing

## 2020-07-22 NOTE — PROGRESS NOTES
Review of Systems      Genitourinary hot flashes at night   Cardiology Frequent chest pain or angina, , palpitations/fluttering feeling in the chest and ankle/leg swelling   Gastrointestinal frequent heartburn/acid reflux and abdominal pain or cramping that disturb sleep    Neurology frequent headaches, awaken with headache, need to move extremities and muscle weakness   Constitutional fatigue, excessive sweating at night and weight change   Integumentary none   Psychiatry anxiety, depression, aggressiveness or irritability and mood change   Musculoskeletal joint pain, muscle aches, back pain, legs twitching/jerking and leg cramps   Pulmonary shortness of breath with activity, snoring and difficulty breathing when lying flat    ENT none   Endocrine excessive thirst   Hematological none

## 2020-07-23 ENCOUNTER — TELEPHONE (OUTPATIENT)
Dept: SLEEP CENTER | Facility: CLINIC | Age: 50
End: 2020-07-23

## 2020-07-23 NOTE — TELEPHONE ENCOUNTER
DME pressure change/resupply order and office note faxed to [facility] at [phone]

## 2020-08-03 ENCOUNTER — CONSULT (OUTPATIENT)
Dept: PAIN MEDICINE | Facility: MEDICAL CENTER | Age: 50
End: 2020-08-03
Payer: COMMERCIAL

## 2020-08-03 ENCOUNTER — APPOINTMENT (OUTPATIENT)
Dept: RADIOLOGY | Facility: MEDICAL CENTER | Age: 50
End: 2020-08-03
Payer: COMMERCIAL

## 2020-08-03 VITALS
BODY MASS INDEX: 32.44 KG/M2 | SYSTOLIC BLOOD PRESSURE: 99 MMHG | RESPIRATION RATE: 16 BRPM | WEIGHT: 190 LBS | HEART RATE: 58 BPM | HEIGHT: 64 IN | TEMPERATURE: 98.1 F | DIASTOLIC BLOOD PRESSURE: 63 MMHG

## 2020-08-03 DIAGNOSIS — M50.30 DEGENERATIVE DISC DISEASE, CERVICAL: ICD-10-CM

## 2020-08-03 DIAGNOSIS — N18.30 CKD (CHRONIC KIDNEY DISEASE) STAGE 3, GFR 30-59 ML/MIN (HCC): ICD-10-CM

## 2020-08-03 DIAGNOSIS — M54.50 CHRONIC BILATERAL LOW BACK PAIN WITHOUT SCIATICA: ICD-10-CM

## 2020-08-03 DIAGNOSIS — M47.816 LUMBAR SPONDYLOSIS: Primary | ICD-10-CM

## 2020-08-03 DIAGNOSIS — G89.29 CHRONIC BILATERAL LOW BACK PAIN WITHOUT SCIATICA: ICD-10-CM

## 2020-08-03 DIAGNOSIS — Z98.84 S/P BARIATRIC SURGERY: ICD-10-CM

## 2020-08-03 DIAGNOSIS — M51.36 DEGENERATIVE DISC DISEASE, LUMBAR: ICD-10-CM

## 2020-08-03 DIAGNOSIS — M47.812 CERVICAL SPONDYLOSIS: ICD-10-CM

## 2020-08-03 DIAGNOSIS — M47.816 LUMBAR SPONDYLOSIS: ICD-10-CM

## 2020-08-03 PROCEDURE — 72050 X-RAY EXAM NECK SPINE 4/5VWS: CPT

## 2020-08-03 PROCEDURE — 3078F DIAST BP <80 MM HG: CPT | Performed by: PHYSICAL MEDICINE & REHABILITATION

## 2020-08-03 PROCEDURE — 3074F SYST BP LT 130 MM HG: CPT | Performed by: PHYSICAL MEDICINE & REHABILITATION

## 2020-08-03 PROCEDURE — 3008F BODY MASS INDEX DOCD: CPT | Performed by: PHYSICAL MEDICINE & REHABILITATION

## 2020-08-03 PROCEDURE — 1036F TOBACCO NON-USER: CPT | Performed by: PHYSICAL MEDICINE & REHABILITATION

## 2020-08-03 PROCEDURE — 72110 X-RAY EXAM L-2 SPINE 4/>VWS: CPT

## 2020-08-03 PROCEDURE — 99244 OFF/OP CNSLTJ NEW/EST MOD 40: CPT | Performed by: PHYSICAL MEDICINE & REHABILITATION

## 2020-08-03 NOTE — PROGRESS NOTES
Assessment  1  Lumbar spondylosis    2  Degenerative disc disease, lumbar    3  Cervical spondylosis    4  Degenerative disc disease, cervical    5  Chronic bilateral low back pain without sciatica    6  CKD (chronic kidney disease) stage 3, GFR 30-59 ml/min ([facility])    7  S/P bariatric surgery        Plan  1  Obtain x-rays of the cervical and lumbar spine  2  We will schedule the patient for bilateral L3-5 medial branch nerve blocks with intention of moving forward towards radiofrequency ablation if there is an appropriate diagnostic response  The initial blocks will be performed with 2% lidocaine and if an appropriate response is obtained upon review of the patient's pain diary, a confirmatory block will be scheduled with 0 5% bupivacaine  In the office today, we reviewed the nature of facet joint pathology in depth using a spine model  We discussed the approach we would use for the injections and provided literature for home review  The patient understands the risks associated with the procedure including bleeding, infection, tissue injury, and allergic reaction and provided verbal informed consent in the office today  3  The patient prefers to avoid additional medications if possible  4  Consider initiating physical therapy when she has better pain control    My impressions and treatment recommendations were discussed in detail with the patient who verbalized understanding and had no further questions  Discharge instructions were provided  I personally saw and examined the patient and I agree with the above discussed plan of care  Orders Placed This Encounter   Procedures    FL spine and pain procedure     Standing Status:   Future     Standing Expiration Date:   8/3/2021     Order Specific Question:   Reason for Exam:     Answer:   (B) L3-5 MBB#1     Order Specific Question:   Anticoagulant hold needed?      Answer:   no    X-ray cervical spine complete 4 or 5 vw     Standing Status:   Future Standing Expiration Date:   8/3/2024     Scheduling Instructions:      Bring along any outside films relating to this procedure  Order Specific Question:   Is the patient pregnant? Answer:   No    X-ray lumbar spine complete 4+ views     Standing Status:   Future     Standing Expiration Date:   8/3/2024     Scheduling Instructions:      Bring along any outside films relating to this procedure  Order Specific Question:   Is the patient pregnant? Answer:   No     No orders of the defined types were placed in this encounter  History of Present Illness    Kaylan Smith is a 52 y o  female seen in consultation at the request of Genia Trinh PA-C regarding chronic neck and chronic back pain complaints  Patient has been experiencing symptoms over the past 2 years without any specific inciting event or trauma  She describes moderate to severe intensity pain rated as a 6/10 which is nearly constant without any typical pattern and described as burning, sharp, throbbing  She localizes this to the axial cervical spine as well as axial lumbar spine  She denies any radiating leg or arm symptoms  She does have some symptoms consistent with carpal tunnel syndrome and has had this diagnosed in the past by EMG  Aggravating factors include lying down, standing, bending, sitting, walking, exercise  She is unable to determine any significant alleviating factors  She did have prior pain management when living in [place] this did consist of cervical and lumbar radiofrequency ablation which provided significant benefit for her  Past medical history significant for anxiety, depression, GERD, hypertension, kidney disease, and she is status post gastric sleeve procedure  She has tried injection therapies in the past with moderate relief as well as chiropractic manipulation  No relief from physical therapy, exercise, or heat or ice application      She does drink alcohol occasionally perhaps once per month  Denies tobacco or marijuana use  Currently using gabapentin and tramadol  Hopefully interventional approaches will allow her to discontinue the tramadol  I have personally reviewed and/or updated the patient's past medical history, past surgical history, family history, social history, current medications, allergies, and vital signs today  Review of Systems   Constitutional: Positive for unexpected weight change  Negative for fever  HENT: Negative for trouble swallowing  Eyes: Negative for visual disturbance  Respiratory: Negative for shortness of breath and wheezing  Cardiovascular: Positive for leg swelling  Negative for chest pain and palpitations  Gastrointestinal: Positive for abdominal pain  Negative for constipation, diarrhea, nausea and vomiting  Endocrine: Negative for cold intolerance, heat intolerance and polydipsia  Genitourinary: Negative for difficulty urinating and frequency  Musculoskeletal: Positive for joint swelling and myalgias  Negative for arthralgias and gait problem  Skin: Negative for rash  Neurological: Positive for dizziness, numbness and headaches  Negative for seizures, syncope and weakness  Hematological: Does not bruise/bleed easily  Psychiatric/Behavioral: Positive for dysphoric mood  The patient is nervous/anxious  All other systems reviewed and are negative        Patient Active Problem List   Diagnosis    Obstructive sleep apnea syndrome    Restless legs syndrome (RLS)    Essential (primary) hypertension    Allergic rhinitis    Depression with anxiety    Gastroesophageal reflux disease without esophagitis    Malabsorption due to intolerance, not elsewhere classified    Hypokalemia    CKD (chronic kidney disease) stage 3, GFR 30-59 ml/min ([facility])    Degenerative disc disease, cervical    Degenerative disc disease, lumbar       Past Medical History:   Diagnosis Date    Anxiety     Chronic kidney disease     Depression     GERD (gastroesophageal reflux disease)     Hypertension     Kidney disease     Kidney stone        Past Surgical History:   Procedure Laterality Date    CHOLECYSTECTOMY      DENTAL SURGERY      [place] teeth extraction    ENDOMETRIAL ABLATION      GASTRIC RESTRICTION SURGERY      LITHOTRIPSY      TUBAL LIGATION         Family History   Problem Relation Age of Onset    Cirrhosis Mother     Kidney failure Mother     Diabetes Mother     Hypertension Mother     Kidney disease Mother     Kidney failure Father     Heart disease Father     Hypertension Father     Alcohol abuse Father     Kidney disease Father     Alcohol abuse Paternal Grandmother     Mental illness Maternal Uncle     Substance Abuse Neg Hx        Social History     Occupational History    Not on file   Tobacco Use    Smoking status: Never Smoker    Smokeless tobacco: Never Used   Substance and Sexual Activity    Alcohol use: Yes     Comment: Rarely    Drug use: Never    Sexual activity: Not Currently     Partners: Male       Current Outpatient Medications on File Prior to Visit   Medication Sig    albuterol (PROVENTIL HFA,VENTOLIN HFA) 90 mcg/act inhaler albuterol sulfate HFA 90 mcg/actuation aerosol inhaler   INHALE 2 PUFFS EVERY 4 HOURS AS NEEDED FOR WHEEZING OR SHORTNESS OF AIR      AMILoride 5 mg tablet Take 2 tablets (10 mg total) by mouth daily    buPROPion (WELLBUTRIN SR) 150 mg 12 hr tablet Take 1 tablet (150 mg total) by mouth 2 (two) times a day    fluticasone (FLONASE) 50 mcg/act nasal spray fluticasone propionate 50 mcg/actuation nasal spray,suspension    furosemide (LASIX) 40 mg tablet Take 1 tablet (40 mg total) by mouth daily (Patient taking differently: Take 40 mg by mouth once as needed )    gabapentin (NEURONTIN) 100 mg capsule Take 1 capsule (100 mg total) by mouth 4 (four) times a day    lisinopril (ZESTRIL) 2 5 mg tablet Take 1 tablet (2 5 mg total) by mouth daily    loratadine (CLARITIN) 10 mg tablet Take 1 tablet (10 mg total) by mouth daily    nitroglycerin (NITROSTAT) 0 4 mg SL tablet Place 1 tablet (0 4 mg total) under the tongue every 5 (five) minutes as needed for chest pain    omeprazole (PriLOSEC) 20 mg delayed release capsule Take 1 capsule (20 mg total) by mouth daily    potassium chloride (Klor-Con M20) 20 mEq tablet Take 1 tablet (20 mEq total) by mouth 3 (three) times a day    promethazine (PHENERGAN) 25 mg tablet Take 1 tablet (25 mg total) by mouth every 6 (six) hours as needed for nausea or vomiting    rOPINIRole (REQUIP) 0 25 mg tablet Take 1 tablet (0 25 mg total) by mouth daily at bedtime    sertraline (ZOLOFT) 50 mg tablet Take 1 tablet (50 mg total) by mouth daily    traMADol (ULTRAM) 50 mg tablet Take 50 mg by mouth 3 (three) times a day      No current facility-administered medications on file prior to visit  Allergies   Allergen Reactions    Sulfa Antibiotics Throat Swelling       Physical Exam    BP 99/63   Pulse 58   Temp 98 1 °F (36 7 °C)   Resp 16   Ht 5' 4"   Wt 86 2 kg (190 lb)   BMI 32 61 kg/m²     General: Well-developed, well-nourished individual in no acute distress  Mental: Appropriate mood and affect  Grossly oriented with coherent speech and thought processing   Neuro:  Cranial nerves: Cranial nerve function is grossly intact bilaterally   Strength: Bilateral upper and lower extremity strength is normal and symmetric   No atrophy or tone abnormalities noted   Reflexes: Bilateral upper and lower extremity muscle stretch reflexes are physiologic and symmetric   No ankle clonus is noted   Sensation: No loss of sensation is noted   SLR/Foraminal Compression Maneuvers: Straight leg raising in the sitting position is negative for radicular pain       Gait:  Gait/gross motor: Gait is normal  Station is normal  Toe walking, heel walking  are normal     Musculoskeletal:  Palpation: Inspection and palpation of the spine and extremities are unremarkable except for tenderness to palpation over lower lumbar facet joints reproducing her pain complaint  Spine: Normal pain-free range of motion except for lumbar extension which is significantly limited in reproduces her symptoms  No gross axial skeletal deformities   Skin: Skin inspection grossly negative for erythema, breakdown, or concerning lesions in affected area   Lymph: No lymphadenopathy is appreciated in the involved extremity   Vessels: No lower extremity edema   Lungs: Breathing is comfortable and regular  No dyspnea noted during examination   Eyes: Visual field grossly intact to confrontation  No redness appreciated  ENT: No craniofacial deformities or asymmetry  No neck masses appreciated           Imaging

## 2020-08-03 NOTE — PATIENT INSTRUCTIONS
Arthritis   WHAT YOU NEED TO KNOW:   Arthritis is a disease that causes inflammation in one or more joints  There are many types of arthritis, such as osteoarthritis, rheumatoid arthritis, and septic arthritis  Some types cause inflammation in the joints  Other types wear away the cartilage between joints  This makes the bones of the joint rub together when you move the joint  Your symptoms may be constant, or symptoms may come and go  Arthritis often gets worse over time and can cause permanent joint damage  DISCHARGE INSTRUCTIONS:   Return to the emergency department if:   · You have a fever and severe joint pain or swelling  · You cannot move the affected joint  · You have severe joint pain you cannot tolerate  Contact your healthcare provider if:   · Your pain or swelling does not get better with treatment  · You have questions or concerns about your condition or care  Medicines:   · Acetaminophen  decreases pain and fever  It is available without a doctor's order  Ask how much to take and how often to take it  Follow directions  Acetaminophen can cause liver damage if not taken correctly  · NSAIDs , such as ibuprofen, help decrease swelling, pain, and fever  This medicine is available with or without a doctor's order  NSAIDs can cause stomach bleeding or kidney problems in certain people  If you take blood thinner medicine, always ask your healthcare provider if NSAIDs are safe for you  Always read the medicine label and follow directions  · Steroids  reduce swelling and pain  · Prescription pain medicine  may be given  Do not wait until the pain is severe before you take your medicine  Ask your healthcare provider how to take this medicine safely  · Take your medicine as directed  Contact your healthcare provider if you think your medicine is not helping or if you have side effects  Tell him of her if you are allergic to any medicine   Keep a list of the medicines, vitamins, and herbs you take  Include the amounts, and when and why you take them  Bring the list or the pill bottles to follow-up visits  Carry your medicine list with you in case of an emergency  Follow up with your healthcare provider or rheumatologist as directed:  Write down your questions so you remember to ask them during your visits  Manage arthritis:   · Rest your painful joint so it can heal   Your healthcare provider may recommend crutches or a walker if the affected joint is in a leg  · Apply ice or heat to the joint  Both can help decrease swelling and pain  Ice may also help prevent tissue damage  Use an ice pack, or put crushed ice in a plastic bag  Cover it with a towel and place it on your joint for 15 to 20 minutes every hour or as directed  You can apply heat for 20 minutes every 2 hours  Heat treatment includes hot packs or heat lamps  · Elevate your joint  Elevation helps reduce swelling and pain  Raise your joint above the level of your heart as often as you can  Prop your painful joint on pillows to keep it above your heart comfortably  · Go to therapy as directed  A physical therapist can teach you exercises to improve flexibility and range of motion  You may also be shown non-weight-bearing exercises that are safe for your joints, such as swimming  Exercise can help keep your joints flexible and reduce pain  An occupational therapist can help you learn to do your daily activities when your joints are stiff or sore  · Maintain a healthy weight  Extra weight puts increased pressure on your joints  Ask your healthcare provider what you should weigh  If you need to lose weight, he can help you create a weight loss program  Weight loss can help reduce pain and increase your ability to do your activities  The amount of exercise you do may vary each day, depending on your symptoms  · Wear flat or low-heeled shoes    This will help decrease pain and reduce pressure on your ankle, knee, and hip joints  · Use support devices  You may be given splints to wear on your hands to help your joints rest and to decrease inflammation  While you sleep, use a pillow that is firm enough to support your neck and head  Support equipment:  The following may help you move and prevent falls:  · Orthotic shoes or insoles  help support your feet when you walk  · Crutches, a cane, or a walker  may help decrease your risk for falling  They also decrease stress on affected joints  · Devices to prevent falls  include raised toilet seats and bathtub bars to help you get up from sitting  Handrails can be placed in areas where you need balance and support  © 2017 [facility] Information is for End User's use only and may not be sold, redistributed or otherwise used for commercial purposes  All illustrations and images included in CareNotes® are the copyrighted property of A D A M [employer] Inc  or [employer]  The above information is an  only  It is not intended as medical advice for individual conditions or treatments  Talk to your doctor, nurse or pharmacist before following any medical regimen to see if it is safe and effective for you

## 2020-08-04 ENCOUNTER — TELEPHONE (OUTPATIENT)
Dept: RADIOLOGY | Facility: MEDICAL CENTER | Age: 50
End: 2020-08-04

## 2020-08-04 NOTE — TELEPHONE ENCOUNTER
----- Message from Kristy Kimble DO sent at 8/4/2020  1:06 PM EDT -----  L4-L5 mild degenerative spondylolisthesis with posterior facet arthrosis  Anatomic alignment otherwise       L4-L5, L5-S1 mild degenerative disc changes

## 2020-08-07 ENCOUNTER — TELEPHONE (OUTPATIENT)
Dept: PAIN MEDICINE | Facility: MEDICAL CENTER | Age: 50
End: 2020-08-07

## 2020-08-07 NOTE — TELEPHONE ENCOUNTER
----- Message from Phuong Qureshi DO sent at 8/7/2020  8:44 AM EDT -----  There is mild disc space narrowing at 6 7  Anterior bridging osteophytes seen at C4-5, C5-6 and C6-7

## 2020-08-10 DIAGNOSIS — N18.30 CKD (CHRONIC KIDNEY DISEASE) STAGE 3, GFR 30-59 ML/MIN (HCC): ICD-10-CM

## 2020-08-10 RX ORDER — FUROSEMIDE 40 MG/1
40 TABLET ORAL ONCE AS NEEDED
Qty: 30 TABLET | Refills: 1 | Status: SHIPPED | OUTPATIENT
Start: 2020-08-10 | End: 2021-02-26

## 2020-08-12 ENCOUNTER — TELEPHONE (OUTPATIENT)
Dept: SLEEP CENTER | Facility: CLINIC | Age: 50
End: 2020-08-12

## 2020-08-12 NOTE — TELEPHONE ENCOUNTER
Patient called, states she would like to use DME provider closer to home  Patient would like to use [facility]     Attempting to obtain sleep studies and office notes  Called [facility], they will fax or call if they do not have

## 2020-08-13 NOTE — TELEPHONE ENCOUNTER
Information received and sent to [facility] for resupply  I spoke with patient, advised above  I also questioned if [place] did pressure change, she states she had brought her machine with her to the appointment and went to a lady that made the change

## 2020-08-17 NOTE — TELEPHONE ENCOUNTER
S/w pt, informed her of cervical xray  Pt wants to know what the next steps are in regards to her neck pain? Pt coming for lumbar MBB on Wednesday  Schedule office visit to discuss the neck? Pt said she was on vacation and that it why she did not call us back, she apologized

## 2020-08-19 ENCOUNTER — HOSPITAL ENCOUNTER (OUTPATIENT)
Dept: RADIOLOGY | Facility: MEDICAL CENTER | Age: 50
Discharge: HOME | End: 2020-08-19
Attending: PHYSICAL MEDICINE & REHABILITATION | Admitting: PHYSICAL MEDICINE & REHABILITATION
Payer: COMMERCIAL

## 2020-08-19 VITALS
OXYGEN SATURATION: 96 % | RESPIRATION RATE: 20 BRPM | HEART RATE: 62 BPM | SYSTOLIC BLOOD PRESSURE: 108 MMHG | TEMPERATURE: 98 F | DIASTOLIC BLOOD PRESSURE: 71 MMHG

## 2020-08-19 DIAGNOSIS — M47.816 LUMBAR SPONDYLOSIS: ICD-10-CM

## 2020-08-19 DIAGNOSIS — M54.50 CHRONIC BILATERAL LOW BACK PAIN WITHOUT SCIATICA: ICD-10-CM

## 2020-08-19 DIAGNOSIS — G89.29 CHRONIC BILATERAL LOW BACK PAIN WITHOUT SCIATICA: ICD-10-CM

## 2020-08-19 PROCEDURE — 64494 INJ PARAVERT F JNT L/S 2 LEV: CPT | Performed by: PHYSICAL MEDICINE & REHABILITATION

## 2020-08-19 PROCEDURE — 64493 INJ PARAVERT F JNT L/S 1 LEV: CPT | Performed by: PHYSICAL MEDICINE & REHABILITATION

## 2020-08-19 RX ADMIN — Medication 3 ML: at 15:26

## 2020-08-19 NOTE — H&P
History of Present Illness:  The patient is a 48 y o  female who presents with complaints of Bilateral lower back pain    Patient Active Problem List   Diagnosis    Obstructive sleep apnea syndrome    Restless legs syndrome (RLS)    Essential (primary) hypertension    Allergic rhinitis    Depression with anxiety    Gastroesophageal reflux disease without esophagitis    Malabsorption due to intolerance, not elsewhere classified    Hypokalemia    CKD (chronic kidney disease) stage 3, GFR 30-59 ml/min ([facility])    Degenerative disc disease, cervical    Degenerative disc disease, lumbar    Lumbar spondylosis    Chronic bilateral low back pain without sciatica       Past Medical History:   Diagnosis Date    Anxiety     Chronic kidney disease     Depression     GERD (gastroesophageal reflux disease)     Hypertension     Kidney disease     Kidney stone        Past Surgical History:   Procedure Laterality Date    CHOLECYSTECTOMY      DENTAL SURGERY      [place] teeth extraction    ENDOMETRIAL ABLATION      GASTRIC RESTRICTION SURGERY      LITHOTRIPSY      TUBAL LIGATION           Current Outpatient Medications:     albuterol (PROVENTIL HFA,VENTOLIN HFA) 90 mcg/act inhaler, albuterol sulfate HFA 90 mcg/actuation aerosol inhaler  INHALE 2 PUFFS EVERY 4 HOURS AS NEEDED FOR WHEEZING OR SHORTNESS OF AIR , Disp: , Rfl:     AMILoride 5 mg tablet, Take 2 tablets (10 mg total) by mouth daily, Disp: 60 tablet, Rfl: 1    buPROPion (WELLBUTRIN SR) 150 mg 12 hr tablet, Take 1 tablet (150 mg total) by mouth 2 (two) times a day, Disp: 60 tablet, Rfl: 1    fluticasone (FLONASE) 50 mcg/act nasal spray, fluticasone propionate 50 mcg/actuation nasal spray,suspension, Disp: , Rfl:     furosemide (LASIX) 40 mg tablet, Take 1 tablet (40 mg total) by mouth once as needed (swelling), Disp: 30 tablet, Rfl: 1    gabapentin (NEURONTIN) 100 mg capsule, Take 1 capsule (100 mg total) by mouth 4 (four) times a day, Disp: 120 capsule, Rfl: 1    lisinopril (ZESTRIL) 2 5 mg tablet, Take 1 tablet (2 5 mg total) by mouth daily, Disp: 30 tablet, Rfl: 1    loratadine (CLARITIN) 10 mg tablet, Take 1 tablet (10 mg total) by mouth daily, Disp: 90 tablet, Rfl: 1    nitroglycerin (NITROSTAT) 0 4 mg SL tablet, Place 1 tablet (0 4 mg total) under the tongue every 5 (five) minutes as needed for chest pain, Disp: 30 tablet, Rfl: 0    omeprazole (PriLOSEC) 20 mg delayed release capsule, Take 1 capsule (20 mg total) by mouth daily, Disp: 30 capsule, Rfl: 1    potassium chloride (Klor-Con M20) 20 mEq tablet, Take 1 tablet (20 mEq total) by mouth 3 (three) times a day, Disp: 90 tablet, Rfl: 1    promethazine (PHENERGAN) 25 mg tablet, Take 1 tablet (25 mg total) by mouth every 6 (six) hours as needed for nausea or vomiting, Disp: 30 tablet, Rfl: 0    rOPINIRole (REQUIP) 0 25 mg tablet, Take 1 tablet (0 25 mg total) by mouth daily at bedtime, Disp: 30 tablet, Rfl: 3    sertraline (ZOLOFT) 50 mg tablet, Take 1 tablet (50 mg total) by mouth daily, Disp: 30 tablet, Rfl: 1    traMADol (ULTRAM) 50 mg tablet, Take 50 mg by mouth 3 (three) times a day , Disp: , Rfl:     Current Facility-Administered Medications:     lidocaine (PF) (XYLOCAINE-MPF) 2 % injection 4 mL, 4 mL, Perineural, Once, Cozette Arina, DO    Allergies   Allergen Reactions    Sulfa Antibiotics Throat Swelling       Physical Exam:   Vitals:    08/19/20 1516   BP: 116/78   Pulse: 66   Resp: 20   Temp: 98 °F (36 7 °C)   SpO2: 96%     General: Awake, Alert, Oriented x 3, Mood and affect appropriate  Respiratory: Respirations even and unlabored  Cardiovascular: Peripheral pulses intact; no edema  Musculoskeletal Exam:  Bilateral lower back pain    ASA Score:  2  Patient/Chart Verification  Patient ID Verified: Verbal  ID Band Applied: No  Consents Confirmed: Procedural, To be obtained in the Pre-Procedure area  H&P( within 30 days) Verified:  To be obtained in the Pre-Procedure area  Interval H&P(within 24 hr) Complete (required for Outpatients and Surgery Admit only): To be obtained in the Pre-Procedure area  Allergies Reviewed: Yes  Anticoag/NSAID held?: No  Currently on antibiotics?: No  Pregnancy denied?: Yes    Assessment:   1  Lumbar spondylosis    2   Chronic bilateral low back pain without sciatica        Plan: (B) L3-5 MBB#1

## 2020-08-19 NOTE — DISCHARGE INSTRUCTIONS

## 2020-08-20 ENCOUNTER — TELEPHONE (OUTPATIENT)
Dept: RADIOLOGY | Facility: MEDICAL CENTER | Age: 50
End: 2020-08-20

## 2020-08-20 NOTE — TELEPHONE ENCOUNTER
Pain diary reviewed by Dr Ricky Live; proceed with MBB #2; I will call and coordinate       Bilateral L3-5

## 2020-08-20 NOTE — TELEPHONE ENCOUNTER
Patient scheduled 9/18  reviewed all pre-procedural instructions including updated protocol/screening  Pt to wear a mask  Pt will need a   NPO 1 hr prior, wear pants w/o metal, call back to r/s if become ill/abx  Pt aware pain level needs to be 5 or higher, no pain meds prescribed prn/as needed or OTC  COVID screening done  Pt verbalized understanding

## 2020-09-13 DIAGNOSIS — M50.30 DEGENERATIVE DISC DISEASE, CERVICAL: ICD-10-CM

## 2020-09-13 DIAGNOSIS — M51.36 DEGENERATIVE DISC DISEASE, LUMBAR: ICD-10-CM

## 2020-09-13 DIAGNOSIS — K21.9 GASTROESOPHAGEAL REFLUX DISEASE WITHOUT ESOPHAGITIS: ICD-10-CM

## 2020-09-14 RX ORDER — GABAPENTIN 100 MG/1
100 CAPSULE ORAL 4 TIMES DAILY
Qty: 120 CAPSULE | Refills: 1 | Status: SHIPPED | OUTPATIENT
Start: 2020-09-14 | End: 2021-01-08 | Stop reason: ALTCHOICE

## 2020-09-14 RX ORDER — PROMETHAZINE HYDROCHLORIDE 25 MG/1
TABLET ORAL
Qty: 30 TABLET | Refills: 0 | Status: SHIPPED | OUTPATIENT
Start: 2020-09-14 | End: 2020-11-22

## 2020-09-18 ENCOUNTER — HOSPITAL ENCOUNTER (OUTPATIENT)
Dept: RADIOLOGY | Facility: MEDICAL CENTER | Age: 50
Discharge: HOME | End: 2020-09-18
Attending: PHYSICAL MEDICINE & REHABILITATION
Payer: COMMERCIAL

## 2020-09-18 ENCOUNTER — APPOINTMENT (OUTPATIENT)
Dept: LAB | Facility: MEDICAL CENTER | Age: 50
End: 2020-09-18
Payer: COMMERCIAL

## 2020-09-18 VITALS
HEART RATE: 62 BPM | OXYGEN SATURATION: 98 % | TEMPERATURE: 97.8 F | DIASTOLIC BLOOD PRESSURE: 78 MMHG | RESPIRATION RATE: 20 BRPM | SYSTOLIC BLOOD PRESSURE: 119 MMHG

## 2020-09-18 DIAGNOSIS — M47.816 LUMBAR SPONDYLOSIS: ICD-10-CM

## 2020-09-18 DIAGNOSIS — E87.6 HYPOKALEMIA: ICD-10-CM

## 2020-09-18 LAB
ANION GAP SERPL CALCULATED.3IONS-SCNC: 5 MMOL/L (ref 4–13)
BUN SERPL-MCNC: 14 MG/DL (ref 5–25)
CALCIUM SERPL-MCNC: 9.7 MG/DL (ref 8.3–10.1)
CHLORIDE SERPL-SCNC: 104 MMOL/L (ref 100–108)
CO2 SERPL-SCNC: 30 MMOL/L (ref 21–32)
CREAT SERPL-MCNC: 0.8 MG/DL (ref 0.6–1.3)
GFR SERPL CREATININE-BSD FRML MDRD: 86 ML/MIN/1.73SQ M
GLUCOSE P FAST SERPL-MCNC: 75 MG/DL (ref 65–99)
MAGNESIUM SERPL-MCNC: 2.1 MG/DL (ref 1.6–2.6)
POTASSIUM SERPL-SCNC: 3.9 MMOL/L (ref 3.5–5.3)
SODIUM SERPL-SCNC: 139 MMOL/L (ref 136–145)

## 2020-09-18 PROCEDURE — 83735 ASSAY OF MAGNESIUM: CPT

## 2020-09-18 PROCEDURE — 36415 COLL VENOUS BLD VENIPUNCTURE: CPT

## 2020-09-18 PROCEDURE — 80048 BASIC METABOLIC PNL TOTAL CA: CPT

## 2020-09-18 PROCEDURE — 64494 INJ PARAVERT F JNT L/S 2 LEV: CPT | Performed by: PHYSICAL MEDICINE & REHABILITATION

## 2020-09-18 PROCEDURE — 64493 INJ PARAVERT F JNT L/S 1 LEV: CPT | Performed by: PHYSICAL MEDICINE & REHABILITATION

## 2020-09-18 RX ORDER — BUPIVACAINE HYDROCHLORIDE 5 MG/ML
10 INJECTION, SOLUTION EPIDURAL; INTRACAUDAL ONCE
Status: COMPLETED | OUTPATIENT
Start: 2020-09-18 | End: 2020-09-18

## 2020-09-18 RX ADMIN — BUPIVACAINE HYDROCHLORIDE 3 ML: 5 INJECTION, SOLUTION EPIDURAL; INTRACAUDAL at 11:45

## 2020-09-18 NOTE — DISCHARGE INSTRUCTIONS

## 2020-09-18 NOTE — H&P
History of Present Illness:  The patient is a 48 y o  female who presents with complaints of Bilateral lower back pain    Patient Active Problem List   Diagnosis    Obstructive sleep apnea syndrome    Restless legs syndrome (RLS)    Essential (primary) hypertension    Allergic rhinitis    Depression with anxiety    Gastroesophageal reflux disease without esophagitis    Malabsorption due to intolerance, not elsewhere classified    Hypokalemia    CKD (chronic kidney disease) stage 3, GFR 30-59 ml/min ([facility])    Degenerative disc disease, cervical    Degenerative disc disease, lumbar    Lumbar spondylosis    Chronic bilateral low back pain without sciatica       Past Medical History:   Diagnosis Date    Anxiety     Chronic kidney disease     Depression     GERD (gastroesophageal reflux disease)     Hypertension     Kidney disease     Kidney stone        Past Surgical History:   Procedure Laterality Date    CHOLECYSTECTOMY      DENTAL SURGERY      [place] teeth extraction    ENDOMETRIAL ABLATION      GASTRIC RESTRICTION SURGERY      LITHOTRIPSY      TUBAL LIGATION           Current Outpatient Medications:     albuterol (PROVENTIL HFA,VENTOLIN HFA) 90 mcg/act inhaler, albuterol sulfate HFA 90 mcg/actuation aerosol inhaler  INHALE 2 PUFFS EVERY 4 HOURS AS NEEDED FOR WHEEZING OR SHORTNESS OF AIR , Disp: , Rfl:     AMILoride 5 mg tablet, Take 2 tablets (10 mg total) by mouth daily, Disp: 60 tablet, Rfl: 1    buPROPion (WELLBUTRIN SR) 150 mg 12 hr tablet, Take 1 tablet (150 mg total) by mouth 2 (two) times a day, Disp: 60 tablet, Rfl: 1    fluticasone (FLONASE) 50 mcg/act nasal spray, fluticasone propionate 50 mcg/actuation nasal spray,suspension, Disp: , Rfl:     furosemide (LASIX) 40 mg tablet, Take 1 tablet (40 mg total) by mouth once as needed (swelling), Disp: 30 tablet, Rfl: 1    gabapentin (NEURONTIN) 100 mg capsule, TAKE 1 CAPSULE (100 MG TOTAL) BY MOUTH 4 (FOUR) TIMES A DAY, Disp: 120 capsule, Rfl: 1    lisinopril (ZESTRIL) 2 5 mg tablet, Take 1 tablet (2 5 mg total) by mouth daily, Disp: 30 tablet, Rfl: 1    loratadine (CLARITIN) 10 mg tablet, Take 1 tablet (10 mg total) by mouth daily, Disp: 90 tablet, Rfl: 1    nitroglycerin (NITROSTAT) 0 4 mg SL tablet, Place 1 tablet (0 4 mg total) under the tongue every 5 (five) minutes as needed for chest pain, Disp: 30 tablet, Rfl: 0    omeprazole (PriLOSEC) 20 mg delayed release capsule, Take 1 capsule (20 mg total) by mouth daily, Disp: 30 capsule, Rfl: 1    potassium chloride (Klor-Con M20) 20 mEq tablet, Take 1 tablet (20 mEq total) by mouth 3 (three) times a day, Disp: 90 tablet, Rfl: 1    promethazine (PHENERGAN) 25 mg tablet, TAKE 1 TABLET BY MOUTH EVERY 6 HOURS AS NEEDED FOR NAUSEA AND VOMITING, Disp: 30 tablet, Rfl: 0    rOPINIRole (REQUIP) 0 25 mg tablet, Take 1 tablet (0 25 mg total) by mouth daily at bedtime, Disp: 30 tablet, Rfl: 3    sertraline (ZOLOFT) 50 mg tablet, Take 1 tablet (50 mg total) by mouth daily, Disp: 30 tablet, Rfl: 1    traMADol (ULTRAM) 50 mg tablet, Take 50 mg by mouth 3 (three) times a day , Disp: , Rfl:     Current Facility-Administered Medications:     bupivacaine (PF) (MARCAINE) 0 5 % injection 10 mL, 10 mL, Injection, Once, Rush Bushme, DO    Allergies   Allergen Reactions    Sulfa Antibiotics Throat Swelling       Physical Exam:   Vitals:    09/18/20 1125   BP: 120/76   Pulse: 62   Resp: 20   Temp: 97 8 °F (36 6 °C)   SpO2: 98%     General: Awake, Alert, Oriented x 3, Mood and affect appropriate  Respiratory: Respirations even and unlabored  Cardiovascular: Peripheral pulses intact; no edema  Musculoskeletal Exam:  Bilateral lower back    ASA Score:  2  Patient/Chart Verification  Patient ID Verified: Verbal  ID Band Applied: No  Consents Confirmed: Procedural, To be obtained in the Pre-Procedure area  H&P( within 30 days) Verified:  To be obtained in the Pre-Procedure area  Interval H&P(within 24 hr) Complete (required for Outpatients and Surgery Admit only): To be obtained in the Pre-Procedure area  Allergies Reviewed: Yes  Anticoag/NSAID held?: NA  Currently on antibiotics?: No  Pregnancy denied?: Yes    Assessment:   1   Lumbar spondylosis        Plan: ROBYN L3-5 MBB #2

## 2020-09-20 DIAGNOSIS — F41.8 DEPRESSION WITH ANXIETY: ICD-10-CM

## 2020-09-22 ENCOUNTER — TELEPHONE (OUTPATIENT)
Dept: RADIOLOGY | Facility: MEDICAL CENTER | Age: 50
End: 2020-09-22

## 2020-09-22 NOTE — TELEPHONE ENCOUNTER
Pain diary reviewed by Dr Sergey Linn; proceed with RFA and f/u; I will call and coordinate       Bilateral L3-5

## 2020-09-23 NOTE — TELEPHONE ENCOUNTER
Called patient and scheudled:     Left L3-5 RFA on 10/23  Right L3-5 RFA on 11/6    Scheduled for cervical MBB on 10/5    Will scheduled 1 f/u to follow when all RFA lumbar and cervical are done

## 2020-10-05 ENCOUNTER — HOSPITAL ENCOUNTER (OUTPATIENT)
Dept: RADIOLOGY | Facility: MEDICAL CENTER | Age: 50
Discharge: HOME | End: 2020-10-05
Attending: PHYSICAL MEDICINE & REHABILITATION
Payer: COMMERCIAL

## 2020-10-05 VITALS
DIASTOLIC BLOOD PRESSURE: 75 MMHG | OXYGEN SATURATION: 96 % | SYSTOLIC BLOOD PRESSURE: 114 MMHG | RESPIRATION RATE: 18 BRPM | HEART RATE: 57 BPM | TEMPERATURE: 98 F

## 2020-10-05 DIAGNOSIS — M54.2 NECK PAIN: ICD-10-CM

## 2020-10-05 DIAGNOSIS — M47.812 CERVICAL SPONDYLOSIS WITHOUT MYELOPATHY: ICD-10-CM

## 2020-10-05 PROCEDURE — 64491 INJ PARAVERT F JNT C/T 2 LEV: CPT | Performed by: PHYSICAL MEDICINE & REHABILITATION

## 2020-10-05 PROCEDURE — 64490 INJ PARAVERT F JNT C/T 1 LEV: CPT | Performed by: PHYSICAL MEDICINE & REHABILITATION

## 2020-10-05 RX ADMIN — Medication 1.5 ML: at 16:01

## 2020-10-06 ENCOUNTER — TELEPHONE (OUTPATIENT)
Dept: RADIOLOGY | Facility: MEDICAL CENTER | Age: 50
End: 2020-10-06

## 2020-10-07 DIAGNOSIS — K21.9 GASTROESOPHAGEAL REFLUX DISEASE WITHOUT ESOPHAGITIS: ICD-10-CM

## 2020-10-07 RX ORDER — OMEPRAZOLE 20 MG/1
CAPSULE, DELAYED RELEASE ORAL
Qty: 30 CAPSULE | Refills: 1 | Status: SHIPPED | OUTPATIENT
Start: 2020-10-07 | End: 2020-11-30

## 2020-10-09 ENCOUNTER — TELEPHONE (OUTPATIENT)
Dept: FAMILY MEDICINE CLINIC | Facility: CLINIC | Age: 50
End: 2020-10-09

## 2020-10-16 DIAGNOSIS — N18.30 CKD (CHRONIC KIDNEY DISEASE) STAGE 3, GFR 30-59 ML/MIN (HCC): ICD-10-CM

## 2020-10-16 RX ORDER — BUPROPION HYDROCHLORIDE 150 MG/1
TABLET, EXTENDED RELEASE ORAL
Qty: 60 TABLET | Refills: 0 | Status: SHIPPED | OUTPATIENT
Start: 2020-10-16 | End: 2020-11-10

## 2020-10-16 RX ORDER — AMILORIDE HYDROCHLORIDE 5 MG/1
TABLET ORAL
Qty: 60 TABLET | Refills: 1 | Status: SHIPPED | OUTPATIENT
Start: 2020-10-16 | End: 2020-12-18

## 2020-10-23 ENCOUNTER — TELEPHONE (OUTPATIENT)
Dept: NEPHROLOGY | Facility: CLINIC | Age: 50
End: 2020-10-23

## 2020-10-23 ENCOUNTER — HOSPITAL ENCOUNTER (OUTPATIENT)
Dept: RADIOLOGY | Facility: MEDICAL CENTER | Age: 50
Discharge: HOME | End: 2020-10-23
Attending: PHYSICAL MEDICINE & REHABILITATION | Admitting: PHYSICAL MEDICINE & REHABILITATION
Payer: COMMERCIAL

## 2020-10-23 ENCOUNTER — TELEPHONE (OUTPATIENT)
Dept: PAIN MEDICINE | Facility: MEDICAL CENTER | Age: 50
End: 2020-10-23

## 2020-10-23 VITALS
SYSTOLIC BLOOD PRESSURE: 114 MMHG | RESPIRATION RATE: 20 BRPM | OXYGEN SATURATION: 98 % | TEMPERATURE: 98.4 F | HEART RATE: 59 BPM | DIASTOLIC BLOOD PRESSURE: 75 MMHG

## 2020-10-23 DIAGNOSIS — M47.816 LUMBAR SPONDYLOSIS: ICD-10-CM

## 2020-10-23 DIAGNOSIS — M54.50 LOW BACK PAIN: ICD-10-CM

## 2020-10-23 DIAGNOSIS — N18.31 STAGE 3A CHRONIC KIDNEY DISEASE (HCC): Primary | ICD-10-CM

## 2020-10-23 PROCEDURE — 64636 DESTROY L/S FACET JNT ADDL: CPT | Performed by: PHYSICAL MEDICINE & REHABILITATION

## 2020-10-23 PROCEDURE — 64635 DESTROY LUMB/SAC FACET JNT: CPT | Performed by: PHYSICAL MEDICINE & REHABILITATION

## 2020-10-23 RX ORDER — BUPIVACAINE HCL/PF 2.5 MG/ML
10 VIAL (ML) INJECTION ONCE
Status: COMPLETED | OUTPATIENT
Start: 2020-10-23 | End: 2020-10-23

## 2020-10-23 RX ORDER — LIDOCAINE HYDROCHLORIDE 10 MG/ML
5 INJECTION, SOLUTION EPIDURAL; INFILTRATION; INTRACAUDAL; PERINEURAL ONCE
Status: COMPLETED | OUTPATIENT
Start: 2020-10-23 | End: 2020-10-23

## 2020-10-23 RX ADMIN — Medication 5 ML: at 09:57

## 2020-10-23 RX ADMIN — Medication 5 ML: at 09:51

## 2020-10-23 RX ADMIN — LIDOCAINE HYDROCHLORIDE 2.5 ML: 10 INJECTION, SOLUTION EPIDURAL; INFILTRATION; INTRACAUDAL; PERINEURAL at 09:48

## 2020-10-28 ENCOUNTER — OFFICE VISIT (OUTPATIENT)
Dept: FAMILY MEDICINE CLINIC | Facility: CLINIC | Age: 50
End: 2020-10-28
Payer: COMMERCIAL

## 2020-10-28 VITALS
DIASTOLIC BLOOD PRESSURE: 70 MMHG | RESPIRATION RATE: 16 BRPM | WEIGHT: 184.7 LBS | SYSTOLIC BLOOD PRESSURE: 110 MMHG | BODY MASS INDEX: 31.53 KG/M2 | HEIGHT: 64 IN | TEMPERATURE: 97.7 F | HEART RATE: 72 BPM

## 2020-10-28 DIAGNOSIS — M47.812 CERVICAL SPONDYLOSIS WITHOUT MYELOPATHY: ICD-10-CM

## 2020-10-28 DIAGNOSIS — G25.81 RESTLESS LEGS SYNDROME (RLS): ICD-10-CM

## 2020-10-28 DIAGNOSIS — Z12.31 ENCOUNTER FOR SCREENING MAMMOGRAM FOR MALIGNANT NEOPLASM OF BREAST: Primary | ICD-10-CM

## 2020-10-28 DIAGNOSIS — B37.9 CANDIDA INFECTION: ICD-10-CM

## 2020-10-28 DIAGNOSIS — M54.50 CHRONIC BILATERAL LOW BACK PAIN WITHOUT SCIATICA: ICD-10-CM

## 2020-10-28 DIAGNOSIS — K90.49 MALABSORPTION DUE TO INTOLERANCE, NOT ELSEWHERE CLASSIFIED: ICD-10-CM

## 2020-10-28 DIAGNOSIS — I10 ESSENTIAL (PRIMARY) HYPERTENSION: ICD-10-CM

## 2020-10-28 DIAGNOSIS — G89.29 CHRONIC BILATERAL LOW BACK PAIN WITHOUT SCIATICA: ICD-10-CM

## 2020-10-28 DIAGNOSIS — E78.5 HYPERLIPIDEMIA, UNSPECIFIED HYPERLIPIDEMIA TYPE: ICD-10-CM

## 2020-10-28 DIAGNOSIS — G47.33 OBSTRUCTIVE SLEEP APNEA SYNDROME: ICD-10-CM

## 2020-10-28 PROBLEM — E87.6 HYPOKALEMIA: Status: RESOLVED | Noted: 2020-07-14 | Resolved: 2020-10-28

## 2020-10-28 PROBLEM — K21.00 GASTROESOPHAGEAL REFLUX DISEASE WITH ESOPHAGITIS: Status: ACTIVE | Noted: 2018-10-29

## 2020-10-28 PROCEDURE — 99214 OFFICE O/P EST MOD 30 MIN: CPT | Performed by: PHYSICIAN ASSISTANT

## 2020-10-28 PROCEDURE — 3008F BODY MASS INDEX DOCD: CPT | Performed by: PHYSICIAN ASSISTANT

## 2020-10-28 PROCEDURE — 93000 ELECTROCARDIOGRAM COMPLETE: CPT | Performed by: PHYSICIAN ASSISTANT

## 2020-10-28 RX ORDER — GABAPENTIN 300 MG/1
300 CAPSULE ORAL 3 TIMES DAILY
Qty: 90 CAPSULE | Refills: 1 | Status: SHIPPED | OUTPATIENT
Start: 2020-10-28 | End: 2020-12-30

## 2020-10-28 RX ORDER — NYSTATIN 100000 [USP'U]/G
POWDER TOPICAL 3 TIMES DAILY
Qty: 15 G | Refills: 1 | Status: SHIPPED | OUTPATIENT
Start: 2020-10-28 | End: 2020-11-22

## 2020-11-03 DIAGNOSIS — M54.50 CHRONIC BILATERAL LOW BACK PAIN WITHOUT SCIATICA: ICD-10-CM

## 2020-11-03 DIAGNOSIS — G89.29 CHRONIC BILATERAL LOW BACK PAIN WITHOUT SCIATICA: Primary | ICD-10-CM

## 2020-11-03 DIAGNOSIS — G25.81 RESTLESS LEGS SYNDROME (RLS): ICD-10-CM

## 2020-11-03 DIAGNOSIS — G89.29 CHRONIC BILATERAL LOW BACK PAIN WITHOUT SCIATICA: ICD-10-CM

## 2020-11-03 DIAGNOSIS — M54.50 CHRONIC BILATERAL LOW BACK PAIN WITHOUT SCIATICA: Primary | ICD-10-CM

## 2020-11-03 RX ORDER — TRAMADOL HYDROCHLORIDE 50 MG/1
50 TABLET ORAL 3 TIMES DAILY
Qty: 60 TABLET | Refills: 0 | Status: SHIPPED | OUTPATIENT
Start: 2020-11-03 | End: 2020-11-03 | Stop reason: SDUPTHER

## 2020-11-03 RX ORDER — ROPINIROLE 0.25 MG/1
0.25 TABLET, FILM COATED ORAL
Qty: 30 TABLET | Refills: 3 | Status: SHIPPED | OUTPATIENT
Start: 2020-11-03 | End: 2021-02-23

## 2020-11-03 RX ORDER — TRAMADOL HYDROCHLORIDE 50 MG/1
50 TABLET ORAL 3 TIMES DAILY
Qty: 60 TABLET | Refills: 0 | Status: SHIPPED | OUTPATIENT
Start: 2020-11-03 | End: 2021-01-08 | Stop reason: ALTCHOICE

## 2020-11-06 ENCOUNTER — HOSPITAL ENCOUNTER (OUTPATIENT)
Dept: RADIOLOGY | Facility: MEDICAL CENTER | Age: 50
Discharge: HOME | End: 2020-11-06
Attending: PHYSICAL MEDICINE & REHABILITATION
Payer: COMMERCIAL

## 2020-11-06 ENCOUNTER — TELEPHONE (OUTPATIENT)
Dept: PAIN MEDICINE | Facility: MEDICAL CENTER | Age: 50
End: 2020-11-06

## 2020-11-06 VITALS
DIASTOLIC BLOOD PRESSURE: 78 MMHG | SYSTOLIC BLOOD PRESSURE: 120 MMHG | OXYGEN SATURATION: 99 % | RESPIRATION RATE: 20 BRPM | HEART RATE: 56 BPM | TEMPERATURE: 98.1 F

## 2020-11-06 DIAGNOSIS — M54.50 LOW BACK PAIN: ICD-10-CM

## 2020-11-06 DIAGNOSIS — M47.816 LUMBAR SPONDYLOSIS: ICD-10-CM

## 2020-11-06 PROCEDURE — 64635 DESTROY LUMB/SAC FACET JNT: CPT | Performed by: PHYSICAL MEDICINE & REHABILITATION

## 2020-11-06 PROCEDURE — 64636 DESTROY L/S FACET JNT ADDL: CPT | Performed by: PHYSICAL MEDICINE & REHABILITATION

## 2020-11-06 RX ORDER — BUPIVACAINE HCL/PF 2.5 MG/ML
10 VIAL (ML) INJECTION ONCE
Status: COMPLETED | OUTPATIENT
Start: 2020-11-06 | End: 2020-11-06

## 2020-11-06 RX ORDER — LIDOCAINE HYDROCHLORIDE 10 MG/ML
5 INJECTION, SOLUTION EPIDURAL; INFILTRATION; INTRACAUDAL; PERINEURAL ONCE
Status: COMPLETED | OUTPATIENT
Start: 2020-11-06 | End: 2020-11-06

## 2020-11-06 RX ADMIN — LIDOCAINE HYDROCHLORIDE 2.5 ML: 10 INJECTION, SOLUTION EPIDURAL; INFILTRATION; INTRACAUDAL; PERINEURAL at 09:40

## 2020-11-06 RX ADMIN — Medication 4 ML: at 09:44

## 2020-11-06 RX ADMIN — Medication 5 ML: at 09:49

## 2020-11-10 DIAGNOSIS — R07.9 CHEST PAIN, UNSPECIFIED TYPE: Primary | ICD-10-CM

## 2020-11-10 RX ORDER — BUPROPION HYDROCHLORIDE 150 MG/1
TABLET, EXTENDED RELEASE ORAL
Qty: 60 TABLET | Refills: 5 | Status: SHIPPED | OUTPATIENT
Start: 2020-11-10 | End: 2021-04-27

## 2020-11-11 ENCOUNTER — LAB (OUTPATIENT)
Dept: LAB | Facility: MEDICAL CENTER | Age: 50
End: 2020-11-11
Payer: COMMERCIAL

## 2020-11-11 ENCOUNTER — TELEPHONE (OUTPATIENT)
Dept: FAMILY MEDICINE CLINIC | Facility: CLINIC | Age: 50
End: 2020-11-11

## 2020-11-11 DIAGNOSIS — R07.9 CHEST PAIN, UNSPECIFIED TYPE: ICD-10-CM

## 2020-11-11 LAB
MAGNESIUM SERPL-MCNC: 2.3 MG/DL (ref 1.6–2.6)
POTASSIUM SERPL-SCNC: 4.1 MMOL/L (ref 3.5–5.3)

## 2020-11-11 PROCEDURE — 84132 ASSAY OF SERUM POTASSIUM: CPT

## 2020-11-11 PROCEDURE — 36415 COLL VENOUS BLD VENIPUNCTURE: CPT

## 2020-11-11 PROCEDURE — 83735 ASSAY OF MAGNESIUM: CPT

## 2020-11-20 ENCOUNTER — HOSPITAL ENCOUNTER (OUTPATIENT)
Dept: RADIOLOGY | Facility: MEDICAL CENTER | Age: 50
Discharge: HOME | End: 2020-11-20
Attending: PHYSICAL MEDICINE & REHABILITATION | Admitting: PHYSICAL MEDICINE & REHABILITATION
Payer: COMMERCIAL

## 2020-11-20 VITALS
OXYGEN SATURATION: 100 % | TEMPERATURE: 98.6 F | DIASTOLIC BLOOD PRESSURE: 85 MMHG | SYSTOLIC BLOOD PRESSURE: 131 MMHG | RESPIRATION RATE: 20 BRPM | HEART RATE: 59 BPM

## 2020-11-20 DIAGNOSIS — M47.812 CERVICAL SPONDYLOSIS WITHOUT MYELOPATHY: ICD-10-CM

## 2020-11-20 DIAGNOSIS — M54.2 NECK PAIN: ICD-10-CM

## 2020-11-20 PROCEDURE — 64491 INJ PARAVERT F JNT C/T 2 LEV: CPT | Performed by: PHYSICAL MEDICINE & REHABILITATION

## 2020-11-20 PROCEDURE — 64490 INJ PARAVERT F JNT C/T 1 LEV: CPT | Performed by: PHYSICAL MEDICINE & REHABILITATION

## 2020-11-20 RX ORDER — BUPIVACAINE HYDROCHLORIDE 5 MG/ML
10 INJECTION, SOLUTION EPIDURAL; INTRACAUDAL ONCE
Status: COMPLETED | OUTPATIENT
Start: 2020-11-20 | End: 2020-11-20

## 2020-11-20 RX ADMIN — BUPIVACAINE HYDROCHLORIDE 1.5 ML: 5 INJECTION, SOLUTION EPIDURAL; INTRACAUDAL at 09:35

## 2020-11-22 ENCOUNTER — OFFICE VISIT (OUTPATIENT)
Dept: URGENT CARE | Facility: CLINIC | Age: 50
End: 2020-11-22
Payer: COMMERCIAL

## 2020-11-22 VITALS
HEIGHT: 64 IN | HEART RATE: 80 BPM | BODY MASS INDEX: 31.41 KG/M2 | TEMPERATURE: 99.1 F | RESPIRATION RATE: 18 BRPM | WEIGHT: 184 LBS | SYSTOLIC BLOOD PRESSURE: 90 MMHG | DIASTOLIC BLOOD PRESSURE: 60 MMHG | OXYGEN SATURATION: 100 %

## 2020-11-22 DIAGNOSIS — B37.9 CANDIDA INFECTION: ICD-10-CM

## 2020-11-22 DIAGNOSIS — R39.9 UTI SYMPTOMS: Primary | ICD-10-CM

## 2020-11-22 DIAGNOSIS — K21.9 GASTROESOPHAGEAL REFLUX DISEASE WITHOUT ESOPHAGITIS: ICD-10-CM

## 2020-11-22 LAB
SL AMB  POCT GLUCOSE, UA: ABNORMAL
SL AMB LEUKOCYTE ESTERASE,UA: ABNORMAL
SL AMB POCT BILIRUBIN,UA: ABNORMAL
SL AMB POCT BLOOD,UA: ABNORMAL
SL AMB POCT CLARITY,UA: CLEAR
SL AMB POCT COLOR,UA: ABNORMAL
SL AMB POCT KETONES,UA: ABNORMAL
SL AMB POCT NITRITE,UA: ABNORMAL
SL AMB POCT PH,UA: ABNORMAL
SL AMB POCT SPECIFIC GRAVITY,UA: ABNORMAL
SL AMB POCT URINE PROTEIN: ABNORMAL
SL AMB POCT UROBILINOGEN: ABNORMAL

## 2020-11-22 PROCEDURE — 99203 OFFICE O/P NEW LOW 30 MIN: CPT | Performed by: PREVENTIVE MEDICINE

## 2020-11-22 PROCEDURE — G0382 LEV 3 HOSP TYPE B ED VISIT: HCPCS | Performed by: PREVENTIVE MEDICINE

## 2020-11-22 PROCEDURE — 99283 EMERGENCY DEPT VISIT LOW MDM: CPT | Performed by: PREVENTIVE MEDICINE

## 2020-11-22 PROCEDURE — 87086 URINE CULTURE/COLONY COUNT: CPT | Performed by: PREVENTIVE MEDICINE

## 2020-11-22 RX ORDER — NYSTATIN 100000 [USP'U]/G
POWDER TOPICAL
Qty: 15 G | Refills: 1 | Status: SHIPPED | OUTPATIENT
Start: 2020-11-22 | End: 2020-12-12

## 2020-11-22 RX ORDER — NITROFURANTOIN 25; 75 MG/1; MG/1
100 CAPSULE ORAL 2 TIMES DAILY
Qty: 10 CAPSULE | Refills: 0 | Status: SHIPPED | OUTPATIENT
Start: 2020-11-22 | End: 2020-11-27

## 2020-11-22 RX ORDER — FLUCONAZOLE 150 MG/1
TABLET ORAL
Qty: 2 TABLET | Refills: 0 | Status: SHIPPED | OUTPATIENT
Start: 2020-11-22 | End: 2020-11-26

## 2020-11-22 RX ORDER — PROMETHAZINE HYDROCHLORIDE 25 MG/1
TABLET ORAL
Qty: 30 TABLET | Refills: 0 | Status: SHIPPED | OUTPATIENT
Start: 2020-11-22 | End: 2021-01-29

## 2020-11-23 LAB — BACTERIA UR CULT: NORMAL

## 2020-11-25 ENCOUNTER — TELEPHONE (OUTPATIENT)
Dept: RADIOLOGY | Facility: MEDICAL CENTER | Age: 50
End: 2020-11-25

## 2020-11-30 DIAGNOSIS — K21.9 GASTROESOPHAGEAL REFLUX DISEASE WITHOUT ESOPHAGITIS: ICD-10-CM

## 2020-11-30 RX ORDER — OMEPRAZOLE 20 MG/1
CAPSULE, DELAYED RELEASE ORAL
Qty: 30 CAPSULE | Refills: 1 | Status: SHIPPED | OUTPATIENT
Start: 2020-11-30 | End: 2021-01-26

## 2020-12-12 DIAGNOSIS — B37.9 CANDIDA INFECTION: ICD-10-CM

## 2020-12-12 RX ORDER — NYSTATIN 100000 [USP'U]/G
POWDER TOPICAL
Qty: 15 G | Refills: 1 | Status: SHIPPED | OUTPATIENT
Start: 2020-12-12 | End: 2021-01-07

## 2020-12-15 ENCOUNTER — TELEPHONE (OUTPATIENT)
Dept: PAIN MEDICINE | Facility: MEDICAL CENTER | Age: 50
End: 2020-12-15

## 2020-12-15 ENCOUNTER — TELEPHONE (OUTPATIENT)
Dept: FAMILY MEDICINE CLINIC | Facility: CLINIC | Age: 50
End: 2020-12-15

## 2020-12-15 ENCOUNTER — HOSPITAL ENCOUNTER (OUTPATIENT)
Dept: RADIOLOGY | Facility: MEDICAL CENTER | Age: 50
Discharge: HOME | End: 2020-12-15
Attending: PHYSICAL MEDICINE & REHABILITATION
Payer: COMMERCIAL

## 2020-12-15 VITALS
TEMPERATURE: 97.8 F | HEART RATE: 64 BPM | SYSTOLIC BLOOD PRESSURE: 136 MMHG | OXYGEN SATURATION: 97 % | RESPIRATION RATE: 20 BRPM | DIASTOLIC BLOOD PRESSURE: 84 MMHG

## 2020-12-15 DIAGNOSIS — M54.2 NECK PAIN: ICD-10-CM

## 2020-12-15 DIAGNOSIS — M47.812 CERVICAL SPONDYLOSIS WITHOUT MYELOPATHY: ICD-10-CM

## 2020-12-15 PROCEDURE — 64634 DESTROY C/TH FACET JNT ADDL: CPT | Performed by: PHYSICAL MEDICINE & REHABILITATION

## 2020-12-15 PROCEDURE — 64633 DESTROY CERV/THOR FACET JNT: CPT | Performed by: PHYSICAL MEDICINE & REHABILITATION

## 2020-12-15 RX ORDER — LIDOCAINE HYDROCHLORIDE 10 MG/ML
5 INJECTION, SOLUTION EPIDURAL; INFILTRATION; INTRACAUDAL; PERINEURAL ONCE
Status: COMPLETED | OUTPATIENT
Start: 2020-12-15 | End: 2020-12-15

## 2020-12-15 RX ADMIN — LIDOCAINE HYDROCHLORIDE 2.5 ML: 10 INJECTION, SOLUTION EPIDURAL; INFILTRATION; INTRACAUDAL; PERINEURAL at 08:31

## 2020-12-15 RX ADMIN — Medication 4 ML: at 08:41

## 2020-12-18 DIAGNOSIS — N18.30 CKD (CHRONIC KIDNEY DISEASE) STAGE 3, GFR 30-59 ML/MIN (HCC): ICD-10-CM

## 2020-12-18 RX ORDER — AMILORIDE HYDROCHLORIDE 5 MG/1
TABLET ORAL
Qty: 60 TABLET | Refills: 1 | Status: SHIPPED | OUTPATIENT
Start: 2020-12-18 | End: 2021-01-29

## 2020-12-30 DIAGNOSIS — G89.29 CHRONIC BILATERAL LOW BACK PAIN WITHOUT SCIATICA: ICD-10-CM

## 2020-12-30 DIAGNOSIS — M54.50 CHRONIC BILATERAL LOW BACK PAIN WITHOUT SCIATICA: ICD-10-CM

## 2020-12-30 RX ORDER — GABAPENTIN 300 MG/1
CAPSULE ORAL
Qty: 90 CAPSULE | Refills: 1 | Status: SHIPPED | OUTPATIENT
Start: 2020-12-30 | End: 2021-02-23

## 2021-01-07 DIAGNOSIS — B37.9 CANDIDA INFECTION: ICD-10-CM

## 2021-01-07 RX ORDER — NYSTATIN 100000 [USP'U]/G
POWDER TOPICAL
Qty: 15 G | Refills: 1 | Status: SHIPPED | OUTPATIENT
Start: 2021-01-07 | End: 2021-01-26

## 2021-01-08 ENCOUNTER — OFFICE VISIT (OUTPATIENT)
Dept: FAMILY MEDICINE CLINIC | Facility: CLINIC | Age: 51
End: 2021-01-08
Payer: COMMERCIAL

## 2021-01-08 VITALS
BODY MASS INDEX: 31.88 KG/M2 | HEART RATE: 76 BPM | RESPIRATION RATE: 16 BRPM | TEMPERATURE: 98 F | DIASTOLIC BLOOD PRESSURE: 70 MMHG | SYSTOLIC BLOOD PRESSURE: 118 MMHG | WEIGHT: 186.7 LBS | HEIGHT: 64 IN

## 2021-01-08 DIAGNOSIS — N39.0 URINARY TRACT INFECTION WITHOUT HEMATURIA, SITE UNSPECIFIED: ICD-10-CM

## 2021-01-08 DIAGNOSIS — Z12.31 ENCOUNTER FOR SCREENING MAMMOGRAM FOR MALIGNANT NEOPLASM OF BREAST: Primary | ICD-10-CM

## 2021-01-08 DIAGNOSIS — E65 PANNICULUS: ICD-10-CM

## 2021-01-08 DIAGNOSIS — N39.41 URGENCY INCONTINENCE: ICD-10-CM

## 2021-01-08 DIAGNOSIS — N76.0 ACUTE VAGINITIS: ICD-10-CM

## 2021-01-08 DIAGNOSIS — B37.9 YEAST INFECTION: ICD-10-CM

## 2021-01-08 LAB
SL AMB  POCT GLUCOSE, UA: NORMAL
SL AMB LEUKOCYTE ESTERASE,UA: NORMAL
SL AMB POCT BILIRUBIN,UA: NORMAL
SL AMB POCT BLOOD,UA: NORMAL
SL AMB POCT CLARITY,UA: CLEAR
SL AMB POCT COLOR,UA: NORMAL
SL AMB POCT KETONES,UA: NORMAL
SL AMB POCT NITRITE,UA: NORMAL
SL AMB POCT PH,UA: 5
SL AMB POCT SPECIFIC GRAVITY,UA: 1.02
SL AMB POCT URINE PROTEIN: NORMAL
SL AMB POCT UROBILINOGEN: 0.2

## 2021-01-08 PROCEDURE — 81002 URINALYSIS NONAUTO W/O SCOPE: CPT | Performed by: PHYSICIAN ASSISTANT

## 2021-01-08 PROCEDURE — 87086 URINE CULTURE/COLONY COUNT: CPT | Performed by: PHYSICIAN ASSISTANT

## 2021-01-08 PROCEDURE — 99213 OFFICE O/P EST LOW 20 MIN: CPT | Performed by: PHYSICIAN ASSISTANT

## 2021-01-08 RX ORDER — NITROFURANTOIN 25; 75 MG/1; MG/1
100 CAPSULE ORAL 2 TIMES DAILY
Qty: 14 CAPSULE | Refills: 0 | Status: SHIPPED | OUTPATIENT
Start: 2021-01-08 | End: 2021-01-15

## 2021-01-08 RX ORDER — FLUCONAZOLE 150 MG/1
TABLET ORAL
Qty: 2 TABLET | Refills: 0 | Status: SHIPPED | OUTPATIENT
Start: 2021-01-08 | End: 2021-01-11

## 2021-01-08 NOTE — PROGRESS NOTES
Assessment/Plan:    1  UTI    - will treat with macrobid 1 tab twice daily for 7 days, force fluids, will send urine for culture  - POCT urine dip  - Urine culture  - nitrofurantoin (MACROBID) 100 mg capsule; Take 1 capsule (100 mg total) by mouth 2 (two) times a day for 7 days  Dispense: 14 capsule; Refill: 0  - Urine culture    2  Acute vaginitis    - will treat with diflucan 1 tab now and 1 in 3 days, acidophilus daily  - fluconazole (DIFLUCAN) 150 mg tablet; Take 1 tab today and repeat in 3 days  Dispense: 2 tablet; Refill: 0    F/u as needed    Subjective:   Chief Complaint   Patient presents with    Urinary Urgency    Foul odor    Vaginal Itching      Patient ID: Hussain Cho is a 48 y o  female  Patient here c/o burning with urination, frequency, urgency  Denies hematuria  Also with vaginal discharge, itching  Per patient "two separate things"  Denies fever, chills, back pain  Working with Dr Sendy Butterfield on her back pain still  Seeing the CRNP to consider new medications        The following portions of the patient's history were reviewed and updated as appropriate: allergies, current medications, past family history, past medical history, past social history, past surgical history and problem list     Past Medical History:   Diagnosis Date    Anxiety     Chronic kidney disease     Depression     GERD (gastroesophageal reflux disease)     Hypertension     Kidney disease     Kidney stone      Past Surgical History:   Procedure Laterality Date    CHOLECYSTECTOMY      DENTAL SURGERY      [place] teeth extraction    ENDOMETRIAL ABLATION      GASTRIC RESTRICTION SURGERY      LITHOTRIPSY      TUBAL LIGATION       Family History   Problem Relation Age of Onset    Cirrhosis Mother     Kidney failure Mother     Diabetes Mother     Hypertension Mother     Kidney disease Mother     Kidney failure Father     Heart disease Father     Hypertension Father     Alcohol abuse Father     Kidney disease Father     Alcohol abuse Paternal Grandmother     Mental illness Maternal Uncle     Substance Abuse Neg Hx      Social History     Socioeconomic History    Marital status:      Spouse name: Not on file    Number of children: Not on file    Years of education: Not on file    Highest education level: Not on file   Occupational History    Not on file   Social Needs    Financial resource strain: Not on file    Food insecurity     Worry: Not on file     Inability: Not on file   [employer] needs     Medical: Not on file     Non-medical: Not on file   Tobacco Use    Smoking status: Never Smoker    Smokeless tobacco: Never Used   Substance and Sexual Activity    Alcohol use: Yes     Comment: Rarely    Drug use: Never    Sexual activity: Not Currently     Partners: Male   Lifestyle    Physical activity     Days per week: Not on file     Minutes per session: Not on file    Stress: Not on file   Relationships    Social connections     Talks on phone: Not on file     Gets together: Not on file     Attends Evangelical service: Not on file     Active member of club or organization: Not on file     Attends meetings of clubs or organizations: Not on file     Relationship status: Not on file    Intimate partner violence     Fear of current or ex partner: Not on file     Emotionally abused: Not on file     Physically abused: Not on file     Forced sexual activity: Not on file   Other Topics Concern    Not on file   Social History Narrative    Not on file       Current Outpatient Medications:     albuterol (PROVENTIL HFA,VENTOLIN HFA) 90 mcg/act inhaler, albuterol sulfate HFA 90 mcg/actuation aerosol inhaler  INHALE 2 PUFFS EVERY 4 HOURS AS NEEDED FOR WHEEZING OR SHORTNESS OF AIR , Disp: , Rfl:     AMILoride 5 mg tablet, TAKE 2 TABLETS BY MOUTH EVERY DAY, Disp: 60 tablet, Rfl: 1    buPROPion (WELLBUTRIN SR) 150 mg 12 hr tablet, Take 1 tablet (150 mg total) by mouth 2 (two) times a day, Disp: 60 tablet, Rfl: 1    Calcium-Phosphorus-Vitamin D (CALCIUM GUMMIES PO), Take by mouth, Disp: , Rfl:     Cholecalciferol (EQL VITAMIN D3 GUMMIES PO), Take by mouth, Disp: , Rfl:     furosemide (LASIX) 40 mg tablet, Take 1 tablet (40 mg total) by mouth once as needed (swelling), Disp: 30 tablet, Rfl: 1    gabapentin (NEURONTIN) 300 mg capsule, TAKE 1 CAPSULE BY MOUTH THREE TIMES A DAY, Disp: 90 capsule, Rfl: 1    lisinopril (ZESTRIL) 2 5 mg tablet, Take 1 tablet (2 5 mg total) by mouth daily, Disp: 30 tablet, Rfl: 1    loratadine (CLARITIN) 10 mg tablet, Take 1 tablet (10 mg total) by mouth daily, Disp: 90 tablet, Rfl: 1    Multiple Vitamins-Minerals (MULTI-VITAMIN GUMMIES PO), Take by mouth, Disp: , Rfl:     nitroglycerin (NITROSTAT) 0 4 mg SL tablet, Place 1 tablet (0 4 mg total) under the tongue every 5 (five) minutes as needed for chest pain, Disp: 30 tablet, Rfl: 0    nystatin (MYCOSTATIN) powder, APPLY TO AFFECTED AREA 3 TIMES A DAY, Disp: 15 g, Rfl: 1    omeprazole (PriLOSEC) 20 mg delayed release capsule, TAKE 1 CAPSULE BY MOUTH EVERY DAY, Disp: 30 capsule, Rfl: 1    potassium chloride (Klor-Con M20) 20 mEq tablet, Take 1 tablet (20 mEq total) by mouth 3 (three) times a day, Disp: 90 tablet, Rfl: 1    promethazine (PHENERGAN) 25 mg tablet, TAKE 1 TABLET BY MOUTH EVERY 6 HOURS AS NEEDED FOR NAUSEA AND VOMITING, Disp: 30 tablet, Rfl: 0    rOPINIRole (REQUIP) 0 25 mg tablet, TAKE 1 TABLET (0 25 MG TOTAL) BY MOUTH DAILY AT BEDTIME, Disp: 30 tablet, Rfl: 3    sertraline (ZOLOFT) 50 mg tablet, TAKE 1 TABLET BY MOUTH EVERY DAY, Disp: 90 tablet, Rfl: 1    Review of Systems          Objective:    Vitals:    01/08/21 1451   BP: 118/70   BP Location: Left arm   Patient Position: Sitting   Cuff Size: Standard   Pulse: 76   Resp: 16   Temp: 98 °F (36 7 °C)   TempSrc: Oral   Weight: 84 7 kg (186 lb 11 2 oz)   Height: 5' 4" (1 626 m)        Physical Exam      Physical Exam   Constitutional: She is oriented to person, place, and time  She appears well-developed and well-nourished  Cardiovascular: Normal rate, regular rhythm and normal heart sounds  Pulmonary/Chest: Effort normal and breath sounds normal    Abdominal: Soft  Bowel sounds are normal  She exhibits no distension and no mass  There is suprapubic tenderenss  There is no rebound and no guarding  No cva tenderness   Neurological: She is alert and oriented to person, place, and time  Skin: Skin is warm and dry  Psychiatric: She has a normal mood and affect   Judgment normal

## 2021-01-09 LAB — BACTERIA UR CULT: NORMAL

## 2021-01-11 ENCOUNTER — TELEPHONE (OUTPATIENT)
Dept: FAMILY MEDICINE CLINIC | Facility: CLINIC | Age: 51
End: 2021-01-11

## 2021-01-11 NOTE — TELEPHONE ENCOUNTER
----- Message from Janae Gutierres PA-C sent at 1/11/2021  2:31 PM EST -----  Please let patient know she did not have a UTI, how is she feeling? She can stop the macrobid

## 2021-01-18 ENCOUNTER — TELEPHONE (OUTPATIENT)
Dept: PAIN MEDICINE | Facility: CLINIC | Age: 51
End: 2021-01-18

## 2021-01-18 NOTE — TELEPHONE ENCOUNTER
Patient is having left hip pain going down leg, she is not sure if she pulled something  Please call her back in the morning       Thank you    [phone]

## 2021-01-19 ENCOUNTER — OFFICE VISIT (OUTPATIENT)
Dept: URGENT CARE | Facility: CLINIC | Age: 51
End: 2021-01-19
Payer: COMMERCIAL

## 2021-01-19 VITALS
RESPIRATION RATE: 20 BRPM | BODY MASS INDEX: 32.1 KG/M2 | SYSTOLIC BLOOD PRESSURE: 89 MMHG | DIASTOLIC BLOOD PRESSURE: 50 MMHG | HEART RATE: 73 BPM | HEIGHT: 64 IN | TEMPERATURE: 98.9 F | WEIGHT: 188 LBS | OXYGEN SATURATION: 96 %

## 2021-01-19 DIAGNOSIS — M54.42 ACUTE LEFT-SIDED LOW BACK PAIN WITH LEFT-SIDED SCIATICA: Primary | ICD-10-CM

## 2021-01-19 PROCEDURE — 99283 EMERGENCY DEPT VISIT LOW MDM: CPT | Performed by: PHYSICIAN ASSISTANT

## 2021-01-19 PROCEDURE — G0382 LEV 3 HOSP TYPE B ED VISIT: HCPCS | Performed by: PHYSICIAN ASSISTANT

## 2021-01-19 PROCEDURE — 99213 OFFICE O/P EST LOW 20 MIN: CPT | Performed by: PHYSICIAN ASSISTANT

## 2021-01-19 RX ORDER — KETOROLAC TROMETHAMINE 30 MG/ML
30 INJECTION, SOLUTION INTRAMUSCULAR; INTRAVENOUS ONCE
Status: COMPLETED | OUTPATIENT
Start: 2021-01-19 | End: 2021-01-19

## 2021-01-19 RX ORDER — PREDNISONE 20 MG/1
60 TABLET ORAL ONCE
Status: COMPLETED | OUTPATIENT
Start: 2021-01-19 | End: 2021-01-19

## 2021-01-19 RX ORDER — PREDNISONE 10 MG/1
TABLET ORAL
Qty: 21 TABLET | Refills: 0 | Status: SHIPPED | OUTPATIENT
Start: 2021-01-19 | End: 2021-04-20

## 2021-01-19 RX ORDER — ACETAMINOPHEN 325 MG/1
975 TABLET ORAL ONCE
Status: COMPLETED | OUTPATIENT
Start: 2021-01-19 | End: 2021-01-19

## 2021-01-19 RX ADMIN — PREDNISONE 60 MG: 20 TABLET ORAL at 09:32

## 2021-01-19 RX ADMIN — KETOROLAC TROMETHAMINE 30 MG: 30 INJECTION, SOLUTION INTRAMUSCULAR; INTRAVENOUS at 09:08

## 2021-01-19 RX ADMIN — ACETAMINOPHEN 975 MG: 325 TABLET ORAL at 09:09

## 2021-01-19 NOTE — PATIENT INSTRUCTIONS
Start Prednisone tomorrow  Continue tylenol and warm compresses with stretching  Go to ER for worsening symptoms  See primary doctor in 2-3 days  Sciatica   WHAT YOU NEED TO KNOW:   Sciatica is a condition that causes pain along your sciatic nerve  The sciatic nerve runs from your spine through both sides of your buttocks  It then runs down the back of your thigh, into your lower leg and foot  Your sciatic nerve may be compressed, inflamed, irritated, or stretched  DISCHARGE INSTRUCTIONS:   Medicines:   · NSAIDs:  These medicines decrease swelling and pain  NSAIDs are available without a doctor's order  Ask your healthcare provider which medicine is right for you  Ask how much to take and when to take it  Take as directed  NSAIDs can cause stomach bleeding or kidney problems if not taken correctly  · Acetaminophen: This medicine decreases pain  Acetaminophen is available without a doctor's order  Ask how much to take and when to take it  Follow directions  Acetaminophen can cause liver damage if not taken correctly  · Muscle relaxers  help decrease pain and muscle spasms  · Take your medicine as directed  Contact your healthcare provider if you think your medicine is not helping or if you have side effects  Tell him of her if you are allergic to any medicine  Keep a list of the medicines, vitamins, and herbs you take  Include the amounts, and when and why you take them  Bring the list or the pill bottles to follow-up visits  Carry your medicine list with you in case of an emergency  Follow up with your healthcare provider as directed:  Write down your questions so you remember to ask them during your visits  Manage your symptoms:   · Activity:  Decrease your activity  Do not lift heavy objects or twist your back for at least 6 weeks  Slowly return to your usual activity  · Ice:  Ice helps decrease swelling and pain  Ice may also help prevent tissue damage   Use an ice pack, or put crushed ice in a plastic bag  Cover it with a towel and place it on your low back or leg for 15 to 20 minutes every hour or as directed  · Heat:  Heat helps decrease pain and muscle spasms  Apply heat on the area for 20 to 30 minutes every 2 hours for as many days as directed  · Physical therapy:  You may need to see physical therapist to teach you exercises to help improve movement and strength, and to decrease pain  An occupational therapist teaches you skills to help with your daily activities  · Use assistive devices if directed: You may need to wear back support, such as a back brace  You may need crutches, a cane, or a walker to decrease stress on your lower back and leg muscles  Ask your healthcare provider for more information about assistive devices and how to use them correctly  Self-care:   · Avoid pressure on your back and legs:  Do not  lift heavy objects, or stand or sit for long periods of time  · Lift objects safely:  Keep your back straight and bend your knees when you  an object  Do not bend or twist your back when you lift  · Maintain a healthy weight:  Ask your healthcare provider how much you should weigh  Ask him to help you create a weight loss plan if you are overweight  · Exercise:  Ask your healthcare provider about the best stretching, warmup, and exercise plan for you  Contact your healthcare provider if:   · You have pain in your lower back at night or when resting  · You have pain in your lower back with numbness below the knee  · You have weakness in one leg only  · You have questions or concerns about your condition or care  Return to the emergency department if:   · You have trouble holding back your urine or bowel movements  · You have weakness in both legs  · You have numbness in your groin or buttocks      © Copyright [facility] [address] Information is for End User's use only and may not be sold, redistributed or otherwise used for commercial purposes  All illustrations and images included in CareNotes® are the copyrighted property of A D A M , Inc  or Ashu Carranza  The above information is an  only  It is not intended as medical advice for individual conditions or treatments  Talk to your doctor, nurse or pharmacist before following any medical regimen to see if it is safe and effective for you

## 2021-01-19 NOTE — TELEPHONE ENCOUNTER
RN s/w pt  Pt states " I am having pain on my left side with jolts down my leg  I was doing normal cleaning, nothing out of normal and while mopping I got a sharp pain shooting down my left leg  I tried ICE/HEAT  Heat helped some but I couldn't get out of bed  I went to  and they said it's my Sciatic nerve " Pt was given a dose of steroid and a script for a dose pack  Per pt she also had a dose of Toradol which helped as well  Pt was advised  At to give it a few hours and if she didn't feel any better to go to the ER  Pt advised to continue with ice/heat whichever feels better,  the dose pack and will need to give that some time to work, she can take Tylenol 1000 mg every 8 hours as long as she does not have any liver concerns (not to exceed 3000 mg in 24 hrs)  Continue with her Gabapentin as prescribed  Pt is ot taking any NSAIDS due to previous gastric surgery  Pt had an OV with AO scheduled for 1/27  Appt moved to 1/25 with AO with 145 arrival time  Pt verbalized understanding and appreciative  Please advise- Agree with the plan? Any additional recs?

## 2021-01-19 NOTE — PROGRESS NOTES
[id number] [employer] [facility]        NAME: Gianna Abdul is a 48 y o  female  : 1970    MRN: [id number]  DATE: 2021  TIME: 10:31 AM    Assessment and Plan   Acute left-sided low back pain with left-sided sciatica [M54 42]  1  Acute left-sided low back pain with left-sided sciatica  ketorolac (TORADOL) injection 30 mg    acetaminophen (TYLENOL) tablet 975 mg    predniSONE 10 mg tablet    predniSONE tablet 60 mg       Patient in painful distress  Ambulates on her own  Is tearful  No red flag signs or symptoms  Medical history states chronic kidney disease but labs in the prior year have all been normal   Will treat with 30 mg of Toradol IM as well as Tylenol  Patient reassessed and reports continued pain  Prednisone given  Patient is resting more comfortably  Pain is exacerbated by movement  Will continue prednisone taper at home and Tylenol 1 g Q 8 hours  Warm compresses and stretching advised  If symptoms worsen or are not improving she should go to the emergency department for further evaluation and pain management  See primary doctor in 2-3 days  Patient verbalized understanding of these instructions  Patient Instructions   Patient Instructions   Start Prednisone tomorrow  Continue tylenol and warm compresses with stretching  Go to ER for worsening symptoms  See primary doctor in 2-3 days  Sciatica   WHAT YOU NEED TO KNOW:   Sciatica is a condition that causes pain along your sciatic nerve  The sciatic nerve runs from your spine through both sides of your buttocks  It then runs down the back of your thigh, into your lower leg and foot  Your sciatic nerve may be compressed, inflamed, irritated, or stretched  DISCHARGE INSTRUCTIONS:   Medicines:   · NSAIDs:  These medicines decrease swelling and pain  NSAIDs are available without a doctor's order  Ask your healthcare provider which medicine is right for you  Ask how much to take and when to take it  Take as directed   NSAIDs can cause stomach bleeding or kidney problems if not taken correctly  · Acetaminophen: This medicine decreases pain  Acetaminophen is available without a doctor's order  Ask how much to take and when to take it  Follow directions  Acetaminophen can cause liver damage if not taken correctly  · Muscle relaxers  help decrease pain and muscle spasms  · Take your medicine as directed  Contact your healthcare provider if you think your medicine is not helping or if you have side effects  Tell him of her if you are allergic to any medicine  Keep a list of the medicines, vitamins, and herbs you take  Include the amounts, and when and why you take them  Bring the list or the pill bottles to follow-up visits  Carry your medicine list with you in case of an emergency  Follow up with your healthcare provider as directed:  Write down your questions so you remember to ask them during your visits  Manage your symptoms:   · Activity:  Decrease your activity  Do not lift heavy objects or twist your back for at least 6 weeks  Slowly return to your usual activity  · Ice:  Ice helps decrease swelling and pain  Ice may also help prevent tissue damage  Use an ice pack, or put crushed ice in a plastic bag  Cover it with a towel and place it on your low back or leg for 15 to 20 minutes every hour or as directed  · Heat:  Heat helps decrease pain and muscle spasms  Apply heat on the area for 20 to 30 minutes every 2 hours for as many days as directed  · Physical therapy:  You may need to see physical therapist to teach you exercises to help improve movement and strength, and to decrease pain  An occupational therapist teaches you skills to help with your daily activities  · Use assistive devices if directed: You may need to wear back support, such as a back brace  You may need crutches, a cane, or a walker to decrease stress on your lower back and leg muscles   Ask your healthcare provider for more information about assistive devices and how to use them correctly  Self-care:   · Avoid pressure on your back and legs:  Do not  lift heavy objects, or stand or sit for long periods of time  · Lift objects safely:  Keep your back straight and bend your knees when you  an object  Do not bend or twist your back when you lift  · Maintain a healthy weight:  Ask your healthcare provider how much you should weigh  Ask him to help you create a weight loss plan if you are overweight  · Exercise:  Ask your healthcare provider about the best stretching, warmup, and exercise plan for you  Contact your healthcare provider if:   · You have pain in your lower back at night or when resting  · You have pain in your lower back with numbness below the knee  · You have weakness in one leg only  · You have questions or concerns about your condition or care  Return to the emergency department if:   · You have trouble holding back your urine or bowel movements  · You have weakness in both legs  · You have numbness in your groin or buttocks  © Copyright [address] Information is for End User's use only and may not be sold, redistributed or otherwise used for commercial purposes  All illustrations and images included in CareNotes® are the copyrighted property of A D A M , Inc  or [address] [place] [address]  The above information is an  only  It is not intended as medical advice for individual conditions or treatments  Talk to your doctor, nurse or pharmacist before following any medical regimen to see if it is safe and effective for you  Proceed to  ER if symptoms worsen  Chief Complaint     Chief Complaint   Patient presents with    Back Pain     started yesterday with lower back pain on left side, was cleaning and then started having back pain  pain is radiating down left side into leg, no numbness or tingling at this time   pt is very uncomfortable, tried ice/heat/pain meds and pain patches with no relief  no fevers or cold symtpoms, no numbness or tingling at this time         History of Present Illness         Patient presents for evaluation of acute low back pain with sciatica on the left side  She states her symptoms began yesterday  She was doing her normal house cleaning and reports she cleaned the bathtub without issue and then started mopping when she felt pain in her low back which was sharp and radiating into her left buttock  Patient states she does have history of degenerative disc disease in her neck and back as well as chronic low back pain but has never experienced sciatic symptoms  She reports the pain is severe and rates it an 8/10  She has taken Tylenol with the last dose being at 3:00 a m  and applied heat to her back  She is ambulating on her own but with pain  She denies any weakness, numbness or tingling in the extremities  No saddle anesthesia  No abdominal pain, hematuria, fever or rash  No history of IV drug abuse  She reports she had a gastric sleeve procedure 2 years ago and was told not to take NSAIDs at that time  Review of Systems   Review of Systems   Constitutional: Negative for chills and fever  Respiratory: Negative  Cardiovascular: Negative  Gastrointestinal: Negative  Genitourinary: Negative  Musculoskeletal: Positive for back pain  Skin: Negative  Neurological: Negative            Current Medications       Current Outpatient Medications:     albuterol (PROVENTIL HFA,VENTOLIN HFA) 90 mcg/act inhaler, albuterol sulfate HFA 90 mcg/actuation aerosol inhaler  INHALE 2 PUFFS EVERY 4 HOURS AS NEEDED FOR WHEEZING OR SHORTNESS OF AIR , Disp: , Rfl:     AMILoride 5 mg tablet, TAKE 2 TABLETS BY MOUTH EVERY DAY, Disp: 60 tablet, Rfl: 1    buPROPion (WELLBUTRIN SR) 150 mg 12 hr tablet, Take 1 tablet (150 mg total) by mouth 2 (two) times a day, Disp: 60 tablet, Rfl: 1    Calcium-Phosphorus-Vitamin D (CALCIUM GUMMIES PO), Take by mouth, Disp: , Rfl:     Cholecalciferol (EQL VITAMIN D3 GUMMIES PO), Take by mouth, Disp: , Rfl:     furosemide (LASIX) 40 mg tablet, Take 1 tablet (40 mg total) by mouth once as needed (swelling), Disp: 30 tablet, Rfl: 1    gabapentin (NEURONTIN) 300 mg capsule, TAKE 1 CAPSULE BY MOUTH THREE TIMES A DAY, Disp: 90 capsule, Rfl: 1    lisinopril (ZESTRIL) 2 5 mg tablet, Take 1 tablet (2 5 mg total) by mouth daily, Disp: 30 tablet, Rfl: 1    loratadine (CLARITIN) 10 mg tablet, Take 1 tablet (10 mg total) by mouth daily, Disp: 90 tablet, Rfl: 1    Multiple Vitamins-Minerals (MULTI-VITAMIN GUMMIES PO), Take by mouth, Disp: , Rfl:     nitroglycerin (NITROSTAT) 0 4 mg SL tablet, Place 1 tablet (0 4 mg total) under the tongue every 5 (five) minutes as needed for chest pain, Disp: 30 tablet, Rfl: 0    nystatin (MYCOSTATIN) powder, APPLY TO AFFECTED AREA 3 TIMES A DAY, Disp: 15 g, Rfl: 1    omeprazole (PriLOSEC) 20 mg delayed release capsule, TAKE 1 CAPSULE BY MOUTH EVERY DAY, Disp: 30 capsule, Rfl: 1    potassium chloride (Klor-Con M20) 20 mEq tablet, Take 1 tablet (20 mEq total) by mouth 3 (three) times a day, Disp: 90 tablet, Rfl: 1    promethazine (PHENERGAN) 25 mg tablet, TAKE 1 TABLET BY MOUTH EVERY 6 HOURS AS NEEDED FOR NAUSEA AND VOMITING, Disp: 30 tablet, Rfl: 0    rOPINIRole (REQUIP) 0 25 mg tablet, TAKE 1 TABLET (0 25 MG TOTAL) BY MOUTH DAILY AT BEDTIME, Disp: 30 tablet, Rfl: 3    sertraline (ZOLOFT) 50 mg tablet, TAKE 1 TABLET BY MOUTH EVERY DAY, Disp: 90 tablet, Rfl: 1    predniSONE 10 mg tablet, Take 6 tabs on day one, then 5 tabs on day two, 4 tabs on day three, 3 tabs on day four, 2 tabs on day five, and 1 tab on day six , Disp: 21 tablet, Rfl: 0  No current facility-administered medications for this visit       Current Allergies     Allergies as of 01/19/2021 - Reviewed 01/19/2021   Allergen Reaction Noted    Sulfa antibiotics Throat Swelling 07/14/2020            The following portions of the patient's history were reviewed and updated as appropriate: allergies, current medications, past family history, past medical history, past social history, past surgical history and problem list      Past Medical History:   Diagnosis Date    Anxiety     Chronic kidney disease     Depression     GERD (gastroesophageal reflux disease)     Hypertension     Kidney disease     Kidney stone        Past Surgical History:   Procedure Laterality Date    CHOLECYSTECTOMY      DENTAL SURGERY      [place] teeth extraction    ENDOMETRIAL ABLATION      GASTRIC RESTRICTION SURGERY      LITHOTRIPSY      TUBAL LIGATION         Family History   Problem Relation Age of Onset    Cirrhosis Mother     Kidney failure Mother     Diabetes Mother     Hypertension Mother     Kidney disease Mother     Kidney failure Father     Heart disease Father     Hypertension Father     Alcohol abuse Father     Kidney disease Father     Alcohol abuse Paternal Grandmother     Mental illness Maternal Uncle     Substance Abuse Neg Hx          Medications have been verified  Objective   BP (!) 89/50   Pulse 73   Temp 98 9 °F (37 2 °C) (Tympanic)   Resp 20   Ht 5' 4" (1 626 m)   Wt 85 3 kg (188 lb)   SpO2 96%   BMI 32 27 kg/m²        Physical Exam     Physical Exam  Vitals signs reviewed  Constitutional:       General: She is in acute distress  HENT:      Mouth/Throat:      Mouth: Mucous membranes are moist    Cardiovascular:      Rate and Rhythm: Normal rate  Pulses: Normal pulses  Pulmonary:      Effort: Pulmonary effort is normal    Abdominal:      Tenderness: There is no abdominal tenderness  There is no right CVA tenderness or left CVA tenderness  Musculoskeletal:      Lumbar back: She exhibits tenderness  She exhibits no swelling and no deformity  Skin:     General: Skin is warm and dry  Findings: No erythema or rash     Neurological:      Mental Status: She is alert and oriented to person, place, and time  Sensory: Sensation is intact  No sensory deficit  Motor: Motor function is intact  No weakness  Gait: Gait is intact        Deep Tendon Reflexes: Reflexes normal

## 2021-01-25 ENCOUNTER — OFFICE VISIT (OUTPATIENT)
Dept: PAIN MEDICINE | Facility: MEDICAL CENTER | Age: 51
End: 2021-01-25
Payer: COMMERCIAL

## 2021-01-25 VITALS
BODY MASS INDEX: 31.92 KG/M2 | DIASTOLIC BLOOD PRESSURE: 78 MMHG | WEIGHT: 187 LBS | RESPIRATION RATE: 16 BRPM | SYSTOLIC BLOOD PRESSURE: 134 MMHG | HEART RATE: 68 BPM | HEIGHT: 64 IN

## 2021-01-25 DIAGNOSIS — B37.9 CANDIDA INFECTION: ICD-10-CM

## 2021-01-25 DIAGNOSIS — M79.18 MYOFASCIAL PAIN SYNDROME: ICD-10-CM

## 2021-01-25 DIAGNOSIS — M46.1 SACROILIITIS (HCC): Primary | ICD-10-CM

## 2021-01-25 DIAGNOSIS — I10 ESSENTIAL (PRIMARY) HYPERTENSION: ICD-10-CM

## 2021-01-25 PROCEDURE — 99214 OFFICE O/P EST MOD 30 MIN: CPT | Performed by: NURSE PRACTITIONER

## 2021-01-25 PROCEDURE — 3075F SYST BP GE 130 - 139MM HG: CPT | Performed by: NURSE PRACTITIONER

## 2021-01-25 PROCEDURE — 3078F DIAST BP <80 MM HG: CPT | Performed by: NURSE PRACTITIONER

## 2021-01-25 PROCEDURE — 3008F BODY MASS INDEX DOCD: CPT | Performed by: NURSE PRACTITIONER

## 2021-01-25 PROCEDURE — 1036F TOBACCO NON-USER: CPT | Performed by: NURSE PRACTITIONER

## 2021-01-25 RX ORDER — TIZANIDINE 4 MG/1
4 TABLET ORAL
Qty: 30 TABLET | Refills: 1 | Status: SHIPPED | OUTPATIENT
Start: 2021-01-25 | End: 2021-04-20 | Stop reason: ALTCHOICE

## 2021-01-26 DIAGNOSIS — K21.9 GASTROESOPHAGEAL REFLUX DISEASE WITHOUT ESOPHAGITIS: ICD-10-CM

## 2021-01-26 RX ORDER — OMEPRAZOLE 20 MG/1
CAPSULE, DELAYED RELEASE ORAL
Qty: 30 CAPSULE | Refills: 1 | Status: SHIPPED | OUTPATIENT
Start: 2021-01-26 | End: 2021-03-22

## 2021-01-26 RX ORDER — LISINOPRIL 2.5 MG/1
TABLET ORAL
Qty: 30 TABLET | Refills: 1 | Status: SHIPPED | OUTPATIENT
Start: 2021-01-26 | End: 2021-03-21

## 2021-01-26 RX ORDER — FUROSEMIDE 40 MG/1
40 TABLET ORAL AS NEEDED
Qty: 30 TABLET | Refills: 0 | Status: SHIPPED | OUTPATIENT
Start: 2021-01-26 | End: 2021-08-19 | Stop reason: SDUPTHER

## 2021-01-26 RX ORDER — NYSTATIN 100000 [USP'U]/G
POWDER TOPICAL
Qty: 15 G | Refills: 1 | Status: SHIPPED | OUTPATIENT
Start: 2021-01-26 | End: 2021-02-22

## 2021-01-26 NOTE — TELEPHONE ENCOUNTER
Patient called in asking for her procedure to be moved to another day- she cant get a  for 9/41   Thank you      [phone]

## 2021-01-29 ENCOUNTER — TELEPHONE (OUTPATIENT)
Dept: FAMILY MEDICINE CLINIC | Facility: CLINIC | Age: 51
End: 2021-01-29

## 2021-01-29 DIAGNOSIS — N18.30 CKD (CHRONIC KIDNEY DISEASE) STAGE 3, GFR 30-59 ML/MIN (HCC): ICD-10-CM

## 2021-01-29 DIAGNOSIS — K21.9 GASTROESOPHAGEAL REFLUX DISEASE WITHOUT ESOPHAGITIS: ICD-10-CM

## 2021-01-29 RX ORDER — PROMETHAZINE HYDROCHLORIDE 25 MG/1
TABLET ORAL
Qty: 30 TABLET | Refills: 0 | Status: SHIPPED | OUTPATIENT
Start: 2021-01-29

## 2021-01-29 RX ORDER — AMILORIDE HYDROCHLORIDE 5 MG/1
TABLET ORAL
Qty: 60 TABLET | Refills: 1 | Status: SHIPPED | OUTPATIENT
Start: 2021-01-29 | End: 2021-02-22

## 2021-01-29 NOTE — TELEPHONE ENCOUNTER
----- Message from Pastora Haney PA-C sent at 1/29/2021  7:48 AM EST -----  Regarding: FW: Non-Urgent Medical Question  Contact: [phone]    Please call patient  I already put in a referral to [address] [place] [address], have they not called her? If not please give her the number   Does she needs something else from me?   ----- Message -----  From: Tiffany Pimentel MA  Sent: 1/28/2021  11:57 AM EST  To: Pastora Haney PA-C  Subject: FW: Non-Urgent Medical Question                    ----- Message -----  From: Faraz Madrigal  Sent: 1/28/2021  11:49 AM EST  To: , #  Subject: Non-Urgent Medical Question                      Good morning Doctor Conchita Fernandez I was reaching out to see if you got the referral sent to a doctor concerning my weight loss  surgery and all the extra loose skin of mine

## 2021-02-12 ENCOUNTER — TELEPHONE (OUTPATIENT)
Dept: PAIN MEDICINE | Facility: MEDICAL CENTER | Age: 51
End: 2021-02-12

## 2021-02-12 NOTE — TELEPHONE ENCOUNTER
Pt called stating she would like to reschedule her procedure   Pt states she does not have a ride     Pt can be reached at [phone]

## 2021-02-17 ENCOUNTER — HOSPITAL ENCOUNTER (OUTPATIENT)
Dept: RADIOLOGY | Facility: MEDICAL CENTER | Age: 51
Discharge: HOME | End: 2021-02-17
Attending: PHYSICAL MEDICINE & REHABILITATION | Admitting: PHYSICAL MEDICINE & REHABILITATION
Payer: COMMERCIAL

## 2021-02-17 VITALS
HEART RATE: 58 BPM | RESPIRATION RATE: 20 BRPM | SYSTOLIC BLOOD PRESSURE: 116 MMHG | DIASTOLIC BLOOD PRESSURE: 72 MMHG | OXYGEN SATURATION: 96 % | TEMPERATURE: 97.9 F

## 2021-02-17 DIAGNOSIS — M46.1 SACROILIITIS (HCC): ICD-10-CM

## 2021-02-17 PROCEDURE — 27096 INJECT SACROILIAC JOINT: CPT | Performed by: PHYSICAL MEDICINE & REHABILITATION

## 2021-02-17 RX ORDER — BUPIVACAINE HCL/PF 2.5 MG/ML
10 VIAL (ML) INJECTION ONCE
Status: COMPLETED | OUTPATIENT
Start: 2021-02-17 | End: 2021-02-17

## 2021-02-17 RX ORDER — METHYLPREDNISOLONE ACETATE 40 MG/ML
40 INJECTION, SUSPENSION INTRA-ARTICULAR; INTRALESIONAL; INTRAMUSCULAR; PARENTERAL; SOFT TISSUE ONCE
Status: COMPLETED | OUTPATIENT
Start: 2021-02-17 | End: 2021-02-17

## 2021-02-17 RX ADMIN — IOHEXOL 0.5 ML: 300 INJECTION, SOLUTION INTRAVENOUS at 15:14

## 2021-02-17 RX ADMIN — Medication 1.5 ML: at 15:15

## 2021-02-17 RX ADMIN — METHYLPREDNISOLONE ACETATE 40 MG: 40 INJECTION, SUSPENSION INTRA-ARTICULAR; INTRALESIONAL; INTRAMUSCULAR; PARENTERAL; SOFT TISSUE at 15:15

## 2021-02-17 NOTE — DISCHARGE INSTRUCTIONS
Steroid Joint Injection   WHAT YOU NEED TO KNOW:   A steroid joint injection is a procedure to inject steroid medicine into a joint  Steroid medicine decreases pain and inflammation  The injection may also contain an anesthetic (numbing medicine) to decrease pain  It may be done to treat conditions such as arthritis, gout, or carpal tunnel syndrome  The injections may be given in your knee, ankle, shoulder, elbow, wrist, ankle or sacroiliac joint  1  Do not apply heat to any area that is numb  If you have discomfort or soreness at the injection site, you may apply ice today, 20 minutes on and 20 minutes off  Tomorrow you may use ice or warm, moist heat  Do not apply ice or heat directly to the skin  2  You may have an increase or change in the discomfort for 36-48 hours after your treatment  Apply ice and continue with any pain medicine you have been prescribed  3  Do not do anything strenuous today  You may shower, but no tub baths or hot tubs today  You may resume your normal activities tomorrow, but do not overdo it  Resume normal activities slowly when you are feeling better  4  If you experience redness, drainage or swelling at the injection site, or if you develop a fever above 100 degrees, please call [facility] at [phone] or go to the Emergency Room  5  Continue to take all routine medicines prescribed by your primary care physician unless otherwise instructed by our staff  Most blood thinners should be started again according to your regularly scheduled dosing  If you have any questions, please give our office a call  If you have a problem specifically related to your procedure, please call our office at [phone]  Problems not related to your procedure should be directed to your primary care physician

## 2021-02-17 NOTE — H&P
History of Present Illness:  The patient is a 48 y o  female who presents with complaints of left low back pain    Patient Active Problem List   Diagnosis    Obstructive sleep apnea syndrome    Restless legs syndrome (RLS)    Hypertension    Allergic rhinitis    Depression with anxiety    Gastroesophageal reflux disease without esophagitis    Malabsorption due to intolerance, not elsewhere classified    CKD (chronic kidney disease) stage 3, GFR 30-59 ml/min    Degenerative disc disease, cervical    Degenerative disc disease, lumbar    Lumbar spondylosis    Chronic bilateral low back pain without sciatica    Cervical spondylosis without myelopathy    Neck pain    Gastroesophageal reflux disease with esophagitis    Hyperlipidemia    Sacroiliitis (HCC)       Past Medical History:   Diagnosis Date    Anxiety     Chronic kidney disease     Depression     GERD (gastroesophageal reflux disease)     Hypertension     Kidney disease     Kidney stone        Past Surgical History:   Procedure Laterality Date    CHOLECYSTECTOMY      DENTAL SURGERY      [place] teeth extraction    ENDOMETRIAL ABLATION      GASTRIC RESTRICTION SURGERY      LITHOTRIPSY      TUBAL LIGATION           Current Outpatient Medications:     albuterol (PROVENTIL HFA,VENTOLIN HFA) 90 mcg/act inhaler, albuterol sulfate HFA 90 mcg/actuation aerosol inhaler  INHALE 2 PUFFS EVERY 4 HOURS AS NEEDED FOR WHEEZING OR SHORTNESS OF AIR , Disp: , Rfl:     AMILoride 5 mg tablet, TAKE 2 TABLETS BY MOUTH EVERY DAY, Disp: 60 tablet, Rfl: 1    buPROPion (WELLBUTRIN SR) 150 mg 12 hr tablet, Take 1 tablet (150 mg total) by mouth 2 (two) times a day, Disp: 60 tablet, Rfl: 1    Calcium-Phosphorus-Vitamin D (CALCIUM GUMMIES PO), Take by mouth, Disp: , Rfl:     Cholecalciferol (EQL VITAMIN D3 GUMMIES PO), Take by mouth, Disp: , Rfl:     furosemide (LASIX) 40 mg tablet, Take 1 tablet (40 mg total) by mouth once as needed (swelling), Disp: 30 tablet, Rfl: 1    gabapentin (NEURONTIN) 300 mg capsule, TAKE 1 CAPSULE BY MOUTH THREE TIMES A DAY, Disp: 90 capsule, Rfl: 1    lisinopril (ZESTRIL) 2 5 mg tablet, TAKE 1 TABLET BY MOUTH EVERY DAY, Disp: 30 tablet, Rfl: 1    loratadine (CLARITIN) 10 mg tablet, Take 1 tablet (10 mg total) by mouth daily, Disp: 90 tablet, Rfl: 1    Multiple Vitamins-Minerals (MULTI-VITAMIN GUMMIES PO), Take by mouth, Disp: , Rfl:     nitroglycerin (NITROSTAT) 0 4 mg SL tablet, Place 1 tablet (0 4 mg total) under the tongue every 5 (five) minutes as needed for chest pain, Disp: 30 tablet, Rfl: 0    nystatin (MYCOSTATIN) powder, APPLY TO AFFECTED AREA 3 TIMES A DAY, Disp: 15 g, Rfl: 1    omeprazole (PriLOSEC) 20 mg delayed release capsule, TAKE 1 CAPSULE BY MOUTH EVERY DAY, Disp: 30 capsule, Rfl: 1    potassium chloride (Klor-Con M20) 20 mEq tablet, Take 1 tablet (20 mEq total) by mouth 3 (three) times a day, Disp: 90 tablet, Rfl: 1    predniSONE 10 mg tablet, Take 6 tabs on day one, then 5 tabs on day two, 4 tabs on day three, 3 tabs on day four, 2 tabs on day five, and 1 tab on day six   (Patient not taking: Reported on 1/25/2021), Disp: 21 tablet, Rfl: 0    promethazine (PHENERGAN) 25 mg tablet, TAKE 1 TABLET BY MOUTH EVERY 6 HOURS AS NEEDED FOR NAUSEA AND VOMITING, Disp: 30 tablet, Rfl: 0    rOPINIRole (REQUIP) 0 25 mg tablet, TAKE 1 TABLET (0 25 MG TOTAL) BY MOUTH DAILY AT BEDTIME, Disp: 30 tablet, Rfl: 3    sertraline (ZOLOFT) 50 mg tablet, TAKE 1 TABLET BY MOUTH EVERY DAY, Disp: 90 tablet, Rfl: 1    tiZANidine (ZANAFLEX) 4 mg tablet, Take 1 tablet (4 mg total) by mouth daily at bedtime, Disp: 30 tablet, Rfl: 1    Current Facility-Administered Medications:     bupivacaine (PF) (MARCAINE) 0 25 % injection 10 mL, 10 mL, Intra-articular, Once, [place]rara Dun, DO    iohexol (OMNIPAQUE) 300 mg/mL injection 50 mL, 50 mL, Intra-articular, Once, Niobrara Tia,     methylPREDNISolone acetate (DEPO-MEDROL) injection 40 mg, 40 mg, Intra-articular, Once, Felisha Route, DO    Allergies   Allergen Reactions    Sulfa Antibiotics Throat Swelling       Physical Exam:   Vitals:    02/17/21 1503   BP: 106/68   Pulse: 58   Resp: 20   Temp: 97 9 °F (36 6 °C)   SpO2: 96%     General: Awake, Alert, Oriented x 3, Mood and affect appropriate  Respiratory: Respirations even and unlabored  Cardiovascular: Peripheral pulses intact; no edema  Musculoskeletal Exam: left low back pain    ASA Score: 2    Patient/Chart Verification  Patient ID Verified: Verbal  ID Band Applied: No  Consents Confirmed: Procedural  H&P( within 30 days) Verified: To be obtained in the Pre-Procedure area  Interval H&P(within 24 hr) Complete (required for Outpatients and Surgery Admit only): To be obtained in the Pre-Procedure area  Allergies Reviewed:  Yes  Anticoag/NSAID held?: NA  Currently on antibiotics?: No  Pregnancy denied?: Yes    Assessment: left sacroiliitis    Plan: left SIJ injection

## 2021-02-20 DIAGNOSIS — N18.30 CKD (CHRONIC KIDNEY DISEASE) STAGE 3, GFR 30-59 ML/MIN (HCC): ICD-10-CM

## 2021-02-20 DIAGNOSIS — B37.9 CANDIDA INFECTION: ICD-10-CM

## 2021-02-21 DIAGNOSIS — M79.18 MYOFASCIAL PAIN SYNDROME: ICD-10-CM

## 2021-02-22 RX ORDER — NYSTATIN 100000 [USP'U]/G
POWDER TOPICAL
Qty: 15 G | Refills: 1 | Status: SHIPPED | OUTPATIENT
Start: 2021-02-22 | End: 2021-03-15

## 2021-02-22 RX ORDER — AMILORIDE HYDROCHLORIDE 5 MG/1
TABLET ORAL
Qty: 60 TABLET | Refills: 1 | Status: SHIPPED | OUTPATIENT
Start: 2021-02-22 | End: 2021-04-15

## 2021-02-22 RX ORDER — TIZANIDINE 4 MG/1
TABLET ORAL
Qty: 30 TABLET | Refills: 1 | OUTPATIENT
Start: 2021-02-22

## 2021-02-23 DIAGNOSIS — G89.29 CHRONIC BILATERAL LOW BACK PAIN WITHOUT SCIATICA: ICD-10-CM

## 2021-02-23 DIAGNOSIS — G25.81 RESTLESS LEGS SYNDROME (RLS): ICD-10-CM

## 2021-02-23 DIAGNOSIS — M54.50 CHRONIC BILATERAL LOW BACK PAIN WITHOUT SCIATICA: ICD-10-CM

## 2021-02-23 RX ORDER — GABAPENTIN 300 MG/1
CAPSULE ORAL
Qty: 90 CAPSULE | Refills: 1 | Status: SHIPPED | OUTPATIENT
Start: 2021-02-23 | End: 2021-03-22 | Stop reason: SDUPTHER

## 2021-02-23 RX ORDER — ROPINIROLE 0.25 MG/1
0.25 TABLET, FILM COATED ORAL
Qty: 90 TABLET | Refills: 1 | Status: SHIPPED | OUTPATIENT
Start: 2021-02-23 | End: 2021-08-06

## 2021-02-24 ENCOUNTER — TELEPHONE (OUTPATIENT)
Dept: PAIN MEDICINE | Facility: CLINIC | Age: 51
End: 2021-02-24

## 2021-02-24 DIAGNOSIS — G89.29 CHRONIC LEFT SACROILIAC JOINT PAIN: Primary | ICD-10-CM

## 2021-02-24 DIAGNOSIS — M53.3 CHRONIC LEFT SACROILIAC JOINT PAIN: Primary | ICD-10-CM

## 2021-02-25 DIAGNOSIS — F41.8 DEPRESSION WITH ANXIETY: ICD-10-CM

## 2021-02-26 ENCOUNTER — TELEPHONE (OUTPATIENT)
Dept: FAMILY MEDICINE CLINIC | Facility: CLINIC | Age: 51
End: 2021-02-26

## 2021-02-26 DIAGNOSIS — N18.30 CKD (CHRONIC KIDNEY DISEASE) STAGE 3, GFR 30-59 ML/MIN (HCC): ICD-10-CM

## 2021-02-26 RX ORDER — FUROSEMIDE 40 MG/1
TABLET ORAL
Qty: 30 TABLET | Refills: 1 | Status: SHIPPED | OUTPATIENT
Start: 2021-02-26 | End: 2021-04-27

## 2021-02-26 RX ORDER — FUROSEMIDE 40 MG/1
40 TABLET ORAL AS NEEDED
Qty: 30 TABLET | Refills: 0 | OUTPATIENT
Start: 2021-02-26

## 2021-03-04 RX ORDER — CYCLOBENZAPRINE HCL 10 MG
10 TABLET ORAL 3 TIMES DAILY PRN
Qty: 90 TABLET | Refills: 0 | Status: SHIPPED | OUTPATIENT
Start: 2021-03-04 | End: 2021-03-22 | Stop reason: SDUPTHER

## 2021-03-04 NOTE — TELEPHONE ENCOUNTER
--pt of TOLU and ANGY--  Both our of the office today and tomorrow 3/4 and 3/5  RN s/w pt regarding previous  Per pt it has been 2 weeks yesterday that she had her Lt SIJ  Per pt her pain level improved the first week but at this point is no better and feels a little worse  Per pt hard time ambulating  Per pt she takes 300mg gabapentin TID ordered by her PCP, and tylenol, per pt not sure how much of this she takes  RN advised that she may take 1000mg every 8 hours no more than 3000mg in a 24 hour period  Per pt she has had wt loss surgery ion the past and cannot take nsaids  Pt does take one 4mg tizanidine at HS which "kicks her out" but does nothing for her pain  Per pt she has horrible dreams on this and has even has night time incontinence due to being so asleep  Pt aware that TOLU and ANGY are out of the office until 3/8 and will send this to the on call Dr and call back with his suggestions and recs for same  Pt appreciative of same  --please advise thank you--  Anything other for pain? Different muscle relaxer?

## 2021-03-04 NOTE — TELEPHONE ENCOUNTER
Pt called stating she is having a lot a pain and would like to know what she can do     Pt can be reached at [phone]

## 2021-03-04 NOTE — TELEPHONE ENCOUNTER
--VS, I apologize that I left this out of the last note, pt already uses Voltaren gel, ice and heat

## 2021-03-10 ENCOUNTER — TELEPHONE (OUTPATIENT)
Dept: PAIN MEDICINE | Facility: MEDICAL CENTER | Age: 51
End: 2021-03-10

## 2021-03-10 DIAGNOSIS — Z23 ENCOUNTER FOR IMMUNIZATION: ICD-10-CM

## 2021-03-10 NOTE — TELEPHONE ENCOUNTER
Patient called stating she didn't realize she had to put her kids on the bus this morning & couldn't make it to her [facility] this morning  She is rescheduled for 3/22/21

## 2021-03-15 DIAGNOSIS — B37.9 CANDIDA INFECTION: ICD-10-CM

## 2021-03-15 RX ORDER — NYSTATIN 100000 [USP'U]/G
POWDER TOPICAL
Qty: 15 G | Refills: 1 | Status: SHIPPED | OUTPATIENT
Start: 2021-03-15 | End: 2021-04-14

## 2021-03-21 DIAGNOSIS — I10 ESSENTIAL (PRIMARY) HYPERTENSION: ICD-10-CM

## 2021-03-21 RX ORDER — LISINOPRIL 2.5 MG/1
TABLET ORAL
Qty: 30 TABLET | Refills: 1 | Status: SHIPPED | OUTPATIENT
Start: 2021-03-21 | End: 2021-05-17

## 2021-03-22 ENCOUNTER — OFFICE VISIT (OUTPATIENT)
Dept: PAIN MEDICINE | Facility: MEDICAL CENTER | Age: 51
End: 2021-03-22
Payer: COMMERCIAL

## 2021-03-22 VITALS
BODY MASS INDEX: 31.76 KG/M2 | WEIGHT: 186 LBS | HEIGHT: 64 IN | HEART RATE: 80 BPM | TEMPERATURE: 97.4 F | DIASTOLIC BLOOD PRESSURE: 77 MMHG | SYSTOLIC BLOOD PRESSURE: 119 MMHG

## 2021-03-22 DIAGNOSIS — M50.30 DEGENERATIVE DISC DISEASE, CERVICAL: ICD-10-CM

## 2021-03-22 DIAGNOSIS — G89.29 CHRONIC LEFT SACROILIAC JOINT PAIN: ICD-10-CM

## 2021-03-22 DIAGNOSIS — G89.29 CHRONIC LEFT-SIDED LOW BACK PAIN WITH LEFT-SIDED SCIATICA: ICD-10-CM

## 2021-03-22 DIAGNOSIS — M47.816 LUMBAR SPONDYLOSIS: ICD-10-CM

## 2021-03-22 DIAGNOSIS — M47.812 CERVICAL SPONDYLOSIS WITHOUT MYELOPATHY: ICD-10-CM

## 2021-03-22 DIAGNOSIS — G89.29 CHRONIC BILATERAL LOW BACK PAIN WITHOUT SCIATICA: ICD-10-CM

## 2021-03-22 DIAGNOSIS — K21.9 GASTROESOPHAGEAL REFLUX DISEASE WITHOUT ESOPHAGITIS: ICD-10-CM

## 2021-03-22 DIAGNOSIS — M46.1 SACROILIITIS (HCC): ICD-10-CM

## 2021-03-22 DIAGNOSIS — M54.42 CHRONIC LEFT-SIDED LOW BACK PAIN WITH LEFT-SIDED SCIATICA: ICD-10-CM

## 2021-03-22 DIAGNOSIS — M53.3 CHRONIC LEFT SACROILIAC JOINT PAIN: ICD-10-CM

## 2021-03-22 DIAGNOSIS — M51.36 DEGENERATIVE DISC DISEASE, LUMBAR: Primary | ICD-10-CM

## 2021-03-22 DIAGNOSIS — M79.18 MYOFASCIAL PAIN SYNDROME: ICD-10-CM

## 2021-03-22 DIAGNOSIS — M54.2 NECK PAIN: ICD-10-CM

## 2021-03-22 DIAGNOSIS — M54.50 CHRONIC BILATERAL LOW BACK PAIN WITHOUT SCIATICA: ICD-10-CM

## 2021-03-22 PROCEDURE — 3078F DIAST BP <80 MM HG: CPT | Performed by: NURSE PRACTITIONER

## 2021-03-22 PROCEDURE — 3074F SYST BP LT 130 MM HG: CPT | Performed by: NURSE PRACTITIONER

## 2021-03-22 PROCEDURE — 3008F BODY MASS INDEX DOCD: CPT | Performed by: NURSE PRACTITIONER

## 2021-03-22 PROCEDURE — 1036F TOBACCO NON-USER: CPT | Performed by: NURSE PRACTITIONER

## 2021-03-22 PROCEDURE — 99213 OFFICE O/P EST LOW 20 MIN: CPT | Performed by: NURSE PRACTITIONER

## 2021-03-22 RX ORDER — TIZANIDINE 4 MG/1
TABLET ORAL
Qty: 30 TABLET | Refills: 1 | OUTPATIENT
Start: 2021-03-22

## 2021-03-22 RX ORDER — GABAPENTIN 300 MG/1
600 CAPSULE ORAL 3 TIMES DAILY
Qty: 180 CAPSULE | Refills: 2 | Status: SHIPPED | OUTPATIENT
Start: 2021-03-22 | End: 2021-06-11 | Stop reason: SDUPTHER

## 2021-03-22 RX ORDER — OMEPRAZOLE 20 MG/1
CAPSULE, DELAYED RELEASE ORAL
Qty: 90 CAPSULE | Refills: 1 | Status: SHIPPED | OUTPATIENT
Start: 2021-03-22

## 2021-03-22 RX ORDER — CYCLOBENZAPRINE HCL 10 MG
10 TABLET ORAL 3 TIMES DAILY PRN
Qty: 90 TABLET | Refills: 2 | Status: SHIPPED | OUTPATIENT
Start: 2021-03-22 | End: 2021-06-11 | Stop reason: SDUPTHER

## 2021-03-22 NOTE — PROGRESS NOTES
Assessment  1  Degenerative disc disease, lumbar    2  Degenerative disc disease, cervical    3  Lumbar spondylosis    4  Cervical spondylosis without myelopathy    5  Sacroiliitis ([address] )    6  Neck pain    7  Chronic left-sided low back pain with left-sided sciatica    8  Chronic left sacroiliac joint pain    9  Chronic bilateral low back pain without sciatica        Plan   at this time we will initiate physical therapy for her left low back and leg pain she will attend once or twice a week for 4-6 weeks  I will increase her gabapentin to the therapeutic dose range of 1800 mg for the day  She was given instruction on how to safely increase the dose  A medication refill was sent to her pharmacy  Continue with cyclobenzaprine and Voltaren gel these were both refilled  follow-up in 6 weeks for re-evaluation if no significant improvement in her pain symptoms would consider an MRI of her lumbar spine  My impressions and treatment recommendations were discussed in detail with the patient who verbalized understanding and had no further questions  Discharge instructions were provided  I personally saw and examined the patient and I agree with the above discussed plan of care      Orders Placed This Encounter   Procedures    Ambulatory referral to Physical Therapy     Standing Status:   Future     Standing Expiration Date:   3/22/2022     Referral Priority:   Routine     Referral Type:   Physical Therapy     Referral Reason:   Specialty Services Required     Requested Specialty:   Physical Therapy     Number of Visits Requested:   1     Expiration Date:   3/22/2022     New Medications Ordered This Visit   Medications    cyclobenzaprine (FLEXERIL) 10 mg tablet     Sig: Take 1 tablet (10 mg total) by mouth 3 (three) times a day as needed for muscle spasms     Dispense:  90 tablet     Refill:  2    Diclofenac Sodium (VOLTAREN) 1 %     Sig: Apply 4 g topically 2 (two) times a day as needed (severe pain) Dispense:  1 Tube     Refill:  2    gabapentin (NEURONTIN) 300 mg capsule     Sig: Take 2 capsules (600 mg total) by mouth 3 (three) times a day     Dispense:  180 capsule     Refill:  2       History of Present Illness    Christiano Sanchez is a 48 y o  female  Presents for follow-up related to her left low back and leg pain  Today she rates her pain 8/10 this is constant bothersome throughout the entirety of the day  She describes the pain as burning, sharp, shooting, numbness, and pins and needles  Patient has been using gabapentin 300 mg 3 times daily along with Voltaren gel twice daily and cyclobenzaprine 10 mg 3 times daily  Patient tells me that it is helping a little bit but it is not great  patient had a left sacroiliac joint injection on February 17, 2021 with Dr Corina Mccord and she had 55% relief for 5 days and then her pain symptoms returned  I have personally reviewed and/or updated the patient's past medical history, past surgical history, family history, social history, current medications, allergies, and vital signs today  Review of Systems   Respiratory: Negative for shortness of breath  Cardiovascular: Negative for chest pain  Gastrointestinal: Negative for constipation, diarrhea, nausea and vomiting  Musculoskeletal: Positive for back pain and gait problem  Negative for arthralgias, joint swelling and myalgias  Skin: Negative for rash  Neurological: Negative for dizziness, seizures and weakness  All other systems reviewed and are negative        Patient Active Problem List   Diagnosis    Obstructive sleep apnea syndrome    Restless legs syndrome (RLS)    Hypertension    Allergic rhinitis    Depression with anxiety    Gastroesophageal reflux disease without esophagitis    Malabsorption due to intolerance, not elsewhere classified    CKD (chronic kidney disease) stage 3, GFR 30-59 ml/min    Degenerative disc disease, cervical    Degenerative disc disease, lumbar  Lumbar spondylosis    Chronic left-sided low back pain with left-sided sciatica    Cervical spondylosis without myelopathy    Neck pain    Gastroesophageal reflux disease with esophagitis    Hyperlipidemia    Sacroiliitis (HCC)       Past Medical History:   Diagnosis Date    Anxiety     Chronic kidney disease     Depression     GERD (gastroesophageal reflux disease)     Hypertension     Kidney disease     Kidney stone        Past Surgical History:   Procedure Laterality Date    CHOLECYSTECTOMY      DENTAL SURGERY      [place] teeth extraction    ENDOMETRIAL ABLATION      GASTRIC RESTRICTION SURGERY      LITHOTRIPSY      TUBAL LIGATION         Family History   Problem Relation Age of Onset    Cirrhosis Mother     Kidney failure Mother     Diabetes Mother     Hypertension Mother     Kidney disease Mother     Kidney failure Father     Heart disease Father     Hypertension Father     Alcohol abuse Father     Kidney disease Father     Alcohol abuse Paternal Grandmother     Mental illness Maternal Uncle     Substance Abuse Neg Hx        Social History     Occupational History    Not on file   Tobacco Use    Smoking status: Never Smoker    Smokeless tobacco: Never Used   Substance and Sexual Activity    Alcohol use: Yes     Comment: Rarely    Drug use: Never    Sexual activity: Not Currently     Partners: Male       Current Outpatient Medications on File Prior to Visit   Medication Sig    AMILoride 5 mg tablet TAKE 2 TABLETS BY MOUTH EVERY DAY    buPROPion (WELLBUTRIN SR) 150 mg 12 hr tablet Take 1 tablet (150 mg total) by mouth 2 (two) times a day    Calcium-Phosphorus-Vitamin D (CALCIUM GUMMIES PO) Take by mouth    Cholecalciferol (EQL VITAMIN D3 GUMMIES PO) Take by mouth    furosemide (LASIX) 40 mg tablet TAKE 1 TABLET BY MOUTH AS NEEDED (SWELLING)      lisinopril (ZESTRIL) 2 5 mg tablet TAKE 1 TABLET BY MOUTH EVERY DAY    loratadine (CLARITIN) 10 mg tablet Take 1 tablet (10 mg total) by mouth daily    Multiple Vitamins-Minerals (MULTI-VITAMIN GUMMIES PO) Take by mouth    nitroglycerin (NITROSTAT) 0 4 mg SL tablet Place 1 tablet (0 4 mg total) under the tongue every 5 (five) minutes as needed for chest pain    nystatin (MYCOSTATIN) powder APPLY TO AFFECTED AREA 3 TIMES A DAY    potassium chloride (Klor-Con M20) 20 mEq tablet Take 1 tablet (20 mEq total) by mouth 3 (three) times a day    promethazine (PHENERGAN) 25 mg tablet TAKE 1 TABLET BY MOUTH EVERY 6 HOURS AS NEEDED FOR NAUSEA AND VOMITING    rOPINIRole (REQUIP) 0 25 mg tablet TAKE 1 TABLET (0 25 MG TOTAL) BY MOUTH DAILY AT BEDTIME    sertraline (ZOLOFT) 50 mg tablet TAKE 1 TABLET BY MOUTH EVERY DAY    [DISCONTINUED] cyclobenzaprine (FLEXERIL) 10 mg tablet Take 1 tablet (10 mg total) by mouth 3 (three) times a day as needed for muscle spasms    [DISCONTINUED] Diclofenac Sodium (VOLTAREN) 1 % Apply 4 g topically 2 (two) times a day as needed (severe pain)    [DISCONTINUED] gabapentin (NEURONTIN) 300 mg capsule TAKE 1 CAPSULE BY MOUTH THREE TIMES A DAY    albuterol (PROVENTIL HFA,VENTOLIN HFA) 90 mcg/act inhaler albuterol sulfate HFA 90 mcg/actuation aerosol inhaler   INHALE 2 PUFFS EVERY 4 HOURS AS NEEDED FOR WHEEZING OR SHORTNESS OF AIR   predniSONE 10 mg tablet Take 6 tabs on day one, then 5 tabs on day two, 4 tabs on day three, 3 tabs on day four, 2 tabs on day five, and 1 tab on day six  (Patient not taking: Reported on 3/22/2021)    tiZANidine (ZANAFLEX) 4 mg tablet Take 1 tablet (4 mg total) by mouth daily at bedtime    [DISCONTINUED] omeprazole (PriLOSEC) 20 mg delayed release capsule TAKE 1 CAPSULE BY MOUTH EVERY DAY     No current facility-administered medications on file prior to visit          Allergies   Allergen Reactions    Sulfa Antibiotics Throat Swelling       Physical Exam    /77   Pulse 80   Temp (!) 97 4 °F (36 3 °C)   Ht 5' 4" (1 626 m)   Wt 84 4 kg (186 lb)   BMI 31 93 kg/m² Constitutional: normal, well developed, well nourished, alert, in no distress and non-toxic and no overt pain behavior    Eyes: anicteric  HEENT: grossly intact  Neck: supple, symmetric, trachea midline and no masses   Pulmonary:even and unlabored  Cardiovascular:No edema or pitting edema present  Skin:Normal without rashes or lesions and well hydrated  Psychiatric:Mood and affect appropriate  Neurologic:Cranial Nerves II-XII grossly intact  Musculoskeletal:normal    Imaging

## 2021-03-22 NOTE — TELEPHONE ENCOUNTER
Patient called in asking if shes to increase the Diclofenac Sodium (VOLTAREN) 1 %   To 4 times a day opposed to 2 times a day  The script reads 2 a day, but patient thought she dicussed the increase to 4 times a day at visit   Thank you       [phone]

## 2021-03-22 NOTE — TELEPHONE ENCOUNTER
Please advise - regarding Voltaren gel    Pt is now taking Flexeril so this refill is not needed correct?

## 2021-03-23 ENCOUNTER — EVALUATION (OUTPATIENT)
Dept: PHYSICAL THERAPY | Facility: CLINIC | Age: 51
End: 2021-03-23
Payer: COMMERCIAL

## 2021-03-23 DIAGNOSIS — G89.29 CHRONIC LEFT SACROILIAC JOINT PAIN: ICD-10-CM

## 2021-03-23 DIAGNOSIS — M51.36 DEGENERATIVE DISC DISEASE, LUMBAR: ICD-10-CM

## 2021-03-23 DIAGNOSIS — M54.42 CHRONIC LEFT-SIDED LOW BACK PAIN WITH LEFT-SIDED SCIATICA: ICD-10-CM

## 2021-03-23 DIAGNOSIS — G89.29 CHRONIC LEFT-SIDED LOW BACK PAIN WITH LEFT-SIDED SCIATICA: ICD-10-CM

## 2021-03-23 DIAGNOSIS — M47.816 LUMBAR SPONDYLOSIS: ICD-10-CM

## 2021-03-23 DIAGNOSIS — M46.1 SACROILIITIS (HCC): ICD-10-CM

## 2021-03-23 DIAGNOSIS — M53.3 CHRONIC LEFT SACROILIAC JOINT PAIN: ICD-10-CM

## 2021-03-23 PROCEDURE — 97112 NEUROMUSCULAR REEDUCATION: CPT | Performed by: PHYSICAL THERAPIST

## 2021-03-23 PROCEDURE — 97161 PT EVAL LOW COMPLEX 20 MIN: CPT | Performed by: PHYSICAL THERAPIST

## 2021-03-23 NOTE — PROGRESS NOTES
PT Evaluation     Today's date: 3/23/2021  Patient name: Flash Beckford  : 1970  MRN: [id number]  Referring provider: JONELLE Salgado  Dx:   Encounter Diagnosis     ICD-10-CM    1  Degenerative disc disease, lumbar  M51 36 Ambulatory referral to Physical Therapy   2  Lumbar spondylosis  M47 816 Ambulatory referral to Physical Therapy   3  Sacroiliitis ([address] )  M46 1 Ambulatory referral to Physical Therapy   4  Chronic left-sided low back pain with left-sided sciatica  M54 42 Ambulatory referral to Physical Therapy    G89 29    5  Chronic left sacroiliac joint pain  M53 3 Ambulatory referral to Physical Therapy    G89 29                   Assessment  Assessment details: Pt is a 49 yo female with chronic low back pain and left sided sciatica presenting with postural deficits, markedly poor core activation, decreased glute strength, decreased BLE flexibility, and adverse neural tension of left sciatic nerve  She responded well to postural correction with lumbar roll and repeated lumbar extensions in prone at the time of initial evaluation with centralization of symptoms and decreased pain intensity  She is a good candidate for outpatient physical therapy to address the above impairments and optimize functional mobility  Functional limitations: interrupted sleeping, pain with prolonged static positions, limited walking tolerance, limited lifting, pain with bending forward, pain with dressing at times  Goals  4-6 weeks  1  Increase B hip extensor strength by 1 MMT grade to indicate improved core stability  2  Tolerate sitting/standing/walking 15-30 minutes without onset of pain to allow resuming PLOF  3  Normalize BLE flexibility to promote good body mechanics during daily tasks  4  Independent with HEP at discharge  5  Decrease max pain to no more than 4/10 to increase functional activity tolerance  6  Eliminate radicular pain with repeated motions of lumbar spine        Plan  Plan details: Provided with and reviewed initial written HEP  Reviewed physical exam findings and plan of care  All questions answered to patient's satisfaction  Patient would benefit from: skilled physical therapy  Planned modality interventions: low level laser therapy  Planned therapy interventions: manual therapy, neuromuscular re-education, patient education, therapeutic activities, therapeutic exercise and home exercise program  Frequency: 2x week  Duration in weeks: 6  Treatment plan discussed with: patient        Subjective Evaluation    History of Present Illness  Mechanism of injury: Pt has had low back pain for years and tried PT several years ago with no relief  She had weight loss surgery 2 years ago and reports the back pain seems to worsen as she loses more weight  She acknowledges that she is able to be more active now and that may be a cause of the pain  The pain radiates down her left leg most of the time to appx her knee  She does not notice that any one position is worse but does notice that any static position is more painful  She had epidural injections that helped only temporarily  She arrives today with referral to outpatient PT      PMH significant for bariatric surgery 2019, stage III kidney disease, sleep apnea, restless leg syndrome, HTN, depression, anxiety, DDD of cervical and lumbar, pregnancy x5, gall bladder removal, hernia repair    Pain  Current pain ratin  Location: left low back and buttock/upper thigh  Quality: radiating and dull ache  Relieving factors: change in position and heat    Social Support  Lives in: multiple-level home  Lives with: adult children and young children    Employment status: not working  Exercise history: goes to [employer] 2-3x/week    Treatments  Previous treatment: injection treatment, physical therapy and medication  Patient Goals  Patient goals for therapy: decreased pain, independence with ADLs/IADLs and increased strength          Objective     Concurrent Complaints  Positive for disturbed sleep, bladder dysfunction (stress incontinence; pre-existing) and saddle (S4) numbness (new onset 3-4 weeks ago; per patient has messaged her doctor about it)  Negative for bowel dysfunction    Postural Observations  Seated posture: poor  Standing posture: poor  Correction of posture: makes symptoms better    Additional Postural Observation Details  Absent lumbar lordosis in sitting; correction with lumbar roll decreased pain    Joint Play   L5 comments: unable to tolerate lumbar spring testing  Mechanical Assessment    Cervical      Thoracic      Lumbar    Standing extension: repeated movements  Pain location: peripheralized  Pain intensity: worse  Lying extension: repeated movements  Pain location: centralized  Pain intensity: better    Strength/Myotome Testing     Left Hip   Planes of Motion   Extension: 4-    Right Hip   Planes of Motion   Extension: 4    Muscle Activation     Additional Muscle Activation Details  Fair TA activation on right, absent on left    Tests     Lumbar     Left   Positive passive SLR and slump test      Left Hip   Negative long sit  Right Hip   Negative long sit                Precautions: neck pain      Manuals 3/23            Max eli laser L SI                                                    Neuro Re-Ed             Prone prop on elbows *demo            Prone on elbows pres up *            VINNY glute set *            TA + PF             TA + PF + march             TA + PF + hip add             TA + PF + hip abd                                                    Postural Ed lumbar roll            Ther Ex             Hamstring str w/ strap             Piriformis str *            Prone quad str w/ strap             L Sciatic nerve glides NV                                                                Ther Activity             Bike w/ lumbar roll             Sit to stand + TA + PF             Gait Training Modalities

## 2021-03-24 NOTE — TELEPHONE ENCOUNTER
Continue with flexeril not tizanidine, and yes I did tell her she can increase to QID on the voltaren application

## 2021-03-28 ENCOUNTER — IMMUNIZATIONS (OUTPATIENT)
Dept: FAMILY MEDICINE CLINIC | Facility: HOSPITAL | Age: 51
End: 2021-03-28

## 2021-03-28 DIAGNOSIS — Z23 ENCOUNTER FOR IMMUNIZATION: Primary | ICD-10-CM

## 2021-03-28 PROCEDURE — 0001A SARS-COV-2 / COVID-19 MRNA VACCINE (PFIZER-BIONTECH) 30 MCG: CPT

## 2021-03-28 PROCEDURE — 91300 SARS-COV-2 / COVID-19 MRNA VACCINE (PFIZER-BIONTECH) 30 MCG: CPT

## 2021-03-29 ENCOUNTER — OFFICE VISIT (OUTPATIENT)
Dept: PHYSICAL THERAPY | Facility: CLINIC | Age: 51
End: 2021-03-29
Payer: COMMERCIAL

## 2021-03-29 DIAGNOSIS — M53.3 CHRONIC LEFT SACROILIAC JOINT PAIN: ICD-10-CM

## 2021-03-29 DIAGNOSIS — G89.29 CHRONIC LEFT-SIDED LOW BACK PAIN WITH LEFT-SIDED SCIATICA: ICD-10-CM

## 2021-03-29 DIAGNOSIS — M46.1 SACROILIITIS (HCC): ICD-10-CM

## 2021-03-29 DIAGNOSIS — M47.816 LUMBAR SPONDYLOSIS: ICD-10-CM

## 2021-03-29 DIAGNOSIS — M54.42 CHRONIC LEFT-SIDED LOW BACK PAIN WITH LEFT-SIDED SCIATICA: ICD-10-CM

## 2021-03-29 DIAGNOSIS — M51.36 DEGENERATIVE DISC DISEASE, LUMBAR: Primary | ICD-10-CM

## 2021-03-29 DIAGNOSIS — G89.29 CHRONIC LEFT SACROILIAC JOINT PAIN: ICD-10-CM

## 2021-03-29 PROCEDURE — 97530 THERAPEUTIC ACTIVITIES: CPT | Performed by: PHYSICAL THERAPIST

## 2021-03-29 PROCEDURE — 97112 NEUROMUSCULAR REEDUCATION: CPT | Performed by: PHYSICAL THERAPIST

## 2021-03-29 NOTE — PROGRESS NOTES
Daily Note     Today's date: 3/29/2021  Patient name: Christiano Sanchez  : 1970  MRN: [id number]  Referring provider: JONELLE Lynn  Dx:   Encounter Diagnosis     ICD-10-CM    1  Degenerative disc disease, lumbar  M51 36    2  Lumbar spondylosis  M47 816    3  Sacroiliitis ([address] )  M46 1    4  Chronic left-sided low back pain with left-sided sciatica  M54 42     G89 29    5  Chronic left sacroiliac joint pain  M53 3     G89 29                   Subjective: Pt reports increased pain since IE  Pt had her Covid vaccine yesterday and is having a lot of soreness  Objective: See treatment diary below      Assessment: Tolerated treatment fair  Patient exhibited good technique with therapeutic exercises and would benefit from continued PT  Pt reported decreased pain after prone lying with laser treatment  Session limited today due to increased pain and fatigue upon arrival to PT  Plan: Continue per plan of care        Precautions: neck pain      Manuals 3/23 3/29           Max eli laser L SI  4'                                                  Neuro Re-Ed             Prone prop on elbows *demo Prone lying 4'           Prone on elbows pres up *            VINNY glute set *            TA + PF  5"x20           TA + PF + march             TA + PF + hip add  5"x20           TA + PF + hip abd  5"x20 red                                                  Postural Ed lumbar roll rev           Ther Ex             Hamstring str w/ strap             Piriformis str *            Prone quad str w/ strap             L Sciatic nerve glides NV 10x                                                               Ther Activity             Bike w/ lumbar roll  6'           Sit to stand + TA + PF  5x           Gait Training                                       Modalities

## 2021-03-30 ENCOUNTER — HOSPITAL ENCOUNTER (EMERGENCY)
Facility: HOSPITAL | Age: 51
Discharge: HOME | End: 2021-03-30
Attending: EMERGENCY MEDICINE | Admitting: EMERGENCY MEDICINE
Payer: COMMERCIAL

## 2021-03-30 ENCOUNTER — APPOINTMENT (OUTPATIENT)
Dept: PHYSICAL THERAPY | Facility: CLINIC | Age: 51
End: 2021-03-30
Payer: COMMERCIAL

## 2021-03-30 VITALS
OXYGEN SATURATION: 99 % | WEIGHT: 196.21 LBS | DIASTOLIC BLOOD PRESSURE: 65 MMHG | HEART RATE: 72 BPM | SYSTOLIC BLOOD PRESSURE: 145 MMHG | BODY MASS INDEX: 33.68 KG/M2 | RESPIRATION RATE: 16 BRPM | TEMPERATURE: 97.6 F

## 2021-03-30 DIAGNOSIS — R42 VERTIGO: Primary | ICD-10-CM

## 2021-03-30 LAB
ANION GAP SERPL CALCULATED.3IONS-SCNC: 8 MMOL/L (ref 4–13)
ATRIAL RATE: 62 BPM
BASOPHILS # BLD AUTO: 0.02 THOUSANDS/ΜL (ref 0–0.1)
BASOPHILS NFR BLD AUTO: 0 % (ref 0–1)
BUN SERPL-MCNC: 16 MG/DL (ref 5–25)
CALCIUM SERPL-MCNC: 9.5 MG/DL (ref 8.3–10.1)
CHLORIDE SERPL-SCNC: 104 MMOL/L (ref 100–108)
CO2 SERPL-SCNC: 29 MMOL/L (ref 21–32)
CREAT SERPL-MCNC: 0.67 MG/DL (ref 0.6–1.3)
EOSINOPHIL # BLD AUTO: 0.39 THOUSAND/ΜL (ref 0–0.61)
EOSINOPHIL NFR BLD AUTO: 5 % (ref 0–6)
ERYTHROCYTE [DISTWIDTH] IN BLOOD BY AUTOMATED COUNT: 13.6 % (ref 11.6–15.1)
GFR SERPL CREATININE-BSD FRML MDRD: 103 ML/MIN/1.73SQ M
GLUCOSE SERPL-MCNC: 88 MG/DL (ref 65–140)
HCT VFR BLD AUTO: 41.2 % (ref 34.8–46.1)
HGB BLD-MCNC: 13.3 G/DL (ref 11.5–15.4)
IMM GRANULOCYTES # BLD AUTO: 0.01 THOUSAND/UL (ref 0–0.2)
IMM GRANULOCYTES NFR BLD AUTO: 0 % (ref 0–2)
LYMPHOCYTES # BLD AUTO: 2.59 THOUSANDS/ΜL (ref 0.6–4.47)
LYMPHOCYTES NFR BLD AUTO: 35 % (ref 14–44)
MCH RBC QN AUTO: 27.6 PG (ref 26.8–34.3)
MCHC RBC AUTO-ENTMCNC: 32.3 G/DL (ref 31.4–37.4)
MCV RBC AUTO: 86 FL (ref 82–98)
MONOCYTES # BLD AUTO: 0.64 THOUSAND/ΜL (ref 0.17–1.22)
MONOCYTES NFR BLD AUTO: 9 % (ref 4–12)
NEUTROPHILS # BLD AUTO: 3.67 THOUSANDS/ΜL (ref 1.85–7.62)
NEUTS SEG NFR BLD AUTO: 51 % (ref 43–75)
NRBC BLD AUTO-RTO: 0 /100 WBCS
P AXIS: 82 DEGREES
PLATELET # BLD AUTO: 216 THOUSANDS/UL (ref 149–390)
PMV BLD AUTO: 10 FL (ref 8.9–12.7)
POTASSIUM SERPL-SCNC: 3.8 MMOL/L (ref 3.5–5.3)
PR INTERVAL: 138 MS
QRS AXIS: 51 DEGREES
QRSD INTERVAL: 92 MS
QT INTERVAL: 424 MS
QTC INTERVAL: 430 MS
RBC # BLD AUTO: 4.82 MILLION/UL (ref 3.81–5.12)
SODIUM SERPL-SCNC: 141 MMOL/L (ref 136–145)
T WAVE AXIS: 69 DEGREES
TROPONIN I SERPL-MCNC: <0.02 NG/ML
VENTRICULAR RATE: 62 BPM
WBC # BLD AUTO: 7.32 THOUSAND/UL (ref 4.31–10.16)

## 2021-03-30 PROCEDURE — 99284 EMERGENCY DEPT VISIT MOD MDM: CPT

## 2021-03-30 PROCEDURE — 99285 EMERGENCY DEPT VISIT HI MDM: CPT | Performed by: EMERGENCY MEDICINE

## 2021-03-30 PROCEDURE — 93010 ELECTROCARDIOGRAM REPORT: CPT

## 2021-03-30 PROCEDURE — 96375 TX/PRO/DX INJ NEW DRUG ADDON: CPT

## 2021-03-30 PROCEDURE — 96374 THER/PROPH/DIAG INJ IV PUSH: CPT

## 2021-03-30 PROCEDURE — 84484 ASSAY OF TROPONIN QUANT: CPT | Performed by: EMERGENCY MEDICINE

## 2021-03-30 PROCEDURE — 80048 BASIC METABOLIC PNL TOTAL CA: CPT | Performed by: EMERGENCY MEDICINE

## 2021-03-30 PROCEDURE — 36415 COLL VENOUS BLD VENIPUNCTURE: CPT | Performed by: EMERGENCY MEDICINE

## 2021-03-30 PROCEDURE — 96361 HYDRATE IV INFUSION ADD-ON: CPT

## 2021-03-30 PROCEDURE — 93005 ELECTROCARDIOGRAM TRACING: CPT

## 2021-03-30 PROCEDURE — 85025 COMPLETE CBC W/AUTO DIFF WBC: CPT | Performed by: EMERGENCY MEDICINE

## 2021-03-30 RX ORDER — DIAZEPAM 5 MG/ML
5 INJECTION, SOLUTION INTRAMUSCULAR; INTRAVENOUS ONCE
Status: COMPLETED | OUTPATIENT
Start: 2021-03-30 | End: 2021-03-30

## 2021-03-30 RX ORDER — ONDANSETRON 4 MG/1
4 TABLET, ORALLY DISINTEGRATING ORAL EVERY 8 HOURS PRN
Qty: 20 TABLET | Refills: 0 | Status: SHIPPED | OUTPATIENT
Start: 2021-03-30

## 2021-03-30 RX ORDER — ONDANSETRON 2 MG/ML
4 INJECTION INTRAMUSCULAR; INTRAVENOUS ONCE
Status: COMPLETED | OUTPATIENT
Start: 2021-03-30 | End: 2021-03-30

## 2021-03-30 RX ORDER — MECLIZINE HYDROCHLORIDE 25 MG/1
25 TABLET ORAL 3 TIMES DAILY PRN
Qty: 30 TABLET | Refills: 0 | Status: SHIPPED | OUTPATIENT
Start: 2021-03-30

## 2021-03-30 RX ORDER — MECLIZINE HCL 12.5 MG/1
25 TABLET ORAL ONCE
Status: COMPLETED | OUTPATIENT
Start: 2021-03-30 | End: 2021-03-30

## 2021-03-30 RX ORDER — DIAZEPAM 5 MG/1
5 TABLET ORAL 2 TIMES DAILY
Qty: 6 TABLET | Refills: 0 | Status: SHIPPED | OUTPATIENT
Start: 2021-03-30 | End: 2021-04-02

## 2021-03-30 RX ADMIN — ONDANSETRON 4 MG: 2 INJECTION INTRAMUSCULAR; INTRAVENOUS at 20:06

## 2021-03-30 RX ADMIN — SODIUM CHLORIDE 1000 ML: 0.9 INJECTION, SOLUTION INTRAVENOUS at 20:06

## 2021-03-30 RX ADMIN — DIAZEPAM 5 MG: 10 INJECTION, SOLUTION INTRAMUSCULAR; INTRAVENOUS at 20:06

## 2021-03-30 RX ADMIN — MECLIZINE 25 MG: 12.5 TABLET ORAL at 20:06

## 2021-03-31 ENCOUNTER — HOSPITAL ENCOUNTER (EMERGENCY)
Facility: HOSPITAL | Age: 51
Discharge: HOME | End: 2021-03-31
Attending: EMERGENCY MEDICINE | Admitting: EMERGENCY MEDICINE
Payer: COMMERCIAL

## 2021-03-31 ENCOUNTER — TELEPHONE (OUTPATIENT)
Dept: PAIN MEDICINE | Facility: MEDICAL CENTER | Age: 51
End: 2021-03-31

## 2021-03-31 VITALS
OXYGEN SATURATION: 99 % | BODY MASS INDEX: 34.06 KG/M2 | WEIGHT: 198.41 LBS | TEMPERATURE: 98.9 F | DIASTOLIC BLOOD PRESSURE: 54 MMHG | RESPIRATION RATE: 18 BRPM | SYSTOLIC BLOOD PRESSURE: 100 MMHG | HEART RATE: 88 BPM

## 2021-03-31 DIAGNOSIS — G89.29 CHRONIC LEFT-SIDED LOW BACK PAIN WITH LEFT-SIDED SCIATICA: Primary | ICD-10-CM

## 2021-03-31 DIAGNOSIS — M54.42 CHRONIC LEFT-SIDED LOW BACK PAIN WITH LEFT-SIDED SCIATICA: Primary | ICD-10-CM

## 2021-03-31 DIAGNOSIS — M54.42 ACUTE LEFT-SIDED LOW BACK PAIN WITH LEFT-SIDED SCIATICA: Primary | ICD-10-CM

## 2021-03-31 PROCEDURE — 99284 EMERGENCY DEPT VISIT MOD MDM: CPT | Performed by: EMERGENCY MEDICINE

## 2021-03-31 PROCEDURE — 96372 THER/PROPH/DIAG INJ SC/IM: CPT

## 2021-03-31 PROCEDURE — 99283 EMERGENCY DEPT VISIT LOW MDM: CPT

## 2021-03-31 RX ORDER — METHYLPREDNISOLONE 4 MG/1
TABLET ORAL
Qty: 1 EACH | Refills: 0 | Status: SHIPPED | OUTPATIENT
Start: 2021-03-31

## 2021-03-31 RX ORDER — KETOROLAC TROMETHAMINE 30 MG/ML
30 INJECTION, SOLUTION INTRAMUSCULAR; INTRAVENOUS ONCE
Status: COMPLETED | OUTPATIENT
Start: 2021-03-31 | End: 2021-03-31

## 2021-03-31 RX ORDER — LIDOCAINE 50 MG/G
1 PATCH TOPICAL ONCE
Status: DISCONTINUED | OUTPATIENT
Start: 2021-03-31 | End: 2021-04-01 | Stop reason: HOSPADM

## 2021-03-31 RX ORDER — DIAZEPAM 2 MG/1
2 TABLET ORAL ONCE
Status: COMPLETED | OUTPATIENT
Start: 2021-03-31 | End: 2021-03-31

## 2021-03-31 RX ADMIN — DIAZEPAM 2 MG: 2 TABLET ORAL at 22:02

## 2021-03-31 RX ADMIN — LIDOCAINE 1 PATCH: 50 PATCH TOPICAL at 22:00

## 2021-03-31 RX ADMIN — KETOROLAC TROMETHAMINE 30 MG: 30 INJECTION, SOLUTION INTRAMUSCULAR at 23:47

## 2021-03-31 NOTE — ED PROVIDER NOTES
History  Chief Complaint   Patient presents with    Dizziness     Reports Covid vaccine Sunday  Feels like room is spinning around her  History of low potassium since weight-loss surgery  Has had intermittent throbbing chest discomfort since this AM      Pt is a 48year old female with a PMH of anxiety, CKD, hypertension, gastric bypass, depression presenting with dizziness  Pt states around 1000 this morning she had gradual onset of dizziness "like the room is spinning"  Pt states she attempted to lay down and rest but the dizziness was worse when she woke up  States that the dizziness is improved with rest and exacerbated by sitting and standing up and head movements  Pt denies headaches, lightheadedness, changes in vision or speech, weakness, numbness, chest pain, Sob, abdominal pain  She did have associated n/v but took phenergan and improved  Denies history of CVA  Did receive COVID vaccine 2 days ago  States she did have chest discomfort as well that dissipated but denies SOB  History provided by:  Patient   used: No    Dizziness  Quality:  Room spinning  Severity:  Moderate  Onset quality:  Gradual  Timing:  Intermittent  Progression:  Worsening  Chronicity:  New  Context: head movement, physical activity and standing up    Relieved by:  Being still  Worsened by:  Standing up, sitting upright, turning head and movement  Associated symptoms: chest pain, nausea and vomiting    Associated symptoms: no diarrhea, no headaches, no palpitations and no weakness    Risk factors: no heart disease, no hx of stroke, no hx of vertigo, no Meniere's disease, no multiple medications and no new medications        Prior to Admission Medications   Prescriptions Last Dose Informant Patient Reported? Taking?    AMILoride 5 mg tablet 3/29/2021 at Unknown time  No Yes   Sig: TAKE 2 TABLETS BY MOUTH EVERY DAY   Calcium-Phosphorus-Vitamin D (CALCIUM GUMMIES PO) 3/29/2021 at Unknown time  Yes Yes   Sig: Take by mouth   Cholecalciferol (EQL VITAMIN D3 GUMMIES PO) 3/29/2021 at Unknown time  Yes Yes   Sig: Take by mouth   Diclofenac Sodium (VOLTAREN) 1 % 3/29/2021 at Unknown time  No Yes   Sig: Apply 4 g topically 2 (two) times a day as needed (severe pain)   Multiple Vitamins-Minerals (MULTI-VITAMIN GUMMIES PO) 3/29/2021 at Unknown time  Yes Yes   Sig: Take by mouth   albuterol (PROVENTIL HFA,VENTOLIN HFA) 90 mcg/act inhaler   Yes No   Sig: albuterol sulfate HFA 90 mcg/actuation aerosol inhaler   INHALE 2 PUFFS EVERY 4 HOURS AS NEEDED FOR WHEEZING OR SHORTNESS OF AIR  buPROPion [facility]) 150 mg 12 hr tablet 3/29/2021 at Unknown time  No Yes   Sig: Take 1 tablet (150 mg total) by mouth 2 (two) times a day   cyclobenzaprine (FLEXERIL) 10 mg tablet 3/29/2021 at Unknown time  No Yes   Sig: Take 1 tablet (10 mg total) by mouth 3 (three) times a day as needed for muscle spasms   furosemide (LASIX) 40 mg tablet 3/29/2021 at Unknown time  No Yes   Sig: TAKE 1 TABLET BY MOUTH AS NEEDED (SWELLING)     gabapentin (NEURONTIN) 300 mg capsule 3/29/2021 at Unknown time  No Yes   Sig: Take 2 capsules (600 mg total) by mouth 3 (three) times a day   lisinopril (ZESTRIL) 2 5 mg tablet 3/29/2021 at Unknown time  No Yes   Sig: TAKE 1 TABLET BY MOUTH EVERY DAY   loratadine (CLARITIN) 10 mg tablet 3/29/2021 at Unknown time  No Yes   Sig: Take 1 tablet (10 mg total) by mouth daily   nitroglycerin (NITROSTAT) 0 4 mg SL tablet 3/29/2021 at Unknown time  No Yes   Sig: Place 1 tablet (0 4 mg total) under the tongue every 5 (five) minutes as needed for chest pain   nystatin (MYCOSTATIN) powder 3/29/2021 at Unknown time  No Yes   Sig: APPLY TO AFFECTED AREA 3 TIMES A DAY   omeprazole (PriLOSEC) 20 mg delayed release capsule 3/29/2021 at Unknown time  No Yes   Sig: TAKE 1 CAPSULE BY MOUTH EVERY DAY   potassium chloride (Klor-Con M20) 20 mEq tablet 3/29/2021 at Unknown time  No Yes   Sig: Take 1 tablet (20 mEq total) by mouth 3 (three) times a day   predniSONE 10 mg tablet Not Taking at Unknown time  No No   Sig: Take 6 tabs on day one, then 5 tabs on day two, 4 tabs on day three, 3 tabs on day four, 2 tabs on day five, and 1 tab on day six  Patient not taking: Reported on 3/22/2021   promethazine (PHENERGAN) 25 mg tablet 3/30/2021 at Unknown time  No Yes   Sig: TAKE 1 TABLET BY MOUTH EVERY 6 HOURS AS NEEDED FOR NAUSEA AND VOMITING   rOPINIRole (REQUIP) 0 25 mg tablet 3/29/2021 at Unknown time  No Yes   Sig: TAKE 1 TABLET (0 25 MG TOTAL) BY MOUTH DAILY AT BEDTIME   sertraline (ZOLOFT) 50 mg tablet 3/29/2021 at Unknown time  No Yes   Sig: TAKE 1 TABLET BY MOUTH EVERY DAY   tiZANidine (ZANAFLEX) 4 mg tablet   No No   Sig: Take 1 tablet (4 mg total) by mouth daily at bedtime      Facility-Administered Medications: None       Past Medical History:   Diagnosis Date    Anxiety     Chronic kidney disease     Depression     GERD (gastroesophageal reflux disease)     Hypertension     Kidney disease     Kidney stone        Past Surgical History:   Procedure Laterality Date    CHOLECYSTECTOMY      DENTAL SURGERY      [place] teeth extraction    ENDOMETRIAL ABLATION      GASTRIC RESTRICTION SURGERY      LITHOTRIPSY      TUBAL LIGATION         Family History   Problem Relation Age of Onset    Cirrhosis Mother     Kidney failure Mother     Diabetes Mother     Hypertension Mother     Kidney disease Mother     Kidney failure Father     Heart disease Father     Hypertension Father     Alcohol abuse Father     Kidney disease Father     Alcohol abuse Paternal Grandmother     Mental illness Maternal Uncle     Substance Abuse Neg Hx      I have reviewed and agree with the history as documented      E-Cigarette/Vaping    E-Cigarette Use Never User      E-Cigarette/Vaping Substances    Nicotine No     THC No     CBD No     Flavoring No     Other No     Unknown No      Social History     Tobacco Use    Smoking status: Never Smoker    Smokeless tobacco: Never Used   Substance Use Topics    Alcohol use: Yes     Comment: Rarely    Drug use: Never       Review of Systems   Constitutional: Negative  HENT: Negative  Respiratory: Negative  Cardiovascular: Positive for chest pain  Negative for palpitations and leg swelling  Gastrointestinal: Positive for nausea and vomiting  Negative for abdominal pain, constipation and diarrhea  Genitourinary: Negative  Musculoskeletal: Negative  Neurological: Positive for dizziness  Negative for syncope, facial asymmetry, speech difficulty, weakness, light-headedness, numbness and headaches  All other systems reviewed and are negative  Physical Exam  Physical Exam  Constitutional:       General: She is not in acute distress  Appearance: She is well-developed  She is not diaphoretic  HENT:      Head: Normocephalic and atraumatic  Right Ear: External ear normal       Left Ear: External ear normal       Nose: Nose normal    Eyes:      General: No scleral icterus  Right eye: No discharge  Left eye: No discharge  Extraocular Movements: Extraocular movements intact  Conjunctiva/sclera: Conjunctivae normal       Pupils: Pupils are equal, round, and reactive to light  Neck:      Musculoskeletal: Normal range of motion and neck supple  Cardiovascular:      Rate and Rhythm: Normal rate and regular rhythm  Heart sounds: Normal heart sounds  Pulmonary:      Effort: Pulmonary effort is normal       Breath sounds: Normal breath sounds  Musculoskeletal: Normal range of motion  Skin:     General: Skin is warm and dry  Neurological:      General: No focal deficit present  Mental Status: She is alert and oriented to person, place, and time  GCS: GCS eye subscore is 4  GCS verbal subscore is 5  GCS motor subscore is 6  Cranial Nerves: Cranial nerves are intact  Sensory: Sensation is intact  No sensory deficit  Motor: Motor function is intact  Coordination: Coordination is intact  Gait: Gait is intact     Psychiatric:         Mood and Affect: Mood normal          Behavior: Behavior normal          Vital Signs  ED Triage Vitals [03/30/21 1634]   Temperature Pulse Respirations Blood Pressure SpO2   97 6 °F (36 4 °C) 77 18 127/88 100 %      Temp Source Heart Rate Source Patient Position - Orthostatic VS BP Location FiO2 (%)   Oral Monitor Sitting Left arm --      Pain Score       7           Vitals:    03/30/21 1634 03/30/21 1833   BP: 127/88 145/65   Pulse: 77 72   Patient Position - Orthostatic VS: Sitting Sitting         Visual Acuity  Visual Acuity      Most Recent Value   L Pupil Size (mm)  2   R Pupil Size (mm)  2          ED Medications  Medications   ondansetron (ZOFRAN) injection 4 mg (4 mg Intravenous Given 3/30/21 2006)   diazepam (VALIUM) injection 5 mg (5 mg Intravenous Given 3/30/21 2006)   meclizine (ANTIVERT) tablet 25 mg (25 mg Oral Given 3/30/21 2006)   sodium chloride 0 9 % bolus 1,000 mL (0 mL Intravenous Stopped 3/30/21 2122)       Diagnostic Studies  Results Reviewed     Procedure Component Value Units Date/Time    Troponin I [[id number]]  (Normal) Collected: 03/30/21 2001    Lab Status: Final result Specimen: Blood from Arm, Right Updated: 03/30/21 2040     Troponin I <0 02 ng/mL     Basic metabolic panel [[id number]] Collected: 03/30/21 2001    Lab Status: Final result Specimen: Blood from Arm, Right Updated: 03/30/21 2036     Sodium 141 mmol/L      Potassium 3 8 mmol/L      Chloride 104 mmol/L      CO2 29 mmol/L      ANION GAP 8 mmol/L      BUN 16 mg/dL      Creatinine 0 67 mg/dL      Glucose 88 mg/dL      Calcium 9 5 mg/dL      eGFR 103 ml/min/1 73sq m     Narrative:      [place] guidelines for Chronic Kidney Disease (CKD):     Stage 1 with normal or high GFR (GFR > 90 mL/min/1 73 square meters)    Stage 2 Mild CKD (GFR = 60-89 mL/min/1 73 square meters)    Stage 3A Moderate CKD (GFR = 45-59 mL/min/1 73 square meters)    Stage 3B Moderate CKD (GFR = 30-44 mL/min/1 73 square meters)    Stage 4 Severe CKD (GFR = 15-29 mL/min/1 73 square meters)    Stage 5 End Stage CKD (GFR <15 mL/min/1 73 square meters)  Note: GFR calculation is accurate only with a steady state creatinine    CBC and differential [[id number]] Collected: 03/30/21 2001    Lab Status: Final result Specimen: Blood from Arm, Right Updated: 03/30/21 2015     WBC 7 32 Thousand/uL      RBC 4 82 Million/uL      Hemoglobin 13 3 g/dL      Hematocrit 41 2 %      MCV 86 fL      MCH 27 6 pg      MCHC 32 3 g/dL      RDW 13 6 %      MPV 10 0 fL      Platelets 825 Thousands/uL      nRBC 0 /100 WBCs      Neutrophils Relative 51 %      Immat GRANS % 0 %      Lymphocytes Relative 35 %      Monocytes Relative 9 %      Eosinophils Relative 5 %      Basophils Relative 0 %      Neutrophils Absolute 3 67 Thousands/µL      Immature Grans Absolute 0 01 Thousand/uL      Lymphocytes Absolute 2 59 Thousands/µL      Monocytes Absolute 0 64 Thousand/µL      Eosinophils Absolute 0 39 Thousand/µL      Basophils Absolute 0 02 Thousands/µL                  No orders to display              Procedures  ECG 12 Lead Documentation Only    Date/Time: 3/30/2021 8:00 PM  Performed by: Jeannette Hercules PA-C  Authorized by: Jeannette Hercules PA-C     ECG reviewed by me, the ED Provider: yes    Patient location:  ED  Previous ECG:     Previous ECG:  Unavailable    Comparison to cardiac monitor: No    Interpretation:     Interpretation: normal    Rate:     ECG rate:  62    ECG rate assessment: normal    Rhythm:     Rhythm: sinus rhythm    Ectopy:     Ectopy: none    QRS:     QRS axis:  Normal    QRS intervals:  Normal  Conduction:     Conduction: normal    ST segments:     ST segments:  Normal  T waves:     T waves: normal               ED Course  ED Course as of Mar 30 2320   Tue Mar 30, 2021   2123 Pt presenting with symptoms of vertigo   Based on history and exam, symptoms consistent with peripheral vertigo  Her EKG and labs are normal  Her vitals are stable  She improved with medications in the ED and is agreeable to discharge  Follow up with PCP or PT  Educated on return precautions  MDM  Number of Diagnoses or Management Options  Vertigo: new and requires workup     Amount and/or Complexity of Data Reviewed  Clinical lab tests: ordered and reviewed  Tests in the medicine section of CPT®: ordered and reviewed  Independent visualization of images, tracings, or specimens: yes    Risk of Complications, Morbidity, and/or Mortality  Presenting problems: moderate  Management options: moderate    Patient Progress  Patient progress: improved      Disposition  Final diagnoses:   Vertigo     Time reflects when diagnosis was documented in both MDM as applicable and the Disposition within this note     Time User Action Codes Description Comment    3/30/2021  9:16 PM Reggie Flank Add [R42] Vertigo     3/30/2021  9:16 PM Bernis Tom [R11 0] Nausea     3/30/2021  9:16 PM Reggie Flank Remove [R11 0] Nausea       ED Disposition     ED Disposition Condition Date/Time Comment    Discharge Good Tue Mar 30, 2021  9:17 PM Jack English 89 discharge to home/self care              Follow-up Information     Follow up With Specialties Details Why Contact Info Additional Information    Waldemar Hamilton PA-C Family Medicine, Physician Assistant Schedule an appointment as soon as possible for a visit today  [address] [place] [address], [address] [place] [address]  [phone]       Physical Therapy at [address] Physical Therapy Go to  As needed [address] [address]  [phone] Physical Therapy at [facility], [address] [place] [address] Way          Discharge Medication List as of 3/30/2021  9:18 PM      START taking these medications    Details   meclizine (ANTIVERT) 25 mg tablet Take 1 tablet (25 mg total) by mouth 3 (three) times a day as needed for dizziness, Starting Tue 3/30/2021, Normal      ondansetron (ZOFRAN-ODT) 4 mg disintegrating tablet Take 1 tablet (4 mg total) by mouth every 8 (eight) hours as needed for nausea, Starting Tue 3/30/2021, Normal         CONTINUE these medications which have NOT CHANGED    Details   albuterol (PROVENTIL HFA,VENTOLIN HFA) 90 mcg/act inhaler albuterol sulfate HFA 90 mcg/actuation aerosol inhaler   INHALE 2 PUFFS EVERY 4 HOURS AS NEEDED FOR WHEEZING OR SHORTNESS OF AIR , Historical Med      AMILoride 5 mg tablet TAKE 2 TABLETS BY MOUTH EVERY DAY, Normal      buPROPion (WELLBUTRIN SR) 150 mg 12 hr tablet Take 1 tablet (150 mg total) by mouth 2 (two) times a day, Starting Tue 7/14/2020, Normal      Calcium-Phosphorus-Vitamin D (CALCIUM GUMMIES PO) Take by mouth, Historical Med      Cholecalciferol (EQL VITAMIN D3 GUMMIES PO) Take by mouth, Historical Med      cyclobenzaprine (FLEXERIL) 10 mg tablet Take 1 tablet (10 mg total) by mouth 3 (three) times a day as needed for muscle spasms, Starting Mon 3/22/2021, Normal      Diclofenac Sodium (VOLTAREN) 1 % Apply 4 g topically 2 (two) times a day as needed (severe pain), Starting Mon 3/22/2021, Normal      furosemide (LASIX) 40 mg tablet TAKE 1 TABLET BY MOUTH AS NEEDED (SWELLING)  , Normal      gabapentin (NEURONTIN) 300 mg capsule Take 2 capsules (600 mg total) by mouth 3 (three) times a day, Starting Mon 3/22/2021, Normal      lisinopril (ZESTRIL) 2 5 mg tablet TAKE 1 TABLET BY MOUTH EVERY DAY, Normal      loratadine (CLARITIN) 10 mg tablet Take 1 tablet (10 mg total) by mouth daily, Starting Wed 7/15/2020, Normal      Multiple Vitamins-Minerals (MULTI-VITAMIN GUMMIES PO) Take by mouth, Historical Med      nitroglycerin (NITROSTAT) 0 4 mg SL tablet Place 1 tablet (0 4 mg total) under the tongue every 5 (five) minutes as needed for chest pain, Starting Tue 7/14/2020, Normal      nystatin (MYCOSTATIN) powder APPLY TO AFFECTED AREA 3 TIMES A DAY, Normal      omeprazole (PriLOSEC) 20 mg delayed release capsule TAKE 1 CAPSULE BY MOUTH EVERY DAY, Normal      potassium chloride (Klor-Con M20) 20 mEq tablet Take 1 tablet (20 mEq total) by mouth 3 (three) times a day, Starting Tue 7/14/2020, Normal      predniSONE 10 mg tablet Take 6 tabs on day one, then 5 tabs on day two, 4 tabs on day three, 3 tabs on day four, 2 tabs on day five, and 1 tab on day six , Normal      promethazine (PHENERGAN) 25 mg tablet TAKE 1 TABLET BY MOUTH EVERY 6 HOURS AS NEEDED FOR NAUSEA AND VOMITING, Normal      rOPINIRole (REQUIP) 0 25 mg tablet TAKE 1 TABLET (0 25 MG TOTAL) BY MOUTH DAILY AT BEDTIME, Starting Tue 2/23/2021, Normal      sertraline (ZOLOFT) 50 mg tablet TAKE 1 TABLET BY MOUTH EVERY DAY, Normal      tiZANidine (ZANAFLEX) 4 mg tablet Take 1 tablet (4 mg total) by mouth daily at bedtime, Starting Mon 1/25/2021, Normal           No discharge procedures on file      PDMP Review       Value Time User    PDMP Reviewed  Yes 11/3/2020  4:22 PM Aneta Stern PA-C          ED Provider  Electronically Signed by           Michela Heck PA-C  03/30/21 1214

## 2021-03-31 NOTE — TELEPHONE ENCOUNTER
Medrol dose pack sent to pharmacy   If not improvement she can return to urgent care or schedule an OV here

## 2021-03-31 NOTE — TELEPHONE ENCOUNTER
--JUNIOR--    RN s/w pt regarding previous  Per pt she just had her first Covid injection on Sunday and had a bad reaction of N/V chest pain and dizziness  Pt spent 8 hour sin the ER and thinks that is what is exacerbating her low back and left leg pain  AO ordered a steroid dose pack but when RN found out about recent covid vaccine, s/w JE who stated not to take steroids for next two weeks  RN advised that she can go to Urgent care if needed and made an appt to see [facility] for Friday at 15:30

## 2021-03-31 NOTE — TELEPHONE ENCOUNTER
Pt called in stating that shes in pain all day and can barely move  Pt has applied her cream for pain and heat  Pain level is 10/10  She would like a callback with pain relief suggestions   Thank you     siena [phone]

## 2021-03-31 NOTE — TELEPHONE ENCOUNTER
RN s/w pt regarding previous  Per pt she saw AO on 3/22 and has implemented all of the changes  Taking 1800mg of gabapentin a day and has taken the AM and afternoon doses already for today is taking her baclofen and voltaren gel and using ice and heat and going to PT, also using tylenol up to 3000mg in a day  Pt cannot take nsaids due to wt loss surgery  Per pt there was a little relief and now the last 3 days she is at a 10/10 and at the point where she was when she had to go to urgent care the last time  Per pt she does not know what else to do        --please advise thank you--

## 2021-04-01 NOTE — ED PROVIDER NOTES
History  Chief Complaint   Patient presents with    Back Pain     "i have sciatic problems", had covid vaccine so unable to take steriods, pt reports low back pain that radiates to left hip and leg     A 49 yo female with pmhx of anxiety, CKD, depression, GERD, HTN and chronic low back pain; presents with low back pain radiating toward the left hip and left lower extremity which has gotten worse since yesterday  Patient states she suffers from chronic low back pain, and her current symptoms are typical   Patient is unsure what caused her symptoms to flare at this time  Patient states at baseline she does have some paresthesias along the left lower extremity  Patient otherwise denies focal weakness, saddle anesthesias and bowel/bladder incontinence  Patient does follow with Pain Management, who scheduled an appointment for Friday  Patient has been taking gabapentin, Tylenol, Flexeril and diclofenac gel as prescribed without relief  Patient otherwise denies fever, chills, chest pain, shortness of breath, abdominal pain, nausea, vomiting, diarrhea, peripheral edema and rashes  Patient denies any recent spinal injections  A/P:  Low back pain, with left lower extremity radicular symptoms  Symptoms are acute on chronic  Patient appears uncomfortable, however is neurologically intact  No red flag symptoms  Will treat symptomatically  History provided by:  Patient and medical records      Prior to Admission Medications   Prescriptions Last Dose Informant Patient Reported? Taking?    AMILoride 5 mg tablet   No Yes   Sig: TAKE 2 TABLETS BY MOUTH EVERY DAY   Calcium-Phosphorus-Vitamin D (CALCIUM GUMMIES PO)   Yes Yes   Sig: Take by mouth   Cholecalciferol (EQL VITAMIN D3 GUMMIES PO)   Yes Yes   Sig: Take by mouth   Diclofenac Sodium (VOLTAREN) 1 %   No Yes   Sig: Apply 4 g topically 2 (two) times a day as needed (severe pain)   Multiple Vitamins-Minerals (MULTI-VITAMIN GUMMIES PO)   Yes Yes   Sig: Take by mouth   albuterol (PROVENTIL HFA,VENTOLIN HFA) 90 mcg/act inhaler   Yes Yes   Sig: albuterol sulfate HFA 90 mcg/actuation aerosol inhaler   INHALE 2 PUFFS EVERY 4 HOURS AS NEEDED FOR WHEEZING OR SHORTNESS OF AIR  buPROPion (WELLBUTRIN SR) 150 mg 12 hr tablet   No Yes   Sig: Take 1 tablet (150 mg total) by mouth 2 (two) times a day   cyclobenzaprine (FLEXERIL) 10 mg tablet   No Yes   Sig: Take 1 tablet (10 mg total) by mouth 3 (three) times a day as needed for muscle spasms   diazepam (VALIUM) 5 mg tablet Not Taking at Unknown time  No No   Sig: Take 1 tablet (5 mg total) by mouth 2 (two) times a day for 3 days   Patient not taking: Reported on 3/31/2021   furosemide (LASIX) 40 mg tablet   No Yes   Sig: TAKE 1 TABLET BY MOUTH AS NEEDED (SWELLING)     gabapentin (NEURONTIN) 300 mg capsule   No Yes   Sig: Take 2 capsules (600 mg total) by mouth 3 (three) times a day   lisinopril (ZESTRIL) 2 5 mg tablet   No Yes   Sig: TAKE 1 TABLET BY MOUTH EVERY DAY   loratadine (CLARITIN) 10 mg tablet   No Yes   Sig: Take 1 tablet (10 mg total) by mouth daily   meclizine (ANTIVERT) 25 mg tablet Not Taking at Unknown time  No No   Sig: Take 1 tablet (25 mg total) by mouth 3 (three) times a day as needed for dizziness   Patient not taking: Reported on 3/31/2021   methylPREDNISolone 4 MG tablet therapy pack Not Taking at Unknown time  No No   Sig: Use as directed on package   Patient not taking: Reported on 3/31/2021   nitroglycerin (NITROSTAT) 0 4 mg SL tablet   No Yes   Sig: Place 1 tablet (0 4 mg total) under the tongue every 5 (five) minutes as needed for chest pain   nystatin (MYCOSTATIN) powder   No Yes   Sig: APPLY TO AFFECTED AREA 3 TIMES A DAY   omeprazole (PriLOSEC) 20 mg delayed release capsule   No Yes   Sig: TAKE 1 CAPSULE BY MOUTH EVERY DAY   ondansetron (ZOFRAN-ODT) 4 mg disintegrating tablet Not Taking at Unknown time  No No   Sig: Take 1 tablet (4 mg total) by mouth every 8 (eight) hours as needed for nausea   Patient not taking: Reported on 3/31/2021   potassium chloride (Klor-Con M20) 20 mEq tablet   No Yes   Sig: Take 1 tablet (20 mEq total) by mouth 3 (three) times a day   predniSONE 10 mg tablet Not Taking at Unknown time  No No   Sig: Take 6 tabs on day one, then 5 tabs on day two, 4 tabs on day three, 3 tabs on day four, 2 tabs on day five, and 1 tab on day six  Patient not taking: Reported on 3/22/2021   promethazine (PHENERGAN) 25 mg tablet   No Yes   Sig: TAKE 1 TABLET BY MOUTH EVERY 6 HOURS AS NEEDED FOR NAUSEA AND VOMITING   rOPINIRole (REQUIP) 0 25 mg tablet   No Yes   Sig: TAKE 1 TABLET (0 25 MG TOTAL) BY MOUTH DAILY AT BEDTIME   sertraline (ZOLOFT) 50 mg tablet   No Yes   Sig: TAKE 1 TABLET BY MOUTH EVERY DAY   tiZANidine (ZANAFLEX) 4 mg tablet   No No   Sig: Take 1 tablet (4 mg total) by mouth daily at bedtime      Facility-Administered Medications: None       Past Medical History:   Diagnosis Date    Anxiety     Chronic kidney disease     Depression     GERD (gastroesophageal reflux disease)     Hypertension     Kidney disease     Kidney stone        Past Surgical History:   Procedure Laterality Date    CHOLECYSTECTOMY      DENTAL SURGERY      Condon teeth extraction    ENDOMETRIAL ABLATION      GASTRIC RESTRICTION SURGERY      LITHOTRIPSY      TUBAL LIGATION         Family History   Problem Relation Age of Onset    Cirrhosis Mother     Kidney failure Mother     Diabetes Mother     Hypertension Mother     Kidney disease Mother     Kidney failure Father     Heart disease Father     Hypertension Father     Alcohol abuse Father     Kidney disease Father     Alcohol abuse Paternal Grandmother     Mental illness Maternal Uncle     Substance Abuse Neg Hx      I have reviewed and agree with the history as documented      E-Cigarette/Vaping    E-Cigarette Use Never User      E-Cigarette/Vaping Substances    Nicotine No     THC No     CBD No     Flavoring No     Other No     Unknown No Social History     Tobacco Use    Smoking status: Never Smoker    Smokeless tobacco: Never Used   Substance Use Topics    Alcohol use: Yes     Comment: Rarely    Drug use: Never       Review of Systems   Musculoskeletal: Positive for back pain  All other systems reviewed and are negative  Physical Exam  Physical Exam  General Appearance: alert and oriented, appears uncomfortable  Nontoxic  Skin:  Warm, dry, intact  HEENT: atraumatic, normocephalic  Neck: Supple, trachea midline  No midline cervical spine tenderness  Cardiac: RRR; no murmurs, rub, gallops  Pulmonary: lungs CTAB; no wheezes, rales, rhonchi  Gastrointestinal: abdomen soft, nontender, nondistended; no guarding or rebound tenderness; good bowel sounds, no mass or bruits  Extremities:  No thoracic spinal tenderness  Midline lumbar and left sacroiliac joint tenderness  Sensation intact throughout the bilateral lower extremities  5/5 motor strength to the bilateral lower extremities    No pedal edema, 2+ pulses; no calf tenderness, no clubbing, no cyanosis  Neuro:  no focal motor or sensory deficits, CN 2-12 grossly intact  Psych:  Normal mood and affect, normal judgement and insight      Vital Signs  ED Triage Vitals   Temperature Pulse Respirations Blood Pressure SpO2   03/31/21 2126 03/31/21 2125 03/31/21 2125 03/31/21 2126 03/31/21 2125   98 9 °F (37 2 °C) 88 18 100/54 99 %      Temp src Heart Rate Source Patient Position - Orthostatic VS BP Location FiO2 (%)   -- 03/31/21 2125 -- -- --    Monitor         Pain Score       03/31/21 2125       Worst Possible Pain           Vitals:    03/31/21 2125 03/31/21 2126   BP:  100/54   Pulse: 88          Visual Acuity      ED Medications  Medications   diazepam (VALIUM) tablet 2 mg (2 mg Oral Given 3/31/21 2202)   ketorolac (TORADOL) injection 30 mg (30 mg Intramuscular Given 3/31/21 2347)       Diagnostic Studies  Results Reviewed     None                 No orders to display Procedures  Procedures         ED Course  ED Course as of Apr 01 0342   Wed Mar 31, 2021   8427 Patient reports feeling better at this time  Will discharge home with pain management follow-up as previously scheduled      2341 Notified by RN that pt had recurrence of pain when attempting to stand and leave  Discussed with patient that her symptoms are chronic in nature, and that she will need to follow up with Pain Management in the morning for further treatment  Pt requesting additional medication  Discussed that narcotics are not indicated for chronic pain  Also discussed that Toradol is avoided in bariatric surgery patients  Pt requesting toradol injection, understanding the risks with her prior bariatric surgery  MDM    Disposition  Final diagnoses:   Acute left-sided low back pain with left-sided sciatica     Time reflects when diagnosis was documented in both MDM as applicable and the Disposition within this note     Time User Action Codes Description Comment    3/31/2021 10:58 PM Rosie [address] [M54 42] Acute left-sided low back pain with left-sided sciatica       ED Disposition     ED Disposition Condition Date/Time Comment    Discharge Stable Wed Mar 31, 2021 10:58 PM Garcia Santoyo discharge to home/self care              Follow-up Information     Follow up With Specialties Details Why Contact Info    Calos Calderón,  Pain Medicine Go to  Previously scheduled appointment for follow up [address]  [phone]            Discharge Medication List as of 3/31/2021 10:58 PM      CONTINUE these medications which have NOT CHANGED    Details   albuterol (PROVENTIL HFA,VENTOLIN HFA) 90 mcg/act inhaler albuterol sulfate HFA 90 mcg/actuation aerosol inhaler   INHALE 2 PUFFS EVERY 4 HOURS AS NEEDED FOR WHEEZING OR SHORTNESS OF AIR , Historical Med      AMILoride 5 mg tablet TAKE 2 TABLETS BY MOUTH EVERY DAY, Normal buPROPion (WELLBUTRIN SR) 150 mg 12 hr tablet Take 1 tablet (150 mg total) by mouth 2 (two) times a day, Starting Tue 7/14/2020, Normal      Calcium-Phosphorus-Vitamin D (CALCIUM GUMMIES PO) Take by mouth, Historical Med      Cholecalciferol (EQL VITAMIN D3 GUMMIES PO) Take by mouth, Historical Med      cyclobenzaprine (FLEXERIL) 10 mg tablet Take 1 tablet (10 mg total) by mouth 3 (three) times a day as needed for muscle spasms, Starting Mon 3/22/2021, Normal      Diclofenac Sodium (VOLTAREN) 1 % Apply 4 g topically 2 (two) times a day as needed (severe pain), Starting Mon 3/22/2021, Normal      furosemide (LASIX) 40 mg tablet TAKE 1 TABLET BY MOUTH AS NEEDED (SWELLING)  , Normal      gabapentin (NEURONTIN) 300 mg capsule Take 2 capsules (600 mg total) by mouth 3 (three) times a day, Starting Mon 3/22/2021, Normal      lisinopril (ZESTRIL) 2 5 mg tablet TAKE 1 TABLET BY MOUTH EVERY DAY, Normal      loratadine (CLARITIN) 10 mg tablet Take 1 tablet (10 mg total) by mouth daily, Starting Wed 7/15/2020, Normal      Multiple Vitamins-Minerals (MULTI-VITAMIN GUMMIES PO) Take by mouth, Historical Med      nitroglycerin (NITROSTAT) 0 4 mg SL tablet Place 1 tablet (0 4 mg total) under the tongue every 5 (five) minutes as needed for chest pain, Starting Tue 7/14/2020, Normal      nystatin (MYCOSTATIN) powder APPLY TO AFFECTED AREA 3 TIMES A DAY, Normal      omeprazole (PriLOSEC) 20 mg delayed release capsule TAKE 1 CAPSULE BY MOUTH EVERY DAY, Normal      potassium chloride (Klor-Con M20) 20 mEq tablet Take 1 tablet (20 mEq total) by mouth 3 (three) times a day, Starting Tue 7/14/2020, Normal      promethazine (PHENERGAN) 25 mg tablet TAKE 1 TABLET BY MOUTH EVERY 6 HOURS AS NEEDED FOR NAUSEA AND VOMITING, Normal      rOPINIRole (REQUIP) 0 25 mg tablet TAKE 1 TABLET (0 25 MG TOTAL) BY MOUTH DAILY AT BEDTIME, Starting Tue 2/23/2021, Normal      sertraline (ZOLOFT) 50 mg tablet TAKE 1 TABLET BY MOUTH EVERY DAY, Normal diazepam (VALIUM) 5 mg tablet Take 1 tablet (5 mg total) by mouth 2 (two) times a day for 3 days, Starting Tue 3/30/2021, Until Fri 4/2/2021, Normal      meclizine (ANTIVERT) 25 mg tablet Take 1 tablet (25 mg total) by mouth 3 (three) times a day as needed for dizziness, Starting Tue 3/30/2021, Normal      methylPREDNISolone 4 MG tablet therapy pack Use as directed on package, Normal      ondansetron (ZOFRAN-ODT) 4 mg disintegrating tablet Take 1 tablet (4 mg total) by mouth every 8 (eight) hours as needed for nausea, Starting Tue 3/30/2021, Normal      predniSONE 10 mg tablet Take 6 tabs on day one, then 5 tabs on day two, 4 tabs on day three, 3 tabs on day four, 2 tabs on day five, and 1 tab on day six , Normal      tiZANidine (ZANAFLEX) 4 mg tablet Take 1 tablet (4 mg total) by mouth daily at bedtime, Starting Mon 1/25/2021, Normal           No discharge procedures on file      PDMP Review       Value Time User    PDMP Reviewed  Yes 11/3/2020  4:22 PM Yeny Contreras PA-C          ED Provider  Electronically Signed by           Nikki Madera DO  04/01/21 [id number]

## 2021-04-05 ENCOUNTER — OFFICE VISIT (OUTPATIENT)
Dept: PHYSICAL THERAPY | Facility: CLINIC | Age: 51
End: 2021-04-05
Payer: COMMERCIAL

## 2021-04-05 DIAGNOSIS — M51.36 DEGENERATIVE DISC DISEASE, LUMBAR: Primary | ICD-10-CM

## 2021-04-05 DIAGNOSIS — M54.42 CHRONIC LEFT-SIDED LOW BACK PAIN WITH LEFT-SIDED SCIATICA: ICD-10-CM

## 2021-04-05 DIAGNOSIS — M53.3 CHRONIC LEFT SACROILIAC JOINT PAIN: ICD-10-CM

## 2021-04-05 DIAGNOSIS — M47.816 LUMBAR SPONDYLOSIS: ICD-10-CM

## 2021-04-05 DIAGNOSIS — M46.1 SACROILIITIS (HCC): ICD-10-CM

## 2021-04-05 DIAGNOSIS — G89.29 CHRONIC LEFT-SIDED LOW BACK PAIN WITH LEFT-SIDED SCIATICA: ICD-10-CM

## 2021-04-05 DIAGNOSIS — G89.29 CHRONIC LEFT SACROILIAC JOINT PAIN: ICD-10-CM

## 2021-04-05 PROCEDURE — 97530 THERAPEUTIC ACTIVITIES: CPT | Performed by: PHYSICAL THERAPIST

## 2021-04-05 PROCEDURE — 97112 NEUROMUSCULAR REEDUCATION: CPT | Performed by: PHYSICAL THERAPIST

## 2021-04-05 NOTE — PROGRESS NOTES
Daily Note     Today's date: 2021  Patient name: Paula William  : 1970  MRN: [id number]  Referring provider: Tyrone Hashimoto, CRNP  Dx:   Encounter Diagnosis     ICD-10-CM    1  Degenerative disc disease, lumbar  M51 36    2  Lumbar spondylosis  M47 816    3  Sacroiliitis ([address] )  M46 1    4  Chronic left-sided low back pain with left-sided sciatica  M54 42     G89 29    5  Chronic left sacroiliac joint pain  M53 3     G89 29                   Subjective: Pt reports the day after last visit she went to the ER with vertigo, thinking it was related to receiving her Covid vaccine  She then had a flare up of her low back pain and sciatica and returned to the ER the next day  She reports nothing was done and she was sent home  She is still having significant pain today upon arrival to PT; 6/10 left sided low back pain into buttock  Objective: See treatment diary below      Assessment: Tolerated treatment well  Patient able to tolerate prone over 2 pillows and press ups to elbows, centralizing pain from left buttock to left SI  Plan: Continue per plan of care        Precautions: neck pain      Manuals 3/23 3/29 4          Max eli laser L SI  4' 4'                                                 Neuro Re-Ed             Prone prop on elbows *demo Prone lying 4' Prone lying 2 pillows 15 min          Prone on elbows pres up *  2x10          VINNY glute set *  5"x20          TA + PF  5"x20 5"x20          TA + PF + march             TA + PF + hip add  5"x20           TA + PF + hip abd  5"x20 red                                                  Postural Ed lumbar roll rev rev          Ther Ex             Hamstring str w/ strap             Piriformis str *            Prone quad str w/ strap             L Sciatic nerve glides NV 10x 10x                                                              Ther Activity             Bike w/ lumbar roll  6' 11'          Sit to stand + TA + PF  5x           Gait Training                                       Modalities

## 2021-04-06 ENCOUNTER — OFFICE VISIT (OUTPATIENT)
Dept: PHYSICAL THERAPY | Facility: CLINIC | Age: 51
End: 2021-04-06
Payer: COMMERCIAL

## 2021-04-06 ENCOUNTER — OFFICE VISIT (OUTPATIENT)
Dept: PAIN MEDICINE | Facility: MEDICAL CENTER | Age: 51
End: 2021-04-06
Payer: COMMERCIAL

## 2021-04-06 VITALS
HEART RATE: 87 BPM | DIASTOLIC BLOOD PRESSURE: 71 MMHG | BODY MASS INDEX: 34.35 KG/M2 | SYSTOLIC BLOOD PRESSURE: 101 MMHG | TEMPERATURE: 98.7 F | WEIGHT: 201.2 LBS | HEIGHT: 64 IN

## 2021-04-06 DIAGNOSIS — M54.42 CHRONIC LEFT-SIDED LOW BACK PAIN WITH LEFT-SIDED SCIATICA: ICD-10-CM

## 2021-04-06 DIAGNOSIS — M54.42 CHRONIC LEFT-SIDED LOW BACK PAIN WITH LEFT-SIDED SCIATICA: Primary | ICD-10-CM

## 2021-04-06 DIAGNOSIS — M46.1 SACROILIITIS (HCC): ICD-10-CM

## 2021-04-06 DIAGNOSIS — G89.29 CHRONIC LEFT-SIDED LOW BACK PAIN WITH LEFT-SIDED SCIATICA: ICD-10-CM

## 2021-04-06 DIAGNOSIS — G89.4 CHRONIC PAIN SYNDROME: ICD-10-CM

## 2021-04-06 DIAGNOSIS — M47.816 LUMBAR SPONDYLOSIS: ICD-10-CM

## 2021-04-06 DIAGNOSIS — G89.29 CHRONIC LEFT-SIDED LOW BACK PAIN WITH LEFT-SIDED SCIATICA: Primary | ICD-10-CM

## 2021-04-06 DIAGNOSIS — M51.36 DEGENERATIVE DISC DISEASE, LUMBAR: Primary | ICD-10-CM

## 2021-04-06 PROCEDURE — 97140 MANUAL THERAPY 1/> REGIONS: CPT | Performed by: PHYSICAL THERAPIST

## 2021-04-06 PROCEDURE — 99214 OFFICE O/P EST MOD 30 MIN: CPT | Performed by: NURSE PRACTITIONER

## 2021-04-06 PROCEDURE — 3078F DIAST BP <80 MM HG: CPT | Performed by: NURSE PRACTITIONER

## 2021-04-06 PROCEDURE — 3074F SYST BP LT 130 MM HG: CPT | Performed by: NURSE PRACTITIONER

## 2021-04-06 PROCEDURE — 97112 NEUROMUSCULAR REEDUCATION: CPT | Performed by: PHYSICAL THERAPIST

## 2021-04-06 RX ORDER — CELECOXIB 100 MG/1
100 CAPSULE ORAL 2 TIMES DAILY
Qty: 60 CAPSULE | Refills: 1 | Status: CANCELLED | OUTPATIENT
Start: 2021-04-06

## 2021-04-06 RX ORDER — CELECOXIB 100 MG/1
100 CAPSULE ORAL 2 TIMES DAILY
Qty: 60 CAPSULE | Refills: 1 | Status: SHIPPED | OUTPATIENT
Start: 2021-04-06 | End: 2021-06-11 | Stop reason: SDUPTHER

## 2021-04-06 NOTE — PROGRESS NOTES
Daily Note     Today's date: 2021  Patient name: Kaylan Smith  : 1970  MRN: [id number]  Referring provider: JONELLE Givens  Dx:   Encounter Diagnosis     ICD-10-CM    1  Degenerative disc disease, lumbar  M51 36    2  Lumbar spondylosis  M47 816    3  Sacroiliitis ([address] )  M46 1    4  Chronic left-sided low back pain with left-sided sciatica  M54 42     G89 29                   Subjective: Pt reports she had significant pain after last visit yesterday with 10/10 pain at left low back and entire LLE  Her pain upon arrival is 6/10 at left sided low back  Objective: See treatment diary below      Assessment: Tolerated treatment well  Patient would benefit from continued PT      Plan: Continue per plan of care        Precautions: neck pain      Manuals 3/23 3/29 4/5 4/6         Max eli laser L SI  4' 4' 4'         LLE LAD    KT                                   Neuro Re-Ed             Prone prop on elbows *demo Prone lying 4' Prone lying 2 pillows 15 min Prone lying 2 pillows 5 min         Prone on elbows pres up *  2x10 3x10         VINNY glute set *  5"x20 5"x20         TA + PF  5"x20 5"x20 5"x20         TA + PF + march             TA + PF + hip add  5"x20  5"x20         TA + PF + hip abd  5"x20 red  5"x20 red                                                Postural Ed lumbar roll rev rev          Ther Ex             Hamstring str w/ strap    20"x3         Piriformis str *   20"x3         Prone quad str w/ strap             L Sciatic nerve glides NV 10x 10x 10x                                                             Ther Activity             Bike w/ lumbar roll  6' 11' hold         Sit to stand + TA + PF  5x           Gait Training                                       Modalities

## 2021-04-06 NOTE — PATIENT INSTRUCTIONS
Ask physical therapist to test you for piriformis syndrome to see if there are some exercises you can do to relieve pain  [email or web address]       Celecoxib (By mouth)   Celecoxib ([id number])  Treats pain and migraine headaches  This medicine is an NSAID  Brand Name(s): CeleBREX   There may be other brand names for this medicine  When This Medicine Should Not Be Used: This medicine is not right for everyone  Do not use it if you had an allergic reaction (including asthma) to celecoxib, aspirin, NSAIDs, or a sulfa drug (including sulfamethoxazole)  Do not use this medicine right before or right after coronary artery bypass graft (CABG)  How to Use This Medicine:   Capsule, Liquid  · Take your medicine as directed  Your dose may need to be changed several times to find what works best for you  · Use this medicine for the shortest time possible and in the smallest dose possible  This will help lower the risk of side effects  · Capsule: If you cannot swallow the capsule, you may open it and pour the medicine into a teaspoon of applesauce  Stir the mixture well and swallow right away  Drink enough water to make sure you swallow all of the medicine  · Oral liquid:   ? If you are taking 120 milligrams (mg), take the medicine directly from the bottle  Hold the bottle upside down for 10 seconds to make sure the full amount of medicine is taken  ? If you are taking 60 mg, use an oral dosing syringe to get and measure (2 4 mL) the medicine from the bottle and take it directly from the syringe  Do not use a household teaspoon to measure the medicine  · This medicine should come with a Medication Guide  Ask your pharmacist for a copy if you do not have one  · Missed dose: Take a dose as soon as you remember  If it is almost time for your next dose, wait until then and take a regular dose  Do not take extra medicine to make up for a missed dose    · Store the medicine in a closed container at room temperature, away from heat, moisture, and direct light  ? Capsule: The medicine that has been mixed with applesauce may be stored in a refrigerator and used within 6 hours  ? Oral liquid: Do not store the bottle and reuse the unused medicine  Throw away the bottle with the unused medicine  Drugs and Foods to Avoid:   Ask your doctor or pharmacist before using any other medicine, including over-the-counter medicines, vitamins, and herbal products  · Do not use aspirin or any other NSAID medicine (including diclofenac, diflunisal, ibuprofen, naproxen, salsalate) unless your doctor says it is okay  · Some medicines can affect how celecoxib works  Tell your doctor if you are using any of the following:   ? Atomoxetine, cyclosporine, digoxin, fluconazole, lithium, methotrexate, phenytoin, pemetrexed, rifampin  ? Blood pressure medicine (including ACE inhibitors, ARBs, beta blockers)  ? Blood thinner (including warfarin)  ? Diuretic (water pill)  ? Medicine to treat depression (including SNRIs, SSRIs)  ? Other migraine medicine (including ergotamine, triptans, narcotic pain medicines)  ? Steroid medicine (including hydrocortisone, methylprednisolone, prednisone)  · Do not drink alcohol while you are using this medicine  Warnings While Using This Medicine:   · Tell your doctor if you are pregnant or breastfeeding  Do not use this medicine during the later part of pregnancy (third trimester), unless your doctor tells you to  · Tell your doctor if you have kidney disease, liver disease, heart disease, anemia, asthma, high blood pressure, or a history of stomach or bowel problems (including bleeding, ulcers)  · This medicine may cause the following problems:   ? Increased risk of blood clots, heart attack, stroke, or heart failure  ? Bleeding problems, including stomach or bowel bleeding or ulcer  ? Liver problem  ? High blood pressure  ? Kidney problems  ?  Risk for disseminated intravascular coagulation (bleeding problem) in children younger than 18 years  ? Serious skin reactions  · This medicine lowers the number of certain blood cells, so you may bleed or bruise more easily  Be careful to avoid injuries  · This medicine may cause a delay in ovulation for women and may affect their ability to have children  If you plan to have children, talk with your doctor before using this medicine  · Tell any doctor or dentist who treats you that you are using this medicine  · Your doctor will do lab tests at regular visits to check on the effects of this medicine  Keep all appointments  · Keep all medicine out of the reach of children  Never share your medicine with anyone  Possible Side Effects While Using This Medicine:   Call your doctor right away if you notice any of these side effects:  · Allergic reaction: Itching or hives, swelling in your face or hands, swelling or tingling in your mouth or throat, chest tightness, trouble breathing  · Blistering, peeling, red skin rash  · Bloody or black, tarry stools  · Change in how much or how often you urinate  · Chest pain, trouble breathing, coughing up blood  · Dark urine or pale stools, nausea, vomiting, loss of appetite, stomach pain, yellow skin or eyes  · Fast or slow heartbeat  · Numbness or weakness in your arm or leg, or on one side of your body, pain in your calf  · Rapid weight gain, swelling in your hands, ankles, or feet  · Sudden or severe headache, dizziness, or problems with vision, speech, or walking  · Unusual bleeding, bruising, or weakness  If you notice these less serious side effects, talk with your doctor:   · Loss or change in taste  · Mild skin rash  · Muscle or joint pain  If you notice other side effects that you think are caused by this medicine, tell your doctor  Call your doctor for medical advice about side effects   You may report side effects to FDA at 2-202-FDA-8627  © Copyright [address] Information is for End User's use only and may not be sold, redistributed or otherwise used for commercial purposes  The above information is an  only  It is not intended as medical advice for individual conditions or treatments  Talk to your doctor, nurse or pharmacist before following any medical regimen to see if it is safe and effective for you

## 2021-04-06 NOTE — PROGRESS NOTES
Assessment:  1  Chronic left-sided low back pain with left-sided sciatica    2  Chronic pain syndrome    3  Sacroiliitis ([address] )    4  Lumbar spondylosis        Plan:  1  MRI of lumbar spine without contrast   Patient has initiated physical therapy and states that today will be her 4th appointment with little improvement  She has left low back and buttock pain that radiates down her leg  She states she has been to the emergency room twice due to severe pain since her sacroiliac joint injection on 02/09/2021  She does appear to have sacroiliac joint pain as well as piriformis tenderness on the left side  I feel that an MRI is warranted to rule out disc pathology,  Given that the radicular symptoms radiate past her knee  2  Possible future piriformis injection  Patient has physical therapy appointment today and was told to ask the therapist for some exercises for the piriformis muscle  Physical exam in office today was positive for piriformis syndrome  3  Start Celebrex 100 mg twice a day as needed for pain  Patient will use cautiously and take with food as she has had the gastric sleeve  She is aware that this is for short-term use only  She also takes omeprazole daily which will help protect the GI tract  I also spoke with patient's pharmacist regarding her sulfa allergy and starting this medication, and I was told that it is okay for her to try this medication  4  Continue gabapentin 600 mg 3 times a day for neuropathic pain radiating down left leg  5   Patient has follow-up appointment with JONELLE Ocampo in May  History of Present Illness:2  The patient is a 48 y o  female who presents for a follow up office visit in regards to Leg Pain, Hip Pain, and Knee Pain  The patients current symptoms include   A pain score of 8/10 that is constant  The quality is a burning, sharp, throbbing, shooting and numbness pain that goes down her left leg    The pain can become so severe that she has gone to the emergency room twice since her sacroiliac joint injection on 02/09/2021  She feels that the injection provided her with 50% relief at times from her pain however she still has flare ups that are severe in nature  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 and she states that her pain is so severe that it interferes with her ability to do this  She has been going to physical therapy however she feels she is making slow progress  She states she is doing everything everyone is telling her to do with no improvement  Current pain medications includes:gabapentin 600 mg TID    The patient reports that this regimen is providing 50% pain relief at times  The patient is reporting no side effects from this pain medication regimen  I have personally reviewed and/or updated the patient's past medical history, past surgical history, family history, social history, current medications, allergies, and vital signs today  Review of Systems  Review of Systems   Respiratory: Negative for shortness of breath  Cardiovascular: Negative for chest pain  Gastrointestinal: Negative for constipation, diarrhea, nausea and vomiting  Musculoskeletal: Positive for arthralgias, gait problem and myalgias  Negative for joint swelling  Joint stiffness  Pain in extremity   Neurological: Positive for dizziness  Negative for seizures and weakness  All other systems reviewed and are negative          Past Medical History:   Diagnosis Date    Anxiety     Chronic kidney disease     Depression     GERD (gastroesophageal reflux disease)     Hypertension     Kidney disease     Kidney stone        Past Surgical History:   Procedure Laterality Date    CHOLECYSTECTOMY      DENTAL SURGERY      [place] teeth extraction    ENDOMETRIAL ABLATION      GASTRIC RESTRICTION SURGERY      LITHOTRIPSY      TUBAL LIGATION         Family History   Problem Relation Age of Onset    Cirrhosis Mother     Kidney failure Mother     Diabetes Mother     Hypertension Mother     Kidney disease Mother     Kidney failure Father     Heart disease Father     Hypertension Father     Alcohol abuse Father     Kidney disease Father     Alcohol abuse Paternal Grandmother     Mental illness Maternal Uncle     Substance Abuse Neg Hx        Social History     Occupational History    Not on file   Tobacco Use    Smoking status: Never Smoker    Smokeless tobacco: Never Used   Substance and Sexual Activity    Alcohol use: Yes     Comment: Rarely    Drug use: Never    Sexual activity: Not Currently     Partners: Male         Current Outpatient Medications:     albuterol (PROVENTIL HFA,VENTOLIN HFA) 90 mcg/act inhaler, albuterol sulfate HFA 90 mcg/actuation aerosol inhaler  INHALE 2 PUFFS EVERY 4 HOURS AS NEEDED FOR WHEEZING OR SHORTNESS OF AIR , Disp: , Rfl:     AMILoride 5 mg tablet, TAKE 2 TABLETS BY MOUTH EVERY DAY, Disp: 60 tablet, Rfl: 1    buPROPion (WELLBUTRIN SR) 150 mg 12 hr tablet, Take 1 tablet (150 mg total) by mouth 2 (two) times a day, Disp: 60 tablet, Rfl: 1    Calcium-Phosphorus-Vitamin D (CALCIUM GUMMIES PO), Take by mouth, Disp: , Rfl:     Cholecalciferol (EQL VITAMIN D3 GUMMIES PO), Take by mouth, Disp: , Rfl:     cyclobenzaprine (FLEXERIL) 10 mg tablet, Take 1 tablet (10 mg total) by mouth 3 (three) times a day as needed for muscle spasms, Disp: 90 tablet, Rfl: 2    Diclofenac Sodium (VOLTAREN) 1 %, Apply 4 g topically 2 (two) times a day as needed (severe pain), Disp: 1 Tube, Rfl: 2    furosemide (LASIX) 40 mg tablet, TAKE 1 TABLET BY MOUTH AS NEEDED (SWELLING)  , Disp: 30 tablet, Rfl: 1    gabapentin (NEURONTIN) 300 mg capsule, Take 2 capsules (600 mg total) by mouth 3 (three) times a day, Disp: 180 capsule, Rfl: 2    lisinopril (ZESTRIL) 2 5 mg tablet, TAKE 1 TABLET BY MOUTH EVERY DAY, Disp: 30 tablet, Rfl: 1    loratadine (CLARITIN) 10 mg tablet, Take 1 tablet (10 mg total) by mouth daily, Disp: 90 tablet, Rfl: 1    Multiple Vitamins-Minerals (MULTI-VITAMIN GUMMIES PO), Take by mouth, Disp: , Rfl:     nitroglycerin (NITROSTAT) 0 4 mg SL tablet, Place 1 tablet (0 4 mg total) under the tongue every 5 (five) minutes as needed for chest pain, Disp: 30 tablet, Rfl: 0    nystatin (MYCOSTATIN) powder, APPLY TO AFFECTED AREA 3 TIMES A DAY, Disp: 15 g, Rfl: 1    omeprazole (PriLOSEC) 20 mg delayed release capsule, TAKE 1 CAPSULE BY MOUTH EVERY DAY, Disp: 90 capsule, Rfl: 1    potassium chloride (Klor-Con M20) 20 mEq tablet, Take 1 tablet (20 mEq total) by mouth 3 (three) times a day, Disp: 90 tablet, Rfl: 1    promethazine (PHENERGAN) 25 mg tablet, TAKE 1 TABLET BY MOUTH EVERY 6 HOURS AS NEEDED FOR NAUSEA AND VOMITING, Disp: 30 tablet, Rfl: 0    rOPINIRole (REQUIP) 0 25 mg tablet, TAKE 1 TABLET (0 25 MG TOTAL) BY MOUTH DAILY AT BEDTIME, Disp: 90 tablet, Rfl: 1    sertraline (ZOLOFT) 50 mg tablet, TAKE 1 TABLET BY MOUTH EVERY DAY, Disp: 90 tablet, Rfl: 1    diazepam (VALIUM) 5 mg tablet, Take 1 tablet (5 mg total) by mouth 2 (two) times a day for 3 days (Patient not taking: Reported on 3/31/2021), Disp: 6 tablet, Rfl: 0    meclizine (ANTIVERT) 25 mg tablet, Take 1 tablet (25 mg total) by mouth 3 (three) times a day as needed for dizziness (Patient not taking: Reported on 3/31/2021), Disp: 30 tablet, Rfl: 0    methylPREDNISolone 4 MG tablet therapy pack, Use as directed on package (Patient not taking: Reported on 3/31/2021), Disp: 1 each, Rfl: 0    ondansetron (ZOFRAN-ODT) 4 mg disintegrating tablet, Take 1 tablet (4 mg total) by mouth every 8 (eight) hours as needed for nausea (Patient not taking: Reported on 3/31/2021), Disp: 20 tablet, Rfl: 0    predniSONE 10 mg tablet, Take 6 tabs on day one, then 5 tabs on day two, 4 tabs on day three, 3 tabs on day four, 2 tabs on day five, and 1 tab on day six   (Patient not taking: Reported on 3/22/2021), Disp: 21 tablet, Rfl: 0    tiZANidine (ZANAFLEX) 4 mg tablet, Take 1 tablet (4 mg total) by mouth daily at bedtime, Disp: 30 tablet, Rfl: 1    Allergies   Allergen Reactions    Sulfa Antibiotics Throat Swelling       Physical Exam:    /71 (BP Location: Left arm, Patient Position: Sitting, Cuff Size: Large)   Pulse 87   Temp 98 7 °F (37 1 °C)   Ht 5' 4" (1 626 m)   Wt 91 3 kg (201 lb 3 2 oz)   BMI 34 54 kg/m²     Constitutional:normal, well developed, well nourished, alert, in no distress and non-toxic and no overt pain behavior    Eyes:anicteric  HEENT:grossly intact  Neck:supple, symmetric, trachea midline and no masses   Pulmonary:even and unlabored  Cardiovascular: no visible edema  Skin:Normal without rashes or lesions and well hydrated  Psychiatric:Mood and affect appropriate  Neurologic:Cranial Nerves II-XII grossly intact  Musculoskeletal:antalgic   Lumbar Spine Exam    Appearance:  Normal lordosis  Palpation/Tenderness:  left lumbar paraspinal tenderness  left sacroiliac joint tenderness  left piriformis tenderness  Special Tests:  Left Straight Leg Test:  positive  Right Straight Leg Test:  negative  Left Piyush's Maneuver:  positive  Right Piyush's Maneuver:  negative  Left Piriformis Stretch Test:  positive      Imaging  MRI lumbar spine without contrast    (Results Pending)       Orders Placed This Encounter   Procedures    MRI lumbar spine without contrast

## 2021-04-13 ENCOUNTER — TELEPHONE (OUTPATIENT)
Dept: PAIN MEDICINE | Facility: MEDICAL CENTER | Age: 51
End: 2021-04-13

## 2021-04-13 ENCOUNTER — OFFICE VISIT (OUTPATIENT)
Dept: PHYSICAL THERAPY | Facility: CLINIC | Age: 51
End: 2021-04-13
Payer: COMMERCIAL

## 2021-04-13 DIAGNOSIS — M51.36 DEGENERATIVE DISC DISEASE, LUMBAR: Primary | ICD-10-CM

## 2021-04-13 DIAGNOSIS — M54.42 CHRONIC LEFT-SIDED LOW BACK PAIN WITH LEFT-SIDED SCIATICA: ICD-10-CM

## 2021-04-13 DIAGNOSIS — M46.1 SACROILIITIS (HCC): ICD-10-CM

## 2021-04-13 DIAGNOSIS — B37.9 CANDIDA INFECTION: ICD-10-CM

## 2021-04-13 DIAGNOSIS — G89.29 CHRONIC LEFT SACROILIAC JOINT PAIN: ICD-10-CM

## 2021-04-13 DIAGNOSIS — M53.3 CHRONIC LEFT SACROILIAC JOINT PAIN: ICD-10-CM

## 2021-04-13 DIAGNOSIS — G89.29 CHRONIC LEFT-SIDED LOW BACK PAIN WITH LEFT-SIDED SCIATICA: ICD-10-CM

## 2021-04-13 DIAGNOSIS — M47.816 LUMBAR SPONDYLOSIS: ICD-10-CM

## 2021-04-13 PROCEDURE — 97112 NEUROMUSCULAR REEDUCATION: CPT | Performed by: PHYSICAL THERAPIST

## 2021-04-13 PROCEDURE — 97530 THERAPEUTIC ACTIVITIES: CPT | Performed by: PHYSICAL THERAPIST

## 2021-04-13 PROCEDURE — 97110 THERAPEUTIC EXERCISES: CPT | Performed by: PHYSICAL THERAPIST

## 2021-04-13 NOTE — TELEPHONE ENCOUNTER
ANGUS    RN s/w pt  Pt states she is in her 4th week of Physical Therapy  RN advised pt to reschedule her MRI beyond her 6 week time frame  Pt verbalized understanding and appreciative

## 2021-04-13 NOTE — TELEPHONE ENCOUNTER
Pt called she go a letter from [employer] stating they need more information before they cover her MRI  Pt would like someone to call her and let her know if her MRI is covered      Pt # [phone]

## 2021-04-13 NOTE — TELEPHONE ENCOUNTER
----- Message from Richard Syed sent at 4/13/2021 12:54 PM EDT -----  Please call patient and let her know that her MRI will not be approved until she completes 6 weeks of PT   Thank you,  Joe Acevedo

## 2021-04-13 NOTE — PROGRESS NOTES
Daily Note     Today's date: 2021  Patient name: Zachery Plummer  : 1970  MRN: [id number]  Referring provider: JONELLE Navarro  Dx:   Encounter Diagnosis     ICD-10-CM    1  Degenerative disc disease, lumbar  M51 36    2  Lumbar spondylosis  M47 816    3  Sacroiliitis ([address] )  M46 1    4  Chronic left-sided low back pain with left-sided sciatica  M54 42     G89 29    5  Chronic left sacroiliac joint pain  M53 3     G89 29                   Subjective: Pt reports she thinks the new medication from pain management is building up in her system and starting to help  Objective: See treatment diary below      Assessment: Tolerated treatment well  Patient exhibited good technique with therapeutic exercises and would benefit from continued PT      Plan: Continue per plan of care        Precautions: neck pain      Manuals 3/23 3/29 4/5 4/6 4/13        Max eli laser L SI  4' 4' 4' NV        LLE LAD    KT NV                                  Neuro Re-Ed             Prone prop on elbows *demo Prone lying 4' Prone lying 2 pillows 15 min Prone lying 2 pillows 5 min prone lying 2 pillows 5 min        Prone on elbows pres up *  2x10 3x10 2x10        VINNY glute set *  5"x20 5"x20 5"x20        TA + PF  5"x20 5"x20 5"x20 5"x20        TA + PF + march             TA + PF + hip add  5"x20  5"x20 5"x20        TA + PF + hip abd  5"x20 red  5"x20 red 5"x20 red                                               Postural Ed lumbar roll rev rev          Ther Ex             Hamstring str w/ strap    20"x3 30"x3        Piriformis str *   20"x3 30"x3        Prone quad str w/ strap             L Sciatic nerve glides NV 10x 10x 10x 10x                                                            Ther Activity             Bike w/ lumbar roll  6' 11' hold 10'        Sit to stand + TA + PF  5x           Gait Training                                       Modalities

## 2021-04-14 RX ORDER — NYSTATIN 100000 [USP'U]/G
POWDER TOPICAL
Qty: 15 G | Refills: 1 | Status: SHIPPED | OUTPATIENT
Start: 2021-04-14 | End: 2021-05-07

## 2021-04-15 DIAGNOSIS — Z98.84 BARIATRIC SURGERY STATUS: Primary | ICD-10-CM

## 2021-04-15 DIAGNOSIS — N18.30 CKD (CHRONIC KIDNEY DISEASE) STAGE 3, GFR 30-59 ML/MIN (HCC): ICD-10-CM

## 2021-04-15 RX ORDER — AMILORIDE HYDROCHLORIDE 5 MG/1
TABLET ORAL
Qty: 60 TABLET | Refills: 1 | Status: SHIPPED | OUTPATIENT
Start: 2021-04-15 | End: 2021-06-07

## 2021-04-20 ENCOUNTER — OFFICE VISIT (OUTPATIENT)
Dept: FAMILY MEDICINE CLINIC | Facility: CLINIC | Age: 51
End: 2021-04-20
Payer: COMMERCIAL

## 2021-04-20 ENCOUNTER — OFFICE VISIT (OUTPATIENT)
Dept: PHYSICAL THERAPY | Facility: CLINIC | Age: 51
End: 2021-04-20
Payer: COMMERCIAL

## 2021-04-20 VITALS
TEMPERATURE: 98.2 F | WEIGHT: 202.4 LBS | HEIGHT: 64 IN | RESPIRATION RATE: 16 BRPM | DIASTOLIC BLOOD PRESSURE: 60 MMHG | HEART RATE: 90 BPM | SYSTOLIC BLOOD PRESSURE: 118 MMHG | BODY MASS INDEX: 34.56 KG/M2

## 2021-04-20 DIAGNOSIS — G89.29 CHRONIC LEFT-SIDED LOW BACK PAIN WITH LEFT-SIDED SCIATICA: ICD-10-CM

## 2021-04-20 DIAGNOSIS — Z83.79 FAMILY HISTORY OF CIRRHOSIS OF LIVER: ICD-10-CM

## 2021-04-20 DIAGNOSIS — Z12.31 ENCOUNTER FOR SCREENING MAMMOGRAM FOR MALIGNANT NEOPLASM OF BREAST: ICD-10-CM

## 2021-04-20 DIAGNOSIS — M47.816 LUMBAR SPONDYLOSIS: ICD-10-CM

## 2021-04-20 DIAGNOSIS — I10 ESSENTIAL HYPERTENSION: ICD-10-CM

## 2021-04-20 DIAGNOSIS — R69 TAKING MEDICATION FOR CHRONIC DISEASE: ICD-10-CM

## 2021-04-20 DIAGNOSIS — I15.9 SECONDARY HYPERTENSION: ICD-10-CM

## 2021-04-20 DIAGNOSIS — M53.3 CHRONIC LEFT SACROILIAC JOINT PAIN: ICD-10-CM

## 2021-04-20 DIAGNOSIS — N18.30 STAGE 3 CHRONIC KIDNEY DISEASE, UNSPECIFIED WHETHER STAGE 3A OR 3B CKD (HCC): ICD-10-CM

## 2021-04-20 DIAGNOSIS — M46.1 SACROILIITIS (HCC): ICD-10-CM

## 2021-04-20 DIAGNOSIS — E78.5 HYPERLIPIDEMIA, UNSPECIFIED HYPERLIPIDEMIA TYPE: ICD-10-CM

## 2021-04-20 DIAGNOSIS — M51.36 DEGENERATIVE DISC DISEASE, LUMBAR: Primary | ICD-10-CM

## 2021-04-20 DIAGNOSIS — G89.29 CHRONIC LEFT SACROILIAC JOINT PAIN: ICD-10-CM

## 2021-04-20 DIAGNOSIS — M54.42 CHRONIC LEFT-SIDED LOW BACK PAIN WITH LEFT-SIDED SCIATICA: ICD-10-CM

## 2021-04-20 PROCEDURE — 99214 OFFICE O/P EST MOD 30 MIN: CPT | Performed by: PHYSICIAN ASSISTANT

## 2021-04-20 PROCEDURE — 97112 NEUROMUSCULAR REEDUCATION: CPT | Performed by: PHYSICAL THERAPIST

## 2021-04-20 PROCEDURE — 97110 THERAPEUTIC EXERCISES: CPT | Performed by: PHYSICAL THERAPIST

## 2021-04-20 PROCEDURE — 97140 MANUAL THERAPY 1/> REGIONS: CPT | Performed by: PHYSICAL THERAPIST

## 2021-04-20 PROCEDURE — 3008F BODY MASS INDEX DOCD: CPT | Performed by: PHYSICIAN ASSISTANT

## 2021-04-20 PROCEDURE — 97530 THERAPEUTIC ACTIVITIES: CPT | Performed by: PHYSICAL THERAPIST

## 2021-04-20 PROCEDURE — 1036F TOBACCO NON-USER: CPT | Performed by: PHYSICIAN ASSISTANT

## 2021-04-20 NOTE — PROGRESS NOTES
Daily Note     Today's date: 2021  Patient name: Garcia Santoyo  : 1970  MRN: [id number]  Referring provider: JONELLE Acevedo  Dx:   Encounter Diagnosis     ICD-10-CM    1  Degenerative disc disease, lumbar  M51 36    2  Lumbar spondylosis  M47 816    3  Sacroiliitis ([address] )  M46 1    4  Chronic left-sided low back pain with left-sided sciatica  M54 42     G89 29    5  Chronic left sacroiliac joint pain  M53 3     G89 29                   Subjective: Pt reports she didn't feel too bad after last session  She still feels like the new medicine from pain management is helping some  Objective: See treatment diary below      Assessment: Tolerated treatment well  Patient demonstrated fatigue post treatment and would benefit from continued PT  No pillows needed with prone lying today  Able to initiate prone hip extension  Plan: Continue per plan of care        Precautions: neck pain      Manuals 3/23 3/29 4/5 4/6 4/13 4/20       Max eli laser L SI  4' 4' 4' NV 4'       LLE LAD    KT NV KT                                 Neuro Re-Ed             Prone prop on elbows *demo Prone lying 4' Prone lying 2 pillows 15 min Prone lying 2 pillows 5 min prone lying 2 pillows 5 min prone prop 5 min       Prone on elbows pres up *  2x10 3x10 2x10 3x5       VINNY glute set *  5"x20 5"x20 5"x20 5"x20       TA + PF  5"x20 5"x20 5"x20 5"x20 5"x20       TA + PF + march             TA + PF + hip add  5"x20  5"x20 5"x20 5"x20       TA + PF + hip abd  5"x20 red  5"x20 red 5"x20 red 5"x20 grn       TA + PF + bridge             Prone hip ext      7x ea                    Postural Ed lumbar roll rev rev          Ther Ex             Hamstring str w/ strap    20"x3 30"x3 30"x3 ea       Piriformis str *   20"x3 30"x3 30"x3 ea       Prone quad str w/ strap             L Sciatic nerve glides NV 10x 10x 10x 10x 10x                                                           Ther Activity             Bike w/ lumbar roll  6' 11' hold 10' 10'       Sit to stand + TA + PF  5x           Gait Training                                       Modalities

## 2021-04-20 NOTE — PROGRESS NOTES
Assessment/Plan:       1  Essential (primary) hypertension     - c/w noravsc, lisinopril daily, EKG done today, work on weight loss and healthy diet     2  Candida infection     - due ot pannus, treat as needed  - nystatin (MYCOSTATIN) powder; Apply topically 3 (three) times a day  Dispense: 15 g; Refill: 1     3  Chronic bilateral low back pain without sciatica     - multiple areas of chronic pain, will continue iwht Dr Neo Ann     4  Restless legs syndrome (RLS)     - c/w requip     5  Cervical spondylosis without myelopathy     - as above with Dr Neo Ann and gabapentin     6  Obstructive sleep apnea syndrome     - c/w Sleep medicine, RIKKI     7  Malabsorption due to intolerance, not elsewhere classified     - tried to scheduled with [facility], will continue to work on scheduling appt     8  CKD      - c/w Dr Ryan Quinonez yearly     9  Depression     - c/w zoloft and wellbutrin, well controlled     10  Hyperlipidemia     - c/w diet     F/u 3 months or sooner if needed        Subjective:   Chief Complaint   Patient presents with    Anxiety    Depression    Chronic Kidney Disease    Hypertension    Hyperlipidemia      Patient ID: Buckley Schlatter is a 48 y o  female  Patient is here in follow up  Has been struggling with low back pain, shooting into legs  Is following with Pain mangement but pain is intense  Starting PT today in the hopes she can get an MRI  Feels a lot of this is due to the skin from her abdomen that hangs down due to her gastric bypass  Has not seen Weight Management here to follow up regarding her gastric bypass done [facility] before moving up here because "no one has called"  Skin folds continue to cause rash, feels she is constantly on nystatin treatments  Notes a decrease in urine recently, last saw nephro last year and labs were good, is not to follow up until fall 2021  Has not been contacted by sleep medicine either      Controlling cholesterol with diet, due for recheck  Compliant with norvasc and lisinopril  No complaints        The following portions of the patient's history were reviewed and updated as appropriate: allergies, current medications, past family history, past medical history, past social history, past surgical history and problem list     Past Medical History:   Diagnosis Date    Anxiety     Chronic kidney disease     Depression     GERD (gastroesophageal reflux disease)     Hypertension     Kidney disease     Kidney stone      Past Surgical History:   Procedure Laterality Date    CHOLECYSTECTOMY      DENTAL SURGERY      [employer] teeth extraction    ENDOMETRIAL ABLATION      GASTRIC RESTRICTION SURGERY      LITHOTRIPSY      TUBAL LIGATION       Family History   Problem Relation Age of Onset    Cirrhosis Mother     Kidney failure Mother     Diabetes Mother     Hypertension Mother     Kidney disease Mother     Kidney failure Father     Heart disease Father     Hypertension Father     Alcohol abuse Father     Kidney disease Father     Alcohol abuse Paternal Grandmother     Mental illness Maternal Uncle     Substance Abuse Neg Hx      Social History     Socioeconomic History    Marital status:      Spouse name: Not on file    Number of children: Not on file    Years of education: Not on file    Highest education level: Not on file   Occupational History    Not on file   Social Needs    Financial resource strain: Not on file    Food insecurity     Worry: Not on file     Inability: Not on file    Transportation needs     Medical: Not on file     Non-medical: Not on file   Tobacco Use    Smoking status: Never Smoker    Smokeless tobacco: Never Used   Substance and Sexual Activity    Alcohol use: Yes     Comment: Rarely    Drug use: Never    Sexual activity: Not Currently     Partners: Male   Lifestyle    Physical activity     Days per week: Not on file     Minutes per session: Not on file    Stress: Not on file Relationships    Social connections     Talks on phone: Not on file     Gets together: Not on file     Attends Muslim service: Not on file     Active member of club or organization: Not on file     Attends meetings of clubs or organizations: Not on file     Relationship status: Not on file    Intimate partner violence     Fear of current or ex partner: Not on file     Emotionally abused: Not on file     Physically abused: Not on file     Forced sexual activity: Not on file   Other Topics Concern    Not on file   Social History Narrative    Not on file       Current Outpatient Medications:     albuterol (PROVENTIL HFA,VENTOLIN HFA) 90 mcg/act inhaler, albuterol sulfate HFA 90 mcg/actuation aerosol inhaler  INHALE 2 PUFFS EVERY 4 HOURS AS NEEDED FOR WHEEZING OR SHORTNESS OF AIR , Disp: , Rfl:     AMILoride 5 mg tablet, TAKE 2 TABLETS BY MOUTH EVERY DAY, Disp: 60 tablet, Rfl: 1    buPROPion (WELLBUTRIN SR) 150 mg 12 hr tablet, Take 1 tablet (150 mg total) by mouth 2 (two) times a day, Disp: 60 tablet, Rfl: 1    Calcium-Phosphorus-Vitamin D (CALCIUM GUMMIES PO), Take by mouth, Disp: , Rfl:     celecoxib (CeleBREX) 100 mg capsule, Take 1 capsule (100 mg total) by mouth 2 (two) times a day, Disp: 60 capsule, Rfl: 1    Cholecalciferol (EQL VITAMIN D3 GUMMIES PO), Take by mouth, Disp: , Rfl:     cyclobenzaprine (FLEXERIL) 10 mg tablet, Take 1 tablet (10 mg total) by mouth 3 (three) times a day as needed for muscle spasms, Disp: 90 tablet, Rfl: 2    Diclofenac Sodium (VOLTAREN) 1 %, Apply 4 g topically 2 (two) times a day as needed (severe pain), Disp: 1 Tube, Rfl: 2    furosemide (LASIX) 40 mg tablet, TAKE 1 TABLET BY MOUTH AS NEEDED (SWELLING)  , Disp: 30 tablet, Rfl: 1    gabapentin (NEURONTIN) 300 mg capsule, Take 2 capsules (600 mg total) by mouth 3 (three) times a day, Disp: 180 capsule, Rfl: 2    lisinopril (ZESTRIL) 2 5 mg tablet, TAKE 1 TABLET BY MOUTH EVERY DAY, Disp: 30 tablet, Rfl: 1   loratadine (CLARITIN) 10 mg tablet, Take 1 tablet (10 mg total) by mouth daily, Disp: 90 tablet, Rfl: 1    Multiple Vitamins-Minerals (MULTI-VITAMIN GUMMIES PO), Take by mouth, Disp: , Rfl:     nitroglycerin (NITROSTAT) 0 4 mg SL tablet, Place 1 tablet (0 4 mg total) under the tongue every 5 (five) minutes as needed for chest pain, Disp: 30 tablet, Rfl: 0    nystatin (MYCOSTATIN) powder, APPLY TO AFFECTED AREA 3 TIMES A DAY, Disp: 15 g, Rfl: 1    omeprazole (PriLOSEC) 20 mg delayed release capsule, TAKE 1 CAPSULE BY MOUTH EVERY DAY, Disp: 90 capsule, Rfl: 1    ondansetron (ZOFRAN-ODT) 4 mg disintegrating tablet, Take 1 tablet (4 mg total) by mouth every 8 (eight) hours as needed for nausea, Disp: 20 tablet, Rfl: 0    promethazine (PHENERGAN) 25 mg tablet, TAKE 1 TABLET BY MOUTH EVERY 6 HOURS AS NEEDED FOR NAUSEA AND VOMITING, Disp: 30 tablet, Rfl: 0    rOPINIRole (REQUIP) 0 25 mg tablet, TAKE 1 TABLET (0 25 MG TOTAL) BY MOUTH DAILY AT BEDTIME, Disp: 90 tablet, Rfl: 1    sertraline (ZOLOFT) 50 mg tablet, TAKE 1 TABLET BY MOUTH EVERY DAY, Disp: 90 tablet, Rfl: 1    diazepam (VALIUM) 5 mg tablet, Take 1 tablet (5 mg total) by mouth 2 (two) times a day for 3 days (Patient not taking: Reported on 3/31/2021), Disp: 6 tablet, Rfl: 0    meclizine (ANTIVERT) 25 mg tablet, Take 1 tablet (25 mg total) by mouth 3 (three) times a day as needed for dizziness (Patient not taking: Reported on 3/31/2021), Disp: 30 tablet, Rfl: 0    methylPREDNISolone 4 MG tablet therapy pack, Use as directed on package (Patient not taking: Reported on 3/31/2021), Disp: 1 each, Rfl: 0    potassium chloride (Klor-Con M20) 20 mEq tablet, Take 1 tablet (20 mEq total) by mouth 3 (three) times a day (Patient not taking: Reported on 4/20/2021), Disp: 90 tablet, Rfl: 1    Review of Systems   Constitutional: Negative for chills and fever  HENT: Negative for ear pain and sore throat  Eyes: Negative for pain and visual disturbance  Respiratory: Negative for cough and shortness of breath  Cardiovascular: Negative for chest pain and palpitations  Gastrointestinal: Negative for abdominal pain and vomiting  Genitourinary: Negative for dysuria and hematuria  Musculoskeletal: Negative for arthralgias and back pain  Skin: Negative for color change and rash  Neurological: Negative for seizures and syncope  All other systems reviewed and are negative  Objective:    Vitals:    04/20/21 1057   BP: 118/60   BP Location: Left arm   Patient Position: Sitting   Cuff Size: Large   Pulse: 90   Resp: 16   Temp: 98 2 °F (36 8 °C)   TempSrc: Oral   Weight: 91 8 kg (202 lb 6 4 oz)   Height: 5' 4 25" (1 632 m)        Physical Exam  Constitutional:       Appearance: Normal appearance  She is well-developed  Neck:      Vascular: No carotid bruit  Cardiovascular:      Rate and Rhythm: Normal rate and regular rhythm  Pulses: Normal pulses  Heart sounds: Normal heart sounds  Pulmonary:      Effort: Pulmonary effort is normal       Breath sounds: Normal breath sounds  Musculoskeletal:      Right lower leg: No edema  Left lower leg: No edema  Skin:     General: Skin is warm  Neurological:      General: No focal deficit present  Mental Status: She is alert and oriented to person, place, and time  Psychiatric:         Mood and Affect: Mood normal          Behavior: Behavior normal          Thought Content: Thought content normal          Judgment: Judgment normal                BMI Counseling: Body mass index is 34 47 kg/m²  The BMI is above normal  Nutrition recommendations include reducing portion sizes

## 2021-04-21 RX ORDER — BUPROPION HYDROCHLORIDE 150 MG/1
TABLET, EXTENDED RELEASE ORAL
Qty: 60 TABLET | Refills: 5 | OUTPATIENT
Start: 2021-04-21

## 2021-04-22 ENCOUNTER — IMMUNIZATIONS (OUTPATIENT)
Dept: FAMILY MEDICINE CLINIC | Facility: HOSPITAL | Age: 51
End: 2021-04-22

## 2021-04-22 DIAGNOSIS — Z23 ENCOUNTER FOR IMMUNIZATION: Primary | ICD-10-CM

## 2021-04-22 PROCEDURE — 91300 SARS-COV-2 / COVID-19 MRNA VACCINE (PFIZER-BIONTECH) 30 MCG: CPT

## 2021-04-22 PROCEDURE — 0002A SARS-COV-2 / COVID-19 MRNA VACCINE (PFIZER-BIONTECH) 30 MCG: CPT

## 2021-04-27 ENCOUNTER — OFFICE VISIT (OUTPATIENT)
Dept: PHYSICAL THERAPY | Facility: CLINIC | Age: 51
End: 2021-04-27
Payer: COMMERCIAL

## 2021-04-27 DIAGNOSIS — M53.3 CHRONIC LEFT SACROILIAC JOINT PAIN: Primary | ICD-10-CM

## 2021-04-27 DIAGNOSIS — N18.30 CKD (CHRONIC KIDNEY DISEASE) STAGE 3, GFR 30-59 ML/MIN (HCC): ICD-10-CM

## 2021-04-27 DIAGNOSIS — G89.29 CHRONIC LEFT SACROILIAC JOINT PAIN: Primary | ICD-10-CM

## 2021-04-27 PROCEDURE — 97110 THERAPEUTIC EXERCISES: CPT | Performed by: PHYSICAL THERAPIST

## 2021-04-27 PROCEDURE — 97112 NEUROMUSCULAR REEDUCATION: CPT | Performed by: PHYSICAL THERAPIST

## 2021-04-27 PROCEDURE — 97530 THERAPEUTIC ACTIVITIES: CPT | Performed by: PHYSICAL THERAPIST

## 2021-04-27 RX ORDER — BUPROPION HYDROCHLORIDE 150 MG/1
TABLET, EXTENDED RELEASE ORAL
Qty: 60 TABLET | Refills: 5 | Status: SHIPPED | OUTPATIENT
Start: 2021-04-27 | End: 2021-08-19 | Stop reason: SDUPTHER

## 2021-04-27 RX ORDER — FUROSEMIDE 40 MG/1
TABLET ORAL
Qty: 90 TABLET | Refills: 1 | Status: SHIPPED | OUTPATIENT
Start: 2021-04-27 | End: 2021-08-03 | Stop reason: SDUPTHER

## 2021-04-27 NOTE — PROGRESS NOTES
Daily Note     Today's date: 2021  Patient name: Alkes Hodge  : 1970  MRN: [id number]  Referring provider: JONELLE Mejia  Dx:   Encounter Diagnosis     ICD-10-CM    1  Chronic left sacroiliac joint pain  M53 3     G89 29                   Subjective: Pt reports 25% improvement since starting PT  She reports only semi compliance with HEP  She needs to move home to [place] for several weeks to take care of her ailing mother  Monday will be her last day of PT  Objective: See treatment diary below      Assessment: Tolerated treatment well  Patient demonstrated fatigue post treatment and would benefit from continued PT  Improved tolerance to prone lying without any pillows  Fair recruitment of TA with exercises  Plan: Continue per plan of care        Precautions: neck pain      Manuals 3/23 3/29 4/5 4/6 4/13 4/20 4/27      Max eli laser L SI  4' 4' 4' NV 4'       LLE LAD    KT NV KT                                 Neuro Re-Ed             Prone prop on elbows *demo Prone lying 4' Prone lying 2 pillows 15 min Prone lying 2 pillows 5 min prone lying 2 pillows 5 min prone prop 5 min prone prop 5 min      Prone on elbows pres up *  2x10 3x10 2x10 3x5 3x5      VINNY glute set *  5"x20 5"x20 5"x20 5"x20 5"x20      TA + PF  5"x20 5"x20 5"x20 5"x20 5"x20 5"x20      TA + PF + march             TA + PF + hip add  5"x20  5"x20 5"x20 5"x20 5"x20      TA + PF + hip abd  5"x20 red  5"x20 red 5"x20 red 5"x20 grn 5"x20 grn      TA + PF + bridge       5"x16      Prone hip ext      7x ea 10x ea                   Postural Ed lumbar roll rev rev          Ther Ex             Hamstring str w/ strap    20"x3 30"x3 30"x3 ea 30"x3 ea      Piriformis str *   20"x3 30"x3 30"x3 ea 30"x3 ea      Prone quad str w/ strap             L Sciatic nerve glides NV 10x 10x 10x 10x 10x 10x                                                          Ther Activity             Bike w/ lumbar roll  6' 11' hold 10' 10' 8'      Sit to stand + TA + PF  5x           Gait Training                                       Modalities

## 2021-05-03 ENCOUNTER — TELEPHONE (OUTPATIENT)
Dept: PAIN MEDICINE | Facility: MEDICAL CENTER | Age: 51
End: 2021-05-03

## 2021-05-06 DIAGNOSIS — B37.9 CANDIDA INFECTION: ICD-10-CM

## 2021-05-07 RX ORDER — NYSTATIN 100000 [USP'U]/G
POWDER TOPICAL
Qty: 15 G | Refills: 1 | Status: SHIPPED | OUTPATIENT
Start: 2021-05-07 | End: 2021-06-03

## 2021-05-12 ENCOUNTER — TELEPHONE (OUTPATIENT)
Dept: NEPHROLOGY | Facility: CLINIC | Age: 51
End: 2021-05-12

## 2021-05-16 DIAGNOSIS — I10 ESSENTIAL (PRIMARY) HYPERTENSION: ICD-10-CM

## 2021-05-17 RX ORDER — LISINOPRIL 2.5 MG/1
TABLET ORAL
Qty: 30 TABLET | Refills: 6 | Status: SHIPPED | OUTPATIENT
Start: 2021-05-17

## 2021-06-03 DIAGNOSIS — B37.9 CANDIDA INFECTION: ICD-10-CM

## 2021-06-03 RX ORDER — NYSTATIN 100000 [USP'U]/G
POWDER TOPICAL
Qty: 15 G | Refills: 1 | Status: SHIPPED | OUTPATIENT
Start: 2021-06-03 | End: 2021-07-13

## 2021-06-07 DIAGNOSIS — N18.30 CKD (CHRONIC KIDNEY DISEASE) STAGE 3, GFR 30-59 ML/MIN (HCC): ICD-10-CM

## 2021-06-07 RX ORDER — AMILORIDE HYDROCHLORIDE 5 MG/1
TABLET ORAL
Qty: 60 TABLET | Refills: 1 | Status: SHIPPED | OUTPATIENT
Start: 2021-06-07 | End: 2021-08-06

## 2021-06-11 ENCOUNTER — OFFICE VISIT (OUTPATIENT)
Dept: PAIN MEDICINE | Facility: MEDICAL CENTER | Age: 51
End: 2021-06-11
Payer: COMMERCIAL

## 2021-06-11 ENCOUNTER — OFFICE VISIT (OUTPATIENT)
Dept: FAMILY MEDICINE CLINIC | Facility: CLINIC | Age: 51
End: 2021-06-11
Payer: COMMERCIAL

## 2021-06-11 VITALS
HEIGHT: 64 IN | WEIGHT: 205.1 LBS | BODY MASS INDEX: 35.01 KG/M2 | HEART RATE: 88 BPM | SYSTOLIC BLOOD PRESSURE: 138 MMHG | DIASTOLIC BLOOD PRESSURE: 70 MMHG | RESPIRATION RATE: 16 BRPM

## 2021-06-11 VITALS
TEMPERATURE: 97.6 F | HEIGHT: 64 IN | HEART RATE: 67 BPM | SYSTOLIC BLOOD PRESSURE: 121 MMHG | DIASTOLIC BLOOD PRESSURE: 75 MMHG | WEIGHT: 205 LBS | BODY MASS INDEX: 35 KG/M2

## 2021-06-11 DIAGNOSIS — J30.9 ALLERGIC RHINITIS, UNSPECIFIED SEASONALITY, UNSPECIFIED TRIGGER: ICD-10-CM

## 2021-06-11 DIAGNOSIS — N76.0 ACUTE VAGINITIS: Primary | ICD-10-CM

## 2021-06-11 DIAGNOSIS — G89.29 CHRONIC LEFT-SIDED LOW BACK PAIN WITH LEFT-SIDED SCIATICA: ICD-10-CM

## 2021-06-11 DIAGNOSIS — M54.42 CHRONIC LEFT-SIDED LOW BACK PAIN WITH LEFT-SIDED SCIATICA: ICD-10-CM

## 2021-06-11 DIAGNOSIS — G89.29 CHRONIC LEFT SACROILIAC JOINT PAIN: ICD-10-CM

## 2021-06-11 DIAGNOSIS — M54.50 CHRONIC BILATERAL LOW BACK PAIN WITHOUT SCIATICA: ICD-10-CM

## 2021-06-11 DIAGNOSIS — M47.816 LUMBAR SPONDYLOSIS: ICD-10-CM

## 2021-06-11 DIAGNOSIS — M53.3 CHRONIC LEFT SACROILIAC JOINT PAIN: ICD-10-CM

## 2021-06-11 DIAGNOSIS — G89.29 CHRONIC BILATERAL LOW BACK PAIN WITHOUT SCIATICA: ICD-10-CM

## 2021-06-11 DIAGNOSIS — M46.1 SACROILIITIS (HCC): ICD-10-CM

## 2021-06-11 DIAGNOSIS — G89.4 CHRONIC PAIN SYNDROME: ICD-10-CM

## 2021-06-11 PROCEDURE — 87510 GARDNER VAG DNA DIR PROBE: CPT | Performed by: PHYSICIAN ASSISTANT

## 2021-06-11 PROCEDURE — 99213 OFFICE O/P EST LOW 20 MIN: CPT | Performed by: PHYSICIAN ASSISTANT

## 2021-06-11 PROCEDURE — 3078F DIAST BP <80 MM HG: CPT | Performed by: NURSE PRACTITIONER

## 2021-06-11 PROCEDURE — 1036F TOBACCO NON-USER: CPT | Performed by: NURSE PRACTITIONER

## 2021-06-11 PROCEDURE — 3008F BODY MASS INDEX DOCD: CPT | Performed by: NURSE PRACTITIONER

## 2021-06-11 PROCEDURE — 99214 OFFICE O/P EST MOD 30 MIN: CPT | Performed by: NURSE PRACTITIONER

## 2021-06-11 PROCEDURE — 87480 CANDIDA DNA DIR PROBE: CPT | Performed by: PHYSICIAN ASSISTANT

## 2021-06-11 PROCEDURE — 87660 TRICHOMONAS VAGIN DIR PROBE: CPT | Performed by: PHYSICIAN ASSISTANT

## 2021-06-11 PROCEDURE — 3074F SYST BP LT 130 MM HG: CPT | Performed by: NURSE PRACTITIONER

## 2021-06-11 RX ORDER — GABAPENTIN 300 MG/1
600 CAPSULE ORAL 3 TIMES DAILY
Qty: 180 CAPSULE | Refills: 2 | Status: SHIPPED | OUTPATIENT
Start: 2021-06-11 | End: 2021-07-08

## 2021-06-11 RX ORDER — FLUCONAZOLE 150 MG/1
TABLET ORAL
Qty: 2 TABLET | Refills: 0 | Status: SHIPPED | OUTPATIENT
Start: 2021-06-11 | End: 2021-06-14

## 2021-06-11 RX ORDER — CYCLOBENZAPRINE HCL 10 MG
10 TABLET ORAL 3 TIMES DAILY PRN
Qty: 90 TABLET | Refills: 2 | Status: SHIPPED | OUTPATIENT
Start: 2021-06-11

## 2021-06-11 RX ORDER — CELECOXIB 100 MG/1
100 CAPSULE ORAL 2 TIMES DAILY
Qty: 60 CAPSULE | Refills: 2 | Status: SHIPPED | OUTPATIENT
Start: 2021-06-11

## 2021-06-11 RX ORDER — METRONIDAZOLE 7.5 MG/G
1 GEL VAGINAL
Qty: 70 G | Refills: 0 | Status: SHIPPED | OUTPATIENT
Start: 2021-06-11 | End: 2021-06-16

## 2021-06-11 RX ORDER — LORATADINE 10 MG/1
10 TABLET ORAL DAILY
Qty: 90 TABLET | Refills: 1 | Status: SHIPPED | OUTPATIENT
Start: 2021-06-11

## 2021-06-11 NOTE — PROGRESS NOTES
Assessment/Plan:    1  Acute vaginitis    - per patient symptoms c/w yeast and BV, has gotten many times in past, will do cultures but also treat today as patient insurance only works in [place] and patient is only here for the weekend, will call with results and further instructions  - fluconazole (DIFLUCAN) 150 mg tablet; Take 1 today and repeat in 3 days  Dispense: 2 tablet; Refill: 0  - metroNIDAZOLE (METROGEL) 0 75 % vaginal gel; Insert 1 application into the vagina daily at bedtime for 5 days  Dispense: 70 g; Refill: 0  - VAGINOSIS DNA PROBE (AFFIRM)    2  Allergic rhinitis, unspecified seasonality, unspecified trigger    - continue as is  - loratadine (CLARITIN) 10 mg tablet; Take 1 tablet (10 mg total) by mouth daily  Dispense: 90 tablet; Refill: 1    F/u as needed    Subjective:   Chief Complaint   Patient presents with    Poss yeast infection      Patient ID: Paula William is a 48 y o  female  Patient here complaining vaginal itching and smelling for a few weeks  Tried OTC yeast medications with no relief  Clear white discharge  Took AZO pills with no relief  Patient is on medical assistance and had to go back home to live with mom who's health is not good, has been in [place] for the past month and cannot use her insurance there  Booked a fight home this weekend to see grandchildren/children and take care of routine health issues        The following portions of the patient's history were reviewed and updated as appropriate: allergies, current medications, past family history, past medical history, past social history, past surgical history and problem list     Past Medical History:   Diagnosis Date    Anxiety     Chronic kidney disease     Depression     GERD (gastroesophageal reflux disease)     Hypertension     Kidney disease     Kidney stone      Past Surgical History:   Procedure Laterality Date    CHOLECYSTECTOMY      DENTAL SURGERY      [place] teeth extraction    ENDOMETRIAL ABLATION  GASTRIC RESTRICTION SURGERY      LITHOTRIPSY      TUBAL LIGATION       Family History   Problem Relation Age of Onset    Cirrhosis Mother     Kidney failure Mother     Diabetes Mother     Hypertension Mother     Kidney disease Mother     Kidney failure Father     Heart disease Father     Hypertension Father     Alcohol abuse Father     Kidney disease Father     Alcohol abuse Paternal Grandmother     Mental illness Maternal Uncle     Substance Abuse Neg Hx      Social History     Socioeconomic History    Marital status:      Spouse name: Not on file    Number of children: Not on file    Years of education: Not on file    Highest education level: Not on file   Occupational History    Not on file   Social Needs    Financial resource strain: Not on file    Food insecurity     Worry: Not on file     Inability: Not on file    Transportation needs     Medical: Not on file     Non-medical: Not on file   Tobacco Use    Smoking status: Never Smoker    Smokeless tobacco: Never Used   Substance and Sexual Activity    Alcohol use: Yes     Comment: Rarely    Drug use: Never    Sexual activity: Not Currently     Partners: Male   Lifestyle    Physical activity     Days per week: Not on file     Minutes per session: Not on file    Stress: Not on file   Relationships    Social connections     Talks on phone: Not on file     Gets together: Not on file     Attends Buddhism service: Not on file     Active member of club or organization: Not on file     Attends meetings of clubs or organizations: Not on file     Relationship status: Not on file    Intimate partner violence     Fear of current or ex partner: Not on file     Emotionally abused: Not on file     Physically abused: Not on file     Forced sexual activity: Not on file   Other Topics Concern    Not on file   Social History Narrative    Not on file       Current Outpatient Medications:     albuterol (PROVENTIL HFA,VENTOLIN HFA) 80 mcg/act inhaler, albuterol sulfate HFA 90 mcg/actuation aerosol inhaler  INHALE 2 PUFFS EVERY 4 HOURS AS NEEDED FOR WHEEZING OR SHORTNESS OF AIR , Disp: , Rfl:     AMILoride 5 mg tablet, TAKE 2 TABLETS BY MOUTH EVERY DAY, Disp: 60 tablet, Rfl: 1    buPROPion (WELLBUTRIN SR) 150 mg 12 hr tablet, Take 1 tablet (150 mg total) by mouth 2 (two) times a day, Disp: 60 tablet, Rfl: 1    Calcium-Phosphorus-Vitamin D (CALCIUM GUMMIES PO), Take by mouth, Disp: , Rfl:     celecoxib (CeleBREX) 100 mg capsule, Take 1 capsule (100 mg total) by mouth 2 (two) times a day, Disp: 60 capsule, Rfl: 1    Cholecalciferol (EQL VITAMIN D3 GUMMIES PO), Take by mouth, Disp: , Rfl:     cyclobenzaprine (FLEXERIL) 10 mg tablet, Take 1 tablet (10 mg total) by mouth 3 (three) times a day as needed for muscle spasms, Disp: 90 tablet, Rfl: 2    Diclofenac Sodium (VOLTAREN) 1 %, Apply 4 g topically 2 (two) times a day as needed (severe pain), Disp: 1 Tube, Rfl: 2    furosemide (LASIX) 40 mg tablet, TAKE 1 TABLET BY MOUTH AS NEEDED (SWELLING)  , Disp: 90 tablet, Rfl: 1    gabapentin (NEURONTIN) 300 mg capsule, Take 2 capsules (600 mg total) by mouth 3 (three) times a day, Disp: 180 capsule, Rfl: 2    lisinopril (ZESTRIL) 2 5 mg tablet, TAKE 1 TABLET BY MOUTH EVERY DAY, Disp: 30 tablet, Rfl: 6    loratadine (CLARITIN) 10 mg tablet, Take 1 tablet (10 mg total) by mouth daily, Disp: 90 tablet, Rfl: 1    meclizine (ANTIVERT) 25 mg tablet, Take 1 tablet (25 mg total) by mouth 3 (three) times a day as needed for dizziness, Disp: 30 tablet, Rfl: 0    methylPREDNISolone 4 MG tablet therapy pack, Use as directed on package, Disp: 1 each, Rfl: 0    Multiple Vitamins-Minerals (MULTI-VITAMIN GUMMIES PO), Take by mouth, Disp: , Rfl:     nitroglycerin (NITROSTAT) 0 4 mg SL tablet, Place 1 tablet (0 4 mg total) under the tongue every 5 (five) minutes as needed for chest pain, Disp: 30 tablet, Rfl: 0    nystatin (MYCOSTATIN) powder, APPLY TO AFFECTED AREA 3 TIMES A DAY, Disp: 15 g, Rfl: 1    omeprazole (PriLOSEC) 20 mg delayed release capsule, TAKE 1 CAPSULE BY MOUTH EVERY DAY, Disp: 90 capsule, Rfl: 1    ondansetron (ZOFRAN-ODT) 4 mg disintegrating tablet, Take 1 tablet (4 mg total) by mouth every 8 (eight) hours as needed for nausea, Disp: 20 tablet, Rfl: 0    potassium chloride (Klor-Con M20) 20 mEq tablet, Take 1 tablet (20 mEq total) by mouth 3 (three) times a day, Disp: 90 tablet, Rfl: 1    promethazine (PHENERGAN) 25 mg tablet, TAKE 1 TABLET BY MOUTH EVERY 6 HOURS AS NEEDED FOR NAUSEA AND VOMITING, Disp: 30 tablet, Rfl: 0    rOPINIRole (REQUIP) 0 25 mg tablet, TAKE 1 TABLET (0 25 MG TOTAL) BY MOUTH DAILY AT BEDTIME, Disp: 90 tablet, Rfl: 1    sertraline (ZOLOFT) 50 mg tablet, TAKE 1 TABLET BY MOUTH EVERY DAY, Disp: 90 tablet, Rfl: 1    diazepam (VALIUM) 5 mg tablet, Take 1 tablet (5 mg total) by mouth 2 (two) times a day for 3 days (Patient not taking: Reported on 3/31/2021), Disp: 6 tablet, Rfl: 0    Review of Systems          Objective:    Vitals:    06/11/21 1003   BP: 138/70   BP Location: Left arm   Patient Position: Sitting   Cuff Size: Standard   Pulse: 88   Resp: 16   Weight: 93 kg (205 lb 1 6 oz)   Height: 5' 4" (1 626 m)        Physical Exam  Constitutional:       Appearance: Normal appearance  Cardiovascular:      Rate and Rhythm: Normal rate and regular rhythm  Pulses: Normal pulses  Heart sounds: Normal heart sounds  Pulmonary:      Effort: Pulmonary effort is normal       Breath sounds: Normal breath sounds  Genitourinary:     Vagina: Normal       Cervix: Discharge and erythema present  Comments: White thick discharge  Neurological:      General: No focal deficit present  Mental Status: She is alert and oriented to person, place, and time  Psychiatric:         Mood and Affect: Mood normal          Behavior: Behavior normal          Thought Content:  Thought content normal          Judgment: Judgment normal

## 2021-06-11 NOTE — PROGRESS NOTES
Assessment  1  Chronic bilateral low back pain without sciatica    2  Chronic left sacroiliac joint pain    3  Chronic left-sided low back pain with left-sided sciatica    4  Chronic pain syndrome    5  Sacroiliitis ([address] )    6  Lumbar spondylosis        Plan  Patient was seen in the office today for follow-up of her chronic low back pain  She was also here for medication refills  Her last office visit was 04/06/2021 and at that time she was referred for physical therapy and an MRI  Her insurance denied the MRI until she completed the 6 weeks of physical therapy, she completed 5 weeks which she had a family emergency that required her to go to [place]  She has been in [place] for the past month and came back up here because she is running out of medications and would like refills  I felt it was reasonable to do this for her and she will be leaving tomorrow to go back to [place] to take care of the family emergency and will be back up here in August   At that time she will obtain the MRI and follow-up in the office for re-evaluation of plan of care  The following is the plan for now:    1  Following medications were refilled and the prescriptions were sent to the pharmacy on file  Celebrex 100 mg twice a day, Flexeril 10 mg up to 3 times a day, gabapentin 600 mg 3 times a day and Voltaren gel 1% up to 4 times per day topically  Patient reports no side effects from these medications therefore I felt it reasonable to continue her on them  She states that she gets about 50% pain relief with these medications  My impressions and treatment recommendations were discussed in detail with the patient who verbalized understanding and had no further questions  Discharge instructions were provided  I personally saw and examined the patient and I agree with the above discussed plan of care  No orders of the defined types were placed in this encounter      New Medications Ordered This Visit   Medications    gabapentin (NEURONTIN) 300 mg capsule     Sig: Take 2 capsules (600 mg total) by mouth 3 (three) times a day     Dispense:  180 capsule     Refill:  2    Diclofenac Sodium (VOLTAREN) 1 %     Sig: Apply 4 g topically 2 (two) times a day as needed (severe pain)     Dispense:  2 g     Refill:  2    celecoxib (CeleBREX) 100 mg capsule     Sig: Take 1 capsule (100 mg total) by mouth 2 (two) times a day     Dispense:  60 capsule     Refill:  2    cyclobenzaprine (FLEXERIL) 10 mg tablet     Sig: Take 1 tablet (10 mg total) by mouth 3 (three) times a day as needed for muscle spasms     Dispense:  90 tablet     Refill:  2       History of Present Illness    Jaun Domínguez is a 48 y o  female who reports to the office today with a pain score of 6/10 that is constant  She describes the quality of her pain as burning, throbbing, shooting with pins and needles  She states that she is currently taking Celebrex, Flexeril, gabapentin and uses Voltaren Gel  These medications help her pain by about 50%  I have personally reviewed and/or updated the patient's past medical history, past surgical history, family history, social history, current medications, allergies, and vital signs today  Review of Systems   Respiratory: Negative for shortness of breath  Cardiovascular: Negative for chest pain  Gastrointestinal: Negative for constipation, diarrhea, nausea and vomiting  Musculoskeletal: Positive for back pain, gait problem, myalgias and neck pain  Negative for arthralgias and joint swelling  Skin: Negative for rash  Neurological: Negative for dizziness, seizures and weakness  All other systems reviewed and are negative        Patient Active Problem List   Diagnosis    Obstructive sleep apnea syndrome    Restless legs syndrome (RLS)    Hypertension    Allergic rhinitis    Depression with anxiety    Gastroesophageal reflux disease without esophagitis    Malabsorption due to intolerance, not elsewhere classified    CKD (chronic kidney disease) stage 3, GFR 30-59 ml/min ([facility])    Degenerative disc disease, cervical    Degenerative disc disease, lumbar    Lumbar spondylosis    Chronic left-sided low back pain with left-sided sciatica    Cervical spondylosis without myelopathy    Neck pain    Gastroesophageal reflux disease with esophagitis    Hyperlipidemia    Sacroiliitis ([facility])       Past Medical History:   Diagnosis Date    Anxiety     Chronic kidney disease     Depression     GERD (gastroesophageal reflux disease)     Hypertension     Kidney disease     Kidney stone        Past Surgical History:   Procedure Laterality Date    CHOLECYSTECTOMY      DENTAL SURGERY      [place] teeth extraction    ENDOMETRIAL ABLATION      GASTRIC RESTRICTION SURGERY      LITHOTRIPSY      TUBAL LIGATION         Family History   Problem Relation Age of Onset    Cirrhosis Mother     Kidney failure Mother     Diabetes Mother     Hypertension Mother     Kidney disease Mother     Kidney failure Father     Heart disease Father     Hypertension Father     Alcohol abuse Father     Kidney disease Father     Alcohol abuse Paternal Grandmother     Mental illness Maternal Uncle     Substance Abuse Neg Hx        Social History     Occupational History    Not on file   Tobacco Use    Smoking status: Never Smoker    Smokeless tobacco: Never Used   Substance and Sexual Activity    Alcohol use: Yes     Comment: Rarely    Drug use: Never    Sexual activity: Not Currently     Partners: Male       Current Outpatient Medications on File Prior to Visit   Medication Sig    albuterol (PROVENTIL HFA,VENTOLIN HFA) 90 mcg/act inhaler albuterol sulfate HFA 90 mcg/actuation aerosol inhaler   INHALE 2 PUFFS EVERY 4 HOURS AS NEEDED FOR WHEEZING OR SHORTNESS OF AIR      AMILoride 5 mg tablet TAKE 2 TABLETS BY MOUTH EVERY DAY    buPROPion (WELLBUTRIN SR) 150 mg 12 hr tablet Take 1 tablet (150 mg total) by mouth 2 (two) times a day    Calcium-Phosphorus-Vitamin D (CALCIUM GUMMIES PO) Take by mouth    Cholecalciferol (EQL VITAMIN D3 GUMMIES PO) Take by mouth    fluconazole (DIFLUCAN) 150 mg tablet Take 1 today and repeat in 3 days    furosemide (LASIX) 40 mg tablet TAKE 1 TABLET BY MOUTH AS NEEDED (SWELLING)      lisinopril (ZESTRIL) 2 5 mg tablet TAKE 1 TABLET BY MOUTH EVERY DAY    loratadine (CLARITIN) 10 mg tablet Take 1 tablet (10 mg total) by mouth daily    meclizine (ANTIVERT) 25 mg tablet Take 1 tablet (25 mg total) by mouth 3 (three) times a day as needed for dizziness    methylPREDNISolone 4 MG tablet therapy pack Use as directed on package    metroNIDAZOLE (METROGEL) 0 75 % vaginal gel Insert 1 application into the vagina daily at bedtime for 5 days    Multiple Vitamins-Minerals (MULTI-VITAMIN GUMMIES PO) Take by mouth    nitroglycerin (NITROSTAT) 0 4 mg SL tablet Place 1 tablet (0 4 mg total) under the tongue every 5 (five) minutes as needed for chest pain    nystatin (MYCOSTATIN) powder APPLY TO AFFECTED AREA 3 TIMES A DAY    omeprazole (PriLOSEC) 20 mg delayed release capsule TAKE 1 CAPSULE BY MOUTH EVERY DAY    ondansetron (ZOFRAN-ODT) 4 mg disintegrating tablet Take 1 tablet (4 mg total) by mouth every 8 (eight) hours as needed for nausea    potassium chloride (Klor-Con M20) 20 mEq tablet Take 1 tablet (20 mEq total) by mouth 3 (three) times a day    promethazine (PHENERGAN) 25 mg tablet TAKE 1 TABLET BY MOUTH EVERY 6 HOURS AS NEEDED FOR NAUSEA AND VOMITING    rOPINIRole (REQUIP) 0 25 mg tablet TAKE 1 TABLET (0 25 MG TOTAL) BY MOUTH DAILY AT BEDTIME    sertraline (ZOLOFT) 50 mg tablet TAKE 1 TABLET BY MOUTH EVERY DAY    [DISCONTINUED] celecoxib (CeleBREX) 100 mg capsule Take 1 capsule (100 mg total) by mouth 2 (two) times a day    [DISCONTINUED] cyclobenzaprine (FLEXERIL) 10 mg tablet Take 1 tablet (10 mg total) by mouth 3 (three) times a day as needed for muscle spasms    [DISCONTINUED] Diclofenac Sodium (VOLTAREN) 1 % Apply 4 g topically 2 (two) times a day as needed (severe pain)    [DISCONTINUED] gabapentin (NEURONTIN) 300 mg capsule Take 2 capsules (600 mg total) by mouth 3 (three) times a day    diazepam (VALIUM) 5 mg tablet Take 1 tablet (5 mg total) by mouth 2 (two) times a day for 3 days (Patient not taking: Reported on 3/31/2021)    [DISCONTINUED] loratadine (CLARITIN) 10 mg tablet Take 1 tablet (10 mg total) by mouth daily     No current facility-administered medications on file prior to visit          Allergies   Allergen Reactions    Sulfa Antibiotics Throat Swelling       Physical Exam    /75   Pulse 67   Temp 97 6 °F (36 4 °C)   Ht 5' 4" (1 626 m)   Wt 93 kg (205 lb)   BMI 35 19 kg/m²     Constitutional: overweight  Eyes: anicteric  HEENT: grossly intact  Neck: supple, symmetric, trachea midline and no masses   Pulmonary:even and unlabored  Cardiovascular:No edema or pitting edema present  Skin:Normal without rashes or lesions and well hydrated  Psychiatric:Mood and affect appropriate  Neurologic:Cranial Nerves II-XII grossly intact  Musculoskeletal:antalgic    Imaging

## 2021-06-13 LAB
CANDIDA RRNA VAG QL PROBE: NEGATIVE
G VAGINALIS RRNA GENITAL QL PROBE: POSITIVE
T VAGINALIS RRNA GENITAL QL PROBE: NEGATIVE

## 2021-06-14 ENCOUNTER — TELEPHONE (OUTPATIENT)
Dept: FAMILY MEDICINE CLINIC | Facility: CLINIC | Age: 51
End: 2021-06-14

## 2021-06-14 NOTE — TELEPHONE ENCOUNTER
----- Message from Honey Ness PA-C sent at 6/14/2021  9:28 AM EDT -----  Please let patient know she did have BV but not yeast  She should finish the flagyl vaginal supp

## 2021-06-30 ENCOUNTER — APPOINTMENT (OUTPATIENT)
Dept: GENERAL RADIOLOGY | Facility: HOSPITAL | Age: 51
End: 2021-06-30

## 2021-06-30 ENCOUNTER — HOSPITAL ENCOUNTER (EMERGENCY)
Facility: HOSPITAL | Age: 51
Discharge: HOME | End: 2021-06-30
Attending: EMERGENCY MEDICINE | Admitting: EMERGENCY MEDICINE

## 2021-06-30 VITALS
BODY MASS INDEX: 34.15 KG/M2 | TEMPERATURE: 97.8 F | HEART RATE: 62 BPM | HEIGHT: 64 IN | SYSTOLIC BLOOD PRESSURE: 130 MMHG | RESPIRATION RATE: 17 BRPM | DIASTOLIC BLOOD PRESSURE: 70 MMHG | OXYGEN SATURATION: 98 % | WEIGHT: 200 LBS

## 2021-06-30 DIAGNOSIS — R11.2 NAUSEA VOMITING AND DIARRHEA: Primary | ICD-10-CM

## 2021-06-30 DIAGNOSIS — R19.7 NAUSEA VOMITING AND DIARRHEA: Primary | ICD-10-CM

## 2021-06-30 LAB
ALBUMIN SERPL-MCNC: 3.9 G/DL (ref 3.5–5.2)
ALBUMIN/GLOB SERPL: 1.3 G/DL
ALP SERPL-CCNC: 88 U/L (ref 39–117)
ALT SERPL W P-5'-P-CCNC: 12 U/L (ref 1–33)
ANION GAP SERPL CALCULATED.3IONS-SCNC: 8 MMOL/L (ref 5–15)
AST SERPL-CCNC: 21 U/L (ref 1–32)
BACTERIA UR QL AUTO: NORMAL /HPF
BASOPHILS # BLD AUTO: 0.02 10*3/MM3 (ref 0–0.2)
BASOPHILS NFR BLD AUTO: 0.3 % (ref 0–1.5)
BILIRUB SERPL-MCNC: 0.5 MG/DL (ref 0–1.2)
BILIRUB UR QL STRIP: NEGATIVE
BUN SERPL-MCNC: 12 MG/DL (ref 6–20)
BUN/CREAT SERPL: 15.8 (ref 7–25)
CALCIUM SPEC-SCNC: 9.8 MG/DL (ref 8.6–10.5)
CHLORIDE SERPL-SCNC: 102 MMOL/L (ref 98–107)
CLARITY UR: CLEAR
CO2 SERPL-SCNC: 29 MMOL/L (ref 22–29)
COLOR UR: YELLOW
CREAT SERPL-MCNC: 0.76 MG/DL (ref 0.57–1)
DEPRECATED RDW RBC AUTO: 40.8 FL (ref 37–54)
EOSINOPHIL # BLD AUTO: 0.16 10*3/MM3 (ref 0–0.4)
EOSINOPHIL NFR BLD AUTO: 2.2 % (ref 0.3–6.2)
ERYTHROCYTE [DISTWIDTH] IN BLOOD BY AUTOMATED COUNT: 13.9 % (ref 12.3–15.4)
GFR SERPL CREATININE-BSD FRML MDRD: 81 ML/MIN/1.73
GLOBULIN UR ELPH-MCNC: 3.1 GM/DL
GLUCOSE SERPL-MCNC: 101 MG/DL (ref 65–99)
GLUCOSE UR STRIP-MCNC: NEGATIVE MG/DL
HCT VFR BLD AUTO: 43.4 % (ref 34–46.6)
HGB BLD-MCNC: 14.3 G/DL (ref 12–15.9)
HGB UR QL STRIP.AUTO: NEGATIVE
HOLD SPECIMEN: NORMAL
HOLD SPECIMEN: NORMAL
HYALINE CASTS UR QL AUTO: NORMAL /LPF
IMM GRANULOCYTES # BLD AUTO: 0.02 10*3/MM3 (ref 0–0.05)
IMM GRANULOCYTES NFR BLD AUTO: 0.3 % (ref 0–0.5)
KETONES UR QL STRIP: NEGATIVE
LEUKOCYTE ESTERASE UR QL STRIP.AUTO: ABNORMAL
LYMPHOCYTES # BLD AUTO: 2.19 10*3/MM3 (ref 0.7–3.1)
LYMPHOCYTES NFR BLD AUTO: 30.2 % (ref 19.6–45.3)
MCH RBC QN AUTO: 27.1 PG (ref 26.6–33)
MCHC RBC AUTO-ENTMCNC: 32.9 G/DL (ref 31.5–35.7)
MCV RBC AUTO: 82.2 FL (ref 79–97)
MONOCYTES # BLD AUTO: 0.42 10*3/MM3 (ref 0.1–0.9)
MONOCYTES NFR BLD AUTO: 5.8 % (ref 5–12)
NEUTROPHILS NFR BLD AUTO: 4.43 10*3/MM3 (ref 1.7–7)
NEUTROPHILS NFR BLD AUTO: 61.2 % (ref 42.7–76)
NITRITE UR QL STRIP: NEGATIVE
NRBC BLD AUTO-RTO: 0 /100 WBC (ref 0–0.2)
NT-PROBNP SERPL-MCNC: 380.2 PG/ML (ref 0–900)
PH UR STRIP.AUTO: <=5 [PH] (ref 5–9)
PLATELET # BLD AUTO: 255 10*3/MM3 (ref 140–450)
PMV BLD AUTO: 9.7 FL (ref 6–12)
POTASSIUM SERPL-SCNC: 3.9 MMOL/L (ref 3.5–5.2)
PROT SERPL-MCNC: 7 G/DL (ref 6–8.5)
PROT UR QL STRIP: NEGATIVE
QT INTERVAL: 420 MS
QTC INTERVAL: 436 MS
RBC # BLD AUTO: 5.28 10*6/MM3 (ref 3.77–5.28)
RBC # UR: NORMAL /HPF
REF LAB TEST METHOD: NORMAL
SODIUM SERPL-SCNC: 139 MMOL/L (ref 136–145)
SP GR UR STRIP: 1.01 (ref 1–1.03)
SQUAMOUS #/AREA URNS HPF: NORMAL /HPF
TROPONIN T SERPL-MCNC: <0.01 NG/ML (ref 0–0.03)
TROPONIN T SERPL-MCNC: <0.01 NG/ML (ref 0–0.03)
UROBILINOGEN UR QL STRIP: ABNORMAL
WBC # BLD AUTO: 7.24 10*3/MM3 (ref 3.4–10.8)
WBC UR QL AUTO: NORMAL /HPF
WHOLE BLOOD HOLD SPECIMEN: NORMAL

## 2021-06-30 PROCEDURE — 25010000002 ONDANSETRON PER 1 MG: Performed by: EMERGENCY MEDICINE

## 2021-06-30 PROCEDURE — 96375 TX/PRO/DX INJ NEW DRUG ADDON: CPT

## 2021-06-30 PROCEDURE — 83880 ASSAY OF NATRIURETIC PEPTIDE: CPT | Performed by: EMERGENCY MEDICINE

## 2021-06-30 PROCEDURE — 96374 THER/PROPH/DIAG INJ IV PUSH: CPT

## 2021-06-30 PROCEDURE — 93005 ELECTROCARDIOGRAM TRACING: CPT | Performed by: EMERGENCY MEDICINE

## 2021-06-30 PROCEDURE — 81001 URINALYSIS AUTO W/SCOPE: CPT | Performed by: EMERGENCY MEDICINE

## 2021-06-30 PROCEDURE — 85025 COMPLETE CBC W/AUTO DIFF WBC: CPT | Performed by: EMERGENCY MEDICINE

## 2021-06-30 PROCEDURE — 80053 COMPREHEN METABOLIC PANEL: CPT | Performed by: EMERGENCY MEDICINE

## 2021-06-30 PROCEDURE — 74019 RADEX ABDOMEN 2 VIEWS: CPT

## 2021-06-30 PROCEDURE — 93010 ELECTROCARDIOGRAM REPORT: CPT | Performed by: INTERNAL MEDICINE

## 2021-06-30 PROCEDURE — 99283 EMERGENCY DEPT VISIT LOW MDM: CPT

## 2021-06-30 PROCEDURE — 84484 ASSAY OF TROPONIN QUANT: CPT | Performed by: EMERGENCY MEDICINE

## 2021-06-30 PROCEDURE — 71045 X-RAY EXAM CHEST 1 VIEW: CPT

## 2021-06-30 RX ORDER — NYSTATIN 100000 [USP'U]/G
POWDER TOPICAL 4 TIMES DAILY
COMMUNITY
End: 2021-08-19 | Stop reason: SDUPTHER

## 2021-06-30 RX ORDER — PANTOPRAZOLE SODIUM 40 MG/10ML
40 INJECTION, POWDER, LYOPHILIZED, FOR SOLUTION INTRAVENOUS ONCE
Status: COMPLETED | OUTPATIENT
Start: 2021-06-30 | End: 2021-06-30

## 2021-06-30 RX ORDER — ONDANSETRON 4 MG/1
4 TABLET, ORALLY DISINTEGRATING ORAL EVERY 6 HOURS PRN
Qty: 15 TABLET | Refills: 0 | Status: SHIPPED | OUTPATIENT
Start: 2021-06-30 | End: 2021-08-19 | Stop reason: SDUPTHER

## 2021-06-30 RX ORDER — LOPERAMIDE HYDROCHLORIDE 2 MG/1
4 CAPSULE ORAL ONCE
Status: COMPLETED | OUTPATIENT
Start: 2021-06-30 | End: 2021-06-30

## 2021-06-30 RX ORDER — SODIUM CHLORIDE 0.9 % (FLUSH) 0.9 %
10 SYRINGE (ML) INJECTION AS NEEDED
Status: DISCONTINUED | OUTPATIENT
Start: 2021-06-30 | End: 2021-06-30 | Stop reason: HOSPADM

## 2021-06-30 RX ORDER — LOPERAMIDE HYDROCHLORIDE 2 MG/1
2 CAPSULE ORAL 4 TIMES DAILY PRN
Qty: 24 CAPSULE | Refills: 0 | Status: SHIPPED | OUTPATIENT
Start: 2021-06-30 | End: 2021-08-19 | Stop reason: SDUPTHER

## 2021-06-30 RX ORDER — ONDANSETRON 2 MG/ML
4 INJECTION INTRAMUSCULAR; INTRAVENOUS ONCE
Status: COMPLETED | OUTPATIENT
Start: 2021-06-30 | End: 2021-06-30

## 2021-06-30 RX ADMIN — LOPERAMIDE HYDROCHLORIDE 4 MG: 2 CAPSULE ORAL at 11:18

## 2021-06-30 RX ADMIN — PANTOPRAZOLE SODIUM 40 MG: 40 INJECTION, POWDER, FOR SOLUTION INTRAVENOUS at 11:19

## 2021-06-30 RX ADMIN — SODIUM CHLORIDE 1000 ML: 900 INJECTION, SOLUTION INTRAVENOUS at 11:18

## 2021-06-30 RX ADMIN — ONDANSETRON HYDROCHLORIDE 4 MG: 2 INJECTION, SOLUTION INTRAMUSCULAR; INTRAVENOUS at 11:19

## 2021-07-08 DIAGNOSIS — M54.50 CHRONIC BILATERAL LOW BACK PAIN WITHOUT SCIATICA: ICD-10-CM

## 2021-07-08 DIAGNOSIS — G89.29 CHRONIC BILATERAL LOW BACK PAIN WITHOUT SCIATICA: ICD-10-CM

## 2021-07-08 RX ORDER — GABAPENTIN 300 MG/1
CAPSULE ORAL
Qty: 90 CAPSULE | Refills: 1 | Status: SHIPPED | OUTPATIENT
Start: 2021-07-08

## 2021-07-13 DIAGNOSIS — B37.9 CANDIDA INFECTION: ICD-10-CM

## 2021-07-13 RX ORDER — NYSTATIN 100000 [USP'U]/G
POWDER TOPICAL
Qty: 15 G | Refills: 1 | Status: SHIPPED | OUTPATIENT
Start: 2021-07-13 | End: 2021-08-09

## 2021-07-22 ENCOUNTER — TELEPHONE (OUTPATIENT)
Dept: NEPHROLOGY | Facility: CLINIC | Age: 51
End: 2021-07-22

## 2021-08-03 DIAGNOSIS — N18.30 CKD (CHRONIC KIDNEY DISEASE) STAGE 3, GFR 30-59 ML/MIN (HCC): ICD-10-CM

## 2021-08-03 RX ORDER — FUROSEMIDE 40 MG/1
40 TABLET ORAL DAILY
Qty: 90 TABLET | Refills: 1 | Status: SHIPPED | OUTPATIENT
Start: 2021-08-03

## 2021-08-06 ENCOUNTER — TELEPHONE (OUTPATIENT)
Dept: FAMILY MEDICINE CLINIC | Facility: CLINIC | Age: 51
End: 2021-08-06

## 2021-08-06 DIAGNOSIS — N18.30 CKD (CHRONIC KIDNEY DISEASE) STAGE 3, GFR 30-59 ML/MIN (HCC): ICD-10-CM

## 2021-08-06 DIAGNOSIS — G25.81 RESTLESS LEGS SYNDROME (RLS): ICD-10-CM

## 2021-08-06 RX ORDER — AMILORIDE HYDROCHLORIDE 5 MG/1
TABLET ORAL
Qty: 60 TABLET | Refills: 1 | Status: SHIPPED | OUTPATIENT
Start: 2021-08-06

## 2021-08-06 RX ORDER — ROPINIROLE 0.25 MG/1
0.25 TABLET, FILM COATED ORAL
Qty: 30 TABLET | Refills: 5 | Status: SHIPPED | OUTPATIENT
Start: 2021-08-06

## 2021-08-06 NOTE — TELEPHONE ENCOUNTER
Patient was in contact with someone who contact covid 2 days ago 8/4  Patient would like to go get tested  I did explain to patient we like to wait 5 days after they have been in contact to go get tested  Patient is asking if you can put script in for her to get tested on Monday  I also told patient that she should quarantine till she gets results from test (hope this still holds true)? Also, said to patient that if she would develop any symptoms over the weekend she can go to an urgent care to get tested earlier  Hope this is all still correct if not please let me know I can let patient know

## 2021-08-08 DIAGNOSIS — B37.9 CANDIDA INFECTION: ICD-10-CM

## 2021-08-09 RX ORDER — NYSTATIN 100000 [USP'U]/G
POWDER TOPICAL
Qty: 15 G | Refills: 1 | Status: SHIPPED | OUTPATIENT
Start: 2021-08-09

## 2021-08-10 ENCOUNTER — TELEPHONE (OUTPATIENT)
Dept: FAMILY MEDICINE CLINIC | Facility: CLINIC | Age: 51
End: 2021-08-10

## 2021-08-10 PROCEDURE — U0003 INFECTIOUS AGENT DETECTION BY NUCLEIC ACID (DNA OR RNA); SEVERE ACUTE RESPIRATORY SYNDROME CORONAVIRUS 2 (SARS-COV-2) (CORONAVIRUS DISEASE [COVID-19]), AMPLIFIED PROBE TECHNIQUE, MAKING USE OF HIGH THROUGHPUT TECHNOLOGIES AS DESCRIBED BY CMS-2020-01-R: HCPCS | Performed by: PHYSICIAN ASSISTANT

## 2021-08-10 PROCEDURE — U0005 INFEC AGEN DETEC AMPLI PROBE: HCPCS | Performed by: PHYSICIAN ASSISTANT

## 2021-08-10 NOTE — TELEPHONE ENCOUNTER
----- Message from Scarlet Crowell PA-C sent at 8/10/2021  3:51 PM EDT -----  Please let patient know her test was negative    Patient informed

## 2021-08-16 ENCOUNTER — OFFICE VISIT (OUTPATIENT)
Dept: FAMILY MEDICINE CLINIC | Facility: CLINIC | Age: 51
End: 2021-08-16

## 2021-08-16 VITALS
SYSTOLIC BLOOD PRESSURE: 130 MMHG | WEIGHT: 212.7 LBS | OXYGEN SATURATION: 98 % | HEIGHT: 64 IN | HEART RATE: 61 BPM | BODY MASS INDEX: 36.31 KG/M2 | DIASTOLIC BLOOD PRESSURE: 90 MMHG

## 2021-08-16 DIAGNOSIS — E66.01 CLASS 2 SEVERE OBESITY DUE TO EXCESS CALORIES WITH SERIOUS COMORBIDITY AND BODY MASS INDEX (BMI) OF 36.0 TO 36.9 IN ADULT (HCC): ICD-10-CM

## 2021-08-16 DIAGNOSIS — G89.29 CHRONIC BACK PAIN, UNSPECIFIED BACK LOCATION, UNSPECIFIED BACK PAIN LATERALITY: ICD-10-CM

## 2021-08-16 DIAGNOSIS — Z98.84 H/O BARIATRIC SURGERY: ICD-10-CM

## 2021-08-16 DIAGNOSIS — M54.9 CHRONIC BACK PAIN, UNSPECIFIED BACK LOCATION, UNSPECIFIED BACK PAIN LATERALITY: ICD-10-CM

## 2021-08-16 DIAGNOSIS — I10 ESSENTIAL HYPERTENSION: ICD-10-CM

## 2021-08-16 DIAGNOSIS — Z87.19 HISTORY OF IBS: ICD-10-CM

## 2021-08-16 DIAGNOSIS — Z76.89 ENCOUNTER TO ESTABLISH CARE: Primary | ICD-10-CM

## 2021-08-16 DIAGNOSIS — Z87.828 HISTORY OF KIDNEY INJURY: ICD-10-CM

## 2021-08-16 PROCEDURE — 99213 OFFICE O/P EST LOW 20 MIN: CPT | Performed by: NURSE PRACTITIONER

## 2021-08-16 NOTE — PROGRESS NOTES
Chief Complaint  Establish Care    Subjective  Presents today to establish care. Had weight loss surgery 6/4/2019 and is starting to gain weight back. She has lived in [place] and recently moved back so she is needing to get in with specialist for her kidneys, GI issues, pain medication, as well as getting in with the doctor who did her weight loss surgery.  Dr. Sunshine. Niraj bergeron, dr. sylvia singh, dr karin Ferris presents to [facility]  HPI: establish care    Hypertension  This is a chronic problem. The current episode started more than 1 year ago. The problem is unchanged. The problem is controlled. Pertinent negatives include no chest pain. Risk factors for coronary artery disease include obesity and dyslipidemia. Past treatments include ACE inhibitors. Current antihypertension treatment includes ACE inhibitors. The current treatment provides mild improvement.   GI Problem  The primary symptoms include fatigue, diarrhea, myalgias and arthralgias. Primary symptoms do not include nausea or dysuria. The problem has not changed since onset.  The myalgias are not associated with weakness.   The illness is also significant for bloating. The illness does not include chills, constipation or back pain.   Obesity  This is a recurrent problem. The current episode started more than 1 year ago. The problem has been waxing and waning. Associated symptoms include arthralgias, fatigue and myalgias. Pertinent negatives include no chest pain, chills, congestion, coughing, nausea, sore throat or weakness. The symptoms are aggravated by eating. Treatments tried: bariatric surgery  The treatment provided moderate relief.       Review of Systems   Constitutional: Positive for fatigue. Negative for activity change, appetite change and chills.   HENT: Negative for congestion, ear pain, sore throat and trouble swallowing.    Eyes: Negative for discharge, itching and  "visual disturbance.   Respiratory: Negative for apnea, cough and wheezing.    Cardiovascular: Negative for chest pain and leg swelling.   Gastrointestinal: Positive for bloating and diarrhea. Negative for abdominal distention, constipation and nausea.   Endocrine: Negative for cold intolerance, heat intolerance and polyuria.   Genitourinary: Negative for dysuria, frequency and urgency.   Musculoskeletal: Positive for arthralgias and myalgias. Negative for back pain.   Skin: Negative for color change, pallor and wound.   Neurological: Negative for dizziness, seizures, syncope, weakness and light-headedness.   Psychiatric/Behavioral: Negative for agitation, confusion and sleep disturbance. The patient is not nervous/anxious.          Objective   Vital Signs:   /90 (BP Location: Left arm)   Pulse 61   Ht 162.6 cm (64\")   Wt 96.5 kg (212 lb 11.2 oz)   SpO2 98%   BMI 36.51 kg/m²     Physical Exam  Vitals and nursing note reviewed.   Constitutional:       Appearance: She is well-developed. She is obese.   HENT:      Head: Normocephalic and atraumatic.   Eyes:      General: Lids are normal.      Conjunctiva/sclera: Conjunctivae normal.   Neck:      Thyroid: No thyroid mass or thyromegaly.      Trachea: Trachea normal. No tracheal tenderness.   Cardiovascular:      Rate and Rhythm: Normal rate.      Pulses: Normal pulses.      Heart sounds: Normal heart sounds.   Pulmonary:      Effort: Pulmonary effort is normal. No respiratory distress.      Breath sounds: Normal breath sounds. No wheezing.   Abdominal:      General: There is no distension.      Palpations: Abdomen is soft. There is no mass.   Musculoskeletal:         General: Normal range of motion.      Cervical back: Normal range of motion. No edema.   Lymphadenopathy:      Head:      Right side of head: No submental, submandibular or tonsillar adenopathy.      Left side of head: No submental, submandibular or tonsillar adenopathy.   Skin:     General: Skin " is warm and dry.      Coloration: Skin is not pale.      Findings: No abrasion, erythema or lesion.   Neurological:      Mental Status: She is alert and oriented to person, place, and time.   Psychiatric:         Mood and Affect: Mood is not anxious. Affect is not inappropriate.         Speech: Speech normal.         Behavior: Behavior normal.         Thought Content: Thought content normal.         Judgment: Judgment normal. Judgment is not impulsive.        Result Review :                 Assessment and Plan    Diagnoses and all orders for this visit:    1. Encounter to establish care (Primary)    2. Class 2 severe obesity due to excess calories with serious comorbidity and body mass index (BMI) of 36.0 to 36.9 in adult (CMS/[facility])  -     Ambulatory Referral to Bariatric Surgery  -     Liraglutide (SAXENDA) 18 MG/3ML injection pen; Inject 0.6mg under skin daily for week one, THEN 1.2mg daily for week two, THEN 1.8mg daily for week three, then 2.4mg daily for week four.  Dispense: 3 pen; Refill: 1    3. Essential hypertension    4. Chronic back pain, unspecified back location, unspecified back pain laterality  -     Ambulatory Referral to Pain Management    5. H/O bariatric surgery  -     Ambulatory Referral to Bariatric Surgery    6. History of kidney injury  -     Ambulatory Referral to Nephrology    7. History of IBS  -     Ambulatory Referral to Gastroenterology      Continue all current medications     Following pain management  for chronic pain, I will send referral to our local clinic   We will continue to monitor vital signs as well as lab work    History of IBS, was previously seeing specialist before she moved out of state, I will send in new referral   History of Kidney injury- previously saw specialist, I will send in referral   Had bariatric surgery in 2019, was following with Dr. Sunshine, which is who performed her surgery, would like to continue to follow with him now that she is back home  She is having  issues with weight gain, so she would like to discuss this with him    We will start Saxenda 0.6 for weight loss  We discussed side effects of this medication as well as how to take     Please call the office if you have any issues     I spent 35 minutes caring for Maricarmen on this date of service. This time includes time spent by me in the following activities:preparing for the visit, reviewing tests, performing a medically appropriate examination and/or evaluation , counseling and educating the patient/family/caregiver, referring and communicating with other health care professionals  and documenting information in the medical record  Follow Up   Return if symptoms worsen or fail to improve, for Annual physical.  Patient was given instructions and counseling regarding her condition or for health maintenance advice. Please see specific information pulled into the AVS if appropriate.           This document has been electronically signed by MARIA TERESA Saucedo on August 17, 2021 11:29 CDT

## 2021-08-17 DIAGNOSIS — F41.8 DEPRESSION WITH ANXIETY: ICD-10-CM

## 2021-08-19 ENCOUNTER — TELEPHONE (OUTPATIENT)
Dept: FAMILY MEDICINE CLINIC | Facility: CLINIC | Age: 51
End: 2021-08-19

## 2021-08-19 ENCOUNTER — OFFICE VISIT (OUTPATIENT)
Dept: GASTROENTEROLOGY | Facility: CLINIC | Age: 51
End: 2021-08-19

## 2021-08-19 ENCOUNTER — OFFICE VISIT (OUTPATIENT)
Dept: FAMILY MEDICINE CLINIC | Facility: CLINIC | Age: 51
End: 2021-08-19

## 2021-08-19 VITALS
DIASTOLIC BLOOD PRESSURE: 72 MMHG | HEART RATE: 64 BPM | HEIGHT: 64 IN | BODY MASS INDEX: 36.51 KG/M2 | OXYGEN SATURATION: 98 % | SYSTOLIC BLOOD PRESSURE: 105 MMHG

## 2021-08-19 VITALS
BODY MASS INDEX: 36.26 KG/M2 | DIASTOLIC BLOOD PRESSURE: 72 MMHG | HEIGHT: 64 IN | HEART RATE: 64 BPM | SYSTOLIC BLOOD PRESSURE: 105 MMHG | WEIGHT: 212.4 LBS

## 2021-08-19 DIAGNOSIS — E87.6 HYPOKALEMIA: ICD-10-CM

## 2021-08-19 DIAGNOSIS — Z00.00 HEALTHCARE MAINTENANCE: Primary | ICD-10-CM

## 2021-08-19 DIAGNOSIS — R74.8 ELEVATED LIVER ENZYMES: ICD-10-CM

## 2021-08-19 DIAGNOSIS — R11.0 NAUSEA: ICD-10-CM

## 2021-08-19 DIAGNOSIS — F41.9 ANXIETY AND DEPRESSION: ICD-10-CM

## 2021-08-19 DIAGNOSIS — R05.9 COUGH IN ADULT: ICD-10-CM

## 2021-08-19 DIAGNOSIS — R19.7 DIARRHEA, UNSPECIFIED TYPE: ICD-10-CM

## 2021-08-19 DIAGNOSIS — F32.A ANXIETY AND DEPRESSION: ICD-10-CM

## 2021-08-19 DIAGNOSIS — R10.84 GENERALIZED ABDOMINAL PAIN: ICD-10-CM

## 2021-08-19 DIAGNOSIS — K75.81 NASH (NONALCOHOLIC STEATOHEPATITIS): Primary | ICD-10-CM

## 2021-08-19 DIAGNOSIS — J30.2 SEASONAL ALLERGIES: ICD-10-CM

## 2021-08-19 DIAGNOSIS — I10 ESSENTIAL HYPERTENSION: ICD-10-CM

## 2021-08-19 PROCEDURE — 99213 OFFICE O/P EST LOW 20 MIN: CPT | Performed by: NURSE PRACTITIONER

## 2021-08-19 PROCEDURE — 99214 OFFICE O/P EST MOD 30 MIN: CPT | Performed by: PHYSICIAN ASSISTANT

## 2021-08-19 RX ORDER — LISINOPRIL 5 MG/1
5 TABLET ORAL DAILY
Qty: 30 TABLET | Refills: 11 | Status: SHIPPED | OUTPATIENT
Start: 2021-08-19 | End: 2021-12-16

## 2021-08-19 RX ORDER — OMEPRAZOLE 20 MG/1
20 CAPSULE, DELAYED RELEASE ORAL DAILY
Qty: 30 CAPSULE | Refills: 6 | Status: SHIPPED | OUTPATIENT
Start: 2021-08-19 | End: 2022-01-19 | Stop reason: DRUGHIGH

## 2021-08-19 RX ORDER — ROPINIROLE 0.25 MG/1
0.25 TABLET, FILM COATED ORAL NIGHTLY
Qty: 30 TABLET | Refills: 11 | Status: SHIPPED | OUTPATIENT
Start: 2021-08-19 | End: 2022-08-24

## 2021-08-19 RX ORDER — ONDANSETRON 4 MG/1
4 TABLET, ORALLY DISINTEGRATING ORAL EVERY 6 HOURS PRN
Qty: 15 TABLET | Refills: 3 | Status: SHIPPED | OUTPATIENT
Start: 2021-08-19 | End: 2022-09-26

## 2021-08-19 RX ORDER — POTASSIUM CHLORIDE 20 MEQ/1
20 TABLET, EXTENDED RELEASE ORAL DAILY
Qty: 30 TABLET | Refills: 11 | Status: SHIPPED | OUTPATIENT
Start: 2021-08-19 | End: 2022-08-24

## 2021-08-19 RX ORDER — NYSTATIN 100000 [USP'U]/G
POWDER TOPICAL 3 TIMES DAILY
Qty: 60 G | Refills: 11 | Status: SHIPPED | OUTPATIENT
Start: 2021-08-19 | End: 2022-08-15

## 2021-08-19 RX ORDER — PROMETHAZINE HYDROCHLORIDE 25 MG/1
25 TABLET ORAL EVERY 8 HOURS PRN
Qty: 30 TABLET | Refills: 3 | Status: SHIPPED | OUTPATIENT
Start: 2021-08-19

## 2021-08-19 RX ORDER — GUAIFENESIN 600 MG/1
600 TABLET, EXTENDED RELEASE ORAL 2 TIMES DAILY
Qty: 60 TABLET | Refills: 11 | Status: SHIPPED | OUTPATIENT
Start: 2021-08-19 | End: 2021-11-22

## 2021-08-19 RX ORDER — FUROSEMIDE 40 MG/1
40 TABLET ORAL AS NEEDED
Qty: 30 TABLET | Refills: 11 | Status: SHIPPED | OUTPATIENT
Start: 2021-08-19 | End: 2022-07-18

## 2021-08-19 RX ORDER — NITROGLYCERIN 0.4 MG/1
0.4 TABLET SUBLINGUAL
Qty: 30 TABLET | Refills: 12 | Status: SHIPPED | OUTPATIENT
Start: 2021-08-19

## 2021-08-19 RX ORDER — SUCRALFATE 1 G/1
1 TABLET ORAL 2 TIMES DAILY
Qty: 60 TABLET | Refills: 2 | Status: SHIPPED | OUTPATIENT
Start: 2021-08-19 | End: 2021-11-18

## 2021-08-19 RX ORDER — LOPERAMIDE HYDROCHLORIDE 2 MG/1
2 CAPSULE ORAL 4 TIMES DAILY PRN
Qty: 24 CAPSULE | Refills: 3 | Status: SHIPPED | OUTPATIENT
Start: 2021-08-19

## 2021-08-19 RX ORDER — AMILORIDE HYDROCHLORIDE 5 MG/1
10 TABLET ORAL DAILY
Qty: 30 TABLET | Refills: 11 | Status: SHIPPED | OUTPATIENT
Start: 2021-08-19 | End: 2023-02-17

## 2021-08-19 RX ORDER — BUPROPION HYDROCHLORIDE 150 MG/1
150 TABLET, EXTENDED RELEASE ORAL 2 TIMES DAILY
Qty: 60 TABLET | Refills: 11 | Status: SHIPPED | OUTPATIENT
Start: 2021-08-19 | End: 2023-03-21 | Stop reason: SDUPTHER

## 2021-08-19 RX ORDER — LORATADINE 10 MG/1
10 TABLET ORAL DAILY
Qty: 30 TABLET | Refills: 11 | Status: SHIPPED | OUTPATIENT
Start: 2021-08-19 | End: 2022-07-18

## 2021-08-19 NOTE — PROGRESS NOTES
Chief Complaint  Med Refill    Subjective  Presents today for medication refills of all of her medications. [place] and is needing everything sent to her pharmacy.         Maricarmen Ferris presents to [facility]  Hypertension  This is a chronic problem. The current episode started more than 1 year ago. The problem is unchanged. The problem is controlled. Pertinent negatives include no chest pain. Risk factors for coronary artery disease include obesity and dyslipidemia. Past treatments include ACE inhibitors. Current antihypertension treatment includes ACE inhibitors. The current treatment provides mild improvement.   GI Problem  The primary symptoms include fatigue, diarrhea, myalgias and arthralgias. Primary symptoms do not include nausea or dysuria. The problem has not changed since onset.  The myalgias are not associated with weakness.   The illness is also significant for bloating. The illness does not include chills, constipation or back pain.   Obesity  This is a recurrent problem. The current episode started more than 1 year ago. The problem has been waxing and waning. Associated symptoms include arthralgias, fatigue and myalgias. Pertinent negatives include no chest pain, chills, congestion, coughing, nausea, sore throat or weakness. The symptoms are aggravated by eating. Treatments tried: bariatric surgery  The treatment provided moderate relief.       Review of Systems   Constitutional: Positive for fatigue. Negative for activity change, appetite change and chills.   HENT: Negative for congestion, ear pain, sore throat and trouble swallowing.    Eyes: Negative for discharge, itching and visual disturbance.   Respiratory: Negative for apnea, cough and wheezing.    Cardiovascular: Negative for chest pain and leg swelling.   Gastrointestinal: Positive for bloating and diarrhea. Negative for abdominal distention, constipation and nausea.  "  Endocrine: Negative for cold intolerance, heat intolerance and polyuria.   Genitourinary: Negative for dysuria, frequency and urgency.   Musculoskeletal: Positive for arthralgias and myalgias. Negative for back pain.   Skin: Negative for color change, pallor and wound.   Neurological: Negative for dizziness, seizures, syncope, weakness and light-headedness.   Psychiatric/Behavioral: Negative for agitation, confusion and sleep disturbance. The patient is not nervous/anxious.          Objective   Vital Signs:   /72 (BP Location: Left arm)   Pulse 64   Ht 162.6 cm (64\")   SpO2 98%   BMI 36.51 kg/m²     Physical Exam  Vitals and nursing note reviewed.   Constitutional:       Appearance: She is well-developed.   HENT:      Head: Normocephalic and atraumatic.   Eyes:      General: Lids are normal.      Conjunctiva/sclera: Conjunctivae normal.   Neck:      Thyroid: No thyroid mass or thyromegaly.      Trachea: Trachea normal. No tracheal tenderness.   Cardiovascular:      Rate and Rhythm: Normal rate.      Pulses: Normal pulses.      Heart sounds: Normal heart sounds.   Pulmonary:      Effort: Pulmonary effort is normal. No respiratory distress.      Breath sounds: Normal breath sounds. No wheezing.   Abdominal:      General: There is no distension.      Palpations: Abdomen is soft. There is no mass.   Musculoskeletal:         General: Normal range of motion.      Cervical back: Normal range of motion. No edema.   Lymphadenopathy:      Head:      Right side of head: No submental, submandibular or tonsillar adenopathy.      Left side of head: No submental, submandibular or tonsillar adenopathy.   Skin:     General: Skin is warm and dry.      Coloration: Skin is not pale.      Findings: No abrasion, erythema or lesion.   Neurological:      Mental Status: She is alert and oriented to person, place, and time.   Psychiatric:         Mood and Affect: Mood is not anxious. Affect is not inappropriate.         Speech: " Speech normal.         Behavior: Behavior normal.         Thought Content: Thought content normal.         Judgment: Judgment normal. Judgment is not impulsive.        Result Review :                 Assessment and Plan    Diagnoses and all orders for this visit:    1. Healthcare maintenance (Primary)  -     aMILoride (MIDAMOR) 5 MG tablet; Take 2 tablets by mouth Daily.  Dispense: 30 tablet; Refill: 11  -     buPROPion SR (WELLBUTRIN SR) 150 MG 12 hr tablet; Take 1 tablet by mouth 2 (Two) Times a Day.  Dispense: 60 tablet; Refill: 11  -     furosemide (LASIX) 40 MG tablet; Take 1 tablet by mouth As Needed (swelling).  Dispense: 30 tablet; Refill: 11  -     guaiFENesin (Mucinex) 600 MG 12 hr tablet; Take 1 tablet by mouth 2 (Two) Times a Day.  Dispense: 60 tablet; Refill: 11  -     lisinopril (PRINIVIL,ZESTRIL) 5 MG tablet; Take 1 tablet by mouth Daily.  Dispense: 30 tablet; Refill: 11  -     loperamide (IMODIUM) 2 MG capsule; Take 1 capsule by mouth 4 (Four) Times a Day As Needed for Diarrhea.  Dispense: 24 capsule; Refill: 3  -     loratadine (Claritin) 10 MG tablet; Take 1 tablet by mouth Daily.  Dispense: 30 tablet; Refill: 11  -     nitroglycerin (NITROSTAT) 0.4 MG SL tablet; Place 1 tablet under the tongue Every 5 (Five) Minutes As Needed for Chest Pain. Take no more than 3 doses in 15 minutes.  Dispense: 30 tablet; Refill: 12  -     nystatin (MYCOSTATIN) 129757 UNIT/GM powder; Apply  topically to the appropriate area as directed 3 (Three) Times a Day.  Dispense: 60 g; Refill: 11  -     omeprazole (priLOSEC) 20 MG capsule; Take 1 capsule by mouth Daily. No longer on protonix  Dispense: 30 capsule; Refill: 6  -     ondansetron ODT (ZOFRAN-ODT) 4 MG disintegrating tablet; Place 1 tablet on the tongue Every 6 (Six) Hours As Needed for Nausea or Vomiting.  Dispense: 15 tablet; Refill: 3  -     potassium chloride (K-DUR,KLOR-CON) 20 MEQ CR tablet; Take 1 tablet by mouth Daily.  Dispense: 30 tablet; Refill: 11  -      promethazine (PHENERGAN) 25 MG tablet; Take 1 tablet by mouth Every 8 (Eight) Hours As Needed for Nausea or Vomiting.  Dispense: 30 tablet; Refill: 3  -     rOPINIRole (REQUIP) 0.25 MG tablet; Take 1 tablet by mouth Every Night.  Dispense: 30 tablet; Refill: 11  -     sertraline (ZOLOFT) 50 MG tablet; Take 1 tablet by mouth Daily.  Dispense: 30 tablet; Refill: 11    2. Cough in adult  -     guaiFENesin (Mucinex) 600 MG 12 hr tablet; Take 1 tablet by mouth 2 (Two) Times a Day.  Dispense: 60 tablet; Refill: 11    3. Hypokalemia  -     potassium chloride (K-DUR,KLOR-CON) 20 MEQ CR tablet; Take 1 tablet by mouth Daily.  Dispense: 30 tablet; Refill: 11    4. Essential hypertension  -     lisinopril (PRINIVIL,ZESTRIL) 5 MG tablet; Take 1 tablet by mouth Daily.  Dispense: 30 tablet; Refill: 11    5. Seasonal allergies  -     guaiFENesin (Mucinex) 600 MG 12 hr tablet; Take 1 tablet by mouth 2 (Two) Times a Day.  Dispense: 60 tablet; Refill: 11  -     loratadine (Claritin) 10 MG tablet; Take 1 tablet by mouth Daily.  Dispense: 30 tablet; Refill: 11    6. Anxiety and depression  -     buPROPion SR (WELLBUTRIN SR) 150 MG 12 hr tablet; Take 1 tablet by mouth 2 (Two) Times a Day.  Dispense: 60 tablet; Refill: 11  -     sertraline (ZOLOFT) 50 MG tablet; Take 1 tablet by mouth Daily.  Dispense: 30 tablet; Refill: 11          Continue all current medications  Refills sent to pharmacy   Pain management referral placed in previous visit     I spent 22 minutes caring for Maricarmen on this date of service. This time includes time spent by me in the following activities:performing a medically appropriate examination and/or evaluation , ordering medications, tests, or procedures and documenting information in the medical record  Follow Up   Return if symptoms worsen or fail to improve, for Next scheduled follow up.  Patient was given instructions and counseling regarding her condition or for health maintenance advice. Please see specific  information pulled into the AVS if appropriate.           This document has been electronically signed by MARIA TERESA Saucedo on August 19, 2021 12:20 CDT

## 2021-08-27 ENCOUNTER — HOSPITAL ENCOUNTER (OUTPATIENT)
Dept: ULTRASOUND IMAGING | Facility: HOSPITAL | Age: 51
Discharge: HOME | End: 2021-08-27
Admitting: PHYSICIAN ASSISTANT

## 2021-08-27 PROCEDURE — 76705 ECHO EXAM OF ABDOMEN: CPT

## 2021-09-01 ENCOUNTER — TELEPHONE (OUTPATIENT)
Dept: PAIN MEDICINE | Facility: CLINIC | Age: 51
End: 2021-09-01

## 2021-09-07 ENCOUNTER — LAB (OUTPATIENT)
Dept: LAB | Facility: HOSPITAL | Age: 51
End: 2021-09-07

## 2021-09-07 LAB
ALPHA-FETOPROTEIN: 2.65 NG/ML (ref 0–8.3)
INR PPP: 0.89 (ref 0.8–1.2)
PROTHROMBIN TIME: 12 SECONDS (ref 11.1–15.3)

## 2021-09-07 PROCEDURE — 82947 ASSAY GLUCOSE BLOOD QUANT: CPT | Performed by: PHYSICIAN ASSISTANT

## 2021-09-07 PROCEDURE — 84460 ALANINE AMINO (ALT) (SGPT): CPT | Performed by: PHYSICIAN ASSISTANT

## 2021-09-07 PROCEDURE — 82247 BILIRUBIN TOTAL: CPT | Performed by: PHYSICIAN ASSISTANT

## 2021-09-07 PROCEDURE — 84450 TRANSFERASE (AST) (SGOT): CPT | Performed by: PHYSICIAN ASSISTANT

## 2021-09-07 PROCEDURE — 82105 ALPHA-FETOPROTEIN SERUM: CPT | Performed by: PHYSICIAN ASSISTANT

## 2021-09-07 PROCEDURE — 82172 ASSAY OF APOLIPOPROTEIN: CPT | Performed by: PHYSICIAN ASSISTANT

## 2021-09-07 PROCEDURE — 85610 PROTHROMBIN TIME: CPT | Performed by: PHYSICIAN ASSISTANT

## 2021-09-07 PROCEDURE — 84478 ASSAY OF TRIGLYCERIDES: CPT | Performed by: PHYSICIAN ASSISTANT

## 2021-09-07 PROCEDURE — 82977 ASSAY OF GGT: CPT | Performed by: PHYSICIAN ASSISTANT

## 2021-09-07 PROCEDURE — 83010 ASSAY OF HAPTOGLOBIN QUANT: CPT | Performed by: PHYSICIAN ASSISTANT

## 2021-09-07 PROCEDURE — 82465 ASSAY BLD/SERUM CHOLESTEROL: CPT | Performed by: PHYSICIAN ASSISTANT

## 2021-09-07 PROCEDURE — 83883 ASSAY NEPHELOMETRY NOT SPEC: CPT | Performed by: PHYSICIAN ASSISTANT

## 2021-09-07 PROCEDURE — 36415 COLL VENOUS BLD VENIPUNCTURE: CPT | Performed by: PHYSICIAN ASSISTANT

## 2021-09-09 LAB
A2 MACROGLOB SERPL-MCNC: 221 MG/DL (ref 110–276)
ALT SERPL W P-5'-P-CCNC: 15 IU/L (ref 0–40)
APO A-I SERPL-MCNC: 151 MG/DL (ref 116–209)
AST SERPL W P-5'-P-CCNC: 26 IU/L (ref 0–40)
BILIRUB SERPL-MCNC: 0.3 MG/DL (ref 0–1.2)
CHOLEST SERPL-MCNC: 194 MG/DL (ref 100–199)
FIBROSIS SCORING:: NORMAL
FIBROSIS STAGE SERPL QL: NORMAL
GGT SERPL-CCNC: 6 IU/L (ref 0–60)
GLUCOSE SERPL-MCNC: 88 MG/DL (ref 65–99)
HAPTOGLOB SERPL-MCNC: 61 MG/DL (ref 33–346)
INTERPRETATIONS: (REFERENCE): NORMAL
LABORATORY COMMENT REPORT: NORMAL
LIVER FIBR SCORE SERPL CALC.FIBROSURE: 0.12 (ref 0–0.21)
NASH SCORING (REFERENCE): NORMAL
NECROINFLAMMATORY ACT GRADE SERPL QL: NORMAL
NECROINFLAMMATORY ACT SCORE SERPL: 0.25
SERVICE CMNT-IMP: NORMAL
STEATOSIS GRADE (REFERENCE): NORMAL
STEATOSIS GRADING (REFERENCE): NORMAL
STEATOSIS SCORE (REFERENCE): 0.22 (ref 0–0.3)
TRIGL SERPL-MCNC: 91 MG/DL (ref 0–149)

## 2021-09-20 ENCOUNTER — OFFICE VISIT (OUTPATIENT)
Dept: GASTROENTEROLOGY | Facility: CLINIC | Age: 51
End: 2021-09-20

## 2021-09-20 VITALS
SYSTOLIC BLOOD PRESSURE: 123 MMHG | HEIGHT: 64 IN | BODY MASS INDEX: 36.57 KG/M2 | WEIGHT: 214.2 LBS | DIASTOLIC BLOOD PRESSURE: 74 MMHG | HEART RATE: 67 BPM

## 2021-09-20 DIAGNOSIS — R74.8 ELEVATED LIVER ENZYMES: ICD-10-CM

## 2021-09-20 DIAGNOSIS — R10.84 GENERALIZED ABDOMINAL PAIN: ICD-10-CM

## 2021-09-20 DIAGNOSIS — K75.81 NASH (NONALCOHOLIC STEATOHEPATITIS): Primary | ICD-10-CM

## 2021-09-20 PROCEDURE — 99214 OFFICE O/P EST MOD 30 MIN: CPT | Performed by: PHYSICIAN ASSISTANT

## 2021-10-22 ENCOUNTER — TRANSCRIBE ORDERS (OUTPATIENT)
Dept: LAB | Facility: HOSPITAL | Age: 51
End: 2021-10-22

## 2021-10-22 DIAGNOSIS — E87.6 HYPOPOTASSEMIA: ICD-10-CM

## 2021-10-22 DIAGNOSIS — N17.9 ACUTE RENAL FAILURE, UNSPECIFIED ACUTE RENAL FAILURE TYPE (HCC): Primary | ICD-10-CM

## 2021-11-18 RX ORDER — SUCRALFATE 1 G/1
TABLET ORAL
Qty: 60 TABLET | Refills: 2 | Status: SHIPPED | OUTPATIENT
Start: 2021-11-18 | End: 2022-03-14

## 2021-11-22 PROBLEM — M51.36 DEGENERATIVE DISC DISEASE, LUMBAR: Status: ACTIVE | Noted: 2020-07-14

## 2021-11-22 PROBLEM — J30.9 ALLERGIC RHINITIS: Status: ACTIVE | Noted: 2020-07-14

## 2021-11-22 PROBLEM — M47.812 CERVICAL SPONDYLOSIS WITHOUT MYELOPATHY: Status: ACTIVE | Noted: 2021-11-22

## 2021-11-22 PROBLEM — N28.9 KIDNEY DISEASE: Status: ACTIVE | Noted: 2021-10-15

## 2021-11-22 PROBLEM — M46.1 SACROILIITIS (HCC): Status: ACTIVE | Noted: 2021-11-22

## 2021-11-22 PROBLEM — N18.30 CKD (CHRONIC KIDNEY DISEASE) STAGE 3, GFR 30-59 ML/MIN (HCC): Status: ACTIVE | Noted: 2020-07-14

## 2021-11-22 PROBLEM — M54.42 CHRONIC LEFT-SIDED LOW BACK PAIN WITH LEFT-SIDED SCIATICA: Status: ACTIVE | Noted: 2021-11-22

## 2021-11-22 PROBLEM — M50.30 DEGENERATIVE DISC DISEASE, CERVICAL: Status: ACTIVE | Noted: 2020-07-14

## 2021-11-22 PROBLEM — G25.81 RESTLESS LEGS SYNDROME (RLS): Status: ACTIVE | Noted: 2020-07-14

## 2021-11-22 PROBLEM — M47.816 LUMBAR SPONDYLOSIS: Status: ACTIVE | Noted: 2021-11-22

## 2021-11-22 PROBLEM — J45.909 ASTHMA: Status: ACTIVE | Noted: 2021-10-15

## 2021-11-22 PROBLEM — G89.29 CHRONIC LEFT-SIDED LOW BACK PAIN WITH LEFT-SIDED SCIATICA: Status: ACTIVE | Noted: 2021-11-22

## 2021-11-22 PROBLEM — K90.49 MALABSORPTION DUE TO INTOLERANCE, NOT ELSEWHERE CLASSIFIED: Status: ACTIVE | Noted: 2020-07-14

## 2021-11-22 PROBLEM — M54.2 NECK PAIN: Status: ACTIVE | Noted: 2021-11-22

## 2021-11-22 PROBLEM — F41.8 DEPRESSION WITH ANXIETY: Status: ACTIVE | Noted: 2020-07-14

## 2021-11-22 PROBLEM — K21.9 GASTROESOPHAGEAL REFLUX DISEASE WITHOUT ESOPHAGITIS: Status: ACTIVE | Noted: 2020-07-14

## 2021-11-23 ENCOUNTER — TELEPHONE (OUTPATIENT)
Dept: FAMILY MEDICINE CLINIC | Facility: CLINIC | Age: 51
End: 2021-11-23

## 2021-11-23 ENCOUNTER — HOSPITAL ENCOUNTER (EMERGENCY)
Facility: HOSPITAL | Age: 51
Discharge: HOME | End: 2021-11-23
Admitting: NURSE PRACTITIONER

## 2021-11-23 VITALS
HEART RATE: 68 BPM | OXYGEN SATURATION: 94 % | SYSTOLIC BLOOD PRESSURE: 118 MMHG | TEMPERATURE: 100.4 F | DIASTOLIC BLOOD PRESSURE: 77 MMHG | RESPIRATION RATE: 18 BRPM

## 2021-11-23 PROCEDURE — 25010000002 INJECTION, CASIRIVIMAB AND IMDEVIMAB, 1200 MG: Performed by: NURSE PRACTITIONER

## 2021-11-23 PROCEDURE — 99202 OFFICE O/P NEW SF 15 MIN: CPT

## 2021-11-23 PROCEDURE — M0243 CASIRIVI AND IMDEVI INFUSION: HCPCS | Performed by: NURSE PRACTITIONER

## 2021-11-23 RX ORDER — DIPHENHYDRAMINE HYDROCHLORIDE 50 MG/ML
50 INJECTION INTRAMUSCULAR; INTRAVENOUS ONCE AS NEEDED
Status: DISCONTINUED | OUTPATIENT
Start: 2021-11-23 | End: 2021-11-23 | Stop reason: HOSPADM

## 2021-11-23 RX ORDER — METHYLPREDNISOLONE SODIUM SUCCINATE 125 MG/2ML
125 INJECTION, POWDER, LYOPHILIZED, FOR SOLUTION INTRAMUSCULAR; INTRAVENOUS ONCE AS NEEDED
Status: DISCONTINUED | OUTPATIENT
Start: 2021-11-23 | End: 2021-11-23 | Stop reason: HOSPADM

## 2021-11-23 RX ORDER — EPINEPHRINE 1 MG/ML
0.3 INJECTION, SOLUTION INTRAMUSCULAR; SUBCUTANEOUS ONCE AS NEEDED
Status: DISCONTINUED | OUTPATIENT
Start: 2021-11-23 | End: 2021-11-23 | Stop reason: HOSPADM

## 2021-11-23 RX ORDER — SODIUM CHLORIDE 9 MG/ML
30 INJECTION, SOLUTION INTRAVENOUS ONCE
Status: COMPLETED | OUTPATIENT
Start: 2021-11-23 | End: 2021-11-23

## 2021-11-23 RX ADMIN — SODIUM CHLORIDE 30 ML: 9 INJECTION, SOLUTION INTRAVENOUS at 10:49

## 2021-11-23 RX ADMIN — CASIRIVIMAB AND IMDEVIMAB: 600; 600 INJECTION, SOLUTION, CONCENTRATE INTRAVENOUS at 10:27

## 2021-12-16 ENCOUNTER — OFFICE VISIT (OUTPATIENT)
Dept: FAMILY MEDICINE CLINIC | Facility: CLINIC | Age: 51
End: 2021-12-16

## 2021-12-16 VITALS
DIASTOLIC BLOOD PRESSURE: 76 MMHG | RESPIRATION RATE: 22 BRPM | HEART RATE: 81 BPM | HEIGHT: 64 IN | BODY MASS INDEX: 36.79 KG/M2 | OXYGEN SATURATION: 98 % | WEIGHT: 215.5 LBS | SYSTOLIC BLOOD PRESSURE: 120 MMHG

## 2021-12-16 DIAGNOSIS — I10 ESSENTIAL HYPERTENSION: Primary | ICD-10-CM

## 2021-12-16 DIAGNOSIS — H66.90 ACUTE OTITIS MEDIA, UNSPECIFIED OTITIS MEDIA TYPE: ICD-10-CM

## 2021-12-16 PROCEDURE — 99213 OFFICE O/P EST LOW 20 MIN: CPT | Performed by: NURSE PRACTITIONER

## 2021-12-16 RX ORDER — AMOXICILLIN AND CLAVULANATE POTASSIUM 875; 125 MG/1; MG/1
1 TABLET, FILM COATED ORAL 2 TIMES DAILY
Qty: 10 TABLET | Refills: 0 | Status: SHIPPED | OUTPATIENT
Start: 2021-12-16 | End: 2022-02-22

## 2021-12-16 RX ORDER — GABAPENTIN 600 MG/1
600 TABLET ORAL 2 TIMES DAILY
COMMUNITY
Start: 2021-11-13

## 2021-12-16 RX ORDER — LISINOPRIL 2.5 MG/1
2.5 TABLET ORAL DAILY
Qty: 30 TABLET | Refills: 11 | Status: SHIPPED | OUTPATIENT
Start: 2021-12-16 | End: 2022-11-30

## 2021-12-16 RX ORDER — FLUTICASONE PROPIONATE 50 MCG
2 SPRAY, SUSPENSION (ML) NASAL DAILY
Qty: 11.1 ML | Refills: 3 | Status: SHIPPED | OUTPATIENT
Start: 2021-12-16 | End: 2022-04-13

## 2021-12-16 NOTE — PROGRESS NOTES
Chief Complaint  Hypertension    Subjective          Maricamren Ferris presents to [facility] [place] for follow-up.  She has been breaking her lisinopril in half and would like to switch over to a 2.5 mg tablet since the pill sometimes crumbles when she breaks in half.  She has been having some right-sided ear discomfort, she was previously treated for an ear infection but thinks it never went away.  Earache   There is pain in the right ear. This is a recurrent problem. The current episode started 1 to 4 weeks ago. The problem occurs constantly. The problem has been waxing and waning. There has been no fever. The pain is mild. Pertinent negatives include no diarrhea. She has tried antibiotics for the symptoms. The treatment provided mild relief.     Outpatient Medications Prior to Visit   Medication Sig Dispense Refill   • albuterol sulfate  (90 Base) MCG/ACT inhaler Inhale 2 puffs Every 4 (Four) Hours As Needed for Wheezing for up to 30 days. 6 g 2   • aMILoride (MIDAMOR) 5 MG tablet Take 2 tablets by mouth Daily. 30 tablet 11   • buPROPion SR (WELLBUTRIN SR) 150 MG 12 hr tablet Take 1 tablet by mouth 2 (Two) Times a Day. 60 tablet 11   • Calcium Carb-Cholecalciferol (CALCIUM 1000 + D) 1000-800 MG-UNIT tablet Take 1 tablet by mouth Daily.     • cyclobenzaprine (FLEXERIL) 10 MG tablet cyclobenzaprine 10 mg tablet   TAKE 1 TABLET BY MOUTH THREE TIMES A DAY AS NEEDED FOR MUSCLE SPASMS     • furosemide (LASIX) 40 MG tablet Take 1 tablet by mouth As Needed (swelling). 30 tablet 11   • gabapentin (NEURONTIN) 600 MG tablet      • Liraglutide (SAXENDA) 18 MG/3ML injection pen Inject 0.6mg under skin daily for week one, THEN 1.2mg daily for week two, THEN 1.8mg daily for week three, then 2.4mg daily for week four. 3 pen 1   • loperamide (IMODIUM) 2 MG capsule Take 1 capsule by mouth 4 (Four) Times a Day As Needed for Diarrhea. 24 capsule 3   • loratadine (Claritin)  10 MG tablet Take 1 tablet by mouth Daily. 30 tablet 11   • meclizine (ANTIVERT) 25 MG tablet meclizine 25 mg tablet   TAKE 1 TABLET BY MOUTH THREE TIMES A DAY AS NEEDED FOR DIZZINESS     • nitroglycerin (NITROSTAT) 0.4 MG SL tablet Place 1 tablet under the tongue Every 5 (Five) Minutes As Needed for Chest Pain. Take no more than 3 doses in 15 minutes. 30 tablet 12   • nystatin (MYCOSTATIN) 796308 UNIT/GM powder Apply  topically to the appropriate area as directed 3 (Three) Times a Day. 60 g 11   • omeprazole (priLOSEC) 20 MG capsule Take 1 capsule by mouth Daily. No longer on protonix 30 capsule 6   • ondansetron ODT (ZOFRAN-ODT) 4 MG disintegrating tablet Place 1 tablet on the tongue Every 6 (Six) Hours As Needed for Nausea or Vomiting. 15 tablet 3   • potassium chloride (K-DUR,KLOR-CON) 20 MEQ CR tablet Take 1 tablet by mouth Daily. 30 tablet 11   • promethazine (PHENERGAN) 25 MG tablet Take 1 tablet by mouth Every 8 (Eight) Hours As Needed for Nausea or Vomiting. 30 tablet 3   • promethazine-dextromethorphan (PROMETHAZINE-DM) 6.25-15 MG/5ML syrup Take 5 mL by mouth 4 (Four) Times a Day As Needed for Cough. Take one teaspoon qhs prn cough 120 mL 0   • rOPINIRole (REQUIP) 0.25 MG tablet Take 1 tablet by mouth Every Night. 30 tablet 11   • sertraline (ZOLOFT) 50 MG tablet Take 1 tablet by mouth Daily. 30 tablet 11   • sucralfate (CARAFATE) 1 g tablet TAKE 1 TABLET BY MOUTH TWICE A DAY 60 tablet 2   • lisinopril (PRINIVIL,ZESTRIL) 5 MG tablet Take 1 tablet by mouth Daily. 30 tablet 11   • gabapentin (NEURONTIN) 100 MG capsule Take 600 mg by mouth 3 (Three) Times a Day.       No facility-administered medications prior to visit.       Review of Systems   Constitutional: Negative for activity change and appetite change.   HENT: Positive for ear pain. Negative for trouble swallowing.    Eyes: Negative for discharge, itching and visual disturbance.   Respiratory: Negative for apnea and wheezing.    Cardiovascular:  "Negative for leg swelling.   Gastrointestinal: Negative for abdominal distention, constipation and diarrhea.   Endocrine: Negative for cold intolerance, heat intolerance and polyuria.   Genitourinary: Negative for dysuria, frequency and urgency.   Musculoskeletal: Negative for back pain.   Skin: Negative for color change, pallor and wound.   Neurological: Negative for dizziness, seizures, syncope and light-headedness.   Psychiatric/Behavioral: Negative for agitation, confusion and sleep disturbance. The patient is not nervous/anxious.          Objective   Vital Signs:   Visit Vitals  /76 (BP Location: Left arm, Patient Position: Sitting, Cuff Size: Large Adult)   Pulse 81   Resp 22   Ht 162.6 cm (64\")   Wt 97.8 kg (215 lb 8 oz)   SpO2 98%   BMI 36.99 kg/m²     Physical Exam  Vitals and nursing note reviewed.   Constitutional:       Appearance: She is well-developed.   HENT:      Head: Normocephalic and atraumatic.      Right Ear: Tympanic membrane is erythematous and bulging.   Eyes:      General: Lids are normal.      Conjunctiva/sclera: Conjunctivae normal.   Neck:      Thyroid: No thyroid mass or thyromegaly.      Trachea: Trachea normal. No tracheal tenderness.   Cardiovascular:      Rate and Rhythm: Normal rate.      Pulses: Normal pulses.      Heart sounds: Normal heart sounds.   Pulmonary:      Effort: Pulmonary effort is normal. No respiratory distress.      Breath sounds: Normal breath sounds. No wheezing.   Abdominal:      General: There is no distension.      Palpations: Abdomen is soft. There is no mass.   Musculoskeletal:         General: Normal range of motion.      Cervical back: Normal range of motion. No edema.   Lymphadenopathy:      Head:      Right side of head: No submental, submandibular or tonsillar adenopathy.      Left side of head: No submental, submandibular or tonsillar adenopathy.   Skin:     General: Skin is warm and dry.      Coloration: Skin is not pale.      Findings: No " abrasion, erythema or lesion.   Neurological:      Mental Status: She is alert and oriented to person, place, and time.   Psychiatric:         Mood and Affect: Mood is not anxious. Affect is not inappropriate.         Speech: Speech normal.         Behavior: Behavior normal.         Thought Content: Thought content normal.         Judgment: Judgment normal. Judgment is not impulsive.        Result Review :                 Assessment and Plan    Diagnoses and all orders for this visit:    1. Essential hypertension (Primary)  -     lisinopril (PRINIVIL,ZESTRIL) 2.5 MG tablet; Take 1 tablet by mouth Daily.  Dispense: 30 tablet; Refill: 11    2. Acute otitis media, unspecified otitis media type  -     fluticasone (Flonase) 50 MCG/ACT nasal spray; 2 sprays into the nostril(s) as directed by provider Daily.  Dispense: 11.1 mL; Refill: 3  -     amoxicillin-clavulanate (Augmentin) 875-125 MG per tablet; Take 1 tablet by mouth 2 (Two) Times a Day.  Dispense: 10 tablet; Refill: 0      Continue current medication, we will decrease Lisinopril to 2.5mg     Start Augmentin for Otitis media  May also use flonase two times daily    Please call the office if you have any issues      I spent 25 minutes caring for Maricarmen on this date of service. This time includes time spent by me in the following activities:preparing for the visit, performing a medically appropriate examination and/or evaluation , counseling and educating the patient/family/caregiver, ordering medications, tests, or procedures and documenting information in the medical record  Follow Up   Return in about 6 months (around 6/16/2022), or if symptoms worsen or fail to improve, for Recheck.  Patient was given instructions and counseling regarding her condition or for health maintenance advice. Please see specific information pulled into the AVS if appropriate.           This document has been electronically signed by MARIA TERESA Saucedo on December 16, 2021 09:50 CST

## 2021-12-20 ENCOUNTER — OFFICE VISIT (OUTPATIENT)
Dept: FAMILY MEDICINE CLINIC | Facility: CLINIC | Age: 51
End: 2021-12-20

## 2021-12-20 VITALS
HEART RATE: 77 BPM | DIASTOLIC BLOOD PRESSURE: 74 MMHG | SYSTOLIC BLOOD PRESSURE: 118 MMHG | OXYGEN SATURATION: 99 % | HEIGHT: 64 IN | WEIGHT: 213 LBS | BODY MASS INDEX: 36.37 KG/M2 | RESPIRATION RATE: 22 BRPM

## 2021-12-20 DIAGNOSIS — T17.1XXA FOREIGN BODY IN NOSE, INITIAL ENCOUNTER: Primary | ICD-10-CM

## 2021-12-20 PROCEDURE — 30300 REMOVE NASAL FOREIGN BODY: CPT | Performed by: NURSE PRACTITIONER

## 2021-12-20 PROCEDURE — 99213 OFFICE O/P EST LOW 20 MIN: CPT | Performed by: NURSE PRACTITIONER

## 2021-12-20 RX ORDER — LISINOPRIL 5 MG/1
TABLET ORAL
COMMUNITY
Start: 2021-12-19 | End: 2022-07-20

## 2021-12-20 RX ORDER — HYDROCODONE BITARTRATE AND ACETAMINOPHEN 7.5; 325 MG/1; MG/1
TABLET ORAL
COMMUNITY
Start: 2021-10-21 | End: 2022-09-13

## 2021-12-20 NOTE — PROGRESS NOTES
Chief Complaint  Foreign Body in Nose    Subjective          Maricarmen Ferris presents to [facility] [place] with possible artificial nail stuck in her nose. Patient has recently got her nose pierced and seems to mess with piercing in the middle of the night while sleeping, when she woke up she realized her nail was missing and possibly stuck in her nose.   Foreign Body in Nose  The incident occurred 2 days ago. Suspected object: fake finger nial  The foreign body is suspected to be in the left nostril. The incident was suspected. The incident was witnessed/reported by the patient. Risk factors include that an object was missing. Pertinent negatives include no chest pain, congestion, cough, sore throat, trouble swallowing or wheezing.     Outpatient Medications Prior to Visit   Medication Sig Dispense Refill   • albuterol sulfate  (90 Base) MCG/ACT inhaler Inhale 2 puffs Every 4 (Four) Hours As Needed for Wheezing for up to 30 days. 6 g 2   • aMILoride (MIDAMOR) 5 MG tablet Take 2 tablets by mouth Daily. 30 tablet 11   • amoxicillin-clavulanate (Augmentin) 875-125 MG per tablet Take 1 tablet by mouth 2 (Two) Times a Day. 10 tablet 0   • buPROPion SR (WELLBUTRIN SR) 150 MG 12 hr tablet Take 1 tablet by mouth 2 (Two) Times a Day. 60 tablet 11   • Calcium Carb-Cholecalciferol (CALCIUM 1000 + D) 1000-800 MG-UNIT tablet Take 1 tablet by mouth Daily.     • cyclobenzaprine (FLEXERIL) 10 MG tablet cyclobenzaprine 10 mg tablet   TAKE 1 TABLET BY MOUTH THREE TIMES A DAY AS NEEDED FOR MUSCLE SPASMS     • fluticasone (Flonase) 50 MCG/ACT nasal spray 2 sprays into the nostril(s) as directed by provider Daily. 11.1 mL 3   • furosemide (LASIX) 40 MG tablet Take 1 tablet by mouth As Needed (swelling). 30 tablet 11   • gabapentin (NEURONTIN) 600 MG tablet      • HYDROcodone-acetaminophen (NORCO) 7.5-325 MG per tablet      • Liraglutide (SAXENDA) 18 MG/3ML injection pen  Inject 0.6mg under skin daily for week one, THEN 1.2mg daily for week two, THEN 1.8mg daily for week three, then 2.4mg daily for week four. 3 pen 1   • lisinopril (PRINIVIL,ZESTRIL) 2.5 MG tablet Take 1 tablet by mouth Daily. 30 tablet 11   • lisinopril (PRINIVIL,ZESTRIL) 5 MG tablet      • loperamide (IMODIUM) 2 MG capsule Take 1 capsule by mouth 4 (Four) Times a Day As Needed for Diarrhea. 24 capsule 3   • loratadine (Claritin) 10 MG tablet Take 1 tablet by mouth Daily. 30 tablet 11   • meclizine (ANTIVERT) 25 MG tablet meclizine 25 mg tablet   TAKE 1 TABLET BY MOUTH THREE TIMES A DAY AS NEEDED FOR DIZZINESS     • nitroglycerin (NITROSTAT) 0.4 MG SL tablet Place 1 tablet under the tongue Every 5 (Five) Minutes As Needed for Chest Pain. Take no more than 3 doses in 15 minutes. 30 tablet 12   • nystatin (MYCOSTATIN) 171942 UNIT/GM powder Apply  topically to the appropriate area as directed 3 (Three) Times a Day. 60 g 11   • omeprazole (priLOSEC) 20 MG capsule Take 1 capsule by mouth Daily. No longer on protonix 30 capsule 6   • ondansetron ODT (ZOFRAN-ODT) 4 MG disintegrating tablet Place 1 tablet on the tongue Every 6 (Six) Hours As Needed for Nausea or Vomiting. 15 tablet 3   • potassium chloride (K-DUR,KLOR-CON) 20 MEQ CR tablet Take 1 tablet by mouth Daily. 30 tablet 11   • promethazine (PHENERGAN) 25 MG tablet Take 1 tablet by mouth Every 8 (Eight) Hours As Needed for Nausea or Vomiting. 30 tablet 3   • promethazine-dextromethorphan (PROMETHAZINE-DM) 6.25-15 MG/5ML syrup Take 5 mL by mouth 4 (Four) Times a Day As Needed for Cough. Take one teaspoon qhs prn cough 120 mL 0   • rOPINIRole (REQUIP) 0.25 MG tablet Take 1 tablet by mouth Every Night. 30 tablet 11   • sertraline (ZOLOFT) 50 MG tablet Take 1 tablet by mouth Daily. 30 tablet 11   • sucralfate (CARAFATE) 1 g tablet TAKE 1 TABLET BY MOUTH TWICE A DAY 60 tablet 2     No facility-administered medications prior to visit.       Review of Systems  "  Constitutional: Negative for activity change, appetite change and chills.   HENT: Negative for congestion, ear pain, sore throat and trouble swallowing.    Eyes: Negative for discharge, itching and visual disturbance.   Respiratory: Negative for apnea, cough and wheezing.    Cardiovascular: Negative for chest pain and leg swelling.   Gastrointestinal: Negative for abdominal distention, constipation, diarrhea and nausea.   Endocrine: Negative for cold intolerance, heat intolerance and polyuria.   Genitourinary: Negative for dysuria, frequency and urgency.   Musculoskeletal: Negative for arthralgias, back pain and myalgias.   Skin: Negative for color change, pallor and wound.   Neurological: Negative for dizziness, seizures, syncope, weakness and light-headedness.   Psychiatric/Behavioral: Negative for agitation, confusion and sleep disturbance. The patient is not nervous/anxious.          Objective   Vital Signs:   Visit Vitals  /74 (BP Location: Left arm, Patient Position: Sitting, Cuff Size: Large Adult)   Pulse 77   Resp 22   Ht 162.6 cm (64\")   Wt 96.6 kg (213 lb)   SpO2 99%   BMI 36.56 kg/m²     Physical Exam  Vitals and nursing note reviewed.   Constitutional:       Appearance: She is well-developed.   HENT:      Head: Normocephalic and atraumatic.      Nose:      Left Nostril: Foreign body present.   Eyes:      General: Lids are normal.      Conjunctiva/sclera: Conjunctivae normal.   Neck:      Thyroid: No thyroid mass or thyromegaly.      Trachea: Trachea normal. No tracheal tenderness.   Cardiovascular:      Rate and Rhythm: Normal rate.      Pulses: Normal pulses.      Heart sounds: Normal heart sounds.   Pulmonary:      Effort: Pulmonary effort is normal. No respiratory distress.      Breath sounds: Normal breath sounds. No wheezing.   Abdominal:      General: There is no distension.      Palpations: Abdomen is soft. There is no mass.   Musculoskeletal:         General: Normal range of motion.      " Cervical back: Normal range of motion. No edema.   Lymphadenopathy:      Head:      Right side of head: No submental, submandibular or tonsillar adenopathy.      Left side of head: No submental, submandibular or tonsillar adenopathy.   Skin:     General: Skin is warm and dry.      Coloration: Skin is not pale.      Findings: No abrasion, erythema or lesion.   Neurological:      Mental Status: She is alert and oriented to person, place, and time.   Psychiatric:         Mood and Affect: Mood is not anxious. Affect is not inappropriate.         Speech: Speech normal.         Behavior: Behavior normal.         Thought Content: Thought content normal.         Judgment: Judgment normal. Judgment is not impulsive.        Result Review :          Foreign Body Removal    Date/Time: 12/20/2021 4:09 PM  Performed by: Lesli Lobo APRN  Authorized by: Lesli Lobo APRN   Body area: nose    Sedation:  Patient sedated: no    Patient restrained: no  Localization method: nasal speculum and visualized  Removal mechanism: alligator forceps  Complexity: simple  1 objects recovered.  Objects recovered: fake finger nail   Post-procedure assessment: foreign body removed  Patient tolerance: patient tolerated the procedure well with no immediate complications          Assessment and Plan    Diagnoses and all orders for this visit:    1. Foreign body in nose, initial encounter (Primary)  -     Foreign Body Removal      Artificial finger nail visualized in left side nostril.   Alligator forceps used to remove nail   Patient tolerated well, no signs of infection    Advised to not use those types of fingernails, or remove nose ring to prevent her from messing with nose ring     Nail obtained and in specimen jar    Please call the office if you have any issues    I spent 21 minutes caring for Maricarmen on this date of service. This time includes time spent by me in the following activities:preparing for the visit, performing a medically  appropriate examination and/or evaluation , counseling and educating the patient/family/caregiver and documenting information in the medical record  Follow Up   Return if symptoms worsen or fail to improve, for Next scheduled follow up.  Patient was given instructions and counseling regarding her condition or for health maintenance advice. Please see specific information pulled into the AVS if appropriate.           This document has been electronically signed by MARIA TERESA Saucedo on December 20, 2021 16:14 CST

## 2021-12-30 ENCOUNTER — LAB (OUTPATIENT)
Dept: LAB | Facility: HOSPITAL | Age: 51
End: 2021-12-30

## 2021-12-30 PROCEDURE — 84156 ASSAY OF PROTEIN URINE: CPT | Performed by: INTERNAL MEDICINE

## 2021-12-30 PROCEDURE — 36415 COLL VENOUS BLD VENIPUNCTURE: CPT | Performed by: INTERNAL MEDICINE

## 2021-12-30 PROCEDURE — 83735 ASSAY OF MAGNESIUM: CPT | Performed by: INTERNAL MEDICINE

## 2021-12-30 PROCEDURE — 80069 RENAL FUNCTION PANEL: CPT | Performed by: INTERNAL MEDICINE

## 2021-12-30 PROCEDURE — 82570 ASSAY OF URINE CREATININE: CPT | Performed by: INTERNAL MEDICINE

## 2021-12-31 LAB
ALBUMIN SERPL-MCNC: 3.8 G/DL (ref 3.5–5.2)
ANION GAP SERPL CALCULATED.3IONS-SCNC: 10 MMOL/L (ref 5–15)
BUN SERPL-MCNC: 15 MG/DL (ref 6–20)
BUN/CREAT SERPL: 22.7 (ref 7–25)
CALCIUM SPEC-SCNC: 9 MG/DL (ref 8.6–10.5)
CHLORIDE SERPL-SCNC: 102 MMOL/L (ref 98–107)
CO2 SERPL-SCNC: 27 MMOL/L (ref 22–29)
CREAT SERPL-MCNC: 0.66 MG/DL (ref 0.57–1)
CREAT UR-MCNC: 123.6 MG/DL
GFR SERPL CREATININE-BSD FRML MDRD: 94 ML/MIN/1.73
GLUCOSE SERPL-MCNC: 150 MG/DL (ref 65–99)
MAGNESIUM SERPL-MCNC: 2 MG/DL (ref 1.6–2.6)
PHOSPHATE SERPL-MCNC: 3.3 MG/DL (ref 2.5–4.5)
POTASSIUM SERPL-SCNC: 3.6 MMOL/L (ref 3.5–5.2)
PROT ?TM UR-MCNC: 14 MG/DL
PROT/CREAT UR: 113.3 MG/G CREA (ref 0–200)
SODIUM SERPL-SCNC: 139 MMOL/L (ref 136–145)

## 2022-01-18 ENCOUNTER — TELEPHONE (OUTPATIENT)
Dept: GASTROENTEROLOGY | Facility: CLINIC | Age: 52
End: 2022-01-18

## 2022-01-18 ENCOUNTER — LAB (OUTPATIENT)
Dept: LAB | Facility: HOSPITAL | Age: 52
End: 2022-01-18

## 2022-01-18 DIAGNOSIS — R74.8 ELEVATED LIVER ENZYMES: ICD-10-CM

## 2022-01-18 DIAGNOSIS — K75.81 NASH (NONALCOHOLIC STEATOHEPATITIS): ICD-10-CM

## 2022-01-18 LAB
INR PPP: 0.94 (ref 0.8–1.2)
PROTHROMBIN TIME: 12.5 SECONDS (ref 11.1–15.3)

## 2022-01-18 PROCEDURE — 80053 COMPREHEN METABOLIC PANEL: CPT

## 2022-01-18 PROCEDURE — 36415 COLL VENOUS BLD VENIPUNCTURE: CPT

## 2022-01-18 PROCEDURE — 85610 PROTHROMBIN TIME: CPT

## 2022-01-18 NOTE — TELEPHONE ENCOUNTER
01/18/2022, 1018 - Patient telephoned per this staff member [phone] with notification of need to complete laboratory testing as soon as possible to ensure results are available for review with patient during clinical appointment with Carlo Colindres PA-C tomorrow, Wednesday, January 19, 2022 at 3:00 P.M.  Patient verbalized understanding and stated she will complete laboratory testing shortly.

## 2022-01-19 ENCOUNTER — OFFICE VISIT (OUTPATIENT)
Dept: GASTROENTEROLOGY | Facility: CLINIC | Age: 52
End: 2022-01-19

## 2022-01-19 VITALS
HEART RATE: 62 BPM | BODY MASS INDEX: 36.7 KG/M2 | WEIGHT: 215 LBS | SYSTOLIC BLOOD PRESSURE: 101 MMHG | HEIGHT: 64 IN | DIASTOLIC BLOOD PRESSURE: 68 MMHG

## 2022-01-19 DIAGNOSIS — K21.00 GASTROESOPHAGEAL REFLUX DISEASE WITH ESOPHAGITIS WITHOUT HEMORRHAGE: Primary | ICD-10-CM

## 2022-01-19 DIAGNOSIS — K75.81 NASH (NONALCOHOLIC STEATOHEPATITIS): ICD-10-CM

## 2022-01-19 DIAGNOSIS — R74.8 ELEVATED LIVER ENZYMES: ICD-10-CM

## 2022-01-19 DIAGNOSIS — R10.84 GENERALIZED ABDOMINAL PAIN: ICD-10-CM

## 2022-01-19 LAB
ALBUMIN SERPL-MCNC: 4.3 G/DL (ref 3.5–5.2)
ALBUMIN/GLOB SERPL: 1.4 G/DL
ALP SERPL-CCNC: 72 U/L (ref 39–117)
ALT SERPL W P-5'-P-CCNC: 13 U/L (ref 1–33)
ANION GAP SERPL CALCULATED.3IONS-SCNC: 11.1 MMOL/L (ref 5–15)
AST SERPL-CCNC: 21 U/L (ref 1–32)
BILIRUB SERPL-MCNC: 0.3 MG/DL (ref 0–1.2)
BUN SERPL-MCNC: 12 MG/DL (ref 6–20)
BUN/CREAT SERPL: 14.8 (ref 7–25)
CALCIUM SPEC-SCNC: 10.1 MG/DL (ref 8.6–10.5)
CHLORIDE SERPL-SCNC: 100 MMOL/L (ref 98–107)
CO2 SERPL-SCNC: 28.9 MMOL/L (ref 22–29)
CREAT SERPL-MCNC: 0.81 MG/DL (ref 0.57–1)
GFR SERPL CREATININE-BSD FRML MDRD: 75 ML/MIN/1.73
GLOBULIN UR ELPH-MCNC: 3 GM/DL
GLUCOSE SERPL-MCNC: 78 MG/DL (ref 65–99)
POTASSIUM SERPL-SCNC: 4.1 MMOL/L (ref 3.5–5.2)
PROT SERPL-MCNC: 7.3 G/DL (ref 6–8.5)
SODIUM SERPL-SCNC: 140 MMOL/L (ref 136–145)

## 2022-01-19 PROCEDURE — 99213 OFFICE O/P EST LOW 20 MIN: CPT | Performed by: PHYSICIAN ASSISTANT

## 2022-01-19 RX ORDER — OMEPRAZOLE 40 MG/1
40 CAPSULE, DELAYED RELEASE ORAL DAILY
Qty: 30 CAPSULE | Refills: 3 | Status: SHIPPED | OUTPATIENT
Start: 2022-01-19 | End: 2022-05-26

## 2022-02-08 RX ORDER — NEOMYCIN SULFATE, POLYMYXIN B SULFATE, HYDROCORTISONE 3.5; 10000; 1 MG/ML; [USP'U]/ML; MG/ML
4 SOLUTION/ DROPS AURICULAR (OTIC) 4 TIMES DAILY
Qty: 10 ML | Refills: 0 | OUTPATIENT
Start: 2022-02-08 | End: 2022-07-20

## 2022-02-22 ENCOUNTER — OFFICE VISIT (OUTPATIENT)
Dept: OBSTETRICS AND GYNECOLOGY | Facility: CLINIC | Age: 52
End: 2022-02-22

## 2022-02-22 VITALS
SYSTOLIC BLOOD PRESSURE: 124 MMHG | WEIGHT: 227 LBS | HEIGHT: 64 IN | DIASTOLIC BLOOD PRESSURE: 82 MMHG | BODY MASS INDEX: 38.76 KG/M2

## 2022-02-22 DIAGNOSIS — Z12.31 ENCOUNTER FOR SCREENING MAMMOGRAM FOR BREAST CANCER: ICD-10-CM

## 2022-02-22 DIAGNOSIS — Z01.419 ENCOUNTER FOR GYNECOLOGICAL EXAMINATION WITHOUT ABNORMAL FINDING: Primary | ICD-10-CM

## 2022-02-22 DIAGNOSIS — N76.0 ACUTE VAGINITIS: ICD-10-CM

## 2022-02-22 PROCEDURE — 99396 PREV VISIT EST AGE 40-64: CPT | Performed by: NURSE PRACTITIONER

## 2022-02-22 RX ORDER — METRONIDAZOLE 500 MG/1
500 TABLET ORAL 2 TIMES DAILY
Qty: 14 TABLET | Refills: 0 | Status: SHIPPED | OUTPATIENT
Start: 2022-02-22 | End: 2022-03-01

## 2022-02-22 RX ORDER — FLUCONAZOLE 150 MG/1
TABLET ORAL
Qty: 2 TABLET | Refills: 0 | Status: SHIPPED | OUTPATIENT
Start: 2022-02-22 | End: 2022-04-18

## 2022-02-22 NOTE — PROGRESS NOTES
Subjective   Maricarmen Ferris is a 51 y.o. here for annual exam and feels like she has a bacterial infection.    LMP- March 2015; s/p endometrial ablation  Last pap- 2019 [facility]; no hx of abnormal  Last mammo- 2019; no hx of abnormal    Gynecologic Exam  The patient's primary symptoms include a genital odor. The patient's pertinent negatives include no genital itching, genital lesions, genital rash, pelvic pain, vaginal bleeding or vaginal discharge. This is a new problem. The current episode started 1 to 4 weeks ago. The problem occurs daily. The problem has been gradually worsening. The patient is experiencing no pain. Pertinent negatives include no abdominal pain, chills, constipation, diarrhea, dysuria, fever, frequency or urgency. She is sexually active. No, her partner does not have an STD. She uses tubal ligation for contraception. She is postmenopausal.       The following portions of the patient's history were reviewed and updated as appropriate: allergies, current medications, past family history, past medical history, past social history, past surgical history and problem list.    Review of Systems   Constitutional: Negative for activity change, appetite change, chills, diaphoresis, fatigue, fever, unexpected weight gain and unexpected weight loss.   Respiratory: Negative for chest tightness and shortness of breath.    Cardiovascular: Negative for chest pain and palpitations.   Gastrointestinal: Negative for abdominal distention, abdominal pain, constipation and diarrhea.   Genitourinary: Negative for breast discharge, breast lump, breast pain, decreased libido, decreased urine volume, difficulty urinating, dyspareunia, dysuria, frequency, pelvic pain, pelvic pressure, urgency, urinary incontinence, vaginal bleeding, vaginal discharge and vaginal pain.        +odor   Musculoskeletal: Negative for myalgias.   Skin: Negative for color change, dry skin and skin lesions.   Neurological: Negative for  light-headedness and headache.   Psychiatric/Behavioral: Negative for agitation, dysphoric mood, sleep disturbance, depressed mood and stress. The patient is not nervous/anxious.        Objective   Physical Exam  Vitals and nursing note reviewed.   Constitutional:       General: She is awake. She is not in acute distress.     Appearance: Normal appearance. She is well-developed and well-groomed. She is not ill-appearing, toxic-appearing or diaphoretic.   Neck:      Thyroid: No thyroid mass, thyromegaly or thyroid tenderness.   Cardiovascular:      Rate and Rhythm: Normal rate and regular rhythm.      Heart sounds: Normal heart sounds.   Pulmonary:      Effort: Pulmonary effort is normal.      Breath sounds: Normal breath sounds.   Chest:   Breasts:      Crispin Score is 5. Breasts are symmetrical.      Right: Normal. No swelling, bleeding, inverted nipple, mass, nipple discharge, skin change, tenderness, axillary adenopathy or supraclavicular adenopathy.      Left: Normal. No swelling, bleeding, inverted nipple, mass, nipple discharge, skin change, tenderness, axillary adenopathy or supraclavicular adenopathy.       Abdominal:      General: Bowel sounds are normal. There is no distension.      Palpations: Abdomen is soft.      Tenderness: There is no abdominal tenderness.   Genitourinary:     General: Normal vulva.      Exam position: Lithotomy position.      Crispin stage (genital): 5.      Labia:         Right: No rash, tenderness, lesion or injury.         Left: No rash, tenderness, lesion or injury.       Urethra: No prolapse, urethral pain, urethral swelling or urethral lesion.      Vagina: Normal.      Cervix: Normal.      Uterus: Normal.       Adnexa: Right adnexa normal and left adnexa normal.        Right: No mass, tenderness or fullness.          Left: No mass, tenderness or fullness.        Comments: Pap obtained. Exam limited 2/2 body habitus.  Lymphadenopathy:      Upper Body:      Right upper body: No  supraclavicular, axillary or pectoral adenopathy.      Left upper body: No supraclavicular, axillary or pectoral adenopathy.      Lower Body: No right inguinal adenopathy. No left inguinal adenopathy.   Skin:     General: Skin is warm and dry.   Neurological:      Mental Status: She is alert and oriented to person, place, and time.      Gait: Gait is intact.   Psychiatric:         Attention and Perception: Attention and perception normal.         Mood and Affect: Mood and affect normal.         Speech: Speech normal.         Behavior: Behavior normal. Behavior is cooperative.           Assessment/Plan   Diagnoses and all orders for this visit:    1. Encounter for gynecological examination without abnormal finding (Primary)  -     Liquid-based Pap Smear, Screening    2. Encounter for screening mammogram for breast cancer  -     Mammo Screening Digital Tomosynthesis Bilateral With CAD; Future    3. Acute vaginitis    Other orders  -     metroNIDAZOLE (Flagyl) 500 MG tablet; Take 1 tablet by mouth 2 (Two) Times a Day for 7 days.  Dispense: 14 tablet; Refill: 0  -     fluconazole (Diflucan) 150 MG tablet; Take 1 tablet by mouth today and repeat in 4 days.  Dispense: 2 tablet; Refill: 0          Reviewed cervical and breast cancer screening and RBA and potential s/e of medications.

## 2022-02-24 LAB
LAB AP CASE REPORT: NORMAL
PATH INTERP SPEC-IMP: NORMAL

## 2022-03-14 RX ORDER — SUCRALFATE 1 G/1
TABLET ORAL
Qty: 60 TABLET | Refills: 1 | Status: SHIPPED | OUTPATIENT
Start: 2022-03-14 | End: 2022-05-12

## 2022-04-13 DIAGNOSIS — H66.90 ACUTE OTITIS MEDIA, UNSPECIFIED OTITIS MEDIA TYPE: ICD-10-CM

## 2022-04-13 RX ORDER — FLUTICASONE PROPIONATE 50 MCG
SPRAY, SUSPENSION (ML) NASAL
Qty: 16 ML | Refills: 3 | Status: SHIPPED | OUTPATIENT
Start: 2022-04-13 | End: 2022-06-10 | Stop reason: SDUPTHER

## 2022-04-18 ENCOUNTER — TRANSCRIBE ORDERS (OUTPATIENT)
Dept: LAB | Facility: HOSPITAL | Age: 52
End: 2022-04-18

## 2022-04-18 DIAGNOSIS — N17.9 ACUTE RENAL FAILURE, UNSPECIFIED ACUTE RENAL FAILURE TYPE: Primary | ICD-10-CM

## 2022-05-12 RX ORDER — SUCRALFATE 1 G/1
TABLET ORAL
Qty: 60 TABLET | Refills: 0 | OUTPATIENT
Start: 2022-05-12 | End: 2022-07-20

## 2022-05-26 RX ORDER — OMEPRAZOLE 40 MG/1
CAPSULE, DELAYED RELEASE ORAL
Qty: 30 CAPSULE | Refills: 0 | Status: SHIPPED | OUTPATIENT
Start: 2022-05-26 | End: 2022-08-15 | Stop reason: SDUPTHER

## 2022-06-09 RX ORDER — SUCRALFATE 1 G/1
TABLET ORAL
Qty: 1 TABLET | Refills: 0 | OUTPATIENT
Start: 2022-06-09

## 2022-06-21 ENCOUNTER — APPOINTMENT (OUTPATIENT)
Dept: GENERAL RADIOLOGY | Facility: HOSPITAL | Age: 52
End: 2022-06-21

## 2022-06-21 ENCOUNTER — HOSPITAL ENCOUNTER (EMERGENCY)
Facility: HOSPITAL | Age: 52
Discharge: HOME | End: 2022-06-21
Attending: STUDENT IN AN ORGANIZED HEALTH CARE EDUCATION/TRAINING PROGRAM | Admitting: STUDENT IN AN ORGANIZED HEALTH CARE EDUCATION/TRAINING PROGRAM

## 2022-06-21 VITALS
HEART RATE: 67 BPM | TEMPERATURE: 98.4 F | SYSTOLIC BLOOD PRESSURE: 152 MMHG | WEIGHT: 217 LBS | OXYGEN SATURATION: 98 % | DIASTOLIC BLOOD PRESSURE: 70 MMHG | HEIGHT: 64 IN | BODY MASS INDEX: 37.05 KG/M2 | RESPIRATION RATE: 18 BRPM

## 2022-06-21 DIAGNOSIS — R10.13 EPIGASTRIC PAIN: Primary | ICD-10-CM

## 2022-06-21 LAB
ALBUMIN SERPL-MCNC: 3.6 G/DL (ref 3.5–5.2)
ALBUMIN/GLOB SERPL: 1.2 G/DL
ALP SERPL-CCNC: 83 U/L (ref 39–117)
ALT SERPL W P-5'-P-CCNC: 38 U/L (ref 1–33)
ANION GAP SERPL CALCULATED.3IONS-SCNC: 7 MMOL/L (ref 5–15)
AST SERPL-CCNC: 34 U/L (ref 1–32)
BACTERIA UR QL AUTO: ABNORMAL /HPF
BASOPHILS # BLD AUTO: 0.03 10*3/MM3 (ref 0–0.2)
BASOPHILS NFR BLD AUTO: 0.3 % (ref 0–1.5)
BILIRUB SERPL-MCNC: 0.2 MG/DL (ref 0–1.2)
BILIRUB UR QL STRIP: NEGATIVE
BUN SERPL-MCNC: 22 MG/DL (ref 6–20)
BUN/CREAT SERPL: 27.2 (ref 7–25)
CALCIUM SPEC-SCNC: 8.8 MG/DL (ref 8.6–10.5)
CHLORIDE SERPL-SCNC: 104 MMOL/L (ref 98–107)
CLARITY UR: CLEAR
CO2 SERPL-SCNC: 28 MMOL/L (ref 22–29)
COLOR UR: YELLOW
CREAT SERPL-MCNC: 0.81 MG/DL (ref 0.57–1)
DEPRECATED RDW RBC AUTO: 41.4 FL (ref 37–54)
EGFRCR SERPLBLD CKD-EPI 2021: 88 ML/MIN/1.73
EOSINOPHIL # BLD AUTO: 0.16 10*3/MM3 (ref 0–0.4)
EOSINOPHIL NFR BLD AUTO: 1.6 % (ref 0.3–6.2)
ERYTHROCYTE [DISTWIDTH] IN BLOOD BY AUTOMATED COUNT: 14 % (ref 12.3–15.4)
FLUAV RNA RESP QL NAA+PROBE: NOT DETECTED
FLUBV RNA RESP QL NAA+PROBE: NOT DETECTED
GLOBULIN UR ELPH-MCNC: 2.9 GM/DL
GLUCOSE SERPL-MCNC: 95 MG/DL (ref 65–99)
GLUCOSE UR STRIP-MCNC: NEGATIVE MG/DL
HCG SERPL QL: NEGATIVE
HCT VFR BLD AUTO: 40.5 % (ref 34–46.6)
HGB BLD-MCNC: 13.6 G/DL (ref 12–15.9)
HGB UR QL STRIP.AUTO: NEGATIVE
HOLD SPECIMEN: NORMAL
HYALINE CASTS UR QL AUTO: ABNORMAL /LPF
IMM GRANULOCYTES # BLD AUTO: 0.02 10*3/MM3 (ref 0–0.05)
IMM GRANULOCYTES NFR BLD AUTO: 0.2 % (ref 0–0.5)
KETONES UR QL STRIP: ABNORMAL
LEUKOCYTE ESTERASE UR QL STRIP.AUTO: ABNORMAL
LYMPHOCYTES # BLD AUTO: 2.88 10*3/MM3 (ref 0.7–3.1)
LYMPHOCYTES NFR BLD AUTO: 28.5 % (ref 19.6–45.3)
MCH RBC QN AUTO: 28 PG (ref 26.6–33)
MCHC RBC AUTO-ENTMCNC: 33.6 G/DL (ref 31.5–35.7)
MCV RBC AUTO: 83.3 FL (ref 79–97)
MONOCYTES # BLD AUTO: 0.86 10*3/MM3 (ref 0.1–0.9)
MONOCYTES NFR BLD AUTO: 8.5 % (ref 5–12)
NEUTROPHILS NFR BLD AUTO: 6.17 10*3/MM3 (ref 1.7–7)
NEUTROPHILS NFR BLD AUTO: 60.9 % (ref 42.7–76)
NITRITE UR QL STRIP: NEGATIVE
NRBC BLD AUTO-RTO: 0 /100 WBC (ref 0–0.2)
NT-PROBNP SERPL-MCNC: 78.9 PG/ML (ref 0–900)
PH UR STRIP.AUTO: 5.5 [PH] (ref 5–9)
PLATELET # BLD AUTO: 299 10*3/MM3 (ref 140–450)
PMV BLD AUTO: 10.1 FL (ref 6–12)
POTASSIUM SERPL-SCNC: 3.8 MMOL/L (ref 3.5–5.2)
PROT SERPL-MCNC: 6.5 G/DL (ref 6–8.5)
PROT UR QL STRIP: NEGATIVE
QT INTERVAL: 410 MS
QT INTERVAL: 422 MS
QTC INTERVAL: 435 MS
QTC INTERVAL: 448 MS
RBC # BLD AUTO: 4.86 10*6/MM3 (ref 3.77–5.28)
RBC # UR STRIP: ABNORMAL /HPF
REF LAB TEST METHOD: ABNORMAL
SARS-COV-2 RNA RESP QL NAA+PROBE: NOT DETECTED
SODIUM SERPL-SCNC: 139 MMOL/L (ref 136–145)
SP GR UR STRIP: 1.03 (ref 1–1.03)
SQUAMOUS #/AREA URNS HPF: ABNORMAL /HPF
TROPONIN T SERPL-MCNC: <0.01 NG/ML (ref 0–0.03)
UROBILINOGEN UR QL STRIP: ABNORMAL
WBC # UR STRIP: ABNORMAL /HPF
WBC NRBC COR # BLD: 10.12 10*3/MM3 (ref 3.4–10.8)
WHOLE BLOOD HOLD COAG: NORMAL
WHOLE BLOOD HOLD SPECIMEN: NORMAL

## 2022-06-21 PROCEDURE — 80053 COMPREHEN METABOLIC PANEL: CPT

## 2022-06-21 PROCEDURE — 71045 X-RAY EXAM CHEST 1 VIEW: CPT

## 2022-06-21 PROCEDURE — 99284 EMERGENCY DEPT VISIT MOD MDM: CPT

## 2022-06-21 PROCEDURE — 93005 ELECTROCARDIOGRAM TRACING: CPT

## 2022-06-21 PROCEDURE — 81001 URINALYSIS AUTO W/SCOPE: CPT

## 2022-06-21 PROCEDURE — 87636 SARSCOV2 & INF A&B AMP PRB: CPT

## 2022-06-21 PROCEDURE — 85025 COMPLETE CBC W/AUTO DIFF WBC: CPT

## 2022-06-21 PROCEDURE — 84703 CHORIONIC GONADOTROPIN ASSAY: CPT

## 2022-06-21 PROCEDURE — 93005 ELECTROCARDIOGRAM TRACING: CPT | Performed by: STUDENT IN AN ORGANIZED HEALTH CARE EDUCATION/TRAINING PROGRAM

## 2022-06-21 PROCEDURE — 93010 ELECTROCARDIOGRAM REPORT: CPT | Performed by: INTERNAL MEDICINE

## 2022-06-21 PROCEDURE — 84484 ASSAY OF TROPONIN QUANT: CPT

## 2022-06-21 PROCEDURE — 83880 ASSAY OF NATRIURETIC PEPTIDE: CPT

## 2022-06-21 RX ORDER — SODIUM CHLORIDE 0.9 % (FLUSH) 0.9 %
10 SYRINGE (ML) INJECTION AS NEEDED
Status: DISCONTINUED | OUTPATIENT
Start: 2022-06-21 | End: 2022-06-21 | Stop reason: HOSPADM

## 2022-06-21 RX ADMIN — SODIUM CHLORIDE 1000 ML: 9 INJECTION, SOLUTION INTRAVENOUS at 18:37

## 2022-06-21 NOTE — ED NOTES
"Pt c/o n/v/d/chestpain/shortness of breath x4 days. States she is coughing up green sputum. Worst complaint per pt is \"leg cramps waking me up\".   "

## 2022-06-21 NOTE — ED PROVIDER NOTES
Subjective   52 y/o female with CMH of MELBA, HDL, HTN, IBS who presents to the ED c/o chest pain for the past 3 days. Pain is localized on the midsternal/epigastric area, does not radiate, and is throbbing/stabing in nature. She reports chills, productive cough with greenish sputum, nausea, and various episode on NBNB vomiting, watery diarrhea, myalgias, and leg cramps. She denies any fevers, shortness of breath. Patient reports that she was taking care of her grandkids about a week ago which had a URI infection.          Review of Systems   Constitutional: Positive for chills and fatigue. Negative for fever.   HENT: Positive for ear pain. Negative for postnasal drip, rhinorrhea, sinus pressure, sinus pain, sneezing, sore throat and trouble swallowing.    Respiratory: Negative for chest tightness, shortness of breath and wheezing.    Cardiovascular: Positive for chest pain. Negative for palpitations and leg swelling.   Gastrointestinal: Positive for diarrhea, nausea and vomiting. Negative for abdominal pain.   Genitourinary: Negative for dysuria, frequency and urgency.   Musculoskeletal: Positive for myalgias.   Skin: Negative for rash.       Past Medical History:   Diagnosis Date   • Acid reflux    • Anxiety    • Depressed    • Dysphagia    • GERD (gastroesophageal reflux disease)    • Hard to intubate    • Hyperlipidemia    • Hypertension    • Hypokalemia    • IBS (irritable bowel syndrome)    • Nausea    • Sleep apnea        Allergies   Allergen Reactions   • Sulfa Antibiotics Anaphylaxis and GI Intolerance       Past Surgical History:   Procedure Laterality Date   • ABDOMINAL SURGERY     • CARDIAC CATHETERIZATION N/A 8/13/2018    Procedure: Left Heart Cath 8/13/2018 @ 9;00;  Surgeon: Kuldip Frank MD;  Location: [facility] CATH INVASIVE LOCATION;  Service: Cardiology   • COLONOSCOPY N/A 11/19/2018    Procedure: COLONOSCOPY;  Surgeon: Bro Whitaker MD;  Location: [facility] ENDOSCOPY;  Service: Gastroenterology   •  CYSTOSCOPY     • ENDOMETRIAL ABLATION  2015   • ENDOSCOPY N/A 11/19/2018    Procedure: ESOPHAGOGASTRODUODENOSCOPYwith dilation;  Surgeon: Bro Whitaker MD;  Location: [facility] ENDOSCOPY;  Service: Gastroenterology   • GASTRIC SLEEVE LAPAROSCOPIC     • LAPAROSCOPIC TUBAL LIGATION  1995   • MA ERCP DX COLLECTION SPECIMEN BRUSHING/WASHING Left 7/31/2017    Procedure: ENDOSCOPIC RETROGRADE CHOLANGIOPANCREATOGRAPHY;  Surgeon: Samuel Redding DO;  Location: [facility] ENDOSCOPY;  Service: Gastroenterology   • MA LAP,CHOLECYSTECTOMY N/A 7/24/2017    Procedure: CHOLECYSTECTOMY LAPAROSCOPIC, also umbillical hernia repair;  Surgeon: Pk العلي MD;  Location: [facility] OR;  Service: General   • UPPER GASTROINTESTINAL ENDOSCOPY  11/19/2018       Family History   Problem Relation Age of Onset   • Diabetes Mother    • Hypertension Mother    • Cirrhosis Mother    • Hypertension Father        Social History     Socioeconomic History   • Marital status:    Tobacco Use   • Smoking status: Never Smoker   • Smokeless tobacco: Never Used   Vaping Use   • Vaping Use: Never used   Substance and Sexual Activity   • Alcohol use: Yes     Comment: socially   • Drug use: No   • Sexual activity: Defer     Birth control/protection: Surgical           Objective   Physical Exam  Vitals reviewed.   Constitutional:       General: She is not in acute distress.     Appearance: She is ill-appearing. She is not toxic-appearing.   HENT:      Head: Normocephalic and atraumatic.   Eyes:      Extraocular Movements: Extraocular movements intact.      Pupils: Pupils are equal, round, and reactive to light.   Cardiovascular:      Rate and Rhythm: Normal rate.      Heart sounds: No murmur heard.  Pulmonary:      Effort: Pulmonary effort is normal. No tachypnea or respiratory distress.      Breath sounds: Normal breath sounds.   Abdominal:      Palpations: Abdomen is soft.      Comments: Mild epigastric tenderness    Skin:     General: Skin is warm and  dry.      Capillary Refill: Capillary refill takes 2 to 3 seconds.   Neurological:      Mental Status: She is alert and oriented to person, place, and time.         ECG 12 Lead      Date/Time: 6/21/2022 6:40 PM  Performed by: Sarah Flores MD  Authorized by: Emergency, Triage Protocol, MD   Interpreted by physician  Comparison: compared with previous ECG from 6/30/2021  Rhythm: sinus rhythm  Clinical impression: normal ECG    ECG 12 Lead      Date/Time: 6/21/2022 6:42 PM  Performed by: Sarah Flores MD  Authorized by: Sukhdeep Altman MD   Interpreted by physician  Comparison: compared with previous ECG from 6/21/2021  Rhythm: sinus bradycardia  Ectopy: PVCs  Clinical impression: abnormal ECG                 ED Course      Results for orders placed or performed during the hospital encounter of 06/21/22   COVID-19 and FLU A/B PCR - Swab, Nasopharynx    Specimen: Nasopharynx; Swab   Result Value Ref Range    COVID19 Not Detected Not Detected - Ref. Range    Influenza A PCR Not Detected Not Detected    Influenza B PCR Not Detected Not Detected   Troponin    Specimen: Blood   Result Value Ref Range    Troponin T <0.010 0.000 - 0.030 ng/mL   Comprehensive Metabolic Panel    Specimen: Blood   Result Value Ref Range    Glucose 95 65 - 99 mg/dL    BUN 22 (H) 6 - 20 mg/dL    Creatinine 0.81 0.57 - 1.00 mg/dL    Sodium 139 136 - 145 mmol/L    Potassium 3.8 3.5 - 5.2 mmol/L    Chloride 104 98 - 107 mmol/L    CO2 28.0 22.0 - 29.0 mmol/L    Calcium 8.8 8.6 - 10.5 mg/dL    Total Protein 6.5 6.0 - 8.5 g/dL    Albumin 3.60 3.50 - 5.20 g/dL    ALT (SGPT) 38 (H) 1 - 33 U/L    AST (SGOT) 34 (H) 1 - 32 U/L    Alkaline Phosphatase 83 39 - 117 U/L    Total Bilirubin 0.2 0.0 - 1.2 mg/dL    Globulin 2.9 gm/dL    A/G Ratio 1.2 g/dL    BUN/Creatinine Ratio 27.2 (H) 7.0 - 25.0    Anion Gap 7.0 5.0 - 15.0 mmol/L    eGFR 88.0 >60.0 mL/min/1.73   BNP    Specimen: Blood   Result Value Ref Range    proBNP 78.9 0.0 - 900.0 pg/mL   hCG,  Serum, Qualitative    Specimen: Blood   Result Value Ref Range    HCG Qualitative Negative Negative   CBC Auto Differential    Specimen: Blood   Result Value Ref Range    WBC 10.12 3.40 - 10.80 10*3/mm3    RBC 4.86 3.77 - 5.28 10*6/mm3    Hemoglobin 13.6 12.0 - 15.9 g/dL    Hematocrit 40.5 34.0 - 46.6 %    MCV 83.3 79.0 - 97.0 fL    MCH 28.0 26.6 - 33.0 pg    MCHC 33.6 31.5 - 35.7 g/dL    RDW 14.0 12.3 - 15.4 %    RDW-SD 41.4 37.0 - 54.0 fl    MPV 10.1 6.0 - 12.0 fL    Platelets 299 140 - 450 10*3/mm3    Neutrophil % 60.9 42.7 - 76.0 %    Lymphocyte % 28.5 19.6 - 45.3 %    Monocyte % 8.5 5.0 - 12.0 %    Eosinophil % 1.6 0.3 - 6.2 %    Basophil % 0.3 0.0 - 1.5 %    Immature Grans % 0.2 0.0 - 0.5 %    Neutrophils, Absolute 6.17 1.70 - 7.00 10*3/mm3    Lymphocytes, Absolute 2.88 0.70 - 3.10 10*3/mm3    Monocytes, Absolute 0.86 0.10 - 0.90 10*3/mm3    Eosinophils, Absolute 0.16 0.00 - 0.40 10*3/mm3    Basophils, Absolute 0.03 0.00 - 0.20 10*3/mm3    Immature Grans, Absolute 0.02 0.00 - 0.05 10*3/mm3    nRBC 0.0 0.0 - 0.2 /100 WBC   Urinalysis With Microscopic If Indicated (No Culture) - Urine, Clean Catch    Specimen: Urine, Clean Catch   Result Value Ref Range    Color, UA Yellow Yellow, Straw, Dark Yellow, Angela    Appearance, UA Clear Clear    pH, UA 5.5 5.0 - 9.0    Specific Gravity, UA 1.028 1.003 - 1.030    Glucose, UA Negative Negative    Ketones, UA Trace (A) Negative    Bilirubin, UA Negative Negative    Blood, UA Negative Negative    Protein, UA Negative Negative    Leuk Esterase, UA Small (1+) (A) Negative    Nitrite, UA Negative Negative    Urobilinogen, UA 1.0 E.U./dL 0.2 - 1.0 E.U./dL   Urinalysis, Microscopic Only - Urine, Clean Catch    Specimen: Urine, Clean Catch   Result Value Ref Range    RBC, UA None Seen None Seen /HPF    WBC, UA 3-5 None Seen, 0-2, 3-5 /HPF    Bacteria, UA 2+ (A) None Seen /HPF    Squamous Epithelial Cells, UA 3-5 (A) None Seen, 0-2 /HPF    Hyaline Casts, UA None Seen None Seen  /LPF    Methodology Manual Light Microscopy    Green Top (Gel)   Result Value Ref Range    Extra Tube Hold for add-ons.    Lavender Top   Result Value Ref Range    Extra Tube hold for add-on    Light Blue Top   Result Value Ref Range    Extra Tube Hold for add-ons.              XR Chest 1 View    Result Date: 6/21/2022  CONCLUSION: The lungs are clear of an acute process. Minimal to moderate elevation of the right hemidiaphragm. [id number] Electronically signed by:  Christiano Enciso MD  6/21/2022 6:08 PM CDT Workstation: [id number]                             MDM  Number of Diagnoses or Management Options  Epigastric pain  Diagnosis management comments: Patient in presented with chest pain. Pain was localized on the epigastric area. Blood work was remarkable for dehydration. Patient discharge on stable condition        Amount and/or Complexity of Data Reviewed  Clinical lab tests: ordered and reviewed  Tests in the radiology section of CPT®: ordered and reviewed          Final diagnoses:   Epigastric pain       ED Disposition  ED Disposition     ED Disposition   Discharge    Condition   Stable    Comment   --             Lesli Lobo, APRN  [address] [place] [address] [phone]    In 3 days           Medication List      No changes were made to your prescriptions during this visit.          Sarah Flores MD  Resident  06/21/22 [id number]

## 2022-06-22 ENCOUNTER — OFFICE VISIT (OUTPATIENT)
Dept: FAMILY MEDICINE CLINIC | Facility: CLINIC | Age: 52
End: 2022-06-22

## 2022-06-22 VITALS
HEIGHT: 64 IN | WEIGHT: 216.4 LBS | BODY MASS INDEX: 36.95 KG/M2 | HEART RATE: 68 BPM | OXYGEN SATURATION: 96 % | SYSTOLIC BLOOD PRESSURE: 142 MMHG | DIASTOLIC BLOOD PRESSURE: 88 MMHG

## 2022-06-22 DIAGNOSIS — F32.A DEPRESSION, UNSPECIFIED DEPRESSION TYPE: Primary | ICD-10-CM

## 2022-06-22 DIAGNOSIS — J06.9 UPPER RESPIRATORY TRACT INFECTION, UNSPECIFIED TYPE: ICD-10-CM

## 2022-06-22 DIAGNOSIS — R05.9 COUGH: ICD-10-CM

## 2022-06-22 PROCEDURE — 99214 OFFICE O/P EST MOD 30 MIN: CPT | Performed by: NURSE PRACTITIONER

## 2022-06-22 RX ORDER — DEXTROMETHORPHAN HYDROBROMIDE AND PROMETHAZINE HYDROCHLORIDE 15; 6.25 MG/5ML; MG/5ML
SYRUP ORAL
Qty: 120 ML | Refills: 0 | Status: SHIPPED | OUTPATIENT
Start: 2022-06-22 | End: 2022-09-14

## 2022-06-22 RX ORDER — AZITHROMYCIN 250 MG/1
TABLET, FILM COATED ORAL
Qty: 6 TABLET | Refills: 0 | OUTPATIENT
Start: 2022-06-22 | End: 2022-07-20

## 2022-06-22 RX ORDER — FLUCONAZOLE 150 MG/1
150 TABLET ORAL ONCE
Qty: 1 TABLET | Refills: 0 | Status: SHIPPED | OUTPATIENT
Start: 2022-06-22 | End: 2022-06-22

## 2022-06-22 NOTE — PROGRESS NOTES
Chief Complaint  Hypertension (6 mo f/u )    Subjective          Maricarmen Ferris presents to [facility] with sinus problem that has been going on for a few weeks and not getting better. Her mother that she lived with and helped care for recently passed and she has been feeling very depressed, crying, and feels lost. She is very upset today in the office, discusses spending increased time with her grandchildren to help get her mind off her mother, but doesn't work very well.   Sinusitis  This is a new problem. The current episode started in the past 7 days. The problem has been gradually worsening since onset. Associated symptoms include congestion, coughing, headaches and sinus pressure. Past treatments include nothing. The treatment provided no relief.   Depression  Visit Type: follow-up  Patient presents with the following symptoms: chest pain, depressed mood, fatigue, feelings of hopelessness, insomnia and nervousness/anxiety.  Frequency of symptoms: constantly   Severity: interfering with daily activities   Sleep quality: poor  Side effects:  Insomnia    Outpatient Medications Prior to Visit   Medication Sig Dispense Refill   • aMILoride (MIDAMOR) 5 MG tablet Take 2 tablets by mouth Daily. 30 tablet 11   • buPROPion SR (WELLBUTRIN SR) 150 MG 12 hr tablet Take 1 tablet by mouth 2 (Two) Times a Day. 60 tablet 11   • Calcium Carb-Cholecalciferol 1000-800 MG-UNIT tablet Take 1 tablet by mouth Daily.     • fluticasone (FLONASE) 50 MCG/ACT nasal spray 2 sprays into the nostril(s) as directed by provider Daily. 16 mL 0   • furosemide (LASIX) 40 MG tablet Take 1 tablet by mouth As Needed (swelling). 30 tablet 11   • gabapentin (NEURONTIN) 600 MG tablet      • HYDROcodone-acetaminophen (NORCO) 7.5-325 MG per tablet      • Liraglutide (SAXENDA) 18 MG/3ML injection pen Inject 0.6mg under skin daily for week one, THEN 1.2mg daily for week two, THEN 1.8mg daily  for week three, then 2.4mg daily for week four. 3 pen 1   • lisinopril (PRINIVIL,ZESTRIL) 2.5 MG tablet Take 1 tablet by mouth Daily. 30 tablet 11   • lisinopril (PRINIVIL,ZESTRIL) 5 MG tablet      • loperamide (IMODIUM) 2 MG capsule Take 1 capsule by mouth 4 (Four) Times a Day As Needed for Diarrhea. 24 capsule 3   • loratadine (Claritin) 10 MG tablet Take 1 tablet by mouth Daily. 30 tablet 11   • meclizine (ANTIVERT) 25 MG tablet meclizine 25 mg tablet   TAKE 1 TABLET BY MOUTH THREE TIMES A DAY AS NEEDED FOR DIZZINESS     • neomycin-polymyxin-hydrocortisone (CORTISPORIN) 1 % solution otic solution Administer 4 drops to the right ear 4 (Four) Times a Day. 10 mL 0   • nitroglycerin (NITROSTAT) 0.4 MG SL tablet Place 1 tablet under the tongue Every 5 (Five) Minutes As Needed for Chest Pain. Take no more than 3 doses in 15 minutes. 30 tablet 12   • nystatin (MYCOSTATIN) 157923 UNIT/GM powder Apply  topically to the appropriate area as directed 3 (Three) Times a Day. 60 g 11   • omeprazole (priLOSEC) 40 MG capsule TAKE 1 CAPSULE BY MOUTH EVERY DAY 30 capsule 0   • ondansetron ODT (ZOFRAN-ODT) 4 MG disintegrating tablet Place 1 tablet on the tongue Every 6 (Six) Hours As Needed for Nausea or Vomiting. 15 tablet 3   • potassium chloride (K-DUR,KLOR-CON) 20 MEQ CR tablet Take 1 tablet by mouth Daily. 30 tablet 11   • promethazine (PHENERGAN) 25 MG tablet Take 1 tablet by mouth Every 8 (Eight) Hours As Needed for Nausea or Vomiting. 30 tablet 3   • rOPINIRole (REQUIP) 0.25 MG tablet Take 1 tablet by mouth Every Night. 30 tablet 11   • sertraline (ZOLOFT) 50 MG tablet Take 1 tablet by mouth Daily. 30 tablet 11   • sucralfate (CARAFATE) 1 g tablet TAKE 1 TABLET BY MOUTH TWICE A DAY 60 tablet 0   • traMADol (ULTRAM) 50 MG tablet Take 50 mg by mouth Every 6 (Six) Hours As Needed for Moderate Pain .     • Vandazole 0.75 % vaginal gel USE 1 APPLICATOR VAGINALLY TWICE A DAY FOR 5 DAYS     • promethazine-dextromethorphan  "(PROMETHAZINE-DM) 6.25-15 MG/5ML syrup Take one teaspoon qhs prn cough 120 mL 0     No facility-administered medications prior to visit.       Review of Systems   HENT: Positive for congestion and sinus pressure.    Respiratory: Positive for cough.    Neurological: Positive for headaches.   Psychiatric/Behavioral: The patient is nervous/anxious and has insomnia.          Objective   Vital Signs:   Visit Vitals  /88   Pulse 68   Ht 162.6 cm (64\")   Wt 98.2 kg (216 lb 6.4 oz)   SpO2 96%   BMI 37.14 kg/m²     Physical Exam  Vitals and nursing note reviewed.   Constitutional:       Appearance: She is well-developed.   HENT:      Head: Normocephalic and atraumatic.      Nose: Congestion present.      Right Sinus: Frontal sinus tenderness present.      Left Sinus: Frontal sinus tenderness present.   Eyes:      General: Lids are normal.      Conjunctiva/sclera: Conjunctivae normal.   Neck:      Thyroid: No thyroid mass or thyromegaly.      Trachea: Trachea normal. No tracheal tenderness.   Cardiovascular:      Rate and Rhythm: Normal rate.      Pulses: Normal pulses.      Heart sounds: Normal heart sounds.   Pulmonary:      Effort: Pulmonary effort is normal. No respiratory distress.      Breath sounds: Normal breath sounds. No wheezing.   Abdominal:      General: There is no distension.      Palpations: Abdomen is soft. There is no mass.   Musculoskeletal:         General: Normal range of motion.      Cervical back: Normal range of motion. No edema.   Lymphadenopathy:      Head:      Right side of head: No submental, submandibular or tonsillar adenopathy.      Left side of head: No submental, submandibular or tonsillar adenopathy.   Skin:     General: Skin is warm and dry.      Coloration: Skin is not pale.      Findings: No abrasion, erythema or lesion.   Neurological:      Mental Status: She is alert and oriented to person, place, and time.   Psychiatric:         Mood and Affect: Mood is depressed. Mood is not " anxious. Affect is tearful. Affect is not inappropriate.         Speech: Speech normal.         Behavior: Behavior normal.         Thought Content: Thought content normal.         Judgment: Judgment normal. Judgment is not impulsive.        Result Review :                 Assessment and Plan    Diagnoses and all orders for this visit:    1. Depression, unspecified depression type (Primary)  -     Ambulatory Referral to Psychiatry    2. Cough  -     promethazine-dextromethorphan (PROMETHAZINE-DM) 6.25-15 MG/5ML syrup; Take one teaspoon qhs prn cough  Dispense: 120 mL; Refill: 0    3. Upper respiratory tract infection, unspecified type  -     promethazine-dextromethorphan (PROMETHAZINE-DM) 6.25-15 MG/5ML syrup; Take one teaspoon qhs prn cough  Dispense: 120 mL; Refill: 0  -     fluconazole (Diflucan) 150 MG tablet; Take 1 tablet by mouth 1 (One) Time for 1 dose.  Dispense: 1 tablet; Refill: 0  -     azithromycin (Zithromax Z-Cortez) 250 MG tablet; Take 2 tablets by mouth on day 1, then 1 tablet daily on days 2-5  Dispense: 6 tablet; Refill: 0      Continue current medications, we will keep all doses of antidepressants/antianxiety mediation the same until she speaks with therapist   She will go to [facility] for counseling, and help with grief     Increase water intake,   Take flonase as well as mucinex     Please call the office if you have any issues         Follow Up   Return in about 3 months (around 9/22/2022), or if symptoms worsen or fail to improve.  Patient was given instructions and counseling regarding her condition or for health maintenance advice. Please see specific information pulled into the AVS if appropriate.           This document has been electronically signed by MARIA TERESA Saucedo on June 23, 2022 12:52 CDT

## 2022-07-13 DIAGNOSIS — R25.2 MUSCLE CRAMPS: Primary | ICD-10-CM

## 2022-07-13 RX ORDER — CYCLOBENZAPRINE HCL 10 MG
10 TABLET ORAL 3 TIMES DAILY PRN
Qty: 30 TABLET | Refills: 0 | Status: SHIPPED | OUTPATIENT
Start: 2022-07-13 | End: 2022-08-02 | Stop reason: SDUPTHER

## 2022-07-18 ENCOUNTER — TRANSCRIBE ORDERS (OUTPATIENT)
Dept: LAB | Facility: HOSPITAL | Age: 52
End: 2022-07-18

## 2022-07-18 DIAGNOSIS — Z00.00 HEALTHCARE MAINTENANCE: ICD-10-CM

## 2022-07-18 DIAGNOSIS — N17.9 ACUTE RENAL FAILURE, UNSPECIFIED ACUTE RENAL FAILURE TYPE: Primary | ICD-10-CM

## 2022-07-18 DIAGNOSIS — J30.2 SEASONAL ALLERGIES: ICD-10-CM

## 2022-07-18 RX ORDER — FUROSEMIDE 40 MG/1
40 TABLET ORAL AS NEEDED
Qty: 30 TABLET | Refills: 11 | Status: SHIPPED | OUTPATIENT
Start: 2022-07-18

## 2022-07-18 RX ORDER — LORATADINE 10 MG/1
TABLET ORAL
Qty: 30 TABLET | Refills: 11 | Status: SHIPPED | OUTPATIENT
Start: 2022-07-18

## 2022-07-20 DIAGNOSIS — F41.9 ANXIETY AND DEPRESSION: ICD-10-CM

## 2022-07-20 DIAGNOSIS — F32.A ANXIETY AND DEPRESSION: ICD-10-CM

## 2022-07-20 DIAGNOSIS — Z00.00 HEALTHCARE MAINTENANCE: ICD-10-CM

## 2022-07-26 ENCOUNTER — TELEPHONE (OUTPATIENT)
Dept: FAMILY MEDICINE CLINIC | Facility: CLINIC | Age: 52
End: 2022-07-26

## 2022-07-26 DIAGNOSIS — M25.562 CHRONIC PAIN OF LEFT KNEE: Primary | ICD-10-CM

## 2022-07-26 DIAGNOSIS — G89.29 CHRONIC PAIN OF LEFT KNEE: Primary | ICD-10-CM

## 2022-07-27 DIAGNOSIS — M25.562 ACUTE PAIN OF LEFT KNEE: Primary | ICD-10-CM

## 2022-08-02 ENCOUNTER — OFFICE VISIT (OUTPATIENT)
Dept: ORTHOPEDIC SURGERY | Facility: CLINIC | Age: 52
End: 2022-08-02

## 2022-08-02 VITALS — WEIGHT: 217.3 LBS | BODY MASS INDEX: 37.1 KG/M2 | HEIGHT: 64 IN

## 2022-08-02 DIAGNOSIS — M23.92 INTERNAL DERANGEMENT OF LEFT KNEE: ICD-10-CM

## 2022-08-02 DIAGNOSIS — R25.2 MUSCLE CRAMPS: ICD-10-CM

## 2022-08-02 DIAGNOSIS — M25.562 ACUTE PAIN OF LEFT KNEE: Primary | ICD-10-CM

## 2022-08-02 PROCEDURE — 20610 DRAIN/INJ JOINT/BURSA W/O US: CPT | Performed by: NURSE PRACTITIONER

## 2022-08-02 PROCEDURE — 99215 OFFICE O/P EST HI 40 MIN: CPT | Performed by: NURSE PRACTITIONER

## 2022-08-02 RX ORDER — CYCLOBENZAPRINE HCL 10 MG
10 TABLET ORAL 3 TIMES DAILY PRN
Qty: 30 TABLET | Refills: 0 | Status: SHIPPED | OUTPATIENT
Start: 2022-08-02 | End: 2022-09-13

## 2022-08-02 RX ORDER — HYDROCODONE BITARTRATE AND ACETAMINOPHEN 5; 325 MG/1; MG/1
1 TABLET ORAL EVERY 6 HOURS PRN
COMMUNITY
End: 2022-12-13 | Stop reason: HOSPADM

## 2022-08-02 RX ADMIN — TRIAMCINOLONE ACETONIDE 40 MG: 40 INJECTION, SUSPENSION INTRA-ARTICULAR; INTRAMUSCULAR at 09:59

## 2022-08-02 RX ADMIN — BUPIVACAINE HYDROCHLORIDE 2 ML: 5 INJECTION, SOLUTION PERINEURAL at 09:59

## 2022-08-02 NOTE — PROGRESS NOTES
Maricarmen Ferris is a 51 y.o. female   Primary provider:  Lesli Lobo APRN       Chief Complaint   Patient presents with   • Left Knee - Initial Evaluation, Pain       HISTORY OF PRESENT ILLNESS:    Mrs. Ferris is a 51-year-old female who presents today with complaints of left knee pain.  Left knee pain has been present for at least 2 weeks.  She got her foot time while stepping down into her pool.  This caused her knee to twist.  Since then she has moderate intermittent pain.  Pain is described as burning.  Pain is in her medial knee.  Pain is associated with bruising and swelling that have been resolving since injury.  Standing, sitting, driving, walking aggravate pain.  She has tried rice therapy and OTC medications which have helped some.  Patient has chronic kidney disease and therefore avoids NSAIDs.  She denies locking and buckling but does report catching and occasional popping.  She has pain with rolling over in bed at night.  She has issues squatting.      Knee Pain   The incident occurred more than 1 week ago. The incident occurred at the pool. The injury mechanism was a twisting injury. The pain is present in the left knee. The quality of the pain is described as burning. The pain is at a severity of 4/10. The pain is moderate. The pain has been intermittent since onset. The symptoms are aggravated by movement and weight bearing. She has tried NSAIDs, ice and heat for the symptoms.        CONCURRENT MEDICAL HISTORY:    Past Medical History:   Diagnosis Date   • Acid reflux    • Anxiety    • Depressed    • Dysphagia    • GERD (gastroesophageal reflux disease)    • Hard to intubate    • Hyperlipidemia    • Hypertension    • Hypokalemia    • IBS (irritable bowel syndrome)    • Nausea    • Sleep apnea        Allergies   Allergen Reactions   • Sulfa Antibiotics Anaphylaxis and GI Intolerance         Current Outpatient Medications:   •  buPROPion SR (WELLBUTRIN SR) 150 MG 12 hr tablet, Take 1 tablet  by mouth 2 (Two) Times a Day., Disp: 60 tablet, Rfl: 11  •  Calcium Carb-Cholecalciferol 1000-800 MG-UNIT tablet, Take 1 tablet by mouth Daily., Disp: , Rfl:   •  fluticasone (FLONASE) 50 MCG/ACT nasal spray, 2 sprays into the nostril(s) as directed by provider Daily., Disp: 16 mL, Rfl: 0  •  furosemide (LASIX) 40 MG tablet, TAKE 1 TABLET BY MOUTH AS NEEDED (SWELLING)., Disp: 30 tablet, Rfl: 11  •  gabapentin (NEURONTIN) 600 MG tablet, , Disp: , Rfl:   •  HYDROcodone-acetaminophen (NORCO) 5-325 MG per tablet, Take 1 tablet by mouth Every 6 (Six) Hours As Needed., Disp: , Rfl:   •  lisinopril (PRINIVIL,ZESTRIL) 2.5 MG tablet, Take 1 tablet by mouth Daily., Disp: 30 tablet, Rfl: 11  •  loperamide (IMODIUM) 2 MG capsule, Take 1 capsule by mouth 4 (Four) Times a Day As Needed for Diarrhea., Disp: 24 capsule, Rfl: 3  •  loratadine (CLARITIN) 10 MG tablet, TAKE 1 TABLET BY MOUTH EVERY DAY, Disp: 30 tablet, Rfl: 11  •  nitroglycerin (NITROSTAT) 0.4 MG SL tablet, Place 1 tablet under the tongue Every 5 (Five) Minutes As Needed for Chest Pain. Take no more than 3 doses in 15 minutes., Disp: 30 tablet, Rfl: 12  •  nystatin (MYCOSTATIN) 978768 UNIT/GM powder, Apply  topically to the appropriate area as directed 3 (Three) Times a Day., Disp: 60 g, Rfl: 11  •  omeprazole (priLOSEC) 40 MG capsule, TAKE 1 CAPSULE BY MOUTH EVERY DAY, Disp: 30 capsule, Rfl: 0  •  ondansetron ODT (ZOFRAN-ODT) 4 MG disintegrating tablet, Place 1 tablet on the tongue Every 6 (Six) Hours As Needed for Nausea or Vomiting., Disp: 15 tablet, Rfl: 3  •  potassium chloride (K-DUR,KLOR-CON) 20 MEQ CR tablet, Take 1 tablet by mouth Daily., Disp: 30 tablet, Rfl: 11  •  promethazine (PHENERGAN) 25 MG tablet, Take 1 tablet by mouth Every 8 (Eight) Hours As Needed for Nausea or Vomiting., Disp: 30 tablet, Rfl: 3  •  rOPINIRole (REQUIP) 0.25 MG tablet, Take 1 tablet by mouth Every Night., Disp: 30 tablet, Rfl: 11  •  sertraline (ZOLOFT) 50 MG tablet, Take 2 tablets  by mouth Daily., Disp: 60 tablet, Rfl: 3  •  aMILoride (MIDAMOR) 5 MG tablet, Take 2 tablets by mouth Daily., Disp: 30 tablet, Rfl: 11  •  cyclobenzaprine (FLEXERIL) 10 MG tablet, Take 1 tablet by mouth 3 (Three) Times a Day As Needed for Muscle Spasms., Disp: 30 tablet, Rfl: 0  •  HYDROcodone-acetaminophen (NORCO) 7.5-325 MG per tablet, , Disp: , Rfl:   •  meclizine (ANTIVERT) 25 MG tablet, meclizine 25 mg tablet  TAKE 1 TABLET BY MOUTH THREE TIMES A DAY AS NEEDED FOR DIZZINESS, Disp: , Rfl:   •  promethazine-dextromethorphan (PROMETHAZINE-DM) 6.25-15 MG/5ML syrup, Take one teaspoon qhs prn cough, Disp: 120 mL, Rfl: 0    Past Surgical History:   Procedure Laterality Date   • ABDOMINAL SURGERY     • CARDIAC CATHETERIZATION N/A 8/13/2018    Procedure: Left Heart Cath 8/13/2018 @ 9;00;  Surgeon: Kuldip Frank MD;  Location: [facility] CATH INVASIVE LOCATION;  Service: Cardiology   • COLONOSCOPY N/A 11/19/2018    Procedure: COLONOSCOPY;  Surgeon: Bro Whitaker MD;  Location: [facility] ENDOSCOPY;  Service: Gastroenterology   • CYSTOSCOPY     • ENDOMETRIAL ABLATION  2015   • ENDOSCOPY N/A 11/19/2018    Procedure: ESOPHAGOGASTRODUODENOSCOPYwith dilation;  Surgeon: Bro Whitaker MD;  Location: [facility] ENDOSCOPY;  Service: Gastroenterology   • GASTRIC SLEEVE LAPAROSCOPIC     • LAPAROSCOPIC TUBAL LIGATION  1995   • MS ERCP DX COLLECTION SPECIMEN BRUSHING/WASHING Left 7/31/2017    Procedure: ENDOSCOPIC RETROGRADE CHOLANGIOPANCREATOGRAPHY;  Surgeon: Samuel Redding DO;  Location: [facility] ENDOSCOPY;  Service: Gastroenterology   • MS LAP,CHOLECYSTECTOMY N/A 7/24/2017    Procedure: CHOLECYSTECTOMY LAPAROSCOPIC, also umbillical hernia repair;  Surgeon: Pk العلي MD;  Location: [facility] OR;  Service: General   • UPPER GASTROINTESTINAL ENDOSCOPY  11/19/2018       Family History   Problem Relation Age of Onset   • Diabetes Mother    • Hypertension Mother    • Cirrhosis Mother    • Hypertension Father        Social History  "    Socioeconomic History   • Marital status:    Tobacco Use   • Smoking status: Never Smoker   • Smokeless tobacco: Never Used   Vaping Use   • Vaping Use: Never used   Substance and Sexual Activity   • Alcohol use: Yes     Comment: socially   • Drug use: No   • Sexual activity: Defer     Birth control/protection: Surgical        Review of Systems   Musculoskeletal:        Knee pain.,  Left   Psychiatric/Behavioral: The patient is nervous/anxious.        PHYSICAL EXAMINATION:       Ht 162.6 cm (64\")   Wt 98.6 kg (217 lb 4.8 oz)   BMI 37.30 kg/m²     Physical Exam  Vitals and nursing note reviewed.   Constitutional:       General: She is not in acute distress.     Appearance: She is well-developed. She is not toxic-appearing.   HENT:      Head: Normocephalic.   Pulmonary:      Effort: Pulmonary effort is normal. No respiratory distress.   Musculoskeletal:      Left knee: No effusion.      Instability Tests: Medial Mer test positive. Lateral Mer test negative.   Skin:     General: Skin is warm and dry.   Neurological:      Mental Status: She is alert and oriented to person, place, and time.   Psychiatric:         Behavior: Behavior normal.         Thought Content: Thought content normal.         Judgment: Judgment normal.         GAIT:     []  Normal  []  Antalgic    Assistive device: []  None  []  Walker     []  Crutches  []  Cane     []  Wheelchair  []  Stretcher    Left Knee Exam     Range of Motion   Extension: -5   Flexion: 140     Tests   Mer:  Medial - positive Lateral - negative  Varus: negative Valgus: positive  Drawer:  Anterior - negative     Posterior - negative    Other   Erythema: absent  Sensation: normal  Pulse: present  Swelling: mild  Effusion: no effusion present    Comments:  No evidence of infection.  Mild pain limitations with arc of motion.  Neurovascular intact                  XR Knee 1 or 2 View Left    Result Date: 7/20/2022  Narrative: Left knee two views July 20, " 2022 INDICATION: Posttraumatic pain FINDINGS: Bones normally mineralized. No fracture or dislocation. No significant knee joint effusion. Diffuse chondrocalcinosis. No focal bony lesions. Joint spaces grossly preserved.     Impression: Diffuse chondrocalcinosis. Otherwise unremarkable examination. Electronically signed by:  Sj Rodriguez MD  7/20/2022 9:44 AM CDT Workstation: [id number]          ASSESSMENT:    Diagnoses and all orders for this visit:    Acute pain of left knee  -     Ambulatory Referral to Physical Therapy Evaluate and treat; (as indicated ); Stretching, Strengthening, ROM    Internal derangement of left knee  -     Ambulatory Referral to Physical Therapy Evaluate and treat; (as indicated ); Stretching, Strengthening, ROM  -     Miscellaneous DME    Other orders  -     HYDROcodone-acetaminophen (NORCO) 5-325 MG per tablet; Take 1 tablet by mouth Every 6 (Six) Hours As Needed.  -     Large Joint Arthrocentesis: L knee          PLAN    Large Joint Arthrocentesis: L knee  Date/Time: 8/2/2022 9:59 AM  Consent given by: patient  Site marked: site marked  Timeout: Immediately prior to procedure a time out was called to verify the correct patient, procedure, equipment, support staff and site/side marked as required   Supporting Documentation  Indications: pain   Procedure Details  Location: knee - L knee  Preparation: Patient was prepped and draped in the usual sterile fashion  Needle size: 22 G  Approach: anteromedial  Medications administered: 40 mg triamcinolone acetonide 40 MG/ML; 2 mL bupivacaine 0.5 %  Patient tolerance: patient tolerated the procedure well with no immediate complications          Exam today suggest meniscal pathology.  This will be consistent with AMANDA.  After discussing available treatment options, patient desires to proceed with physical therapy and intra-articular injection.  Risk, benefits, alternatives explained, patient tolerated injection well.  Patient also given DME for  cane.  Patient is to return in 4 to 6 weeks for recheck, if not improving we will proceed with MRI.      EMR Dragon/Transciption Disclaimer: Some of this note may be an electronic transcription/translation of spoken language to printed text.  The electronic translation of spoken language may permit erroneous, or at times, nonsensical words or phrases to be inadvertently transcribed. Although I have reviewed the note for such errors, some may still exist.     Time spent of a minimum of 45 minutes including the face to face evaluation, reviewing of medical history and prior medial records, reviewing of diagnostic studies, ordering additional tests, documentation, patient education and coordination of care.       This document has been electronically signed by Zahra MOREL  on August 3, 2022 08:20 CDT      Return in about 6 weeks (around 9/13/2022).

## 2022-08-03 RX ORDER — BUPIVACAINE HYDROCHLORIDE 5 MG/ML
2 INJECTION, SOLUTION PERINEURAL
Status: COMPLETED | OUTPATIENT
Start: 2022-08-02 | End: 2022-08-02

## 2022-08-03 RX ORDER — TRIAMCINOLONE ACETONIDE 40 MG/ML
40 INJECTION, SUSPENSION INTRA-ARTICULAR; INTRAMUSCULAR
Status: COMPLETED | OUTPATIENT
Start: 2022-08-02 | End: 2022-08-02

## 2022-08-04 ENCOUNTER — APPOINTMENT (OUTPATIENT)
Dept: PHYSICAL THERAPY | Facility: HOSPITAL | Age: 52
End: 2022-08-04

## 2022-08-13 DIAGNOSIS — Z00.00 HEALTHCARE MAINTENANCE: ICD-10-CM

## 2022-08-14 RX ORDER — OMEPRAZOLE 40 MG/1
40 CAPSULE, DELAYED RELEASE ORAL DAILY
Qty: 30 CAPSULE | Refills: 0 | Status: CANCELLED | OUTPATIENT
Start: 2022-08-14

## 2022-08-15 ENCOUNTER — TELEPHONE (OUTPATIENT)
Dept: FAMILY MEDICINE CLINIC | Facility: CLINIC | Age: 52
End: 2022-08-15

## 2022-08-15 RX ORDER — OMEPRAZOLE 40 MG/1
40 CAPSULE, DELAYED RELEASE ORAL DAILY
Qty: 30 CAPSULE | Refills: 0 | Status: SHIPPED | OUTPATIENT
Start: 2022-08-15 | End: 2022-09-12

## 2022-08-15 RX ORDER — NYSTATIN 100000 [USP'U]/G
POWDER TOPICAL 3 TIMES DAILY
Qty: 60 G | Refills: 11 | Status: SHIPPED | OUTPATIENT
Start: 2022-08-15 | End: 2023-03-21 | Stop reason: SDUPTHER

## 2022-08-15 NOTE — TELEPHONE ENCOUNTER
----- Message from Maricarmen Ferris sent at 8/15/2022  9:36 AM CDT -----  Regarding: Medicine   Can you refill my omprazole 40 mg my original prescription come from dr cutler after weight loss surgery Carlo Colindres up mg but I haven’t seen him so he want refill my prescription I really need this medicine

## 2022-08-16 ENCOUNTER — HOSPITAL ENCOUNTER (OUTPATIENT)
Dept: PHYSICAL THERAPY | Facility: HOSPITAL | Age: 52
Setting detail: THERAPIES SERIES
Discharge: HOME | End: 2022-08-16

## 2022-08-16 DIAGNOSIS — M25.562 ACUTE PAIN OF LEFT KNEE: Primary | ICD-10-CM

## 2022-08-16 DIAGNOSIS — M23.92 INTERNAL DERANGEMENT OF LEFT KNEE: ICD-10-CM

## 2022-08-16 PROCEDURE — 97162 PT EVAL MOD COMPLEX 30 MIN: CPT | Performed by: PHYSICAL THERAPIST

## 2022-08-16 NOTE — THERAPY EVALUATION
Outpatient Physical Therapy Ortho Initial Evaluation  [facility]     Patient Name: Maricarmen Ferris  : 1970  MRN: [id number]  Today's Date: 2022      Visit Date: 2022  Visit   Return to MD: ANITHA  Re-certification date: 22  Patient Active Problem List   Diagnosis   • Chronic cholecystitis   • Umbilical hernia without obstruction and without gangrene   • Abdominal pain   • Abdominal fluid collection   • Intra-hepatic bile leak   • Abnormal cardiovascular function study   • Chest pain   • Class 1 obesity due to excess calories with serious comorbidity and body mass index (BMI) of 34.0 to 34.9 in adult   • Restrictive lung disease secondary to obesity   • Nausea   • Weight gain, abnormal   • Pain of upper abdomen   • Gastroesophageal reflux disease with esophagitis   • Irritable bowel syndrome with both constipation and diarrhea   • Dysphagia   • Mild persistent asthma without complication   • ELSIE on CPAP   • Acute kidney injury (HCC)   • DYAN (acute kidney injury) (Formerly [facility])   • Hypertension   • Hyperlipidemia   • Hyperkalemia   • Dehydration   • Hypokalemia   • History of second hand smoke exposure   • Obesity (BMI 30-39.9)   • History of weight loss surgery   • Acute cystitis without hematuria   • Allergic rhinitis   • Asthma   • Chronic left-sided low back pain with left-sided sciatica   • CKD (chronic kidney disease) stage 3, GFR 30-59 ml/min (Formerly [facility])   • Cervical spondylosis without myelopathy   • Degenerative disc disease, lumbar   • Degenerative disc disease, cervical   • Depression with anxiety   • Gastroesophageal reflux disease without esophagitis   • Kidney disease   • Lumbar spondylosis   • Malabsorption due to intolerance, not elsewhere classified   • Neck pain   • Sacroiliitis (HCC)   • Restless legs syndrome (RLS)        Past Medical History:   Diagnosis Date   • Acid reflux    • Anxiety    • Depressed    • Dysphagia    • GERD (gastroesophageal reflux disease)    • Hard to  intubate    • Hyperlipidemia    • Hypertension    • Hypokalemia    • IBS (irritable bowel syndrome)    • Nausea    • Sleep apnea         Past Surgical History:   Procedure Laterality Date   • ABDOMINAL SURGERY     • CARDIAC CATHETERIZATION N/A 8/13/2018    Procedure: Left Heart Cath 8/13/2018 @ 9;00;  Surgeon: Kuldip Frank MD;  Location: [facility] CATH INVASIVE LOCATION;  Service: Cardiology   • COLONOSCOPY N/A 11/19/2018    Procedure: COLONOSCOPY;  Surgeon: Bro Whitaker MD;  Location: [facility] ENDOSCOPY;  Service: Gastroenterology   • CYSTOSCOPY     • ENDOMETRIAL ABLATION  2015   • ENDOSCOPY N/A 11/19/2018    Procedure: ESOPHAGOGASTRODUODENOSCOPYwith dilation;  Surgeon: Bro Whitaker MD;  Location: [facility] ENDOSCOPY;  Service: Gastroenterology   • GASTRIC SLEEVE LAPAROSCOPIC     • LAPAROSCOPIC TUBAL LIGATION  1995   • ID ERCP DX COLLECTION SPECIMEN BRUSHING/WASHING Left 7/31/2017    Procedure: ENDOSCOPIC RETROGRADE CHOLANGIOPANCREATOGRAPHY;  Surgeon: Samuel Redding DO;  Location: [facility] ENDOSCOPY;  Service: Gastroenterology   • ID LAP,CHOLECYSTECTOMY N/A 7/24/2017    Procedure: CHOLECYSTECTOMY LAPAROSCOPIC, also umbillical hernia repair;  Surgeon: Pk العلي MD;  Location: [facility] OR;  Service: General   • UPPER GASTROINTESTINAL ENDOSCOPY  11/19/2018       Visit Dx:     ICD-10-CM ICD-9-CM   1. Acute pain of left knee  M25.562 719.46   2. Internal derangement of left knee  M23.92 717.9     Medications (Admitted on 8/16/2022)         aMILoride (MIDAMOR) 5 MG tablet    buPROPion SR (WELLBUTRIN SR) 150 MG 12 hr tablet    Calcium Carb-Cholecalciferol 1000-800 MG-UNIT tablet    cyclobenzaprine (FLEXERIL) 10 MG tablet    fluticasone (FLONASE) 50 MCG/ACT nasal spray    furosemide (LASIX) 40 MG tablet    gabapentin (NEURONTIN) 600 MG tablet    HYDROcodone-acetaminophen (NORCO) 5-325 MG per tablet    HYDROcodone-acetaminophen (NORCO) 7.5-325 MG per tablet    lisinopril (PRINIVIL,ZESTRIL) 2.5 MG tablet    loperamide  (IMODIUM) 2 MG capsule    loratadine (CLARITIN) 10 MG tablet    meclizine (ANTIVERT) 25 MG tablet    nitroglycerin (NITROSTAT) 0.4 MG SL tablet    nystatin (MYCOSTATIN) 265393 UNIT/GM powder    omeprazole (priLOSEC) 40 MG capsule    ondansetron ODT (ZOFRAN-ODT) 4 MG disintegrating tablet    potassium chloride (K-DUR,KLOR-CON) 20 MEQ CR tablet    promethazine (PHENERGAN) 25 MG tablet    promethazine-dextromethorphan (PROMETHAZINE-DM) 6.25-15 MG/5ML syrup    rOPINIRole (REQUIP) 0.25 MG tablet    sertraline (ZOLOFT) 50 MG tablet     Allergies        Sulfa AntibioticsAnaphylaxis, GI Intolerance         PT Ortho     Row Name 08/16/22 0334       Subjective Comments    Subjective Comments 53 yo female with 1 month h/o acute L knee pain since she twisted her L knee while her L foot missed a step with a step ladder and had sharp pain and edema at the time. Went to urgent care the next day. Issued pain meds and an injection at the time. Has medial L knee pain, throbbing pain at times. Pain worst at night. Easing factors: heat, elevation. Aggravating factors: laying down, prolonged sitting, prolonged standing, walking, squatting. Occupation: disabled x 2 yrs. Lives with step father in a single level home with ramp access.  -BS       Precautions and Contraindications    Precautions Monitor Vitals  -BS       Subjective Pain    Able to rate subjective pain? yes  -BS    Pre-Treatment Pain Level 3  -BS    Post-Treatment Pain Level 3  -BS       Special Tests/Palpation    Special Tests/Palpation Knee  -BS       Knee Special Tests    Anterior drawer (ACL lesion) Left:;Negative  -BS    Posterior drawer (PCL lesion) Left:;Negative  -BS    Valgus stress (MCL lesion) Left:;Negative  -BS    Varus stress (LCL lesion) Left:;Negative  -BS    Mer’s test (meniscal lesion) Left:;Positive  -BS       General ROM    GENERAL ROM COMMENTS AROM: L knee 0-3-113° R  knee 0-120°  -BS       MMT (Manual Muscle Testing)    General MMT Comments MMT: R  LE 5/5 L LE-hip flex 5/5 knee ext 4/5 knee flex 4/5 ankle DF 5/5 hip abd 5/5 hip add 4/5  -BS       Sensation    Light Touch Partial deficits in the LLE  hyperalgesia L medial knee  -BS       Balance Skills Training    SLS 2-3 sec on each LE  -BS          User Key  (r) = Recorded By, (t) = Taken By, (c) = Cosigned By    Initials Name Provider Type    Demetrio Ely, PT Physical Therapist                                   PT OP Goals     Row Name 08/16/22 0852          PT Short Term Goals    STG Date to Achieve 09/06/22  -BS     STG 1 Improve L knee AROM to 0-0-120°  -BS     STG 1 Progress New  -BS     STG 2 Improve L knee flex/ext MMT to 5/5  -BS     STG 2 Progress New  -BS     STG 3 Prolonged sitting/standing with 75% reduction in L knee pain  -BS     STG 3 Progress New  -BS     STG 4 Improve L SLS to >/=10 sec consistently  -BS     STG 4 Progress New  -BS     STG 5 Improve LEFS score to >/=36  -BS     STG 5 Progress New  -BS            Time Calculation    PT Goal Re-Cert Due Date 09/06/22  -BS           User Key  (r) = Recorded By, (t) = Taken By, (c) = Cosigned By    Initials Name Provider Type    Dmeetrio Ely, PT Physical Therapist                 PT Assessment/Plan     Row Name 08/16/22 0852          PT Assessment    Functional Limitations Impaired gait;Performance in leisure activities;Limitation in home management;Performance in sport activities;Performance in work activities  -BS     Impairments Balance;Gait;Endurance;Impaired flexibility;Muscle strength;Pain;Range of motion  -BS     Assessment Comments 51 yo female with acute L knee pain due to twisting injury 1 month ago. Suspect meniscal involvement. Limited by ROM loss, L knee weakness, impaired standing balance and antalgic gait pattern.  -BS     Please refer to paper survey for additional self-reported information Yes  -BS     Rehab Potential Fair  -BS     Patient/caregiver participated in establishment of treatment plan and goals Yes  -BS      Patient would benefit from skilled therapy intervention Yes  -BS            PT Plan    PT Frequency 1x/week;2x/week  -BS     Predicted Duration of Therapy Intervention (PT) 3 weeks then TBD  -BS     Planned CPT's? PT EVAL MOD COMPLELITY: 01170;PT RE-EVAL: 25031;PT THER PROC EA 15 MIN: 92448;PT THER ACT EA 15 MIN: 82222;PT MANUAL THERAPY EA 15 MIN: 95450;PT NEUROMUSC RE-EDUCATION EA 15 MIN: 12861;PT ELECTRICAL STIM UNATTEND: ;PT HOT/COLD PACK WC NONMCARE: 00622;PT THER SUPP EA 15 MIN  -BS     Physical Therapy Interventions (Optional Details) balance training;gait training;home exercise program;joint mobilization;manual therapy techniques;modalities;neuromuscular re-education;patient/family education;postural re-education;ROM (Range of Motion);stair training;strengthening;stretching  -BS     PT Plan Comments Address L knee strengthening, L knee ROM, modalities for pain management.  -BS           User Key  (r) = Recorded By, (t) = Taken By, (c) = Cosigned By    Initials Name Provider Type    Demetrio Ely, PT Physical Therapist                   OP Exercises     Row Name 08/16/22 0852             Subjective Comments    Subjective Comments 51 yo female with 1 month h/o acute L knee pain since she twisted her L knee while her L foot missed a step with a step ladder and had sharp pain and edema at the time. Went to urgent care the next day. Issued pain meds and an injection at the time. Has medial L knee pain, throbbing pain at times. Pain worst at night. Easing factors: heat, elevation. Aggravating factors: laying down, prolonged sitting, prolonged standing, walking, squatting. Occupation: disabled x 2 yrs. Lives with step father in a single level home with ramp access.  -BS              Subjective Pain    Able to rate subjective pain? yes  -BS      Pre-Treatment Pain Level 3  -BS      Post-Treatment Pain Level 3  -BS              Exercise 1    Exercise Name 1 QS  -BS      Sets 1 1  -BS      Reps 1 10  -BS       "Time 1 5\" hold  -BS              Exercise 2    Exercise Name 2 L heel slides w/ strap, supine  -BS      Sets 2 1  -BS      Reps 2 10  -BS            User Key  (r) = Recorded By, (t) = Taken By, (c) = Cosigned By    Initials Name Provider Type    Demetrio Ely, PT Physical Therapist                              Outcome Measure Options: Lower Extremity Functional Scale (LEFS)  Lower Extremity Functional Index  Any of your usual work, housework or school activities: Moderate difficulty  Your usual hobbies, recreational or sporting activities: Moderate difficulty  Getting into or out of the bath: No difficulty  Walking between rooms: A little bit of difficulty  Putting on your shoes or socks: No difficulty  Squatting: Moderate difficulty  Lifting an object, like a bag of groceries from the floor: Moderate difficulty  Performing light activities around your home: Moderate difficulty  Performing heavy activities around your home: Quite a bit of difficulty  Getting into or out of a car: Quite a bit of difficulty  Walking 2 blocks: Extreme difficulty or unable to perform activity  Walking a mile: Extreme difficulty or unable to perform activity  Going up or down 10 stairs (about 1 flight of stairs): Extreme difficulty or unable to perform activity  Standing for 1 hour: Extreme difficulty or unable to perform activity  Sitting for 1 hour: Extreme difficulty or unable to perform activity  Running on even ground: Extreme difficulty or unable to perform activity  Running on uneven ground: Extreme difficulty or unable to perform activity  Making sharp turns while running fast: Extreme difficulty or unable to perform activity  Hopping: Extreme difficulty or unable to perform activity  Rolling over in bed: A little bit of difficulty  Total: 26      Time Calculation:     Start Time: 0852  Stop Time: 0928  Time Calculation (min): 36 min  Total Timed Code Minutes- PT: 36 minute(s)     Therapy Charges for Today     Code " Description Service Date Service Provider Modifiers Qty    48724986629 HC PT EVAL MOD COMPLEXITY 3 8/16/2022 Demetrio Drew, PT GP 1          PT G-Codes  Outcome Measure Options: Lower Extremity Functional Scale (LEFS)  Total: 26         Demetrio Drew, PT  8/16/2022

## 2022-08-24 ENCOUNTER — HOSPITAL ENCOUNTER (OUTPATIENT)
Dept: PHYSICAL THERAPY | Facility: HOSPITAL | Age: 52
Setting detail: THERAPIES SERIES
Discharge: HOME | End: 2022-08-24

## 2022-08-24 DIAGNOSIS — M23.92 INTERNAL DERANGEMENT OF LEFT KNEE: ICD-10-CM

## 2022-08-24 DIAGNOSIS — M25.562 ACUTE PAIN OF LEFT KNEE: Primary | ICD-10-CM

## 2022-08-24 DIAGNOSIS — Z00.00 HEALTHCARE MAINTENANCE: ICD-10-CM

## 2022-08-24 DIAGNOSIS — E87.6 HYPOKALEMIA: ICD-10-CM

## 2022-08-24 PROCEDURE — 97110 THERAPEUTIC EXERCISES: CPT | Performed by: PHYSICAL THERAPIST

## 2022-08-24 PROCEDURE — G0283 ELEC STIM OTHER THAN WOUND: HCPCS | Performed by: PHYSICAL THERAPIST

## 2022-08-24 PROCEDURE — 97116 GAIT TRAINING THERAPY: CPT | Performed by: PHYSICAL THERAPIST

## 2022-08-24 RX ORDER — ROPINIROLE 0.25 MG/1
TABLET, FILM COATED ORAL
Qty: 30 TABLET | Refills: 11 | Status: SHIPPED | OUTPATIENT
Start: 2022-08-24 | End: 2023-03-21

## 2022-08-24 RX ORDER — POTASSIUM CHLORIDE 1500 MG/1
TABLET, EXTENDED RELEASE ORAL
Qty: 30 TABLET | Refills: 11 | Status: SHIPPED | OUTPATIENT
Start: 2022-08-24 | End: 2023-03-21

## 2022-08-24 NOTE — THERAPY TREATMENT NOTE
Outpatient Physical Therapy Ortho Treatment Note  [facility]     Patient Name: Maricarmen Ferris  : 1970  MRN: [id number]  Today's Date: 2022      Visit Date: 2022  Visit   Return to MD: ANITHA  Re-certification date: 22    Visit Dx:    ICD-10-CM ICD-9-CM   1. Acute pain of left knee  M25.562 719.46   2. Internal derangement of left knee  M23.92 717.9       Patient Active Problem List   Diagnosis   • Chronic cholecystitis   • Umbilical hernia without obstruction and without gangrene   • Abdominal pain   • Abdominal fluid collection   • Intra-hepatic bile leak   • Abnormal cardiovascular function study   • Chest pain   • Class 1 obesity due to excess calories with serious comorbidity and body mass index (BMI) of 34.0 to 34.9 in adult   • Restrictive lung disease secondary to obesity   • Nausea   • Weight gain, abnormal   • Pain of upper abdomen   • Gastroesophageal reflux disease with esophagitis   • Irritable bowel syndrome with both constipation and diarrhea   • Dysphagia   • Mild persistent asthma without complication   • ELSIE on CPAP   • Acute kidney injury (HCC)   • DYAN (acute kidney injury) (Formerly [facility])   • Hypertension   • Hyperlipidemia   • Hyperkalemia   • Dehydration   • Hypokalemia   • History of second hand smoke exposure   • Obesity (BMI 30-39.9)   • History of weight loss surgery   • Acute cystitis without hematuria   • Allergic rhinitis   • Asthma   • Chronic left-sided low back pain with left-sided sciatica   • CKD (chronic kidney disease) stage 3, GFR 30-59 ml/min (Formerly [facility])   • Cervical spondylosis without myelopathy   • Degenerative disc disease, lumbar   • Degenerative disc disease, cervical   • Depression with anxiety   • Gastroesophageal reflux disease without esophagitis   • Kidney disease   • Lumbar spondylosis   • Malabsorption due to intolerance, not elsewhere classified   • Neck pain   • Sacroiliitis (HCC)   • Restless legs syndrome (RLS)        Past Medical History:    Diagnosis Date   • Acid reflux    • Anxiety    • Depressed    • Dysphagia    • GERD (gastroesophageal reflux disease)    • Hard to intubate    • Hyperlipidemia    • Hypertension    • Hypokalemia    • IBS (irritable bowel syndrome)    • Nausea    • Sleep apnea         Past Surgical History:   Procedure Laterality Date   • ABDOMINAL SURGERY     • CARDIAC CATHETERIZATION N/A 8/13/2018    Procedure: Left Heart Cath 8/13/2018 @ 9;00;  Surgeon: Kuldip Frank MD;  Location: [facility] CATH INVASIVE LOCATION;  Service: Cardiology   • COLONOSCOPY N/A 11/19/2018    Procedure: COLONOSCOPY;  Surgeon: Bro Whitaker MD;  Location: [facility] ENDOSCOPY;  Service: Gastroenterology   • CYSTOSCOPY     • ENDOMETRIAL ABLATION  2015   • ENDOSCOPY N/A 11/19/2018    Procedure: ESOPHAGOGASTRODUODENOSCOPYwith dilation;  Surgeon: Bro Whitaker MD;  Location: [facility] ENDOSCOPY;  Service: Gastroenterology   • GASTRIC SLEEVE LAPAROSCOPIC     • LAPAROSCOPIC TUBAL LIGATION  1995   • IA ERCP DX COLLECTION SPECIMEN BRUSHING/WASHING Left 7/31/2017    Procedure: ENDOSCOPIC RETROGRADE CHOLANGIOPANCREATOGRAPHY;  Surgeon: Samuel Redding DO;  Location: [facility] ENDOSCOPY;  Service: Gastroenterology   • IA LAP,CHOLECYSTECTOMY N/A 7/24/2017    Procedure: CHOLECYSTECTOMY LAPAROSCOPIC, also umbillical hernia repair;  Surgeon: Pk العلي MD;  Location: [facility] OR;  Service: General   • UPPER GASTROINTESTINAL ENDOSCOPY  11/19/2018        PT Ortho     Row Name 08/24/22 5615       Subjective Comments    Subjective Comments Patient reports increased L knee pain since the PT consult.  -BS       Subjective Pain    Able to rate subjective pain? yes  -BS    Pre-Treatment Pain Level 6  -BS          User Key  (r) = Recorded By, (t) = Taken By, (c) = Cosigned By    Initials Name Provider Type    Demetrio Eyl, PT Physical Therapist                             PT Assessment/Plan     Row Name 08/24/22 6114          PT Assessment    Functional Limitations Impaired  gait;Performance in leisure activities;Limitation in home management;Performance in sport activities;Performance in work activities  -BS     Impairments Balance;Gait;Endurance;Impaired flexibility;Muscle strength;Pain;Range of motion  -BS     Assessment Comments Good tolerance to ice/IFC post tx. Improved gait mechanics (less antalgic gait) with use of SC versus no AD.  -BS     Rehab Potential Fair  -BS     Patient/caregiver participated in establishment of treatment plan and goals Yes  -BS     Patient would benefit from skilled therapy intervention Yes  -BS            PT Plan    PT Frequency 1x/week;2x/week  -BS     Predicted Duration of Therapy Intervention (PT) 3 weeks then TBD  -BS     PT Plan Comments push L knee ROM as able. Advance quad strengthening. Instruct in 3 way SLR. Patient going to [place] on vacation and will not be back for 2 weeks.  -BS           User Key  (r) = Recorded By, (t) = Taken By, (c) = Cosigned By    Initials Name Provider Type    Demetrio Ely, PT Physical Therapist                 Modalities     Row Name 08/24/22 0925             Ice    Ice Applied Yes  -BS      Location L knee  -BS      PT Ice Rx Minutes 15  -BS      Ice S/P Rx Yes  -BS              ELECTRICAL STIMULATION    Attended/Unattended Unattended  -BS      Stimulation Type IFC  -BS      Location/Electrode Placement/Other L knee  -BS      PT E-Stim Unattended Minutes 15  -BS            User Key  (r) = Recorded By, (t) = Taken By, (c) = Cosigned By    Initials Name Provider Type    Demetrio Ely, PT Physical Therapist               OP Exercises     Row Name 08/24/22 0999             Subjective Comments    Subjective Comments Patient reports increased L knee pain since the PT consult.  -BS              Subjective Pain    Able to rate subjective pain? yes  -BS      Pre-Treatment Pain Level 6  -BS      Post-Treatment Pain Level 4  -BS              Exercise 1    Exercise Name 1 QS w/ towel roll  -BS      Sets 1 1  -BS       "Reps 1 15  -BS      Time 1 5\" hold  -BS              Exercise 2    Exercise Name 2 L heel slides w/ strap, supine  -BS      Sets 2 1  -BS      Reps 2 15  -BS              Exercise 3    Exercise Name 3 L SAQ's  -BS      Sets 3 2  -BS      Reps 3 10  -BS              Exercise 4    Exercise Name 4 L heel digs  -BS      Sets 4 1  -BS      Reps 4 10  -BS      Time 4 5\" hold  -BS              Exercise 5    Exercise Name 5 see modalities  -BS              Exercise 6    Exercise Name 6 L LAQ's, partial arc  -BS      Sets 6 1  -BS      Reps 6 10  -BS              Exercise 7    Exercise Name 7 gait training with SC, vc's for sequencing x 30'  -BS      Time 7 supervised  -BS      Additional Comments mildly antalgic gait pattern with AD  -BS            User Key  (r) = Recorded By, (t) = Taken By, (c) = Cosigned By    Initials Name Provider Type    Demetrio Ely, PT Physical Therapist                              PT OP Goals     Row Name 08/24/22 0935          PT Short Term Goals    STG Date to Achieve 09/06/22  -BS     STG 1 Improve L knee AROM to 0-0-120°  -BS     STG 1 Progress Ongoing  -BS     STG 2 Improve L knee flex/ext MMT to 5/5  -BS     STG 2 Progress Ongoing  -BS     STG 3 Prolonged sitting/standing with 75% reduction in L knee pain  -BS     STG 3 Progress Ongoing  -BS     STG 4 Improve L SLS to >/=10 sec consistently  -BS     STG 4 Progress Ongoing  -BS     STG 5 Improve LEFS score to >/=36  -BS     STG 5 Progress Ongoing  -BS            Time Calculation    PT Goal Re-Cert Due Date 09/06/22  -BS           User Key  (r) = Recorded By, (t) = Taken By, (c) = Cosigned By    Initials Name Provider Type    Demetrio Ely, PT Physical Therapist                               Time Calculation:   Start Time: 0935  Stop Time: 1030  Time Calculation (min): 55 min  PT Non-Billable Time (min): 15 min  Total Timed Code Minutes- PT: 40 minute(s)  Untimed Charges  PT E-Stim Unattended Minutes: 15  PT Ice Rx Minutes: 15  Total " Minutes  Untimed Charges Total Minutes: 30   Total Minutes: 30  Therapy Charges for Today     Code Description Service Date Service Provider Modifiers Qty    [id number]  PT THER PROC EA 15 MIN 8/24/2022 Demetrio Drew, PT GP 2    [id number] HC GAIT TRAINING EA 15 MIN 8/24/2022 Demetrio Drew, PT GP 1    [id number]  PT ELECTRICAL STIM UNATTENDED 8/24/2022 Demetrio Drew, PT  1                    Demetrio Drew, PT  8/24/2022

## 2022-08-29 ENCOUNTER — APPOINTMENT (OUTPATIENT)
Dept: PHYSICAL THERAPY | Facility: HOSPITAL | Age: 52
End: 2022-08-29

## 2022-08-31 ENCOUNTER — APPOINTMENT (OUTPATIENT)
Dept: PHYSICAL THERAPY | Facility: HOSPITAL | Age: 52
End: 2022-08-31

## 2022-09-06 ENCOUNTER — HOSPITAL ENCOUNTER (OUTPATIENT)
Dept: PHYSICAL THERAPY | Facility: HOSPITAL | Age: 52
Setting detail: THERAPIES SERIES
Discharge: HOME | End: 2022-09-06

## 2022-09-06 DIAGNOSIS — M23.92 INTERNAL DERANGEMENT OF LEFT KNEE: ICD-10-CM

## 2022-09-06 DIAGNOSIS — M25.562 ACUTE PAIN OF LEFT KNEE: Primary | ICD-10-CM

## 2022-09-06 NOTE — THERAPY TREATMENT NOTE
Outpatient Physical Therapy Ortho Treatment Note  [facility]     Patient Name: Maricarmen Ferris  : 1970  MRN: [id number]  Today's Date: 2022      Visit Date: 2022     Subjective Improvement 0  Visits 3/4  Visits approved 6 until 2022  RTMD 2022  Recert Date 2022    Left knee pain      Visit Dx:    ICD-10-CM ICD-9-CM   1. Acute pain of left knee  M25.562 719.46   2. Internal derangement of left knee  M23.92 717.9       Patient Active Problem List   Diagnosis   • Chronic cholecystitis   • Umbilical hernia without obstruction and without gangrene   • Abdominal pain   • Abdominal fluid collection   • Intra-hepatic bile leak   • Abnormal cardiovascular function study   • Chest pain   • Class 1 obesity due to excess calories with serious comorbidity and body mass index (BMI) of 34.0 to 34.9 in adult   • Restrictive lung disease secondary to obesity   • Nausea   • Weight gain, abnormal   • Pain of upper abdomen   • Gastroesophageal reflux disease with esophagitis   • Irritable bowel syndrome with both constipation and diarrhea   • Dysphagia   • Mild persistent asthma without complication   • ELSIE on CPAP   • Acute kidney injury (HCC)   • DYAN (acute kidney injury) ([facility])   • Hypertension   • Hyperlipidemia   • Hyperkalemia   • Dehydration   • Hypokalemia   • History of second hand smoke exposure   • Obesity (BMI 30-39.9)   • History of weight loss surgery   • Acute cystitis without hematuria   • Allergic rhinitis   • Asthma   • Chronic left-sided low back pain with left-sided sciatica   • CKD (chronic kidney disease) stage 3, GFR 30-59 ml/min ([facility])   • Cervical spondylosis without myelopathy   • Degenerative disc disease, lumbar   • Degenerative disc disease, cervical   • Depression with anxiety   • Gastroesophageal reflux disease without esophagitis   • Kidney disease   • Lumbar spondylosis   • Malabsorption due to intolerance, not elsewhere classified   • Neck pain   •  Sacroiliitis (HCC)   • Restless legs syndrome (RLS)        Past Medical History:   Diagnosis Date   • Acid reflux    • Anxiety    • Depressed    • Dysphagia    • GERD (gastroesophageal reflux disease)    • Hard to intubate    • Hyperlipidemia    • Hypertension    • Hypokalemia    • IBS (irritable bowel syndrome)    • Nausea    • Sleep apnea         Past Surgical History:   Procedure Laterality Date   • ABDOMINAL SURGERY     • CARDIAC CATHETERIZATION N/A 8/13/2018    Procedure: Left Heart Cath 8/13/2018 @ 9;00;  Surgeon: Kuldip Frank MD;  Location: [facility] CATH INVASIVE LOCATION;  Service: Cardiology   • COLONOSCOPY N/A 11/19/2018    Procedure: COLONOSCOPY;  Surgeon: Bro Whitaker MD;  Location: [facility] ENDOSCOPY;  Service: Gastroenterology   • CYSTOSCOPY     • ENDOMETRIAL ABLATION  2015   • ENDOSCOPY N/A 11/19/2018    Procedure: ESOPHAGOGASTRODUODENOSCOPYwith dilation;  Surgeon: Bro Whitaker MD;  Location: [facility] ENDOSCOPY;  Service: Gastroenterology   • GASTRIC SLEEVE LAPAROSCOPIC     • LAPAROSCOPIC TUBAL LIGATION  1995   • [place] ERCP DX COLLECTION SPECIMEN BRUSHING/WASHING Left 7/31/2017    Procedure: ENDOSCOPIC RETROGRADE CHOLANGIOPANCREATOGRAPHY;  Surgeon: Samuel Redding DO;  Location: [facility] ENDOSCOPY;  Service: Gastroenterology   • [place] LAP,CHOLECYSTECTOMY N/A 7/24/2017    Procedure: CHOLECYSTECTOMY LAPAROSCOPIC, also umbillical hernia repair;  Surgeon: Pk العلي MD;  Location: [facility] OR;  Service: General   • UPPER GASTROINTESTINAL ENDOSCOPY  11/19/2018        PT Ortho     Row Name 09/06/22 0900       Subjective Pain    Able to rate subjective pain? yes  -CP    Pre-Treatment Pain Level 6  -CP       General ROM    LT Lower Ext Lt Knee Extension/Flexion  -CP       Left Lower Ext    Lt Knee Extension/Flexion AROM 0-85  -CP          User Key  (r) = Recorded By, (t) = Taken By, (c) = Cosigned By    Initials Name Provider Type    Marla Gardner, PTA Physical Therapist Assistant                              PT Assessment/Plan     Row Name 09/06/22 1044          PT Assessment    Assessment Comments Patient cont to report in left knee.  She did have decrease AROM this date vs eval date.  Edema noted in left knee.  -CP            PT Plan    PT Frequency 1x/week;2x/week  -CP     Predicted Duration of Therapy Intervention (PT) 3 weeks  -CP     PT Plan Comments Cont wtih POC  MD note next visit  -CP           User Key  (r) = Recorded By, (t) = Taken By, (c) = Cosigned By    Initials Name Provider Type    Marla Gradner PTA Physical Therapist Assistant                 Modalities     Row Name 09/06/22 0900             Ice    Ice Applied Yes  -CP      Location left knee with IFC  -CP      PT Ice Rx Minutes 15  -CP      Ice S/P Rx Yes  -CP              Ultrasound 93400    Location left medial knee  -CP      Duty Cycle 100  -CP      Frequency 1.0 MHz  -CP      Intensity - Wts/cm 1.5  -CP      29325 - PT Ultrasound Minutes 8  -CP              ELECTRICAL STIMULATION    Attended/Unattended Unattended  -CP      Stimulation Type IFC  -CP      Location/Electrode Placement/Other L knee with ice  -CP      PT E-Stim Unattended Minutes 15  -CP            User Key  (r) = Recorded By, (t) = Taken By, (c) = Cosigned By    Initials Name Provider Type    Marla Gardner PTA Physical Therapist Assistant               OP Exercises     Row Name 09/06/22 1045 09/06/22 0900          Subjective Comments    Subjective Comments -- Patient reports cont pain and some swelling in the left knee.  She has been approved for an MRI and tashia call MD to try to get the MRI scheduled  She does have priya eincrease pain at night.  She will apply ice on her left knee to help with pain  -CP            Subjective Pain    Able to rate subjective pain? -- yes  -CP     Pre-Treatment Pain Level -- 6  -CP     Post-Treatment Pain Level -- --  numb from ice  -CP            Total Minutes    34338 - PT Therapeutic Exercise Minutes 37  -CP --       "      Exercise 1    Exercise Name 1 -- Pro II level 2  -CP     Time 1 -- 10  -CP            Exercise 2    Exercise Name 2 -- incline stretch  -CP     Cueing 2 -- Verbal  -CP     Sets 2 -- 3  -CP     Time 2 -- 30\" holds  -CP            Exercise 3    Exercise Name 3 -- standing HS stretch  -CP     Cueing 3 -- Verbal  -CP     Sets 3 -- 3  -CP     Time 3 -- 30\" holds  -CP            Exercise 4    Exercise Name 4 -- step up 4\"  -CP     Cueing 4 -- Verbal;Demo  -CP     Sets 4 -- 2  -CP     Reps 4 -- 10  -CP     Time 4 -- indpendent  -CP            Exercise 5    Exercise Name 5 -- CR  -CP     Cueing 5 -- Verbal  -CP     Sets 5 -- 2  -CP     Reps 5 -- 10  -CP            Exercise 6    Exercise Name 6 -- heelsldies with strap  -CP     Cueing 6 -- Verbal;Demo  -CP     Sets 6 -- 2  -CP     Reps 6 -- 10  -CP            Exercise 7    Exercise Name 7 -- SLR  -CP     Cueing 7 -- Verbal;Tactile  -CP     Sets 7 -- 2  -CP     Reps 7 -- 10  -CP           User Key  (r) = Recorded By, (t) = Taken By, (c) = Cosigned By    Initials Name Provider Type    CP Marla Pabon, PTA Physical Therapist Assistant                              PT OP Goals     Row Name 09/06/22 1000          PT Short Term Goals    STG Date to Achieve 09/06/22  -CP     STG 1 Improve L knee AROM to 0-0-120°  -CP     STG 1 Progress Ongoing  -CP     STG 2 Improve L knee flex/ext MMT to 5/5  -CP     STG 2 Progress Ongoing  -CP     STG 3 Prolonged sitting/standing with 75% reduction in L knee pain  -CP     STG 3 Progress Ongoing  -CP     STG 4 Improve L SLS to >/=10 sec consistently  -CP     STG 4 Progress Ongoing  -CP     STG 5 Improve LEFS score to >/=36  -CP     STG 5 Progress Ongoing  -CP            Time Calculation    PT Goal Re-Cert Due Date 09/06/22  -CP           User Key  (r) = Recorded By, (t) = Taken By, (c) = Cosigned By    Initials Name Provider Type    CP Marla Pabno, PTA Physical Therapist Assistant                Therapy Education  Education " Details: SLR  Given: HEP  Program: New  How Provided: Verbal, Demonstration  Provided to: Patient  Level of Understanding: Teach back education performed, Verbalized, Demonstrated              Time Calculation:   Start Time: 0930  Stop Time: 1035  Time Calculation (min): 65 min  Total Timed Code Minutes- PT: 45 minute(s)  Timed Charges  15142 - PT Ultrasound Minutes: 8  81754 - PT Therapeutic Exercise Minutes: 37  Untimed Charges  PT E-Stim Unattended Minutes: 15  PT Ice Rx Minutes: 15  Total Minutes  Timed Charges Total Minutes: 45  Untimed Charges Total Minutes: 30   Total Minutes: 45                Marla Pabon, PTA  9/6/2022

## 2022-09-09 DIAGNOSIS — G47.30 SLEEP APNEA, UNSPECIFIED TYPE: Primary | ICD-10-CM

## 2022-09-12 ENCOUNTER — HOSPITAL ENCOUNTER (OUTPATIENT)
Dept: PHYSICAL THERAPY | Facility: HOSPITAL | Age: 52
Setting detail: THERAPIES SERIES
Discharge: HOME | End: 2022-09-12

## 2022-09-12 DIAGNOSIS — M23.92 INTERNAL DERANGEMENT OF LEFT KNEE: ICD-10-CM

## 2022-09-12 DIAGNOSIS — M25.562 ACUTE PAIN OF LEFT KNEE: Primary | ICD-10-CM

## 2022-09-12 PROCEDURE — G0283 ELEC STIM OTHER THAN WOUND: HCPCS | Performed by: PHYSICAL THERAPIST

## 2022-09-12 PROCEDURE — 97110 THERAPEUTIC EXERCISES: CPT | Performed by: PHYSICAL THERAPIST

## 2022-09-12 RX ORDER — OMEPRAZOLE 40 MG/1
CAPSULE, DELAYED RELEASE ORAL
Qty: 30 CAPSULE | Refills: 0 | Status: SHIPPED | OUTPATIENT
Start: 2022-09-12 | End: 2022-10-11

## 2022-09-12 NOTE — THERAPY PROGRESS REPORT/RE-CERT
Outpatient Physical Therapy Ortho Progress Note  [facility]     Patient Name: Maricarmen Ferris  : 1970  MRN: [id number]  Today's Date: 2022      Visit Date: 2022  Subjective Improvement 0  Visits 4/5  Visits approved 6 until 2022  RTMD 2022  Recert Date 10/3/22     Left knee pain  Visit Dx:    ICD-10-CM ICD-9-CM   1. Acute pain of left knee  M25.562 719.46   2. Internal derangement of left knee  M23.92 717.9       Patient Active Problem List   Diagnosis   • Chronic cholecystitis   • Umbilical hernia without obstruction and without gangrene   • Abdominal pain   • Abdominal fluid collection   • Intra-hepatic bile leak   • Abnormal cardiovascular function study   • Chest pain   • Class 1 obesity due to excess calories with serious comorbidity and body mass index (BMI) of 34.0 to 34.9 in adult   • Restrictive lung disease secondary to obesity   • Nausea   • Weight gain, abnormal   • Pain of upper abdomen   • Gastroesophageal reflux disease with esophagitis   • Irritable bowel syndrome with both constipation and diarrhea   • Dysphagia   • Mild persistent asthma without complication   • ELSIE on CPAP   • Acute kidney injury (HCC)   • DYAN (acute kidney injury) ([facility])   • Hypertension   • Hyperlipidemia   • Hyperkalemia   • Dehydration   • Hypokalemia   • History of second hand smoke exposure   • Obesity (BMI 30-39.9)   • History of weight loss surgery   • Acute cystitis without hematuria   • Allergic rhinitis   • Asthma   • Chronic left-sided low back pain with left-sided sciatica   • CKD (chronic kidney disease) stage 3, GFR 30-59 ml/min ([facility])   • Cervical spondylosis without myelopathy   • Degenerative disc disease, lumbar   • Degenerative disc disease, cervical   • Depression with anxiety   • Gastroesophageal reflux disease without esophagitis   • Kidney disease   • Lumbar spondylosis   • Malabsorption due to intolerance, not elsewhere classified   • Neck pain   • Sacroiliitis  ([facility])   • Restless legs syndrome (RLS)        Past Medical History:   Diagnosis Date   • Acid reflux    • Anxiety    • Depressed    • Dysphagia    • GERD (gastroesophageal reflux disease)    • Hard to intubate    • Hyperlipidemia    • Hypertension    • Hypokalemia    • IBS (irritable bowel syndrome)    • Nausea    • Sleep apnea         Past Surgical History:   Procedure Laterality Date   • ABDOMINAL SURGERY     • CARDIAC CATHETERIZATION N/A 8/13/2018    Procedure: Left Heart Cath 8/13/2018 @ 9;00;  Surgeon: Kuldip Frank MD;  Location: [facility] CATH INVASIVE LOCATION;  Service: Cardiology   • COLONOSCOPY N/A 11/19/2018    Procedure: COLONOSCOPY;  Surgeon: Bro Whitaker MD;  Location: [facility] ENDOSCOPY;  Service: Gastroenterology   • CYSTOSCOPY     • ENDOMETRIAL ABLATION  2015   • ENDOSCOPY N/A 11/19/2018    Procedure: ESOPHAGOGASTRODUODENOSCOPYwith dilation;  Surgeon: Bro Whitaker MD;  Location: [facility] ENDOSCOPY;  Service: Gastroenterology   • GASTRIC SLEEVE LAPAROSCOPIC     • LAPAROSCOPIC TUBAL LIGATION  1995   • [place] ERCP DX COLLECTION SPECIMEN BRUSHING/WASHING Left 7/31/2017    Procedure: ENDOSCOPIC RETROGRADE CHOLANGIOPANCREATOGRAPHY;  Surgeon: Samuel Redding DO;  Location: [facility] ENDOSCOPY;  Service: Gastroenterology   • [place] LAP,CHOLECYSTECTOMY N/A 7/24/2017    Procedure: CHOLECYSTECTOMY LAPAROSCOPIC, also umbillical hernia repair;  Surgeon: Pk العلي MD;  Location: [facility] OR;  Service: General   • UPPER GASTROINTESTINAL ENDOSCOPY  11/19/2018        PT Ortho     Row Name 09/12/22 7258       Subjective Comments    Subjective Comments Patient feels PT is not very helpful thus far. Hit her L knee on her bed this morning  -BS       Subjective Pain    Able to rate subjective pain? yes  -BS    Pre-Treatment Pain Level 6  -BS    Post-Treatment Pain Level --  numb  -BS       Posture/Observations    Posture/Observations Comments severe TTP along L MCL ligament  -BS       Knee Special Tests    Valgus stress  (MCL lesion) Left:;Positive  L knee flexed 30°-moderate laxity, positive  -BS       Left Lower Ext    Lt Knee Extension/Flexion AROM 0-0-99°  -BS       MMT (Manual Muscle Testing)    General MMT Comments MMT: L LE-hip flex 4+/5 knee ext 4+/5 knee flex 5/5 hip add 3+/5  -BS       Balance Skills Training    SLS L 7 sec R 3 sec  -BS          User Key  (r) = Recorded By, (t) = Taken By, (c) = Cosigned By    Initials Name Provider Type    Demetrio Ely, PT Physical Therapist                             PT Assessment/Plan     Row Name 09/12/22 0935          PT Assessment    Assessment Comments Minimal progress made thus far with PT. Suspect a MCL sprain. Worsening ROM of the L knee since the PT consult.  -BS            PT Plan    PT Frequency 1x/week;2x/week  -BS     Predicted Duration of Therapy Intervention (PT) 3 weeks  -BS     PT Plan Comments await MD orders to d/c or not from PT.  -BS           User Key  (r) = Recorded By, (t) = Taken By, (c) = Cosigned By    Initials Name Provider Type    Demetrio Ely, PT Physical Therapist                 Modalities     Row Name 09/12/22 0935             Ice    Ice Applied Yes  -BS      Location left knee with IFC  -BS      PT Ice Rx Minutes 15  -BS      Ice S/P Rx Yes  -BS              ELECTRICAL STIMULATION    Attended/Unattended Unattended  -BS      Stimulation Type IFC  -BS      Location/Electrode Placement/Other L knee with ice  -BS      PT E-Stim Unattended Minutes 15  -BS            User Key  (r) = Recorded By, (t) = Taken By, (c) = Cosigned By    Initials Name Provider Type    Demetrio Ely, PT Physical Therapist               OP Exercises     Row Name 09/12/22 0935             Subjective Comments    Subjective Comments Patient feels PT is not very helpful thus far. Hit her L knee on her bed this morning  -BS              Subjective Pain    Able to rate subjective pain? yes  -BS      Pre-Treatment Pain Level 6  -BS      Post-Treatment Pain Level --  numb  -BS   "            Exercise 1    Exercise Name 1 Pro II level 3  -BS      Time 1 10  -BS              Exercise 2    Exercise Name 2 L SLR  -BS      Sets 2 1  -BS      Reps 2 3  -BS      Additional Comments increased L knee pain  -BS              Exercise 3    Exercise Name 3 QS  -BS      Sets 3 1  -BS      Reps 3 20  -BS      Time 3 5\" hold  -BS              Exercise 4    Exercise Name 4 MMT/ROM reassessment  -BS      Time 4 3'  -BS              Exercise 5    Exercise Name 5 SLS  -BS      Reps 5 3 sec on R  -BS      Time 5 7 sec on L  -BS              Exercise 6    Exercise Name 6 L heel slides w/ strap, AAROM  -BS      Sets 6 1  -BS      Reps 6 20  -BS              Exercise 7    Exercise Name 7 see modalities  -BS            User Key  (r) = Recorded By, (t) = Taken By, (c) = Cosigned By    Initials Name Provider Type    Demetrio Ely, PT Physical Therapist                              PT OP Goals     Row Name 09/12/22 1000 09/12/22 0935       PT Short Term Goals    STG Date to Achieve -- 09/06/22  -BS    STG 1 -- Improve L knee AROM to 0-0-120°  -BS    STG 1 Progress -- Not Met  -BS    STG 2 -- Improve L knee flex/ext MMT to 5/5  -BS    STG 2 Progress -- Ongoing;Partially Met  -BS    STG 3 -- Prolonged sitting/standing with 75% reduction in L knee pain  -BS    STG 3 Progress -- Not Met  -BS    STG 4 -- Improve L SLS to >/=10 sec consistently  -BS    STG 4 Progress -- Ongoing;Not Met  -BS    STG 5 -- Improve LEFS score to >/=36  -BS    STG 5 Progress -- Ongoing;Not Met  -BS       Time Calculation    PT Goal Re-Cert Due Date --  -BS 10/03/22  -BS          User Key  (r) = Recorded By, (t) = Taken By, (c) = Cosigned By    Initials Name Provider Type    Demetrio Ely, PT Physical Therapist                     Outcome Measure Options: Lower Extremity Functional Scale (LEFS)  Lower Extremity Functional Index  Any of your usual work, housework or school activities: Quite a bit of difficulty  Your usual hobbies, " recreational or sporting activities: Quite a bit of difficulty  Getting into or out of the bath: No difficulty  Walking between rooms: A little bit of difficulty  Putting on your shoes or socks: A little bit of difficulty  Squatting: Quite a bit of difficulty  Lifting an object, like a bag of groceries from the floor: Moderate difficulty  Performing light activities around your home: Moderate difficulty  Performing heavy activities around your home: Quite a bit of difficulty  Getting into or out of a car: Quite a bit of difficulty  Walking 2 blocks: Extreme difficulty or unable to perform activity  Walking a mile: Extreme difficulty or unable to perform activity  Going up or down 10 stairs (about 1 flight of stairs): Extreme difficulty or unable to perform activity  Standing for 1 hour: Extreme difficulty or unable to perform activity  Sitting for 1 hour: Extreme difficulty or unable to perform activity  Running on even ground: Extreme difficulty or unable to perform activity  Running on uneven ground: Extreme difficulty or unable to perform activity  Making sharp turns while running fast: Extreme difficulty or unable to perform activity  Hopping: Extreme difficulty or unable to perform activity  Rolling over in bed: Quite a bit of difficulty  Total: 20      Time Calculation:   Start Time: 0935  Stop Time: 1030  Time Calculation (min): 55 min  PT Non-Billable Time (min): 15 min  Total Timed Code Minutes- PT: 40 minute(s)  Untimed Charges  PT E-Stim Unattended Minutes: 15  PT Ice Rx Minutes: 15  Total Minutes  Untimed Charges Total Minutes: 30   Total Minutes: 30  Therapy Charges for Today     Code Description Service Date Service Provider Modifiers Qty    01034273991 HC PT THER PROC EA 15 MIN 9/12/2022 Demetrio Drew, PT GP 3    [id number] HC PT ELECTRICAL STIM UNATTENDED 9/12/2022 Demetrio Drew, PT  1          PT G-Codes  Outcome Measure Options: Lower Extremity Functional Scale (LEFS)  Total: 20         Demetrio CARPENTER  Rajendra, PT  9/12/2022

## 2022-09-13 ENCOUNTER — OFFICE VISIT (OUTPATIENT)
Dept: ORTHOPEDIC SURGERY | Facility: CLINIC | Age: 52
End: 2022-09-13

## 2022-09-13 VITALS — BODY MASS INDEX: 37.94 KG/M2 | WEIGHT: 222.2 LBS | HEIGHT: 64 IN

## 2022-09-13 DIAGNOSIS — G89.29 CHRONIC PAIN OF LEFT KNEE: Primary | ICD-10-CM

## 2022-09-13 DIAGNOSIS — M23.92 INTERNAL DERANGEMENT OF LEFT KNEE: ICD-10-CM

## 2022-09-13 DIAGNOSIS — M25.562 CHRONIC PAIN OF LEFT KNEE: Primary | ICD-10-CM

## 2022-09-13 DIAGNOSIS — R25.2 MUSCLE CRAMPS: ICD-10-CM

## 2022-09-13 PROCEDURE — 99214 OFFICE O/P EST MOD 30 MIN: CPT | Performed by: NURSE PRACTITIONER

## 2022-09-13 RX ORDER — CYCLOBENZAPRINE HCL 10 MG
TABLET ORAL
Qty: 30 TABLET | Refills: 0 | Status: SHIPPED | OUTPATIENT
Start: 2022-09-13 | End: 2022-10-07

## 2022-09-13 NOTE — PROGRESS NOTES
"Maricarmen Ferris is a 52 y.o. female returns for     Chief Complaint   Patient presents with   • Left Knee - Follow-up       HISTORY OF PRESENT ILLNESS:     Mrs. Ferris is a 52-year-old female who presents today for follow-up of medial, left knee pain.  Left knee pain has been present for around the past 8 weeks.  She has been treated with NSAIDs, cane, brace, activity modification, formal physical therapy, and intra-articular injection without relief of symptoms.  Pain continues to cause catching and occasional popping.  She continues to have pain with rolling over bed at night and difficulty squatting.           CONCURRENT MEDICAL HISTORY:    The following portions of the patient's history were reviewed and updated as appropriate: allergies, current medications, past family history, past medical history, past social history, past surgical history and problem list.     ROS  No fevers or chills.  No chest pain or shortness of air.  No GI or  disturbances.  Left knee pain.    PHYSICAL EXAMINATION:       Ht 162.6 cm (64\")   Wt 101 kg (222 lb 3.2 oz)   BMI 38.14 kg/m²     Physical Exam  Vitals and nursing note reviewed.   Constitutional:       General: She is not in acute distress.     Appearance: She is well-developed. She is not toxic-appearing.   HENT:      Head: Normocephalic.   Pulmonary:      Effort: Pulmonary effort is normal. No respiratory distress.   Musculoskeletal:      Left knee: No effusion.      Instability Tests: Medial Mer test positive. Lateral Mer test negative.   Skin:     General: Skin is warm and dry.   Neurological:      Mental Status: She is alert and oriented to person, place, and time.   Psychiatric:         Behavior: Behavior normal.         Thought Content: Thought content normal.         Judgment: Judgment normal.         GAIT:     [x]  Normal  []  Antalgic    Assistive device: [x]  None  []  Walker     []  Crutches  []  Cane     []  Wheelchair  []  Stretcher    Left " Knee Exam     Range of Motion   Extension: -5   Flexion: 110     Tests   Mer:  Medial - positive Lateral - negative  Varus: negative Valgus: negative  Drawer:  Anterior - negative     Posterior - negative    Other   Erythema: absent  Sensation: normal  Pulse: present  Swelling: mild  Effusion: no effusion present    Comments:  No evidence of infection.  Mild pain limitations with arc of motion.  Neurovascular intact              No results found.    Standing AP knees     HISTORY: Pain. Left knee pain.     AP standing view of each knee obtained.     COMPARISON: Left knee July 20, 2022     FINDINGS:   No fracture or dislocation demonstrated on the AP projection.  Minimal to moderate narrowing of each medial joint space.  Bilateral chondrocalcinosis, greater on the left.  No other osseous or articular abnormality.     IMPRESSION:  CONCLUSION:  Minimal to moderate narrowing of each medial joint space.  Bilateral chondrocalcinosis, greater on the left.     03535     Electronically signed by:  Christiano Enciso MD  8/3/2022 11:41 AM CDT  Workstation: [id number]      Left knee two views July 20, 2022     INDICATION: Posttraumatic pain     FINDINGS:  Bones normally mineralized.  No fracture or dislocation.  No significant knee joint effusion.  Diffuse chondrocalcinosis.  No focal bony lesions.  Joint spaces grossly preserved.     IMPRESSION:  Diffuse chondrocalcinosis.  Otherwise unremarkable examination.     Electronically signed by:  Sj Rodriguez MD  7/20/2022 9:44 AM  CDT Workstation: [id number]      ASSESSMENT:    Diagnoses and all orders for this visit:    Chronic pain of left knee  -     MRI Knee Left Without Contrast; Future    Internal derangement of left knee  -     MRI Knee Left Without Contrast; Future          PLAN    Patient continues to have exam and complaints consistent with meniscal pathology.  Patient has tried and failed many conservative measures including activity modification, weightbearing  modification, bracing, NSAIDs, intra-articular injections, and formal physical therapy.  Recommend MRI to assess for possible surgical indications.    Recommend the following:    -Rest and activity modification as tolerated and based on pain.  -Modified weightbearing of the affected extremity with use of a cane or walker as needed.  -Gradual progression of weightbearing and activity as pain and swelling allow.  -Conditioning and strengthening exercises of the bilateral knees/legs.  -Elevation and ice therapy to the affected knee to minimize pain/swelling/inflammation.   -Tylenol, Aleve or Ibuprofen as needed for pain/discomfort.          Return for MRI results .    EMR Dragon/Transciption Disclaimer: Some of this note may be an electronic transcription/translation of spoken language to printed text.  The electronic translation of spoken language may permit erroneous, or at times, nonsensical words or phrases to be inadvertently transcribed. Although I have reviewed the note for such errors, some may still exist.     Time spent of a minimum of 30 minutes including the face to face evaluation, reviewing of medical history and prior medial records, reviewing of diagnostic studies, ordering additional tests, documentation, patient education and coordination of care.         This document has been electronically signed by Zahra MOREL on September 13, 2022 08:38 CDT

## 2022-09-14 ENCOUNTER — APPOINTMENT (OUTPATIENT)
Dept: PHYSICAL THERAPY | Facility: HOSPITAL | Age: 52
End: 2022-09-14

## 2022-09-14 ENCOUNTER — OFFICE VISIT (OUTPATIENT)
Dept: SLEEP MEDICINE | Facility: HOSPITAL | Age: 52
End: 2022-09-14

## 2022-09-14 ENCOUNTER — HOSPITAL ENCOUNTER (OUTPATIENT)
Dept: MRI IMAGING | Facility: HOSPITAL | Age: 52
Discharge: HOME | End: 2022-09-14
Admitting: NURSE PRACTITIONER

## 2022-09-14 VITALS
SYSTOLIC BLOOD PRESSURE: 120 MMHG | DIASTOLIC BLOOD PRESSURE: 70 MMHG | HEART RATE: 80 BPM | OXYGEN SATURATION: 95 % | BODY MASS INDEX: 36.98 KG/M2 | WEIGHT: 216.6 LBS | HEIGHT: 64 IN

## 2022-09-14 DIAGNOSIS — G47.33 OSA (OBSTRUCTIVE SLEEP APNEA): Primary | ICD-10-CM

## 2022-09-14 DIAGNOSIS — F51.04 PSYCHOPHYSIOLOGICAL INSOMNIA: ICD-10-CM

## 2022-09-14 DIAGNOSIS — E66.01 MORBID (SEVERE) OBESITY DUE TO EXCESS CALORIES: ICD-10-CM

## 2022-09-14 DIAGNOSIS — G89.29 CHRONIC PAIN OF LEFT KNEE: ICD-10-CM

## 2022-09-14 DIAGNOSIS — G25.81 RESTLESS LEGS SYNDROME (RLS): ICD-10-CM

## 2022-09-14 DIAGNOSIS — M23.92 INTERNAL DERANGEMENT OF LEFT KNEE: ICD-10-CM

## 2022-09-14 DIAGNOSIS — M25.562 CHRONIC PAIN OF LEFT KNEE: ICD-10-CM

## 2022-09-14 PROCEDURE — 73721 MRI JNT OF LWR EXTRE W/O DYE: CPT

## 2022-09-14 PROCEDURE — 99213 OFFICE O/P EST LOW 20 MIN: CPT | Performed by: NURSE PRACTITIONER

## 2022-09-14 RX ORDER — TRAZODONE HYDROCHLORIDE 50 MG/1
50 TABLET ORAL NIGHTLY
COMMUNITY
End: 2023-03-21 | Stop reason: SDUPTHER
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
## 2022-09-19 ENCOUNTER — TELEPHONE (OUTPATIENT)
Dept: FAMILY MEDICINE CLINIC | Facility: CLINIC | Age: 52
End: 2022-09-19

## 2022-09-19 NOTE — TELEPHONE ENCOUNTER
Ms. Ferris,     Unfortunately, MARIA TERESA Ballesteros is out of office until Friday 09/23/2022.  I have sent her a message under his medical record.  I do not know if she is able to check messages or not so it will probably be Friday before she see's the message.     JADIEL Marlow       ----- Message -----  From: Ele Perez LPN  Sent: 9/19/2022   9:57 AM CDT  To: MARIA TERESA Do  Subject: FW: Owen Latham                                 Can you help with this or does it need to wait for Lesli to return on Friday?     JADIEL Marlow    ----- Message -----  From: Maricarmen Laura Barrington  Sent: 9/19/2022   9:26 AM CDT  To: Riri [address]  Subject: Owen Latham                                     Good morning Lesli I have been helping with the care of   Owen Latham 2/14/1987  He has a bed from [facility]   It want inflate fully when I went to ask them if they could check it they said yes then called [facility] this morning and said they had to order from you to be able to look at it which I think is crazy it’s there products if something is wrong with them they should fix it   But could you please send an order to [facility] for [facility] bed he sleeps in every night   Thank you so much   Your the best

## 2022-09-23 DIAGNOSIS — H66.90 ACUTE OTITIS MEDIA, UNSPECIFIED OTITIS MEDIA TYPE: ICD-10-CM

## 2022-09-23 RX ORDER — FLUTICASONE PROPIONATE 50 MCG
SPRAY, SUSPENSION (ML) NASAL
Qty: 16 ML | Refills: 3 | Status: SHIPPED | OUTPATIENT
Start: 2022-09-23 | End: 2023-03-31

## 2022-09-24 DIAGNOSIS — Z00.00 HEALTHCARE MAINTENANCE: ICD-10-CM

## 2022-09-26 RX ORDER — ONDANSETRON 4 MG/1
4 TABLET, ORALLY DISINTEGRATING ORAL EVERY 6 HOURS PRN
Qty: 15 TABLET | Refills: 3 | Status: SHIPPED | OUTPATIENT
Start: 2022-09-26

## 2022-09-29 ENCOUNTER — OFFICE VISIT (OUTPATIENT)
Dept: ORTHOPEDIC SURGERY | Facility: CLINIC | Age: 52
End: 2022-09-29

## 2022-09-29 VITALS — WEIGHT: 218 LBS | BODY MASS INDEX: 37.22 KG/M2 | HEIGHT: 64 IN

## 2022-09-29 DIAGNOSIS — G89.29 CHRONIC PAIN OF LEFT KNEE: Primary | ICD-10-CM

## 2022-09-29 DIAGNOSIS — M25.562 CHRONIC PAIN OF LEFT KNEE: Primary | ICD-10-CM

## 2022-09-29 DIAGNOSIS — M23.92 INTERNAL DERANGEMENT OF LEFT KNEE: ICD-10-CM

## 2022-09-29 DIAGNOSIS — S83.412A SPRAIN OF MEDIAL COLLATERAL LIGAMENT OF LEFT KNEE, INITIAL ENCOUNTER: ICD-10-CM

## 2022-09-29 PROCEDURE — 99213 OFFICE O/P EST LOW 20 MIN: CPT | Performed by: NURSE PRACTITIONER

## 2022-09-29 NOTE — PROGRESS NOTES
"Maricarmen Ferris is a 52 y.o. female returns for     Chief Complaint   Patient presents with   • Left Knee - Pain, Follow-up   • Results       HISTORY OF PRESENT ILLNESS:    Mrs. Ferris is a 52-year-old female who presents today to follow-up on MRI results of left knee.  Left knee pain has been present for around the past 10 weeks.  She has been treated with NSAIDs, cane, brace, activity modification, formal physical therapy, and intra-articular injection without relief of symptoms.  Pain continues to cause catching and occasional popping.  She continues to have pain with rolling over bed at night and difficulty squatting.  She is wearing knee brace today.             CONCURRENT MEDICAL HISTORY:    The following portions of the patient's history were reviewed and updated as appropriate: allergies, current medications, past family history, past medical history, past social history, past surgical history and problem list.     ROS  No fevers or chills.  No chest pain or shortness of air.  No GI or  disturbances. Left knee pain    PHYSICAL EXAMINATION:       Ht 162.6 cm (64\")   Wt 98.9 kg (218 lb)   BMI 37.42 kg/m²     Physical Exam  Vitals and nursing note reviewed.   Constitutional:       General: She is not in acute distress.     Appearance: She is well-developed. She is not toxic-appearing or diaphoretic.   HENT:      Head: Normocephalic.   Eyes:      General: No scleral icterus.  Pulmonary:      Effort: Pulmonary effort is normal. No respiratory distress.   Musculoskeletal:      Left knee: No effusion.   Skin:     General: Skin is warm and dry.   Neurological:      Mental Status: She is alert and oriented to person, place, and time.   Psychiatric:         Mood and Affect: Mood normal.         Behavior: Behavior normal.         Thought Content: Thought content normal.         Judgment: Judgment normal.         GAIT:     []  Normal  []  Antalgic    Assistive device: []  None  []  Walker     []  Crutches  [] "  Cane     []  Wheelchair  []  Stretcher    Left Knee Exam     Tenderness   The patient is experiencing tenderness in the medial joint line and lateral joint line.    Range of Motion   Extension: -5   Flexion: 120     Tests   Varus: negative Valgus: positive  Drawer:  Anterior - negative     Posterior - negative    Other   Erythema: absent  Sensation: normal  Pulse: present  Effusion: no effusion present    Comments:  No evidence of infection.  Mild pain limitations with arc of motion.  Neurovascular intact              MRI Knee Left Without Contrast    Result Date: 9/19/2022  Narrative: MRI left knee. HISTORY: Left knee pain. Prior exam left knee July 20, 2022. Technique: Multiplanar multisequence noncontrast images left knee. Small supra patellar effusion. Thickening and laxity, waviness of the lateral patellar retinaculum suggesting partial thickness injury. Series 3 image 9. Normal patellar articular hyaline cartilage. Increased signal intensity, edema and thickening of a large portion medial collateral ligament suggesting high-grade partial-thickness injury. Normal medial meniscus. Normal lateral meniscus.     Impression: Soft tissue thickening and edema involving a large portion of the medial collateral ligament suggesting high-grade partial-thickness injury. Laxity, waviness and increased signal intensity involving most superior portion of the lateral patellar retinaculum, partial-thickness injury. Electronically signed by:  David Monique MD  9/19/2022 11:17 AM CDT Workstation: [id number]            ASSESSMENT:    Diagnoses and all orders for this visit:    Chronic pain of left knee  -     Ambulatory Referral to Physical Therapy Evaluate and treat; (as indicated ); Stretching, ROM, Strengthening    Internal derangement of left knee  -     Ambulatory Referral to Physical Therapy Evaluate and treat; (as indicated ); Stretching, ROM, Strengthening    Sprain of medial collateral ligament of left knee, initial  encounter  -     Ambulatory Referral to Physical Therapy Evaluate and treat; (as indicated ); Stretching, ROM, Strengthening          PLAN    MRI results reviewed with patient.  Patient has high-grade partial-thickness injury to her MCL as well as partial-thickness injury to her lateral retinaculum.  Patient has tried and failed conservative management including NSAIDs, cane, brace, activity modification, formal physical therapy, and intra-articular injection.  Recommend patient return to physical therapy for additional visits and targeted PT now that MRI has been performed.  If patient fails to improve in 4 to 6 weeks with additional physical therapy, recommend she return to see orthopedic surgeon.  Patient reports satisfaction with this plan of care.  New physical therapy order placed to [facility].    Return in about 4 weeks (around 10/27/2022).    EMR Dragon/Transciption Disclaimer: Some of this note may be an electronic transcription/translation of spoken language to printed text.  The electronic translation of spoken language may permit erroneous, or at times, nonsensical words or phrases to be inadvertently transcribed. Although I have reviewed the note for such errors, some may still exist.     Time spent of a minimum of 30 minutes including the face to face evaluation, reviewing of medical history and prior medial records, reviewing of diagnostic studies, ordering additional tests, documentation, patient education and coordination of care.       This document has been electronically signed by Zahra MOREL on September 29, 2022 15:48 CDT

## 2022-10-06 DIAGNOSIS — R25.2 MUSCLE CRAMPS: ICD-10-CM

## 2022-10-07 ENCOUNTER — APPOINTMENT (OUTPATIENT)
Dept: PHYSICAL THERAPY | Facility: HOSPITAL | Age: 52
End: 2022-10-07

## 2022-10-07 RX ORDER — CYCLOBENZAPRINE HCL 10 MG
TABLET ORAL
Qty: 30 TABLET | Refills: 0 | Status: SHIPPED | OUTPATIENT
Start: 2022-10-07 | End: 2022-11-01

## 2022-10-11 RX ORDER — OMEPRAZOLE 40 MG/1
CAPSULE, DELAYED RELEASE ORAL
Qty: 30 CAPSULE | Refills: 0 | Status: SHIPPED | OUTPATIENT
Start: 2022-10-11 | End: 2022-11-10

## 2022-10-17 ENCOUNTER — HOSPITAL ENCOUNTER (OUTPATIENT)
Dept: PHYSICAL THERAPY | Facility: HOSPITAL | Age: 52
Setting detail: THERAPIES SERIES
Discharge: HOME | End: 2022-10-17

## 2022-10-17 DIAGNOSIS — S83.412A SPRAIN OF MEDIAL COLLATERAL LIGAMENT OF LEFT KNEE, INITIAL ENCOUNTER: Primary | ICD-10-CM

## 2022-10-17 DIAGNOSIS — M23.92 INTERNAL DERANGEMENT OF LEFT KNEE: ICD-10-CM

## 2022-10-17 DIAGNOSIS — G89.29 CHRONIC PAIN OF LEFT KNEE: ICD-10-CM

## 2022-10-17 DIAGNOSIS — M25.562 CHRONIC PAIN OF LEFT KNEE: ICD-10-CM

## 2022-10-17 PROCEDURE — 97162 PT EVAL MOD COMPLEX 30 MIN: CPT | Performed by: PHYSICAL THERAPIST

## 2022-10-17 NOTE — THERAPY EVALUATION
Outpatient Physical Therapy Ortho Initial Evaluation  [facility]     Patient Name: Maricarmen Ferris  : 1970  MRN: [id number]  Today's Date: 10/17/2022      Visit Date: 10/17/2022  Visit   Return to MD: ANITHA  Re-cert date: 22  Patient Active Problem List   Diagnosis   • Chronic cholecystitis   • Umbilical hernia without obstruction and without gangrene   • Abdominal pain   • Abdominal fluid collection   • Intra-hepatic bile leak   • Abnormal cardiovascular function study   • Chest pain   • Class 1 obesity due to excess calories with serious comorbidity and body mass index (BMI) of 34.0 to 34.9 in adult   • Restrictive lung disease secondary to obesity   • Nausea   • Weight gain, abnormal   • Pain of upper abdomen   • Gastroesophageal reflux disease with esophagitis   • Irritable bowel syndrome with both constipation and diarrhea   • Dysphagia   • Mild persistent asthma without complication   • ELSIE on CPAP   • Acute kidney injury (HCC)   • DYAN (acute kidney injury) ([facility])   • Hypertension   • Hyperlipidemia   • Hyperkalemia   • Dehydration   • Hypokalemia   • History of second hand smoke exposure   • Obesity (BMI 30-39.9)   • History of weight loss surgery   • Acute cystitis without hematuria   • Allergic rhinitis   • Asthma   • Chronic left-sided low back pain with left-sided sciatica   • CKD (chronic kidney disease) stage 3, GFR 30-59 ml/min ([facility])   • Cervical spondylosis without myelopathy   • Degenerative disc disease, lumbar   • Degenerative disc disease, cervical   • Depression with anxiety   • Gastroesophageal reflux disease without esophagitis   • Kidney disease   • Lumbar spondylosis   • Malabsorption due to intolerance, not elsewhere classified   • Neck pain   • Sacroiliitis (HCC)   • Restless legs syndrome (RLS)   • Chronic pain of left knee   • Internal derangement of left knee        Past Medical History:   Diagnosis Date   • Acid reflux    • Anxiety    • Depressed    •  Dysphagia    • GERD (gastroesophageal reflux disease)    • Hard to intubate    • Hyperlipidemia    • Hypertension    • Hypokalemia    • IBS (irritable bowel syndrome)    • Nausea    • Sleep apnea         Past Surgical History:   Procedure Laterality Date   • ABDOMINAL SURGERY     • CARDIAC CATHETERIZATION N/A 8/13/2018    Procedure: Left Heart Cath 8/13/2018 @ 9;00;  Surgeon: Kuldip Frank MD;  Location: [facility] CATH INVASIVE LOCATION;  Service: Cardiology   • COLONOSCOPY N/A 11/19/2018    Procedure: COLONOSCOPY;  Surgeon: Bro Whitaker MD;  Location: [facility] ENDOSCOPY;  Service: Gastroenterology   • CYSTOSCOPY     • ENDOMETRIAL ABLATION  2015   • ENDOSCOPY N/A 11/19/2018    Procedure: ESOPHAGOGASTRODUODENOSCOPYwith dilation;  Surgeon: Bro Whitaker MD;  Location: [facility] ENDOSCOPY;  Service: Gastroenterology   • GASTRIC SLEEVE LAPAROSCOPIC     • LAPAROSCOPIC TUBAL LIGATION  1995   • TX ERCP DX COLLECTION SPECIMEN BRUSHING/WASHING Left 7/31/2017    Procedure: ENDOSCOPIC RETROGRADE CHOLANGIOPANCREATOGRAPHY;  Surgeon: Samuel Redding DO;  Location: [facility] ENDOSCOPY;  Service: Gastroenterology   • TX LAP,CHOLECYSTECTOMY N/A 7/24/2017    Procedure: CHOLECYSTECTOMY LAPAROSCOPIC, also umbillical hernia repair;  Surgeon: Pk العلي MD;  Location: [facility] OR;  Service: General   • UPPER GASTROINTESTINAL ENDOSCOPY  11/19/2018       Visit Dx:     ICD-10-CM ICD-9-CM   1. Sprain of medial collateral ligament of left knee, initial encounter  S83.412A 844.1   2. Chronic pain of left knee  M25.562 719.46    G89.29 338.29   3. Internal derangement of left knee  M23.92 717.9     Medications (Admitted on 10/17/2022)       aMILoride (MIDAMOR) 5 MG tablet  buPROPion SR (WELLBUTRIN SR) 150 MG 12 hr tablet  cyclobenzaprine (FLEXERIL) 10 MG tablet  fluticasone (FLONASE) 50 MCG/ACT nasal spray  furosemide (LASIX) 40 MG tablet  gabapentin (NEURONTIN) 600 MG tablet  HYDROcodone-acetaminophen (NORCO) 5-325 MG per tablet  KLOR-CON 20  MEQ CR tablet  lisinopril (PRINIVIL,ZESTRIL) 2.5 MG tablet  loperamide (IMODIUM) 2 MG capsule  loratadine (CLARITIN) 10 MG tablet  methylPREDNISolone (MEDROL) 4 MG dose pack  nitroglycerin (NITROSTAT) 0.4 MG SL tablet  nystatin (MYCOSTATIN) 191338 UNIT/GM powder  omeprazole (priLOSEC) 40 MG capsule  ondansetron ODT (ZOFRAN-ODT) 4 MG disintegrating tablet  promethazine (PHENERGAN) 25 MG tablet  rOPINIRole (REQUIP) 0.25 MG tablet  sertraline (ZOLOFT) 50 MG tablet  traZODone (DESYREL) 50 MG tablet      Allergies        Sulfa AntibioticsAnaphylaxis, GI Intolerance      PT Ortho     Row Name 10/17/22 3295       Subjective Comments    Subjective Comments 53 yo female with 3 month h/o L knee pain (July 2022)  since she twisted her L knee while her L foot missed a step with a step ladder and had sharp pain and edema at the time. Went to urgent care the next day. Issued pain meds and an injection at the time. Has medial L knee pain, throbbing pain at times. Pain worst at night with sleeping. Easing factors: ice/heat, elevation. Aggravating factors: laying down, prolonged sitting, prolonged standing, walking, squatting. Occupation: disabled x 2 yrs. Lives with step father in a single level home with ramp access.  -BS       Precautions and Contraindications    Precautions/Limitations no known precautions/limitations  -BS       Subjective Pain    Able to rate subjective pain? yes  -BS    Pre-Treatment Pain Level 5  -BS    Post-Treatment Pain Level 5  -BS       Posture/Observations    Posture/Observations Comments TTP along L medial knee and R MCL region.  -BS       General ROM    GENERAL ROM COMMENTS AROM: L knee 0-2-99° R knee WFL  -BS       MMT (Manual Muscle Testing)    General MMT Comments MMT: R LE 5/5 L LE-hip flex 4+/5 hip abd 4+/5 hip add 4+/5 knee ext 4+/5 knee flex 4+/5 ankle DF 5/5  -BS       Sensation    Light Touch No apparent deficits  -BS    Additional Comments no B LE ataxia  -BS       Flexibility    Flexibility  Tested? Lower Extremity  -BS       Lower Extremity Flexibility    Hamstrings Left:;Severely limited;Right:;WNL  L 53° R 75°  -BS       Balance Skills Training    SLS R 5,15 sec; L 2,6 sec  -BS    Balance Comments L>R knee instability  -BS          User Key  (r) = Recorded By, (t) = Taken By, (c) = Cosigned By    Initials Name Provider Type    Demetrio Ely, PT Physical Therapist                                   PT OP Goals     Row Name 10/17/22 1109          PT Short Term Goals    STG Date to Achieve 10/31/22  -BS     STG 1 Improve L knee AROM to 0-0-120°  -BS     STG 1 Progress New  -BS     STG 2 Improve L knee flex/ext MMT to 5/5  -BS     STG 2 Progress New  -BS     STG 3 Improve L SLS to >/=20 sec consistently  -BS     STG 3 Progress New  -BS     STG 4 Reduce L knee pain by 50% with prolonged sittin and standing  -BS     STG 4 Progress New  -BS     STG 5 Improve LEFS score to 26 or greater  -BS     STG 5 Progress New  -BS        Time Calculation    PT Goal Re-Cert Due Date 11/07/22  -BS           User Key  (r) = Recorded By, (t) = Taken By, (c) = Cosigned By    Initials Name Provider Type    Demetrio Ely, PT Physical Therapist                 PT Assessment/Plan     Row Name 10/17/22 1109          PT Assessment    Functional Limitations Impaired gait;Performance in leisure activities;Limitation in home management;Performance in sport activities;Performance in work activities  -BS     Impairments Balance;Endurance;Gait;Muscle strength;Pain;Range of motion  -BS     Assessment Comments 51 yo female with L knee pain s/p MCL sprain with damange to the lateral patellar retinaculum,  partial-thickness injury (per MRI findings). Presents with ROM loss, L knee/hip weakness, impaired standing balance and loss in function.  -BS     Please refer to paper survey for additional self-reported information Yes  -BS     Rehab Potential Good  -BS     Patient/caregiver participated in establishment of treatment plan and  goals Yes  -BS     Patient would benefit from skilled therapy intervention Yes  -BS        PT Plan    PT Frequency 2x/week  -BS     Predicted Duration of Therapy Intervention (PT) 3 weeks then TBD  -BS     Planned CPT's? PT EVAL MOD COMPLELITY: 99009;PT THER PROC EA 15 MIN: 60323;PT MANUAL THERAPY EA 15 MIN: 17911;PT THER ACT EA 15 MIN: 03994;PT NEUROMUSC RE-EDUCATION EA 15 MIN: 78849;PT GAIT TRAINING EA 15 MIN: 87982;PT ELECTRICAL STIM UNATTEND: ;PT HOT/COLD PACK WC NONMCARE: 03263;PT THER SUPP EA 15 MIN  -BS     Physical Therapy Interventions (Optional Details) balance training;home exercise program;joint mobilization;manual therapy techniques;modalities;neuromuscular re-education;patient/family education;stair training;strengthening;stretching;ROM (Range of Motion)  -BS     PT Plan Comments Address L knee ROM loss, L knee/hip strengthening.  -BS           User Key  (r) = Recorded By, (t) = Taken By, (c) = Cosigned By    Initials Name Provider Type    Demetrio Ely, PT Physical Therapist                   OP Exercises     Row Name 10/17/22 1109             Subjective Comments    Subjective Comments 53 yo female with 3 month h/o L knee pain (July 2022)  since she twisted her L knee while her L foot missed a step with a step ladder and had sharp pain and edema at the time. Went to urgent care the next day. Issued pain meds and an injection at the time. Has medial L knee pain, throbbing pain at times. Pain worst at night with sleeping. Easing factors: ice/heat, elevation. Aggravating factors: laying down, prolonged sitting, prolonged standing, walking, squatting. Occupation: disabled x 2 yrs. Lives with step father in a single level home with ramp access.  -BS         Subjective Pain    Able to rate subjective pain? yes  -BS      Pre-Treatment Pain Level 5  -BS      Post-Treatment Pain Level 5  -BS         Exercise 1    Exercise Name 1 LAQ's, L  -BS      Sets 1 1  -BS      Reps 1 10  -BS         Exercise 2     Exercise Name 2 supine L heel slides w/ strap  -BS      Sets 2 1  -BS      Reps 2 10  -BS         Exercise 3    Exercise Name 3 L SLR  -BS      Sets 3 1  -BS      Reps 3 10  -BS            User Key  (r) = Recorded By, (t) = Taken By, (c) = Cosigned By    Initials Name Provider Type    Demetrio Ely, PT Physical Therapist                              Outcome Measure Options: Lower Extremity Functional Scale (LEFS)  Lower Extremity Functional Index  Any of your usual work, housework or school activities: Moderate difficulty  Your usual hobbies, recreational or sporting activities: Quite a bit of difficulty  Getting into or out of the bath: No difficulty  Walking between rooms: A little bit of difficulty  Putting on your shoes or socks: Quite a bit of difficulty  Squatting: Quite a bit of difficulty  Lifting an object, like a bag of groceries from the floor: Quite a bit of difficulty  Performing light activities around your home: Moderate difficulty  Performing heavy activities around your home: Extreme difficulty or unable to perform activity  Getting into or out of a car: Quite a bit of difficulty  Walking 2 blocks: Extreme difficulty or unable to perform activity  Walking a mile: Extreme difficulty or unable to perform activity  Going up or down 10 stairs (about 1 flight of stairs): Extreme difficulty or unable to perform activity  Standing for 1 hour: Extreme difficulty or unable to perform activity  Sitting for 1 hour: Extreme difficulty or unable to perform activity  Running on even ground: Extreme difficulty or unable to perform activity  Running on uneven ground: Extreme difficulty or unable to perform activity  Making sharp turns while running fast: Extreme difficulty or unable to perform activity  Hopping: Extreme difficulty or unable to perform activity  Rolling over in bed: Extreme difficulty or unable to perform activity  Total: 16      Time Calculation:     Start Time: 1109  Stop Time: 1145  Time  Calculation (min): 36 min  Total Timed Code Minutes- PT: 36 minute(s)     Therapy Charges for Today     Code Description Service Date Service Provider Modifiers Qty    15744260938 HC PT EVAL MOD COMPLEXITY 3 10/17/2022 Demetrio Drew, PT GP 1          PT G-Codes  Outcome Measure Options: Lower Extremity Functional Scale (LEFS)  Total: 16         Demetrio Drew, PT  10/17/2022

## 2022-10-25 ENCOUNTER — HOSPITAL ENCOUNTER (OUTPATIENT)
Dept: PHYSICAL THERAPY | Facility: HOSPITAL | Age: 52
Setting detail: THERAPIES SERIES
Discharge: HOME | End: 2022-10-25

## 2022-10-25 DIAGNOSIS — M23.92 INTERNAL DERANGEMENT OF LEFT KNEE: ICD-10-CM

## 2022-10-25 DIAGNOSIS — G89.29 CHRONIC PAIN OF LEFT KNEE: ICD-10-CM

## 2022-10-25 DIAGNOSIS — M25.562 CHRONIC PAIN OF LEFT KNEE: ICD-10-CM

## 2022-10-25 DIAGNOSIS — S83.412A SPRAIN OF MEDIAL COLLATERAL LIGAMENT OF LEFT KNEE, INITIAL ENCOUNTER: Primary | ICD-10-CM

## 2022-10-25 PROCEDURE — 97110 THERAPEUTIC EXERCISES: CPT

## 2022-10-25 PROCEDURE — G0283 ELEC STIM OTHER THAN WOUND: HCPCS

## 2022-10-25 NOTE — THERAPY TREATMENT NOTE
Outpatient Physical Therapy Ortho Treatment Note  [facility]     Patient Name: Maricarmen Ferris  : 1970  MRN: [id number]  Today's Date: 10/25/2022      Visit Date: 10/25/2022  Attendance: 2/2 of 3  Subjective improvement:  50%  Recert:  ANITHA KHAN Appointment: tomorrow with ortho    Visit Dx:    ICD-10-CM ICD-9-CM   1. Sprain of medial collateral ligament of left knee, initial encounter  S83.412A 844.1   2. Chronic pain of left knee  M25.562 719.46    G89.29 338.29   3. Internal derangement of left knee  M23.92 717.9       Patient Active Problem List   Diagnosis   • Chronic cholecystitis   • Umbilical hernia without obstruction and without gangrene   • Abdominal pain   • Abdominal fluid collection   • Intra-hepatic bile leak   • Abnormal cardiovascular function study   • Chest pain   • Class 1 obesity due to excess calories with serious comorbidity and body mass index (BMI) of 34.0 to 34.9 in adult   • Restrictive lung disease secondary to obesity   • Nausea   • Weight gain, abnormal   • Pain of upper abdomen   • Gastroesophageal reflux disease with esophagitis   • Irritable bowel syndrome with both constipation and diarrhea   • Dysphagia   • Mild persistent asthma without complication   • ELSIE on CPAP   • Acute kidney injury (HCC)   • DYAN (acute kidney injury) ([facility])   • Hypertension   • Hyperlipidemia   • Hyperkalemia   • Dehydration   • Hypokalemia   • History of second hand smoke exposure   • Obesity (BMI 30-39.9)   • History of weight loss surgery   • Acute cystitis without hematuria   • Allergic rhinitis   • Asthma   • Chronic left-sided low back pain with left-sided sciatica   • CKD (chronic kidney disease) stage 3, GFR 30-59 ml/min ([facility])   • Cervical spondylosis without myelopathy   • Degenerative disc disease, lumbar   • Degenerative disc disease, cervical   • Depression with anxiety   • Gastroesophageal reflux disease without esophagitis   • Kidney disease   • Lumbar spondylosis   •  Malabsorption due to intolerance, not elsewhere classified   • Neck pain   • Sacroiliitis (HCC)   • Restless legs syndrome (RLS)   • Chronic pain of left knee   • Internal derangement of left knee        Past Medical History:   Diagnosis Date   • Acid reflux    • Anxiety    • Depressed    • Dysphagia    • GERD (gastroesophageal reflux disease)    • Hard to intubate    • Hyperlipidemia    • Hypertension    • Hypokalemia    • IBS (irritable bowel syndrome)    • Nausea    • Sleep apnea         Past Surgical History:   Procedure Laterality Date   • ABDOMINAL SURGERY     • CARDIAC CATHETERIZATION N/A 8/13/2018    Procedure: Left Heart Cath 8/13/2018 @ 9;00;  Surgeon: Kuldip Frank MD;  Location: [facility] CATH INVASIVE LOCATION;  Service: Cardiology   • COLONOSCOPY N/A 11/19/2018    Procedure: COLONOSCOPY;  Surgeon: Bro Whitaker MD;  Location: [facility] ENDOSCOPY;  Service: Gastroenterology   • CYSTOSCOPY     • ENDOMETRIAL ABLATION  2015   • ENDOSCOPY N/A 11/19/2018    Procedure: ESOPHAGOGASTRODUODENOSCOPYwith dilation;  Surgeon: Bro Whitaker MD;  Location: [facility] ENDOSCOPY;  Service: Gastroenterology   • GASTRIC SLEEVE LAPAROSCOPIC     • LAPAROSCOPIC TUBAL LIGATION  1995   • NE ERCP DX COLLECTION SPECIMEN BRUSHING/WASHING Left 7/31/2017    Procedure: ENDOSCOPIC RETROGRADE CHOLANGIOPANCREATOGRAPHY;  Surgeon: Samuel Redding DO;  Location: [facility] ENDOSCOPY;  Service: Gastroenterology   • NE LAP,CHOLECYSTECTOMY N/A 7/24/2017    Procedure: CHOLECYSTECTOMY LAPAROSCOPIC, also umbillical hernia repair;  Surgeon: Pk العلي MD;  Location: [facility] OR;  Service: General   • UPPER GASTROINTESTINAL ENDOSCOPY  11/19/2018                        PT Assessment/Plan     Row Name 10/25/22 1400          PT Assessment    Functional Limitations Impaired gait;Performance in leisure activities;Limitation in home management;Performance in sport activities;Performance in work activities  -EM     Impairments Balance;Endurance;Gait;Muscle  strength;Pain;Range of motion  -EM     Assessment Comments Pt alyson tx well with progressed therex regime focused on medial knee stability. Improved pain post tx.  -EM     Rehab Potential Good  -EM     Patient/caregiver participated in establishment of treatment plan and goals Yes  -EM     Patient would benefit from skilled therapy intervention Yes  -EM        PT Plan    PT Frequency 2x/week  -EM     Predicted Duration of Therapy Intervention (PT) 3 weeks with further TBD  -EM     PT Plan Comments 1 visit remains approved.Cont hip and knee strengthening with focus on VMO. Update HEP.   -EM           User Key  (r) = Recorded By, (t) = Taken By, (c) = Cosigned By    Initials Name Provider Type    EM Gabe Enciso, PTA Physical Therapist Assistant                 Modalities     Row Name 10/25/22 1400             Subjective Pain    Post-Treatment Pain Level 5  -EM         Ice    Ice Applied Yes  -EM      Location L knee  -EM      Ice S/P Rx Yes  -EM         ELECTRICAL STIMULATION    Attended/Unattended Unattended  -EM      Stimulation Type IFC  w/ ice/elev  -EM      Max mAmp 17  -EM      Location/Electrode Placement/Other L knee  -EM            User Key  (r) = Recorded By, (t) = Taken By, (c) = Cosigned By    Initials Name Provider Type    EM Gabe Enciso, PTA Physical Therapist Assistant               OP Exercises     Row Name 10/25/22 1400             Subjective Comments    Subjective Comments Pt presents 14' late to tx. Pt reports she is really hurting the past 2 days with no known reason why. Seeing Dr. Canales tomorrow.  -EM         Subjective Pain    Able to rate subjective pain? yes  -EM      Pre-Treatment Pain Level 8  -EM      Post-Treatment Pain Level 5  -EM         Exercise 1    Exercise Name 1 PRO II-Course-4.0  -EM      Time 1 10'  -EM         Exercise 2    Exercise Name 2 LAQ  -EM      Sets 2 1  -EM      Reps 2 20  -EM         Exercise 3    Exercise Name 3 Seated Hip Flexion  -EM      Sets 3 1  -EM       Reps 3 20  -EM         Exercise 4    Exercise Name 4 Sup SLR  -EM      Sets 4 1  -EM      Reps 4 20  -EM         Exercise 5    Exercise Name 5 Sup VMO SLR  -EM      Sets 5 1  -EM      Reps 5 20  -EM      Additional Comments AAROM->AROM  -EM         Exercise 6    Exercise Name 6 SL Hip Abd/Add  -EM      Sets 6 1  -EM      Reps 6 20  -EM         Exercise 7    Exercise Name 7 HS with strap  -EM      Sets 7 1  -EM      Reps 7 20  -EM         Exercise 8    Exercise Name 8 IFC w/ ice/elev  -EM      Time 8 15'  -EM            User Key  (r) = Recorded By, (t) = Taken By, (c) = Cosigned By    Initials Name Provider Type    EM Gabe Enciso PTA Physical Therapist Assistant                              PT OP Goals     Row Name 10/25/22 1400          PT Short Term Goals    STG Date to Achieve 10/31/22  -EM     STG 1 Improve L knee AROM to 0-0-120°  -EM     STG 1 Progress Met   -EM     STG 2 Improve L knee flex/ext MMT to 5/5  -EM     STG 2 Progress Not Met  -EM     STG 3 Improve L SLS to >/=20 sec consistently  -EM     STG 3 Progress Not Met  -EM     STG 4 Reduce L knee pain by 50% with prolonged sittin and standing  -EM     STG 4 Progress Not Met  -EM     STG 5 Improve LEFS score to 26 or greater  -EM     STG 5 Progress Not Met  -EM        Time Calculation    PT Goal Re-Cert Due Date 11/07/22  -EM           User Key  (r) = Recorded By, (t) = Taken By, (c) = Cosigned By    Initials Name Provider Type    EM Gabe Enciso PTA Physical Therapist Assistant                               Time Calculation:   Start Time: 1359  Stop Time: 1508  Time Calculation (min): 69 min  Total Timed Code Minutes- PT: 69 minute(s)  Therapy Charges for Today     Code Description Service Date Service Provider Modifiers Qty    42544661025 HC PT ELECTRICAL STIM UNATTENDED 10/25/2022 Gabe Enciso, PTA CQ 1    88190730160 HC PT THER PROC EA 15 MIN 10/25/2022 Gabe Enciso PTA GP, CQ 4                    Gabe Enciso PTA  10/25/2022

## 2022-10-26 ENCOUNTER — PREP FOR SURGERY (OUTPATIENT)
Dept: OTHER | Facility: HOSPITAL | Age: 52
End: 2022-10-26

## 2022-10-26 ENCOUNTER — OFFICE VISIT (OUTPATIENT)
Dept: ORTHOPEDIC SURGERY | Facility: CLINIC | Age: 52
End: 2022-10-26

## 2022-10-26 VITALS — BODY MASS INDEX: 37.42 KG/M2 | HEIGHT: 64 IN

## 2022-10-26 DIAGNOSIS — S83.412A: Primary | ICD-10-CM

## 2022-10-26 DIAGNOSIS — G89.29 CHRONIC PAIN OF LEFT KNEE: Primary | ICD-10-CM

## 2022-10-26 DIAGNOSIS — S83.242D ACUTE MEDIAL MENISCAL TEAR, LEFT, SUBSEQUENT ENCOUNTER: ICD-10-CM

## 2022-10-26 DIAGNOSIS — M25.562 CHRONIC PAIN OF LEFT KNEE: Primary | ICD-10-CM

## 2022-10-26 DIAGNOSIS — S83.412D: ICD-10-CM

## 2022-10-26 PROCEDURE — 99213 OFFICE O/P EST LOW 20 MIN: CPT | Performed by: SPECIALIST/TECHNOLOGIST, OTHER

## 2022-10-26 RX ORDER — BUPIVACAINE HCL/0.9 % NACL/PF 0.1 %
2 PLASTIC BAG, INJECTION (ML) EPIDURAL ONCE
Status: CANCELLED | OUTPATIENT
Start: 2022-12-13 | End: 2022-10-26

## 2022-10-26 NOTE — PROGRESS NOTES
"Maricarmen Ferris is a 52 y.o. female returns for     Chief Complaint   Patient presents with   • Left Knee - Pain, Follow-up       HISTORY OF PRESENT ILLNESS:  52-year-old female patient with 3-month old injury to the left knee, trying to get into the pool slipped and torn her medial collateral ligament as her left leg gave away while getting into the pool that happened in July 2022.  Following this the patient had imaging studies done to her left knee including an MRI that clearly indicated a torn MCL in the left knee along with chondrocalcinosis and degenerative calcification of the medial meniscus with a tear of the medial meniscus in the left knee.  Was treated by my provider colleagues in the past 3 months with rest, hinged knee brace immobilization, cane to walk with a in the right hand to support the left knee, over-the-counter pain meds and dedicated to the physical therapy to the left knee for 2 months which did not relieve her symptoms.    Patient is here for an alternate opinion and further definitive treatment plans for her left knee as it is affecting her ability to walk, do her ADLs, and is affecting her night sleep as well.     CONCURRENT MEDICAL HISTORY:    The following portions of the patient's history were reviewed and updated as appropriate: allergies, current medications, past family history, past medical history, past social history, past surgical history and problem list.     ROS  No fevers or chills.  No chest pain or shortness of air.  No GI or  disturbances.    PHYSICAL EXAMINATION:       Ht 162.6 cm (64\")   Wt (P) 101 kg (222 lb)   BMI (P) 38.11 kg/m²     Physical Exam    GAIT:     []  Normal  []  Antalgic    Assistive device: []  None  []  Walker     []  Crutches  []  Cane     []  Wheelchair  []  Stretcher    Ortho Exam  Left knee no effusion or swelling noted.  Obvious quadriceps wasting noted.  Focal tenderness at the MCL attachment on the femoral condyle near the adductor " tubercle and at the level of the joint line with positive Mer's and positive valgus strain indicating completely lax medial side from a complete tear of the middle collateral ligament in the left knee.    No results found.          ASSESSMENT:  52-year-old female patient with 3-month old injury to her left knee try to get into the pool slipped and felt her left knee give away.  Imaging studies confirm torn MCL on the MRI that is dated 09/14/2022 and the x-rays from July and August 2022 clearly indicating chondrocalcinosis with degenerative medial meniscal tear.    Conservative measures of dedicated physical therapy to the left knee, walking with a cane in the right hand, knee brace application, rest and activity modification and pain meds did not relieve her symptoms to any extent.  Diagnoses and all orders for this visit:    Chronic pain of left knee    Traumatic rupture of medial collateral ligament of left knee, subsequent encounter    Acute medial meniscal tear, left, subsequent encounter    Discussed different treatment options of continuing the present conservative line of management which she felt did not help her for the 3 months and is unable to get on with her ADLs and further discussed in detail about arthroscopic management of the meniscal pathology in the left knee along with open surgical reconstruction versus repair of the medial collateral ligament using an allograft to strengthen the medial collateral ligament.    Patient would like to pursue the surgical plan despite explaining that the surgical result may not be as expected in terms of the outcome since its 3 months since the tear and the collateral ligaments healing can be affected with the weightbearing activities and the body weight given her obesity condition..  Also the patient clearly was explained about the prolonged rehab in order to achieve a reasonable outcome following surgical reconstruction of an MCL postop for at least 2 to 3  months.    PLAN  To schedule for left knee open MCL reconstruction along with arthroscopy left knee and proceed at a suitable date based on the availability of the OR schedules.  In the meantime she is advised to continue the conservative measures that she has been following for the left knee to protect from further damage.  The patient voiced understanding of the risks, benefits, and alternative forms of treatment that were discussed and the patient consents to proceed with surgery.  All risks, benefits and alternatives were discussed.  Risks include, but not exclusive to anesthetic complications, including death, MI, CVA, infection, bleeding DVT, fracture, residual pain and need for future surgery.    This discussion was held with the patient by  Tyrese Canales MD and all questions were answered.    EMR Dragon/Transciption Disclaimer: Some of this note may be an electronic transcription/translation of spoken language to printed text using the Dragon Dictation System.    Time spent of a minimum of 25 minutes including the face to face evaluation, reviewing of medical history and prior medial records, reviewing of diagnostic studies, documentation, patient education and coordination of care and surgical treatment decision.         No follow-ups on file.    Tyrese Canales MD

## 2022-11-01 ENCOUNTER — APPOINTMENT (OUTPATIENT)
Dept: PHYSICAL THERAPY | Facility: HOSPITAL | Age: 52
End: 2022-11-01

## 2022-11-01 DIAGNOSIS — R25.2 MUSCLE CRAMPS: ICD-10-CM

## 2022-11-01 RX ORDER — CYCLOBENZAPRINE HCL 10 MG
TABLET ORAL
Qty: 30 TABLET | Refills: 0 | Status: SHIPPED | OUTPATIENT
Start: 2022-11-01

## 2022-11-10 RX ORDER — OMEPRAZOLE 40 MG/1
CAPSULE, DELAYED RELEASE ORAL
Qty: 30 CAPSULE | Refills: 0 | Status: SHIPPED | OUTPATIENT
Start: 2022-11-10 | End: 2022-12-07

## 2022-11-28 ENCOUNTER — OFFICE VISIT (OUTPATIENT)
Dept: FAMILY MEDICINE CLINIC | Facility: CLINIC | Age: 52
End: 2022-11-28

## 2022-11-28 VITALS
SYSTOLIC BLOOD PRESSURE: 132 MMHG | BODY MASS INDEX: 38.16 KG/M2 | HEART RATE: 75 BPM | OXYGEN SATURATION: 98 % | DIASTOLIC BLOOD PRESSURE: 80 MMHG | WEIGHT: 223.5 LBS | HEIGHT: 64 IN | RESPIRATION RATE: 22 BRPM | TEMPERATURE: 98.6 F

## 2022-11-28 DIAGNOSIS — R05.8 OTHER COUGH: Primary | ICD-10-CM

## 2022-11-28 DIAGNOSIS — J02.9 SORE THROAT: ICD-10-CM

## 2022-11-28 DIAGNOSIS — J06.9 UPPER RESPIRATORY TRACT INFECTION, UNSPECIFIED TYPE: ICD-10-CM

## 2022-11-28 LAB
EXPIRATION DATE: NORMAL
EXPIRATION DATE: NORMAL
FLUAV AG UPPER RESP QL IA.RAPID: NOT DETECTED
FLUBV AG UPPER RESP QL IA.RAPID: NOT DETECTED
INTERNAL CONTROL: NORMAL
INTERNAL CONTROL: NORMAL
Lab: NORMAL
Lab: NORMAL
S PYO AG THROAT QL: NEGATIVE
SARS-COV-2 AG UPPER RESP QL IA.RAPID: NOT DETECTED

## 2022-11-28 PROCEDURE — 87428 SARSCOV & INF VIR A&B AG IA: CPT | Performed by: NURSE PRACTITIONER

## 2022-11-28 PROCEDURE — 99214 OFFICE O/P EST MOD 30 MIN: CPT | Performed by: NURSE PRACTITIONER

## 2022-11-28 PROCEDURE — 87880 STREP A ASSAY W/OPTIC: CPT | Performed by: NURSE PRACTITIONER

## 2022-11-28 RX ORDER — FLUCONAZOLE 150 MG/1
150 TABLET ORAL ONCE
Qty: 1 TABLET | Refills: 0 | Status: SHIPPED | OUTPATIENT
Start: 2022-11-28 | End: 2022-11-28

## 2022-11-28 RX ORDER — SERTRALINE HYDROCHLORIDE 100 MG/1
100 TABLET, FILM COATED ORAL EVERY MORNING
COMMUNITY
Start: 2022-09-23 | End: 2023-03-21 | Stop reason: SDUPTHER

## 2022-11-28 RX ORDER — DEXTROMETHORPHAN HYDROBROMIDE AND PROMETHAZINE HYDROCHLORIDE 15; 6.25 MG/5ML; MG/5ML
5 SYRUP ORAL 4 TIMES DAILY PRN
Qty: 118 ML | Refills: 0 | Status: SHIPPED | OUTPATIENT
Start: 2022-11-28 | End: 2023-02-17

## 2022-11-28 RX ORDER — AMOXICILLIN AND CLAVULANATE POTASSIUM 875; 125 MG/1; MG/1
1 TABLET, FILM COATED ORAL 2 TIMES DAILY
Qty: 14 TABLET | Refills: 0 | Status: SHIPPED | OUTPATIENT
Start: 2022-11-28 | End: 2022-12-06

## 2022-11-28 NOTE — PROGRESS NOTES
Chief Complaint  Sore Throat (Since Saturday)    Subjective          Maricarmen Ferris presents to [facility] [place] with upper respiratory issues sore throat.  This started Saturday getting better.  Patient was around grandkids were sick with the flu.  Sore Throat   This is a new problem. The current episode started in the past 7 days. Associated symptoms include coughing.   URI   This is a new problem. The current episode started in the past 7 days. The problem has been unchanged. Associated symptoms include coughing, sinus pain and a sore throat. She has tried nothing for the symptoms. The treatment provided no relief.     Outpatient Medications Prior to Visit   Medication Sig Dispense Refill   • buPROPion SR (WELLBUTRIN SR) 150 MG 12 hr tablet Take 1 tablet by mouth 2 (Two) Times a Day. 60 tablet 11   • cyclobenzaprine (FLEXERIL) 10 MG tablet TAKE 1 TABLET BY MOUTH THREE TIMES A DAY AS NEEDED FOR MUSCLE SPASM 30 tablet 0   • fluticasone (FLONASE) 50 MCG/ACT nasal spray INSTILL 2 SPRAYS INTO THE NOSTRIL AS DIRECTED BY PROVIDER DAILY. 16 mL 3   • furosemide (LASIX) 40 MG tablet TAKE 1 TABLET BY MOUTH AS NEEDED (SWELLING). 30 tablet 11   • gabapentin (NEURONTIN) 600 MG tablet      • HYDROcodone-acetaminophen (NORCO) 5-325 MG per tablet Take 1 tablet by mouth Every 6 (Six) Hours As Needed.     • KLOR-CON 20 MEQ CR tablet TAKE 1 TABLET BY MOUTH EVERY DAY 30 tablet 11   • lisinopril (PRINIVIL,ZESTRIL) 2.5 MG tablet Take 1 tablet by mouth Daily. 30 tablet 11   • loperamide (IMODIUM) 2 MG capsule Take 1 capsule by mouth 4 (Four) Times a Day As Needed for Diarrhea. 24 capsule 3   • loratadine (CLARITIN) 10 MG tablet TAKE 1 TABLET BY MOUTH EVERY DAY 30 tablet 11   • nitroglycerin (NITROSTAT) 0.4 MG SL tablet Place 1 tablet under the tongue Every 5 (Five) Minutes As Needed for Chest Pain. Take no more than 3 doses in 15 minutes. 30 tablet 12   • nystatin (MYCOSTATIN)  "383229 UNIT/GM powder APPLY TOPICALLY TO THE APPROPRIATE AREA AS DIRECTED 3 (THREE) TIMES A DAY. 60 g 11   • omeprazole (priLOSEC) 40 MG capsule TAKE 1 CAPSULE BY MOUTH EVERY DAY 30 capsule 0   • ondansetron ODT (ZOFRAN-ODT) 4 MG disintegrating tablet PLACE 1 TABLET ON THE TONGUE EVERY 6 (SIX) HOURS AS NEEDED FOR NAUSEA OR VOMITING. 15 tablet 3   • promethazine (PHENERGAN) 25 MG tablet Take 1 tablet by mouth Every 8 (Eight) Hours As Needed for Nausea or Vomiting. 30 tablet 3   • rOPINIRole (REQUIP) 0.25 MG tablet TAKE 1 TABLET BY MOUTH EVERY DAY AT NIGHT 30 tablet 11   • sertraline (ZOLOFT) 100 MG tablet Take 100 mg by mouth Every Morning.     • traZODone (DESYREL) 50 MG tablet Take 50 mg by mouth Every Night.     • sertraline (ZOLOFT) 50 MG tablet Take 2 tablets by mouth Daily. 60 tablet 3   • aMILoride (MIDAMOR) 5 MG tablet Take 2 tablets by mouth Daily. 30 tablet 11   • methylPREDNISolone (MEDROL) 4 MG dose pack Dose pack. Take as directed on package instructions. 1 each 0     No facility-administered medications prior to visit.       Review of Systems   HENT: Positive for sinus pain and sore throat.    Respiratory: Positive for cough.          Objective   Vital Signs:   Visit Vitals  /80 (BP Location: Left arm, Patient Position: Sitting, Cuff Size: Large Adult)   Pulse 75   Temp 98.6 °F (37 °C) (Tympanic)   Resp 22   Ht 162.6 cm (64\")   Wt 101 kg (223 lb 8 oz)   SpO2 98%   BMI 38.36 kg/m²     Physical Exam  Vitals and nursing note reviewed.   Constitutional:       Appearance: She is well-developed.   HENT:      Head: Normocephalic and atraumatic.      Nose: Nasal tenderness and congestion present.      Right Sinus: Maxillary sinus tenderness and frontal sinus tenderness present.      Left Sinus: Maxillary sinus tenderness and frontal sinus tenderness present.   Eyes:      General: Lids are normal.      Conjunctiva/sclera: Conjunctivae normal.   Neck:      Thyroid: No thyroid mass or thyromegaly.      " Trachea: Trachea normal. No tracheal tenderness.   Cardiovascular:      Rate and Rhythm: Normal rate.      Pulses: Normal pulses.      Heart sounds: Normal heart sounds.   Pulmonary:      Effort: Pulmonary effort is normal. No respiratory distress.      Breath sounds: Normal breath sounds. No decreased breath sounds, wheezing or rhonchi.   Abdominal:      General: There is no distension.      Palpations: Abdomen is soft. There is no mass.   Musculoskeletal:         General: Normal range of motion.      Cervical back: Normal range of motion. No edema.   Skin:     General: Skin is warm and dry.      Coloration: Skin is not pale.      Findings: No abrasion, erythema or lesion.   Neurological:      Mental Status: She is alert and oriented to person, place, and time.   Psychiatric:         Mood and Affect: Mood is not anxious. Affect is not inappropriate.         Speech: Speech normal.         Behavior: Behavior normal.         Thought Content: Thought content normal.         Judgment: Judgment normal. Judgment is not impulsive.        Result Review :                 Assessment and Plan    Diagnoses and all orders for this visit:    1. Other cough (Primary)  -     POCT SARS-CoV-2 Antigen RACHELLE + Flu  -     POCT rapid strep A  -     promethazine-dextromethorphan (PROMETHAZINE-DM) 6.25-15 MG/5ML syrup; Take 5 mL by mouth 4 (Four) Times a Day As Needed for Cough.  Dispense: 118 mL; Refill: 0  -     amoxicillin-clavulanate (Augmentin) 875-125 MG per tablet; Take 1 tablet by mouth 2 (Two) Times a Day.  Dispense: 14 tablet; Refill: 0  -     fluconazole (Diflucan) 150 MG tablet; Take 1 tablet by mouth 1 (One) Time for 1 dose.  Dispense: 1 tablet; Refill: 0    2. Sore throat  -     POCT SARS-CoV-2 Antigen RACHELLE + Flu  -     POCT rapid strep A  -     amoxicillin-clavulanate (Augmentin) 875-125 MG per tablet; Take 1 tablet by mouth 2 (Two) Times a Day.  Dispense: 14 tablet; Refill: 0  -     fluconazole (Diflucan) 150 MG tablet; Take 1  tablet by mouth 1 (One) Time for 1 dose.  Dispense: 1 tablet; Refill: 0    3. Upper respiratory tract infection, unspecified type  -     amoxicillin-clavulanate (Augmentin) 875-125 MG per tablet; Take 1 tablet by mouth 2 (Two) Times a Day.  Dispense: 14 tablet; Refill: 0  -     fluconazole (Diflucan) 150 MG tablet; Take 1 tablet by mouth 1 (One) Time for 1 dose.  Dispense: 1 tablet; Refill: 0      Start prescribed medications  Increase hydration and rest    Use Flonase over-the-counter as well as Mucinex    She is scheduled to have surgery in 2 weeks  If no improvement or worsening of symptoms please call the office    If you have trouble breathing please go to the ER          Follow Up   Return if symptoms worsen or fail to improve.  Patient was given instructions and counseling regarding her condition or for health maintenance advice. Please see specific information pulled into the AVS if appropriate.           This document has been electronically signed by MARIA TERESA Sauecdo on November 29, 2022 12:55 CST

## 2022-11-30 DIAGNOSIS — I10 ESSENTIAL HYPERTENSION: ICD-10-CM

## 2022-11-30 RX ORDER — LISINOPRIL 2.5 MG/1
TABLET ORAL
Qty: 30 TABLET | Refills: 11 | Status: SHIPPED | OUTPATIENT
Start: 2022-11-30

## 2022-12-06 ENCOUNTER — PRE-ADMISSION TESTING (OUTPATIENT)
Dept: PREADMISSION TESTING | Facility: HOSPITAL | Age: 52
End: 2022-12-06

## 2022-12-06 VITALS
HEIGHT: 64 IN | WEIGHT: 220 LBS | OXYGEN SATURATION: 97 % | SYSTOLIC BLOOD PRESSURE: 116 MMHG | DIASTOLIC BLOOD PRESSURE: 74 MMHG | BODY MASS INDEX: 37.56 KG/M2 | HEART RATE: 72 BPM | RESPIRATION RATE: 16 BRPM

## 2022-12-06 LAB
ANION GAP SERPL CALCULATED.3IONS-SCNC: 8 MMOL/L (ref 5–15)
BUN SERPL-MCNC: 14 MG/DL (ref 6–20)
BUN/CREAT SERPL: 16.5 (ref 7–25)
CALCIUM SPEC-SCNC: 9.4 MG/DL (ref 8.6–10.5)
CHLORIDE SERPL-SCNC: 103 MMOL/L (ref 98–107)
CO2 SERPL-SCNC: 32 MMOL/L (ref 22–29)
CREAT SERPL-MCNC: 0.85 MG/DL (ref 0.57–1)
EGFRCR SERPLBLD CKD-EPI 2021: 82.6 ML/MIN/1.73
GLUCOSE SERPL-MCNC: 92 MG/DL (ref 65–99)
POTASSIUM SERPL-SCNC: 3.8 MMOL/L (ref 3.5–5.2)
SODIUM SERPL-SCNC: 143 MMOL/L (ref 136–145)

## 2022-12-06 PROCEDURE — 93010 ELECTROCARDIOGRAM REPORT: CPT | Performed by: INTERNAL MEDICINE

## 2022-12-06 PROCEDURE — 93005 ELECTROCARDIOGRAM TRACING: CPT

## 2022-12-06 PROCEDURE — 80048 BASIC METABOLIC PNL TOTAL CA: CPT

## 2022-12-06 PROCEDURE — 36415 COLL VENOUS BLD VENIPUNCTURE: CPT

## 2022-12-06 RX ORDER — SODIUM CHLORIDE, SODIUM GLUCONATE, SODIUM ACETATE, POTASSIUM CHLORIDE AND MAGNESIUM CHLORIDE 526; 502; 368; 37; 30 MG/100ML; MG/100ML; MG/100ML; MG/100ML; MG/100ML
1000 INJECTION, SOLUTION INTRAVENOUS CONTINUOUS PRN
Status: CANCELLED | OUTPATIENT
Start: 2022-12-13

## 2022-12-07 RX ORDER — OMEPRAZOLE 40 MG/1
40 CAPSULE, DELAYED RELEASE ORAL DAILY
Qty: 30 CAPSULE | Refills: 0 | Status: SHIPPED | OUTPATIENT
Start: 2022-12-07 | End: 2023-01-03

## 2022-12-12 ENCOUNTER — ANESTHESIA EVENT (OUTPATIENT)
Dept: PERIOP | Facility: HOSPITAL | Age: 52
End: 2022-12-12

## 2022-12-12 NOTE — ANESTHESIA PREPROCEDURE EVALUATION
Anesthesia Evaluation     Patient summary reviewed and Nursing notes reviewed   history of anesthetic complications: difficult airway  NPO Solid Status: > 8 hours  NPO Liquid Status: > 6 hours           Airway   Mallampati: III  TM distance: <3 FB  Neck ROM: limited  Difficult intubation highly probable, Small opening and Anterior  Comment: Previous Airway:   Successful airway: ETT  Cuffed: yes   Successful intubation technique: video laryngoscopy  Facilitating devices/methods: Bougie  Endotracheal tube insertion site: oral  Blade: Parveen  Blade size: #3  ETT size: 7.0 mm  Cormack-Lehane Classification: grade IIb - view of arytenoids or posterior of glottis only  Placement verified by: chest auscultation and capnometry   Inital cuff pressure (cm H2O): 22  Cuff volume (mL): 8  Measured from: lips  Number of attempts at approach: 3 or more  Dental - normal exam         Pulmonary - normal exam    breath sounds clear to auscultation  (+) asthma,sleep apnea on CPAP,   (-) shortness of breath, rhonchi, decreased breath sounds, wheezes, not a smoker, no home oxygen  Cardiovascular - normal exam  Exercise tolerance: good (4-7 METS)    ECG reviewed  Rhythm: regular  Rate: normal    (+) hypertension 2 medications or greater, valvular problems/murmurs MR and TI, dysrhythmias Bradycardia, hyperlipidemia,   (-) pacemaker, past MI, angina, CHF, murmur, cardiac stents, DVT    ROS comment: EK22  Normal sinus rhythm  Normal ECG  When compared with ECG of 2022 18:02,  Premature supraventricular complexes are no longer Present    TTE 19  Interpretation Summary  ? Left atrial cavity size is mildly dilated.  ? Left ventricular wall thickness is consistent with mild concentric hypertrophy.  ? Mild mitral valve regurgitation is present      Neuro/Psych  (+) numbness, psychiatric history Anxiety and Depression,    (-) seizures, TIA, CVA  GI/Hepatic/Renal/Endo    (+) obesity,  GERD well controlled,  liver disease,  renal disease CRI,   (-) morbid obesity    Musculoskeletal     (+) back pain, neck pain,   Abdominal   (+) obese,    Substance History      OB/GYN          Other   arthritis,      ROS/Med Hx Other:  Traumatic rupture of medial collateral ligament of left knee, subsequent encounter     Acute medial meniscal tear, left, subsequent encounter    Hx: HARD TO INTUBATE     GERD: currently taking Prilosec. S/p gastric sleeve (2019). When pt takes her omeprazole it is controlled. Pt took omeprazole this AM     Pt was placed on lasix & amiloride after gastric sleeve and due to low K. K 12/6/2022 was 3.8    Pt denies asthma since gastric sleeve. Has lost 150lbs. No longer uses CPAP due to weight loss as well.     Hx of chest pain was due to GERD. Has had a work up. Reason she was prescribed nitroglycerin due to misdiagnosis. Never has had to take nitroglycerin.         Phys Exam Other: Overbite, small mouth, mallampati 3, borderline anterior airway, treat as anterior. Difficult mask and see previous airway note.                 Anesthesia Plan    ASA 3     general     (Hard to Intubate see previous airway: hx of multiple attempts leading to bougie use in order to get airway. Used a MAC 3 at that time (2017). Also difficult to mask. Straight to small x3 or glidescope low pro s3 if intubating case  Pts GERD is controlled as long as pt takes her omeprazole. Pt took her omeprazole and denies any reflux today.   LMA due to nature of this surgery as well as pts GERD being controlled. Due to pt being a difficult intubation we will have a small x2 blade in the room and a glidescope w/ low pro s3 nearby.   Hx of bradycardia HR today (75). Please have ephedrine drawn up  Discussed peripheral nerve block (adductor canal) for post op pain relief and patient understands possible complications, risks, & agrees.)  intravenous induction     Anesthetic plan, risks, benefits, and alternatives have been provided, discussed and informed  consent has been obtained with: patient.  Pre-procedure education provided  Use of blood products discussed with consented to blood products.   Plan discussed with CRNA.

## 2022-12-13 ENCOUNTER — APPOINTMENT (OUTPATIENT)
Dept: GENERAL RADIOLOGY | Facility: HOSPITAL | Age: 52
End: 2022-12-13

## 2022-12-13 ENCOUNTER — HOSPITAL ENCOUNTER (OUTPATIENT)
Facility: HOSPITAL | Age: 52
Setting detail: HOSPITAL OUTPATIENT SURGERY
Discharge: HOME | End: 2022-12-13
Attending: SPECIALIST/TECHNOLOGIST, OTHER | Admitting: SPECIALIST/TECHNOLOGIST, OTHER

## 2022-12-13 ENCOUNTER — ANESTHESIA (OUTPATIENT)
Dept: PERIOP | Facility: HOSPITAL | Age: 52
End: 2022-12-13

## 2022-12-13 VITALS
WEIGHT: 218.03 LBS | DIASTOLIC BLOOD PRESSURE: 59 MMHG | HEIGHT: 64 IN | RESPIRATION RATE: 18 BRPM | SYSTOLIC BLOOD PRESSURE: 117 MMHG | TEMPERATURE: 97.4 F | OXYGEN SATURATION: 95 % | HEART RATE: 66 BPM | BODY MASS INDEX: 37.22 KG/M2

## 2022-12-13 DIAGNOSIS — S83.412D: ICD-10-CM

## 2022-12-13 DIAGNOSIS — S83.242A ACUTE MENISCAL TEAR, MEDIAL, LEFT, INITIAL ENCOUNTER: Primary | ICD-10-CM

## 2022-12-13 DIAGNOSIS — S83.412A: ICD-10-CM

## 2022-12-13 LAB — GLUCOSE BLDC GLUCOMTR-MCNC: 101 MG/DL (ref 70–130)

## 2022-12-13 PROCEDURE — 25010000002 HYDROMORPHONE 1 MG/ML SOLUTION: Performed by: NURSE ANESTHETIST, CERTIFIED REGISTERED

## 2022-12-13 PROCEDURE — 25010000002 MIDAZOLAM PER 1 MG: Performed by: NURSE ANESTHETIST, CERTIFIED REGISTERED

## 2022-12-13 PROCEDURE — 25010000002 PROPOFOL 200 MG/20ML EMULSION: Performed by: NURSE ANESTHETIST, CERTIFIED REGISTERED

## 2022-12-13 PROCEDURE — C1713 ANCHOR/SCREW BN/BN,TIS/BN: HCPCS | Performed by: SPECIALIST/TECHNOLOGIST, OTHER

## 2022-12-13 PROCEDURE — 25010000002 FENTANYL CITRATE (PF) 100 MCG/2ML SOLUTION: Performed by: NURSE ANESTHETIST, CERTIFIED REGISTERED

## 2022-12-13 PROCEDURE — 25010000002 CEFAZOLIN PER 500 MG: Performed by: SPECIALIST/TECHNOLOGIST, OTHER

## 2022-12-13 PROCEDURE — 25010000002 ROPIVACAINE PER 1 MG: Performed by: ANESTHESIOLOGY

## 2022-12-13 PROCEDURE — 29881 ARTHRS KNE SRG MNISECTMY M/L: CPT | Performed by: SPECIALIST/TECHNOLOGIST, OTHER

## 2022-12-13 PROCEDURE — 76000 FLUOROSCOPY <1 HR PHYS/QHP: CPT

## 2022-12-13 PROCEDURE — 76942 ECHO GUIDE FOR BIOPSY: CPT | Performed by: SPECIALIST/TECHNOLOGIST, OTHER

## 2022-12-13 PROCEDURE — 25010000002 ONDANSETRON PER 1 MG: Performed by: NURSE ANESTHETIST, CERTIFIED REGISTERED

## 2022-12-13 PROCEDURE — 82962 GLUCOSE BLOOD TEST: CPT

## 2022-12-13 PROCEDURE — 27428 RECONSTRUCTION KNEE: CPT | Performed by: SPECIALIST/TECHNOLOGIST, OTHER

## 2022-12-13 PROCEDURE — 25010000002 DEXAMETHASONE PER 1 MG: Performed by: NURSE ANESTHETIST, CERTIFIED REGISTERED

## 2022-12-13 DEVICE — SUT TAPE W/TPR END 1/2 CIR TPR NDL 1.3MM 36IN: Type: IMPLANTABLE DEVICE | Site: KNEE | Status: FUNCTIONAL

## 2022-12-13 DEVICE — TNDN VERSAGRAFT PRE-SUT FZ STRL: Type: IMPLANTABLE DEVICE | Site: KNEE | Status: FUNCTIONAL

## 2022-12-13 DEVICE — SYS IMP INTERNALBRACE REPR AUG LIG: Type: IMPLANTABLE DEVICE | Site: KNEE | Status: FUNCTIONAL

## 2022-12-13 DEVICE — KT BUTN REPR SHLDR DIST BIOCOMP: Type: IMPLANTABLE DEVICE | Site: KNEE | Status: FUNCTIONAL

## 2022-12-13 RX ORDER — HYDROCODONE BITARTRATE AND ACETAMINOPHEN 7.5; 325 MG/1; MG/1
1 TABLET ORAL ONCE AS NEEDED
Status: DISCONTINUED | OUTPATIENT
Start: 2022-12-13 | End: 2022-12-13 | Stop reason: HOSPADM

## 2022-12-13 RX ORDER — DEXAMETHASONE SODIUM PHOSPHATE 4 MG/ML
INJECTION, SOLUTION INTRA-ARTICULAR; INTRALESIONAL; INTRAMUSCULAR; INTRAVENOUS; SOFT TISSUE AS NEEDED
Status: DISCONTINUED | OUTPATIENT
Start: 2022-12-13 | End: 2022-12-13 | Stop reason: SURG

## 2022-12-13 RX ORDER — DIPHENHYDRAMINE HYDROCHLORIDE 50 MG/ML
12.5 INJECTION INTRAMUSCULAR; INTRAVENOUS
Status: DISCONTINUED | OUTPATIENT
Start: 2022-12-13 | End: 2022-12-13 | Stop reason: HOSPADM

## 2022-12-13 RX ORDER — ACETAMINOPHEN 650 MG/1
650 SUPPOSITORY RECTAL ONCE AS NEEDED
Status: DISCONTINUED | OUTPATIENT
Start: 2022-12-13 | End: 2022-12-13 | Stop reason: HOSPADM

## 2022-12-13 RX ORDER — ROPIVACAINE HYDROCHLORIDE 5 MG/ML
INJECTION, SOLUTION EPIDURAL; INFILTRATION; PERINEURAL
Status: COMPLETED | OUTPATIENT
Start: 2022-12-13 | End: 2022-12-13

## 2022-12-13 RX ORDER — LIDOCAINE HYDROCHLORIDE 10 MG/ML
INJECTION, SOLUTION EPIDURAL; INFILTRATION; INTRACAUDAL; PERINEURAL
Status: COMPLETED | OUTPATIENT
Start: 2022-12-13 | End: 2022-12-13

## 2022-12-13 RX ORDER — MEPERIDINE HYDROCHLORIDE 25 MG/ML
12.5 INJECTION INTRAMUSCULAR; INTRAVENOUS; SUBCUTANEOUS
Status: DISCONTINUED | OUTPATIENT
Start: 2022-12-13 | End: 2022-12-13 | Stop reason: HOSPADM

## 2022-12-13 RX ORDER — LIDOCAINE HYDROCHLORIDE 20 MG/ML
INJECTION, SOLUTION EPIDURAL; INFILTRATION; INTRACAUDAL; PERINEURAL AS NEEDED
Status: DISCONTINUED | OUTPATIENT
Start: 2022-12-13 | End: 2022-12-13 | Stop reason: SURG

## 2022-12-13 RX ORDER — FLUMAZENIL 0.1 MG/ML
0.2 INJECTION INTRAVENOUS AS NEEDED
Status: DISCONTINUED | OUTPATIENT
Start: 2022-12-13 | End: 2022-12-13 | Stop reason: HOSPADM

## 2022-12-13 RX ORDER — ONDANSETRON 2 MG/ML
INJECTION INTRAMUSCULAR; INTRAVENOUS AS NEEDED
Status: DISCONTINUED | OUTPATIENT
Start: 2022-12-13 | End: 2022-12-13 | Stop reason: SURG

## 2022-12-13 RX ORDER — PROMETHAZINE HYDROCHLORIDE 25 MG/1
25 TABLET ORAL ONCE AS NEEDED
Status: DISCONTINUED | OUTPATIENT
Start: 2022-12-13 | End: 2022-12-13 | Stop reason: HOSPADM

## 2022-12-13 RX ORDER — PROPOFOL 10 MG/ML
INJECTION, EMULSION INTRAVENOUS AS NEEDED
Status: DISCONTINUED | OUTPATIENT
Start: 2022-12-13 | End: 2022-12-13 | Stop reason: SURG

## 2022-12-13 RX ORDER — SODIUM CHLORIDE, SODIUM GLUCONATE, SODIUM ACETATE, POTASSIUM CHLORIDE AND MAGNESIUM CHLORIDE 526; 502; 368; 37; 30 MG/100ML; MG/100ML; MG/100ML; MG/100ML; MG/100ML
1000 INJECTION, SOLUTION INTRAVENOUS CONTINUOUS PRN
Status: DISCONTINUED | OUTPATIENT
Start: 2022-12-13 | End: 2022-12-13 | Stop reason: HOSPADM

## 2022-12-13 RX ORDER — EPHEDRINE SULFATE 50 MG/ML
5 INJECTION, SOLUTION INTRAVENOUS ONCE AS NEEDED
Status: DISCONTINUED | OUTPATIENT
Start: 2022-12-13 | End: 2022-12-13 | Stop reason: HOSPADM

## 2022-12-13 RX ORDER — ACETAMINOPHEN 325 MG/1
650 TABLET ORAL ONCE AS NEEDED
Status: DISCONTINUED | OUTPATIENT
Start: 2022-12-13 | End: 2022-12-13 | Stop reason: HOSPADM

## 2022-12-13 RX ORDER — ONDANSETRON 2 MG/ML
4 INJECTION INTRAMUSCULAR; INTRAVENOUS ONCE AS NEEDED
Status: DISCONTINUED | OUTPATIENT
Start: 2022-12-13 | End: 2022-12-13 | Stop reason: HOSPADM

## 2022-12-13 RX ORDER — FENTANYL CITRATE 50 UG/ML
INJECTION, SOLUTION INTRAMUSCULAR; INTRAVENOUS AS NEEDED
Status: DISCONTINUED | OUTPATIENT
Start: 2022-12-13 | End: 2022-12-13 | Stop reason: SURG

## 2022-12-13 RX ORDER — HYDROCODONE BITARTRATE AND ACETAMINOPHEN 7.5; 325 MG/1; MG/1
1 TABLET ORAL EVERY 4 HOURS PRN
Qty: 30 TABLET | Refills: 0 | Status: SHIPPED | OUTPATIENT
Start: 2022-12-13

## 2022-12-13 RX ORDER — PROMETHAZINE HYDROCHLORIDE 25 MG/1
25 SUPPOSITORY RECTAL ONCE AS NEEDED
Status: DISCONTINUED | OUTPATIENT
Start: 2022-12-13 | End: 2022-12-13 | Stop reason: HOSPADM

## 2022-12-13 RX ORDER — BUPIVACAINE HCL/0.9 % NACL/PF 0.1 %
2 PLASTIC BAG, INJECTION (ML) EPIDURAL ONCE
Status: COMPLETED | OUTPATIENT
Start: 2022-12-13 | End: 2022-12-13

## 2022-12-13 RX ORDER — NALOXONE HCL 0.4 MG/ML
0.4 VIAL (ML) INJECTION AS NEEDED
Status: DISCONTINUED | OUTPATIENT
Start: 2022-12-13 | End: 2022-12-13 | Stop reason: HOSPADM

## 2022-12-13 RX ORDER — MIDAZOLAM HYDROCHLORIDE 1 MG/ML
INJECTION INTRAMUSCULAR; INTRAVENOUS AS NEEDED
Status: DISCONTINUED | OUTPATIENT
Start: 2022-12-13 | End: 2022-12-13 | Stop reason: SURG

## 2022-12-13 RX ADMIN — HYDROCODONE BITARTRATE AND ACETAMINOPHEN 1 TABLET: 7.5; 325 TABLET ORAL at 17:37

## 2022-12-13 RX ADMIN — MIDAZOLAM 2 MG: 1 INJECTION, SOLUTION INTRAMUSCULAR; INTRAVENOUS at 12:23

## 2022-12-13 RX ADMIN — SODIUM CHLORIDE, SODIUM GLUCONATE, SODIUM ACETATE, POTASSIUM CHLORIDE AND MAGNESIUM CHLORIDE: 526; 502; 368; 37; 30 INJECTION, SOLUTION INTRAVENOUS at 12:54

## 2022-12-13 RX ADMIN — HYDROMORPHONE HYDROCHLORIDE 0.5 MG: 1 INJECTION, SOLUTION INTRAMUSCULAR; INTRAVENOUS; SUBCUTANEOUS at 15:25

## 2022-12-13 RX ADMIN — PROPOFOL 130 MG: 10 INJECTION, EMULSION INTRAVENOUS at 12:32

## 2022-12-13 RX ADMIN — LIDOCAINE HYDROCHLORIDE 30 MG: 10 INJECTION, SOLUTION EPIDURAL; INFILTRATION; INTRACAUDAL; PERINEURAL at 11:50

## 2022-12-13 RX ADMIN — ONDANSETRON 4 MG: 2 INJECTION INTRAMUSCULAR; INTRAVENOUS at 12:39

## 2022-12-13 RX ADMIN — DEXAMETHASONE SODIUM PHOSPHATE 10 MG: 4 INJECTION, SOLUTION INTRAMUSCULAR; INTRAVENOUS at 12:39

## 2022-12-13 RX ADMIN — FENTANYL CITRATE 50 MCG: 50 INJECTION, SOLUTION INTRAMUSCULAR; INTRAVENOUS at 13:13

## 2022-12-13 RX ADMIN — SODIUM CHLORIDE, SODIUM GLUCONATE, SODIUM ACETATE, POTASSIUM CHLORIDE AND MAGNESIUM CHLORIDE 1000 ML: 526; 502; 368; 37; 30 INJECTION, SOLUTION INTRAVENOUS at 10:54

## 2022-12-13 RX ADMIN — FENTANYL CITRATE 25 MCG: 50 INJECTION, SOLUTION INTRAMUSCULAR; INTRAVENOUS at 12:54

## 2022-12-13 RX ADMIN — FENTANYL CITRATE 25 MCG: 50 INJECTION, SOLUTION INTRAMUSCULAR; INTRAVENOUS at 12:26

## 2022-12-13 RX ADMIN — LIDOCAINE HYDROCHLORIDE 100 MG: 20 INJECTION, SOLUTION EPIDURAL; INFILTRATION; INTRACAUDAL; PERINEURAL at 12:32

## 2022-12-13 RX ADMIN — ROPIVACAINE HYDROCHLORIDE 30 ML: 5 INJECTION, SOLUTION EPIDURAL; INFILTRATION; PERINEURAL at 11:50

## 2022-12-13 RX ADMIN — Medication 2 G: at 12:34

## 2022-12-13 NOTE — ANESTHESIA POSTPROCEDURE EVALUATION
Patient: Maricarmen Ferris    Procedure Summary     Date: 12/13/22 Room / Location: [facility] OR 03 /  [facility]    Anesthesia Start: 1224 Anesthesia Stop: 1512    Procedure: KNEE ARTHROSCOPY WITH DEBRIDEMENT MEDIAL MENISCUS WITH OPEN RECONSTRUCTION OF THE TORN MEDIAL COLLATERAL LIGAMENT OF THE LEFT KNEE USING ALLOGRAFT (Left: Knee) Diagnosis:       Traumatic rupture of medial collateral ligament of left knee      (Traumatic rupture of medial collateral ligament of left knee [S83.412A])    Surgeons: Tyrese Canales MD Provider: Hernando Min CRNA    Anesthesia Type: general ASA Status: 3          Anesthesia Type: general    Vitals  No vitals data found for the desired time range.          Post Anesthesia Care and Evaluation    Patient location during evaluation: PACU  Patient participation: complete - patient participated  Level of consciousness: sleepy but conscious  Pain score: 0  Pain management: adequate    Airway patency: patent  Anesthetic complications: No anesthetic complications  PONV Status: none  Cardiovascular status: acceptable and hemodynamically stable  Respiratory status: acceptable and room air  Hydration status: acceptable    Comments: HR 80  /63  SPO2 94  TEMP 97.0

## 2022-12-13 NOTE — H&P
History and Physical    Patient Care Team:  Lesli Lobo APRN as PCP - General (Nurse Practitioner)    Chief complaint Left knee pain    52-year-old female patient with 4-month old injury to the left knee, trying to get into the pool slipped and torn her medial collateral ligament as her left leg gave away while getting into the pool that happened in July 2022.  Following this the patient had imaging studies done to her left knee including an MRI that clearly indicated a torn MCL in the left knee along with chondrocalcinosis and degenerative calcification of the medial meniscus with a tear of the medial meniscus in the left knee.  Was treated by my provider colleagues in the past 3 months with rest, hinged knee brace immobilization, cane to walk with a in the right hand to support the left knee, over-the-counter pain meds and dedicated to the physical therapy to the left knee for 2 months which did not relieve her symptoms.     Patient is here today for definitive treatment plans for her left knee as it is affecting her ability to walk, do her ADLs, and is affecting her night sleep as well.     CONCURRENT MEDICAL HISTORY:     The following portions of the patient's history were reviewed and updated as appropriate: allergies, current medications, past family history, past medical history, past social history, past surgical history and problem list.      ROS    No fevers or chills.  No chest pain or shortness of air.  No GI or  disturbances.    Review of Systems  Review of Systems   Otherwise complete ROS is negative except as mentioned above.    History  Past Medical History:   Diagnosis Date   • Acid reflux    • Anxiety    • Depressed    • Dysphagia    • GERD (gastroesophageal reflux disease)    • Hard to intubate    • Hyperlipidemia    • Hypertension    • Hypokalemia    • IBS (irritable bowel syndrome)    • Nausea    • Sleep apnea      Past Surgical History:   Procedure Laterality Date   • ABDOMINAL SURGERY      • CARDIAC CATHETERIZATION N/A 8/13/2018    Procedure: Left Heart Cath 8/13/2018 @ 9;00;  Surgeon: Kuldip Frank MD;  Location: [facility] CATH INVASIVE LOCATION;  Service: Cardiology   • COLONOSCOPY N/A 11/19/2018    Procedure: COLONOSCOPY;  Surgeon: Bro Whitaker MD;  Location: [facility] ENDOSCOPY;  Service: Gastroenterology   • CYSTOSCOPY     • ENDOMETRIAL ABLATION  2015   • ENDOSCOPY N/A 11/19/2018    Procedure: ESOPHAGOGASTRODUODENOSCOPYwith dilation;  Surgeon: Bro Whitaker MD;  Location: [facility] ENDOSCOPY;  Service: Gastroenterology   • GASTRIC SLEEVE LAPAROSCOPIC     • LAPAROSCOPIC TUBAL LIGATION  1995   • TN ERCP DX COLLECTION SPECIMEN BRUSHING/WASHING Left 7/31/2017    Procedure: ENDOSCOPIC RETROGRADE CHOLANGIOPANCREATOGRAPHY;  Surgeon: Samuel Redding DO;  Location: [facility] ENDOSCOPY;  Service: Gastroenterology   • TN LAP,CHOLECYSTECTOMY N/A 7/24/2017    Procedure: CHOLECYSTECTOMY LAPAROSCOPIC, also umbillical hernia repair;  Surgeon: Pk العلي MD;  Location: [facility] OR;  Service: General   • UPPER GASTROINTESTINAL ENDOSCOPY  11/19/2018     Family History   Problem Relation Age of Onset   • Diabetes Mother    • Hypertension Mother    • Cirrhosis Mother    • Hypertension Father      Social History     Tobacco Use   • Smoking status: Never   • Smokeless tobacco: Never   Vaping Use   • Vaping Use: Never used   Substance Use Topics   • Alcohol use: Yes     Comment: socially   • Drug use: Never     Allergies   Allergen Reactions   • Sulfa Antibiotics Anaphylaxis and GI Intolerance     Prior to Admission medications    Medication Sig Start Date End Date Taking? Authorizing Provider   buPROPion SR (WELLBUTRIN SR) 150 MG 12 hr tablet Take 1 tablet by mouth 2 (Two) Times a Day. 8/19/21  Yes Lesli Lobo APRN   cyclobenzaprine (FLEXERIL) 10 MG tablet TAKE 1 TABLET BY MOUTH THREE TIMES A DAY AS NEEDED FOR MUSCLE SPASM  Patient taking differently: 10 mg 3 (Three) Times a Day As Needed. 11/1/22  Yes Yamil  MARIA TERESA Ballesteros   furosemide (LASIX) 40 MG tablet TAKE 1 TABLET BY MOUTH AS NEEDED (SWELLING). 7/18/22  Yes Lesli Lobo APRN   gabapentin (NEURONTIN) 600 MG tablet Take 600 mg by mouth 2 (Two) Times a Day. 11/13/21  Yes Howie Phillips MD   HYDROcodone-acetaminophen (NORCO) 5-325 MG per tablet Take 1 tablet by mouth Every 6 (Six) Hours As Needed.   Yes Howie Phillips MD   lisinopril (PRINIVIL,ZESTRIL) 2.5 MG tablet TAKE 1 TABLET BY MOUTH EVERY DAY  Patient taking differently: Take 2.5 mg by mouth Daily. 11/30/22  Yes Lesli Lobo APRN   loratadine (CLARITIN) 10 MG tablet TAKE 1 TABLET BY MOUTH EVERY DAY  Patient taking differently: Take 10 mg by mouth Daily. 7/18/22  Yes Lesli Lobo APRN   nystatin (MYCOSTATIN) 068381 UNIT/GM powder APPLY TOPICALLY TO THE APPROPRIATE AREA AS DIRECTED 3 (THREE) TIMES A DAY. 8/15/22  Yes Lesli Lobo APRN   sertraline (ZOLOFT) 100 MG tablet Take 100 mg by mouth Every Morning. 9/23/22  Yes Howie Phillips MD   aMILoride (MIDAMOR) 5 MG tablet Take 2 tablets by mouth Daily. 8/19/21   Lesli Lobo APRN   fluticasone (FLONASE) 50 MCG/ACT nasal spray INSTILL 2 SPRAYS INTO THE NOSTRIL AS DIRECTED BY PROVIDER DAILY.  Patient taking differently: 2 sprays Daily As Needed. 9/23/22   Lesli Lobo APRN   KLOR-CON 20 MEQ CR tablet TAKE 1 TABLET BY MOUTH EVERY DAY  Patient taking differently: Take 20 mEq by mouth Daily As Needed. 8/24/22   Lesli Lobo APRN   loperamide (IMODIUM) 2 MG capsule Take 1 capsule by mouth 4 (Four) Times a Day As Needed for Diarrhea. 8/19/21   Lesli Lobo APRN   nitroglycerin (NITROSTAT) 0.4 MG SL tablet Place 1 tablet under the tongue Every 5 (Five) Minutes As Needed for Chest Pain. Take no more than 3 doses in 15 minutes. 8/19/21   Lesli Lobo APRN   omeprazole (priLOSEC) 40 MG capsule Take 1 capsule by mouth Daily. 12/7/22   Lesli Lobo APRN   ondansetron ODT (ZOFRAN-ODT) 4 MG disintegrating tablet PLACE 1 TABLET ON THE TONGUE  "EVERY 6 (SIX) HOURS AS NEEDED FOR NAUSEA OR VOMITING. 9/26/22   Lesli Lobo APRN   promethazine (PHENERGAN) 25 MG tablet Take 1 tablet by mouth Every 8 (Eight) Hours As Needed for Nausea or Vomiting. 8/19/21   Lesli Lobo APRN   promethazine-dextromethorphan (PROMETHAZINE-DM) 6.25-15 MG/5ML syrup Take 5 mL by mouth 4 (Four) Times a Day As Needed for Cough. 11/28/22   Lesli Lobo APRN   rOPINIRole (REQUIP) 0.25 MG tablet TAKE 1 TABLET BY MOUTH EVERY DAY AT NIGHT  Patient taking differently: Take 0.25 mg by mouth Every Night. 8/24/22   Lesli Lobo APRN   traZODone (DESYREL) 50 MG tablet Take 50 mg by mouth Every Night.    Provider, MD Howie       Objective     Vital Signs  Temp:  [97 °F (36.1 °C)] 97 °F (36.1 °C)  Heart Rate:  [75] 75  Resp:  [18] 18  BP: (122)/(75) 122/75    Physical Exam:  Ht 162.6 cm (64\")   Wt (P) 101 kg (222 lb)   BMI (P) 38.11 kg/m²      Physical Exam     GAIT:                           []?  Normal                   [x]?  Antalgic     Assistive device:         []?  None                      [x]?  Walker                                      []?  Crutches                []?  Cane                                      []?  Wheelchair                        []?  Stretcher     Ortho Exam  Left knee no effusion or swelling noted.  Obvious quadriceps wasting noted.  Focal tenderness at the MCL attachment on the femoral condyle near the adductor tubercle and at the level of the joint line with positive Mer's and positive valgus strain indicating completely lax medial side from a complete tear of the middle collateral ligament in the left knee.    Results Review:   Lab Results (last 24 hours)     Procedure Component Value Units Date/Time    POC Glucose Once [[id number]]  (Normal) Collected: 12/13/22 1046    Specimen: Blood Updated: 12/13/22 1059     Glucose 101 mg/dL      Comment: : [phone] JEAN-BAPTISTE SHERRYMeter ID: [id number]           Imaging Results (Last 24 Hours)     " ** No results found for the last 24 hours. **            Assessment & Plan   52-year-old female patient with 4-month old injury to her left knee try to get into the pool slipped and felt her left knee give away.  Imaging studies confirm torn MCL on the MRI that is dated 09/14/2022 and the x-rays from July and August 2022 clearly indicating chondrocalcinosis with degenerative medial meniscal tear.     Conservative measures of dedicated physical therapy to the left knee, walking with a cane in the right hand, knee brace application, rest and activity modification and pain meds did not relieve her symptoms to any extent.    Complete tear of medial collateral ligament of left knee    Traumatic tear of medial collateral ligament of left knee      Discussed different treatment options of continuing the present conservative line of management which she felt did not help her for the 3 months and is unable to get on with her ADLs and further discussed in detail about arthroscopic management of the meniscal pathology in the left knee along with open surgical reconstruction versus repair of the medial collateral ligament using an allograft to strengthen the medial collateral ligament.     Patient would like to pursue the surgical plan despite explaining that the surgical result may not be as expected in terms of the outcome since its 3 months since the tear and the collateral ligaments healing can be affected with the weightbearing activities and the body weight given her obesity condition..  Also the patient clearly was explained about the prolonged rehab in order to achieve a reasonable outcome following surgical reconstruction of an MCL postop for at least 2 to 3 months.     PLAN  Today this patient is scheduled  left knee open MCL reconstruction along with arthroscopy left knee and proceed. The patient voiced understanding of the risks, benefits, and alternative forms of treatment that were discussed and the patient  consents to proceed with surgery.  All risks, benefits and alternatives were discussed.  Risks include, but not exclusive to anesthetic complications, including death, MI, CVA, infection, bleeding DVT, fracture, residual pain and need for future surgery.     This discussion was held with the patient by  Tyrese Canales MD and all questions were answered.     EMR Dragon/Transciption Disclaimer: Some of this note may be an electronic transcription/translation of spoken language to printed text using the Dragon Dictation System.     Time spent of a minimum of 25 minutes including the face to face evaluation, reviewing of medical history and prior medial records, reviewing of diagnostic studies, documentation, patient education and coordination of care and surgical treatment decision.     Tyrese Canales MD  12/13/22  11:54 CST

## 2022-12-13 NOTE — OP NOTE
KNEE ARTHROSCOPY  Procedure Note    Name:    Maricarmen Ferris  YOB: 1970  Date of surgery:   12/13/2022    Pre-op Diagnosis:   Traumatic rupture of medial collateral ligament of left knee [S83.412A]    Post-op Diagnosis:    Post-Op Diagnosis Codes:     * Traumatic rupture of medial collateral ligament of left knee [S83.412A]    Procedure:  Procedure(s):  KNEE ARTHROSCOPY WITH DEBRIDEMENT MEDIAL MENISCUS WITH OPEN RECONSTRUCTION OF THE TORN MEDIAL COLLATERAL LIGAMENT OF THE LEFT KNEE USING ALLOGRAFT    Surgeon:  Surgeon(s):  Tyrese Canales MD    Assistant: Rachel Brenner CSA was responsible for performing the following activities: Retraction, Suction, Irrigation, Suturing, Closing and Placing Dressing and their skilled assistance was necessary for the success of this case.     Anesthesia: General    Staff:   Circulator: Ladan Grady RN; Mandi Quintanilla RN; Abby Jiménez RN  Scrub Person: Soraida Guzmán; Dianne Smith  Vendor Representative: Aissatou Muir  Assistant: Rachel Brenner CSA    Estimated Blood Loss: minimal    Specimens:                None      Drains: * No LDAs found *    Findings:  TORN POSTERIOR HORN MEDIAL MENISCUS, GR 3 osteochondral damage medial femoral condyle, lax medial joint with wide opened space due to MCL tear, superomedial and superolateral plica    Complications: None    IMPLANTS:   Implant Name Type Inv. Item Serial No.  Lot No. LRB No. Used Action   SUT TAPE W/TPR END 1/2 CIR TPR NDL 1.3MM 36IN - [id number] Implant SUT TAPE W/TPR END 1/2 CIR TPR NDL 1.3MM 36IN  [employer] [id number] Left 1 Implanted   SYS IMP INTERNALBRACE REPR AUG LIG - [id number] Implant SYS IMP INTERNALBRACE REPR AUG LIG  [employer] [id number] Left 1 Implanted   KT BUTN REPR SHLDR DIST BIOCOMP - [id number] Implant KT BUTN REPR SHLDR DIST BIOCOMP  [employer] [id number] Left 1 Implanted   TNDN VERSAGRAFT PRE-SUT FZ STRL - [id number] Implant TNDN VERSAGRAFT  PRE-SUT FZ [facility]  Left 1 Implanted         PROCEDURE:  The patient was met in the preop area and was marked and consented for left knee arthroscopic partial medial meniscectomy, open MCL reconstruction using allograft and the postoperative rehab plan and possible complications and concerns were explained to him and no guarantees were made.  The patient then was shifted to the operating room and after proper induction of anesthesia, he was placed supine with a thigh support and tourniquet for the left lower extremity and the left leg was prepped and draped up to the mid thigh.  Timeout was performed and once everybody agreed with the diagnosis, patient name and details regarding the surgical procedure; planned surgery was begun.    Diagnostic arthroscopy of the left knee was performed that indicated a posterior horn complex tear with a radial component and an intrasubstance component involving the body and the posterior horn junction was identified along with a superomedial and superolateral plica in the knee.  The lateral meniscus and the joint compartment on the lateral tibiofemoral joint were intact ACL was intact.  The medial tibiofemoral joint surface showed grade 2-3 osteochondral changes with signs of early osteoarthritis.  This was followed by partial medial meniscectomy and the debridement of the cartilage on the medial femoral condyle and the posterior tibial condyle on the medial side followed by a knee washout and the plica was excised on the superomedial / superolateral aspect of the knee. The joint was thoroughly irrigated and removed of any debris in the joint.     Later, the attention was turned to mini-open approach for MCL reconstruction using the VERSAGRAFT allograft (110 mm length when doubled with 4.5 mm hector / thickness). Two small 2 cm incisions were made over the proximal medial tibia and medial femoral epicondyle under fluoro guidance for isometric points. Then  the 3.2 spade tip guide wire used to pass the graft end sutures through tibia over a endobutton device and over the femur using the bone bridge method on the lateral cortex. The graft was additionally supplemented with fiber tape as internal brace. Final graft tightening was performed with two 4.75 mm Swivelock suture anchors one at each end of the graft to secure the fixation in the near cortex bone tunnel for the graft fixation. Final knee stability and rigidity of the fixation was verified to be satisfactory with no restriction of ROM of the left knee.    Later, hemostasis was secured after releasing the tourniquet a and the scope portals were closed with 3-0 monocryl and staples and sterile dressing applied with Xeroform 4 x 4 ABDs and Ace wrap to the left lower extremity that was then immobilized in a hinged knee immobilizer locked from 0 deg extension to - 60 deg flexion.  Once the patient recovered from anesthesia he was shifted to the recovery in a stable condition with no complications postop.      Tyrese Canales MD     Date: 12/13/2022  Time: 15:15 CST

## 2022-12-13 NOTE — ANESTHESIA PROCEDURE NOTES
Peripheral Block      Patient reassessed immediately prior to procedure    Patient location during procedure: pre-op  Start time: 12/13/2022 11:50 AM  Stop time: 12/13/2022 12:00 PM  Reason for block: procedure for pain, at surgeon's request, post-op pain management and secondary anesthetic  Performed by  Anesthesiologist: Fela Moise DO  Preanesthetic Checklist  Completed: patient identified, IV checked, site marked, risks and benefits discussed, surgical consent, monitors and equipment checked, pre-op evaluation and timeout performed  Prep:  Pt Position: supine  Sterile barriers:cap, gloves and sterile barriers  Prep: ChloraPrep  Patient monitoring: blood pressure monitoring, continuous pulse oximetry and EKG  Procedure    Sedation: no  Performed under: PNB  Guidance:ultrasound guided    ULTRASOUND INTERPRETATION.  Using ultrasound guidance a 21 G gauge needle was placed in close proximity to the femoral nerve, at which point, under ultrasound guidance anesthetic was injected in the area of the nerve and spread of the anesthesia was seen on ultrasound in close proximity thereto.  There were no abnormalities seen on ultrasound; a digital image was taken; and the patient tolerated the procedure with no complications. Images:still images obtained, printed/placed on chart    Laterality:left  Block Type:adductor canal block  Injection Technique:single-shot  Needle Type:echogenic  Needle Gauge:21 G  Resistance on Injection: none  Catheter Size:20 G    Medications Used: lidocaine PF 1% (XYLOCAINE) injection - Injection   30 mg - 12/13/2022 11:50:00 AM  ropivacaine (NAROPIN) 0.5 % injection - Injection   30 mL - 12/13/2022 11:50:00 AM      Post Assessment  Injection Assessment: negative aspiration for heme, no paresthesia on injection and incremental injection  Patient Tolerance:comfortable throughout block  Complications:no  Additional Notes  Pt & side identified  U/S used throughout and needle seen  throughout  No complications  Pt tolerated procedure well

## 2022-12-17 LAB
QT INTERVAL: 438 MS
QTC INTERVAL: 448 MS

## 2022-12-21 ENCOUNTER — TELEPHONE (OUTPATIENT)
Dept: ORTHOPEDIC SURGERY | Facility: CLINIC | Age: 52
End: 2022-12-21

## 2022-12-21 ENCOUNTER — OFFICE VISIT (OUTPATIENT)
Dept: ORTHOPEDIC SURGERY | Facility: CLINIC | Age: 52
End: 2022-12-21

## 2022-12-21 VITALS — HEIGHT: 64 IN | BODY MASS INDEX: 37.43 KG/M2

## 2022-12-21 DIAGNOSIS — S83.412D: Primary | ICD-10-CM

## 2022-12-21 DIAGNOSIS — Z48.89 ENCOUNTER FOR POSTOPERATIVE WOUND CARE: ICD-10-CM

## 2022-12-21 DIAGNOSIS — Z98.890 STATUS POST ARTHROSCOPY OF LEFT KNEE: ICD-10-CM

## 2022-12-21 PROCEDURE — 99024 POSTOP FOLLOW-UP VISIT: CPT | Performed by: SPECIALIST/TECHNOLOGIST, OTHER

## 2022-12-21 RX ORDER — DOXYCYCLINE HYCLATE 100 MG/1
100 CAPSULE ORAL 2 TIMES DAILY
Qty: 30 CAPSULE | Refills: 0 | Status: CANCELLED | OUTPATIENT
Start: 2022-12-21 | End: 2023-01-05

## 2022-12-21 NOTE — PROGRESS NOTES
Maricarmen Ferris is a 52 y.o. female is s/p       Chief Complaint   Patient presents with   • Left Knee - Post-op   • Wound Check       HISTORY OF PRESENT ILLNESS: postop left knee, patient states pain, swelling, and redness started couple days ago.      12/13/22 (8d) Tyrese Canales MD    Knee Arthroscopy With Debridement Medial Meniscus With Open Reconstruction Of The Torn Medial Collateral Ligament Of The Left Knee Using Allograft - Left     52-year-old female patient who is 1 week status post left knee arthroscopy, MCL reconstruction.  Here for follow-up with concerns of mild redness around the incisions from possible allergy to the dressing.  The following portions of the patient's history were reviewed and updated as appropriate: allergies, current medications, past family history, past medical history, past social history, past surgical history and problem list.          Allergies   Allergen Reactions   • Sulfa Antibiotics Anaphylaxis and GI Intolerance         Current Outpatient Medications:   •  aMILoride (MIDAMOR) 5 MG tablet, Take 2 tablets by mouth Daily., Disp: 30 tablet, Rfl: 11  •  buPROPion SR (WELLBUTRIN SR) 150 MG 12 hr tablet, Take 1 tablet by mouth 2 (Two) Times a Day., Disp: 60 tablet, Rfl: 11  •  cyclobenzaprine (FLEXERIL) 10 MG tablet, TAKE 1 TABLET BY MOUTH THREE TIMES A DAY AS NEEDED FOR MUSCLE SPASM (Patient taking differently: 10 mg 3 (Three) Times a Day As Needed.), Disp: 30 tablet, Rfl: 0  •  doxycycline (DORYX) 100 MG enteric coated tablet, Take 1 tablet by mouth 2 (Two) Times a Day., Disp: 30 tablet, Rfl: 0  •  fluticasone (FLONASE) 50 MCG/ACT nasal spray, INSTILL 2 SPRAYS INTO THE NOSTRIL AS DIRECTED BY PROVIDER DAILY. (Patient taking differently: 2 sprays Daily As Needed.), Disp: 16 mL, Rfl: 3  •  furosemide (LASIX) 40 MG tablet, TAKE 1 TABLET BY MOUTH AS NEEDED (SWELLING)., Disp: 30 tablet, Rfl: 11  •  gabapentin (NEURONTIN) 600 MG tablet, Take 600 mg by mouth 2 (Two) Times a  Day., Disp: , Rfl:   •  HYDROcodone-acetaminophen (NORCO) 7.5-325 MG per tablet, Take 1 tablet by mouth Every 4 (Four) Hours As Needed for Moderate Pain., Disp: 30 tablet, Rfl: 0  •  KLOR-CON 20 MEQ CR tablet, TAKE 1 TABLET BY MOUTH EVERY DAY (Patient taking differently: Take 20 mEq by mouth Daily As Needed.), Disp: 30 tablet, Rfl: 11  •  lisinopril (PRINIVIL,ZESTRIL) 2.5 MG tablet, TAKE 1 TABLET BY MOUTH EVERY DAY (Patient taking differently: Take 2.5 mg by mouth Daily.), Disp: 30 tablet, Rfl: 11  •  loperamide (IMODIUM) 2 MG capsule, Take 1 capsule by mouth 4 (Four) Times a Day As Needed for Diarrhea., Disp: 24 capsule, Rfl: 3  •  loratadine (CLARITIN) 10 MG tablet, TAKE 1 TABLET BY MOUTH EVERY DAY (Patient taking differently: Take 10 mg by mouth Daily.), Disp: 30 tablet, Rfl: 11  •  nitroglycerin (NITROSTAT) 0.4 MG SL tablet, Place 1 tablet under the tongue Every 5 (Five) Minutes As Needed for Chest Pain. Take no more than 3 doses in 15 minutes., Disp: 30 tablet, Rfl: 12  •  nystatin (MYCOSTATIN) 697638 UNIT/GM powder, APPLY TOPICALLY TO THE APPROPRIATE AREA AS DIRECTED 3 (THREE) TIMES A DAY., Disp: 60 g, Rfl: 11  •  omeprazole (priLOSEC) 40 MG capsule, Take 1 capsule by mouth Daily., Disp: 30 capsule, Rfl: 0  •  ondansetron ODT (ZOFRAN-ODT) 4 MG disintegrating tablet, PLACE 1 TABLET ON THE TONGUE EVERY 6 (SIX) HOURS AS NEEDED FOR NAUSEA OR VOMITING., Disp: 15 tablet, Rfl: 3  •  promethazine (PHENERGAN) 25 MG tablet, Take 1 tablet by mouth Every 8 (Eight) Hours As Needed for Nausea or Vomiting., Disp: 30 tablet, Rfl: 3  •  promethazine-dextromethorphan (PROMETHAZINE-DM) 6.25-15 MG/5ML syrup, Take 5 mL by mouth 4 (Four) Times a Day As Needed for Cough., Disp: 118 mL, Rfl: 0  •  rOPINIRole (REQUIP) 0.25 MG tablet, TAKE 1 TABLET BY MOUTH EVERY DAY AT NIGHT (Patient taking differently: Take 0.25 mg by mouth Every Night.), Disp: 30 tablet, Rfl: 11  •  sertraline (ZOLOFT) 100 MG tablet, Take 100 mg by mouth Every  Morning., Disp: , Rfl:   •  traZODone (DESYREL) 50 MG tablet, Take 50 mg by mouth Every Night., Disp: , Rfl:     No fevers or chills.  No nausea or vomiting.      PHYSICAL EXAMINATION:       Maricarmen Ferris is a 52 y.o. female    Patient is awake and alert, answers questions appropriately and is in no apparent distress.    GAIT:     []  Normal  []  Antalgic    Assistive device: []  None  []  Walker     []  Crutches  []  Cane     [x]  Wheelchair  []  Stretcher    Ortho Exam  Left knee exam:  Surgical incisions clean, dry, intact.  Patient is able to fully extend the knee, flex the knee up to 90 degrees flexion with assistance.  No obvious wound infection/wound drainage/cellulitic changes/wound breakdown noted.  No sign of DVT/compartment soft nontender.  Patient is able to flex extend the toes foot and ankle left lower extremity.  Able to perceive sensation to light touch.              ASSESSMENT:  52-year-old female patient who is 1 week status post left knee arthroscopy, MCL reconstruction.  Here for follow-up with concerns of mild redness around the incisions from possible allergy to the dressing.  Diagnoses and all orders for this visit:    Traumatic rupture of medial collateral ligament of left knee, subsequent encounter  -     Ambulatory Referral to Physical Therapy Evaluate and treat, POST OP          PLAN  Patient is advised to continue with Ace wrap for the left knee without any additional dressing material.  However, advised to apply waterproof Band-Aids just before having shower and to remove them right after to minimize any skin irritation.  Prophylactic prescription antibiotic (doxycycline) was sent to patient pharmacy to be consumed to avoid any risk of infection.  Patient is advised to continue other conservative measures of rest, activity modification, local pain gel application on the back of the knee along with ice application to minimize left knee discomfort.  Patient is also advised to  continue mobilizing with hinged knee immobilizer that is unlocked to promote knee flexion from 0 to 90 degrees of flexion.  It was suggested that patient be followed up with physical therapy as an outpatient basis hence Referral was provided for outpatient physical therapy and rehabilitation of her left knee at our [facility]     - ROM(0-100)  - Quad strengthening  - Hinged knee brace while walking flatfoot weight bearing ( 20 lbs) with walker to offload the MCL recon  - modalities as needed   - Call us if needed     Will review this patient in 2 weeks time for follow-up wound check, staple removal and further rehab advised.  The patient voiced understanding of the risks, benefits, and alternative forms of treatment that were discussed and the patient agreed to the treatment plan.  This discussion was held with the patient by  Tyrese Canales MD and all questions were answered.    EMR Dragon/Transciption Disclaimer: Some of this note may be an electronic transcription/translation of spoken language to printed text using the Dragon Dictation System.  Time spent of a minimum of 25 minutes including the face to face evaluation, reviewing of medical history and prior medial records, reviewing of diagnostic studies, documentation, patient education and coordination of care and surgical treatment decision.     No follow-ups on file.    Tyrese Canales MD

## 2022-12-21 NOTE — TELEPHONE ENCOUNTER
Parker    Patient called this morning and said her knee is red and hot to touch.  Please call her.

## 2022-12-22 ENCOUNTER — TELEPHONE (OUTPATIENT)
Dept: ORTHOPEDIC SURGERY | Facility: CLINIC | Age: 52
End: 2022-12-22

## 2022-12-22 RX ORDER — DOXYCYCLINE HYCLATE 100 MG/1
100 TABLET, DELAYED RELEASE ORAL 2 TIMES DAILY
Qty: 30 TABLET | Refills: 0 | COMMUNITY
End: 2023-02-17

## 2022-12-22 NOTE — TELEPHONE ENCOUNTER
DR PALOMINO.  PATIENT WAS IN THE OFFICE YESTERDAY AND A PRESCRIPTION FOR AN ANTIBIOTIC, STARTED WITH A D. POSSIBLY DOXYCYCLINE?.  SHE STATES SHE HAS INFECTION SPOTS ON INCISION.  SHE IS ALLERGIC TO SULFA.  IT WAS NOT CALLED IN.  PLEASE CALL [facility].

## 2022-12-24 PROBLEM — Z98.890 STATUS POST ARTHROSCOPY OF LEFT KNEE: Status: ACTIVE | Noted: 2022-12-24

## 2022-12-24 PROBLEM — Z48.89 ENCOUNTER FOR POSTOPERATIVE WOUND CARE: Status: ACTIVE | Noted: 2022-12-24

## 2022-12-25 ENCOUNTER — HOSPITAL ENCOUNTER (EMERGENCY)
Facility: HOSPITAL | Age: 52
Discharge: HOME | End: 2022-12-25
Attending: FAMILY MEDICINE | Admitting: STUDENT IN AN ORGANIZED HEALTH CARE EDUCATION/TRAINING PROGRAM

## 2022-12-25 ENCOUNTER — APPOINTMENT (OUTPATIENT)
Dept: GENERAL RADIOLOGY | Facility: HOSPITAL | Age: 52
End: 2022-12-25

## 2022-12-25 VITALS
RESPIRATION RATE: 16 BRPM | HEIGHT: 64 IN | TEMPERATURE: 98.2 F | BODY MASS INDEX: 37.56 KG/M2 | DIASTOLIC BLOOD PRESSURE: 73 MMHG | SYSTOLIC BLOOD PRESSURE: 126 MMHG | HEART RATE: 82 BPM | WEIGHT: 220 LBS | OXYGEN SATURATION: 99 %

## 2022-12-25 DIAGNOSIS — G89.18 POST-OP PAIN: Primary | ICD-10-CM

## 2022-12-25 LAB
ALBUMIN SERPL-MCNC: 3.4 G/DL (ref 3.5–5.2)
ALBUMIN/GLOB SERPL: 1.2 G/DL
ALP SERPL-CCNC: 69 U/L (ref 39–117)
ALT SERPL W P-5'-P-CCNC: 10 U/L (ref 1–33)
ANION GAP SERPL CALCULATED.3IONS-SCNC: 8 MMOL/L (ref 5–15)
AST SERPL-CCNC: 14 U/L (ref 1–32)
BASOPHILS # BLD AUTO: 0.02 10*3/MM3 (ref 0–0.2)
BASOPHILS NFR BLD AUTO: 0.2 % (ref 0–1.5)
BILIRUB SERPL-MCNC: 0.2 MG/DL (ref 0–1.2)
BUN SERPL-MCNC: 15 MG/DL (ref 6–20)
BUN/CREAT SERPL: 17.2 (ref 7–25)
CALCIUM SPEC-SCNC: 8.8 MG/DL (ref 8.6–10.5)
CHLORIDE SERPL-SCNC: 103 MMOL/L (ref 98–107)
CO2 SERPL-SCNC: 30 MMOL/L (ref 22–29)
CREAT SERPL-MCNC: 0.87 MG/DL (ref 0.57–1)
CRP SERPL-MCNC: 0.55 MG/DL (ref 0–0.5)
D-LACTATE SERPL-SCNC: 0.8 MMOL/L (ref 0.5–2)
DEPRECATED RDW RBC AUTO: 39.8 FL (ref 37–54)
EGFRCR SERPLBLD CKD-EPI 2021: 80.3 ML/MIN/1.73
EOSINOPHIL # BLD AUTO: 0.12 10*3/MM3 (ref 0–0.4)
EOSINOPHIL NFR BLD AUTO: 1.4 % (ref 0.3–6.2)
ERYTHROCYTE [DISTWIDTH] IN BLOOD BY AUTOMATED COUNT: 13.2 % (ref 12.3–15.4)
GLOBULIN UR ELPH-MCNC: 2.9 GM/DL
GLUCOSE SERPL-MCNC: 95 MG/DL (ref 65–99)
HCT VFR BLD AUTO: 40.2 % (ref 34–46.6)
HGB BLD-MCNC: 13 G/DL (ref 12–15.9)
HOLD SPECIMEN: NORMAL
IMM GRANULOCYTES # BLD AUTO: 0.02 10*3/MM3 (ref 0–0.05)
IMM GRANULOCYTES NFR BLD AUTO: 0.2 % (ref 0–0.5)
LYMPHOCYTES # BLD AUTO: 2.86 10*3/MM3 (ref 0.7–3.1)
LYMPHOCYTES NFR BLD AUTO: 33.3 % (ref 19.6–45.3)
MCH RBC QN AUTO: 27 PG (ref 26.6–33)
MCHC RBC AUTO-ENTMCNC: 32.3 G/DL (ref 31.5–35.7)
MCV RBC AUTO: 83.6 FL (ref 79–97)
MONOCYTES # BLD AUTO: 0.7 10*3/MM3 (ref 0.1–0.9)
MONOCYTES NFR BLD AUTO: 8.2 % (ref 5–12)
NEUTROPHILS NFR BLD AUTO: 4.86 10*3/MM3 (ref 1.7–7)
NEUTROPHILS NFR BLD AUTO: 56.7 % (ref 42.7–76)
NRBC BLD AUTO-RTO: 0 /100 WBC (ref 0–0.2)
PLATELET # BLD AUTO: 311 10*3/MM3 (ref 140–450)
PMV BLD AUTO: 9.6 FL (ref 6–12)
POTASSIUM SERPL-SCNC: 3.8 MMOL/L (ref 3.5–5.2)
PROT SERPL-MCNC: 6.3 G/DL (ref 6–8.5)
RBC # BLD AUTO: 4.81 10*6/MM3 (ref 3.77–5.28)
SODIUM SERPL-SCNC: 141 MMOL/L (ref 136–145)
WBC NRBC COR # BLD: 8.58 10*3/MM3 (ref 3.4–10.8)
WHOLE BLOOD HOLD COAG: NORMAL

## 2022-12-25 PROCEDURE — 73564 X-RAY EXAM KNEE 4 OR MORE: CPT

## 2022-12-25 PROCEDURE — 83605 ASSAY OF LACTIC ACID: CPT | Performed by: STUDENT IN AN ORGANIZED HEALTH CARE EDUCATION/TRAINING PROGRAM

## 2022-12-25 PROCEDURE — 25010000002 MORPHINE PER 10 MG: Performed by: STUDENT IN AN ORGANIZED HEALTH CARE EDUCATION/TRAINING PROGRAM

## 2022-12-25 PROCEDURE — 85025 COMPLETE CBC W/AUTO DIFF WBC: CPT | Performed by: STUDENT IN AN ORGANIZED HEALTH CARE EDUCATION/TRAINING PROGRAM

## 2022-12-25 PROCEDURE — 25010000002 ONDANSETRON PER 1 MG: Performed by: STUDENT IN AN ORGANIZED HEALTH CARE EDUCATION/TRAINING PROGRAM

## 2022-12-25 PROCEDURE — 86140 C-REACTIVE PROTEIN: CPT | Performed by: STUDENT IN AN ORGANIZED HEALTH CARE EDUCATION/TRAINING PROGRAM

## 2022-12-25 PROCEDURE — 99283 EMERGENCY DEPT VISIT LOW MDM: CPT

## 2022-12-25 PROCEDURE — 96375 TX/PRO/DX INJ NEW DRUG ADDON: CPT

## 2022-12-25 PROCEDURE — 80053 COMPREHEN METABOLIC PANEL: CPT | Performed by: STUDENT IN AN ORGANIZED HEALTH CARE EDUCATION/TRAINING PROGRAM

## 2022-12-25 PROCEDURE — 96374 THER/PROPH/DIAG INJ IV PUSH: CPT

## 2022-12-25 RX ORDER — DOXYCYCLINE 100 MG/1
100 CAPSULE ORAL ONCE
Status: COMPLETED | OUTPATIENT
Start: 2022-12-25 | End: 2022-12-25

## 2022-12-25 RX ORDER — MORPHINE SULFATE 2 MG/ML
2 INJECTION, SOLUTION INTRAMUSCULAR; INTRAVENOUS ONCE
Status: COMPLETED | OUTPATIENT
Start: 2022-12-25 | End: 2022-12-25

## 2022-12-25 RX ORDER — ONDANSETRON 2 MG/ML
4 INJECTION INTRAMUSCULAR; INTRAVENOUS ONCE
Status: COMPLETED | OUTPATIENT
Start: 2022-12-25 | End: 2022-12-25

## 2022-12-25 RX ORDER — DOXYCYCLINE 100 MG/1
100 CAPSULE ORAL 2 TIMES DAILY
Qty: 14 CAPSULE | Refills: 0 | Status: SHIPPED | OUTPATIENT
Start: 2022-12-25 | End: 2023-01-01

## 2022-12-25 RX ADMIN — MORPHINE SULFATE 2 MG: 2 INJECTION, SOLUTION INTRAMUSCULAR; INTRAVENOUS at 16:23

## 2022-12-25 RX ADMIN — ONDANSETRON 4 MG: 2 INJECTION INTRAMUSCULAR; INTRAVENOUS at 16:22

## 2022-12-25 RX ADMIN — DOXYCYCLINE 100 MG: 100 CAPSULE ORAL at 17:53

## 2022-12-25 NOTE — ED PROVIDER NOTES
Subjective   History of Present Illness  52 year old female patient presents to ER for swelling, redness, and increased pain to left knee incisions. She had a meniscus tear repair on 12/13 by. Dr. Canales. She was seen in office on Wednesday and Doxycycline was prescribed. Her pharmacy denies having script. She called office on Thursday and they reported resent script which pharmacy still reportedly did not receive. Today her knee is more swollen and pain is preventing her from wearing her splint. She is rating her pain 5/10 presently after two doses of Lortab 5 at home today. She has had chills, denies known fever. She reports yellow discharge from distal incision. She denies tobacco use, drinks ETOH occasionally, and denies illicit drug use.         Review of Systems   Constitutional: Positive for chills. Negative for fever.   HENT: Negative.    Eyes: Negative.    Respiratory: Negative.    Cardiovascular: Negative.    Gastrointestinal: Negative.    Endocrine: Negative.    Genitourinary: Negative.    Musculoskeletal: Positive for joint swelling.   Skin: Negative.    Allergic/Immunologic: Negative.    Neurological: Negative.    Hematological: Negative.    Psychiatric/Behavioral: Negative.        Past Medical History:   Diagnosis Date   • Acid reflux    • Anxiety    • Depressed    • Dysphagia    • GERD (gastroesophageal reflux disease)    • Hard to intubate    • Hyperlipidemia    • Hypertension    • Hypokalemia    • IBS (irritable bowel syndrome)    • Nausea    • Sleep apnea        Allergies   Allergen Reactions   • Sulfa Antibiotics Anaphylaxis and GI Intolerance       Past Surgical History:   Procedure Laterality Date   • ABDOMINAL SURGERY     • CARDIAC CATHETERIZATION N/A 8/13/2018    Procedure: Left Heart Cath 8/13/2018 @ 9;00;  Surgeon: Kuldip Frank MD;  Location: [facility] INVASIVE LOCATION;  Service: Cardiology   • COLONOSCOPY N/A 11/19/2018    Procedure: COLONOSCOPY;  Surgeon: Bro Whitaker MD;   "Location: [facility] ENDOSCOPY;  Service: Gastroenterology   • CYSTOSCOPY     • ENDOMETRIAL ABLATION  2015   • ENDOSCOPY N/A 11/19/2018    Procedure: ESOPHAGOGASTRODUODENOSCOPYwith dilation;  Surgeon: Bro Whitaker MD;  Location: [facility] ENDOSCOPY;  Service: Gastroenterology   • GASTRIC SLEEVE LAPAROSCOPIC     • LAPAROSCOPIC TUBAL LIGATION  1995   • OR ERCP DX COLLECTION SPECIMEN BRUSHING/WASHING Left 7/31/2017    Procedure: ENDOSCOPIC RETROGRADE CHOLANGIOPANCREATOGRAPHY;  Surgeon: Samuel Redding DO;  Location: [facility] ENDOSCOPY;  Service: Gastroenterology   • OR LAP,CHOLECYSTECTOMY N/A 7/24/2017    Procedure: CHOLECYSTECTOMY LAPAROSCOPIC, also umbillical hernia repair;  Surgeon: Pk العلي MD;  Location: [facility] OR;  Service: General   • UPPER GASTROINTESTINAL ENDOSCOPY  11/19/2018       Family History   Problem Relation Age of Onset   • Diabetes Mother    • Hypertension Mother    • Cirrhosis Mother    • Hypertension Father        Social History     Socioeconomic History   • Marital status:    Tobacco Use   • Smoking status: Never   • Smokeless tobacco: Never   Vaping Use   • Vaping Use: Never used   Substance and Sexual Activity   • Alcohol use: Yes     Comment: socially   • Drug use: Never   • Sexual activity: Not Currently     Birth control/protection: Surgical           Objective    /84 (BP Location: Left arm, Patient Position: Sitting)   Pulse 80   Temp 98.2 °F (36.8 °C) (Oral)   Resp 20   Ht 162.6 cm (64\")   Wt 99.8 kg (220 lb)   SpO2 98%   BMI 37.76 kg/m²     Physical Exam  Vitals and nursing note reviewed.   Constitutional:       General: She is not in acute distress.     Appearance: Normal appearance. She is not ill-appearing, toxic-appearing or diaphoretic.   HENT:      Head: Normocephalic.      Right Ear: External ear normal.      Left Ear: External ear normal.      Nose: Nose normal.      Mouth/Throat:      Mouth: Mucous membranes are moist.   Eyes:      Pupils: Pupils are equal, " round, and reactive to light.   Cardiovascular:      Rate and Rhythm: Normal rate and regular rhythm.      Pulses: Normal pulses.      Heart sounds: Normal heart sounds.   Pulmonary:      Effort: Pulmonary effort is normal.      Breath sounds: Normal breath sounds.   Abdominal:      General: Bowel sounds are normal. There is no distension.      Palpations: Abdomen is soft.      Tenderness: There is no abdominal tenderness.   Musculoskeletal:         General: Swelling and tenderness present. Normal range of motion.      Cervical back: Normal range of motion.      Comments: Swelling to left knee, erythema noted to proximal incision to medial and lateral aspect of knee. Incisions are intact and well approximated. No drainage appreciated, staples are present. Erythema is only around incisions.    Skin:     General: Skin is warm and dry.      Capillary Refill: Capillary refill takes less than 2 seconds.   Neurological:      Mental Status: She is alert and oriented to person, place, and time.   Psychiatric:         Mood and Affect: Mood normal.         Behavior: Behavior normal.         Thought Content: Thought content normal.         Judgment: Judgment normal.         Procedures           ED Course  ED Course as of 12/25/22 1744   Sun Dec 25, 2022   1741 Spoke with patient about her results and plan for discharge including sending her script to a secondary pharmacy. She verbalizes understanding and is agreeable with plan. [HS]      ED Course User Index  [HS] Macie Banuelos, MARIA TERESA      Results for orders placed or performed during the hospital encounter of 12/25/22   Comprehensive Metabolic Panel    Specimen: Blood   Result Value Ref Range    Glucose 95 65 - 99 mg/dL    BUN 15 6 - 20 mg/dL    Creatinine 0.87 0.57 - 1.00 mg/dL    Sodium 141 136 - 145 mmol/L    Potassium 3.8 3.5 - 5.2 mmol/L    Chloride 103 98 - 107 mmol/L    CO2 30.0 (H) 22.0 - 29.0 mmol/L    Calcium 8.8 8.6 - 10.5 mg/dL    Total Protein 6.3 6.0 - 8.5  g/dL    Albumin 3.40 (L) 3.50 - 5.20 g/dL    ALT (SGPT) 10 1 - 33 U/L    AST (SGOT) 14 1 - 32 U/L    Alkaline Phosphatase 69 39 - 117 U/L    Total Bilirubin 0.2 0.0 - 1.2 mg/dL    Globulin 2.9 gm/dL    A/G Ratio 1.2 g/dL    BUN/Creatinine Ratio 17.2 7.0 - 25.0    Anion Gap 8.0 5.0 - 15.0 mmol/L    eGFR 80.3 >60.0 mL/min/1.73   Lactic Acid, Plasma    Specimen: Blood   Result Value Ref Range    Lactate 0.8 0.5 - 2.0 mmol/L   C-reactive Protein    Specimen: Blood   Result Value Ref Range    C-Reactive Protein 0.55 (H) 0.00 - 0.50 mg/dL   CBC Auto Differential    Specimen: Blood   Result Value Ref Range    WBC 8.58 3.40 - 10.80 10*3/mm3    RBC 4.81 3.77 - 5.28 10*6/mm3    Hemoglobin 13.0 12.0 - 15.9 g/dL    Hematocrit 40.2 34.0 - 46.6 %    MCV 83.6 79.0 - 97.0 fL    MCH 27.0 26.6 - 33.0 pg    MCHC 32.3 31.5 - 35.7 g/dL    RDW 13.2 12.3 - 15.4 %    RDW-SD 39.8 37.0 - 54.0 fl    MPV 9.6 6.0 - 12.0 fL    Platelets 311 140 - 450 10*3/mm3    Neutrophil % 56.7 42.7 - 76.0 %    Lymphocyte % 33.3 19.6 - 45.3 %    Monocyte % 8.2 5.0 - 12.0 %    Eosinophil % 1.4 0.3 - 6.2 %    Basophil % 0.2 0.0 - 1.5 %    Immature Grans % 0.2 0.0 - 0.5 %    Neutrophils, Absolute 4.86 1.70 - 7.00 10*3/mm3    Lymphocytes, Absolute 2.86 0.70 - 3.10 10*3/mm3    Monocytes, Absolute 0.70 0.10 - 0.90 10*3/mm3    Eosinophils, Absolute 0.12 0.00 - 0.40 10*3/mm3    Basophils, Absolute 0.02 0.00 - 0.20 10*3/mm3    Immature Grans, Absolute 0.02 0.00 - 0.05 10*3/mm3    nRBC 0.0 0.0 - 0.2 /100 WBC   Gold Top - SST   Result Value Ref Range    Extra Tube Hold for add-ons.    Light Blue Top   Result Value Ref Range    Extra Tube Hold for add-ons.      XR Knee 4+ View Left    Result Date: 12/25/2022  4 view left knee HISTORY: Recent surgery. Redness and swelling. AP, lateral, tunnel and oblique views obtained. COMPARISON: August 2, 2022 FINDINGS: Surgical skin staples in place. Postoperative changes involving the proximal tibial diametaphyseal region.  Chondrocalcinosis. Very small patellar, femoral and tibial spurs. Small suprapatellar effusion. No fracture or dislocation. No other osseous or articular abnormality.     CONCLUSION: Surgical skin staples in place. Postoperative changes involving the proximal tibial diametaphyseal region. Chondrocalcinosis. Very small patellar, femoral and tibial spurs. Small suprapatellar effusion. [id number] Electronically signed by:  Christiano Enciso MD  12/25/2022 4:22 PM [facility] Workstation: [id number]                                         [facility]    Final diagnoses:   Post-op pain       ED Disposition  ED Disposition     ED Disposition   Discharge    Condition   Stable    Comment   --             Tyrese Canales MD  [address]  Entrance [place] [id number] [phone]    Call   ER follow up for reevaluation of post op incisions.         Medication List      New Prescriptions    doxycycline 100 MG capsule  Commonly known as: MONODOX  Take 1 capsule by mouth 2 (Two) Times a Day for 7 days.        Changed    cyclobenzaprine 10 MG tablet  Commonly known as: FLEXERIL  TAKE 1 TABLET BY MOUTH THREE TIMES A DAY AS NEEDED FOR MUSCLE SPASM  What changed:   · how to take this  · reasons to take this     fluticasone 50 MCG/ACT nasal spray  Commonly known as: FLONASE  INSTILL 2 SPRAYS INTO THE NOSTRIL AS DIRECTED BY PROVIDER DAILY.  What changed: See the new instructions.     KLOR-CON 20 MEQ CR tablet  Generic drug: potassium chloride  TAKE 1 TABLET BY MOUTH EVERY DAY  What changed:   · how much to take  · when to take this  · reasons to take this     rOPINIRole 0.25 MG tablet  Commonly known as: REQUIP  TAKE 1 TABLET BY MOUTH EVERY DAY AT NIGHT  What changed: when to take this           Where to Get Your Medications      These medications were sent to [id number] - [place], [place] - [address] AT [facility] [address] - [phone] [facility] [phone] FX  [address] [place] [id number]    Phone:  [phone]   · doxycycline 100 MG capsule          Lakisha, Macie MCKENZIE, APRN  12/25/22 [id number]

## 2022-12-25 NOTE — DISCHARGE INSTRUCTIONS
Home to rest. Take antibiotic as prescribed. Keep affected leg elevated and limit weight bearing. Wear splint as tolerated. Follow up with Dr. Canales this week for reevaluation of incisions. Continue home medications.

## 2022-12-25 NOTE — ED NOTES
Patient is at an increased risk for falls. Call light within reach and patient instructed to call for assistance. Side rails up x2, bed alarm activated, and gait belt readily accessible.

## 2023-01-03 RX ORDER — OMEPRAZOLE 40 MG/1
CAPSULE, DELAYED RELEASE ORAL
Qty: 30 CAPSULE | Refills: 0 | Status: SHIPPED | OUTPATIENT
Start: 2023-01-03 | End: 2023-01-30

## 2023-01-04 ENCOUNTER — OFFICE VISIT (OUTPATIENT)
Dept: ORTHOPEDIC SURGERY | Facility: CLINIC | Age: 53
End: 2023-01-04
Payer: COMMERCIAL

## 2023-01-04 VITALS — BODY MASS INDEX: 38.93 KG/M2 | HEIGHT: 64 IN | WEIGHT: 228 LBS

## 2023-01-04 DIAGNOSIS — Z98.890 STATUS POST ARTHROSCOPY OF LEFT KNEE: Primary | ICD-10-CM

## 2023-01-04 DIAGNOSIS — Z48.89 ENCOUNTER FOR POSTOPERATIVE CARE: ICD-10-CM

## 2023-01-04 PROCEDURE — 99024 POSTOP FOLLOW-UP VISIT: CPT | Performed by: SPECIALIST/TECHNOLOGIST, OTHER

## 2023-01-04 RX ORDER — DOXYCYCLINE HYCLATE 100 MG/1
100 TABLET, DELAYED RELEASE ORAL 2 TIMES DAILY
Qty: 30 TABLET | Refills: 0 | COMMUNITY
End: 2023-02-17

## 2023-01-04 NOTE — PROGRESS NOTES
Maricarmen Ferris is a 52 y.o. female is s/p  Knee Arthroscopy With Debridement Medial Meniscus With Open Reconstruction Of The Torn Medial Collateral Ligament Of The Left Knee Using Allograft - Left     Chief Complaint   Patient presents with   • Post-op      12/13/22 (3w 1d) Tyrese Canales MD   Knee Arthroscopy With Debridement Medial Meniscus With Open Reconstruction Of The Torn Medial Collateral Ligament Of The Left Knee Using Allograft - Left           HISTORY OF PRESENT ILLNESS:   52-year-old female patient who is 3 week status post left knee arthroscopy, MCL reconstruction.  Here for follow-up with concerns of mild redness around the incisions from possible allergy to the dressing.  She has been on 1 week of oral antibiotics (doxycycline) to minimize the risk of cellulitis/allergy related skin condition on her left knee surgical area.  She denies any constitutional disturbance, doing well overall with no major concerns.  The following portions of the patient's history were reviewed and updated as appropriate: allergies, current medications, past family history, past medical history, past social history, past surgical history and problem list.           Allergies   Allergen Reactions   • Sulfa Antibiotics Anaphylaxis and GI Intolerance         Current Outpatient Medications:   •  buPROPion SR (WELLBUTRIN SR) 150 MG 12 hr tablet, Take 1 tablet by mouth 2 (Two) Times a Day., Disp: 60 tablet, Rfl: 11  •  cyclobenzaprine (FLEXERIL) 10 MG tablet, TAKE 1 TABLET BY MOUTH THREE TIMES A DAY AS NEEDED FOR MUSCLE SPASM (Patient taking differently: 10 mg 3 (Three) Times a Day As Needed.), Disp: 30 tablet, Rfl: 0  •  doxycycline (DORYX) 100 MG enteric coated tablet, Take 1 tablet by mouth 2 (Two) Times a Day., Disp: 30 tablet, Rfl: 0  •  fluticasone (FLONASE) 50 MCG/ACT nasal spray, INSTILL 2 SPRAYS INTO THE NOSTRIL AS DIRECTED BY PROVIDER DAILY. (Patient taking differently: 2 sprays Daily As Needed.), Disp: 16 mL,  Rfl: 3  •  furosemide (LASIX) 40 MG tablet, TAKE 1 TABLET BY MOUTH AS NEEDED (SWELLING)., Disp: 30 tablet, Rfl: 11  •  gabapentin (NEURONTIN) 600 MG tablet, Take 600 mg by mouth 2 (Two) Times a Day., Disp: , Rfl:   •  HYDROcodone-acetaminophen (NORCO) 7.5-325 MG per tablet, Take 1 tablet by mouth Every 4 (Four) Hours As Needed for Moderate Pain., Disp: 30 tablet, Rfl: 0  •  KLOR-CON 20 MEQ CR tablet, TAKE 1 TABLET BY MOUTH EVERY DAY (Patient taking differently: Take 20 mEq by mouth Daily As Needed.), Disp: 30 tablet, Rfl: 11  •  lisinopril (PRINIVIL,ZESTRIL) 2.5 MG tablet, TAKE 1 TABLET BY MOUTH EVERY DAY (Patient taking differently: Take 2.5 mg by mouth Daily.), Disp: 30 tablet, Rfl: 11  •  loperamide (IMODIUM) 2 MG capsule, Take 1 capsule by mouth 4 (Four) Times a Day As Needed for Diarrhea., Disp: 24 capsule, Rfl: 3  •  loratadine (CLARITIN) 10 MG tablet, TAKE 1 TABLET BY MOUTH EVERY DAY (Patient taking differently: Take 10 mg by mouth Daily.), Disp: 30 tablet, Rfl: 11  •  nitroglycerin (NITROSTAT) 0.4 MG SL tablet, Place 1 tablet under the tongue Every 5 (Five) Minutes As Needed for Chest Pain. Take no more than 3 doses in 15 minutes., Disp: 30 tablet, Rfl: 12  •  nystatin (MYCOSTATIN) 001351 UNIT/GM powder, APPLY TOPICALLY TO THE APPROPRIATE AREA AS DIRECTED 3 (THREE) TIMES A DAY., Disp: 60 g, Rfl: 11  •  omeprazole (priLOSEC) 40 MG capsule, TAKE 1 CAPSULE BY MOUTH EVERY DAY, Disp: 30 capsule, Rfl: 0  •  ondansetron ODT (ZOFRAN-ODT) 4 MG disintegrating tablet, PLACE 1 TABLET ON THE TONGUE EVERY 6 (SIX) HOURS AS NEEDED FOR NAUSEA OR VOMITING., Disp: 15 tablet, Rfl: 3  •  promethazine (PHENERGAN) 25 MG tablet, Take 1 tablet by mouth Every 8 (Eight) Hours As Needed for Nausea or Vomiting., Disp: 30 tablet, Rfl: 3  •  rOPINIRole (REQUIP) 0.25 MG tablet, TAKE 1 TABLET BY MOUTH EVERY DAY AT NIGHT (Patient taking differently: Take 0.25 mg by mouth Every Night.), Disp: 30 tablet, Rfl: 11  •  sertraline (ZOLOFT) 100 MG  tablet, Take 100 mg by mouth Every Morning., Disp: , Rfl:   •  traZODone (DESYREL) 50 MG tablet, Take 50 mg by mouth Every Night., Disp: , Rfl:   •  aMILoride (MIDAMOR) 5 MG tablet, Take 2 tablets by mouth Daily., Disp: 30 tablet, Rfl: 11  •  doxycycline (DORYX) 100 MG enteric coated tablet, Take 1 tablet by mouth 2 (Two) Times a Day., Disp: 30 tablet, Rfl: 0  •  promethazine-dextromethorphan (PROMETHAZINE-DM) 6.25-15 MG/5ML syrup, Take 5 mL by mouth 4 (Four) Times a Day As Needed for Cough., Disp: 118 mL, Rfl: 0    No fevers or chills.  No nausea or vomiting.      PHYSICAL EXAMINATION:       Maricarmen Ferris is a 52 y.o. female    Patient is awake and alert, answers questions appropriately and is in no apparent distress.    GAIT:     [x]  Normal  [x]  Antalgic    Assistive device: []  None  [x]  Walker     []  Crutches  []  Cane     []  Wheelchair  []  Stretcher    Ortho Exam  Left knee exam:  Surgical incisions clean, dry, intact.  Patient is able to fully extend the knee, flex the knee up to 90 degrees flexion with assistance.  No obvious wound infection/wound drainage/cellulitic changes/wound breakdown noted.  No sign of DVT/compartment soft nontender.  Patient is able to flex extend the toes foot and ankle left lower extremity.  Able to perceive sensation to light touch.  Staples removed in clinic today. Wounds healed well with no obvious wound break down or infection or drainage  XR Knee 4+ View Left    Result Date: 12/25/2022  Narrative: 4 view left knee HISTORY: Recent surgery. Redness and swelling. AP, lateral, tunnel and oblique views obtained. COMPARISON: August 2, 2022 FINDINGS: Surgical skin staples in place. Postoperative changes involving the proximal tibial diametaphyseal region. Chondrocalcinosis. Very small patellar, femoral and tibial spurs. Small suprapatellar effusion. No fracture or dislocation. No other osseous or articular abnormality.     Impression: CONCLUSION: Surgical skin staples in  place. Postoperative changes involving the proximal tibial diametaphyseal region. Chondrocalcinosis. Very small patellar, femoral and tibial spurs. Small suprapatellar effusion. [id number] Electronically signed by:  Christiano Enciso MD  12/25/2022 4:22 PM [facility] Workstation: [id number]    Peripheral Block    Result Date: 12/13/2022  Narrative: Fela Moise DO     12/13/2022 12:18 PM Peripheral Block Patient reassessed immediately prior to procedure Patient location during procedure: pre-op Start time: 12/13/2022 11:50 AM Stop time: 12/13/2022 12:00 PM Reason for block: procedure for pain, at surgeon's request, post-op pain management and secondary anesthetic Performed by Anesthesiologist: Fela Moise DO Preanesthetic Checklist Completed: patient identified, IV checked, site marked, risks and benefits discussed, surgical consent, monitors and equipment checked, pre-op evaluation and timeout performed Prep: Pt Position: supine Sterile barriers:cap, gloves and sterile barriers Prep: ChloraPrep Patient monitoring: blood pressure monitoring, continuous pulse oximetry and EKG Procedure Sedation: no Performed under: PNB Guidance:ultrasound guided ULTRASOUND INTERPRETATION.  Using ultrasound guidance a 21 G gauge needle was placed in close proximity to the femoral nerve, at which point, under ultrasound guidance anesthetic was injected in the area of the nerve and spread of the anesthesia was seen on ultrasound in close proximity thereto.  There were no abnormalities seen on ultrasound; a digital image was taken; and the patient tolerated the procedure with no complications. Images:still images obtained, printed/placed on chart Laterality:left Block Type:adductor canal block Injection Technique:single-shot Needle Type:echogenic Needle Gauge:21 G Resistance on Injection: none Catheter Size:20 G Medications Used: lidocaine PF 1% (XYLOCAINE) injection - Injection  30 mg - 12/13/2022 11:50:00 AM ropivacaine (NAROPIN) 0.5 %  injection - Injection  30 mL - 12/13/2022 11:50:00 AM Post Assessment Injection Assessment: negative aspiration for heme, no paresthesia on injection and incremental injection Patient Tolerance:comfortable throughout block Complications:no Additional Notes Pt & side identified U/S used throughout and needle seen throughout No complications Pt tolerated procedure well           ASSESSMENT:  52-year-old female patient who is 3 week status post left knee arthroscopy, MCL reconstruction.  Here for follow-up with concerns of mild redness around the incisions from possible allergy to the dressing.    Diagnoses and all orders for this visit:    Status post arthroscopy of left knee    Other orders  -     doxycycline (DORYX) 100 MG enteric coated tablet; Take 1 tablet by mouth 2 (Two) Times a Day.          PLAN  Patient is advised to continue with Ace wrap for the left knee without any additional dressing material.  However, advised to apply waterproof Band-Aids just before having shower and to remove them right after to minimize any skin irritation.  Prophylactic prescription antibiotic (doxycycline) was sent to patient pharmacy for another week to be consumed to avoid any risk of infection.  Patient is advised to continue other conservative measures of rest, activity modification, local pain gel application on the back of the knee along with ice application to minimize left knee discomfort.  Patient is also advised to continue mobilizing with hinged knee immobilizer that is unlocked to promote knee flexion from 0 to 90 degrees of flexion.  It was suggested that patient be followed up with physical therapy as an outpatient basis to continue   - ROM(0-100)  - Quad strengthening  - Hinged knee brace while walking flatfoot weight bearing ( 20 lbs) with walker to offload the MCL recon  - modalities as needed   - Call us if needed      Will review this patient in 3- 4 weeks time for follow-up wound check, staple removal and  further rehab advised.  The patient voiced understanding of the risks, benefits, and alternative forms of treatment that were discussed and the patient agreed to the treatment plan.  This discussion was held with the patient by  Tyrese Canales MD and all questions were answered.     EMR Dragon/Transciption Disclaimer: Some of this note may be an electronic transcription/translation of spoken language to printed text using the Dragon Dictation System.  Time spent of a minimum of 25 minutes including the face to face evaluation, reviewing of medical history and prior medial records, reviewing of diagnostic studies, documentation, patient education and coordination of care and surgical treatment decision.   No follow-ups on file.    Tyrese Canales MD

## 2023-01-05 ENCOUNTER — HOSPITAL ENCOUNTER (OUTPATIENT)
Dept: PHYSICAL THERAPY | Facility: HOSPITAL | Age: 53
Setting detail: THERAPIES SERIES
Discharge: HOME | End: 2023-01-05
Payer: COMMERCIAL

## 2023-01-05 DIAGNOSIS — S83.412D: Primary | ICD-10-CM

## 2023-01-05 PROCEDURE — 97161 PT EVAL LOW COMPLEX 20 MIN: CPT | Performed by: PHYSICAL THERAPIST

## 2023-01-05 NOTE — THERAPY EVALUATION
Outpatient Physical Therapy Ortho Initial Evaluation  [facility]     Patient Name: Maricarmen Ferris  : 1970  MRN: [id number]  Today's Date: 2023      Visit Date: 2023  Visit number:     % Improvement:   gabriela KHAN appointment:  23   Recert date:  23    Visit Diagnosis:  PT-MCL reconstruction/meniscus debridement 22        Patient Active Problem List   Diagnosis   • Chronic cholecystitis   • Umbilical hernia without obstruction and without gangrene   • Abdominal pain   • Abdominal fluid collection   • Intra-hepatic bile leak   • Abnormal cardiovascular function study   • Chest pain   • Class 1 obesity due to excess calories with serious comorbidity and body mass index (BMI) of 34.0 to 34.9 in adult   • Restrictive lung disease secondary to obesity   • Nausea   • Weight gain, abnormal   • Pain of upper abdomen   • Gastroesophageal reflux disease with esophagitis   • Irritable bowel syndrome with both constipation and diarrhea   • Dysphagia   • Mild persistent asthma without complication   • ELSIE on CPAP   • Acute kidney injury (HCC)   • DYAN (acute kidney injury) ([facility])   • Hypertension   • Hyperlipidemia   • Hyperkalemia   • Dehydration   • Hypokalemia   • History of second hand smoke exposure   • Obesity (BMI 30-39.9)   • History of weight loss surgery   • Acute cystitis without hematuria   • Allergic rhinitis   • Asthma   • Chronic left-sided low back pain with left-sided sciatica   • CKD (chronic kidney disease) stage 3, GFR 30-59 ml/min ([facility])   • Cervical spondylosis without myelopathy   • Degenerative disc disease, lumbar   • Degenerative disc disease, cervical   • Depression with anxiety   • Gastroesophageal reflux disease without esophagitis   • Kidney disease   • Lumbar spondylosis   • Malabsorption due to intolerance, not elsewhere classified   • Neck pain   • Sacroiliitis ([facility])   • Restless legs syndrome (RLS)   • Chronic pain of left knee   • Internal derangement  of left knee   • Complete tear of medial collateral ligament of left knee   • Acute medial meniscal tear, left, subsequent encounter   • Traumatic tear of medial collateral ligament of left knee   • Acute meniscal tear, medial, left, initial encounter   • Status post arthroscopy of left knee   • Encounter for postoperative wound care   • Encounter for postoperative care        Past Medical History:   Diagnosis Date   • Acid reflux    • Anxiety    • Depressed    • Dysphagia    • GERD (gastroesophageal reflux disease)    • Hard to intubate    • Hyperlipidemia    • Hypertension    • Hypokalemia    • IBS (irritable bowel syndrome)    • Nausea    • Sleep apnea         Past Surgical History:   Procedure Laterality Date   • ABDOMINAL SURGERY     • CARDIAC CATHETERIZATION N/A 8/13/2018    Procedure: Left Heart Cath 8/13/2018 @ 9;00;  Surgeon: Kuldip Frank MD;  Location: [facility] CATH INVASIVE LOCATION;  Service: Cardiology   • COLONOSCOPY N/A 11/19/2018    Procedure: COLONOSCOPY;  Surgeon: Bro Whitaker MD;  Location: [facility] ENDOSCOPY;  Service: Gastroenterology   • CYSTOSCOPY     • ENDOMETRIAL ABLATION  2015   • ENDOSCOPY N/A 11/19/2018    Procedure: ESOPHAGOGASTRODUODENOSCOPYwith dilation;  Surgeon: Bro Whitaker MD;  Location: [facility] ENDOSCOPY;  Service: Gastroenterology   • GASTRIC SLEEVE LAPAROSCOPIC     • KNEE ARTHROSCOPY Left 12/13/2022    Procedure: KNEE ARTHROSCOPY WITH DEBRIDEMENT MEDIAL MENISCUS WITH OPEN RECONSTRUCTION OF THE TORN MEDIAL COLLATERAL LIGAMENT OF THE LEFT KNEE USING ALLOGRAFT;  Surgeon: Tyrese Canales MD;  Location: [facility] OR;  Service: Orthopedics;  Laterality: Left;   • LAPAROSCOPIC TUBAL LIGATION  1995   • SC ERCP DX COLLECTION SPECIMEN BRUSHING/WASHING Left 7/31/2017    Procedure: ENDOSCOPIC RETROGRADE CHOLANGIOPANCREATOGRAPHY;  Surgeon: Samuel Redding DO;  Location: [facility] ENDOSCOPY;  Service: Gastroenterology   • SC LAPAROSCOPY SURG CHOLECYSTECTOMY N/A 7/24/2017    Procedure:  CHOLECYSTECTOMY LAPAROSCOPIC, also umbillical hernia repair;  Surgeon: Pk العلي MD;  Location: [facility];  Service: General   • UPPER GASTROINTESTINAL ENDOSCOPY  11/19/2018       Visit Dx:     ICD-10-CM ICD-9-CM   1. Traumatic rupture of medial collateral ligament of left knee, subsequent encounter  S83.412D V58.89     844.1          Patient History     Row Name 01/05/23 0800             History    Brief Description of Current Complaint Patient injured left knee getting in pool at the end of June 2022. She underwent MCL reconstruction/meniscus debridement 12-13-22. Patient is taking pain meds 3x/day  for lumbar pain which is also helping with knee pain. Patient lives with her stepfather who is helping her to dress. She lives in a 1 story home with a ramp into the home. Patient has a walk in shower with a seat in it. She is able to groom independently at this time. Patient was not using an AD prior to surgery. Patient has difficulty using cane and prefers when able to transer from rolator to no AD. She had an infection in incisions and has another week of antibiotics for the infection.  -SW      Patient/Caregiver Goals Relieve pain;Return to prior level of function;Improve strength;Improve mobility  -SW      Occupation/sports/leisure activities household chores, workout at [employer], disabled due to back  -SW         Pain     Pain Location Knee  -SW      Pain at Present 5  -SW      Pain at Best 4  -SW      Pain at Worst 7  -SW      Is your sleep disturbed? Yes  -SW      Is medication used to assist with sleep? Yes  -SW         Fall Risk Assessment    Any falls in the past year: Yes  -SW      Number of falls reported in the last 12 months 2  -SW         Daily Activities    Primary Language English  -SW            User Key  (r) = Recorded By, (t) = Taken By, (c) = Cosigned By    Initials Name Provider Type    Courtney Shay Physical Therapist                 PT Ortho     Row Name 01/05/23 0800       Subjective Comments     Subjective Comments see pt hx  -SW       Precautions and Contraindications    Precautions Per referral:ROM(0-100)  Quad strengthening  Hinged knee brace while walking flatfoot weight bearing ( 20 lbs) with walker to offload the MCL recon  modalities as needed   Call us if needed   -SW       Subjective Pain    Able to rate subjective pain? yes  -SW    Pre-Treatment Pain Level 5  -SW       Posture/Observations    Posture/Observations Comments presents on rollator and hinged knee brace, moderate swelling, no signs of infection  -SW       General ROM    LT Lower Ext Lt Knee Extension/Flexion  -SW       Left Lower Ext    Lt Knee Extension/Flexion AROM -2-85  -SW    Lt Knee Extension/Flexion PROM -5-90  -SW       MMT (Manual Muscle Testing)    Lt Lower Ext Lt Hip Flexion;Lt Knee Extension;Lt Knee Flexion;Lt Ankle Plantarflexion;Lt Ankle Dorsiflexion  -SW       MMT Left Lower Ext    Lt Hip Flexion MMT, Gross Movement (4/5) good  -SW    Lt Knee Extension MMT, Gross Movement (4/5) good  -SW    Lt Knee Flexion MMT, Gross Movement (4/5) good  -SW    Lt Ankle Plantarflexion MMT, Gross Movement (4/5) good  -SW    Lt Ankle Dorsiflexion MMT, Gross Movement (4/5) good  -SW          User Key  (r) = Recorded By, (t) = Taken By, (c) = Cosigned By    Initials Name Provider Type    Courtney Shay Physical Therapist                            Therapy Education  Education Details: qs,long sit hamstring stretch, slr, heel slides, marching in sitting  Given: HEP, Symptoms/condition management  Program: New  How Provided: Verbal, Demonstration, Written  Provided to: Patient  Level of Understanding: Teach back education performed, Verbalized, Demonstrated      PT OP Goals     Row Name 01/05/23 0800          PT Short Term Goals    STG Date to Achieve 01/26/23  -SW     STG 1 independent and compliant with hep  -SW        Long Term Goals    LTG Date to Achieve 02/16/23  -SW     LTG 1 LEFS 40  -SW     LTG 2 ambulating community distances  without AD  and with good mechanics if physician permits FWB  -     LTG 3 left knee AROM equal to right  -SW     LTG 4 5x sit to  10\" with no UE support  -     LTG 5 reciprocate up and down stairs  -     LTG 6 SLS 20\" on left  -SW        Time Calculation    PT Goal Re-Cert Due Date 01/26/23  -           User Key  (r) = Recorded By, (t) = Taken By, (c) = Cosigned By    Initials Name Provider Type     Courtney Mejia Physical Therapist                 PT Assessment/Plan     Row Name 01/05/23 0800          PT Assessment    Functional Limitations Decreased safety during functional activities;Impaired gait;Impaired locomotion;Limitation in home management;Limitations in community activities;Limitations in functional capacity and performance;Performance in leisure activities;Performance in self-care ADL  -     Impairments Balance;Gait;Impaired muscle length;Impaired muscle endurance;Muscle strength;Pain;Poor body mechanics;Range of motion  -     Assessment Comments 52 yof is  23 days s/p left MCL reconstruction/meniscus debridement reduced left knee ROM and left LE strength as well as altered gait and decreased balance. Treatment will address these impairments.  -     Rehab Potential Good  -SW     Patient/caregiver participated in establishment of treatment plan and goals Yes  -SW     Patient would benefit from skilled therapy intervention Yes  -SW        PT Plan    PT Frequency 2x/week  -     Predicted Duration of Therapy Intervention (PT) 6 weeks  -     Planned CPT's? PT EVAL LOW COMPLEXITY: 99362;PT THER PROC EA 15 MIN: 49475;PT THER ACT EA 15 MIN: 05492;PT MANUAL THERAPY EA 15 MIN: 50401;PT NEUROMUSC RE-EDUCATION EA 15 MIN: 38242;PT GAIT TRAINING EA 15 MIN: 46957;PT SELF CARE/HOME MGMT/TRAIN EA 15: 99971;PT HOT OR COLD PACK TREAT MCARE;PT AQUATIC THERAPY EA 15 MIN: 60401  -     Physical Therapy Interventions (Optional Details) aquatics exercise;balance training;gait training;home exercise  program;manual therapy techniques;modalities;neuromuscular re-education;patient/family education;ROM (Range of Motion);stair training;strengthening;stretching  -     PT Plan Comments ROM, strength, flexibility within parameters indicated by physician.  -           User Key  (r) = Recorded By, (t) = Taken By, (c) = Cosigned By    Initials Name Provider Type    Courtney Shay Physical Therapist                   OP Exercises     Row Name 01/05/23 0800             Subjective Comments    Subjective Comments see pt hx  -SW         Subjective Pain    Able to rate subjective pain? yes  -SW      Pre-Treatment Pain Level 5  -            User Key  (r) = Recorded By, (t) = Taken By, (c) = Cosigned By    Initials Name Provider Type    Courtney Shay Physical Therapist                              Outcome Measure Options: Lower Extremity Functional Scale (LEFS)         Time Calculation:     Start Time: 0803  Stop Time: 0840  Time Calculation (min): 37 min     Therapy Charges for Today     Code Description Service Date Service Provider Modifiers Qty    [id number] HC PT EVAL LOW COMPLEXITY 3 1/5/2023 Courtney Mejia GP 1          PT G-Codes  Outcome Measure Options: Lower Extremity Functional Scale (LEFS)         Courtney Mejia  1/5/2023

## 2023-01-09 ENCOUNTER — HOSPITAL ENCOUNTER (OUTPATIENT)
Dept: PHYSICAL THERAPY | Facility: HOSPITAL | Age: 53
Setting detail: THERAPIES SERIES
Discharge: HOME | End: 2023-01-09
Payer: COMMERCIAL

## 2023-01-09 DIAGNOSIS — M23.92 INTERNAL DERANGEMENT OF LEFT KNEE: ICD-10-CM

## 2023-01-09 DIAGNOSIS — S83.412D: Primary | ICD-10-CM

## 2023-01-09 DIAGNOSIS — M25.562 CHRONIC PAIN OF LEFT KNEE: ICD-10-CM

## 2023-01-09 DIAGNOSIS — G89.29 CHRONIC PAIN OF LEFT KNEE: ICD-10-CM

## 2023-01-09 DIAGNOSIS — M25.562 ACUTE PAIN OF LEFT KNEE: ICD-10-CM

## 2023-01-09 DIAGNOSIS — S83.412A SPRAIN OF MEDIAL COLLATERAL LIGAMENT OF LEFT KNEE, INITIAL ENCOUNTER: ICD-10-CM

## 2023-01-09 PROCEDURE — 97110 THERAPEUTIC EXERCISES: CPT

## 2023-01-09 NOTE — THERAPY TREATMENT NOTE
Outpatient Physical Therapy Ortho Treatment Note  [facility]     Patient Name: Maricarmen Ferris  : 1970  MRN: [id number]  Today's Date: 2023      Visit Date: 2023     Subjective Improvement 0  Visits 2/2  Visits approved eval +12 until 2023  RTMD 2023  Recert Date 2023    Visit Diagnosis:  PT-MCL reconstruction/meniscus debridement 22  Post op 3 weeks 6 days    Visit Dx:    ICD-10-CM ICD-9-CM   1. Traumatic rupture of medial collateral ligament of left knee, subsequent encounter  S83.412D V58.89     844.1   2. Sprain of medial collateral ligament of left knee, initial encounter  S83.412A 844.1   3. Chronic pain of left knee  M25.562 719.46    G89.29 338.29   4. Internal derangement of left knee  M23.92 717.9   5. Acute pain of left knee  M25.562 719.46       Patient Active Problem List   Diagnosis   • Chronic cholecystitis   • Umbilical hernia without obstruction and without gangrene   • Abdominal pain   • Abdominal fluid collection   • Intra-hepatic bile leak   • Abnormal cardiovascular function study   • Chest pain   • Class 1 obesity due to excess calories with serious comorbidity and body mass index (BMI) of 34.0 to 34.9 in adult   • Restrictive lung disease secondary to obesity   • Nausea   • Weight gain, abnormal   • Pain of upper abdomen   • Gastroesophageal reflux disease with esophagitis   • Irritable bowel syndrome with both constipation and diarrhea   • Dysphagia   • Mild persistent asthma without complication   • ELSIE on CPAP   • Acute kidney injury (HCC)   • DYAN (acute kidney injury) (HCC)   • Hypertension   • Hyperlipidemia   • Hyperkalemia   • Dehydration   • Hypokalemia   • History of second hand smoke exposure   • Obesity (BMI 30-39.9)   • History of weight loss surgery   • Acute cystitis without hematuria   • Allergic rhinitis   • Asthma   • Chronic left-sided low back pain with left-sided sciatica   • CKD (chronic kidney disease) stage 3, GFR  30-59 ml/min (HCC)   • Cervical spondylosis without myelopathy   • Degenerative disc disease, lumbar   • Degenerative disc disease, cervical   • Depression with anxiety   • Gastroesophageal reflux disease without esophagitis   • Kidney disease   • Lumbar spondylosis   • Malabsorption due to intolerance, not elsewhere classified   • Neck pain   • Sacroiliitis (HCC)   • Restless legs syndrome (RLS)   • Chronic pain of left knee   • Internal derangement of left knee   • Complete tear of medial collateral ligament of left knee   • Acute medial meniscal tear, left, subsequent encounter   • Traumatic tear of medial collateral ligament of left knee   • Acute meniscal tear, medial, left, initial encounter   • Status post arthroscopy of left knee   • Encounter for postoperative wound care   • Encounter for postoperative care        Past Medical History:   Diagnosis Date   • Acid reflux    • Anxiety    • Depressed    • Dysphagia    • GERD (gastroesophageal reflux disease)    • Hard to intubate    • Hyperlipidemia    • Hypertension    • Hypokalemia    • IBS (irritable bowel syndrome)    • Nausea    • Sleep apnea         Past Surgical History:   Procedure Laterality Date   • ABDOMINAL SURGERY     • CARDIAC CATHETERIZATION N/A 8/13/2018    Procedure: Left Heart Cath 8/13/2018 @ 9;00;  Surgeon: Kuldip Frank MD;  Location: [facility] CATH INVASIVE LOCATION;  Service: Cardiology   • COLONOSCOPY N/A 11/19/2018    Procedure: COLONOSCOPY;  Surgeon: Bro Whitaker MD;  Location: [facility] ENDOSCOPY;  Service: Gastroenterology   • CYSTOSCOPY     • ENDOMETRIAL ABLATION  2015   • ENDOSCOPY N/A 11/19/2018    Procedure: ESOPHAGOGASTRODUODENOSCOPYwith dilation;  Surgeon: Bro Whitaker MD;  Location: [facility] ENDOSCOPY;  Service: Gastroenterology   • GASTRIC SLEEVE LAPAROSCOPIC     • KNEE ARTHROSCOPY Left 12/13/2022    Procedure: KNEE ARTHROSCOPY WITH DEBRIDEMENT MEDIAL MENISCUS WITH OPEN RECONSTRUCTION OF THE TORN MEDIAL COLLATERAL LIGAMENT  OF THE LEFT KNEE USING ALLOGRAFT;  Surgeon: Tyrese Canales MD;  Location: [facility] OR;  Service: Orthopedics;  Laterality: Left;   • LAPAROSCOPIC TUBAL LIGATION  1995   • [place] ERCP DX COLLECTION SPECIMEN BRUSHING/WASHING Left 7/31/2017    Procedure: ENDOSCOPIC RETROGRADE CHOLANGIOPANCREATOGRAPHY;  Surgeon: Samuel Redding DO;  Location: [facility] ENDOSCOPY;  Service: Gastroenterology   • NV LAPAROSCOPY SURG CHOLECYSTECTOMY N/A 7/24/2017    Procedure: CHOLECYSTECTOMY LAPAROSCOPIC, also umbillical hernia repair;  Surgeon: Pk العلي MD;  Location: [facility] OR;  Service: General   • UPPER GASTROINTESTINAL ENDOSCOPY  11/19/2018        PT Ortho     Row Name 01/09/23 1500       Subjective Comments    Subjective Comments Patient states that she is on her third week of antibiotics.  She states that her knee is still swollen adn warm to the touch.  She is wering the brace and walking inside her home with her walker.  Today, she thought it would be easier to use a cane as MD told her that she can use a cane or walker.  She is performing HEP with brace off.  -CP       Precautions and Contraindications    Precautions Per referral:ROM(0-100)  Quad strengthening  Hinged knee brace while walking flatfoot weight bearing ( 20 lbs) with walker to offload the MCL recon  modalities as needed   Call us if needed   -CP    Contraindications post op 3 wks 6 days  -CP       Subjective Pain    Able to rate subjective pain? yes  -CP    Pre-Treatment Pain Level 4  -CP       Posture/Observations    Posture/Observations Comments presents amb with SPC  -CP       Left Lower Ext    Lt Knee Extension/Flexion AROM 0-90  -CP       MMT Left Lower Ext    Lt Hip Flexion MMT, Gross Movement (4/5) good  -CP    Lt Knee Extension MMT, Gross Movement (4/5) good  -CP    Lt Knee Flexion MMT, Gross Movement (4/5) good  -CP    Lt Ankle Plantarflexion MMT, Gross Movement (4/5) good  -CP    Lt Ankle Dorsiflexion MMT, Gross Movement (4/5) good  -CP          User Key  (r)  = Recorded By, (t) = Taken By, (c) = Cosigned By    Initials Name Provider Type    Marla Gardner PTA Physical Therapist Assistant                             PT Assessment/Plan     Row Name 01/09/23 1607          PT Assessment    Assessment Comments incision are red and slightly warm to touch.  edema noted in left knee,  Patient arrived amb with SPC.  she was advised to cont to use her walker as she has a 20 lb WB limit.  Increase ext this date.  She does appear to be compliant with HEP  -CP        PT Plan    PT Frequency 2x/week  -CP     Predicted Duration of Therapy Intervention (PT) 6 weeks  -CP     PT Plan Comments Cont withPOC per protocol scanned in chart, Standing CR/TR with brace donned, supported hip ext standing with brace donned, SL'ing hip AB,  -CP           User Key  (r) = Recorded By, (t) = Taken By, (c) = Cosigned By    Initials Name Provider Type    Marla Gardner PTA Physical Therapist Assistant                 Modalities     Row Name 01/09/23 1500             Ice    Ice Applied Yes  -CP      Location Left knee  -CP      PT Ice Rx Minutes 15  -CP      Ice S/P Rx Yes  -CP            User Key  (r) = Recorded By, (t) = Taken By, (c) = Cosigned By    Initials Name Provider Type    Marla Gardner PTA Physical Therapist Assistant               OP Exercises     Row Name 01/09/23 1615 01/09/23 1500          Subjective Comments    Subjective Comments -- Patient states that she is on her third week of antibiotics.  She states that her knee is still swollen adn warm to the touch.  She is wering the brace and walking inside her home with her walker.  Today, she thought it would be easier to use a cane as MD told her that she can use a cane or walker.  She is performing HEP with brace off.  -CP        Subjective Pain    Able to rate subjective pain? -- yes  -CP     Pre-Treatment Pain Level -- 4  -CP     Post-Treatment Pain Level -- 4  -CP        Total Minutes    45556 - PT Therapeutic  Exercise Minutes 43  -CP --        Exercise 1    Exercise Name 1 -- patient suggested that since she is 20 lb max WB, It would be best to use cont to use her walker at all times  -CP        Exercise 2    Exercise Name 2 -- seated CR/Tr  -CP     Cueing 2 -- Verbal;Demo  -CP     Reps 2 -- 20  -CP        Exercise 3    Exercise Name 3 -- QS  -CP     Cueing 3 -- Verbal;Tactile  -CP     Sets 3 -- 2  -CP     Reps 3 -- 10  -CP     Time 3 -- 5\"  -CP        Exercise 4    Exercise Name 4 -- SAQ  -CP     Cueing 4 -- Verbal;Tactile  -CP     Sets 4 -- 2  -CP     Reps 4 -- 10  -CP     Time 4 -- 5\" holds  -CP        Exercise 5    Exercise Name 5 -- heelslides with strap  -CP     Cueing 5 -- Verbal;Tactile  -CP     Reps 5 -- 20  -CP     Time 5 -- limit to 100 fl  -CP        Exercise 6    Exercise Name 6 -- AROM heelslides  -CP     Cueing 6 -- Verbal;Tactile  -CP     Sets 6 -- 2  -CP     Reps 6 -- 10  -CP        Exercise 7    Exercise Name 7 -- HS stretch with strap  -CP     Cueing 7 -- Tactile  -CP     Sets 7 -- 3  -CP     Time 7 -- 30\" holds  -CP        Exercise 8    Exercise Name 8 -- Ham sets  -CP     Cueing 8 -- Verbal;Tactile  -CP     Sets 8 -- 2  -CP     Reps 8 -- 10  -CP     Time 8 -- 5' holds  -CP        Exercise 9    Exercise Name 9 -- Review HEP  -CP     Cueing 9 -- Verbal  -CP     Sets 9 -- --  -CP     Reps 9 -- --  -CP           User Key  (r) = Recorded By, (t) = Taken By, (c) = Cosigned By    Initials Name Provider Type    CP Marla Pabon, PTA Physical Therapist Assistant                              PT OP Goals     Row Name 01/09/23 1600          PT Short Term Goals    STG Date to Achieve 01/26/23  -CP     STG 1 independent and compliant with hep  -CP        Long Term Goals    LTG Date to Achieve 02/16/23  -CP     LTG 1 LEFS 40  -CP     LTG 2 ambulating community distances without AD  and with good mechanics if physician permits FWB  -CP     LTG 3 left knee AROM equal to right  -CP     LTG 4 5x sit to  10\"  with no UE support  -CP     LTG 5 reciprocate up and down stairs  -CP     LTG 6 SLS 20\" on left  -CP        Time Calculation    PT Goal Re-Cert Due Date 01/26/23  -CP           User Key  (r) = Recorded By, (t) = Taken By, (c) = Cosigned By    Initials Name Provider Type    CP Marla Pabon, RICHELLE Physical Therapist Assistant                Therapy Education  Education Details: seated CR/TR, QS, Ham sets, heelslides with strap, AROM heel slides SAQ  Given: HEP  Program: New  How Provided: Verbal, Demonstration, Written  Provided to: Patient  Level of Understanding: Teach back education performed, Verbalized, Demonstrated              Time Calculation:   Start Time: 1515  Stop Time: 1620  Time Calculation (min): 65 min  Total Timed Code Minutes- PT: 43 minute(s)  Timed Charges  64519 - PT Therapeutic Exercise Minutes: 43  Untimed Charges  PT Ice Rx Minutes: 15  Total Minutes  Timed Charges Total Minutes: 43  Untimed Charges Total Minutes: 15   Total Minutes: 43  Therapy Charges for Today     Code Description Service Date Service Provider Modifiers Qty    40865812805 HC PT THER SUPP EA 15 MIN 1/9/2023 Marla Pabon PTA GP 1    43340186622 HC PT THER PROC EA 15 MIN 1/9/2023 Marla Pabon PTA GP, CQ 3                    Marla Pabon PTA  1/9/2023

## 2023-01-16 ENCOUNTER — HOSPITAL ENCOUNTER (OUTPATIENT)
Dept: PHYSICAL THERAPY | Facility: HOSPITAL | Age: 53
Setting detail: THERAPIES SERIES
Discharge: HOME | End: 2023-01-16
Payer: COMMERCIAL

## 2023-01-16 DIAGNOSIS — M23.92 INTERNAL DERANGEMENT OF LEFT KNEE: ICD-10-CM

## 2023-01-16 DIAGNOSIS — G89.29 CHRONIC PAIN OF LEFT KNEE: ICD-10-CM

## 2023-01-16 DIAGNOSIS — S83.412A SPRAIN OF MEDIAL COLLATERAL LIGAMENT OF LEFT KNEE, INITIAL ENCOUNTER: ICD-10-CM

## 2023-01-16 DIAGNOSIS — M25.562 ACUTE PAIN OF LEFT KNEE: ICD-10-CM

## 2023-01-16 DIAGNOSIS — S83.412D: Primary | ICD-10-CM

## 2023-01-16 DIAGNOSIS — M25.562 CHRONIC PAIN OF LEFT KNEE: ICD-10-CM

## 2023-01-16 PROCEDURE — 97110 THERAPEUTIC EXERCISES: CPT

## 2023-01-16 PROCEDURE — G0283 ELEC STIM OTHER THAN WOUND: HCPCS

## 2023-01-16 NOTE — THERAPY TREATMENT NOTE
Outpatient Physical Therapy Ortho Treatment Note  [facility]     Patient Name: Maricarmen Ferris  : 1970  MRN: [id number]  Today's Date: 2023      Visit Date: 2023     Subjective Improvement 0  Visits 3/3  Visits approved eval +12 until 2023  RTMD 2023  Recert Date 2023    Visit Diagnosis:  PT-MCL reconstruction/meniscus debridement 22  Post op 4 weeks 6 days      Visit Dx:    ICD-10-CM ICD-9-CM   1. Traumatic rupture of medial collateral ligament of left knee, subsequent encounter  S83.412D V58.89     844.1   2. Sprain of medial collateral ligament of left knee, initial encounter  S83.412A 844.1   3. Chronic pain of left knee  M25.562 719.46    G89.29 338.29   4. Internal derangement of left knee  M23.92 717.9   5. Acute pain of left knee  M25.562 719.46       Patient Active Problem List   Diagnosis   • Chronic cholecystitis   • Umbilical hernia without obstruction and without gangrene   • Abdominal pain   • Abdominal fluid collection   • Intra-hepatic bile leak   • Abnormal cardiovascular function study   • Chest pain   • Class 1 obesity due to excess calories with serious comorbidity and body mass index (BMI) of 34.0 to 34.9 in adult   • Restrictive lung disease secondary to obesity   • Nausea   • Weight gain, abnormal   • Pain of upper abdomen   • Gastroesophageal reflux disease with esophagitis   • Irritable bowel syndrome with both constipation and diarrhea   • Dysphagia   • Mild persistent asthma without complication   • ELSIE on CPAP   • Acute kidney injury (HCC)   • DYAN (acute kidney injury) (HCC)   • Hypertension   • Hyperlipidemia   • Hyperkalemia   • Dehydration   • Hypokalemia   • History of second hand smoke exposure   • Obesity (BMI 30-39.9)   • History of weight loss surgery   • Acute cystitis without hematuria   • Allergic rhinitis   • Asthma   • Chronic left-sided low back pain with left-sided sciatica   • CKD (chronic kidney disease) stage 3, GFR  30-59 ml/min (HCC)   • Cervical spondylosis without myelopathy   • Degenerative disc disease, lumbar   • Degenerative disc disease, cervical   • Depression with anxiety   • Gastroesophageal reflux disease without esophagitis   • Kidney disease   • Lumbar spondylosis   • Malabsorption due to intolerance, not elsewhere classified   • Neck pain   • Sacroiliitis (HCC)   • Restless legs syndrome (RLS)   • Chronic pain of left knee   • Internal derangement of left knee   • Complete tear of medial collateral ligament of left knee   • Acute medial meniscal tear, left, subsequent encounter   • Traumatic tear of medial collateral ligament of left knee   • Acute meniscal tear, medial, left, initial encounter   • Status post arthroscopy of left knee   • Encounter for postoperative wound care   • Encounter for postoperative care        Past Medical History:   Diagnosis Date   • Acid reflux    • Anxiety    • Depressed    • Dysphagia    • GERD (gastroesophageal reflux disease)    • Hard to intubate    • Hyperlipidemia    • Hypertension    • Hypokalemia    • IBS (irritable bowel syndrome)    • Nausea    • Sleep apnea         Past Surgical History:   Procedure Laterality Date   • ABDOMINAL SURGERY     • CARDIAC CATHETERIZATION N/A 8/13/2018    Procedure: Left Heart Cath 8/13/2018 @ 9;00;  Surgeon: Kuldip Frank MD;  Location: [facility] CATH INVASIVE LOCATION;  Service: Cardiology   • COLONOSCOPY N/A 11/19/2018    Procedure: COLONOSCOPY;  Surgeon: Bro Whitaker MD;  Location: [facility] ENDOSCOPY;  Service: Gastroenterology   • CYSTOSCOPY     • ENDOMETRIAL ABLATION  2015   • ENDOSCOPY N/A 11/19/2018    Procedure: ESOPHAGOGASTRODUODENOSCOPYwith dilation;  Surgeon: Bro Whitaker MD;  Location: [facility] ENDOSCOPY;  Service: Gastroenterology   • GASTRIC SLEEVE LAPAROSCOPIC     • KNEE ARTHROSCOPY Left 12/13/2022    Procedure: KNEE ARTHROSCOPY WITH DEBRIDEMENT MEDIAL MENISCUS WITH OPEN RECONSTRUCTION OF THE TORN MEDIAL COLLATERAL LIGAMENT  OF THE LEFT KNEE USING ALLOGRAFT;  Surgeon: Tyrese Canales MD;  Location: [facility] OR;  Service: Orthopedics;  Laterality: Left;   • LAPAROSCOPIC TUBAL LIGATION  1995   • [place] ERCP DX COLLECTION SPECIMEN BRUSHING/WASHING Left 7/31/2017    Procedure: ENDOSCOPIC RETROGRADE CHOLANGIOPANCREATOGRAPHY;  Surgeon: Samuel Redding DO;  Location: [facility] ENDOSCOPY;  Service: Gastroenterology   • [place] LAPAROSCOPY SURG CHOLECYSTECTOMY N/A 7/24/2017    Procedure: CHOLECYSTECTOMY LAPAROSCOPIC, also umbillical hernia repair;  Surgeon: Pk العلي MD;  Location: [facility] OR;  Service: General   • UPPER GASTROINTESTINAL ENDOSCOPY  11/19/2018        PT Ortho     Row Name 01/16/23 1300       Subjective Comments    Subjective Comments She is very concerned about her high pain in left knee.  she is unable to sleep in her bed.  She feels that the brace is increasing her pain by putting pressure on both sides of her knee.  She states that she did fall today landing on her left knee  -CP       Precautions and Contraindications    Precautions Per referral:ROM(0-100)  Quad strengthening  Hinged knee brace while walking flatfoot weight bearing ( 20 lbs) with walker to offload the MCL recon  modalities as needed   Call us if needed  -CP    Contraindications post op 4 weeks 6 days  -CP       Subjective Pain    Able to rate subjective pain? yes  -CP    Pre-Treatment Pain Level 5  -CP       Posture/Observations    Posture/Observations Comments amb with rollator  -CP       Left Lower Ext    Lt Knee Extension/Flexion AROM 0-100  -CP       MMT Left Lower Ext    Lt Hip Flexion MMT, Gross Movement (4/5) good  -CP    Lt Knee Extension MMT, Gross Movement (4/5) good  -CP    Lt Knee Flexion MMT, Gross Movement (4/5) good  -CP    Lt Ankle Plantarflexion MMT, Gross Movement (4/5) good  -CP    Lt Ankle Dorsiflexion MMT, Gross Movement (4/5) good  -CP          User Key  (r) = Recorded By, (t) = Taken By, (c) = Cosigned By    Initials Name Provider Type    CP  Marla Pabon PTA Physical Therapist Assistant                             PT Assessment/Plan     Row Name 01/16/23 1341          PT Assessment    Assessment Comments Patient does present with increase pain today.  She reports that she fell today landing on her left knee.  No bruising noted on left knee.  -CP        PT Plan    PT Frequency 2x/week  -CP     Predicted Duration of Therapy Intervention (PT) 6 weeks  -CP     PT Plan Comments Cont with POC followup on MD visit.  VO given by KAROLINA Drew for IFC for pain control  -CP           User Key  (r) = Recorded By, (t) = Taken By, (c) = Cosigned By    Initials Name Provider Type    CP Marla Pabon, RICHELLE Physical Therapist Assistant                 Modalities     Row Name 01/16/23 1300             Ice    Ice Applied Yes  -CP      Location left knee with IFC  -CP      PT Ice Rx Minutes 15  -CP      Ice S/P Rx Yes  -CP         ELECTRICAL STIMULATION    Attended/Unattended Unattended  -CP      Stimulation Type IFC  -CP      Location/Electrode Placement/Other left knee with ice  -CP      PT E-Stim Unattended Minutes 15  -CP            User Key  (r) = Recorded By, (t) = Taken By, (c) = Cosigned By    Initials Name Provider Type    CP Marla Pabon, RICHELLE Physical Therapist Assistant               OP Exercises     Row Name 01/16/23 1409 01/16/23 1300          Subjective Comments    Subjective Comments -- She is very concerned about her high pain in left knee.  she is unable to sleep in her bed.  She feels that the brace is increasing her pain by putting pressure on both sides of her knee.  She states that she did fall today landing on her left knee  -CP        Subjective Pain    Able to rate subjective pain? -- yes  -CP     Pre-Treatment Pain Level -- 5  -CP     Post-Treatment Pain Level -- 4  -CP        Total Minutes    23033 - PT Therapeutic Exercise Minutes 20  -CP --        Exercise 1    Exercise Name 1 -- heelslides with strap  -CP     Cueing 1 -- Verbal;Tactile  " -CP     Reps 1 -- 20  -CP     Time 1 -- limit 100  -CP        Exercise 2    Exercise Name 2 -- quad sets  -CP     Cueing 2 -- Verbal;Tactile  -CP     Sets 2 -- 2  -CP     Reps 2 -- 10  -CP     Time 2 -- 5' holds  -CP        Exercise 3    Exercise Name 3 -- SLR 3 way  -CP     Cueing 3 -- Verbal;Tactile  -CP     Sets 3 -- 2  -CP     Reps 3 -- 10  -CP           User Key  (r) = Recorded By, (t) = Taken By, (c) = Cosigned By    Initials Name Provider Type    CP Marla Pabon PTA Physical Therapist Assistant                              PT OP Goals     Row Name 01/16/23 1300          PT Short Term Goals    STG Date to Achieve 01/26/23  -CP     STG 1 independent and compliant with hep  -CP        Long Term Goals    LTG Date to Achieve 02/16/23  -CP     LTG 1 LEFS 40  -CP     LTG 2 ambulating community distances without AD  and with good mechanics if physician permits FWB  -CP     LTG 3 left knee AROM equal to right  -CP     LTG 4 5x sit to  10\" with no UE support  -CP     LTG 5 reciprocate up and down stairs  -CP     LTG 6 SLS 20\" on left  -CP        Time Calculation    PT Goal Re-Cert Due Date 01/26/23  -CP           User Key  (r) = Recorded By, (t) = Taken By, (c) = Cosigned By    Initials Name Provider Type    CP Marla Pabon PTA Physical Therapist Assistant                               Time Calculation:   Start Time: 1305  Stop Time: 1400  Time Calculation (min): 55 min  Total Timed Code Minutes- PT: 20 minute(s)  Timed Charges  99528 - PT Therapeutic Exercise Minutes: 20  Untimed Charges  PT E-Stim Unattended Minutes: 15  PT Ice Rx Minutes: 15  Total Minutes  Timed Charges Total Minutes: 20  Untimed Charges Total Minutes: 30   Total Minutes: 20  Therapy Charges for Today     Code Description Service Date Service Provider Modifiers Qty    77386823096 HC PT ELECTRICAL STIM UNATTENDED 1/16/2023 Marla Pabon PTA CQ 1    54764420709 HC PT THER PROC EA 15 MIN 1/16/2023 Marla Pabon PTA GP, " CQ 1                    Marla Pabon, PTA  1/16/2023

## 2023-01-18 ENCOUNTER — OFFICE VISIT (OUTPATIENT)
Dept: ORTHOPEDIC SURGERY | Facility: CLINIC | Age: 53
End: 2023-01-18
Payer: COMMERCIAL

## 2023-01-18 VITALS — HEIGHT: 64 IN | WEIGHT: 228 LBS | BODY MASS INDEX: 38.93 KG/M2

## 2023-01-18 DIAGNOSIS — Z98.890 STATUS POST ARTHROSCOPY OF LEFT KNEE: Primary | ICD-10-CM

## 2023-01-18 PROCEDURE — 99024 POSTOP FOLLOW-UP VISIT: CPT | Performed by: SPECIALIST/TECHNOLOGIST, OTHER

## 2023-01-19 NOTE — PROGRESS NOTES
Maricarmen Ferris is a 52 y.o. female   Primary provider:  Lesli Lobo APRN       Chief Complaint   Patient presents with   • Left Knee - Follow-up     5 weeks postop: knee Arthroscopy With Debridement Medial Meniscus With Open Reconstruction Of The Torn Medial Collateral Ligament Of The Left Knee Using Allograft - Left    HISTORY OF PRESENT ILLNESS:  52-year-old female patient who is 5 week status post left knee arthroscopy, MCL reconstruction.  Here for follow-up with concerns of mild redness around the incisions from possible allergy to the dressing.  She completed 1 week of oral antibiotics (doxycycline) to minimize the risk of cellulitis/allergy related skin condition on her left knee surgical area.  She denies any constitutional disturbance, doing well overall with no major concerns.      History of Present Illness     CONCURRENT MEDICAL HISTORY:  The following portions of the patient's history were reviewed and updated as appropriate: allergies, current medications, past family history, past medical history, past social history, past surgical history and problem list.     Past Medical History:   Diagnosis Date   • Acid reflux    • Anxiety    • Depressed    • Dysphagia    • GERD (gastroesophageal reflux disease)    • Hard to intubate    • Hyperlipidemia    • Hypertension    • Hypokalemia    • IBS (irritable bowel syndrome)    • Nausea    • Sleep apnea        Allergies   Allergen Reactions   • Sulfa Antibiotics Anaphylaxis and GI Intolerance         Current Outpatient Medications:   •  aMILoride (MIDAMOR) 5 MG tablet, Take 2 tablets by mouth Daily., Disp: 30 tablet, Rfl: 11  •  buPROPion SR (WELLBUTRIN SR) 150 MG 12 hr tablet, Take 1 tablet by mouth 2 (Two) Times a Day., Disp: 60 tablet, Rfl: 11  •  cyclobenzaprine (FLEXERIL) 10 MG tablet, TAKE 1 TABLET BY MOUTH THREE TIMES A DAY AS NEEDED FOR MUSCLE SPASM (Patient taking differently: 10 mg 3 (Three) Times a Day As Needed.), Disp: 30 tablet, Rfl: 0  •   doxycycline (DORYX) 100 MG enteric coated tablet, Take 1 tablet by mouth 2 (Two) Times a Day., Disp: 30 tablet, Rfl: 0  •  doxycycline (DORYX) 100 MG enteric coated tablet, Take 1 tablet by mouth 2 (Two) Times a Day., Disp: 30 tablet, Rfl: 0  •  fluticasone (FLONASE) 50 MCG/ACT nasal spray, INSTILL 2 SPRAYS INTO THE NOSTRIL AS DIRECTED BY PROVIDER DAILY. (Patient taking differently: 2 sprays Daily As Needed.), Disp: 16 mL, Rfl: 3  •  furosemide (LASIX) 40 MG tablet, TAKE 1 TABLET BY MOUTH AS NEEDED (SWELLING)., Disp: 30 tablet, Rfl: 11  •  gabapentin (NEURONTIN) 600 MG tablet, Take 600 mg by mouth 2 (Two) Times a Day., Disp: , Rfl:   •  HYDROcodone-acetaminophen (NORCO) 7.5-325 MG per tablet, Take 1 tablet by mouth Every 4 (Four) Hours As Needed for Moderate Pain., Disp: 30 tablet, Rfl: 0  •  KLOR-CON 20 MEQ CR tablet, TAKE 1 TABLET BY MOUTH EVERY DAY (Patient taking differently: Take 20 mEq by mouth Daily As Needed.), Disp: 30 tablet, Rfl: 11  •  lisinopril (PRINIVIL,ZESTRIL) 2.5 MG tablet, TAKE 1 TABLET BY MOUTH EVERY DAY (Patient taking differently: Take 2.5 mg by mouth Daily.), Disp: 30 tablet, Rfl: 11  •  loperamide (IMODIUM) 2 MG capsule, Take 1 capsule by mouth 4 (Four) Times a Day As Needed for Diarrhea., Disp: 24 capsule, Rfl: 3  •  loratadine (CLARITIN) 10 MG tablet, TAKE 1 TABLET BY MOUTH EVERY DAY (Patient taking differently: Take 10 mg by mouth Daily.), Disp: 30 tablet, Rfl: 11  •  nitroglycerin (NITROSTAT) 0.4 MG SL tablet, Place 1 tablet under the tongue Every 5 (Five) Minutes As Needed for Chest Pain. Take no more than 3 doses in 15 minutes., Disp: 30 tablet, Rfl: 12  •  nystatin (MYCOSTATIN) 731614 UNIT/GM powder, APPLY TOPICALLY TO THE APPROPRIATE AREA AS DIRECTED 3 (THREE) TIMES A DAY., Disp: 60 g, Rfl: 11  •  omeprazole (priLOSEC) 40 MG capsule, TAKE 1 CAPSULE BY MOUTH EVERY DAY, Disp: 30 capsule, Rfl: 0  •  ondansetron ODT (ZOFRAN-ODT) 4 MG disintegrating tablet, PLACE 1 TABLET ON THE TONGUE  EVERY 6 (SIX) HOURS AS NEEDED FOR NAUSEA OR VOMITING., Disp: 15 tablet, Rfl: 3  •  promethazine (PHENERGAN) 25 MG tablet, Take 1 tablet by mouth Every 8 (Eight) Hours As Needed for Nausea or Vomiting., Disp: 30 tablet, Rfl: 3  •  promethazine-dextromethorphan (PROMETHAZINE-DM) 6.25-15 MG/5ML syrup, Take 5 mL by mouth 4 (Four) Times a Day As Needed for Cough., Disp: 118 mL, Rfl: 0  •  rOPINIRole (REQUIP) 0.25 MG tablet, TAKE 1 TABLET BY MOUTH EVERY DAY AT NIGHT (Patient taking differently: Take 0.25 mg by mouth Every Night.), Disp: 30 tablet, Rfl: 11  •  sertraline (ZOLOFT) 100 MG tablet, Take 100 mg by mouth Every Morning., Disp: , Rfl:   •  traZODone (DESYREL) 50 MG tablet, Take 50 mg by mouth Every Night., Disp: , Rfl:     Past Surgical History:   Procedure Laterality Date   • ABDOMINAL SURGERY     • CARDIAC CATHETERIZATION N/A 8/13/2018    Procedure: Left Heart Cath 8/13/2018 @ 9;00;  Surgeon: Kuldip Frank MD;  Location: [facility] CATH INVASIVE LOCATION;  Service: Cardiology   • COLONOSCOPY N/A 11/19/2018    Procedure: COLONOSCOPY;  Surgeon: Bro Whitaker MD;  Location: [facility] ENDOSCOPY;  Service: Gastroenterology   • CYSTOSCOPY     • ENDOMETRIAL ABLATION  2015   • ENDOSCOPY N/A 11/19/2018    Procedure: ESOPHAGOGASTRODUODENOSCOPYwith dilation;  Surgeon: Bro Whitaker MD;  Location: [facility] ENDOSCOPY;  Service: Gastroenterology   • GASTRIC SLEEVE LAPAROSCOPIC     • KNEE ARTHROSCOPY Left 12/13/2022    Procedure: KNEE ARTHROSCOPY WITH DEBRIDEMENT MEDIAL MENISCUS WITH OPEN RECONSTRUCTION OF THE TORN MEDIAL COLLATERAL LIGAMENT OF THE LEFT KNEE USING ALLOGRAFT;  Surgeon: Tyrese Canales MD;  Location: [facility] OR;  Service: Orthopedics;  Laterality: Left;   • LAPAROSCOPIC TUBAL LIGATION  1995   • [place] ERCP DX COLLECTION SPECIMEN BRUSHING/WASHING Left 7/31/2017    Procedure: ENDOSCOPIC RETROGRADE CHOLANGIOPANCREATOGRAPHY;  Surgeon: Samuel Redding DO;  Location: [facility] ENDOSCOPY;  Service: Gastroenterology   • [place]  "LAPAROSCOPY SURG CHOLECYSTECTOMY N/A 7/24/2017    Procedure: CHOLECYSTECTOMY LAPAROSCOPIC, also umbillical hernia repair;  Surgeon: Pk العلي MD;  Location: [facility];  Service: General   • UPPER GASTROINTESTINAL ENDOSCOPY  11/19/2018       Family History   Problem Relation Age of Onset   • Diabetes Mother    • Hypertension Mother    • Cirrhosis Mother    • Hypertension Father         Social History     Socioeconomic History   • Marital status:    Tobacco Use   • Smoking status: Never   • Smokeless tobacco: Never   Vaping Use   • Vaping Use: Never used   Substance and Sexual Activity   • Alcohol use: Yes     Comment: socially   • Drug use: Never   • Sexual activity: Not Currently     Birth control/protection: Surgical        Review of Systems    PHYSICAL EXAMINATION:       Ht 162.6 cm (64\")   Wt 103 kg (228 lb)   BMI 39.14 kg/m²     Physical Exam    GAIT:     []  Normal  [x]  Antalgic    Assistive device: []  None  []  Walker     []  Crutches  [x]  Cane     []  Wheelchair []  Stretcher    Ortho Exam  Left knee exam:  Surgical incisions clean, dry, intact.  Patient is able to fully extend the knee, flex the knee up to 90 degrees flexion with assistance.  No obvious wound infection/wound drainage/cellulitic changes/wound breakdown noted.  No sign of DVT/compartment soft nontender.  Patient is able to flex extend the toes foot and ankle left lower extremity.  Able to perceive sensation to light touch.    XR Knee 4+ View Left    Result Date: 12/25/2022  Narrative: 4 view left knee HISTORY: Recent surgery. Redness and swelling. AP, lateral, tunnel and oblique views obtained. COMPARISON: August 2, 2022 FINDINGS: Surgical skin staples in place. Postoperative changes involving the proximal tibial diametaphyseal region. Chondrocalcinosis. Very small patellar, femoral and tibial spurs. Small suprapatellar effusion. No fracture or dislocation. No other osseous or articular abnormality.     Impression: CONCLUSION: " Surgical skin staples in place. Postoperative changes involving the proximal tibial diametaphyseal region. Chondrocalcinosis. Very small patellar, femoral and tibial spurs. Small suprapatellar effusion. [id number] Electronically signed by:  Christiano Enciso MD  12/25/2022 4:22 PM [facility] Workstation: [id number]          ASSESSMENT:  52-year-old female patient who is 5 week status post left knee arthroscopy, MCL reconstruction.  Here for follow-up with concerns of mild redness around the incisions from possible allergy to the dressing.     Diagnoses and all orders for this visit:    Status post arthroscopy of left knee          PLAN  Patient is advised to continue with Ace wrap for the left knee without any additional dressing material.   Prophylactic prescription antibiotic (doxycycline) was sent to patient pharmacy for another week to be consumed to avoid any risk of infection.  Patient is advised to continue other conservative measures of rest, activity modification, local pain gel application on the back of the knee along with ice application to minimize left knee discomfort.  Patient is also advised to continue mobilizing with hinged knee immobilizer that is unlocked to promote max knee flexion   It was suggested that patient be followed up with physical therapy as an outpatient basis to continue   - ROM(0-100)  - Quad strengthening  - Hinged knee brace while walking flatfoot weight bearing ( 20 lbs) with walker to offload the MCL recon  - modalities as needed   - Call us if needed      Will review this patient in 6 weeks time for follow-up wound check, staple removal and further rehab advised.  The patient voiced understanding of the risks, benefits, and alternative forms of treatment that were discussed and the patient agreed to the treatment plan.  This discussion was held with the patient by  Tyrese Canales MD and all questions were answered.     EMR Dragon/Transciption Disclaimer: Some of this note may be an electronic  transcription/translation of spoken language to printed text using the Dragon Dictation System.  Time spent of a minimum of 25 minutes including the face to face evaluation, reviewing of medical history and prior medial records, reviewing of diagnostic studies, documentation, patient education and coordination of care and surgical treatment decision.     No follow-ups on file.      This document has been electronically signed by Tyrese Canales MD on January 19, 2023 10:08 CST

## 2023-01-23 ENCOUNTER — APPOINTMENT (OUTPATIENT)
Dept: PHYSICAL THERAPY | Facility: HOSPITAL | Age: 53
End: 2023-01-23
Payer: COMMERCIAL

## 2023-01-30 RX ORDER — OMEPRAZOLE 40 MG/1
CAPSULE, DELAYED RELEASE ORAL
Qty: 30 CAPSULE | Refills: 0 | Status: SHIPPED | OUTPATIENT
Start: 2023-01-30 | End: 2023-02-27

## 2023-02-02 ENCOUNTER — HOSPITAL ENCOUNTER (OUTPATIENT)
Dept: PHYSICAL THERAPY | Facility: HOSPITAL | Age: 53
Setting detail: THERAPIES SERIES
Discharge: HOME | End: 2023-02-02
Payer: MEDICARE

## 2023-02-02 ENCOUNTER — TELEPHONE (OUTPATIENT)
Dept: ORTHOPEDIC SURGERY | Facility: CLINIC | Age: 53
End: 2023-02-02
Payer: MEDICARE

## 2023-02-02 DIAGNOSIS — G89.29 CHRONIC PAIN OF LEFT KNEE: ICD-10-CM

## 2023-02-02 DIAGNOSIS — S83.412A SPRAIN OF MEDIAL COLLATERAL LIGAMENT OF LEFT KNEE, INITIAL ENCOUNTER: ICD-10-CM

## 2023-02-02 DIAGNOSIS — M25.562 ACUTE PAIN OF LEFT KNEE: ICD-10-CM

## 2023-02-02 DIAGNOSIS — M25.562 CHRONIC PAIN OF LEFT KNEE: ICD-10-CM

## 2023-02-02 DIAGNOSIS — M23.92 INTERNAL DERANGEMENT OF LEFT KNEE: ICD-10-CM

## 2023-02-02 DIAGNOSIS — S83.412D: Primary | ICD-10-CM

## 2023-02-02 PROCEDURE — 97530 THERAPEUTIC ACTIVITIES: CPT | Performed by: PHYSICAL THERAPIST

## 2023-02-02 PROCEDURE — 97110 THERAPEUTIC EXERCISES: CPT | Performed by: PHYSICAL THERAPIST

## 2023-02-02 NOTE — TELEPHONE ENCOUNTER
"PARKER,    I spoke with Rachel from PT and she is going to put patient on hold for physical therapy because patient is not wearing brace outside of facility or in. Rachel stated patient is full weight bearing outside of facility and has not been wear brace at all. She is putting patient on hold for physical therapy until after patient sees Parker again because her insurance only will provide 6-8 more visits and she does not want to waste them because she will be doing \"less\" in office than she is doing outside of office. If Parker or Rachel([facility]) needs to speak with physical therapy phone number [phone]  "

## 2023-02-02 NOTE — TELEPHONE ENCOUNTER
Please ask the patient to continue using the brace till further out patient follow-up visit with me. She needs to continue PT with the Brace especially while weight bearing on that left knee.

## 2023-02-02 NOTE — THERAPY PROGRESS REPORT/RE-CERT
Outpatient Physical Therapy Ortho Progress Note  [facility]     Patient Name: Maricarmen Ferris  : 1970  MRN: [id number]  Today's Date: 2023      Visit Date: 2023  Subjective Improvement 70%  Visits 4/4  Visits approved eval +12 until 2023  RTMD 3-1-2023  Recert Date 2023     Visit Diagnosis:  PT-left MCL reconstruction/meniscus debridement 22    ICD-10-CM ICD-9-CM   1. Traumatic rupture of medial collateral ligament of left knee, subsequent encounter  S83.412D V58.89     844.1   2. Sprain of medial collateral ligament of left knee, initial encounter  S83.412A 844.1   3. Chronic pain of left knee  M25.562 719.46    G89.29 338.29   4. Internal derangement of left knee  M23.92 717.9   5. Acute pain of left knee  M25.562 719.46       Patient Active Problem List   Diagnosis   • Chronic cholecystitis   • Umbilical hernia without obstruction and without gangrene   • Abdominal pain   • Abdominal fluid collection   • Intra-hepatic bile leak   • Abnormal cardiovascular function study   • Chest pain   • Class 1 obesity due to excess calories with serious comorbidity and body mass index (BMI) of 34.0 to 34.9 in adult   • Restrictive lung disease secondary to obesity   • Nausea   • Weight gain, abnormal   • Pain of upper abdomen   • Gastroesophageal reflux disease with esophagitis   • Irritable bowel syndrome with both constipation and diarrhea   • Dysphagia   • Mild persistent asthma without complication   • ELSIE on CPAP   • Acute kidney injury (HCC)   • DYAN (acute kidney injury) ([facility])   • Hypertension   • Hyperlipidemia   • Hyperkalemia   • Dehydration   • Hypokalemia   • History of second hand smoke exposure   • Obesity (BMI 30-39.9)   • History of weight loss surgery   • Acute cystitis without hematuria   • Allergic rhinitis   • Asthma   • Chronic left-sided low back pain with left-sided sciatica   • CKD (chronic kidney disease) stage 3, GFR 30-59 ml/min ([facility])   • Cervical  spondylosis without myelopathy   • Degenerative disc disease, lumbar   • Degenerative disc disease, cervical   • Depression with anxiety   • Gastroesophageal reflux disease without esophagitis   • Kidney disease   • Lumbar spondylosis   • Malabsorption due to intolerance, not elsewhere classified   • Neck pain   • Sacroiliitis (HCC)   • Restless legs syndrome (RLS)   • Chronic pain of left knee   • Internal derangement of left knee   • Complete tear of medial collateral ligament of left knee   • Acute medial meniscal tear, left, subsequent encounter   • Traumatic tear of medial collateral ligament of left knee   • Acute meniscal tear, medial, left, initial encounter   • Status post arthroscopy of left knee   • Encounter for postoperative wound care   • Encounter for postoperative care        Past Medical History:   Diagnosis Date   • Acid reflux    • Anxiety    • Depressed    • Dysphagia    • GERD (gastroesophageal reflux disease)    • Hard to intubate    • Hyperlipidemia    • Hypertension    • Hypokalemia    • IBS (irritable bowel syndrome)    • Nausea    • Sleep apnea         Past Surgical History:   Procedure Laterality Date   • ABDOMINAL SURGERY     • CARDIAC CATHETERIZATION N/A 8/13/2018    Procedure: Left Heart Cath 8/13/2018 @ 9;00;  Surgeon: Kuldip Frank MD;  Location: [facility] CATH INVASIVE LOCATION;  Service: Cardiology   • COLONOSCOPY N/A 11/19/2018    Procedure: COLONOSCOPY;  Surgeon: Bro Whitaker MD;  Location: [facility] ENDOSCOPY;  Service: Gastroenterology   • CYSTOSCOPY     • ENDOMETRIAL ABLATION  2015   • ENDOSCOPY N/A 11/19/2018    Procedure: ESOPHAGOGASTRODUODENOSCOPYwith dilation;  Surgeon: Bro Whitaker MD;  Location: [facility] ENDOSCOPY;  Service: Gastroenterology   • GASTRIC SLEEVE LAPAROSCOPIC     • KNEE ARTHROSCOPY Left 12/13/2022    Procedure: KNEE ARTHROSCOPY WITH DEBRIDEMENT MEDIAL MENISCUS WITH OPEN RECONSTRUCTION OF THE TORN MEDIAL COLLATERAL LIGAMENT OF THE LEFT KNEE USING  ALLOGRAFT;  Surgeon: Tyrese Canales MD;  Location: [facility] OR;  Service: Orthopedics;  Laterality: Left;   • LAPAROSCOPIC TUBAL LIGATION  1995   • [place] ERCP DX COLLECTION SPECIMEN BRUSHING/WASHING Left 7/31/2017    Procedure: ENDOSCOPIC RETROGRADE CHOLANGIOPANCREATOGRAPHY;  Surgeon: Samuel Redding DO;  Location: [facility] ENDOSCOPY;  Service: Gastroenterology   • [place] LAPAROSCOPY SURG CHOLECYSTECTOMY N/A 7/24/2017    Procedure: CHOLECYSTECTOMY LAPAROSCOPIC, also umbillical hernia repair;  Surgeon: Pk العلي MD;  Location: [facility] OR;  Service: General   • UPPER GASTROINTESTINAL ENDOSCOPY  11/19/2018        PT Ortho     Row Name 02/02/23 1100       Precautions and Contraindications    Precautions Per referral:ROM(0-100)  Quad strengthening  Hinged knee brace while walking flatfoot weight bearing ( 20 lbs) with walker to offload the MCL recon  modalities as needed   Call us if needed  -SW       Left Lower Ext    Lt Knee Extension/Flexion AROM 0-120  -SW       MMT (Manual Muscle Testing)    Lt Lower Ext Lt Hip Extension;Lt Hip ABduction;Lt Hip Internal (Medial) Rotation;Lt Hip External (Lateral) Rotation  -SW       MMT Left Lower Ext    Lt Hip Flexion MMT, Gross Movement (5/5) normal  -SW    Lt Hip Extension MMT, Gross Movement (3+/5) fair plus  -SW    Lt Hip ABduction MMT, Gross Movement (4/5) good  -SW    Lt Hip Internal (Medial) Rotation MMT, Gross Movement (3+/5) fair plus  -SW    Lt Hip External (Lateral) Rotation MMT, Gross Movement (5/5) normal  -SW    Lt Knee Extension MMT, Gross Movement (5/5) normal  -SW    Lt Knee Flexion MMT, Gross Movement (5/5) normal  -SW    Lt Ankle Plantarflexion MMT, Gross Movement (5/5) normal  -SW    Lt Ankle Dorsiflexion MMT, Gross Movement (5/5) normal  -SW    Lt Lower Extremity Comments  no extensor lag  -SW          User Key  (r) = Recorded By, (t) = Taken By, (c) = Cosigned By    Initials Name Provider Type    Courtney Shay Physical Therapist                             PT  Assessment/Plan     Row Name 02/02/23 1100          PT Assessment    Functional Limitations Decreased safety during functional activities;Impaired gait;Impaired locomotion;Limitation in home management;Limitations in community activities;Limitations in functional capacity and performance;Performance in leisure activities;Performance in self-care ADL  -SW     Impairments Balance;Gait;Impaired muscle length;Impaired muscle endurance;Muscle strength;Pain;Poor body mechanics;Range of motion  -     Assessment Comments Patient presents 6 weeks post left MCL reconstruction/meniscus debridement progressing very well with ROM and strength. She exhibits 0-120 degrees left knee active flexion ROM. She demonstrates no exensor lag. She is ambulating without assistive device with good gait mechanics. Per last MD visit patient was instructed to continue in brace and restricted to PWB. Dr. Canales's team was contacted re: how long to continue with these restrictions. They plan to get back with PT.  -SW     Rehab Potential Good  -SW     Patient/caregiver participated in establishment of treatment plan and goals Yes  -SW     Patient would benefit from skilled therapy intervention Yes  -SW        PT Plan    PT Frequency 2x/week  -     Predicted Duration of Therapy Intervention (PT) 6 weeks  -SW     Planned CPT's? PT THER PROC EA 15 MIN: 11036;PT THER ACT EA 15 MIN: 31522;PT MANUAL THERAPY EA 15 MIN: 13321;PT NEUROMUSC RE-EDUCATION EA 15 MIN: 67943;PT SELF CARE/HOME MGMT/TRAIN EA 15: 52112;PT HOT OR COLD PACK TREAT MCARE  -SW     Physical Therapy Interventions (Optional Details) balance training;home exercise program;manual therapy techniques;modalities;neuromuscular re-education;patient/family education;ROM (Range of Motion);stair training;strengthening;stretching  -SW     PT Plan Comments Progress to funcitonal activities and strengthening in standing when given the green light by physician. Patient did schedule further  "appointments, but I discussed with her placing her on hold if we are still to follow weight bearing restricitions as she has a limited number of visits.  -SW           User Key  (r) = Recorded By, (t) = Taken By, (c) = Cosigned By    Initials Name Provider Type    Courtney Shay Physical Therapist                   OP Exercises     Row Name 02/02/23 1100             Subjective Comments    Subjective Comments Patient completed antibiotics about 2 weeks ago. She reports Dr. Canales wants her to wear brace, but it is painful. She has not been wearing brace. She presents today without brace or AD. Pain at worst is 4/10. Pain is currently 0/10.  She is ready to get back in the gym. She is going to [place] and [place] in August on a cruise and wants to be ready. She reports she is 70% better.  -SW         Subjective Pain    Able to rate subjective pain? yes  -SW         Exercise 1    Exercise Name 1 Pro II L4  -SW      Time 1 10'  -SW         Exercise 2    Exercise Name 2 recheck  -SW         Exercise 3    Exercise Name 3 laq 5#  -SW      Reps 3 20x5\"  -SW         Exercise 4    Exercise Name 4 SLR 3 way 3#  -SW      Reps 4 20  -SW         Exercise 5    Exercise Name 5 prone quad stretch  -SW      Reps 5 3x30\"  -SW            User Key  (r) = Recorded By, (t) = Taken By, (c) = Cosigned By    Initials Name Provider Type    Courtney Shay Physical Therapist                              PT OP Goals     Row Name 02/02/23 1100          PT Short Term Goals    STG Date to Achieve 02/23/23  -SW     STG 1 independent and compliant with hep  -SW     STG 1 Progress Progressing  -SW        Long Term Goals    LTG Date to Achieve 03/16/23  -SW     LTG 1 LEFS 40  -SW     LTG 1 Progress Progressing  -SW     LTG 2 ambulating community distances without AD  and with good mechanics if physician permits FWB  -SW     LTG 2 Progress Met  -SW     LTG 3 left knee AROM equal to right  -SW     LTG 3 Progress Progressing  -SW     LTG 4 5x " "sit to  10\" with no UE support  -     LTG 4 Progress Ongoing  -     LTG 5 reciprocate up and down stairs  -     LTG 5 Progress Ongoing  -     LTG 6 SLS 20\" on left  -     LTG 6 Progress Ongoing  -        Time Calculation    PT Goal Re-Cert Due Date 02/23/23  -           User Key  (r) = Recorded By, (t) = Taken By, (c) = Cosigned By    Initials Name Provider Type    Courtney Shay Physical Therapist                     Outcome Measure Options: Lower Extremity Functional Scale (LEFS)  Lower Extremity Functional Index  Any of your usual work, housework or school activities: A little bit of difficulty  Your usual hobbies, recreational or sporting activities: Moderate difficulty  Getting into or out of the bath: Moderate difficulty  Walking between rooms: No difficulty  Putting on your shoes or socks: A little bit of difficulty  Squatting: Moderate difficulty  Lifting an object, like a bag of groceries from the floor: No difficulty  Performing light activities around your home: A little bit of difficulty  Performing heavy activities around your home: Quite a bit of difficulty  Getting into or out of a car: A little bit of difficulty  Walking 2 blocks: Moderate difficulty  Walking a mile: Extreme difficulty or unable to perform activity  Going up or down 10 stairs (about 1 flight of stairs): Quite a bit of difficulty  Standing for 1 hour: Extreme difficulty or unable to perform activity  Sitting for 1 hour: A little bit of difficulty  Running on even ground: Extreme difficulty or unable to perform activity  Running on uneven ground: Extreme difficulty or unable to perform activity  Making sharp turns while running fast: Extreme difficulty or unable to perform activity  Hopping: Extreme difficulty or unable to perform activity  Rolling over in bed: No difficulty  Total: 37      Time Calculation:   Start Time: 1105  Stop Time: 1145  Time Calculation (min): 40 min  Therapy Charges for Today     Code " Description Service Date Service Provider Modifiers Qty    31221438997 HC PT THER PROC EA 15 MIN 2/2/2023 Courtney Mejia GP 2    32593114233 HC PT THERAPEUTIC ACT EA 15 MIN 2/2/2023 Courtney Mejia GP 1          PT G-Codes  Outcome Measure Options: Lower Extremity Functional Scale (LEFS)  Total: 37         Courtney Mejia  2/2/2023

## 2023-02-02 NOTE — TELEPHONE ENCOUNTER
Rachel PALOMINO from PT called and patient showed up to PT without her brace on. Patient has not been wearing it since last visit. PT needed to know if they can precede without brace or if patient needs to wear brace. Rachel (from PT) said the patient is doing great and has no pain. She just needs clarification since office notes state :  It was suggested that patient be followed up with physical therapy as an outpatient basis to continue   - ROM(0-100)  - Quad strengthening  - Hinged knee brace while walking flatfoot weight bearing ( 20 lbs) with walker to offload the MCL recon  - modalities as needed   Let me know what Parker says or you guys can call Rachel at [phone] to discuss. Thanks.

## 2023-02-07 DIAGNOSIS — G89.29 CHRONIC BACK PAIN, UNSPECIFIED BACK LOCATION, UNSPECIFIED BACK PAIN LATERALITY: Primary | ICD-10-CM

## 2023-02-07 DIAGNOSIS — M54.9 CHRONIC BACK PAIN, UNSPECIFIED BACK LOCATION, UNSPECIFIED BACK PAIN LATERALITY: Primary | ICD-10-CM

## 2023-02-09 ENCOUNTER — APPOINTMENT (OUTPATIENT)
Dept: PHYSICAL THERAPY | Facility: HOSPITAL | Age: 53
End: 2023-02-09
Payer: MEDICARE

## 2023-02-10 ENCOUNTER — APPOINTMENT (OUTPATIENT)
Dept: PHYSICAL THERAPY | Facility: HOSPITAL | Age: 53
End: 2023-02-10
Payer: MEDICARE

## 2023-02-27 RX ORDER — OMEPRAZOLE 40 MG/1
CAPSULE, DELAYED RELEASE ORAL
Qty: 30 CAPSULE | Refills: 0 | Status: SHIPPED | OUTPATIENT
Start: 2023-02-27 | End: 2023-03-29

## 2023-03-08 ENCOUNTER — OFFICE VISIT (OUTPATIENT)
Dept: ORTHOPEDIC SURGERY | Facility: CLINIC | Age: 53
End: 2023-03-08
Payer: MEDICARE

## 2023-03-08 VITALS — HEIGHT: 64 IN | WEIGHT: 220 LBS | BODY MASS INDEX: 37.56 KG/M2

## 2023-03-08 DIAGNOSIS — Z48.89 ENCOUNTER FOR POSTOPERATIVE CARE: ICD-10-CM

## 2023-03-08 DIAGNOSIS — Z98.890 STATUS POST ARTHROSCOPY OF LEFT KNEE: Primary | ICD-10-CM

## 2023-03-08 DIAGNOSIS — S83.412D: ICD-10-CM

## 2023-03-08 PROCEDURE — 1159F MED LIST DOCD IN RCRD: CPT | Performed by: SPECIALIST/TECHNOLOGIST, OTHER

## 2023-03-08 PROCEDURE — 99024 POSTOP FOLLOW-UP VISIT: CPT | Performed by: SPECIALIST/TECHNOLOGIST, OTHER

## 2023-03-08 PROCEDURE — 1160F RVW MEDS BY RX/DR IN RCRD: CPT | Performed by: SPECIALIST/TECHNOLOGIST, OTHER

## 2023-03-08 NOTE — PROGRESS NOTES
[facility] Follow[facility]up    Name:  Maricarmen Ferris  Date:  3/26/2023  :  1970    Chief Complaint:    Chief Complaint   Patient presents with   • Left Knee - Follow-up   • Wound Check     Date of surgery:    Procedures with active global billing periods:   1. IA LIGAMENTOUS RECONSTRUCTION KNEE INTRA-ARTICULAR until 23 (Remaining days: 5)   2. IA ARTHRS KNE SURG W/MENISCECTOMY MED/LAT W/SHVG until 23 (Remaining days: 5) 22 (12w 1d) Tyrese Canales MD    Knee Arthroscopy With Debridement Medial Meniscus With Open Reconstruction Of The Torn Medial Collateral Ligament Of The Left Knee Using Allograft - Left     Postop Follow-up    Name:  Maricarmen Ferris  Date:  3/26/2023  :  1970    Chief Complaint:    Chief Complaint   Patient presents with   • Left Knee - Follow-up   • Wound Check     Date of surgery:  22    Procedure: left knee arthroscopy, MCL reconstruction    History of Present Illness:  52-year-old female patient who is 12 week status post left knee arthroscopy, MCL reconstruction.  Here for follow-up.  She denies any constitutional disturbance, doing well overall with no major concerns.        CONCURRENT MEDICAL HISTORY:  The following portions of the patient's history were reviewed and updated as appropriate: allergies, current medications, past family history, past medical history, past social history, past surgical history and problem list.          Current Outpatient Medications:   •  cyclobenzaprine (FLEXERIL) 10 MG tablet, TAKE 1 TABLET BY MOUTH THREE TIMES A DAY AS NEEDED FOR MUSCLE SPASM (Patient taking differently: 1 tablet 3 (Three) Times a Day As Needed.), Disp: 30 tablet, Rfl: 0  •  fluticasone (FLONASE) 50 MCG/ACT nasal spray, INSTILL 2 SPRAYS INTO THE NOSTRIL AS DIRECTED BY PROVIDER DAILY. (Patient taking differently: 2 sprays Daily As Needed.), Disp: 16 mL, Rfl: 3  •  furosemide (LASIX) 40 MG tablet, TAKE 1 TABLET BY MOUTH AS NEEDED (SWELLING)., Disp:  30 tablet, Rfl: 11  •  gabapentin (NEURONTIN) 600 MG tablet, Take 1 tablet by mouth 2 (Two) Times a Day., Disp: , Rfl:   •  lisinopril (PRINIVIL,ZESTRIL) 2.5 MG tablet, TAKE 1 TABLET BY MOUTH EVERY DAY (Patient taking differently: Take 1 tablet by mouth Daily.), Disp: 30 tablet, Rfl: 11  •  loperamide (IMODIUM) 2 MG capsule, Take 1 capsule by mouth 4 (Four) Times a Day As Needed for Diarrhea., Disp: 24 capsule, Rfl: 3  •  loratadine (CLARITIN) 10 MG tablet, TAKE 1 TABLET BY MOUTH EVERY DAY (Patient taking differently: Take 1 tablet by mouth Daily.), Disp: 30 tablet, Rfl: 11  •  nitroglycerin (NITROSTAT) 0.4 MG SL tablet, Place 1 tablet under the tongue Every 5 (Five) Minutes As Needed for Chest Pain. Take no more than 3 doses in 15 minutes., Disp: 30 tablet, Rfl: 12  •  omeprazole (priLOSEC) 40 MG capsule, TAKE 1 CAPSULE BY MOUTH EVERY DAY, Disp: 30 capsule, Rfl: 0  •  ondansetron ODT (ZOFRAN-ODT) 4 MG disintegrating tablet, PLACE 1 TABLET ON THE TONGUE EVERY 6 (SIX) HOURS AS NEEDED FOR NAUSEA OR VOMITING., Disp: 15 tablet, Rfl: 3  •  promethazine (PHENERGAN) 25 MG tablet, Take 1 tablet by mouth Every 8 (Eight) Hours As Needed for Nausea or Vomiting., Disp: 30 tablet, Rfl: 3  •  buPROPion SR (WELLBUTRIN SR) 150 MG 12 hr tablet, Take 1 tablet by mouth 2 (Two) Times a Day., Disp: 60 tablet, Rfl: 11  •  Diclofenac Sodium (VOLTAREN) 1 % gel gel, Apply  topically to the appropriate area as directed 2 (Two) Times a Day., Disp: 50 g, Rfl: 6  •  HYDROcodone-acetaminophen (NORCO) 7.5-325 MG per tablet, Take 1 tablet by mouth Every 4 (Four) Hours As Needed for Moderate Pain., Disp: 30 tablet, Rfl: 0  •  nystatin (MYCOSTATIN) 193302 UNIT/GM powder, Apply  topically to the appropriate area as directed 3 (Three) Times a Day., Disp: 30 g, Rfl: 11  •  Semaglutide,0.25 or 0.5MG/DOS, (OZEMPIC) 2 MG/1.5ML solution pen-injector, Inject 0.25 mg under the skin into the appropriate area as directed 1 (One) Time Per Week., Disp: 1.5 mL,  "Rfl: 3  •  sertraline (ZOLOFT) 100 MG tablet, Take 1 tablet by mouth Every Morning., Disp: 30 tablet, Rfl: 11  •  traZODone (DESYREL) 50 MG tablet, Take 1 tablet by mouth Every Night., Disp: 30 tablet, Rfl: 11    Allergies   Allergen Reactions   • Sulfa Antibiotics Anaphylaxis and GI Intolerance         Exam:  Vitals:    03/08/23 1139   Weight: 99.8 kg (220 lb)   Height: 162.6 cm (64\")       The patient is awake, alert, and oriented and in no apparent distress.    Right upper extremity:wnl    Left upper extremity:wnl    Right lower extremity:wnl    Left lower extremity:  Left knee exam:  Surgical incisions clean, dry, intact.  No knee effusion noted.  Patient is able to fully extend the knee, flex the knee up to 100 degrees flexion with assistance.  No obvious wound infection/wound drainage/cellulitic changes/wound breakdown noted.  No sign of DVT/compartment soft nontender.  Patient is able to flex extend the toes foot and ankle left lower extremity.  Able to perceive sensation to light touch.    No results found.      Assessment:  52-year-old female patient who is 12 week status post left knee arthroscopy, MCL reconstruction.  Here for follow-up.  She denies any constitutional disturbance, doing well overall with no major concerns.    Diagnoses and all orders for this visit:    Status post arthroscopy of left knee  -     Ambulatory Referral to Physical Therapy Evaluate and treat    Encounter for postoperative care  -     Ambulatory Referral to Physical Therapy Evaluate and treat    Traumatic rupture of medial collateral ligament of left knee, subsequent encounter  -     Ambulatory Referral to Physical Therapy Evaluate and treat          Plan:  Patient is advised to continue other conservative measures of rest to the left knee in a soft hinged support brace, activity modification, local pain gel application on the back of the knee along with ice application to minimize left knee discomfort.  Is advised to continue " home excise program along with the exercises learned from the therapy and in clinic today and to avoid prolonged standing and incline surfaces and uneven surfaces to minimize stress to the operated left knee.    Referral was provided for outpatient physical therapy and rehabilitation of her left knee at our [facility]    - ROM KNEE   - strengthening   - modialities( as needed)    - HEP    Patient has been noncompliant with the hinged knee immobilizer that was given to her during the interim to protect the MCL reconstruction as she had wound concerns and cellulitis at the surgical site from the skin irritation.    Will review this patient in 2 mos time for follow-up and further rehab advised.  The patient voiced understanding of the risks, benefits, and alternative forms of treatment that were discussed and the patient agreed to the treatment plan.  This discussion was held with the patient by  Tyrese Canales MD and all questions were answered.     EMR Dragon/Transciption Disclaimer: Some of this note may be an electronic transcription/translation of spoken language to printed text using the Dragon Dictation System.  Time spent of a minimum of 25 minutes including the face to face evaluation, reviewing of medical history and prior medial records, reviewing of diagnostic studies, documentation, patient education and coordination of care and surgical treatment decision.          03/26/23 at 11:33 CST by Tyrese Canales MD

## 2023-03-10 ENCOUNTER — HOSPITAL ENCOUNTER (OUTPATIENT)
Dept: PHYSICAL THERAPY | Facility: HOSPITAL | Age: 53
Setting detail: THERAPIES SERIES
Discharge: HOME | End: 2023-03-10
Payer: MEDICARE

## 2023-03-10 DIAGNOSIS — M23.92 INTERNAL DERANGEMENT OF LEFT KNEE: ICD-10-CM

## 2023-03-10 DIAGNOSIS — S83.412D: Primary | ICD-10-CM

## 2023-03-10 DIAGNOSIS — M25.562 CHRONIC PAIN OF LEFT KNEE: ICD-10-CM

## 2023-03-10 DIAGNOSIS — G89.29 CHRONIC PAIN OF LEFT KNEE: ICD-10-CM

## 2023-03-10 DIAGNOSIS — S83.412A SPRAIN OF MEDIAL COLLATERAL LIGAMENT OF LEFT KNEE, INITIAL ENCOUNTER: ICD-10-CM

## 2023-03-10 DIAGNOSIS — M25.562 ACUTE PAIN OF LEFT KNEE: ICD-10-CM

## 2023-03-10 PROCEDURE — 97530 THERAPEUTIC ACTIVITIES: CPT | Performed by: PHYSICAL THERAPIST

## 2023-03-10 PROCEDURE — 97112 NEUROMUSCULAR REEDUCATION: CPT | Performed by: PHYSICAL THERAPIST

## 2023-03-10 PROCEDURE — 97110 THERAPEUTIC EXERCISES: CPT | Performed by: PHYSICAL THERAPIST

## 2023-03-10 NOTE — THERAPY PROGRESS REPORT/RE-CERT
Outpatient Physical Therapy Ortho Progress Note  [facility]     Patient Name: Maricarmen Ferris  : 1970  MRN: [id number]  Today's Date: 3/10/2023      Visit Date: 03/10/2023  Subjective Improvement 75%  Visits 5/5  Visits approved eval +12 until 2023  RTMD 2023  Recert Date 3-     Visit Diagnosis:  PT-left MCL reconstruction/meniscus debridement 22    ICD-10-CM ICD-9-CM   1. Traumatic rupture of medial collateral ligament of left knee, subsequent encounter  S83.412D V58.89     844.1   2. Sprain of medial collateral ligament of left knee, initial encounter  S83.412A 844.1   3. Chronic pain of left knee  M25.562 719.46    G89.29 338.29   4. Internal derangement of left knee  M23.92 717.9   5. Acute pain of left knee  M25.562 719.46       Patient Active Problem List   Diagnosis   • Chronic cholecystitis   • Umbilical hernia without obstruction and without gangrene   • Abdominal pain   • Abdominal fluid collection   • Intra-hepatic bile leak   • Abnormal cardiovascular function study   • Chest pain   • Class 1 obesity due to excess calories with serious comorbidity and body mass index (BMI) of 34.0 to 34.9 in adult   • Restrictive lung disease secondary to obesity   • Nausea   • Weight gain, abnormal   • Pain of upper abdomen   • Gastroesophageal reflux disease with esophagitis   • Irritable bowel syndrome with both constipation and diarrhea   • Dysphagia   • Mild persistent asthma without complication   • ELSIE on CPAP   • Acute kidney injury (HCC)   • DYAN (acute kidney injury) ([facility])   • Hypertension   • Hyperlipidemia   • Hyperkalemia   • Dehydration   • Hypokalemia   • History of second hand smoke exposure   • Obesity (BMI 30-39.9)   • History of weight loss surgery   • Acute cystitis without hematuria   • Allergic rhinitis   • Asthma   • Chronic left-sided low back pain with left-sided sciatica   • CKD (chronic kidney disease) stage 3, GFR 30-59 ml/min ([facility])   • Cervical  spondylosis without myelopathy   • Degenerative disc disease, lumbar   • Degenerative disc disease, cervical   • Depression with anxiety   • Gastroesophageal reflux disease without esophagitis   • Kidney disease   • Lumbar spondylosis   • Malabsorption due to intolerance, not elsewhere classified   • Neck pain   • Sacroiliitis (HCC)   • Restless legs syndrome (RLS)   • Chronic pain of left knee   • Internal derangement of left knee   • Complete tear of medial collateral ligament of left knee   • Acute medial meniscal tear, left, subsequent encounter   • Traumatic tear of medial collateral ligament of left knee   • Acute meniscal tear, medial, left, initial encounter   • Status post arthroscopy of left knee   • Encounter for postoperative wound care   • Encounter for postoperative care        Past Medical History:   Diagnosis Date   • Acid reflux    • Anxiety    • Depressed    • Dysphagia    • GERD (gastroesophageal reflux disease)    • Hard to intubate    • Hyperlipidemia    • Hypertension    • Hypokalemia    • IBS (irritable bowel syndrome)    • Nausea    • Sleep apnea         Past Surgical History:   Procedure Laterality Date   • ABDOMINAL SURGERY     • CARDIAC CATHETERIZATION N/A 8/13/2018    Procedure: Left Heart Cath 8/13/2018 @ 9;00;  Surgeon: Kuldip Frank MD;  Location: [facility] CATH INVASIVE LOCATION;  Service: Cardiology   • COLONOSCOPY N/A 11/19/2018    Procedure: COLONOSCOPY;  Surgeon: Bro Whitaker MD;  Location: [facility] ENDOSCOPY;  Service: Gastroenterology   • CYSTOSCOPY     • ENDOMETRIAL ABLATION  2015   • ENDOSCOPY N/A 11/19/2018    Procedure: ESOPHAGOGASTRODUODENOSCOPYwith dilation;  Surgeon: Bro Whitaker MD;  Location: [facility] ENDOSCOPY;  Service: Gastroenterology   • GASTRIC SLEEVE LAPAROSCOPIC     • KNEE ARTHROSCOPY Left 12/13/2022    Procedure: KNEE ARTHROSCOPY WITH DEBRIDEMENT MEDIAL MENISCUS WITH OPEN RECONSTRUCTION OF THE TORN MEDIAL COLLATERAL LIGAMENT OF THE LEFT KNEE USING  ALLOGRAFT;  Surgeon: Tyrese Canales MD;  Location: [facility] OR;  Service: Orthopedics;  Laterality: Left;   • LAPAROSCOPIC TUBAL LIGATION  1995   • AK ERCP DX COLLECTION SPECIMEN BRUSHING/WASHING Left 7/31/2017    Procedure: ENDOSCOPIC RETROGRADE CHOLANGIOPANCREATOGRAPHY;  Surgeon: Samuel Redding DO;  Location: [facility] ENDOSCOPY;  Service: Gastroenterology   • AK LAPAROSCOPY SURG CHOLECYSTECTOMY N/A 7/24/2017    Procedure: CHOLECYSTECTOMY LAPAROSCOPIC, also umbillical hernia repair;  Surgeon: Pk العلي MD;  Location: [facility] OR;  Service: General   • UPPER GASTROINTESTINAL ENDOSCOPY  11/19/2018        PT Ortho     Row Name 03/10/23 0900       Precautions and Contraindications    Precautions Per new referral on 3-8-23:    Continue therapy with WBAT  -SW       Posture/Observations    Posture/Observations Comments ambulating with good gait mechanics without AD and with hinged knee brace  -SW       Left Lower Ext    Lt Knee Extension/Flexion AROM 0-133  -SW    LT Lower Extremity Comments almost equal to right  -SW       MMT (Manual Muscle Testing)    General MMT Comments 20 consecutive SLR's without extensor lag  -SW       MMT Left Lower Ext    Lt Hip Flexion MMT, Gross Movement (5/5) normal  -SW    Lt Knee Extension MMT, Gross Movement (5/5) normal  -SW    Lt Knee Flexion MMT, Gross Movement (5/5) normal  -SW    Lt Ankle Plantarflexion MMT, Gross Movement (5/5) normal  -SW    Lt Ankle Dorsiflexion MMT, Gross Movement (5/5) normal  -SW          User Key  (r) = Recorded By, (t) = Taken By, (c) = Cosigned By    Initials Name Provider Type    SW Courtney Mejia Physical Therapist                             PT Assessment/Plan     Row Name 03/10/23 0900          PT Assessment    Functional Limitations Limitations in community activities;Limitations in functional capacity and performance;Performance in leisure activities  -     Assessment Comments Physician has not yet completed note from 3-8-23. New referral states WBAT.  "She is walking community distances without AD and without pain with good gait mechanics. She is able to reciprocate up and down stairs although stair mechanics are slightly altered. She demonstrates 0-133 degrees of left knee flexion.  -SW     Rehab Potential Good  -SW     Patient/caregiver participated in establishment of treatment plan and goals Yes  -SW     Patient would benefit from skilled therapy intervention Yes  -SW        PT Plan    PT Frequency 2x/week  -     Predicted Duration of Therapy Intervention (PT) 5 more visits  -SW     PT Plan Comments Skilled PT is necessary to address remaining impairments. Focus on LE strengthening and quad flexibility. Remaining visits will be performed in an aquatic environment as patient feels comfortable there.  -           User Key  (r) = Recorded By, (t) = Taken By, (c) = Cosigned By    Initials Name Provider Type    Courtney Shay Physical Therapist                   OP Exercises     Row Name 03/10/23 0900             Subjective Comments    Subjective Comments Patient reports she has had no pain in 2 weeks. She returned to surgeon and received a good report. She is pursuing gastric bypass surgery. She has returned to most daily activities. She states physician told her to stay away from the  for now.  -SW         Subjective Pain    Able to rate subjective pain? yes  -SW      Pre-Treatment Pain Level 0  -SW         Exercise 1    Exercise Name 1 Pro II L4  -SW      Time 1 10'  -SW         Exercise 2    Exercise Name 2 recheck  -SW         Exercise 3    Exercise Name 3 sit to stand  -SW      Sets 3 2  -SW      Reps 3 5  -SW         Exercise 4    Exercise Name 4 SLS  -SW      Reps 4 20\"x1, 10\"x1, 5\"x1  -SW         Exercise 5    Exercise Name 5 step ups 6\"  -SW      Reps 5 10 ea side  -SW         Exercise 6    Exercise Name 6 stair navigation  -SW      Reps 6 1 1/2 flights  -SW         Exercise 7    Exercise Name 7 SLR  -SW      Reps 7 20  -SW         Exercise 8    " "Exercise Name 8 prone quad stretch with strap  -      Reps 8 30\"x3  -SW            User Key  (r) = Recorded By, (t) = Taken By, (c) = Cosigned By    Initials Name Provider Type    Courtney Shay Physical Therapist                              PT OP Goals     Row Name 03/10/23 0900          PT Short Term Goals    STG Date to Achieve 03/31/23  -SW     STG 1 independent and compliant with hep  -SW     STG 1 Progress Progressing  -SW        Long Term Goals    LTG Date to Achieve 04/21/23  -SW     LTG 1 LEFS 40  -SW     LTG 1 Progress Met  -SW     LTG 2 ambulating community distances without AD  and with good mechanics if physician permits FWB  -SW     LTG 2 Progress Met  -SW     LTG 3 left knee AROM equal to right  -SW     LTG 3 Progress Progressing  -SW     LTG 4 5x sit to  10\" with no UE support  -     LTG 4 Progress Progressing  -SW     LTG 5 reciprocate up and down stairs  -     LTG 5 Progress Met  -SW     LTG 6 SLS 20\" on left  -     LTG 6 Progress Met  -SW        Time Calculation    PT Goal Re-Cert Due Date 03/31/23  -SW           User Key  (r) = Recorded By, (t) = Taken By, (c) = Cosigned By    Initials Name Provider Type    Courtney Shay Physical Therapist                     Outcome Measure Options: 5x Sit to Stand  5 Times Sit to Stand  5 Times Sit to Stand (seconds): 16 seconds  5 Times Sit to Stand Comments: no UE support  Lower Extremity Functional Index  Any of your usual work, housework or school activities: A little bit of difficulty  Your usual hobbies, recreational or sporting activities: A little bit of difficulty  Getting into or out of the bath: Quite a bit of difficulty  Walking between rooms: No difficulty  Putting on your shoes or socks: No difficulty  Squatting: Moderate difficulty  Lifting an object, like a bag of groceries from the floor: No difficulty  Performing light activities around your home: A little bit of difficulty  Performing heavy activities around your home: " Quite a bit of difficulty  Getting into or out of a car: No difficulty  Walking 2 blocks: Moderate difficulty  Walking a mile: Extreme difficulty or unable to perform activity  Going up or down 10 stairs (about 1 flight of stairs): Moderate difficulty  Standing for 1 hour: Extreme difficulty or unable to perform activity  Sitting for 1 hour: A little bit of difficulty  Running on even ground: Extreme difficulty or unable to perform activity  Running on uneven ground: Extreme difficulty or unable to perform activity  Making sharp turns while running fast: Extreme difficulty or unable to perform activity  Hopping: Extreme difficulty or unable to perform activity  Rolling over in bed: No difficulty  Total: 40      Time Calculation:   Start Time: 0932  Stop Time: 1010  Time Calculation (min): 38 min  Therapy Charges for Today     Code Description Service Date Service Provider Modifiers Qty    71606601447 HC PT THER PROC EA 15 MIN 3/10/2023 Courtney Mejia GP 1    34360719739 HC PT THERAPEUTIC ACT EA 15 MIN 3/10/2023 Courtney Mejia GP 1    37016079500 HC PT NEUROMUSC RE EDUCATION EA 15 MIN 3/10/2023 Courtney Mejia GP 1          PT G-Codes  Outcome Measure Options: 5x Sit to Stand  Total: 40         Courtney Mejia  3/10/2023

## 2023-03-15 ENCOUNTER — APPOINTMENT (OUTPATIENT)
Dept: PHYSICAL THERAPY | Facility: HOSPITAL | Age: 53
End: 2023-03-15
Payer: MEDICARE

## 2023-03-21 ENCOUNTER — OFFICE VISIT (OUTPATIENT)
Dept: FAMILY MEDICINE CLINIC | Facility: CLINIC | Age: 53
End: 2023-03-21
Payer: MEDICARE

## 2023-03-21 VITALS
BODY MASS INDEX: 37.9 KG/M2 | OXYGEN SATURATION: 97 % | WEIGHT: 222 LBS | HEART RATE: 73 BPM | HEIGHT: 64 IN | SYSTOLIC BLOOD PRESSURE: 118 MMHG | DIASTOLIC BLOOD PRESSURE: 80 MMHG

## 2023-03-21 DIAGNOSIS — F32.A ANXIETY AND DEPRESSION: Primary | ICD-10-CM

## 2023-03-21 DIAGNOSIS — R25.2 MUSCLE CRAMPS: ICD-10-CM

## 2023-03-21 DIAGNOSIS — R73.03 PRE-DIABETES: Primary | ICD-10-CM

## 2023-03-21 DIAGNOSIS — F41.9 ANXIETY AND DEPRESSION: Primary | ICD-10-CM

## 2023-03-21 DIAGNOSIS — Z00.00 HEALTHCARE MAINTENANCE: ICD-10-CM

## 2023-03-21 DIAGNOSIS — F51.01 PRIMARY INSOMNIA: ICD-10-CM

## 2023-03-21 RX ORDER — SERTRALINE HYDROCHLORIDE 100 MG/1
100 TABLET, FILM COATED ORAL EVERY MORNING
Qty: 30 TABLET | Refills: 11 | Status: SHIPPED | OUTPATIENT
Start: 2023-03-21

## 2023-03-21 RX ORDER — BUPROPION HYDROCHLORIDE 150 MG/1
150 TABLET, EXTENDED RELEASE ORAL 2 TIMES DAILY
Qty: 60 TABLET | Refills: 11 | Status: SHIPPED | OUTPATIENT
Start: 2023-03-21

## 2023-03-21 RX ORDER — NYSTATIN 100000 [USP'U]/G
POWDER TOPICAL 3 TIMES DAILY
Qty: 30 G | Refills: 11 | Status: SHIPPED | OUTPATIENT
Start: 2023-03-21

## 2023-03-21 RX ORDER — TRAZODONE HYDROCHLORIDE 50 MG/1
50 TABLET ORAL NIGHTLY
Qty: 30 TABLET | Refills: 11 | Status: SHIPPED | OUTPATIENT
Start: 2023-03-21

## 2023-03-21 RX ORDER — ONDANSETRON 4 MG/1
1 TABLET, ORALLY DISINTEGRATING ORAL EVERY 8 HOURS PRN
COMMUNITY
End: 2023-03-21 | Stop reason: SDUPTHER

## 2023-03-21 NOTE — PROGRESS NOTES
Chief Complaint  Follow-up (Med refill/)    Subjective          Maricarmen Ferris presents to [facility] [place] for routine follow-up and refills.  She would like to discuss her weight and recent lab work.  Hypertension  This is a chronic problem. The current episode started more than 1 year ago. The problem is unchanged. The problem is controlled. Pertinent negatives include no chest pain. Risk factors for coronary artery disease include obesity and dyslipidemia. Past treatments include ACE inhibitors. Current antihypertension treatment includes ACE inhibitors. The current treatment provides mild improvement.   Obesity  This is a recurrent problem. The current episode started more than 1 year ago. The problem has been waxing and waning. Pertinent negatives include no chest pain, congestion, coughing or sore throat. The symptoms are aggravated by eating. Treatments tried: bariatric surgery  The treatment provided moderate relief.     Outpatient Medications Prior to Visit   Medication Sig Dispense Refill   • cyclobenzaprine (FLEXERIL) 10 MG tablet TAKE 1 TABLET BY MOUTH THREE TIMES A DAY AS NEEDED FOR MUSCLE SPASM (Patient taking differently: 1 tablet 3 (Three) Times a Day As Needed.) 30 tablet 0   • fluticasone (FLONASE) 50 MCG/ACT nasal spray INSTILL 2 SPRAYS INTO THE NOSTRIL AS DIRECTED BY PROVIDER DAILY. (Patient taking differently: 2 sprays Daily As Needed.) 16 mL 3   • furosemide (LASIX) 40 MG tablet TAKE 1 TABLET BY MOUTH AS NEEDED (SWELLING). 30 tablet 11   • gabapentin (NEURONTIN) 600 MG tablet Take 1 tablet by mouth 2 (Two) Times a Day.     • HYDROcodone-acetaminophen (NORCO) 7.5-325 MG per tablet Take 1 tablet by mouth Every 4 (Four) Hours As Needed for Moderate Pain. 30 tablet 0   • lisinopril (PRINIVIL,ZESTRIL) 2.5 MG tablet TAKE 1 TABLET BY MOUTH EVERY DAY (Patient taking differently: Take 1 tablet by mouth Daily.) 30 tablet 11   • loperamide  (IMODIUM) 2 MG capsule Take 1 capsule by mouth 4 (Four) Times a Day As Needed for Diarrhea. 24 capsule 3   • loratadine (CLARITIN) 10 MG tablet TAKE 1 TABLET BY MOUTH EVERY DAY (Patient taking differently: Take 1 tablet by mouth Daily.) 30 tablet 11   • nitroglycerin (NITROSTAT) 0.4 MG SL tablet Place 1 tablet under the tongue Every 5 (Five) Minutes As Needed for Chest Pain. Take no more than 3 doses in 15 minutes. 30 tablet 12   • omeprazole (priLOSEC) 40 MG capsule TAKE 1 CAPSULE BY MOUTH EVERY DAY 30 capsule 0   • ondansetron ODT (ZOFRAN-ODT) 4 MG disintegrating tablet PLACE 1 TABLET ON THE TONGUE EVERY 6 (SIX) HOURS AS NEEDED FOR NAUSEA OR VOMITING. 15 tablet 3   • promethazine (PHENERGAN) 25 MG tablet Take 1 tablet by mouth Every 8 (Eight) Hours As Needed for Nausea or Vomiting. 30 tablet 3   • buPROPion SR (WELLBUTRIN SR) 150 MG 12 hr tablet Take 1 tablet by mouth 2 (Two) Times a Day. 60 tablet 11   • Diclofenac Sodium (VOLTAREN) 1 % gel gel APPLY 4 G TOPICALLY 2 (TWO) TIMES A DAY AS NEEDED (SEVERE PAIN)     • nystatin (MYCOSTATIN) 014888 UNIT/GM powder APPLY TOPICALLY TO THE APPROPRIATE AREA AS DIRECTED 3 (THREE) TIMES A DAY. (Patient taking differently: Apply  topically to the appropriate area as directed Daily.) 60 g 11   • sertraline (ZOLOFT) 100 MG tablet Take 1 tablet by mouth Every Morning.     • traZODone (DESYREL) 50 MG tablet Take 1 tablet by mouth Every Night.     • azithromycin (Zithromax Z-Cortez) 250 MG tablet Take 2 tablets the first day, then 1 tablet daily for 4 days. 6 tablet 0   • KLOR-CON 20 MEQ CR tablet TAKE 1 TABLET BY MOUTH EVERY DAY (Patient taking differently: Take 1 tablet by mouth Daily As Needed.) 30 tablet 11   • ondansetron ODT (ZOFRAN-ODT) 4 MG disintegrating tablet Take 1 tablet by mouth Every 8 (Eight) Hours As Needed.     • rOPINIRole (REQUIP) 0.25 MG tablet TAKE 1 TABLET BY MOUTH EVERY DAY AT NIGHT (Patient not taking: Reported on 3/21/2023) 30 tablet 11     No  "facility-administered medications prior to visit.       Review of Systems   HENT: Negative for congestion and sore throat.    Respiratory: Negative for cough.    Cardiovascular: Negative for chest pain.         Objective   Vital Signs:   Visit Vitals  /80 (BP Location: Right arm, Patient Position: Sitting, Cuff Size: Adult)   Pulse 73   Ht 162.6 cm (64.02\")   Wt 101 kg (222 lb)   SpO2 97%   BMI 38.09 kg/m²     Physical Exam  Vitals and nursing note reviewed.   Constitutional:       Appearance: She is well-developed.   HENT:      Head: Normocephalic and atraumatic.   Eyes:      General: Lids are normal.      Conjunctiva/sclera: Conjunctivae normal.   Neck:      Thyroid: No thyroid mass or thyromegaly.      Trachea: Trachea normal. No tracheal tenderness.   Cardiovascular:      Rate and Rhythm: Normal rate.      Pulses: Normal pulses.      Heart sounds: Normal heart sounds.   Pulmonary:      Effort: Pulmonary effort is normal. No respiratory distress.      Breath sounds: Normal breath sounds. No wheezing.   Abdominal:      General: There is no distension.      Palpations: Abdomen is soft. There is no mass.   Musculoskeletal:         General: Normal range of motion.      Cervical back: Normal range of motion. No edema.   Skin:     General: Skin is warm and dry.      Coloration: Skin is not pale.      Findings: No abrasion, erythema or lesion.   Neurological:      Mental Status: She is alert and oriented to person, place, and time.   Psychiatric:         Mood and Affect: Mood is not anxious. Affect is not inappropriate.         Speech: Speech normal.         Behavior: Behavior normal.         Thought Content: Thought content normal.         Judgment: Judgment normal. Judgment is not impulsive.        Result Review :                 Assessment and Plan    Diagnoses and all orders for this visit:    1. Anxiety and depression (Primary)  -     sertraline (ZOLOFT) 100 MG tablet; Take 1 tablet by mouth Every Morning.  " Dispense: 30 tablet; Refill: 11  -     buPROPion SR (WELLBUTRIN SR) 150 MG 12 hr tablet; Take 1 tablet by mouth 2 (Two) Times a Day.  Dispense: 60 tablet; Refill: 11    2. Healthcare maintenance  -     buPROPion SR (WELLBUTRIN SR) 150 MG 12 hr tablet; Take 1 tablet by mouth 2 (Two) Times a Day.  Dispense: 60 tablet; Refill: 11  -     nystatin (MYCOSTATIN) 011806 UNIT/GM powder; Apply  topically to the appropriate area as directed 3 (Three) Times a Day.  Dispense: 30 g; Refill: 11    3. Primary insomnia  -     traZODone (DESYREL) 50 MG tablet; Take 1 tablet by mouth Every Night.  Dispense: 30 tablet; Refill: 11    4. Muscle cramps  -     Diclofenac Sodium (VOLTAREN) 1 % gel gel; Apply  topically to the appropriate area as directed 2 (Two) Times a Day.  Dispense: 50 g; Refill: 6      Continue current medications    Last A1c from lab work dated February 2023 was 5.8, which puts her at prediabetes  She has been trying to diet and exercise to help lose weight.  She is looking into bariatric surgery    Discussed medication options, she would like to try GLP-1 for prediabetes and to help with weight loss    I personally think that depression from her mother's recent passing has contributed to some of her fluctuating weight  However she is dealing with her mother's death appropriately        Follow Up   Return if symptoms worsen or fail to improve, for Medicare Wellness.  Patient was given instructions and counseling regarding her condition or for health maintenance advice. Please see specific information pulled into the AVS if appropriate.           This document has been electronically signed by MARIA TERESA Saucedo on March 22, 2023 07:19 CDT

## 2023-03-22 ENCOUNTER — APPOINTMENT (OUTPATIENT)
Dept: PHYSICAL THERAPY | Facility: HOSPITAL | Age: 53
End: 2023-03-22
Payer: MEDICARE

## 2023-03-29 RX ORDER — OMEPRAZOLE 40 MG/1
CAPSULE, DELAYED RELEASE ORAL
Qty: 30 CAPSULE | Refills: 0 | Status: SHIPPED | OUTPATIENT
Start: 2023-03-29

## 2023-03-31 DIAGNOSIS — H66.90 ACUTE OTITIS MEDIA, UNSPECIFIED OTITIS MEDIA TYPE: ICD-10-CM

## 2023-03-31 RX ORDER — FLUTICASONE PROPIONATE 50 MCG
SPRAY, SUSPENSION (ML) NASAL
Qty: 16 ML | Refills: 3 | Status: SHIPPED | OUTPATIENT
Start: 2023-03-31

## 2023-04-11 ENCOUNTER — OFFICE VISIT (OUTPATIENT)
Dept: FAMILY MEDICINE CLINIC | Facility: CLINIC | Age: 53
End: 2023-04-11
Payer: MEDICARE

## 2023-04-11 VITALS
WEIGHT: 217 LBS | OXYGEN SATURATION: 100 % | HEIGHT: 64 IN | DIASTOLIC BLOOD PRESSURE: 70 MMHG | SYSTOLIC BLOOD PRESSURE: 114 MMHG | HEART RATE: 69 BPM | BODY MASS INDEX: 37.05 KG/M2

## 2023-04-11 DIAGNOSIS — Z00.00 WELCOME TO MEDICARE PREVENTIVE VISIT: ICD-10-CM

## 2023-04-11 DIAGNOSIS — R73.03 PRE-DIABETES: Primary | ICD-10-CM

## 2023-04-11 NOTE — PROGRESS NOTES
The ABCs of the Annual Wellness Visit  [place] to [place] Visit    Subjective     Maricarmen Ferris is a 52 y.o. female who presents for a  Welcome to Medicare Visit.    The following portions of the patient's history were reviewed and   updated as appropriate: allergies, current medications, past family history, past medical history, past social history, past surgical history and problem list.     Compared to one year ago, the patient feels her physical   health is worse.    Compared to one year ago, the patient feels her mental   health is worse.    Recent Hospitalizations:  She was not admitted to the hospital during the last year.       Current Medical Providers:  Patient Care Team:  Lesli Lobo APRN as PCP - General (Nurse Practitioner)    Outpatient Medications Prior to Visit   Medication Sig Dispense Refill   • buPROPion SR (WELLBUTRIN SR) 150 MG 12 hr tablet Take 1 tablet by mouth 2 (Two) Times a Day. 60 tablet 11   • cyclobenzaprine (FLEXERIL) 10 MG tablet TAKE 1 TABLET BY MOUTH THREE TIMES A DAY AS NEEDED FOR MUSCLE SPASM (Patient taking differently: 1 tablet 3 (Three) Times a Day As Needed.) 30 tablet 0   • Diclofenac Sodium (VOLTAREN) 1 % gel gel Apply  topically to the appropriate area as directed 2 (Two) Times a Day. 50 g 6   • fluticasone (FLONASE) 50 MCG/ACT nasal spray INSTILL 2 SPRAYS INTO THE NOSTRIL AS DIRECTED BY PROVIDER DAILY. 16 mL 3   • furosemide (LASIX) 40 MG tablet TAKE 1 TABLET BY MOUTH AS NEEDED (SWELLING). 30 tablet 11   • gabapentin (NEURONTIN) 600 MG tablet Take 1 tablet by mouth 2 (Two) Times a Day.     • lisinopril (PRINIVIL,ZESTRIL) 2.5 MG tablet TAKE 1 TABLET BY MOUTH EVERY DAY (Patient taking differently: Take 1 tablet by mouth Daily.) 30 tablet 11   • loperamide (IMODIUM) 2 MG capsule Take 1 capsule by mouth 4 (Four) Times a Day As Needed for Diarrhea. 24 capsule 3   • loratadine (CLARITIN) 10 MG tablet TAKE 1 TABLET BY MOUTH EVERY DAY (Patient taking differently: Take  1 tablet by mouth Daily.) 30 tablet 11   • nitroglycerin (NITROSTAT) 0.4 MG SL tablet Place 1 tablet under the tongue Every 5 (Five) Minutes As Needed for Chest Pain. Take no more than 3 doses in 15 minutes. 30 tablet 12   • nystatin (MYCOSTATIN) 763541 UNIT/GM powder Apply  topically to the appropriate area as directed 3 (Three) Times a Day. 30 g 11   • omeprazole (priLOSEC) 40 MG capsule TAKE 1 CAPSULE BY MOUTH EVERY DAY 30 capsule 0   • ondansetron ODT (ZOFRAN-ODT) 4 MG disintegrating tablet PLACE 1 TABLET ON THE TONGUE EVERY 6 (SIX) HOURS AS NEEDED FOR NAUSEA OR VOMITING. 15 tablet 3   • promethazine (PHENERGAN) 25 MG tablet Take 1 tablet by mouth Every 8 (Eight) Hours As Needed for Nausea or Vomiting. 30 tablet 3   • sertraline (ZOLOFT) 100 MG tablet Take 1 tablet by mouth Every Morning. 30 tablet 11   • traZODone (DESYREL) 50 MG tablet Take 1 tablet by mouth Every Night. 30 tablet 11   • Semaglutide,0.25 or 0.5MG/DOS, (OZEMPIC) 2 MG/1.5ML solution pen-injector Inject 0.25 mg under the skin into the appropriate area as directed 1 (One) Time Per Week. 1.5 mL 3   • HYDROcodone-acetaminophen (NORCO) 7.5-325 MG per tablet Take 1 tablet by mouth Every 4 (Four) Hours As Needed for Moderate Pain. 30 tablet 0     No facility-administered medications prior to visit.       Opioid medication/s are on active medication list.  and I have evaluated her active treatment plan and pain score trends (see table).  Vitals:    04/11/23 1116   PainSc: 0-No pain     I have reviewed the chart for potential of high risk medication and harmful drug interactions in the elderly.            Aspirin is not on active medication list.  Aspirin use is not indicated based on review of current medical condition/s. Risk of harm outweighs potential benefits.  .    Patient Active Problem List   Diagnosis   • Chronic cholecystitis   • Umbilical hernia without obstruction and without gangrene   • Abdominal pain   • Abdominal fluid collection   •  Intra-hepatic bile leak   • Abnormal cardiovascular function study   • Chest pain   • Class 1 obesity due to excess calories with serious comorbidity and body mass index (BMI) of 34.0 to 34.9 in adult   • Restrictive lung disease secondary to obesity   • Nausea   • Weight gain, abnormal   • Pain of upper abdomen   • Gastroesophageal reflux disease with esophagitis   • Irritable bowel syndrome with both constipation and diarrhea   • Dysphagia   • Mild persistent asthma without complication   • ELSIE on CPAP   • Acute kidney injury   • DYAN (acute kidney injury)   • Hypertension   • Hyperlipidemia   • Hyperkalemia   • Dehydration   • Hypokalemia   • History of second hand smoke exposure   • Obesity (BMI 30-39.9)   • History of weight loss surgery   • Acute cystitis without hematuria   • Allergic rhinitis   • Asthma   • Chronic left-sided low back pain with left-sided sciatica   • CKD (chronic kidney disease) stage 3, GFR 30-59 ml/min   • Cervical spondylosis without myelopathy   • Degenerative disc disease, lumbar   • Degenerative disc disease, cervical   • Depression with anxiety   • Gastroesophageal reflux disease without esophagitis   • Kidney disease   • Lumbar spondylosis   • Malabsorption due to intolerance, not elsewhere classified   • Neck pain   • Sacroiliitis   • Restless legs syndrome (RLS)   • Chronic pain of left knee   • Internal derangement of left knee   • Complete tear of medial collateral ligament of left knee   • Acute medial meniscal tear, left, subsequent encounter   • Traumatic tear of medial collateral ligament of left knee   • Acute meniscal tear, medial, left, initial encounter   • Status post arthroscopy of left knee   • Encounter for postoperative wound care   • Encounter for postoperative care     Advance Care Planning   Advance Care Planning     Advance Directive is not on file.  ACP discussion was declined by the patient. Patient does not have an advance directive, information provided.  "Patient does not have an advance directive, declines further assistance.       Objective   Vitals:    04/11/23 1116   BP: 114/70   BP Location: Left arm   Patient Position: Sitting   Cuff Size: Adult   Pulse: 69   SpO2: 100%   Weight: 98.4 kg (217 lb)   Height: 162.6 cm (64.02\")   PainSc: 0-No pain     Estimated body mass index is 37.23 kg/m² as calculated from the following:    Height as of this encounter: 162.6 cm (64.02\").    Weight as of this encounter: 98.4 kg (217 lb).    Class 2 Severe Obesity (BMI >=35 and <=39.9). Obesity-related health conditions include the following: hypertension, diabetes mellitus and GERD. Obesity is improving with lifestyle modifications. BMI is is above average; BMI management plan is completed. We discussed low calorie, low carb based diet program, portion control, increasing exercise and consulting a Bariatric surgeon.      Does the patient have evidence of cognitive impairment?   No         Procedures       HEALTH RISK ASSESSMENT    Smoking Status:  Social History     Tobacco Use   Smoking Status Never   Smokeless Tobacco Never     Alcohol Consumption:  Social History     Substance and Sexual Activity   Alcohol Use Yes    Comment: socially       Fall Risk Screen:    STEADI Fall Risk Assessment was completed, and patient is at LOW risk for falls.Assessment completed on:4/11/2023    Depression Screen:       4/11/2023    11:26 AM   PHQ-2/PHQ-9 Depression Screening   Little Interest or Pleasure in Doing Things 1-->several days   Feeling Down, Depressed or Hopeless 1-->several days   Trouble Falling or Staying Asleep, or Sleeping Too Much 2-->more than half the days   Feeling Tired or Having Little Energy 2-->more than half the days   Poor Appetite or Overeating 3-->nearly every day   Feeling Bad about Yourself - or that You are a Failure or Have Let Yourself or Your Family Down 1-->several days   Trouble Concentrating on Things, Such as Reading the Newspaper or Watching Television " 1-->several days   Moving or Speaking So Slowly that Other People Could Have Noticed? Or the Opposite - Being So Fidgety 1-->several days   Thoughts that You Would be Better Off Dead or of Hurting Yourself in Some Way 0-->not at all   PHQ-9: Brief Depression Severity Measure Score 12   If You Checked Off Any Problems, How Difficult Have These Problems Made It For You to Do Your Work, Take Care of Things at Home, or Get Along with Other People? somewhat difficult       Health Habits and Functional and Cognitive Screening:       View : No data to display.                Visual Acuity:    Vision Screening    Right eye Left eye Both eyes   Without correction 20/200 20/200 20/200   With correction 20/15 20/20 20/15       Age-appropriate Screening Schedule:  Refer to the list below for future screening recommendations based on patient's age, sex and/or medical conditions. Orders for these recommended tests are listed in the plan section. The patient has been provided with a written plan.    Health Maintenance   Topic Date Due   • LIPID PANEL  09/27/2020   • ZOSTER VACCINE (2 of 2) 11/04/2020   • MAMMOGRAM  02/22/2023   • INFLUENZA VACCINE  08/01/2023   • COLORECTAL CANCER SCREENING  11/19/2023   • ANNUAL WELLNESS VISIT  04/11/2024   • PAP SMEAR  02/22/2027   • TDAP/TD VACCINES (4 - Td or Tdap) 07/14/2030   • Pneumococcal Vaccine 0-64 (3 - PPSV23 if available, else PCV20) 08/11/2035   • HEPATITIS C SCREENING  Completed   • COVID-19 Vaccine  Completed        CMS Preventative Services Quick Reference  Risk Factors Identified During Encounter    Chronic Pain: Pain Management Referral Ordered  The above risks/problems have been discussed with the patient.  Pertinent information has been shared with the patient in the After Visit Summary.  Follow up plans and orders are seen below in the Assessment/Plan Section.    Diagnoses and all orders for this visit:    1. Pre-diabetes (Primary)  -     Semaglutide,0.25 or 0.5MG/DOS,  (OZEMPIC) 2 MG/1.5ML solution pen-injector; Inject 0.5 mg under the skin into the appropriate area as directed 1 (One) Time Per Week.  Dispense: 1.5 mL; Refill: 6    2. Welcome to Medicare preventive visit  -     TSH; Future  -     Comprehensive Metabolic Panel; Future  -     Hemoglobin A1c; Future  -     CBC & Differential; Future  -     Vitamin B12; Future  -     Lipid Panel; Future        Follow Up:   Initial Medicare Visit in one year    An After Visit Summary and PPPS were made available to the patient.      Return if symptoms worsen or fail to improve.        This document has been electronically signed by MARIA TERESA Saucedo on April 11, 2023 12:21 CDT

## 2023-04-11 NOTE — PROGRESS NOTES
Chief Complaint  Welcome To Medicare    Subjective     {Problem List  Visit Diagnosis   Encounters  Notes  Medications  Labs  Result Review Imaging  Media :23}     Maricarmen Ferris presents to [facility] [place]  History of Present Illness  Outpatient Medications Prior to Visit   Medication Sig Dispense Refill   • buPROPion SR (WELLBUTRIN SR) 150 MG 12 hr tablet Take 1 tablet by mouth 2 (Two) Times a Day. 60 tablet 11   • cyclobenzaprine (FLEXERIL) 10 MG tablet TAKE 1 TABLET BY MOUTH THREE TIMES A DAY AS NEEDED FOR MUSCLE SPASM (Patient taking differently: 1 tablet 3 (Three) Times a Day As Needed.) 30 tablet 0   • Diclofenac Sodium (VOLTAREN) 1 % gel gel Apply  topically to the appropriate area as directed 2 (Two) Times a Day. 50 g 6   • fluticasone (FLONASE) 50 MCG/ACT nasal spray INSTILL 2 SPRAYS INTO THE NOSTRIL AS DIRECTED BY PROVIDER DAILY. 16 mL 3   • furosemide (LASIX) 40 MG tablet TAKE 1 TABLET BY MOUTH AS NEEDED (SWELLING). 30 tablet 11   • gabapentin (NEURONTIN) 600 MG tablet Take 1 tablet by mouth 2 (Two) Times a Day.     • lisinopril (PRINIVIL,ZESTRIL) 2.5 MG tablet TAKE 1 TABLET BY MOUTH EVERY DAY (Patient taking differently: Take 1 tablet by mouth Daily.) 30 tablet 11   • loperamide (IMODIUM) 2 MG capsule Take 1 capsule by mouth 4 (Four) Times a Day As Needed for Diarrhea. 24 capsule 3   • loratadine (CLARITIN) 10 MG tablet TAKE 1 TABLET BY MOUTH EVERY DAY (Patient taking differently: Take 1 tablet by mouth Daily.) 30 tablet 11   • nitroglycerin (NITROSTAT) 0.4 MG SL tablet Place 1 tablet under the tongue Every 5 (Five) Minutes As Needed for Chest Pain. Take no more than 3 doses in 15 minutes. 30 tablet 12   • nystatin (MYCOSTATIN) 705144 UNIT/GM powder Apply  topically to the appropriate area as directed 3 (Three) Times a Day. 30 g 11   • omeprazole (priLOSEC) 40 MG capsule TAKE 1 CAPSULE BY MOUTH EVERY DAY 30 capsule 0   • ondansetron ODT  "(ZOFRAN-ODT) 4 MG disintegrating tablet PLACE 1 TABLET ON THE TONGUE EVERY 6 (SIX) HOURS AS NEEDED FOR NAUSEA OR VOMITING. 15 tablet 3   • promethazine (PHENERGAN) 25 MG tablet Take 1 tablet by mouth Every 8 (Eight) Hours As Needed for Nausea or Vomiting. 30 tablet 3   • Semaglutide,0.25 or 0.5MG/DOS, (OZEMPIC) 2 MG/1.5ML solution pen-injector Inject 0.25 mg under the skin into the appropriate area as directed 1 (One) Time Per Week. 1.5 mL 3   • sertraline (ZOLOFT) 100 MG tablet Take 1 tablet by mouth Every Morning. 30 tablet 11   • traZODone (DESYREL) 50 MG tablet Take 1 tablet by mouth Every Night. 30 tablet 11   • HYDROcodone-acetaminophen (NORCO) 7.5-325 MG per tablet Take 1 tablet by mouth Every 4 (Four) Hours As Needed for Moderate Pain. 30 tablet 0     No facility-administered medications prior to visit.       Review of Systems      Objective   Vital Signs:   Visit Vitals  /70 (BP Location: Left arm, Patient Position: Sitting, Cuff Size: Adult)   Pulse 69   Ht 162.6 cm (64.02\")   Wt 98.4 kg (217 lb)   SpO2 100%   BMI 37.23 kg/m²     Physical Exam   Result Review :{Labs  Result Review  Imaging  Med Tab  Media :23}   {The following data was reviewed by (Optional):44070}  {Ambulatory Labs (Optional):86516}  {Data reviewed (Optional):94228:::1}          Assessment and Plan {CC Problem List  Visit Diagnosis  ROS  Review (Popup)  Health Maintenance  Quality  BestPractice  Medications  SmartSets  SnapShot Encounters  Media :23}   There are no diagnoses linked to this encounter.  {Time Spent (Optional):28403}  Follow Up {Instructions Charge Capture  Follow-up Communications :23}  No follow-ups on file.  Patient was given instructions and counseling regarding her condition or for health maintenance advice. Please see specific information pulled into the AVS if appropriate.           This document has been electronically signed by MARIA TERESA Saucedo on April 11, 2023 11:40 CDT  "

## 2023-04-12 DIAGNOSIS — R25.2 MUSCLE CRAMPS: ICD-10-CM

## 2023-04-12 RX ORDER — CYCLOBENZAPRINE HCL 10 MG
TABLET ORAL
Qty: 30 TABLET | Refills: 0 | Status: SHIPPED | OUTPATIENT
Start: 2023-04-12

## 2023-04-25 DIAGNOSIS — K21.00 GASTROESOPHAGEAL REFLUX DISEASE WITH ESOPHAGITIS, UNSPECIFIED WHETHER HEMORRHAGE: Primary | ICD-10-CM

## 2023-04-25 RX ORDER — OMEPRAZOLE 40 MG/1
40 CAPSULE, DELAYED RELEASE ORAL 2 TIMES DAILY
Qty: 60 CAPSULE | Refills: 6 | Status: SHIPPED | OUTPATIENT
Start: 2023-04-25

## 2023-04-28 DIAGNOSIS — I10 ESSENTIAL HYPERTENSION: ICD-10-CM

## 2023-04-28 RX ORDER — LISINOPRIL 2.5 MG/1
2.5 TABLET ORAL DAILY
Qty: 90 TABLET | Refills: 3 | Status: SHIPPED | OUTPATIENT
Start: 2023-04-28

## 2023-04-28 NOTE — TELEPHONE ENCOUNTER
Incoming Refill Request      Medication requested (name and dose):     Pharmacy where request should be sent:     Additional details provided by patient:     Best call back number:     Does the patient have less than a 3 day supply:  [] Yes  [] No    Maryanne Marie MA  04/28/23, 11:17 CDT

## 2023-05-01 DIAGNOSIS — K08.89 PAIN, DENTAL: Primary | ICD-10-CM

## 2023-06-06 DIAGNOSIS — B37.9 YEAST INFECTION: ICD-10-CM

## 2023-06-06 DIAGNOSIS — K08.89 PAIN, DENTAL: Primary | ICD-10-CM

## 2023-06-06 RX ORDER — FLUCONAZOLE 150 MG/1
150 TABLET ORAL ONCE
Qty: 1 TABLET | Refills: 0 | Status: SHIPPED | OUTPATIENT
Start: 2023-06-06 | End: 2023-06-07

## 2023-06-06 RX ORDER — AZITHROMYCIN 250 MG/1
TABLET, FILM COATED ORAL
Qty: 6 TABLET | Refills: 0 | Status: SHIPPED | OUTPATIENT
Start: 2023-06-06

## 2023-07-25 ENCOUNTER — TELEPHONE (OUTPATIENT)
Dept: FAMILY MEDICINE CLINIC | Facility: CLINIC | Age: 53
End: 2023-07-25
Payer: MEDICARE

## 2023-07-25 DIAGNOSIS — I10 PRIMARY HYPERTENSION: ICD-10-CM

## 2023-07-25 DIAGNOSIS — R73.03 PRE-DIABETES: ICD-10-CM

## 2023-07-25 RX ORDER — SEMAGLUTIDE 1.34 MG/ML
1 INJECTION, SOLUTION SUBCUTANEOUS WEEKLY
Qty: 9 ML | Refills: 3 | Status: SHIPPED | OUTPATIENT
Start: 2023-07-25

## 2023-07-25 NOTE — TELEPHONE ENCOUNTER
Wanting if Lesli has received a fax on Ms. Maricarmen Ferris concerning medication increase.    The medication is   Ozempic and Lisinopril.  Wanting to increase to 90 day supply.  If Lesli approves please send to pharmacy on file.

## 2023-07-26 ENCOUNTER — OFFICE VISIT (OUTPATIENT)
Dept: GASTROENTEROLOGY | Facility: CLINIC | Age: 53
End: 2023-07-26
Payer: MEDICARE

## 2023-07-26 ENCOUNTER — OFFICE VISIT (OUTPATIENT)
Dept: FAMILY MEDICINE CLINIC | Facility: CLINIC | Age: 53
End: 2023-07-26
Payer: MEDICARE

## 2023-07-26 VITALS
WEIGHT: 203 LBS | DIASTOLIC BLOOD PRESSURE: 80 MMHG | HEART RATE: 69 BPM | BODY MASS INDEX: 34.66 KG/M2 | HEIGHT: 64 IN | SYSTOLIC BLOOD PRESSURE: 135 MMHG

## 2023-07-26 VITALS
WEIGHT: 203 LBS | OXYGEN SATURATION: 98 % | BODY MASS INDEX: 34.66 KG/M2 | SYSTOLIC BLOOD PRESSURE: 106 MMHG | HEIGHT: 64 IN | HEART RATE: 63 BPM | DIASTOLIC BLOOD PRESSURE: 66 MMHG

## 2023-07-26 DIAGNOSIS — I10 PRIMARY HYPERTENSION: ICD-10-CM

## 2023-07-26 DIAGNOSIS — E71.30 DISORDER OF FATTY-ACID METABOLISM, UNSPECIFIED: ICD-10-CM

## 2023-07-26 DIAGNOSIS — R19.7 DIARRHEA, UNSPECIFIED TYPE: ICD-10-CM

## 2023-07-26 DIAGNOSIS — K75.81 NASH (NONALCOHOLIC STEATOHEPATITIS): Primary | ICD-10-CM

## 2023-07-26 DIAGNOSIS — R74.8 ELEVATED LIVER ENZYMES: ICD-10-CM

## 2023-07-26 DIAGNOSIS — K21.9 BILE ACID ESOPHAGEAL REFLUX: ICD-10-CM

## 2023-07-26 DIAGNOSIS — K21.00 GASTROESOPHAGEAL REFLUX DISEASE WITH ESOPHAGITIS WITHOUT HEMORRHAGE: ICD-10-CM

## 2023-07-26 DIAGNOSIS — E66.9 OBESITY (BMI 30-39.9): Primary | ICD-10-CM

## 2023-07-26 PROCEDURE — 3074F SYST BP LT 130 MM HG: CPT | Performed by: NURSE PRACTITIONER

## 2023-07-26 PROCEDURE — 1160F RVW MEDS BY RX/DR IN RCRD: CPT | Performed by: NURSE PRACTITIONER

## 2023-07-26 PROCEDURE — 99213 OFFICE O/P EST LOW 20 MIN: CPT | Performed by: NURSE PRACTITIONER

## 2023-07-26 PROCEDURE — 3078F DIAST BP <80 MM HG: CPT | Performed by: NURSE PRACTITIONER

## 2023-07-26 PROCEDURE — 1159F MED LIST DOCD IN RCRD: CPT | Performed by: NURSE PRACTITIONER

## 2023-07-26 RX ORDER — DIAZEPAM 5 MG/1
TABLET ORAL
COMMUNITY

## 2023-07-26 RX ORDER — MONTELUKAST SODIUM 4 MG/1
1 TABLET, CHEWABLE ORAL 2 TIMES DAILY
Qty: 60 TABLET | Refills: 2 | Status: SHIPPED | OUTPATIENT
Start: 2023-07-26

## 2023-07-26 RX ORDER — CELECOXIB 100 MG/1
CAPSULE ORAL
COMMUNITY

## 2023-07-26 NOTE — PROGRESS NOTES
Chief Complaint  Follow-up (1 month )    Subjective          Maricarmen Ferris presents to [facility] [place] for 1 month follow-up and weight check.  Patient states she was doing well and is still currently losing weight.        Hypertension  This is a chronic problem. The current episode started more than 1 year ago. The problem is unchanged. The problem is controlled. Pertinent negatives include no chest pain. Risk factors for coronary artery disease include obesity and dyslipidemia. Past treatments include ACE inhibitors. Current antihypertension treatment includes ACE inhibitors. The current treatment provides mild improvement.   Obesity  This is a recurrent problem. The current episode started more than 1 year ago. The problem occurs constantly. The problem has been gradually improving. Pertinent negatives include no chest pain, congestion, coughing or sore throat. The symptoms are aggravated by eating and drinking. She has tried walking, eating and drinking (bariatric surgery, diet, GLP1) for the symptoms. The treatment provided mild relief.   Outpatient Medications Prior to Visit   Medication Sig Dispense Refill    buPROPion SR (WELLBUTRIN SR) 150 MG 12 hr tablet Take 1 tablet by mouth 2 (Two) Times a Day. 60 tablet 11    celecoxib (CeleBREX) 100 MG capsule Take 1 capsule every day by oral route.      cyanocobalamin (VITAMIN B-12) 500 MCG tablet Take 1 tablet by mouth Daily. 30 tablet 11    cyclobenzaprine (FLEXERIL) 10 MG tablet TAKE 1 TABLET BY MOUTH THREE TIMES A DAY AS NEEDED FOR MUSCLE SPASM 30 tablet 0    diazePAM (VALIUM) 5 MG tablet Take 1 tablet twice a day by oral route.      Diclofenac Sodium (VOLTAREN) 1 % gel gel Apply  topically to the appropriate area as directed 2 (Two) Times a Day. 50 g 6    fluticasone (FLONASE) 50 MCG/ACT nasal spray INSTILL 2 SPRAYS INTO THE NOSTRIL AS DIRECTED BY PROVIDER DAILY. 16 mL 3    furosemide (LASIX) 40 MG  "tablet TAKE 1 TABLET BY MOUTH AS NEEDED (SWELLING). 30 tablet 11    gabapentin (NEURONTIN) 600 MG tablet Take 1 tablet by mouth 2 (Two) Times a Day.      lisinopril (PRINIVIL,ZESTRIL) 2.5 MG tablet Take 1 tablet by mouth Daily. 90 tablet 3    loperamide (IMODIUM) 2 MG capsule Take 1 capsule by mouth 4 (Four) Times a Day As Needed for Diarrhea. 24 capsule 3    nitroglycerin (NITROSTAT) 0.4 MG SL tablet Place 1 tablet under the tongue Every 5 (Five) Minutes As Needed for Chest Pain. Take no more than 3 doses in 15 minutes. 30 tablet 12    nystatin (MYCOSTATIN) 191937 UNIT/GM powder Apply  topically to the appropriate area as directed 3 (Three) Times a Day. 30 g 11    omeprazole (priLOSEC) 40 MG capsule Take 1 capsule by mouth 2 (Two) Times a Day. 60 capsule 6    ondansetron ODT (ZOFRAN-ODT) 4 MG disintegrating tablet PLACE 1 TABLET ON THE TONGUE EVERY 6 (SIX) HOURS AS NEEDED FOR NAUSEA OR VOMITING. 15 tablet 3    promethazine (PHENERGAN) 25 MG tablet Take 1 tablet by mouth Every 8 (Eight) Hours As Needed for Nausea or Vomiting. 30 tablet 3    Semaglutide, 1 MG/DOSE, (Ozempic, 1 MG/DOSE,) 4 MG/3ML solution pen-injector Inject 1 mg under the skin into the appropriate area as directed 1 (One) Time Per Week. 9 mL 3    sertraline (ZOLOFT) 100 MG tablet Take 1 tablet by mouth Every Morning. 30 tablet 11    traZODone (DESYREL) 50 MG tablet Take 1 tablet by mouth Every Night. 30 tablet 11    loratadine (CLARITIN) 10 MG tablet TAKE 1 TABLET BY MOUTH EVERY DAY (Patient taking differently: Take 1 tablet by mouth Daily.) 30 tablet 11     No facility-administered medications prior to visit.       Review of Systems   HENT:  Negative for congestion and sore throat.    Respiratory:  Negative for cough.    Cardiovascular:  Negative for chest pain.       Objective   Vital Signs:   Visit Vitals  /66 (BP Location: Left arm, Patient Position: Sitting, Cuff Size: Large Adult)   Pulse 63   Ht 162.6 cm (64.02\")   Wt 92.1 kg (203 lb) "   SpO2 98%   BMI 34.83 kg/m²     Physical Exam  Vitals and nursing note reviewed.   Constitutional:       Appearance: She is well-developed. She is obese.   HENT:      Head: Normocephalic and atraumatic.   Eyes:      General: Lids are normal.      Conjunctiva/sclera: Conjunctivae normal.   Neck:      Thyroid: No thyroid mass or thyromegaly.      Trachea: Trachea normal. No tracheal tenderness.   Cardiovascular:      Rate and Rhythm: Normal rate.      Pulses: Normal pulses.      Heart sounds: Normal heart sounds.   Pulmonary:      Effort: Pulmonary effort is normal. No respiratory distress.      Breath sounds: Normal breath sounds. No wheezing.   Abdominal:      General: There is no distension.      Palpations: Abdomen is soft. There is no mass.   Musculoskeletal:         General: Normal range of motion.      Cervical back: Normal range of motion. No edema.   Skin:     General: Skin is warm and dry.      Coloration: Skin is not pale.      Findings: No abrasion, erythema or lesion.   Neurological:      Mental Status: She is alert and oriented to person, place, and time.   Psychiatric:         Mood and Affect: Mood is not anxious. Affect is not inappropriate.         Speech: Speech normal.         Behavior: Behavior normal.         Thought Content: Thought content normal.         Judgment: Judgment normal. Judgment is not impulsive.      Result Review :                 Assessment and Plan    Diagnoses and all orders for this visit:    1. Obesity (BMI 30-39.9) (Primary)    2. Primary hypertension        Continue current medications as well as current diet and exercise regimen    Please call the office if you have any questions or concerns    We will continue to monitor weight loss progression and support weight loss goals.    Follow Up   Return in about 4 weeks (around 8/23/2023), or if symptoms worsen or fail to improve, for Next scheduled follow up.  Patient was given instructions and counseling regarding her  condition or for health maintenance advice. Please see specific information pulled into the AVS if appropriate.           This document has been electronically signed by MARIA TERESA Saucedo on July 28, 2023 07:58 CDT  Answers submitted by the patient for this visit:  Other (Submitted on 7/23/2023)  Please describe your symptoms.: Weight loss check up  Have you had these symptoms before?: Yes  How long have you been having these symptoms?: Greater than 2 weeks  Please list any medications you are currently taking for this condition.: Attached  Please describe any probable cause for these symptoms. :          None  Primary Reason for Visit (Submitted on 7/23/2023)  What is the primary reason for your visit?: Other

## 2023-07-26 NOTE — PROGRESS NOTES
Chief Complaint   Patient presents with    Heartburn    ellis       ENDO PROCEDURE ORDERED:    Subjective    Maricarmen Ferris is a 52 y.o. female. she is here today for follow-up.    History of Present Illness    Patient is seen on a recheck of her GERD, abdominal pain, ELLIS. Last seen on 01/19/2022. At that time increased her Prilosec to 40 mg daily. She has been having more diarrhea alternating with constipation. Weight is down 12 pounds since last visit. She does not think the Prilosec is controlling her reflux. She recently had an EGD in [facility] on 05/30/2023 with Dr. Kelly and is considering bariatric surgery. She has had a history of gastric sleeve but is planning a gastric bypass. Report indicates she had a small hiatal hernia, bile gastritis. He placed a Bravo. Had negative biopsies, negative H. pylori. I did not see the results of the Bravo and the patient was not certain. The patient states she was 338 pounds before she had the gastric sleeve. She had had an EGD with Dr. Sunshine on 09/07/2021 that showed gastritis, hiatal hernia. Prior to that she had esophagitis, gastritis, hemorrhoids on EGD/colonoscopy on 11/19/2018. Recent laboratory on 07/13/2023 showed normal vitamin D, CBC, TSH, magnesium. Iron 41. B12 was low at 183. She states she started supplementation. A1c was 5.8%. Folate was low at 5.05. Normal cholesterol. CMP showed a protein of 6.2, otherwise normal.     ASSESSMENT/PLAN:  Patient with bile reflux with poorly controlled heartburn. We will have her continue on the Prilosec. We will add Colestid 1 g b.i.d. but I have asked her to start at 1 a day and we will see if this bile salt reflux that she is having. Encouraged to continue dietary modification and weight loss. We will add a ELLIS FibroSURE to her next laboratories since this has not been checked in some time. Her last LFTs were normal. She is deficient of B12 and folate and did recommend she continue a supplementation of  those. We will plan follow-up in 4-6 weeks, sooner if needed. Further pending clinical course and the results of the above.         The following portions of the patient's history were reviewed and updated as appropriate:   Past Medical History:   Diagnosis Date    Acid reflux     Anxiety     Depressed     Dysphagia     GERD (gastroesophageal reflux disease)     Hard to intubate     Hyperlipidemia     Hypertension     Hypokalemia     IBS (irritable bowel syndrome)     Nausea     Sleep apnea      Past Surgical History:   Procedure Laterality Date    ABDOMINAL SURGERY      CARDIAC CATHETERIZATION N/A 8/13/2018    Procedure: Left Heart Cath 8/13/2018 @ 9;00;  Surgeon: Kuldip Frank MD;  Location: [facility] CATH INVASIVE LOCATION;  Service: Cardiology    COLONOSCOPY N/A 11/19/2018    Procedure: COLONOSCOPY;  Surgeon: Bro Whitaker MD;  Location: [facility] ENDOSCOPY;  Service: Gastroenterology    CYSTOSCOPY      ENDOMETRIAL ABLATION  2015    ENDOSCOPY N/A 11/19/2018    Procedure: ESOPHAGOGASTRODUODENOSCOPYwith dilation;  Surgeon: Bro Whitaker MD;  Location: [facility] ENDOSCOPY;  Service: Gastroenterology    GASTRIC SLEEVE LAPAROSCOPIC      KNEE ARTHROSCOPY Left 12/13/2022    Procedure: KNEE ARTHROSCOPY WITH DEBRIDEMENT MEDIAL MENISCUS WITH OPEN RECONSTRUCTION OF THE TORN MEDIAL COLLATERAL LIGAMENT OF THE LEFT KNEE USING ALLOGRAFT;  Surgeon: Tyrese Canales MD;  Location: [facility] OR;  Service: Orthopedics;  Laterality: Left;    LAPAROSCOPIC TUBAL LIGATION  1995    WA ERCP DX COLLECTION SPECIMEN BRUSHING/WASHING Left 7/31/2017    Procedure: ENDOSCOPIC RETROGRADE CHOLANGIOPANCREATOGRAPHY;  Surgeon: Samuel Redding DO;  Location: [facility] ENDOSCOPY;  Service: Gastroenterology    WA LAPAROSCOPY SURG CHOLECYSTECTOMY N/A 7/24/2017    Procedure: CHOLECYSTECTOMY LAPAROSCOPIC, also umbillical hernia repair;  Surgeon: Pk العلي MD;  Location: [facility] OR;  Service: General    UPPER GASTROINTESTINAL ENDOSCOPY  11/19/2018     Family  History   Problem Relation Age of Onset    Diabetes Mother     Hypertension Mother     Cirrhosis Mother     Hypertension Father      OB History          3    Para   3    Term   3            AB        Living             SAB        IAB        Ectopic        Molar        Multiple        Live Births                  Allergies   Allergen Reactions    Sulfa Antibiotics Anaphylaxis and GI Intolerance     Social History     Socioeconomic History    Marital status:    Tobacco Use    Smoking status: Never    Smokeless tobacco: Never   Vaping Use    Vaping Use: Never used   Substance and Sexual Activity    Alcohol use: Yes     Comment: socially    Drug use: Never    Sexual activity: Not Currently     Birth control/protection: Surgical     Current Medications:  Prior to Admission medications    Medication Sig Start Date End Date Taking? Authorizing Provider   buPROPion SR (WELLBUTRIN SR) 150 MG 12 hr tablet Take 1 tablet by mouth 2 (Two) Times a Day. 3/21/23  Yes Lelsi Lobo APRN   celecoxib (CeleBREX) 100 MG capsule Take 1 capsule every day by oral route.   Yes ProviderHowie MD   cyanocobalamin (VITAMIN B-12) 500 MCG tablet Take 1 tablet by mouth Daily. 23 Yes Howie Phillips MD   cyclobenzaprine (FLEXERIL) 10 MG tablet TAKE 1 TABLET BY MOUTH THREE TIMES A DAY AS NEEDED FOR MUSCLE SPASM 23  Yes Lesli Lobo APRN   diazePAM (VALIUM) 5 MG tablet Take 1 tablet twice a day by oral route.   Yes ProviderHowie MD   Diclofenac Sodium (VOLTAREN) 1 % gel gel Apply  topically to the appropriate area as directed 2 (Two) Times a Day. 3/21/23  Yes Lesli Lobo APRN   fluticasone (FLONASE) 50 MCG/ACT nasal spray INSTILL 2 SPRAYS INTO THE NOSTRIL AS DIRECTED BY PROVIDER DAILY. 3/31/23  Yes Lesli Lobo APRN   furosemide (LASIX) 40 MG tablet TAKE 1 TABLET BY MOUTH AS NEEDED (SWELLING). 23  Yes Kaylie Garner APRN   gabapentin (NEURONTIN) 600 MG tablet Take 1 tablet by  "mouth 2 (Two) Times a Day. 11/13/21  Yes Provider, MD Howie   lisinopril (PRINIVIL,ZESTRIL) 2.5 MG tablet Take 1 tablet by mouth Daily. 4/28/23  Yes Lesli Lobo APRN   loperamide (IMODIUM) 2 MG capsule Take 1 capsule by mouth 4 (Four) Times a Day As Needed for Diarrhea. 8/19/21  Yes Lesli Lobo APRN   loratadine (CLARITIN) 10 MG tablet TAKE 1 TABLET BY MOUTH EVERY DAY  Patient taking differently: Take 1 tablet by mouth Daily. 7/18/22  Yes Lesli Lobo APRN   nitroglycerin (NITROSTAT) 0.4 MG SL tablet Place 1 tablet under the tongue Every 5 (Five) Minutes As Needed for Chest Pain. Take no more than 3 doses in 15 minutes. 8/19/21  Yes Lesli Lobo APRN   nystatin (MYCOSTATIN) 840073 UNIT/GM powder Apply  topically to the appropriate area as directed 3 (Three) Times a Day. 3/21/23  Yes Lesli Lobo APRN   omeprazole (priLOSEC) 40 MG capsule Take 1 capsule by mouth 2 (Two) Times a Day. 4/25/23  Yes Lesli Lobo APRN   ondansetron ODT (ZOFRAN-ODT) 4 MG disintegrating tablet PLACE 1 TABLET ON THE TONGUE EVERY 6 (SIX) HOURS AS NEEDED FOR NAUSEA OR VOMITING. 9/26/22  Yes Lesli Lobo APRN   promethazine (PHENERGAN) 25 MG tablet Take 1 tablet by mouth Every 8 (Eight) Hours As Needed for Nausea or Vomiting. 8/19/21  Yes Lesli Lobo APRN   Semaglutide, 1 MG/DOSE, (Ozempic, 1 MG/DOSE,) 4 MG/3ML solution pen-injector Inject 1 mg under the skin into the appropriate area as directed 1 (One) Time Per Week. 7/25/23  Yes Lesli Lobo APRN   sertraline (ZOLOFT) 100 MG tablet Take 1 tablet by mouth Every Morning. 3/21/23  Yes Lesli Lobo APRN   traZODone (DESYREL) 50 MG tablet Take 1 tablet by mouth Every Night. 3/21/23  Yes Lesli Lobo APRN     Review of Systems  Review of Systems       Objective    /80   Pulse 69   Ht 162.6 cm (64.02\")   Wt 92.1 kg (203 lb)   BMI 34.82 kg/mý   Physical Exam  Vitals and nursing note reviewed.   Constitutional:       General: She is not in acute distress.     " Appearance: She is well-developed. She is ill-appearing.   HENT:      Head: Normocephalic and atraumatic.   Eyes:      Pupils: Pupils are equal, round, and reactive to light.   Cardiovascular:      Rate and Rhythm: Normal rate and regular rhythm.      Heart sounds: Normal heart sounds.   Pulmonary:      Effort: Pulmonary effort is normal.      Breath sounds: Normal breath sounds.   Abdominal:      General: Bowel sounds are normal. There is no distension or abdominal bruit.      Palpations: Abdomen is soft. Abdomen is not rigid. There is no shifting dullness or mass.      Tenderness: There is abdominal tenderness. There is no guarding or rebound.      Hernia: No hernia is present. There is no hernia in the ventral area.   Musculoskeletal:         General: Normal range of motion.      Cervical back: Normal range of motion.   Skin:     General: Skin is warm and dry.   Neurological:      Mental Status: She is alert and oriented to person, place, and time.   Psychiatric:         Behavior: Behavior normal.         Thought Content: Thought content normal.         Judgment: Judgment normal.     Assessment & Plan      1. LAWRENCE (nonalcoholic steatohepatitis)    2. Elevated liver enzymes    3. Gastroesophageal reflux disease with esophagitis without hemorrhage    4. Bile acid esophageal reflux    5. Diarrhea, unspecified type    6. Disorder of fatty-acid metabolism, unspecified    .   Diagnoses and all orders for this visit:    1. LAWRENCE (nonalcoholic steatohepatitis) (Primary)  -     LAWRENCE Fibrosure Plus    2. Elevated liver enzymes  -     LAWRENCE Fibrosure Plus    3. Gastroesophageal reflux disease with esophagitis without hemorrhage    4. Bile acid esophageal reflux    5. Diarrhea, unspecified type    6. Disorder of fatty-acid metabolism, unspecified  -     LAWRENCE Fibrosure Plus    Other orders  -     colestipol (Colestid) 1 g tablet; Take 1 tablet by mouth 2 (Two) Times a Day.  Dispense: 60 tablet; Refill: 2        Orders placed  during this encounter include:  Orders Placed This Encounter   Procedures    LAWRENCE Fibrosure Plus     Order Specific Question:   Release to patient     Answer:   Routine Release       Medications prescribed:  New Medications Ordered This Visit   Medications    colestipol (Colestid) 1 g tablet     Sig: Take 1 tablet by mouth 2 (Two) Times a Day.     Dispense:  60 tablet     Refill:  2       Requested Prescriptions     Signed Prescriptions Disp Refills    colestipol (Colestid) 1 g tablet 60 tablet 2     Sig: Take 1 tablet by mouth 2 (Two) Times a Day.       Review and/or summary of lab tests, radiology, procedures, medications. Review and summary of old records and obtaining of history. The risks and benefits of my recommendations, as well as other treatment options were discussed with the patient today. Questions were answered.    Follow-up: Return in about 6 weeks (around 9/6/2023), or if symptoms worsen or fail to improve.     * Surgery not found *      This document has been electronically signed by Carlo Colindres PA-C on August 9, 2023 19:51 CDT      Results for orders placed or performed during the hospital encounter of 12/25/22   [facility]   Result Value Ref Range    Extra Tube Hold for add-ons.    CBC Auto Differential    Specimen: Blood   Result Value Ref Range    WBC 8.58 3.40 - 10.80 10*3/mm3    RBC 4.81 3.77 - 5.28 10*6/mm3    Hemoglobin 13.0 12.0 - 15.9 g/dL    Hematocrit 40.2 34.0 - 46.6 %    MCV 83.6 79.0 - 97.0 fL    MCH 27.0 26.6 - 33.0 pg    MCHC 32.3 31.5 - 35.7 g/dL    RDW 13.2 12.3 - 15.4 %    RDW-SD 39.8 37.0 - 54.0 fl    MPV 9.6 6.0 - 12.0 fL    Platelets 311 140 - 450 10*3/mm3    Neutrophil % 56.7 42.7 - 76.0 %    Lymphocyte % 33.3 19.6 - 45.3 %    Monocyte % 8.2 5.0 - 12.0 %    Eosinophil % 1.4 0.3 - 6.2 %    Basophil % 0.2 0.0 - 1.5 %    Immature Grans % 0.2 0.0 - 0.5 %    Neutrophils, Absolute 4.86 1.70 - 7.00 10*3/mm3    Lymphocytes, Absolute 2.86 0.70 - 3.10 10*3/mm3    Monocytes,  Absolute 0.70 0.10 - 0.90 10*3/mm3    Eosinophils, Absolute 0.12 0.00 - 0.40 10*3/mm3    Basophils, Absolute 0.02 0.00 - 0.20 10*3/mm3    Immature Grans, Absolute 0.02 0.00 - 0.05 10*3/mm3    nRBC 0.0 0.0 - 0.2 /100 WBC   Light Blue Top   Result Value Ref Range    Extra Tube Hold for add-ons.    C-reactive Protein    Specimen: Blood   Result Value Ref Range    C-Reactive Protein 0.55 (H) 0.00 - 0.50 mg/dL   Lactic Acid, Plasma    Specimen: Blood   Result Value Ref Range    Lactate 0.8 0.5 - 2.0 mmol/L   Comprehensive Metabolic Panel    Specimen: Blood   Result Value Ref Range    Glucose 95 65 - 99 mg/dL    BUN 15 6 - 20 mg/dL    Creatinine 0.87 0.57 - 1.00 mg/dL    Sodium 141 136 - 145 mmol/L    Potassium 3.8 3.5 - 5.2 mmol/L    Chloride 103 98 - 107 mmol/L    CO2 30.0 (H) 22.0 - 29.0 mmol/L    Calcium 8.8 8.6 - 10.5 mg/dL    Total Protein 6.3 6.0 - 8.5 g/dL    Albumin 3.40 (L) 3.50 - 5.20 g/dL    ALT (SGPT) 10 1 - 33 U/L    AST (SGOT) 14 1 - 32 U/L    Alkaline Phosphatase 69 39 - 117 U/L    Total Bilirubin 0.2 0.0 - 1.2 mg/dL    Globulin 2.9 gm/dL    A/G Ratio 1.2 g/dL    BUN/Creatinine Ratio 17.2 7.0 - 25.0    Anion Gap 8.0 5.0 - 15.0 mmol/L    eGFR 80.3 >60.0 mL/min/1.73   Results for orders placed or performed during the hospital encounter of 12/13/22   POC Glucose Once    Specimen: Blood   Result Value Ref Range    Glucose 101 70 - 130 mg/dL   Results for orders placed or performed in visit on 12/06/22   Basic Metabolic Panel    Specimen: Blood   Result Value Ref Range    Glucose 92 65 - 99 mg/dL    BUN 14 6 - 20 mg/dL    Creatinine 0.85 0.57 - 1.00 mg/dL    Sodium 143 136 - 145 mmol/L    Potassium 3.8 3.5 - 5.2 mmol/L    Chloride 103 98 - 107 mmol/L    CO2 32.0 (H) 22.0 - 29.0 mmol/L    Calcium 9.4 8.6 - 10.5 mg/dL    BUN/Creatinine Ratio 16.5 7.0 - 25.0    Anion Gap 8.0 5.0 - 15.0 mmol/L    eGFR 82.6 >60.0 mL/min/1.73   ECG 12 Lead   Result Value Ref Range    QT Interval 438 ms    QTC Interval 448 ms    Results for orders placed or performed in visit on 11/28/22   POCT SARS-CoV-2 Antigen RACHELLE + Flu    Specimen: Swab   Result Value Ref Range    SARS Antigen Not Detected Not Detected, Presumptive Negative    Influenza A Antigen RACHELLE Not Detected Not Detected    Influenza B Antigen RACHELLE Not Detected Not Detected    Internal Control Passed Passed    Lot Number [id number]     Expiration Date 2/4/2023    POCT rapid strep A    Specimen: Swab   Result Value Ref Range    Rapid Strep A Screen Negative Negative, VALID, INVALID, Not Performed    Internal Control Passed Passed    Lot Number [id number]     Expiration Date 5/31/2024    Results for orders placed or performed during the hospital encounter of 06/21/22   Green Top (Gel)   Result Value Ref Range    Extra Tube Hold for add-ons.    COVID-19 and FLU A/B PCR - Swab, Nasopharynx    Specimen: Nasopharynx; Swab   Result Value Ref Range    COVID19 Not Detected Not Detected - Ref. Range    Influenza A PCR Not Detected Not Detected    Influenza B PCR Not Detected Not Detected   Urinalysis, Microscopic Only - Urine, Clean Catch    Specimen: Urine, Clean Catch   Result Value Ref Range    RBC, UA None Seen None Seen /HPF    WBC, UA 3-5 None Seen, 0-2, 3-5 /HPF    Bacteria, UA 2+ (A) None Seen /HPF    Squamous Epithelial Cells, UA 3-5 (A) None Seen, 0-2 /HPF    Hyaline Casts, UA None Seen None Seen /LPF    Methodology Manual Light Microscopy    Urinalysis With Microscopic If Indicated (No Culture) - Urine, Clean Catch    Specimen: Urine, Clean Catch   Result Value Ref Range    Color, UA Yellow Yellow, Straw, Dark Yellow, Angela    Appearance, UA Clear Clear    pH, UA 5.5 5.0 - 9.0    Specific Gravity, UA 1.028 1.003 - 1.030    Glucose, UA Negative Negative    Ketones, UA Trace (A) Negative    Bilirubin, UA Negative Negative    Blood, UA Negative Negative    Protein, UA Negative Negative    Leuk Esterase, UA Small (1+) (A) Negative    Nitrite, UA Negative Negative    Urobilinogen, UA  1.0 E.U./dL 0.2 - 1.0 E.U./dL   CBC Auto Differential    Specimen: Blood   Result Value Ref Range    WBC 10.12 3.40 - 10.80 10*3/mm3    RBC 4.86 3.77 - 5.28 10*6/mm3    Hemoglobin 13.6 12.0 - 15.9 g/dL    Hematocrit 40.5 34.0 - 46.6 %    MCV 83.3 79.0 - 97.0 fL    MCH 28.0 26.6 - 33.0 pg    MCHC 33.6 31.5 - 35.7 g/dL    RDW 14.0 12.3 - 15.4 %    RDW-SD 41.4 37.0 - 54.0 fl    MPV 10.1 6.0 - 12.0 fL    Platelets 299 140 - 450 10*3/mm3    Neutrophil % 60.9 42.7 - 76.0 %    Lymphocyte % 28.5 19.6 - 45.3 %    Monocyte % 8.5 5.0 - 12.0 %    Eosinophil % 1.6 0.3 - 6.2 %    Basophil % 0.3 0.0 - 1.5 %    Immature Grans % 0.2 0.0 - 0.5 %    Neutrophils, Absolute 6.17 1.70 - 7.00 10*3/mm3    Lymphocytes, Absolute 2.88 0.70 - 3.10 10*3/mm3    Monocytes, Absolute 0.86 0.10 - 0.90 10*3/mm3    Eosinophils, Absolute 0.16 0.00 - 0.40 10*3/mm3    Basophils, Absolute 0.03 0.00 - 0.20 10*3/mm3    Immature Grans, Absolute 0.02 0.00 - 0.05 10*3/mm3    nRBC 0.0 0.0 - 0.2 /100 WBC     *Note: Due to a large number of results and/or encounters for the requested time period, some results have not been displayed. A complete set of results can be found in Results Review.

## 2023-07-27 DIAGNOSIS — Z00.00 HEALTHCARE MAINTENANCE: ICD-10-CM

## 2023-07-27 DIAGNOSIS — J30.2 SEASONAL ALLERGIES: ICD-10-CM

## 2023-07-27 RX ORDER — LORATADINE 10 MG/1
TABLET ORAL
Qty: 30 TABLET | Refills: 11 | Status: SHIPPED | OUTPATIENT
Start: 2023-07-27

## 2023-08-10 NOTE — PROGRESS NOTES
Pulmonary Office Follow-up    Maricarmen Ferris is seen in the office today for   Chief Complaint   Patient presents with   • Asthma   .    Subjective     History of Present Illness  Maricarmen Ferris is a 48 y.o. female with a PMH significant for morbid obesity, ELSIE on CPAP, HTN, GERD, and depression who presents for evaluation of dyspnea.  She was last seen on 12/17/18, at which time she continued with dyspnea and frequent albuterol use, so I recommended escalating to the 200 dose of Asmanex.  She states that she is doing much better on the increased dose of Asmanex and has only needed her albuterol on average twice a week.  Unfortunately, she was notified by her pharmacy that her insurance would not cover this medication and they did contact us 2 weeks ago.  At that time, I did send in a prescription for Flovent Diskus, but she has not picked that up from the pharmacy yet.  She denies any cough, sputum, chest pain, or leg swelling.  She did recently join the [employer] and has started on regular exercise.  Patient is also nearing the end of her preop evaluation for bariatric surgery.  Otherwise, she continues to be compliant with her CPAP.    Review of Systems: History obtained from chart review and the patient.  Review of Systems   Constitutional: Positive for fatigue. Negative for fever and unexpected weight change.   HENT: Negative for ear pain and rhinorrhea.    Respiratory: Positive for shortness of breath. Negative for cough and wheezing.    Cardiovascular: Negative for chest pain, palpitations and leg swelling.     As described in the HPI. Otherwise, remainder of ROS (14 systems) were negative.    Patient Active Problem List   Diagnosis   • Chronic cholecystitis   • Umbilical hernia without obstruction and without gangrene   • Abdominal pain   • Abdominal fluid collection   • Intra-hepatic bile leak   • Abnormal cardiovascular function study   • Chest pain   • Morbid obesity with BMI of 50.0-59.9,  "adult (CMS/HCC)   • Restrictive lung disease secondary to obesity   • Nausea   • Weight gain, abnormal   • Pain of upper abdomen   • Gastroesophageal reflux disease with esophagitis   • Irritable bowel syndrome with both constipation and diarrhea   • Dysphagia   • Mild persistent asthma without complication   • ELSIE on CPAP       Medications, Allergies, Social, and Family Histories reviewed as per EMR.         Objective     Blood pressure 144/73, pulse 80, height 165.1 cm (65\"), weight (!) 142 kg (313 lb), not currently breastfeeding.  Physical Exam   Constitutional: She is oriented to person, place, and time. Vital signs are normal. She appears well-developed and well-nourished.   Morbidly obese   HENT:   Head: Normocephalic and atraumatic.   Nose: Nose normal.   Mouth/Throat: Uvula is midline, oropharynx is clear and moist and mucous membranes are normal.   Mallampati 4, tongue ring   Eyes: Conjunctivae, EOM and lids are normal. Pupils are equal, round, and reactive to light.   Neck: Trachea normal and normal range of motion. No tracheal tenderness present. No thyroid mass present.   Cardiovascular: Normal rate, regular rhythm and S1 normal. PMI is not displaced. Exam reveals distant heart sounds. Exam reveals no gallop.   No murmur heard.  Pulmonary/Chest: Effort normal and breath sounds normal. No respiratory distress. She has no decreased breath sounds. She has no wheezes. She has no rhonchi. Chest wall is not dull to percussion. She exhibits no tenderness.   Abdominal: Soft. Normal appearance and bowel sounds are normal. There is no hepatomegaly. There is no tenderness.   Obese   Musculoskeletal:   Normal gait, no extermity edema     Vascular Status -  Her right foot exhibits no edema. Her left foot exhibits no edema.  Lymphadenopathy:        Head (right side): No submandibular adenopathy present.        Head (left side): No submandibular adenopathy present.     She has no cervical adenopathy.        Right: No " supraclavicular adenopathy present.        Left: No supraclavicular adenopathy present.   Neurological: She is alert and oriented to person, place, and time.   Skin: Skin is warm and dry. No rash noted. No cyanosis. Nails show no clubbing.   Psychiatric: She has a normal mood and affect. Her speech is normal and behavior is normal. Judgment normal.   Nursing note and vitals reviewed.      PFTs: 10/8/18 (independently reviewed and interpreted by me)  Ratio 85  FVC 2.5/ 67%  FEV1 2.12/ 71%  TLC 3.59/ 69%  DLCO 18.64/ 72%  Poor effort.  Mild restriction with no significant bronchodilator response.  Mildly reduced diffusing capacity, likely due to obesity.  No comparative data available.       Assessment/Plan     Maricarmen was seen today for asthma.    Diagnoses and all orders for this visit:    Mild persistent asthma without complication    Restrictive lung disease secondary to obesity    ELSIE on CPAP    Morbid obesity with BMI of 50.0-59.9, adult (CMS/[facility])         Discussion/ Recommendations:   She has had improvement with the increased ICS dose so I think we should maintain.  Unfortunately, her insurance did not cover the Asmanex so I have sent an equivalent ICS to her pharmacy.  Otherwise, she is doing well on CPAP and has initiated an exercise regimen to assist with her weight loss.    -Finish current Asmanex inhaler, then start Flovent Diskus 1 puff twice daily.  -Continue albuterol as needed for dyspnea or wheeze.  -Encourage CPAP compliance, especially if she is hospitalized.  -Encouraged ongoing efforts with weight loss through diet and exercise.  Preop exam completed and sent to Dr. Sunshine' office.  -Annual flu vaccine.    Patient's Body mass index is 52.09 kg/m². BMI is above normal parameters. Recommendations include: exercise counseling.         Return in about 3 months (around 4/30/2019) for Recheck asthma.      Thank you for allowing me to participate in the care of Maricarmen Ferris. Please do not  hesitate to contact me with any questions.         This document has been electronically signed by Rachel Enciso MD on January 30, 2019 11:10 AM      Dictated using Dragon     Mohs Case Number (Optional):

## 2023-08-13 DIAGNOSIS — H66.90 ACUTE OTITIS MEDIA, UNSPECIFIED OTITIS MEDIA TYPE: ICD-10-CM

## 2023-08-14 ENCOUNTER — TRANSCRIBE ORDERS (OUTPATIENT)
Dept: LAB | Facility: HOSPITAL | Age: 53
End: 2023-08-14
Payer: MEDICARE

## 2023-08-14 ENCOUNTER — HOSPITAL ENCOUNTER (EMERGENCY)
Facility: HOSPITAL | Age: 53
Discharge: HOME | End: 2023-08-14
Attending: EMERGENCY MEDICINE | Admitting: EMERGENCY MEDICINE
Payer: MEDICARE

## 2023-08-14 VITALS
BODY MASS INDEX: 35.34 KG/M2 | HEIGHT: 64 IN | DIASTOLIC BLOOD PRESSURE: 82 MMHG | OXYGEN SATURATION: 97 % | WEIGHT: 207 LBS | HEART RATE: 81 BPM | RESPIRATION RATE: 21 BRPM | SYSTOLIC BLOOD PRESSURE: 153 MMHG | TEMPERATURE: 100.5 F

## 2023-08-14 DIAGNOSIS — U07.1 COVID-19 VIRUS INFECTION: Primary | ICD-10-CM

## 2023-08-14 DIAGNOSIS — N17.9 ACUTE RENAL FAILURE, UNSPECIFIED ACUTE RENAL FAILURE TYPE: Primary | ICD-10-CM

## 2023-08-14 LAB
FLUAV RNA RESP QL NAA+PROBE: NOT DETECTED
FLUBV RNA RESP QL NAA+PROBE: NOT DETECTED
SARS-COV-2 RNA RESP QL NAA+PROBE: DETECTED

## 2023-08-14 PROCEDURE — 93005 ELECTROCARDIOGRAM TRACING: CPT

## 2023-08-14 PROCEDURE — 93005 ELECTROCARDIOGRAM TRACING: CPT | Performed by: EMERGENCY MEDICINE

## 2023-08-14 PROCEDURE — 87636 SARSCOV2 & INF A&B AMP PRB: CPT

## 2023-08-14 PROCEDURE — 99283 EMERGENCY DEPT VISIT LOW MDM: CPT

## 2023-08-14 RX ORDER — DIPHENOXYLATE HYDROCHLORIDE AND ATROPINE SULFATE 2.5; .025 MG/1; MG/1
1 TABLET ORAL DAILY
COMMUNITY

## 2023-08-14 RX ORDER — IBUPROFEN 800 MG/1
800 TABLET ORAL EVERY 8 HOURS PRN
Qty: 20 TABLET | Refills: 0 | Status: SHIPPED | OUTPATIENT
Start: 2023-08-14 | End: 2023-08-14

## 2023-08-14 RX ORDER — FLUTICASONE PROPIONATE 50 MCG
SPRAY, SUSPENSION (ML) NASAL
Qty: 16 ML | Refills: 3 | Status: SHIPPED | OUTPATIENT
Start: 2023-08-14

## 2023-08-14 RX ORDER — ALBUTEROL SULFATE 90 UG/1
2 AEROSOL, METERED RESPIRATORY (INHALATION) EVERY 4 HOURS PRN
Qty: 6.7 G | Refills: 0 | Status: SHIPPED | OUTPATIENT
Start: 2023-08-14

## 2023-08-14 RX ORDER — BROMPHENIRAMINE MALEATE, PSEUDOEPHEDRINE HYDROCHLORIDE, AND DEXTROMETHORPHAN HYDROBROMIDE 2; 30; 10 MG/5ML; MG/5ML; MG/5ML
5 SYRUP ORAL 4 TIMES DAILY PRN
Qty: 118 ML | Refills: 0 | Status: SHIPPED | OUTPATIENT
Start: 2023-08-14

## 2023-08-14 RX ORDER — ACETAMINOPHEN 500 MG
1000 TABLET ORAL ONCE
Status: COMPLETED | OUTPATIENT
Start: 2023-08-14 | End: 2023-08-14

## 2023-08-14 RX ORDER — CALCIUM CARBONATE 500 MG/1
1 TABLET, CHEWABLE ORAL DAILY
COMMUNITY

## 2023-08-14 RX ORDER — IBUPROFEN 800 MG/1
800 TABLET ORAL ONCE
Status: DISCONTINUED | OUTPATIENT
Start: 2023-08-14 | End: 2023-08-14

## 2023-08-14 RX ADMIN — ACETAMINOPHEN 1000 MG: 500 TABLET, FILM COATED ORAL at 08:40

## 2023-08-14 NOTE — DISCHARGE INSTRUCTIONS
Increase PO fluid intake  Return ED vomiting, dehydration, shortness of air, worse condition, any other concerns

## 2023-08-14 NOTE — ED PROVIDER NOTES
Subjective   History of Present Illness  52yo female pmh significant presents ED c/o 2d hx fever/sore throat/sinus congestion/nonproducitve cough/malaise.  Pt reports recently returned from cruise vacation.    History provided by:  Patient  URI  Presenting symptoms: congestion, cough, fever, rhinorrhea and sore throat      Review of Systems   Constitutional:  Positive for fever.   HENT:  Positive for congestion, rhinorrhea and sore throat.    Respiratory:  Positive for cough.    Gastrointestinal: Negative.    Genitourinary: Negative.    Musculoskeletal: Negative.    Skin: Negative.    All other systems reviewed and are negative.    Past Medical History:   Diagnosis Date    Acid reflux     Anxiety     Depressed     Dysphagia     GERD (gastroesophageal reflux disease)     Hard to intubate     Hyperlipidemia     Hypertension     Hypokalemia     IBS (irritable bowel syndrome)     Nausea     Sleep apnea        Allergies   Allergen Reactions    Sulfa Antibiotics Anaphylaxis and GI Intolerance       Past Surgical History:   Procedure Laterality Date    ABDOMINAL SURGERY      CARDIAC CATHETERIZATION N/A 8/13/2018    Procedure: Left Heart Cath 8/13/2018 @ 9;00;  Surgeon: Kuldip Frank MD;  Location: [facility] CATH INVASIVE LOCATION;  Service: Cardiology    COLONOSCOPY N/A 11/19/2018    Procedure: COLONOSCOPY;  Surgeon: Bro Whitaker MD;  Location: [facility] ENDOSCOPY;  Service: Gastroenterology    CYSTOSCOPY      ENDOMETRIAL ABLATION  2015    ENDOSCOPY N/A 11/19/2018    Procedure: ESOPHAGOGASTRODUODENOSCOPYwith dilation;  Surgeon: Bro Whitaker MD;  Location: [facility] ENDOSCOPY;  Service: Gastroenterology    GASTRIC SLEEVE LAPAROSCOPIC      KNEE ARTHROSCOPY Left 12/13/2022    Procedure: KNEE ARTHROSCOPY WITH DEBRIDEMENT MEDIAL MENISCUS WITH OPEN RECONSTRUCTION OF THE TORN MEDIAL COLLATERAL LIGAMENT OF THE LEFT KNEE USING ALLOGRAFT;  Surgeon: Tyrese Canales MD;  Location: [facility] OR;  Service: Orthopedics;  Laterality: Left;     LAPAROSCOPIC TUBAL LIGATION  1995    RI ERCP DX COLLECTION SPECIMEN BRUSHING/WASHING Left 7/31/2017    Procedure: ENDOSCOPIC RETROGRADE CHOLANGIOPANCREATOGRAPHY;  Surgeon: Samuel Redding DO;  Location: [facility] ENDOSCOPY;  Service: Gastroenterology    RI LAPAROSCOPY SURG CHOLECYSTECTOMY N/A 7/24/2017    Procedure: CHOLECYSTECTOMY LAPAROSCOPIC, also umbillical hernia repair;  Surgeon: Pk العلي MD;  Location: [facility] OR;  Service: General    UPPER GASTROINTESTINAL ENDOSCOPY  11/19/2018       Family History   Problem Relation Age of Onset    Diabetes Mother     Hypertension Mother     Cirrhosis Mother     Hypertension Father        Social History     Socioeconomic History    Marital status:    Tobacco Use    Smoking status: Never    Smokeless tobacco: Never   Vaping Use    Vaping Use: Never used   Substance and Sexual Activity    Alcohol use: Yes     Comment: socially    Drug use: Never    Sexual activity: Defer     Birth control/protection: Surgical           Objective   Physical Exam  Vitals and nursing note reviewed.   Constitutional:       General: She is not in acute distress.     Appearance: Normal appearance. She is ill-appearing.   HENT:      Head: Normocephalic and atraumatic.      Right Ear: External ear normal.      Left Ear: External ear normal.      Nose: Nose normal.      Mouth/Throat:      Mouth: Mucous membranes are moist.      Pharynx: Oropharynx is clear. No oropharyngeal exudate or posterior oropharyngeal erythema.   Eyes:      Pupils: Pupils are equal, round, and reactive to light.   Cardiovascular:      Rate and Rhythm: Normal rate and regular rhythm.      Pulses: Normal pulses.      Heart sounds: Normal heart sounds. No murmur heard.    No friction rub. No gallop.   Pulmonary:      Effort: Pulmonary effort is normal. No respiratory distress.      Breath sounds: Normal breath sounds. No wheezing, rhonchi or rales.   Abdominal:      General: Abdomen is flat. Bowel sounds are normal.  There is no distension.      Palpations: Abdomen is soft.      Tenderness: There is no abdominal tenderness.   Musculoskeletal:         General: No swelling or deformity.      Cervical back: Normal range of motion and neck supple. No rigidity.   Lymphadenopathy:      Cervical: No cervical adenopathy.   Skin:     General: Skin is warm and dry.   Neurological:      General: No focal deficit present.      Mental Status: She is alert and oriented to person, place, and time.      GCS: GCS eye subscore is 4. GCS verbal subscore is 5. GCS motor subscore is 6.       ECG 12 Lead      Date/Time: 8/14/2023 9:12 AM  Performed by: David Bunn MD  Authorized by: David Bunn MD   Interpreted by physician  Rhythm: sinus rhythm  Rate: normal  BPM: 84  QRS axis: normal  Conduction: conduction normal  ST Segments: ST segments normal  T Waves: T waves normal  Other findings: PRWP  Clinical impression: non-specific ECG             ED Course           Labs Reviewed   COVID-19 AND FLU A/B, NP SWAB IN TRANSPORT MEDIA 8-12 HR TAT - Abnormal; Notable for the following components:       Result Value    COVID19 Detected (*)     All other components within normal limits    Narrative:     Fact sheet for providers: https://www.fda.gov/media/131007/download    Fact sheet for patients: https://www.fda.gov/media/250009/download    Test performed by PCR.  Influenza A and Influenza B negative results should be considered presumptive in samples that have a positive SARS-CoV-2 result.    Competitive inhibition studies showed that SARS-CoV-2 virus, when present at concentrations above 3.6E+04 copies/mL, can inhibit the detection and amplification of influenza A and influenza B virus RNA if present at or below 1.8E+02 copies/mL or 4.9E+02 copies/mL, respectively, and may lead to false negative influenza virus results. If co-infection with influenza A or influenza B virus is suspected in samples with a positive SARS-CoV-2 result, the sample should  be re-tested with another FDA cleared, approved, or authorized influenza test, if influenza virus detection would change clinical management.                                     Medical Decision Making  Problems Addressed:  COVID-19 virus infection: complicated acute illness or injury    Amount and/or Complexity of Data Reviewed  ECG/medicine tests: ordered and independent interpretation performed.    Risk  OTC drugs.  Prescription drug management.        Final diagnoses:   COVID-19 virus infection       ED Disposition  ED Disposition       ED Disposition   Discharge    Condition   Good    Comment   --               Lesli Lobo, APRN  [address]   [place] [address] [phone]    In 1 week           Medication List        New Prescriptions      albuterol sulfate  (90 Base) MCG/ACT inhaler  Commonly known as: PROVENTIL HFA;VENTOLIN HFA;PROAIR HFA  Inhale 2 puffs Every 4 (Four) Hours As Needed for Wheezing or Shortness of Air.     brompheniramine-pseudoephedrine-DM 30-2-10 MG/5ML syrup  Take 5 mL by mouth 4 (Four) Times a Day As Needed for Congestion or Cough.               Where to Get Your Medications        These medications were sent to [facility]/pharmacy #[id number] - [place], [place] - [address] [place] [address] - [phone]  - [phone] [address] [address]      Phone: [phone]   albuterol sulfate  (90 Base) MCG/ACT inhaler  brompheniramine-pseudoephedrine-DM 30-2-10 MG/5ML syrup  fluticasone 50 MCG/ACT nasal spray            David Bunn MD  08/14/23 0911       David Bunn MD  08/14/23 0912       David Bunn MD  08/14/23 0914

## 2023-08-15 DIAGNOSIS — R05.9 COUGH IN ADULT: Primary | ICD-10-CM

## 2023-08-15 LAB
QT INTERVAL: 358 MS
QTC INTERVAL: 423 MS

## 2023-08-15 RX ORDER — DEXTROMETHORPHAN HYDROBROMIDE AND PROMETHAZINE HYDROCHLORIDE 15; 6.25 MG/5ML; MG/5ML
5 SYRUP ORAL 4 TIMES DAILY PRN
Qty: 118 ML | Refills: 0 | Status: SHIPPED | OUTPATIENT
Start: 2023-08-15

## 2023-08-18 ENCOUNTER — LAB (OUTPATIENT)
Dept: LAB | Facility: HOSPITAL | Age: 53
End: 2023-08-18
Payer: MEDICARE

## 2023-08-18 DIAGNOSIS — Z00.00 WELCOME TO MEDICARE PREVENTIVE VISIT: ICD-10-CM

## 2023-08-18 DIAGNOSIS — N17.9 ACUTE RENAL FAILURE, UNSPECIFIED ACUTE RENAL FAILURE TYPE: ICD-10-CM

## 2023-08-18 LAB
ALBUMIN SERPL-MCNC: 3.8 G/DL (ref 3.5–5.2)
ALBUMIN/GLOB SERPL: 1.6 G/DL
ALP SERPL-CCNC: 53 U/L (ref 39–117)
ALT SERPL W P-5'-P-CCNC: 15 U/L (ref 1–33)
ANION GAP SERPL CALCULATED.3IONS-SCNC: 8 MMOL/L (ref 5–15)
AST SERPL-CCNC: 26 U/L (ref 1–32)
BASOPHILS # BLD AUTO: 0.01 10*3/MM3 (ref 0–0.2)
BASOPHILS NFR BLD AUTO: 0.2 % (ref 0–1.5)
BILIRUB SERPL-MCNC: 0.3 MG/DL (ref 0–1.2)
BUN SERPL-MCNC: 8 MG/DL (ref 6–20)
BUN/CREAT SERPL: 12.7 (ref 7–25)
CALCIUM SPEC-SCNC: 8.9 MG/DL (ref 8.6–10.5)
CHLORIDE SERPL-SCNC: 104 MMOL/L (ref 98–107)
CHOLEST SERPL-MCNC: 169 MG/DL (ref 0–200)
CO2 SERPL-SCNC: 27 MMOL/L (ref 22–29)
CREAT SERPL-MCNC: 0.63 MG/DL (ref 0.57–1)
CREAT UR-MCNC: 213.5 MG/DL
DEPRECATED RDW RBC AUTO: 41.3 FL (ref 37–54)
EGFRCR SERPLBLD CKD-EPI 2021: 106.2 ML/MIN/1.73
EOSINOPHIL # BLD AUTO: 0.05 10*3/MM3 (ref 0–0.4)
EOSINOPHIL NFR BLD AUTO: 1.2 % (ref 0.3–6.2)
ERYTHROCYTE [DISTWIDTH] IN BLOOD BY AUTOMATED COUNT: 14.4 % (ref 12.3–15.4)
GLOBULIN UR ELPH-MCNC: 2.4 GM/DL
GLUCOSE SERPL-MCNC: 95 MG/DL (ref 65–99)
HBA1C MFR BLD: 5.7 % (ref 4.8–5.6)
HCT VFR BLD AUTO: 37.5 % (ref 34–46.6)
HDLC SERPL-MCNC: 42 MG/DL (ref 40–60)
HGB BLD-MCNC: 12.5 G/DL (ref 12–15.9)
IMM GRANULOCYTES # BLD AUTO: 0 10*3/MM3 (ref 0–0.05)
IMM GRANULOCYTES NFR BLD AUTO: 0 % (ref 0–0.5)
LDLC SERPL CALC-MCNC: 106 MG/DL (ref 0–100)
LDLC/HDLC SERPL: 2.48 {RATIO}
LYMPHOCYTES # BLD AUTO: 2.15 10*3/MM3 (ref 0.7–3.1)
LYMPHOCYTES NFR BLD AUTO: 51.4 % (ref 19.6–45.3)
MCH RBC QN AUTO: 27.2 PG (ref 26.6–33)
MCHC RBC AUTO-ENTMCNC: 33.3 G/DL (ref 31.5–35.7)
MCV RBC AUTO: 81.7 FL (ref 79–97)
MONOCYTES # BLD AUTO: 0.26 10*3/MM3 (ref 0.1–0.9)
MONOCYTES NFR BLD AUTO: 6.2 % (ref 5–12)
NEUTROPHILS NFR BLD AUTO: 1.71 10*3/MM3 (ref 1.7–7)
NEUTROPHILS NFR BLD AUTO: 41 % (ref 42.7–76)
NRBC BLD AUTO-RTO: 0.2 /100 WBC (ref 0–0.2)
PLATELET # BLD AUTO: 216 10*3/MM3 (ref 140–450)
PMV BLD AUTO: 10.7 FL (ref 6–12)
POTASSIUM SERPL-SCNC: 3.6 MMOL/L (ref 3.5–5.2)
PROT ?TM UR-MCNC: 20.7 MG/DL
PROT SERPL-MCNC: 6.2 G/DL (ref 6–8.5)
PROT/CREAT UR: 97 MG/G CREA (ref 0–200)
RBC # BLD AUTO: 4.59 10*6/MM3 (ref 3.77–5.28)
SODIUM SERPL-SCNC: 139 MMOL/L (ref 136–145)
TRIGL SERPL-MCNC: 115 MG/DL (ref 0–150)
TSH SERPL DL<=0.05 MIU/L-ACNC: 1.33 UIU/ML (ref 0.27–4.2)
VIT B12 BLD-MCNC: 459 PG/ML (ref 211–946)
VLDLC SERPL-MCNC: 21 MG/DL (ref 5–40)
WBC NRBC COR # BLD: 4.18 10*3/MM3 (ref 3.4–10.8)

## 2023-08-18 PROCEDURE — 83010 ASSAY OF HAPTOGLOBIN QUANT: CPT | Performed by: PHYSICIAN ASSISTANT

## 2023-08-18 PROCEDURE — 82570 ASSAY OF URINE CREATININE: CPT

## 2023-08-18 PROCEDURE — 82947 ASSAY GLUCOSE BLOOD QUANT: CPT | Performed by: PHYSICIAN ASSISTANT

## 2023-08-18 PROCEDURE — 82977 ASSAY OF GGT: CPT | Performed by: PHYSICIAN ASSISTANT

## 2023-08-18 PROCEDURE — 84443 ASSAY THYROID STIM HORMONE: CPT

## 2023-08-18 PROCEDURE — 84156 ASSAY OF PROTEIN URINE: CPT

## 2023-08-18 PROCEDURE — 36415 COLL VENOUS BLD VENIPUNCTURE: CPT

## 2023-08-18 PROCEDURE — 84478 ASSAY OF TRIGLYCERIDES: CPT | Performed by: PHYSICIAN ASSISTANT

## 2023-08-18 PROCEDURE — 80061 LIPID PANEL: CPT

## 2023-08-18 PROCEDURE — 80053 COMPREHEN METABOLIC PANEL: CPT | Performed by: PHYSICIAN ASSISTANT

## 2023-08-18 PROCEDURE — 82172 ASSAY OF APOLIPOPROTEIN: CPT | Performed by: PHYSICIAN ASSISTANT

## 2023-08-18 PROCEDURE — 83883 ASSAY NEPHELOMETRY NOT SPEC: CPT | Performed by: PHYSICIAN ASSISTANT

## 2023-08-18 PROCEDURE — 85025 COMPLETE CBC W/AUTO DIFF WBC: CPT

## 2023-08-18 PROCEDURE — 82607 VITAMIN B-12: CPT

## 2023-08-18 PROCEDURE — 83036 HEMOGLOBIN GLYCOSYLATED A1C: CPT

## 2023-08-18 PROCEDURE — 82465 ASSAY BLD/SERUM CHOLESTEROL: CPT | Performed by: PHYSICIAN ASSISTANT

## 2023-08-18 PROCEDURE — 82247 BILIRUBIN TOTAL: CPT | Performed by: PHYSICIAN ASSISTANT

## 2023-08-21 LAB
A2 MACROGLOB SERPL-MCNC: 235 MG/DL (ref 110–276)
ALT SERPL W P-5'-P-CCNC: 15 IU/L (ref 0–40)
APO A-I SERPL-MCNC: 121 MG/DL (ref 116–209)
AST SERPL W P-5'-P-CCNC: 26 IU/L (ref 0–40)
BILIRUB SERPL-MCNC: 0.2 MG/DL (ref 0–1.2)
CHOLEST SERPL-MCNC: 180 MG/DL (ref 100–199)
FIBROSIS SCORING:: NORMAL
FIBROSIS STAGE SERPL QL: NORMAL
GGT SERPL-CCNC: 14 IU/L (ref 0–60)
GLUCOSE SERPL-MCNC: 96 MG/DL (ref 70–99)
HAPTOGLOB SERPL-MCNC: 151 MG/DL (ref 33–346)
LABORATORY COMMENT REPORT: NORMAL
LIVER FIBR SCORE SERPL CALC.FIBROSURE: 0.12 (ref 0–0.21)
LIVER STEATOSIS GRADE SERPL QL: NORMAL
LIVER STEATOSIS SCORE SERPL: 0.34 (ref 0–0.4)
NASH GRADE SERPL QL: NORMAL
NASH INTERPRETATION SERPL-IMP: NORMAL
NASH SCORE SERPL: 0 (ref 0–0.25)
NASH SCORING: NORMAL
STEATOSIS SCORING: NORMAL
TEST PERFORMANCE INFO SPEC: NORMAL
TEST PERFORMANCE INFO SPEC: NORMAL
TRIGL SERPL-MCNC: 133 MG/DL (ref 0–149)

## 2023-08-28 ENCOUNTER — OFFICE VISIT (OUTPATIENT)
Dept: FAMILY MEDICINE CLINIC | Facility: CLINIC | Age: 53
End: 2023-08-28
Payer: MEDICARE

## 2023-08-28 VITALS
DIASTOLIC BLOOD PRESSURE: 70 MMHG | HEART RATE: 79 BPM | OXYGEN SATURATION: 98 % | SYSTOLIC BLOOD PRESSURE: 108 MMHG | BODY MASS INDEX: 33.46 KG/M2 | HEIGHT: 64 IN | WEIGHT: 196 LBS

## 2023-08-28 DIAGNOSIS — R73.03 PRE-DIABETES: ICD-10-CM

## 2023-08-28 DIAGNOSIS — E66.9 OBESITY (BMI 30-39.9): Primary | ICD-10-CM

## 2023-08-28 DIAGNOSIS — Z12.31 BREAST CANCER SCREENING BY MAMMOGRAM: ICD-10-CM

## 2023-08-28 PROCEDURE — 1159F MED LIST DOCD IN RCRD: CPT | Performed by: NURSE PRACTITIONER

## 2023-08-28 PROCEDURE — 99214 OFFICE O/P EST MOD 30 MIN: CPT | Performed by: NURSE PRACTITIONER

## 2023-08-28 PROCEDURE — 3074F SYST BP LT 130 MM HG: CPT | Performed by: NURSE PRACTITIONER

## 2023-08-28 PROCEDURE — 3078F DIAST BP <80 MM HG: CPT | Performed by: NURSE PRACTITIONER

## 2023-08-28 PROCEDURE — 1160F RVW MEDS BY RX/DR IN RCRD: CPT | Performed by: NURSE PRACTITIONER

## 2023-08-28 PROCEDURE — 3044F HG A1C LEVEL LT 7.0%: CPT | Performed by: NURSE PRACTITIONER

## 2023-08-28 RX ORDER — LORATADINE 10 MG/1
TABLET ORAL
COMMUNITY
Start: 2023-08-24

## 2023-08-28 RX ORDER — IBUPROFEN 800 MG/1
TABLET ORAL
COMMUNITY
Start: 2023-08-14

## 2023-08-28 RX ORDER — SEMAGLUTIDE 2.68 MG/ML
2 INJECTION, SOLUTION SUBCUTANEOUS WEEKLY
Qty: 3 ML | Refills: 6 | Status: SHIPPED | OUTPATIENT
Start: 2023-08-28

## 2023-09-05 DIAGNOSIS — F41.9 ANXIETY AND DEPRESSION: ICD-10-CM

## 2023-09-05 DIAGNOSIS — Z00.00 HEALTHCARE MAINTENANCE: ICD-10-CM

## 2023-09-05 DIAGNOSIS — F32.A ANXIETY AND DEPRESSION: ICD-10-CM

## 2023-09-05 DIAGNOSIS — F51.01 PRIMARY INSOMNIA: ICD-10-CM

## 2023-09-05 RX ORDER — TRAZODONE HYDROCHLORIDE 50 MG/1
50 TABLET ORAL NIGHTLY
Qty: 30 TABLET | Refills: 11 | Status: SHIPPED | OUTPATIENT
Start: 2023-09-05 | End: 2023-09-08 | Stop reason: SDUPTHER

## 2023-09-05 RX ORDER — BUPROPION HYDROCHLORIDE 150 MG/1
150 TABLET, EXTENDED RELEASE ORAL 2 TIMES DAILY
Qty: 60 TABLET | Refills: 11 | Status: SHIPPED | OUTPATIENT
Start: 2023-09-05 | End: 2023-09-08 | Stop reason: SDUPTHER

## 2023-09-05 RX ORDER — SERTRALINE HYDROCHLORIDE 100 MG/1
100 TABLET, FILM COATED ORAL EVERY MORNING
Qty: 30 TABLET | Refills: 11 | Status: SHIPPED | OUTPATIENT
Start: 2023-09-05 | End: 2023-09-08 | Stop reason: SDUPTHER

## 2023-09-05 NOTE — TELEPHONE ENCOUNTER
Incoming Refill Request      Medication requested (name and dose):     Pharmacy where request should be sent:     Additional details provided by patient:     Best call back number:     Does the patient have less than a 3 day supply:  [] Yes  [] No    Maryanne Marie MA  09/05/23, 15:03 CDT

## 2023-09-05 NOTE — TELEPHONE ENCOUNTER
Incoming Refill Request      Medication requested (name and dose):     Pharmacy where request should be sent:     Additional details provided by patient:     Best call back number:     Does the patient have less than a 3 day supply:  [] Yes  [] No    Maryanne Marie MA  09/05/23, 15:28 CDT

## 2023-09-07 ENCOUNTER — OFFICE VISIT (OUTPATIENT)
Dept: GASTROENTEROLOGY | Facility: CLINIC | Age: 53
End: 2023-09-07
Payer: MEDICARE

## 2023-09-07 VITALS
SYSTOLIC BLOOD PRESSURE: 134 MMHG | BODY MASS INDEX: 34.18 KG/M2 | WEIGHT: 200.2 LBS | HEART RATE: 56 BPM | DIASTOLIC BLOOD PRESSURE: 84 MMHG | HEIGHT: 64 IN

## 2023-09-07 DIAGNOSIS — R19.7 DIARRHEA, UNSPECIFIED TYPE: ICD-10-CM

## 2023-09-07 DIAGNOSIS — K75.81 NASH (NONALCOHOLIC STEATOHEPATITIS): Primary | ICD-10-CM

## 2023-09-07 DIAGNOSIS — K21.00 GASTROESOPHAGEAL REFLUX DISEASE WITH ESOPHAGITIS WITHOUT HEMORRHAGE: ICD-10-CM

## 2023-09-07 DIAGNOSIS — R74.8 ELEVATED LIVER ENZYMES: ICD-10-CM

## 2023-09-07 DIAGNOSIS — K21.9 BILE ACID ESOPHAGEAL REFLUX: ICD-10-CM

## 2023-09-07 PROCEDURE — 1159F MED LIST DOCD IN RCRD: CPT | Performed by: PHYSICIAN ASSISTANT

## 2023-09-07 PROCEDURE — 99213 OFFICE O/P EST LOW 20 MIN: CPT | Performed by: PHYSICIAN ASSISTANT

## 2023-09-07 PROCEDURE — 3079F DIAST BP 80-89 MM HG: CPT | Performed by: PHYSICIAN ASSISTANT

## 2023-09-07 PROCEDURE — 3075F SYST BP GE 130 - 139MM HG: CPT | Performed by: PHYSICIAN ASSISTANT

## 2023-09-07 PROCEDURE — 1160F RVW MEDS BY RX/DR IN RCRD: CPT | Performed by: PHYSICIAN ASSISTANT

## 2023-09-07 NOTE — PROGRESS NOTES
Chief Complaint   Patient presents with    ellis    Heartburn    elevated LFTS       ENDO PROCEDURE ORDERED:    Subjective    Maricarmen Ferris is a 53 y.o. female. she is here today for follow-up.    History of Present Illness    Patient seen on a recheck of her elevated liver enzymes, GERD, ELLIS, IBS.  Last seen 07/26/2023 was given Colestid.  She is doing better.  She noticed that when they increased her dose of Ozempic the diarrhea became a little bit more frequent but she was doing better on the Colestid.  I talked to her about possibly increasing that although if it is from the Ozempic this may not help.  Her GERD is doing well on the Prilosec 40 mg daily.  She denied abdominal pain.  Weight is down 3 pounds since last visit.  She had a preop for bariatric EGD 05/30/2023.  Prior to that EGD/colonoscopy showed esophagitis, gastritis, hemorrhoids on 11/19/2018.      Laboratory 08/18/2023 cholesterol panel showed an LDL of 160, B12 of 459, normal CBC, A1c 5.7.  CMP, TSH normal.  ELLIS FibroSure 0.12/F0, steatosis 0.34/S0, 0.00/N0.      A/P:  Patient with bile salt diarrhea, GERD, abdominal pain, fatty liver.  Encouraged dietary modification and weight loss. She may try increasing the Colestid.   Will plan follow-up in 3 or 4 months, will likely need to repeat laboratories but will plan further pending clinical course and the results of the above.            The following portions of the patient's history were reviewed and updated as appropriate:   Past Medical History:   Diagnosis Date    Acid reflux     Anxiety     Depressed     Dysphagia     GERD (gastroesophageal reflux disease)     Hard to intubate     Hyperlipidemia     Hypertension     Hypokalemia     IBS (irritable bowel syndrome)     Nausea     Sleep apnea      Past Surgical History:   Procedure Laterality Date    ABDOMINAL SURGERY      CARDIAC CATHETERIZATION N/A 8/13/2018    Procedure: Left Heart Cath 8/13/2018 @ 9;00;  Surgeon: Kuldip Frank MD;   Location: [facility] CATH INVASIVE LOCATION;  Service: Cardiology    COLONOSCOPY N/A 2018    Procedure: COLONOSCOPY;  Surgeon: Bro Whitaker MD;  Location: [facility] ENDOSCOPY;  Service: Gastroenterology    CYSTOSCOPY      ENDOMETRIAL ABLATION  2015    ENDOSCOPY N/A 2018    Procedure: ESOPHAGOGASTRODUODENOSCOPYwith dilation;  Surgeon: Bro Whitaker MD;  Location: [facility] ENDOSCOPY;  Service: Gastroenterology    GASTRIC SLEEVE LAPAROSCOPIC      KNEE ARTHROSCOPY Left 2022    Procedure: KNEE ARTHROSCOPY WITH DEBRIDEMENT MEDIAL MENISCUS WITH OPEN RECONSTRUCTION OF THE TORN MEDIAL COLLATERAL LIGAMENT OF THE LEFT KNEE USING ALLOGRAFT;  Surgeon: Tyrese Canales MD;  Location: [facility] OR;  Service: Orthopedics;  Laterality: Left;    LAPAROSCOPIC TUBAL LIGATION      DC ERCP DX COLLECTION SPECIMEN BRUSHING/WASHING Left 2017    Procedure: ENDOSCOPIC RETROGRADE CHOLANGIOPANCREATOGRAPHY;  Surgeon: Samuel Redding DO;  Location: [facility] ENDOSCOPY;  Service: Gastroenterology    DC LAPAROSCOPY SURG CHOLECYSTECTOMY N/A 2017    Procedure: CHOLECYSTECTOMY LAPAROSCOPIC, also umbillical hernia repair;  Surgeon: Pk العلي MD;  Location: [facility] OR;  Service: General    UPPER GASTROINTESTINAL ENDOSCOPY  2018     Family History   Problem Relation Age of Onset    Diabetes Mother     Hypertension Mother     Cirrhosis Mother     Hypertension Father      OB History          3    Para   3    Term   3            AB        Living             SAB        IAB        Ectopic        Molar        Multiple        Live Births                  Allergies   Allergen Reactions    Sulfa Antibiotics Anaphylaxis and GI Intolerance     Social History     Socioeconomic History    Marital status:    Tobacco Use    Smoking status: Never    Smokeless tobacco: Never   Vaping Use    Vaping Use: Never used   Substance and Sexual Activity    Alcohol use: Yes     Comment: socially    Drug use: Never    Sexual  activity: Defer     Birth control/protection: Surgical     Current Medications:  Prior to Admission medications    Medication Sig Start Date End Date Taking? Authorizing Provider   albuterol sulfate  (90 Base) MCG/ACT inhaler Inhale 2 puffs Every 4 (Four) Hours As Needed for Wheezing or Shortness of Air. 8/14/23  Yes David Bunn MD   buPROPion SR (WELLBUTRIN SR) 150 MG 12 hr tablet Take 1 tablet by mouth 2 (Two) Times a Day. 9/5/23  Yes Lesli Lobo APRN   calcium carbonate (TUMS) 500 MG chewable tablet Chew 1 tablet Daily.   Yes ProviderHowie MD   colestipol (Colestid) 1 g tablet Take 1 tablet by mouth 2 (Two) Times a Day. 7/26/23  Yes Carlo Colindres PA-C   cyanocobalamin (VITAMIN B-12) 500 MCG tablet Take 1 tablet by mouth Daily. 7/18/23 7/18/24 Yes Howie Phillips MD   cyclobenzaprine (FLEXERIL) 10 MG tablet TAKE 1 TABLET BY MOUTH THREE TIMES A DAY AS NEEDED FOR MUSCLE SPASM 4/12/23  Yes Lesli Lobo APRN   diazePAM (VALIUM) 5 MG tablet Take 1 tablet twice a day by oral route.   Yes ProviderHowie MD   Diclofenac Sodium (VOLTAREN) 1 % gel gel Apply  topically to the appropriate area as directed 2 (Two) Times a Day. 3/21/23  Yes Lesli Lobo APRN   fluticasone (FLONASE) 50 MCG/ACT nasal spray INSTILL 2 SPRAYS INTO THE NOSTRIL AS DIRECTED BY PROVIDER DAILY. 8/14/23  Yes Lesli Lobo APRN   furosemide (LASIX) 40 MG tablet TAKE 1 TABLET BY MOUTH AS NEEDED (SWELLING). 7/19/23  Yes Kaylie Garner APRN   gabapentin (NEURONTIN) 600 MG tablet Take 1 tablet by mouth 2 (Two) Times a Day. 11/13/21  Yes Howie Phillips MD   ibuprofen (ADVIL,MOTRIN) 800 MG tablet TAKE 1 TABLET BY MOUTH EVERY 8 (EIGHT) HOURS AS NEEDED FOR MILD PAIN OR FEVER. 8/14/23  Yes Howie Phillips MD   lisinopril (PRINIVIL,ZESTRIL) 2.5 MG tablet Take 1 tablet by mouth Daily. 4/28/23  Yes Lesli Lobo APRN   loperamide (IMODIUM) 2 MG capsule Take 1 capsule by mouth 4 (Four) Times a Day As Needed for  "Diarrhea. 8/19/21  Yes Lesli Lobo APRN   loratadine (CLARITIN) 10 MG tablet  8/24/23  Yes Howie Phillips MD   multivitamin (THERAGRAN) tablet tablet Take 1 tablet by mouth Daily.   Yes ProviderHowie MD   nitroglycerin (NITROSTAT) 0.4 MG SL tablet Place 1 tablet under the tongue Every 5 (Five) Minutes As Needed for Chest Pain. Take no more than 3 doses in 15 minutes. 8/19/21  Yes Lesli Lobo APRN   nystatin (MYCOSTATIN) 205298 UNIT/GM powder Apply  topically to the appropriate area as directed 3 (Three) Times a Day. 3/21/23  Yes Lesli Lobo APRN   omeprazole (priLOSEC) 40 MG capsule Take 1 capsule by mouth 2 (Two) Times a Day. 4/25/23  Yes Lesli Lobo APRN   ondansetron ODT (ZOFRAN-ODT) 4 MG disintegrating tablet PLACE 1 TABLET ON THE TONGUE EVERY 6 (SIX) HOURS AS NEEDED FOR NAUSEA OR VOMITING. 9/26/22  Yes Lesli Lobo APRN   promethazine (PHENERGAN) 25 MG tablet Take 1 tablet by mouth Every 8 (Eight) Hours As Needed for Nausea or Vomiting. 8/19/21  Yes Lesli Lobo APRN   Semaglutide, 2 MG/DOSE, (Ozempic, 2 MG/DOSE,) 8 MG/3ML solution pen-injector Inject 2 mg under the skin into the appropriate area as directed 1 (One) Time Per Week. 8/28/23  Yes Lesli Lobo APRN   sertraline (ZOLOFT) 100 MG tablet Take 1 tablet by mouth Every Morning. 9/5/23  Yes Lesli Lobo APRN   traZODone (DESYREL) 50 MG tablet Take 1 tablet by mouth Every Night. 9/5/23  Yes Lesli Lobo APRN   promethazine-dextromethorphan (PROMETHAZINE-DM) 6.25-15 MG/5ML syrup Take 5 mL by mouth 4 (Four) Times a Day As Needed for Cough. 8/15/23 9/7/23  Lesli Lobo APRN     Review of Systems  Review of Systems       Objective    /84   Pulse 56   Ht 162.6 cm (64.02\")   Wt 90.8 kg (200 lb 3.2 oz)   BMI 34.34 kg/m²   Physical Exam  Vitals and nursing note reviewed.   Constitutional:       General: She is not in acute distress.     Appearance: She is well-developed.   HENT:      Head: Normocephalic and " atraumatic.   Eyes:      Pupils: Pupils are equal, round, and reactive to light.   Cardiovascular:      Rate and Rhythm: Normal rate and regular rhythm.      Heart sounds: Normal heart sounds.   Pulmonary:      Effort: Pulmonary effort is normal.      Breath sounds: Normal breath sounds.   Abdominal:      General: Bowel sounds are normal. There is no distension or abdominal bruit.      Palpations: Abdomen is soft. Abdomen is not rigid. There is no shifting dullness or mass.      Tenderness: There is abdominal tenderness. There is no guarding or rebound.      Hernia: No hernia is present. There is no hernia in the ventral area.   Musculoskeletal:         General: Normal range of motion.      Cervical back: Normal range of motion.   Skin:     General: Skin is warm and dry.   Neurological:      Mental Status: She is alert and oriented to person, place, and time.   Psychiatric:         Behavior: Behavior normal.         Thought Content: Thought content normal.         Judgment: Judgment normal.     Assessment & Plan      1. LAWRENCE (nonalcoholic steatohepatitis)    2. Elevated liver enzymes    3. Bile acid esophageal reflux    4. Gastroesophageal reflux disease with esophagitis without hemorrhage    5. Diarrhea, unspecified type    .   Diagnoses and all orders for this visit:    1. LAWRENCE (nonalcoholic steatohepatitis) (Primary)    2. Elevated liver enzymes    3. Bile acid esophageal reflux    4. Gastroesophageal reflux disease with esophagitis without hemorrhage    5. Diarrhea, unspecified type        Orders placed during this encounter include:  No orders of the defined types were placed in this encounter.      Medications prescribed:  No orders of the defined types were placed in this encounter.    Discontinued Medications         Reason for Discontinue     promethazine-dextromethorphan (PROMETHAZINE-DM) 6.25-15 MG/5ML syrup    *Therapy completed          Requested Prescriptions      No prescriptions requested or ordered  in this encounter       Review and/or summary of lab tests, radiology, procedures, medications. Review and summary of old records and obtaining of history. The risks and benefits of my recommendations, as well as other treatment options were discussed with the patient today. Questions were answered.    Follow-up: Return in about 3 months (around 12/7/2023), or if symptoms worsen or fail to improve.     * Surgery not found *      This document has been electronically signed by Carlo Colindres PA-C on September 21, 2023 19:21 CDT      Results for orders placed or performed in visit on 08/18/23   CBC Auto Differential    Specimen: Blood   Result Value Ref Range    WBC 4.18 3.40 - 10.80 10*3/mm3    RBC 4.59 3.77 - 5.28 10*6/mm3    Hemoglobin 12.5 12.0 - 15.9 g/dL    Hematocrit 37.5 34.0 - 46.6 %    MCV 81.7 79.0 - 97.0 fL    MCH 27.2 26.6 - 33.0 pg    MCHC 33.3 31.5 - 35.7 g/dL    RDW 14.4 12.3 - 15.4 %    RDW-SD 41.3 37.0 - 54.0 fl    MPV 10.7 6.0 - 12.0 fL    Platelets 216 140 - 450 10*3/mm3    Neutrophil % 41.0 (L) 42.7 - 76.0 %    Lymphocyte % 51.4 (H) 19.6 - 45.3 %    Monocyte % 6.2 5.0 - 12.0 %    Eosinophil % 1.2 0.3 - 6.2 %    Basophil % 0.2 0.0 - 1.5 %    Immature Grans % 0.0 0.0 - 0.5 %    Neutrophils, Absolute 1.71 1.70 - 7.00 10*3/mm3    Lymphocytes, Absolute 2.15 0.70 - 3.10 10*3/mm3    Monocytes, Absolute 0.26 0.10 - 0.90 10*3/mm3    Eosinophils, Absolute 0.05 0.00 - 0.40 10*3/mm3    Basophils, Absolute 0.01 0.00 - 0.20 10*3/mm3    Immature Grans, Absolute 0.00 0.00 - 0.05 10*3/mm3    nRBC 0.2 0.0 - 0.2 /100 WBC   Protein / Creatinine Ratio, Urine - Urine, Clean Catch    Specimen: Urine, Clean Catch   Result Value Ref Range    Protein/Creatinine Ratio, Urine 97.0 0.0 - 200.0 mg/G Crea    Creatinine, Urine 213.5 mg/dL    Total Protein, Urine 20.7 mg/dL   TSH    Specimen: Blood   Result Value Ref Range    TSH 1.330 0.270 - 4.200 uIU/mL   Hemoglobin A1c    Specimen: Blood   Result Value Ref Range     Hemoglobin A1C 5.70 (H) 4.80 - 5.60 %   Vitamin B12    Specimen: Blood   Result Value Ref Range    Vitamin B-12 459 211 - 946 pg/mL   Lipid Panel    Specimen: Blood   Result Value Ref Range    Total Cholesterol 169 0 - 200 mg/dL    Triglycerides 115 0 - 150 mg/dL    HDL Cholesterol 42 40 - 60 mg/dL    LDL Cholesterol  106 (H) 0 - 100 mg/dL    VLDL Cholesterol 21 5 - 40 mg/dL    LDL/HDL Ratio 2.48    Comprehensive Metabolic Panel    Specimen: Blood   Result Value Ref Range    Glucose 95 65 - 99 mg/dL    BUN 8 6 - 20 mg/dL    Creatinine 0.63 0.57 - 1.00 mg/dL    Sodium 139 136 - 145 mmol/L    Potassium 3.6 3.5 - 5.2 mmol/L    Chloride 104 98 - 107 mmol/L    CO2 27.0 22.0 - 29.0 mmol/L    Calcium 8.9 8.6 - 10.5 mg/dL    Total Protein 6.2 6.0 - 8.5 g/dL    Albumin 3.8 3.5 - 5.2 g/dL    ALT (SGPT) 15 1 - 33 U/L    AST (SGOT) 26 1 - 32 U/L    Alkaline Phosphatase 53 39 - 117 U/L    Total Bilirubin 0.3 0.0 - 1.2 mg/dL    Globulin 2.4 gm/dL    A/G Ratio 1.6 g/dL    BUN/Creatinine Ratio 12.7 7.0 - 25.0    Anion Gap 8.0 5.0 - 15.0 mmol/L    eGFR 106.2 >60.0 mL/min/1.73   Results for orders placed or performed during the hospital encounter of 08/14/23   COVID-19 and FLU A/B PCR - Swab, Nasopharynx    Specimen: Nasopharynx; Swab   Result Value Ref Range    COVID19 Detected (C) Not Detected - Ref. Range    Influenza A PCR Not Detected Not Detected    Influenza B PCR Not Detected Not Detected   ECG 12 Lead Dyspnea   Result Value Ref Range    QT Interval 358 ms    QTC Interval 423 ms   Results for orders placed or performed in visit on 07/26/23   LAWRENCE Fibrosure Plus    Specimen: Blood   Result Value Ref Range    Fibrosis Score 0.12 0.00 - 0.21    Fibrosis Stage Comment     Steatosis Score (Reference) 0.34 0.00 - 0.40    Steatosis Grade (Reference) Comment     LAWRENCE Score (Reference) 0.00 0.00 - 0.25    Lawrence Grade (Reference) Comment     Methodology: Comment     Alpha 2-Macroglobulins, Qn 235 110 - 276 mg/dL    Haptoglobin 151 33 -  346 mg/dL    Apolipoprotein A-1 121 116 - 209 mg/dL    Total Bilirubin 0.2 0.0 - 1.2 mg/dL    GGT 14 0 - 60 IU/L    ALT (SGPT) 15 0 - 40 IU/L    AST (SGOT) P5P (Reference) 26 0 - 40 IU/L    Cholesterol, Total (Reference) 180 100 - 199 mg/dL    Glucose, Serum (Reference) 96 70 - 99 mg/dL    Triglycerides 133 0 - 149 mg/dL    Interpretations: (Reference) Comment     Fibrosis Scoring: Comment     Steatosis Scoring Comment     LAWRENCE Scoring Comment     Limitations: Comment     Comment Comment    Results for orders placed or performed during the hospital encounter of 12/25/22   [facility]   Result Value Ref Range    Extra Tube Hold for add-ons.    CBC Auto Differential    Specimen: Blood   Result Value Ref Range    WBC 8.58 3.40 - 10.80 10*3/mm3    RBC 4.81 3.77 - 5.28 10*6/mm3    Hemoglobin 13.0 12.0 - 15.9 g/dL    Hematocrit 40.2 34.0 - 46.6 %    MCV 83.6 79.0 - 97.0 fL    MCH 27.0 26.6 - 33.0 pg    MCHC 32.3 31.5 - 35.7 g/dL    RDW 13.2 12.3 - 15.4 %    RDW-SD 39.8 37.0 - 54.0 fl    MPV 9.6 6.0 - 12.0 fL    Platelets 311 140 - 450 10*3/mm3    Neutrophil % 56.7 42.7 - 76.0 %    Lymphocyte % 33.3 19.6 - 45.3 %    Monocyte % 8.2 5.0 - 12.0 %    Eosinophil % 1.4 0.3 - 6.2 %    Basophil % 0.2 0.0 - 1.5 %    Immature Grans % 0.2 0.0 - 0.5 %    Neutrophils, Absolute 4.86 1.70 - 7.00 10*3/mm3    Lymphocytes, Absolute 2.86 0.70 - 3.10 10*3/mm3    Monocytes, Absolute 0.70 0.10 - 0.90 10*3/mm3    Eosinophils, Absolute 0.12 0.00 - 0.40 10*3/mm3    Basophils, Absolute 0.02 0.00 - 0.20 10*3/mm3    Immature Grans, Absolute 0.02 0.00 - 0.05 10*3/mm3    nRBC 0.0 0.0 - 0.2 /100 WBC     *Note: Due to a large number of results and/or encounters for the requested time period, some results have not been displayed. A complete set of results can be found in Results Review.

## 2023-09-08 DIAGNOSIS — F51.01 PRIMARY INSOMNIA: ICD-10-CM

## 2023-09-08 DIAGNOSIS — Z00.00 HEALTHCARE MAINTENANCE: ICD-10-CM

## 2023-09-08 DIAGNOSIS — F41.9 ANXIETY AND DEPRESSION: ICD-10-CM

## 2023-09-08 DIAGNOSIS — F32.A ANXIETY AND DEPRESSION: ICD-10-CM

## 2023-09-08 RX ORDER — BUPROPION HYDROCHLORIDE 150 MG/1
150 TABLET, EXTENDED RELEASE ORAL 2 TIMES DAILY
Qty: 180 TABLET | Refills: 3 | Status: SHIPPED | OUTPATIENT
Start: 2023-09-08

## 2023-09-08 RX ORDER — SERTRALINE HYDROCHLORIDE 100 MG/1
100 TABLET, FILM COATED ORAL EVERY MORNING
Qty: 90 TABLET | Refills: 3 | Status: SHIPPED | OUTPATIENT
Start: 2023-09-08

## 2023-09-08 RX ORDER — TRAZODONE HYDROCHLORIDE 50 MG/1
50 TABLET ORAL NIGHTLY
Qty: 90 TABLET | Refills: 3 | Status: SHIPPED | OUTPATIENT
Start: 2023-09-08

## 2023-09-08 NOTE — TELEPHONE ENCOUNTER
Incoming Refill Request      Medication requested (name and dose):     Pharmacy where request should be sent:     Additional details provided by patient:     Best call back number:     Does the patient have less than a 3 day supply:  [] Yes  [] No    Mrayanne Marie MA  09/08/23, 08:28 CDT

## 2023-09-10 DIAGNOSIS — R25.2 MUSCLE CRAMPS: ICD-10-CM

## 2023-09-11 RX ORDER — CYCLOBENZAPRINE HCL 10 MG
TABLET ORAL
Qty: 30 TABLET | Refills: 0 | Status: SHIPPED | OUTPATIENT
Start: 2023-09-11

## 2023-09-28 ENCOUNTER — OFFICE VISIT (OUTPATIENT)
Dept: FAMILY MEDICINE CLINIC | Facility: CLINIC | Age: 53
End: 2023-09-28
Payer: MEDICARE

## 2023-09-28 VITALS
OXYGEN SATURATION: 96 % | HEIGHT: 64 IN | DIASTOLIC BLOOD PRESSURE: 82 MMHG | HEART RATE: 74 BPM | RESPIRATION RATE: 16 BRPM | SYSTOLIC BLOOD PRESSURE: 92 MMHG | WEIGHT: 189.6 LBS | BODY MASS INDEX: 32.37 KG/M2

## 2023-09-28 DIAGNOSIS — E66.9 OBESITY (BMI 30-39.9): Primary | ICD-10-CM

## 2023-09-28 DIAGNOSIS — T78.40XA ALLERGY, INITIAL ENCOUNTER: ICD-10-CM

## 2023-09-28 DIAGNOSIS — E66.09 CLASS 1 OBESITY DUE TO EXCESS CALORIES WITH SERIOUS COMORBIDITY AND BODY MASS INDEX (BMI) OF 34.0 TO 34.9 IN ADULT: ICD-10-CM

## 2023-09-28 DIAGNOSIS — R05.1 ACUTE COUGH: ICD-10-CM

## 2023-09-28 PROBLEM — R06.02 SHORTNESS OF BREATH: Status: ACTIVE | Noted: 2023-09-14

## 2023-09-28 RX ORDER — METHYLPREDNISOLONE 4 MG/1
TABLET ORAL
Qty: 21 EACH | Refills: 0 | Status: SHIPPED | OUTPATIENT
Start: 2023-09-28

## 2023-09-28 RX ORDER — FEXOFENADINE HCL 180 MG/1
180 TABLET ORAL DAILY
Qty: 30 TABLET | Refills: 6 | Status: SHIPPED | OUTPATIENT
Start: 2023-09-28

## 2023-09-28 RX ORDER — DEXTROMETHORPHAN HYDROBROMIDE AND PROMETHAZINE HYDROCHLORIDE 15; 6.25 MG/5ML; MG/5ML
5 SYRUP ORAL NIGHTLY
Qty: 118 ML | Refills: 0 | Status: SHIPPED | OUTPATIENT
Start: 2023-09-28

## 2023-09-28 NOTE — PROGRESS NOTES
Chief Complaint  Follow-up, Obesity, and Prediabetes    Subjective          Maricarmen Ferris presents to [facility] [place] for weight check. She is going to her nutrition class in October. Gagandeep is having some issues with allergies and a cough.         Obesity  This is a recurrent problem. The current episode started more than 1 year ago. The problem occurs constantly. The problem has been gradually improving. Pertinent negatives include no chest pain, congestion, coughing or sore throat. The symptoms are aggravated by eating and drinking. She has tried walking, eating and drinking (bariatric surgery, diet, GLP1) for the symptoms. The treatment provided mild relief.   Hypertension  This is a chronic problem. The current episode started more than 1 year ago. The problem is unchanged. The problem is controlled. Pertinent negatives include no chest pain. Risk factors for coronary artery disease include obesity and dyslipidemia. Past treatments include ACE inhibitors. Current antihypertension treatment includes ACE inhibitors. The current treatment provides mild improvement.   Outpatient Medications Prior to Visit   Medication Sig Dispense Refill    buPROPion SR (WELLBUTRIN SR) 150 MG 12 hr tablet Take 1 tablet by mouth 2 (Two) Times a Day. 180 tablet 3    colestipol (Colestid) 1 g tablet Take 1 tablet by mouth 2 (Two) Times a Day. 60 tablet 2    cyanocobalamin (VITAMIN B-12) 500 MCG tablet Take 1 tablet by mouth Daily. 30 tablet 11    cyclobenzaprine (FLEXERIL) 10 MG tablet TAKE 1 TABLET BY MOUTH THREE TIMES A DAY AS NEEDED FOR MUSCLE SPASM 30 tablet 0    Diclofenac Sodium (VOLTAREN) 1 % gel gel Apply  topically to the appropriate area as directed 2 (Two) Times a Day. 50 g 6    fluticasone (FLONASE) 50 MCG/ACT nasal spray INSTILL 2 SPRAYS INTO THE NOSTRIL AS DIRECTED BY PROVIDER DAILY. 16 mL 3    furosemide (LASIX) 40 MG tablet TAKE 1 TABLET BY MOUTH AS NEEDED  (SWELLING). 30 tablet 11    gabapentin (NEURONTIN) 600 MG tablet Take 1 tablet by mouth 2 (Two) Times a Day.      lisinopril (PRINIVIL,ZESTRIL) 2.5 MG tablet Take 1 tablet by mouth Daily. 90 tablet 3    loperamide (IMODIUM) 2 MG capsule Take 1 capsule by mouth 4 (Four) Times a Day As Needed for Diarrhea. 24 capsule 3    multivitamin (THERAGRAN) tablet tablet Take 1 tablet by mouth Daily.      nitroglycerin (NITROSTAT) 0.4 MG SL tablet Place 1 tablet under the tongue Every 5 (Five) Minutes As Needed for Chest Pain. Take no more than 3 doses in 15 minutes. 30 tablet 12    nystatin (MYCOSTATIN) 967253 UNIT/GM powder Apply  topically to the appropriate area as directed 3 (Three) Times a Day. 30 g 11    omeprazole (priLOSEC) 40 MG capsule Take 1 capsule by mouth 2 (Two) Times a Day. 60 capsule 6    ondansetron ODT (ZOFRAN-ODT) 4 MG disintegrating tablet PLACE 1 TABLET ON THE TONGUE EVERY 6 (SIX) HOURS AS NEEDED FOR NAUSEA OR VOMITING. 15 tablet 3    promethazine (PHENERGAN) 25 MG tablet Take 1 tablet by mouth Every 8 (Eight) Hours As Needed for Nausea or Vomiting. 30 tablet 3    Semaglutide, 2 MG/DOSE, (Ozempic, 2 MG/DOSE,) 8 MG/3ML solution pen-injector Inject 2 mg under the skin into the appropriate area as directed 1 (One) Time Per Week. 3 mL 6    sertraline (ZOLOFT) 100 MG tablet Take 1 tablet by mouth Every Morning. 90 tablet 3    traZODone (DESYREL) 50 MG tablet Take 1 tablet by mouth Every Night. 90 tablet 3    loratadine (CLARITIN) 10 MG tablet       albuterol sulfate  (90 Base) MCG/ACT inhaler Inhale 2 puffs Every 4 (Four) Hours As Needed for Wheezing or Shortness of Air. (Patient not taking: Reported on 9/28/2023) 6.7 g 0    calcium carbonate (TUMS) 500 MG chewable tablet Chew 1 tablet Daily. (Patient not taking: Reported on 9/28/2023)      diazePAM (VALIUM) 5 MG tablet Take 1 tablet twice a day by oral route. (Patient not taking: Reported on 9/28/2023)      ibuprofen (ADVIL,MOTRIN) 800 MG tablet TAKE 1  "TABLET BY MOUTH EVERY 8 (EIGHT) HOURS AS NEEDED FOR MILD PAIN OR FEVER. (Patient not taking: Reported on 9/28/2023)       No facility-administered medications prior to visit.       Review of Systems   HENT:  Negative for congestion and sore throat.    Respiratory:  Negative for cough.    Cardiovascular:  Negative for chest pain.       Objective   Vital Signs:   Visit Vitals  BP 92/82 (BP Location: Left arm, Patient Position: Sitting, Cuff Size: Large Adult)   Pulse 74   Resp 16   Ht 162.6 cm (64.02\")   Wt 86 kg (189 lb 9.6 oz)   SpO2 96%   BMI 32.52 kg/m²     Physical Exam  Vitals and nursing note reviewed.   Constitutional:       Appearance: She is well-developed. She is obese.   HENT:      Head: Normocephalic and atraumatic.      Nose: Congestion present.      Mouth/Throat:      Pharynx: No pharyngeal swelling or posterior oropharyngeal erythema.   Eyes:      General: Lids are normal.      Conjunctiva/sclera: Conjunctivae normal.   Neck:      Thyroid: No thyroid mass or thyromegaly.      Trachea: Trachea normal. No tracheal tenderness.   Cardiovascular:      Rate and Rhythm: Normal rate.      Pulses: Normal pulses.      Heart sounds: Normal heart sounds.   Pulmonary:      Effort: Pulmonary effort is normal. No respiratory distress.      Breath sounds: Normal breath sounds. No wheezing.   Abdominal:      General: There is no distension.      Palpations: Abdomen is soft. There is no mass.   Musculoskeletal:         General: Normal range of motion.      Cervical back: Normal range of motion. No edema.   Skin:     General: Skin is warm and dry.      Coloration: Skin is not pale.      Findings: No abrasion, erythema or lesion.   Neurological:      Mental Status: She is alert and oriented to person, place, and time.   Psychiatric:         Mood and Affect: Mood is not anxious. Affect is not inappropriate.         Speech: Speech normal.         Behavior: Behavior normal.         Thought Content: Thought content normal.    "      Judgment: Judgment normal. Judgment is not impulsive.      Result Review :                 Assessment and Plan    Diagnoses and all orders for this visit:    1. Obesity (BMI 30-39.9) (Primary)    2. Class 1 obesity due to excess calories with serious comorbidity and body mass index (BMI) of 34.0 to 34.9 in adult    3. Acute cough  -     methylPREDNISolone (MEDROL) 4 MG dose pack; Take as directed on package instructions.  Dispense: 21 each; Refill: 0  -     promethazine-dextromethorphan (PROMETHAZINE-DM) 6.25-15 MG/5ML syrup; Take 5 mL by mouth Every Night.  Dispense: 118 mL; Refill: 0    4. Allergy, initial encounter  -     methylPREDNISolone (MEDROL) 4 MG dose pack; Take as directed on package instructions.  Dispense: 21 each; Refill: 0  -     promethazine-dextromethorphan (PROMETHAZINE-DM) 6.25-15 MG/5ML syrup; Take 5 mL by mouth Every Night.  Dispense: 118 mL; Refill: 0  -     fexofenadine (Allegra Allergy) 180 MG tablet; Take 1 tablet by mouth Daily.  Dispense: 30 tablet; Refill: 6      Weight continue to decrease    The current medical regimen is effective;  continue present plan and medications.    Start newly prescribed medications   Please call the office if you have issues, questions or concerns    Increase water intake     Please call the office if you have any issues, questions or concerns.       Follow Up   Return in about 4 weeks (around 10/26/2023), or if symptoms worsen or fail to improve, for Recheck.  Patient was given instructions and counseling regarding her condition or for health maintenance advice. Please see specific information pulled into the AVS if appropriate.           This document has been electronically signed by MARIA TERESA Saucedo on September 29, 2023 08:18 CDT

## 2024-12-31 ENCOUNTER — APPOINTMENT (OUTPATIENT)
Dept: URGENT CARE | Facility: CLINIC | Age: 54
End: 2024-12-31
Payer: COMMERCIAL

## 2024-12-31 ENCOUNTER — OFFICE VISIT (OUTPATIENT)
Dept: URGENT CARE | Facility: CLINIC | Age: 54
End: 2024-12-31
Payer: COMMERCIAL

## 2024-12-31 VITALS
DIASTOLIC BLOOD PRESSURE: 88 MMHG | OXYGEN SATURATION: 96 % | TEMPERATURE: 100.6 F | HEIGHT: 64 IN | HEART RATE: 77 BPM | BODY MASS INDEX: 25.64 KG/M2 | SYSTOLIC BLOOD PRESSURE: 118 MMHG | WEIGHT: 150.2 LBS | RESPIRATION RATE: 16 BRPM

## 2024-12-31 DIAGNOSIS — J06.9 UPPER RESPIRATORY TRACT INFECTION, UNSPECIFIED TYPE: Primary | ICD-10-CM

## 2024-12-31 PROCEDURE — 99213 OFFICE O/P EST LOW 20 MIN: CPT | Performed by: NURSE PRACTITIONER

## 2024-12-31 PROCEDURE — G0463 HOSPITAL OUTPT CLINIC VISIT: HCPCS | Performed by: NURSE PRACTITIONER

## 2024-12-31 RX ORDER — PREDNISONE 20 MG/1
20 TABLET ORAL 2 TIMES DAILY WITH MEALS
Qty: 10 TABLET | Refills: 0 | Status: SHIPPED | OUTPATIENT
Start: 2024-12-31 | End: 2025-01-05

## 2024-12-31 NOTE — PROGRESS NOTES
[facility]        NAME: Samantha Martines is a 54 y.o. female  : 1970    MRN: [id number]  DATE: 2024  TIME: 9:27 AM    Assessment and Plan   Upper respiratory tract infection, unspecified type [J06.9]  1. Upper respiratory tract infection, unspecified type  predniSONE 20 mg tablet            Patient Instructions       Take med as prescribed  Cont with previously prescribed cough medication  Follow up with PCP in 3-5 days.  Proceed to  ER if symptoms worsen.    If tests have been performed at [facility], our office will contact you with results if changes need to be made to the care plan discussed with you at the visit.  You can review your full results on [facility].    Chief Complaint     Chief Complaint   Patient presents with    Cough     Started Saturday with a headache, difficulty breathing, coughing up greenish yellow mucus, had a fever this morning of 100.6, and she's been taking Tylenol for relief          History of Present Illness       HPI  Presents to clinic with complaint of cough, headache and a bit of shortness of breath. Having greenish mucus. States she has a temperature of 100 degrees at home earlier today. At the clinic it is 100.6 degrees. States she has Phenergan cough medication at home. Previously prescribed by PCP    Review of Systems   Review of Systems   Constitutional:  Positive for fever.   HENT:  Positive for congestion and rhinorrhea. Negative for ear pain and sore throat.    Respiratory:  Positive for cough (When coughing a lot) and shortness of breath. Negative for chest tightness and wheezing.    Cardiovascular:  Negative for chest pain.   Gastrointestinal:  Negative for diarrhea and vomiting.         Current Medications       Current Outpatient Medications:     buPROPion (WELLBUTRIN SR) 150 mg 12 hr tablet, Take 1 tablet (150 mg total) by mouth 2 (two) times a day, Disp: 60 tablet, Rfl: 1    furosemide (LASIX) 40 mg tablet, Take 1 tablet (40 mg  total) by mouth daily, Disp: 90 tablet, Rfl: 1    gabapentin (NEURONTIN) 300 mg capsule, TAKE 1 CAPSULE BY MOUTH THREE TIMES A DAY, Disp: 90 capsule, Rfl: 1    lisinopril (ZESTRIL) 2.5 mg tablet, TAKE 1 TABLET BY MOUTH EVERY DAY, Disp: 30 tablet, Rfl: 6    loratadine (CLARITIN) 10 mg tablet, Take 1 tablet (10 mg total) by mouth daily, Disp: 90 tablet, Rfl: 1    Multiple Vitamins-Minerals (MULTI-VITAMIN GUMMIES PO), Take by mouth, Disp: , Rfl:     nitroglycerin (NITROSTAT) 0.4 mg SL tablet, Place 1 tablet (0.4 mg total) under the tongue every 5 (five) minutes as needed for chest pain, Disp: 30 tablet, Rfl: 0    nystatin (MYCOSTATIN) powder, APPLY TO AFFECTED AREA 3 TIMES A DAY, Disp: 15 g, Rfl: 1    omeprazole (PriLOSEC) 20 mg delayed release capsule, TAKE 1 CAPSULE BY MOUTH EVERY DAY, Disp: 90 capsule, Rfl: 1    ondansetron (ZOFRAN-ODT) 4 mg disintegrating tablet, Take 1 tablet (4 mg total) by mouth every 8 (eight) hours as needed for nausea, Disp: 20 tablet, Rfl: 0    potassium chloride (Klor-Con M20) 20 mEq tablet, Take 1 tablet (20 mEq total) by mouth 3 (three) times a day, Disp: 90 tablet, Rfl: 1    predniSONE 20 mg tablet, Take 1 tablet (20 mg total) by mouth 2 (two) times a day with meals for 5 days, Disp: 10 tablet, Rfl: 0    promethazine (PHENERGAN) 25 mg tablet, TAKE 1 TABLET BY MOUTH EVERY 6 HOURS AS NEEDED FOR NAUSEA AND VOMITING, Disp: 30 tablet, Rfl: 0    sertraline (ZOLOFT) 50 mg tablet, TAKE 1 TABLET BY MOUTH EVERY DAY, Disp: 90 tablet, Rfl: 1    albuterol (PROVENTIL HFA,VENTOLIN HFA) 90 mcg/act inhaler, albuterol sulfate HFA 90 mcg/actuation aerosol inhaler  INHALE 2 PUFFS EVERY 4 HOURS AS NEEDED FOR WHEEZING OR SHORTNESS OF AIR. (Patient not taking: Reported on 12/31/2024), Disp: , Rfl:     AMILoride 5 mg tablet, TAKE 2 TABLETS BY MOUTH EVERY DAY (Patient not taking: Reported on 12/31/2024), Disp: 60 tablet, Rfl: 1    Calcium-Phosphorus-Vitamin D (CALCIUM GUMMIES PO), Take by mouth (Patient not  taking: Reported on 12/31/2024), Disp: , Rfl:     celecoxib (CeleBREX) 100 mg capsule, Take 1 capsule (100 mg total) by mouth 2 (two) times a day (Patient not taking: Reported on 12/31/2024), Disp: 60 capsule, Rfl: 2    Cholecalciferol (EQL VITAMIN D3 GUMMIES PO), Take by mouth (Patient not taking: Reported on 12/31/2024), Disp: , Rfl:     cyclobenzaprine (FLEXERIL) 10 mg tablet, Take 1 tablet (10 mg total) by mouth 3 (three) times a day as needed for muscle spasms (Patient not taking: Reported on 12/31/2024), Disp: 90 tablet, Rfl: 2    diazepam (VALIUM) 5 mg tablet, Take 1 tablet (5 mg total) by mouth 2 (two) times a day for 3 days (Patient not taking: Reported on 3/31/2021), Disp: 6 tablet, Rfl: 0    Diclofenac Sodium (VOLTAREN) 1 %, Apply 4 g topically 2 (two) times a day as needed (severe pain) (Patient not taking: Reported on 12/31/2024), Disp: 2 g, Rfl: 2    meclizine (ANTIVERT) 25 mg tablet, Take 1 tablet (25 mg total) by mouth 3 (three) times a day as needed for dizziness (Patient not taking: Reported on 12/31/2024), Disp: 30 tablet, Rfl: 0    methylPREDNISolone 4 MG tablet therapy pack, Use as directed on package (Patient not taking: Reported on 12/31/2024), Disp: 1 each, Rfl: 0    rOPINIRole (REQUIP) 0.25 mg tablet, TAKE 1 TABLET (0.25 MG TOTAL) BY MOUTH DAILY AT BEDTIME (Patient not taking: Reported on 12/31/2024), Disp: 30 tablet, Rfl: 5    Current Allergies     Allergies as of 12/31/2024 - Reviewed 12/31/2024   Allergen Reaction Noted    Sulfa antibiotics Throat Swelling 07/14/2020            The following portions of the patient's history were reviewed and updated as appropriate: allergies, current medications, past family history, past medical history, past social history, past surgical history and problem list.     Past Medical History:   Diagnosis Date    Anxiety     Chronic kidney disease     Depression     GERD (gastroesophageal reflux disease)     Hypertension     Kidney disease     Kidney stone   "      Past Surgical History:   Procedure Laterality Date    CHOLECYSTECTOMY      DENTAL SURGERY      [place] teeth extraction    ENDOMETRIAL ABLATION      GASTRIC RESTRICTION SURGERY      LITHOTRIPSY      TUBAL LIGATION         Family History   Problem Relation Age of Onset    Cirrhosis Mother     Kidney failure Mother     Diabetes Mother     Hypertension Mother     Kidney disease Mother     Kidney failure Father     Heart disease Father     Hypertension Father     Alcohol abuse Father     Kidney disease Father     Alcohol abuse Paternal Grandmother     Mental illness Maternal Uncle     Substance Abuse Neg Hx          Medications have been verified.        Objective   /88 (BP Location: Left arm, Patient Position: Sitting)   Pulse 77   Temp (!) 100.6 °F (38.1 °C) (Tympanic)   Resp 16   Ht 5' 4\" (1.626 m)   Wt 68.1 kg (150 lb 3.2 oz)   SpO2 96%   BMI 25.78 kg/m²   No LMP recorded.       Physical Exam     Physical Exam  Constitutional:       General: She is not in acute distress.     Appearance: She is not ill-appearing.   HENT:      Right Ear: Tympanic membrane normal.      Left Ear: Tympanic membrane normal.      Nose: Rhinorrhea present.      Mouth/Throat:      Pharynx: No posterior oropharyngeal erythema.   Cardiovascular:      Rate and Rhythm: Regular rhythm.      Heart sounds: Normal heart sounds.   Pulmonary:      Effort: Pulmonary effort is normal.      Breath sounds: Wheezing present.      Comments: Congestion in the lungs                  "

## 2025-07-05 ENCOUNTER — APPOINTMENT (EMERGENCY)
Dept: CT IMAGING | Facility: HOSPITAL | Age: 55
End: 2025-07-05
Payer: COMMERCIAL

## 2025-07-05 ENCOUNTER — HOSPITAL ENCOUNTER (EMERGENCY)
Facility: HOSPITAL | Age: 55
Discharge: HOME | End: 2025-07-05
Attending: EMERGENCY MEDICINE | Admitting: EMERGENCY MEDICINE
Payer: COMMERCIAL

## 2025-07-05 ENCOUNTER — APPOINTMENT (EMERGENCY)
Dept: RADIOLOGY | Facility: HOSPITAL | Age: 55
End: 2025-07-05
Payer: COMMERCIAL

## 2025-07-05 ENCOUNTER — OFFICE VISIT (OUTPATIENT)
Dept: URGENT CARE | Facility: CLINIC | Age: 55
End: 2025-07-05
Payer: COMMERCIAL

## 2025-07-05 VITALS
TEMPERATURE: 98.8 F | SYSTOLIC BLOOD PRESSURE: 97 MMHG | OXYGEN SATURATION: 95 % | DIASTOLIC BLOOD PRESSURE: 57 MMHG | BODY MASS INDEX: 26.79 KG/M2 | RESPIRATION RATE: 16 BRPM | HEART RATE: 68 BPM | WEIGHT: 156.09 LBS

## 2025-07-05 VITALS
TEMPERATURE: 97.6 F | WEIGHT: 155 LBS | DIASTOLIC BLOOD PRESSURE: 89 MMHG | BODY MASS INDEX: 26.61 KG/M2 | SYSTOLIC BLOOD PRESSURE: 136 MMHG | RESPIRATION RATE: 16 BRPM | HEART RATE: 56 BPM | OXYGEN SATURATION: 100 %

## 2025-07-05 DIAGNOSIS — R11.2 NAUSEA & VOMITING: ICD-10-CM

## 2025-07-05 DIAGNOSIS — J02.9 SORE THROAT: ICD-10-CM

## 2025-07-05 DIAGNOSIS — R10.9 ABDOMINAL PAIN: Primary | ICD-10-CM

## 2025-07-05 DIAGNOSIS — J02.9 SORE THROAT: Primary | ICD-10-CM

## 2025-07-05 DIAGNOSIS — R10.9 ABDOMINAL PAIN, UNSPECIFIED ABDOMINAL LOCATION: ICD-10-CM

## 2025-07-05 LAB
ALBUMIN SERPL BCG-MCNC: 3.5 G/DL (ref 3.5–5)
ALP SERPL-CCNC: 47 U/L (ref 34–104)
ALT SERPL W P-5'-P-CCNC: 22 U/L (ref 7–52)
ANION GAP SERPL CALCULATED.3IONS-SCNC: 2 MMOL/L (ref 4–13)
AST SERPL W P-5'-P-CCNC: 27 U/L (ref 13–39)
BASOPHILS # BLD AUTO: 0.02 THOUSANDS/ÂΜL (ref 0–0.1)
BASOPHILS NFR BLD AUTO: 0 % (ref 0–1)
BILIRUB SERPL-MCNC: 0.29 MG/DL (ref 0.2–1)
BILIRUB UR QL STRIP: NEGATIVE
BUN SERPL-MCNC: 16 MG/DL (ref 5–25)
CALCIUM SERPL-MCNC: 8.7 MG/DL (ref 8.4–10.2)
CARDIAC TROPONIN I PNL SERPL HS: 3 NG/L (ref ?–50)
CHLORIDE SERPL-SCNC: 107 MMOL/L (ref 96–108)
CLARITY UR: CLEAR
CO2 SERPL-SCNC: 31 MMOL/L (ref 21–32)
COLOR UR: COLORLESS
CREAT SERPL-MCNC: 0.63 MG/DL (ref 0.6–1.3)
EOSINOPHIL # BLD AUTO: 0.18 THOUSAND/ÂΜL (ref 0–0.61)
EOSINOPHIL NFR BLD AUTO: 2 % (ref 0–6)
ERYTHROCYTE [DISTWIDTH] IN BLOOD BY AUTOMATED COUNT: 17.2 % (ref 11.6–15.1)
GFR SERPL CREATININE-BSD FRML MDRD: 102 ML/MIN/1.73SQ M
GLUCOSE SERPL-MCNC: 79 MG/DL (ref 65–140)
GLUCOSE UR STRIP-MCNC: ABNORMAL MG/DL
HCT VFR BLD AUTO: 34.3 % (ref 34.8–46.1)
HGB BLD-MCNC: 10.4 G/DL (ref 11.5–15.4)
HGB UR QL STRIP.AUTO: NEGATIVE
IMM GRANULOCYTES # BLD AUTO: 0.02 THOUSAND/UL (ref 0–0.2)
IMM GRANULOCYTES NFR BLD AUTO: 0 % (ref 0–2)
KETONES UR STRIP-MCNC: NEGATIVE MG/DL
LEUKOCYTE ESTERASE UR QL STRIP: NEGATIVE
LIPASE SERPL-CCNC: 99 U/L (ref 11–82)
LYMPHOCYTES # BLD AUTO: 3.01 THOUSANDS/ÂΜL (ref 0.6–4.47)
LYMPHOCYTES NFR BLD AUTO: 39 % (ref 14–44)
MCH RBC QN AUTO: 23.5 PG (ref 26.8–34.3)
MCHC RBC AUTO-ENTMCNC: 30.3 G/DL (ref 31.4–37.4)
MCV RBC AUTO: 77 FL (ref 82–98)
MONOCYTES # BLD AUTO: 0.67 THOUSAND/ÂΜL (ref 0.17–1.22)
MONOCYTES NFR BLD AUTO: 9 % (ref 4–12)
NEUTROPHILS # BLD AUTO: 3.83 THOUSANDS/ÂΜL (ref 1.85–7.62)
NEUTS SEG NFR BLD AUTO: 50 % (ref 43–75)
NITRITE UR QL STRIP: NEGATIVE
NRBC BLD AUTO-RTO: 0 /100 WBCS
PH UR STRIP.AUTO: 6 [PH]
PLATELET # BLD AUTO: 307 THOUSANDS/UL (ref 149–390)
PMV BLD AUTO: 9.9 FL (ref 8.9–12.7)
POTASSIUM SERPL-SCNC: 3.9 MMOL/L (ref 3.5–5.3)
PROT SERPL-MCNC: 6.3 G/DL (ref 6.4–8.4)
PROT UR STRIP-MCNC: NEGATIVE MG/DL
RBC # BLD AUTO: 4.43 MILLION/UL (ref 3.81–5.12)
S PYO AG THROAT QL: NEGATIVE
SODIUM SERPL-SCNC: 140 MMOL/L (ref 135–147)
SP GR UR STRIP.AUTO: 1.01 (ref 1–1.03)
UROBILINOGEN UR STRIP-ACNC: <2 MG/DL
WBC # BLD AUTO: 7.73 THOUSAND/UL (ref 4.31–10.16)

## 2025-07-05 PROCEDURE — 71046 X-RAY EXAM CHEST 2 VIEWS: CPT

## 2025-07-05 PROCEDURE — 36415 COLL VENOUS BLD VENIPUNCTURE: CPT

## 2025-07-05 PROCEDURE — 84484 ASSAY OF TROPONIN QUANT: CPT

## 2025-07-05 PROCEDURE — 83690 ASSAY OF LIPASE: CPT

## 2025-07-05 PROCEDURE — 85025 COMPLETE CBC W/AUTO DIFF WBC: CPT

## 2025-07-05 PROCEDURE — 80053 COMPREHEN METABOLIC PANEL: CPT

## 2025-07-05 PROCEDURE — 74177 CT ABD & PELVIS W/CONTRAST: CPT

## 2025-07-05 PROCEDURE — 99285 EMERGENCY DEPT VISIT HI MDM: CPT | Performed by: EMERGENCY MEDICINE

## 2025-07-05 PROCEDURE — 99284 EMERGENCY DEPT VISIT MOD MDM: CPT

## 2025-07-05 PROCEDURE — G0463 HOSPITAL OUTPT CLINIC VISIT: HCPCS | Performed by: PHYSICIAN ASSISTANT

## 2025-07-05 PROCEDURE — 87070 CULTURE OTHR SPECIMN AEROBIC: CPT | Performed by: PHYSICIAN ASSISTANT

## 2025-07-05 PROCEDURE — 99213 OFFICE O/P EST LOW 20 MIN: CPT | Performed by: PHYSICIAN ASSISTANT

## 2025-07-05 PROCEDURE — 96365 THER/PROPH/DIAG IV INF INIT: CPT

## 2025-07-05 PROCEDURE — 87880 STREP A ASSAY W/OPTIC: CPT | Performed by: PHYSICIAN ASSISTANT

## 2025-07-05 RX ORDER — SODIUM CHLORIDE, SODIUM GLUCONATE, SODIUM ACETATE, POTASSIUM CHLORIDE, MAGNESIUM CHLORIDE, SODIUM PHOSPHATE, DIBASIC, AND POTASSIUM PHOSPHATE .53; .5; .37; .037; .03; .012; .00082 G/100ML; G/100ML; G/100ML; G/100ML; G/100ML; G/100ML; G/100ML
1000 INJECTION, SOLUTION INTRAVENOUS ONCE
Status: COMPLETED | OUTPATIENT
Start: 2025-07-05 | End: 2025-07-05

## 2025-07-05 RX ORDER — AMOXICILLIN 500 MG/1
500 CAPSULE ORAL EVERY 12 HOURS SCHEDULED
Qty: 20 CAPSULE | Refills: 0 | Status: SHIPPED | OUTPATIENT
Start: 2025-07-05 | End: 2025-07-15

## 2025-07-05 RX ORDER — DICYCLOMINE HCL 20 MG
20 TABLET ORAL ONCE
Status: COMPLETED | OUTPATIENT
Start: 2025-07-05 | End: 2025-07-05

## 2025-07-05 RX ORDER — MAGNESIUM HYDROXIDE/ALUMINUM HYDROXICE/SIMETHICONE 120; 1200; 1200 MG/30ML; MG/30ML; MG/30ML
30 SUSPENSION ORAL ONCE
Status: COMPLETED | OUTPATIENT
Start: 2025-07-05 | End: 2025-07-05

## 2025-07-05 RX ORDER — LOPERAMIDE HYDROCHLORIDE 2 MG/1
2 CAPSULE ORAL ONCE
Status: COMPLETED | OUTPATIENT
Start: 2025-07-05 | End: 2025-07-05

## 2025-07-05 RX ORDER — DIPHENHYDRAMINE HYDROCHLORIDE AND LIDOCAINE HYDROCHLORIDE AND ALUMINUM HYDROXIDE AND MAGNESIUM HYDRO
10 KIT ONCE
Status: COMPLETED | OUTPATIENT
Start: 2025-07-05 | End: 2025-07-05

## 2025-07-05 RX ORDER — PROMETHAZINE HYDROCHLORIDE 25 MG/1
25 SUPPOSITORY RECTAL EVERY 6 HOURS PRN
Qty: 12 SUPPOSITORY | Refills: 0 | Status: SHIPPED | OUTPATIENT
Start: 2025-07-05 | End: 2025-07-05

## 2025-07-05 RX ORDER — PROMETHAZINE HYDROCHLORIDE 25 MG/1
25 TABLET ORAL EVERY 6 HOURS PRN
Qty: 30 TABLET | Refills: 0 | Status: SHIPPED | OUTPATIENT
Start: 2025-07-05

## 2025-07-05 RX ORDER — DIPHENHYDRAMINE HYDROCHLORIDE AND LIDOCAINE HYDROCHLORIDE AND ALUMINUM HYDROXIDE AND MAGNESIUM HYDRO
10 KIT EVERY 4 HOURS PRN
Qty: 119 ML | Refills: 0 | Status: SHIPPED | OUTPATIENT
Start: 2025-07-05

## 2025-07-05 RX ORDER — PROMETHAZINE HYDROCHLORIDE 25 MG/1
25 TABLET ORAL ONCE
Status: DISCONTINUED | OUTPATIENT
Start: 2025-07-05 | End: 2025-07-06 | Stop reason: HOSPADM

## 2025-07-05 RX ORDER — HYDROMORPHONE HCL/PF 1 MG/ML
0.5 SYRINGE (ML) INJECTION ONCE
Status: DISCONTINUED | OUTPATIENT
Start: 2025-07-05 | End: 2025-07-05

## 2025-07-05 RX ORDER — PROMETHAZINE HYDROCHLORIDE 25 MG/1
25 TABLET ORAL ONCE
Status: COMPLETED | OUTPATIENT
Start: 2025-07-05 | End: 2025-07-05

## 2025-07-05 RX ADMIN — SODIUM CHLORIDE, SODIUM GLUCONATE, SODIUM ACETATE, POTASSIUM CHLORIDE, MAGNESIUM CHLORIDE, SODIUM PHOSPHATE, DIBASIC, AND POTASSIUM PHOSPHATE 1000 ML: .53; .5; .37; .037; .03; .012; .00082 INJECTION, SOLUTION INTRAVENOUS at 17:10

## 2025-07-05 RX ADMIN — DIPHENHYDRAMINE HYDROCHLORIDE AND LIDOCAINE HYDROCHLORIDE AND ALUMINUM HYDROXIDE AND MAGNESIUM HYDRO 10 ML: KIT at 17:08

## 2025-07-05 RX ADMIN — LOPERAMIDE HYDROCHLORIDE 2 MG: 2 CAPSULE ORAL at 17:08

## 2025-07-05 RX ADMIN — IOHEXOL 50 ML: 240 INJECTION, SOLUTION INTRATHECAL; INTRAVASCULAR; INTRAVENOUS; ORAL at 18:31

## 2025-07-05 RX ADMIN — ALUMINUM HYDROXIDE, MAGNESIUM HYDROXIDE, AND DIMETHICONE 30 ML: 200; 20; 200 SUSPENSION ORAL at 19:34

## 2025-07-05 RX ADMIN — DICYCLOMINE HYDROCHLORIDE 20 MG: 20 TABLET ORAL at 19:34

## 2025-07-05 RX ADMIN — IOHEXOL 100 ML: 350 INJECTION, SOLUTION INTRAVENOUS at 18:31

## 2025-07-05 RX ADMIN — PROMETHAZINE HYDROCHLORIDE 25 MG: 25 TABLET ORAL at 17:07

## 2025-07-05 NOTE — ED PROVIDER NOTES
Time reflects when diagnosis was documented in both MDM as applicable and the Disposition within this note       Time User Action Codes Description Comment    7/5/2025  9:59 PM Vo, Baljit Add [R10.9] Abdominal pain     7/5/2025  9:59 PM Vo, Baljit Add [J02.9] Sore throat     7/5/2025  9:59 PM Vo, Baljit Add [J02.9] Pharyngitis     7/5/2025  9:59 PM Vo, Baljit Remove [J02.9] Pharyngitis     7/5/2025 10:03 PM Vo, Baljit Add [R11.2] Nausea & vomiting           ED Disposition       ED Disposition   Discharge    Condition   Stable    Date/Time   Sat Jul 5, 2025  9:59 PM    Comment   Samantha Martines discharge to home/self care.                   Assessment & Plan       Medical Decision Making  54 year old female with pmhx of abdominoplasty in feb, [facility] [place], presents to the ED for evaluation of intermittent sharp lower abdominal pain, N/V/D since Wednesday, and sore throat for the past two days. Grandson with strep throat. Zofran at home with improvement. Visiting from [place] since June. Patient was evaluated for urgent care and had a strep test that was negative culture was sent.    No fever, chest pain,     On exam vital signs within normal limits.  Patient resting comfortably on stretcher not in acute distress.  Oral mucosa without lesions, pharynx mild erythema or swelling without exudate, uvula midline.  Heart regular rate and rhythm.  Abdomen soft nontender well-healing surgical scar with mild right lower quadrant tenderness to palpation.    Symptoms likely viral however will evaluate with CBC, CMP, UA, and CT abdomen pelvis evaluate for appendicitis, colitis, diverticulitis  Labs unremarkable CT abdomen pelvis negative for acute findings  These results were discussed patient expressed understanding and was reporting improvement in her symptoms.  Patient discharged home with instruction to follow-up with PCP.      Amount and/or Complexity of Data Reviewed  Labs: ordered. Decision-making details documented in ED  Course.  Radiology: ordered. Decision-making details documented in ED Course.    Risk  OTC drugs.  Prescription drug management.        ED Course as of 07/08/25 1821   Sat Jul 05, 2025   1711 WBC: 7.73   1711 Hemoglobin(!): 10.4   1711 Platelet Count: 307   1716 Potassium: 3.9   1716 Sodium: 140   1716 LIPASE(!): 99   1804 hs TnI 0hr: 3   1804 Nitrite, UA: Negative   2159 CT abdomen pelvis with contrast  IMPRESSION:     No bowel obstruction or acute inflammatory findings identified in the abdomen or pelvis.  No free air or free fluid.     Punctate nonobstructing right nephrolithiasis.         Medications   promethazine (PHENERGAN) tablet 25 mg (25 mg Oral Given 7/5/25 1707)   loperamide (IMODIUM) capsule 2 mg (2 mg Oral Given 7/5/25 1708)   diphenhydramine, lidocaine, Al/Mg hydroxide, simethicone (Magic Mouthwash) oral solution 10 mL (10 mL Swish & Spit Given 7/5/25 1708)   multi-electrolyte (Plasmalyte-A/Isolyte-S PH 7.4/Normosol-R) IV bolus 1,000 mL (0 mL Intravenous Stopped 7/5/25 1812)   iohexol (OMNIPAQUE) 350 MG/ML injection (MULTI-DOSE) 100 mL (100 mL Intravenous Given 7/5/25 1831)   iohexol (OMNIPAQUE) 240 MG/ML solution 50 mL (50 mL Oral Given 7/5/25 1831)   dicyclomine (BENTYL) tablet 20 mg (20 mg Oral Given 7/5/25 1934)   aluminum-magnesium hydroxide-simethicone (MAALOX) oral suspension 30 mL (30 mL Oral Given 7/5/25 1934)       ED Risk Strat Scores                    No data recorded        SBIRT 22yo+      Flowsheet Row Most Recent Value   Initial Alcohol Screen: US AUDIT-C     1. How often do you have a drink containing alcohol? 0 Filed at: 07/05/2025 1714   2. How many drinks containing alcohol do you have on a typical day you are drinking?  0 Filed at: 07/05/2025 1714   3b. FEMALE Any Age, or MALE 65+: How often do you have 4 or more drinks on one occassion? 0 Filed at: 07/05/2025 1714   Audit-C Score 0 Filed at: 07/05/2025 1714   JONATHAN: How many times in the past year have you...    Used an illegal  drug or used a prescription medication for non-medical reasons? Never Filed at: 07/05/2025 9101                            History of Present Illness       Chief Complaint   Patient presents with    Abdominal Pain     N/v/D since Wed, had weight loss surgery in Dec,        Past Medical History[1]   Past Surgical History[2]   Family History[3]   Social History[4]   E-Cigarette/Vaping    E-Cigarette Use Never User       E-Cigarette/Vaping Substances    Nicotine No     THC No     CBD No     Flavoring No     Other No     Unknown No       I have reviewed and agree with the history as documented.       Abdominal Pain  Associated symptoms: nausea and sore throat    Associated symptoms: no chest pain, no chills, no constipation, no cough, no diarrhea, no dysuria, no fever, no hematuria, no shortness of breath and no vomiting        Review of Systems   Constitutional:  Negative for appetite change, chills, diaphoresis, fever and unexpected weight change.   HENT:  Positive for sore throat. Negative for dental problem, ear pain, facial swelling and trouble swallowing.    Eyes:  Negative for pain and visual disturbance.   Respiratory:  Negative for cough, chest tightness and shortness of breath.    Cardiovascular:  Negative for chest pain, palpitations and leg swelling.   Gastrointestinal:  Positive for abdominal pain and nausea. Negative for abdominal distention, constipation, diarrhea and vomiting.   Endocrine: Negative for polyuria.   Genitourinary:  Negative for difficulty urinating, dysuria and hematuria.   Musculoskeletal:  Negative for arthralgias and back pain.   Skin:  Negative for color change and rash.   Neurological:  Negative for dizziness, seizures, syncope, light-headedness and headaches.   Psychiatric/Behavioral:  Negative for confusion.    All other systems reviewed and are negative.          Objective       ED Triage Vitals   Temperature Pulse Blood Pressure Respirations SpO2 Patient Position - Orthostatic VS    07/05/25 1458 07/05/25 1458 07/05/25 1458 07/05/25 1458 07/05/25 1458 07/05/25 1845   98.8 °F (37.1 °C) 87 126/67 20 98 % Lying      Temp src Heart Rate Source BP Location FiO2 (%) Pain Score    -- 07/05/25 1845 07/05/25 1845 -- 07/05/25 1845     Monitor Right arm  8      Vitals      Date and Time Temp Pulse SpO2 Resp BP Pain Score FACES Pain Rating User   07/05/25 2000 -- 68 95 % 16 97/57 -- -- VF   07/05/25 1845 -- 64 100 % 18 126/59 8 -- JS   07/05/25 1458 98.8 °F (37.1 °C) 87 98 % 20 126/67 -- -- AEN            Physical Exam  Vitals and nursing note reviewed.   Constitutional:       General: She is not in acute distress.     Appearance: Normal appearance. She is well-developed. She is not ill-appearing.   HENT:      Head: Normocephalic and atraumatic.      Right Ear: External ear normal.      Left Ear: External ear normal.      Nose: Nose normal.      Mouth/Throat:      Mouth: Mucous membranes are moist.      Tongue: No lesions.      Pharynx: Uvula midline. Posterior oropharyngeal erythema present.     Eyes:      General: No scleral icterus.        Right eye: No discharge.      Extraocular Movements: Extraocular movements intact.      Conjunctiva/sclera: Conjunctivae normal.       Cardiovascular:      Rate and Rhythm: Normal rate and regular rhythm.      Pulses: Normal pulses.      Heart sounds: No murmur heard.  Pulmonary:      Effort: Pulmonary effort is normal. No respiratory distress.      Breath sounds: Normal breath sounds. No wheezing.   Chest:      Chest wall: No tenderness.   Abdominal:      General: Abdomen is flat. There is no distension.      Palpations: Abdomen is soft.      Tenderness: There is no abdominal tenderness.     Musculoskeletal:         General: No swelling, deformity or signs of injury. Normal range of motion.      Cervical back: Normal range of motion and neck supple. No rigidity or tenderness.      Right lower leg: No edema.      Left lower leg: No edema.     Skin:     General: Skin  is warm and dry.      Capillary Refill: Capillary refill takes less than 2 seconds.     Neurological:      General: No focal deficit present.      Mental Status: She is alert and oriented to person, place, and time.      Motor: No weakness.     Psychiatric:         Mood and Affect: Mood normal.         Results Reviewed       Procedure Component Value Units Date/Time    HS Troponin 0hr (reflex protocol) [[id number]]  (Normal) Collected: 07/05/25 1647    Lab Status: Final result Specimen: Blood from Arm, Right Updated: 07/05/25 1718     hs TnI 0hr 3 ng/L     UA w Reflex to Microscopic w Reflex to Culture [[id number]]  (Abnormal) Collected: 07/05/25 1710    Lab Status: Final result Specimen: Urine, Clean Catch Updated: 07/05/25 1717     Color, UA Colorless     Clarity, UA Clear     Specific Gravity, UA 1.008     pH, UA 6.0     Leukocytes, UA Negative     Nitrite, UA Negative     Protein, UA Negative mg/dl      Glucose, UA Trace mg/dl      Ketones, UA Negative mg/dl      Urobilinogen, UA <2.0 mg/dl      Bilirubin, UA Negative     Occult Blood, UA Negative    Comprehensive metabolic panel [[id number]]  (Abnormal) Collected: 07/05/25 1647    Lab Status: Final result Specimen: Blood from Arm, Right Updated: 07/05/25 1712     Sodium 140 mmol/L      Potassium 3.9 mmol/L      Chloride 107 mmol/L      CO2 31 mmol/L      ANION GAP 2 mmol/L      BUN 16 mg/dL      Creatinine 0.63 mg/dL      Glucose 79 mg/dL      Calcium 8.7 mg/dL      AST 27 U/L      ALT 22 U/L      Alkaline Phosphatase 47 U/L      Total Protein 6.3 g/dL      Albumin 3.5 g/dL      Total Bilirubin 0.29 mg/dL      eGFR 102 ml/min/1.73sq m     Narrative:      National Kid[employer] guidelines for Chronic Kidney Disease (CKD):     Stage 1 with normal or high GFR (GFR > 90 mL/min/1.73 square meters)    Stage 2 Mild CKD (GFR = 60-89 mL/min/1.73 square meters)    Stage 3A Moderate CKD (GFR = 45-59 mL/min/1.73 square meters)    Stage 3B Moderate CKD (GFR = 30-44  mL/min/1.73 square meters)    Stage 4 Severe CKD (GFR = 15-29 mL/min/1.73 square meters)    Stage 5 End Stage CKD (GFR <15 mL/min/1.73 square meters)  Note: GFR calculation is accurate only with a steady state creatinine    Lipase [[id number]]  (Abnormal) Collected: 07/05/25 1647    Lab Status: Final result Specimen: Blood from Arm, Right Updated: 07/05/25 1712     Lipase 99 u/L     CBC and differential [[id number]]  (Abnormal) Collected: 07/05/25 1647    Lab Status: Final result Specimen: Blood from Arm, Right Updated: 07/05/25 1655     WBC 7.73 Thousand/uL      RBC 4.43 Million/uL      Hemoglobin 10.4 g/dL      Hematocrit 34.3 %      MCV 77 fL      MCH 23.5 pg      MCHC 30.3 g/dL      RDW 17.2 %      MPV 9.9 fL      Platelets 307 Thousands/uL      nRBC 0 /100 WBCs      Segmented % 50 %      Immature Grans % 0 %      Lymphocytes % 39 %      Monocytes % 9 %      Eosinophils Relative 2 %      Basophils Relative 0 %      Absolute Neutrophils 3.83 Thousands/µL      Absolute Immature Grans 0.02 Thousand/uL      Absolute Lymphocytes 3.01 Thousands/µL      Absolute Monocytes 0.67 Thousand/µL      Eosinophils Absolute 0.18 Thousand/µL      Basophils Absolute 0.02 Thousands/µL             CT abdomen pelvis with contrast   Final Interpretation by Trevor Hagen MD (07/05 2141)      No bowel obstruction or acute inflammatory findings identified in the abdomen or pelvis.   No free air or free fluid.      Punctate nonobstructing right nephrolithiasis.         Workstation performed: [id number]         XR chest 2 views   Final Interpretation by Alba Combs MD (07/06 0943)      No acute cardiopulmonary disease.            Workstation performed: [id number]             Procedures    ED Medication and Procedure Management   Prior to Admission Medications   Prescriptions Last Dose Informant Patient Reported? Taking?   AMILoride 5 mg tablet   No No   Sig: TAKE 2 TABLETS BY MOUTH EVERY DAY   Patient not taking: Reported on  12/31/2024   Calcium-Phosphorus-Vitamin D (CALCIUM GUMMIES PO)   Yes No   Sig: Take by mouth   Patient not taking: Reported on 12/31/2024   Cholecalciferol (EQL VITAMIN D3 GUMMIES PO)   Yes No   Sig: Take by mouth   Patient not taking: Reported on 12/31/2024   Diclofenac Sodium (VOLTAREN) 1 %   No No   Sig: Apply 4 g topically 2 (two) times a day as needed (severe pain)   Patient not taking: Reported on 12/31/2024   Multiple Vitamins-Minerals (MULTI-VITAMIN GUMMIES PO)   Yes No   Sig: Take by mouth   albuterol (PROVENTIL HFA,VENTOLIN HFA) 90 mcg/act inhaler   Yes No   Sig: albuterol sulfate HFA 90 mcg/actuation aerosol inhaler   INHALE 2 PUFFS EVERY 4 HOURS AS NEEDED FOR WHEEZING OR SHORTNESS OF AIR.   Patient not taking: Reported on 12/31/2024   buPROPion (WELLBUTRIN SR) 150 mg 12 hr tablet   No No   Sig: Take 1 tablet (150 mg total) by mouth 2 (two) times a day   celecoxib (CeleBREX) 100 mg capsule   No No   Sig: Take 1 capsule (100 mg total) by mouth 2 (two) times a day   Patient not taking: Reported on 12/31/2024   cyclobenzaprine (FLEXERIL) 10 mg tablet   No No   Sig: Take 1 tablet (10 mg total) by mouth 3 (three) times a day as needed for muscle spasms   Patient not taking: Reported on 12/31/2024   diazepam (VALIUM) 5 mg tablet   No No   Sig: Take 1 tablet (5 mg total) by mouth 2 (two) times a day for 3 days   Patient not taking: Reported on 3/31/2021   furosemide (LASIX) 40 mg tablet   No No   Sig: Take 1 tablet (40 mg total) by mouth daily   gabapentin (NEURONTIN) 300 mg capsule   No No   Sig: TAKE 1 CAPSULE BY MOUTH THREE TIMES A DAY   lisinopril (ZESTRIL) 2.5 mg tablet   No No   Sig: TAKE 1 TABLET BY MOUTH EVERY DAY   loratadine (CLARITIN) 10 mg tablet   No No   Sig: Take 1 tablet (10 mg total) by mouth daily   meclizine (ANTIVERT) 25 mg tablet   No No   Sig: Take 1 tablet (25 mg total) by mouth 3 (three) times a day as needed for dizziness   Patient not taking: Reported on 12/31/2024   methylPREDNISolone  4 MG tablet therapy pack   No No   Sig: Use as directed on package   Patient not taking: Reported on 12/31/2024   nitroglycerin (NITROSTAT) 0.4 mg SL tablet   No No   Sig: Place 1 tablet (0.4 mg total) under the tongue every 5 (five) minutes as needed for chest pain   nystatin (MYCOSTATIN) powder   No No   Sig: APPLY TO AFFECTED AREA 3 TIMES A DAY   omeprazole (PriLOSEC) 20 mg delayed release capsule   No No   Sig: TAKE 1 CAPSULE BY MOUTH EVERY DAY   ondansetron (ZOFRAN-ODT) 4 mg disintegrating tablet   No No   Sig: Take 1 tablet (4 mg total) by mouth every 8 (eight) hours as needed for nausea   potassium chloride (Klor-Con M20) 20 mEq tablet   No No   Sig: Take 1 tablet (20 mEq total) by mouth 3 (three) times a day   promethazine (PHENERGAN) 25 mg tablet   No No   Sig: TAKE 1 TABLET BY MOUTH EVERY 6 HOURS AS NEEDED FOR NAUSEA AND VOMITING   rOPINIRole (REQUIP) 0.25 mg tablet   No No   Sig: TAKE 1 TABLET (0.25 MG TOTAL) BY MOUTH DAILY AT BEDTIME   Patient not taking: Reported on 12/31/2024   sertraline (ZOLOFT) 50 mg tablet   No No   Sig: TAKE 1 TABLET BY MOUTH EVERY DAY      Facility-Administered Medications: None     Discharge Medication List as of 7/5/2025 10:08 PM        START taking these medications    Details   amoxicillin (AMOXIL) 500 mg capsule Take 1 capsule (500 mg total) by mouth every 12 (twelve) hours for 10 days, Starting Sat 7/5/2025, Until Tue 7/15/2025, Normal      diphenhydramine, lidocaine, Al/Mg hydroxide, simethicone (Magic Mouthwash) SUSP Swish and spit 10 mL every 4 (four) hours as needed for mouth pain or discomfort, Starting Sat 7/5/2025, Normal      !! promethazine (PHENERGAN) 25 mg tablet Take 1 tablet (25 mg total) by mouth every 6 (six) hours as needed for nausea or vomiting, Starting Sat 7/5/2025, Normal       !! - Potential duplicate medications found. Please discuss with provider.        CONTINUE these medications which have NOT CHANGED    Details   albuterol (PROVENTIL HFA,VENTOLIN  HFA) 90 mcg/act inhaler albuterol sulfate HFA 90 mcg/actuation aerosol inhaler   INHALE 2 PUFFS EVERY 4 HOURS AS NEEDED FOR WHEEZING OR SHORTNESS OF AIR., Historical Med      AMILoride 5 mg tablet TAKE 2 TABLETS BY MOUTH EVERY DAY, Normal      buPROPion (WELLBUTRIN SR) 150 mg 12 hr tablet Take 1 tablet (150 mg total) by mouth 2 (two) times a day, Starting Tue 7/14/2020, Normal      Calcium-Phosphorus-Vitamin D (CALCIUM GUMMIES PO) Take by mouth, Historical Med      celecoxib (CeleBREX) 100 mg capsule Take 1 capsule (100 mg total) by mouth 2 (two) times a day, Starting Fri 6/11/2021, Normal      Cholecalciferol (EQL VITAMIN D3 GUMMIES PO) Take by mouth, Historical Med      cyclobenzaprine (FLEXERIL) 10 mg tablet Take 1 tablet (10 mg total) by mouth 3 (three) times a day as needed for muscle spasms, Starting Fri 6/11/2021, Normal      diazepam (VALIUM) 5 mg tablet Take 1 tablet (5 mg total) by mouth 2 (two) times a day for 3 days, Starting Tue 3/30/2021, Until Fri 4/2/2021, Normal      Diclofenac Sodium (VOLTAREN) 1 % Apply 4 g topically 2 (two) times a day as needed (severe pain), Starting Fri 6/11/2021, Normal      furosemide (LASIX) 40 mg tablet Take 1 tablet (40 mg total) by mouth daily, Starting Tue 8/3/2021, Normal      gabapentin (NEURONTIN) 300 mg capsule TAKE 1 CAPSULE BY MOUTH THREE TIMES A DAY, Normal      lisinopril (ZESTRIL) 2.5 mg tablet TAKE 1 TABLET BY MOUTH EVERY DAY, Normal      loratadine (CLARITIN) 10 mg tablet Take 1 tablet (10 mg total) by mouth daily, Starting Fri 6/11/2021, Normal      meclizine (ANTIVERT) 25 mg tablet Take 1 tablet (25 mg total) by mouth 3 (three) times a day as needed for dizziness, Starting Tue 3/30/2021, Normal      methylPREDNISolone 4 MG tablet therapy pack Use as directed on package, Normal      Multiple Vitamins-Minerals (MULTI-VITAMIN GUMMIES PO) Take by mouth, Historical Med      nitroglycerin (NITROSTAT) 0.4 mg SL tablet Place 1 tablet (0.4 mg total) under the tongue  every 5 (five) minutes as needed for chest pain, Starting Tue 7/14/2020, Normal      nystatin (MYCOSTATIN) powder APPLY TO AFFECTED AREA 3 TIMES A DAY, Normal      omeprazole (PriLOSEC) 20 mg delayed release capsule TAKE 1 CAPSULE BY MOUTH EVERY DAY, Normal      ondansetron (ZOFRAN-ODT) 4 mg disintegrating tablet Take 1 tablet (4 mg total) by mouth every 8 (eight) hours as needed for nausea, Starting Tue 3/30/2021, Normal      potassium chloride (Klor-Con M20) 20 mEq tablet Take 1 tablet (20 mEq total) by mouth 3 (three) times a day, Starting Tue 7/14/2020, Normal      !! promethazine (PHENERGAN) 25 mg tablet TAKE 1 TABLET BY MOUTH EVERY 6 HOURS AS NEEDED FOR NAUSEA AND VOMITING, Normal      rOPINIRole (REQUIP) 0.25 mg tablet TAKE 1 TABLET (0.25 MG TOTAL) BY MOUTH DAILY AT BEDTIME, Starting Fri 8/6/2021, Normal      sertraline (ZOLOFT) 50 mg tablet TAKE 1 TABLET BY MOUTH EVERY DAY, Normal       !! - Potential duplicate medications found. Please discuss with provider.        No discharge procedures on file.  ED SEPSIS DOCUMENTATION   Time reflects when diagnosis was documented in both MDM as applicable and the Disposition within this note       Time User Action Codes Description Comment    7/5/2025  9:59 PM Baljit Pinto Add [R10.9] Abdominal pain     7/5/2025  9:59 PM VoBaljit Add [J02.9] Sore throat     7/5/2025  9:59 PM VoBaljit Add [J02.9] Pharyngitis     7/5/2025  9:59 PM Baljit Pinto Remove [J02.9] Pharyngitis     7/5/2025 10:03 PM VoBaljit Add [R11.2] Nausea & vomiting                    Baljit Vo, DO  07/08/25 1821         [1]   Past Medical History:  Diagnosis Date    Anxiety     Chronic kidney disease     Depression     GERD (gastroesophageal reflux disease)     Hypertension     Kidney disease     Kidney stone    [2]   Past Surgical History:  Procedure Laterality Date    CHOLECYSTECTOMY      DENTAL SURGERY      [place] teeth extraction    ENDOMETRIAL ABLATION      GASTRIC RESTRICTION SURGERY      LITHOTRIPSY      TUBAL  LIGATION     [3]   Family History  Problem Relation Name Age of Onset    Cirrhosis Mother      Kidney failure Mother      Diabetes Mother      Hypertension Mother      Kidney disease Mother      Kidney failure Father      Heart disease Father      Hypertension Father      Alcohol abuse Father      Kidney disease Father      Alcohol abuse Paternal Grandmother      Mental illness Maternal Uncle      Substance Abuse Neg Hx     [4]   Social History  Tobacco Use    Smoking status: Never    Smokeless tobacco: Never   Vaping Use    Vaping status: Never Used   Substance Use Topics    Alcohol use: Yes     Comment: Rarely    Drug use: Never        Baljit Pinto DO  07/08/25 182

## 2025-07-05 NOTE — PROGRESS NOTES
"[facility]  Name: Samantha Martines      : 1970      MRN: [id number]  Encounter Provider: SHANE SEAMAN  Encounter Date: 2025   Encounter department: [facility] [place]  :  Assessment & Plan  Sore throat    Orders:    POCT rapid ANTIGEN strepA    Throat culture; Future    Abdominal pain, unspecified abdominal location    Orders:    Transfer to other facility        Patient Instructions  Follow up with PCP in 3-5 days.  Proceed to  ER if symptoms worsen.    If tests are performed, our office will contact you with results only if changes need to made to the care plan discussed with you at the visit. [facility]    Chief Complaint:   Chief Complaint   Patient presents with    Vomiting     Patient with throwing and diarrhea since Wednesday. Patient also with sore throat. Living at home with aunt who has strep.      History of Present Illness   Patient presents with 3 days of abdominal pain, nausea, vomiting, sore throat, and diarrhea.  Abdominal pain is a sharp stabbing pain described as \"like somebody stabbing me with a knife and turning it\" in the lower abdomen.  States she has been taking Zofran to stop the vomiting.  Denies dysuria, urinary frequency or similar occurrences.  States she has had bariatric surgery and recent skin removal.  States she has never felt pain like this before.    Vomiting   Associated symptoms include abdominal pain and diarrhea.         Review of Systems   HENT:  Positive for sore throat.    Gastrointestinal:  Positive for abdominal pain, diarrhea and vomiting.   Genitourinary:  Negative for dysuria and frequency.     Past Medical History   Past Medical History[1]  Past Surgical History[2]  Family History[3]  she reports that she has never smoked. She has never used smokeless tobacco. She reports current alcohol use. She reports that she does not use drugs.  Current Outpatient Medications   Medication " Instructions    albuterol (PROVENTIL HFA,VENTOLIN HFA) 90 mcg/act inhaler albuterol sulfate HFA 90 mcg/actuation aerosol inhaler   INHALE 2 PUFFS EVERY 4 HOURS AS NEEDED FOR WHEEZING OR SHORTNESS OF AIR.    AMILoride 5 mg tablet TAKE 2 TABLETS BY MOUTH EVERY DAY    buPROPion (WELLBUTRIN SR) 150 mg, Oral, 2 times daily    Calcium-Phosphorus-Vitamin D (CALCIUM GUMMIES PO) Take by mouth    celecoxib (CELEBREX) 100 mg, Oral, 2 times daily    Cholecalciferol (EQL VITAMIN D3 GUMMIES PO) Take by mouth    cyclobenzaprine (FLEXERIL) 10 mg, Oral, 3 times daily PRN    diazepam (VALIUM) 5 mg, Oral, 2 times daily    Diclofenac Sodium (VOLTAREN) 4 g, Topical, 2 times daily PRN    furosemide (LASIX) 40 mg, Oral, Daily    gabapentin (NEURONTIN) 300 mg capsule TAKE 1 CAPSULE BY MOUTH THREE TIMES A DAY    lisinopril (ZESTRIL) 2.5 mg tablet TAKE 1 TABLET BY MOUTH EVERY DAY    loratadine (CLARITIN) 10 mg, Oral, Daily    meclizine (ANTIVERT) 25 mg, Oral, 3 times daily PRN    methylPREDNISolone 4 MG tablet therapy pack Use as directed on package    Multiple Vitamins-Minerals (MULTI-VITAMIN GUMMIES PO) Take by mouth    nitroglycerin (NITROSTAT) 0.4 mg, Sublingual, Every 5 minutes PRN    nystatin (MYCOSTATIN) powder APPLY TO AFFECTED AREA 3 TIMES A DAY    omeprazole (PriLOSEC) 20 mg delayed release capsule TAKE 1 CAPSULE BY MOUTH EVERY DAY    ondansetron (ZOFRAN-ODT) 4 mg, Oral, Every 8 hours PRN    potassium chloride (Klor-Con M20) 20 mEq tablet 20 mEq, Oral, 3 times daily    promethazine (PHENERGAN) 25 mg tablet TAKE 1 TABLET BY MOUTH EVERY 6 HOURS AS NEEDED FOR NAUSEA AND VOMITING    rOPINIRole (REQUIP) 0.25 mg, Oral, Daily at bedtime    sertraline (ZOLOFT) 50 mg tablet TAKE 1 TABLET BY MOUTH EVERY DAY   Allergies[4]     Objective   /89   Pulse 56   Temp 97.6 °F (36.4 °C)   Resp 16   Wt 70.3 kg (155 lb)   SpO2 100%   BMI 26.61 kg/m²      Physical Exam  Constitutional:       Appearance: Normal appearance.   HENT:      Nose:  "Nose normal.      Mouth/Throat:      Mouth: Mucous membranes are moist.      Pharynx: Oropharynx is clear.   Abdominal:      General: Abdomen is flat.      Tenderness: There is abdominal tenderness. There is no guarding or rebound.     Neurological:      Mental Status: She is alert.     Psychiatric:         Mood and Affect: Mood normal.         Behavior: Behavior normal.         Portions of the record may have been created with voice recognition software.  Occasional wrong word or \"sound a like\" substitutions may have occurred due to the inherent limitations of voice recognition software.  Read the chart carefully and recognize, using context, where substitutions have occurred.       [1]   Past Medical History:  Diagnosis Date    Anxiety     Chronic kidney disease     Depression     GERD (gastroesophageal reflux disease)     Hypertension     Kidney disease     Kidney stone    [2]   Past Surgical History:  Procedure Laterality Date    CHOLECYSTECTOMY      DENTAL SURGERY      [place] teeth extraction    ENDOMETRIAL ABLATION      GASTRIC RESTRICTION SURGERY      LITHOTRIPSY      TUBAL LIGATION     [3]   Family History  Problem Relation Name Age of Onset    Cirrhosis Mother      Kidney failure Mother      Diabetes Mother      Hypertension Mother      Kidney disease Mother      Kidney failure Father      Heart disease Father      Hypertension Father      Alcohol abuse Father      Kidney disease Father      Alcohol abuse Paternal Grandmother      Mental illness Maternal Uncle      Substance Abuse Neg Hx     [4]   Allergies  Allergen Reactions    Sulfa Antibiotics Throat Swelling and Anaphylaxis     "

## 2025-07-05 NOTE — ED ATTENDING ATTESTATION
7/5/2025  I, Bernarda Arshad DO, saw and evaluated the patient. I have discussed the patient with the resident/non-physician practitioner and agree with the resident's/non-physician practitioner's findings, Plan of Care, and MDM as documented in the resident's/non-physician practitioner's note, except where noted. All available labs and Radiology studies were reviewed.  I was present for key portions of any procedure(s) performed by the resident/non-physician practitioner and I was immediately available to provide assistance.       At this point I agree with the current assessment done in the Emergency Department.  I have conducted an independent evaluation of this patient a history and physical is as follows:    ED Course         Critical Care Time  Procedures    54-year-old female presents to the ED with a 3-day history of nonbloody, nonbilious vomiting and nonbloody diarrhea as well as sore throat.  She has been babysitting her grandchildren who had similar symptoms and tested positive for strep.  She has had no fevers or chills.  She also complains of lower abdominal pain.  On exam she looks well in no distress.  Her mucous membranes are moist.  Her pharynx is only slightly erythematous.  She was seen at an urgent care center today and had a rapid strep which was negative and has been sent down for culture.  She does not appear clinically dehydrated her heart rate is normal.  She does have right lower quadrant tenderness with some guarding on exam.  Suspect that her symptoms are likely viral but will check basic labs and CT abdomen pelvis to rule out mainly acute appendicitis.  Will check urine to rule out UTI.  Likely will discharge with treatment for symptoms if all else is negative.  May prescribe prescription for amoxicillin and patient can wait for the strep culture and if positive can start the antibiotic

## 2025-07-06 LAB — BACTERIA THROAT CULT: NORMAL

## 2025-07-06 NOTE — DISCHARGE INSTRUCTIONS
You were evaluated in the Emergency Department today for abdominal pain and sore throat.    Can take motrin and tylenol together every 6 hours for pain control. You were given a prescription for Amoxicillin, wait to for the culture to result, if positive you can start taking it twice a day for 10 days.    Please schedule an appointment with your primary care physician within the next 2-3 days.    Return to the Emergency Department if you experience worsening or uncontrolled pain, fevers 100.4°F or greater, recurrent vomiting, inability to tolerate food or fluids by mouth, bloody stools or vomit, black or tarry stools, or any other concerning symptoms.    Thank you for choosing us for your care.

## 2025-07-07 LAB — BACTERIA THROAT CULT: NORMAL

## (undated) DEVICE — GLV SURG SENSICARE GREEN W/ALOE PF LF 6.5 STRL

## (undated) DEVICE — CATH DIAG EXPO M/ PK 6FR FL4/FR4 PIG 3PK

## (undated) DEVICE — BITEBLOCK ENDO W/STRAP 60F A/ LF DISP

## (undated) DEVICE — Device: Brand: DISPOSABLE TRIPLE LUMEN SPHINCTEROTOME

## (undated) DEVICE — ENDOPOUCH RETRIEVER SPECIMEN RETRIEVAL BAGS: Brand: ENDOPOUCH RETRIEVER

## (undated) DEVICE — SUT ETHLN 3-0 FS118IN 663H

## (undated) DEVICE — GLIDESHEATH BASIC HYDROPHILIC COATED INTRODUCER SHEATH: Brand: GLIDESHEATH

## (undated) DEVICE — STERILE POLYISOPRENE POWDER-FREE SURGICAL GLOVES WITH EMOLLIENT COATING: Brand: PROTEXIS

## (undated) DEVICE — GLV SURG SENSICARE GREEN W/ALOE PF LF 8.5 STRL

## (undated) DEVICE — SPLNT WRST RAD 3560

## (undated) DEVICE — SOL IRRIG NACL 1000ML

## (undated) DEVICE — DRSNG GZ CURAD XEROFORM NONADHS 5X9IN STRL

## (undated) DEVICE — BNDG ELAS ELITE V/CLOSE 4IN 5YD LF STRL

## (undated) DEVICE — GLV SURG SENSICARE PI ORTHO SZ8 LF STRL

## (undated) DEVICE — RADIFOCUS OPTITORQUE ANGIOGRAPHIC CATHETER: Brand: OPTITORQUE

## (undated) DEVICE — SOL IRR LACT RNG BG 3000ML

## (undated) DEVICE — GW PERIPH GUIDERIGHT STD/J/TP PTFE/PCOAT SS 0.038IN 5X150CM

## (undated) DEVICE — COVER,MAYO STAND,STERILE: Brand: MEDLINE

## (undated) DEVICE — PAD UNDERCAST WYTEX 4IN 4YD LF STRL

## (undated) DEVICE — SHEET,DRAPE,53X77,STERILE: Brand: MEDLINE

## (undated) DEVICE — GOWN,PREVENTION PLUS,XLNG/XXLARGE,STRL: Brand: MEDLINE

## (undated) DEVICE — TP SXN YANKR BLB TIP W/TBG 10F LF STRL

## (undated) DEVICE — GLV SURG SENSICARE POLYISPRN W/ALOE PF LF 6.5 GRN STRL

## (undated) DEVICE — GLV SURG SENSICARE GREEN W/ALOE PF LF 7 STRL

## (undated) DEVICE — PAD,ABDOMINAL,8"X10",ST,LF: Brand: MEDLINE

## (undated) DEVICE — MODEL BT2000 P/N 700287-012KIT CONTENTS: MANIFOLD WITH SALINE AND CONTRAST PORTS, SALINE TUBING WITH SPIKE AND HAND SYRINGE, TRANSDUCER: Brand: BT2000 AUTOMATED MANIFOLD KIT

## (undated) DEVICE — ENDOPATH XCEL BLADELESS TROCARS WITH STABILITY SLEEVES: Brand: ENDOPATH XCEL

## (undated) DEVICE — MODEL AT P65, P/N 701554-001KIT CONTENTS: HAND CONTROLLER, 3-WAY HIGH-PRESSURE STOPCOCK WITH ROTATING END AND PREMIUM HIGH-PRESSURE TUBING: Brand: ANGIOTOUCH® KIT

## (undated) DEVICE — KT INTRO MINISTICK MAX W/GW NITNL/TUNG ECHO 4F 21G 7CM

## (undated) DEVICE — SUT VIC 0/0 UR6 27IN DYED J603H

## (undated) DEVICE — PAD GRND REM POLYHESIVE A/ DISP

## (undated) DEVICE — GLV SURG SENSICARE PI ORTHO SZ7.5 LF STRL

## (undated) DEVICE — SUT PDS CLOSURE CT1 1/0 27IN Z341H

## (undated) DEVICE — SINGLE USE CANNULA: Brand: SINGLE USE CANNULA

## (undated) DEVICE — GLV SURG TRIUMPH NATURAL W/ALOE PF LTX 7.5 STRL

## (undated) DEVICE — CYSTO/BLADDER IRRIGATION SET, REGULATING CLAMP

## (undated) DEVICE — ENDOPATH XCEL BLUNT TIP TROCARS WITH SMOOTH SLEEVES: Brand: ENDOPATH XCEL

## (undated) DEVICE — BNDG ELAS ELITE V/CLOSE 6IN 5YD LF STRL

## (undated) DEVICE — TBG PENCL TELESCP MEGADYNE SMOKE EVAC 10FT

## (undated) DEVICE — GW PERIPH GUIDERIGHT STD/EXCHNG/J/TIP SS 0.038IN 5X260CM

## (undated) DEVICE — SOL IRR NACL 0.9PCT BT 1000ML

## (undated) DEVICE — PK KN ARTHSCP LF 60

## (undated) DEVICE — CONN TBG Y 6 IN 1 LF STRL

## (undated) DEVICE — INTRO SHEATH ART/FEM ENGAGE .038 6F12CM

## (undated) DEVICE — GOWN,AURORA,NOREINF,RAGLAN,XL,STERILE: Brand: MEDLINE

## (undated) DEVICE — SPNG GZ WOVN 4X4IN 12PLY 10/BX STRL

## (undated) DEVICE — PK LAP CHOLE LF 60

## (undated) DEVICE — CVR SURG EQUIP BND RECTG 36X28

## (undated) DEVICE — ELECTRODE,RT,STRESS,FOAM,50PK: Brand: MEDLINE

## (undated) DEVICE — SPNG LAP 18X18IN LF STRL PK/5

## (undated) DEVICE — GLV SURG TRIUMPH LT PF LTX 8 STRL

## (undated) DEVICE — SINGLE-USE BIOPSY FORCEPS: Brand: RADIAL JAW 4

## (undated) DEVICE — ANTIBACTERIAL UNDYED BRAIDED (POLYGLACTIN 910), SYNTHETIC ABSORBABLE SUTURE: Brand: COATED VICRYL

## (undated) DEVICE — SKIN AFFIX SURG ADHESIVE 72/CS 0.55ML: Brand: MEDLINE

## (undated) DEVICE — PROXIMATE SKIN STAPLERS (35 WIDE) CONTAINS 35 STAINLESS STEEL STAPLES (FIXED HEAD): Brand: PROXIMATE

## (undated) DEVICE — BLD SHAVER TORPEDO COOLCUT 4.0MM 13CM

## (undated) DEVICE — MONOPOLAR METZENBAUM SCISSOR TIP, DISPOSABLE: Brand: MONOPOLAR METZENBAUM SCISSOR TIP, DISPOSABLE

## (undated) DEVICE — GLV SURG SENSICARE PI ORTHO PF SZ7 LF STRL

## (undated) DEVICE — TR BAND RADIAL ARTERY COMPRESSION DEVICE: Brand: TR BAND

## (undated) DEVICE — DEV BIL POSITION RX LK OLYMPUS/FUJINON CAP SYS

## (undated) DEVICE — PK CATH LAB 60

## (undated) DEVICE — GLV SURG TRIUMPH LT PF LTX 6.5 STRL

## (undated) DEVICE — NDL ANGIOGR ADV THN SMOTH SGLWALL 21G 1.5